# Patient Record
Sex: FEMALE | Race: WHITE | NOT HISPANIC OR LATINO | Employment: UNEMPLOYED | URBAN - METROPOLITAN AREA
[De-identification: names, ages, dates, MRNs, and addresses within clinical notes are randomized per-mention and may not be internally consistent; named-entity substitution may affect disease eponyms.]

---

## 2017-01-16 ENCOUNTER — OFFICE VISIT (OUTPATIENT)
Dept: OBSTETRICS AND GYNECOLOGY | Facility: CLINIC | Age: 77
End: 2017-01-16
Attending: OBSTETRICS & GYNECOLOGY
Payer: MEDICARE

## 2017-01-16 VITALS
WEIGHT: 154.31 LBS | SYSTOLIC BLOOD PRESSURE: 120 MMHG | HEIGHT: 68 IN | DIASTOLIC BLOOD PRESSURE: 70 MMHG | BODY MASS INDEX: 23.39 KG/M2

## 2017-01-16 DIAGNOSIS — Z79.890 POSTMENOPAUSAL HORMONE REPLACEMENT THERAPY: ICD-10-CM

## 2017-01-16 DIAGNOSIS — Z91.89 ENCOUNTER FOR GYNECOLOGIC EXAMINATION FOR HIGH-RISK PATIENT COVERED BY MEDICARE: Primary | ICD-10-CM

## 2017-01-16 DIAGNOSIS — Z12.31 SCREENING MAMMOGRAM FOR HIGH-RISK PATIENT: ICD-10-CM

## 2017-01-16 PROCEDURE — 99999 PR PBB SHADOW E&M-EST. PATIENT-LVL II: CPT | Mod: PBBFAC,,, | Performed by: OBSTETRICS & GYNECOLOGY

## 2017-01-16 PROCEDURE — G0101 CA SCREEN;PELVIC/BREAST EXAM: HCPCS | Mod: PBBFAC | Performed by: OBSTETRICS & GYNECOLOGY

## 2017-01-16 PROCEDURE — G0101 CA SCREEN;PELVIC/BREAST EXAM: HCPCS | Mod: S$PBB,,, | Performed by: OBSTETRICS & GYNECOLOGY

## 2017-01-16 PROCEDURE — 99212 OFFICE O/P EST SF 10 MIN: CPT | Mod: PBBFAC | Performed by: OBSTETRICS & GYNECOLOGY

## 2017-01-16 RX ORDER — ESTRADIOL AND NORETHINDRONE ACETATE .5; .1 MG/1; MG/1
1 TABLET ORAL DAILY
Qty: 90 TABLET | Refills: 3 | Status: SHIPPED | OUTPATIENT
Start: 2017-01-16 | End: 2017-07-03

## 2017-01-16 NOTE — PROGRESS NOTES
Subjective:       Patient ID: Shelly Vásquez is a 76 y.o. female.    Chief Complaint:  Annual Exam      History of Present Illness  HPI  Shelly Vásquez is a 76 y.o. female  here for her annual GYN exam.  She reports that she wants to stay on HRT because she feels better on it.  She describes her periods as stopped with menopause many years ago.  Denies break through bleeding.   Denies vaginal itching or irritation.  Denies vaginal discharge.  She is not currently sexually active. She has had 1 partner for 6 years .     History of abnormal pap: No  Last Pap: was normal  Last MMG: normal-May 2,2016:routine follow-up in 12 months  Last Colonoscopy:  colonoscopy 3 years ago without abnormalities.(2014- one sessile polyp, advised to return in 5 years)  Denies domestic violence. She does feel safe at home.     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     Social History Main Topics    Smoking status: Former Smoker     Packs/day: 1.00     Years: 22.00     Types: Cigarettes     Quit date: 1980    Smokeless tobacco: Never Used    Alcohol use 4.2 oz/week     7 Glasses of wine per week      Comment: Socially    Drug use: No    Sexual activity: Yes     Partners: Male     Birth control/ protection:  "Post-menopausal     Other Topics Concern    Not on file     Social History Narrative     Family History   Problem Relation Age of Onset    No Known Problems Father     No Known Problems Mother     No Known Problems Sister     No Known Problems Brother     No Known Problems Maternal Aunt     No Known Problems Maternal Uncle     No Known Problems Paternal Aunt     No Known Problems Paternal Uncle     No Known Problems Maternal Grandmother     No Known Problems Maternal Grandfather     No Known Problems Paternal Grandmother     No Known Problems Paternal Grandfather     Breast cancer Neg Hx     Colon cancer Neg Hx     Ovarian cancer Neg Hx     Amblyopia Neg Hx     Blindness Neg Hx     Cancer Neg Hx     Cataracts Neg Hx     Diabetes Neg Hx     Glaucoma Neg Hx     Hypertension Neg Hx     Macular degeneration Neg Hx     Retinal detachment Neg Hx     Strabismus Neg Hx     Stroke Neg Hx     Thyroid disease Neg Hx      OB History      Para Term  AB TAB SAB Ectopic Multiple Living    2 2 2       2          Visit Vitals    /70    Ht 5' 8" (1.727 m)    Wt 70 kg (154 lb 5.2 oz)    LMP  (LMP Unknown)    BMI 23.46 kg/m2           GYN & OB History  No LMP recorded (lmp unknown). Patient is postmenopausal.   Date of Last Pap: No result found    OB History    Para Term  AB SAB TAB Ectopic Multiple Living   2 2 2       2      # Outcome Date GA Lbr Marquez/2nd Weight Sex Delivery Anes PTL Lv   2 Term      Vag-Spont   Y   1 Term      Vag-Spont   Y          Review of Systems  Review of Systems   Constitutional: Negative for activity change, appetite change, fatigue and unexpected weight change.   HENT: Negative.    Eyes: Negative for visual disturbance.   Respiratory: Negative for shortness of breath and wheezing.    Cardiovascular: Negative for chest pain, palpitations and leg swelling.   Gastrointestinal: Negative for abdominal pain, bloating and blood in stool. "   Endocrine: Negative for diabetes, hair loss and hot flashes.   Genitourinary: Negative for decreased libido, dyspareunia and postmenopausal bleeding.   Musculoskeletal: Negative for back pain and joint swelling.   Skin:  Negative for no acne and hair changes.   Neurological: Negative for headaches.   Hematological: Does not bruise/bleed easily.   Psychiatric/Behavioral: Negative for depression and sleep disturbance. The patient is not nervous/anxious.    Breast: Negative for breast pain and nipple discharge          Objective:    Physical Exam:   Constitutional: She is oriented to person, place, and time. She appears well-developed and well-nourished.    HENT:   Head: Normocephalic and atraumatic.    Eyes: EOM are normal. Pupils are equal, round, and reactive to light.    Neck: Normal range of motion. Neck supple.    Cardiovascular: Normal rate and regular rhythm.     Pulmonary/Chest: Effort normal and breath sounds normal.   BREASTS: Symmetrical, no skin changes or visible lesions.  No palpable masses, nipple discharge bilaterally.          Abdominal: Soft. Bowel sounds are normal.     Genitourinary: Vagina normal. Pelvic exam was performed with patient supine.   Genitourinary Comments: PELVIC: Normal external genitalia without lesions.  Normal hair distribution.  Adequate perineal body, normal urethral meatus.  Vagina  Dry and poorly rugated, atrophic, without lesions or discharge.  Cervix pink, without lesions, discharge or tenderness.  No significant cystocele or rectocele.  Bimanual exam shows uterus to be normal size, regular, mobile and nontender.  Adnexa without masses or tenderness.    RECTAL:Deferred             Musculoskeletal: Normal range of motion and moves all extremeties.       Neurological: She is alert and oriented to person, place, and time.    Skin: Skin is warm and dry.    Psychiatric: She has a normal mood and affect.          Assessment:        1. Encounter for gynecologic examination for  high-risk patient covered by Medicare    2. Postmenopausal hormone replacement therapy    3. Screening mammogram for high-risk patient                Plan:      .1. Encounter for gynecologic examination for high-risk patient covered by Medicare  COUNSELING:  The patient was counseled today on osteoporosis prevention, calcium supplementation, and regular weight bearing exercise. The patient was also counseled today on ACS PAP guidelines, with recommendations for yearly pelvic exams unless their uterus, cervix, and ovaries were removed for benign reasons; in that case, examinations every 3-5 years are recommended. The patient was also counseled regarding monthly breast self-examination, routine STD screening for at-risk populations, prophylactic immunizations for transmitted infections such as HPV, Pertussis, or Influenza as appropriate, and yearly mammograms when indicated by ACS guidelines. She was advised to see her primary care physician for all other health maintenance.   FOLLOW-UP with me for next routine visit.         2. Postmenopausal hormone replacement therapy  A full discussion of the benefit-risk ratio of hormonal replacement therapy was carried out. Improvement in vasomotor and other climacteric symptoms is discussed, including possible improvements in sleep and mood. Reduction of risk for osteoporosis was explained. We discussed the study data showing increased risk of thrombo-embolic events such as myocardial infarction, stroke and also possibly breast cancer with estrogen replacement, and how this might affect her. The range of side effects such as breast tenderness, weight gain and including possible increases in lifetime risk of breast cancer and possible thrombotic complications was discussed. We also discussed ACOG's recommendation to use hormone replacement therapy for the relief of hot flashes alone and to be on the lowest dose possible for the shortest amount of time.  Alternative such as  herbal and soy-based products were reviewed. All of her questions about this therapy were answered.    - estradiol-norethindrone acet 0.5-0.1 mg per tablet; Take 1 tablet by mouth once daily.  Dispense: 90 tablet; Refill: 3    3. Screening mammogram for high-risk patient         Return in about 2 years (around 1/16/2019).

## 2017-01-19 ENCOUNTER — OFFICE VISIT (OUTPATIENT)
Dept: OPTOMETRY | Facility: CLINIC | Age: 77
End: 2017-01-19
Payer: MEDICARE

## 2017-01-19 ENCOUNTER — OFFICE VISIT (OUTPATIENT)
Dept: OPTOMETRY | Facility: CLINIC | Age: 77
End: 2017-01-19

## 2017-01-19 DIAGNOSIS — Z46.0 FITTING AND ADJUSTMENT OF SPECTACLES AND CONTACT LENSES: Primary | ICD-10-CM

## 2017-01-19 DIAGNOSIS — H52.4 PRESBYOPIA: ICD-10-CM

## 2017-01-19 DIAGNOSIS — H25.13 SENILE NUCLEAR SCLEROSIS, BILATERAL: Primary | ICD-10-CM

## 2017-01-19 DIAGNOSIS — Z13.5 GLAUCOMA SCREENING: ICD-10-CM

## 2017-01-19 PROCEDURE — 92014 COMPRE OPH EXAM EST PT 1/>: CPT | Mod: S$GLB,,, | Performed by: OPTOMETRIST

## 2017-01-19 PROCEDURE — 92015 DETERMINE REFRACTIVE STATE: CPT | Mod: S$GLB,,, | Performed by: OPTOMETRIST

## 2017-01-19 PROCEDURE — 92310 CONTACT LENS FITTING OU: CPT | Mod: S$GLB,,, | Performed by: OPTOMETRIST

## 2017-01-19 PROCEDURE — 99999 PR PBB SHADOW E&M-EST. PATIENT-LVL II: CPT | Mod: PBBFAC,,, | Performed by: OPTOMETRIST

## 2017-01-19 NOTE — PROGRESS NOTES
HPI     DLS: 12/07/2015 Dr. Esquivel    Patient is here for her annual exam. She states she has been doing well   with her CTL's. She states she wears one lens in the left eye for reading.   Denies flashes, floaters, diplopia, photophobia, glare, HA's, or pain.         Last edited by Juan R Esquivel, OD on 1/19/2017  2:26 PM.         Assessment /Plan     For exam results, see Encounter Report.    Senile nuclear sclerosis, bilateral  -Educated patient on presence of cataracts at today's exam, monitor at annual dilated fundus exam. 5+ years surgical estimate.    Glaucoma screening  -Monitor with annual eye exam and IOP check    Presbyopia  Eyeglass Final Rx     Eyeglass Final Rx      Sphere Cylinder Axis Add   Right [place] Sphere  +2.75   Left +0.25 +0.50 010 +2.75       Expiration Date:  1/20/2018              Contact Lens Final Rx     Final Contact Lens Rx      Brand Base Curve Diameter Sphere Cylinder   Right - - -     Left Acuvue Oasys 1 day 8.5 14.3 +3.00 Sphere       Expiration Date:  1/20/2018    Replacement:  Daily    Wearing Schedule:  Daily wear                  RTC 1 yr

## 2017-01-19 NOTE — MR AVS SNAPSHOT
[facility]  [address]  [facility] [address]  Phone: [phone]  Fax: [phone]                  Shelly Vásquez   2017 1:30 PM   Office Visit    Description:  Female : 1940   Provider:  Juan R Esquivel OD   Department:  [facility]           Reason for Visit     Cataract           Diagnoses this Visit        Comments    Senile nuclear sclerosis, bilateral    -  Primary     Glaucoma screening         Presbyopia                To Do List           Future Appointments        Provider Department Dept Phone    2017 2:00 PM [facility] MRI1 [address] [facility] [phone]    2017 3:00 PM Chaka Martin MD [facility] [phone]      Goals (5 Years of Data)     None      Follow-Up and Disposition     Return in about 1 year (around 2018).      [facility]     [facility] Care [facility] -  Assistance  Registered nurses in the [facility] provide clinical advisement, health education, appointment booking, and other advisory services.  Call for this free service at [phone].             Medications           Message regarding Medications     Verify the changes and/or additions to your medication regime listed below are the same as discussed with your clinician today.  If any of these changes or additions are incorrect, please notify your healthcare provider.             Verify that the below list of medications is an accurate representation of the medications you are currently taking.  If none reported, the list may be blank. If incorrect, please contact your healthcare provider. Carry this list with you in case of emergency.           Current Medications     acetaminophen (TYLENOL) 325 MG tablet Take 325 mg by mouth continuous prn for Pain.    epinephrine (EPIPEN) 0.3 mg/0.3 mL (1:1,000) AtIn Inject 1 each into the muscle once as needed (for stinging insect allergy).      estradiol-norethindrone acet 0.5-0.1 mg per tablet Take 1 tablet by mouth once daily.    gabapentin (NEURONTIN) 300 MG capsule Take 300 mg by mouth.    sucralfate (CARAFATE) 100 mg/mL suspension Take 10 mLs (1 g total) by mouth every 6 (six) hours.    temazepam (RESTORIL) 15 mg Cap Take 1 capsule (15 mg total) by mouth nightly.           Clinical Reference Information           Vital Signs - Last Recorded     LMP                   (LMP Unknown)           Allergies as of 1/19/2017     No Known Allergies      Immunizations Administered on Date of Encounter - 1/19/2017     None

## 2017-01-24 ENCOUNTER — PATIENT MESSAGE (OUTPATIENT)
Dept: OBSTETRICS AND GYNECOLOGY | Facility: CLINIC | Age: 77
End: 2017-01-24

## 2017-01-24 ENCOUNTER — OFFICE VISIT (OUTPATIENT)
Dept: SPINE | Facility: CLINIC | Age: 77
End: 2017-01-24
Attending: NEUROLOGICAL SURGERY
Payer: MEDICARE

## 2017-01-24 ENCOUNTER — HOSPITAL ENCOUNTER (OUTPATIENT)
Dept: RADIOLOGY | Facility: OTHER | Age: 77
Discharge: HOME | End: 2017-01-24
Attending: NEUROLOGICAL SURGERY
Payer: MEDICARE

## 2017-01-24 VITALS
DIASTOLIC BLOOD PRESSURE: 54 MMHG | BODY MASS INDEX: 23.34 KG/M2 | HEIGHT: 68 IN | SYSTOLIC BLOOD PRESSURE: 105 MMHG | HEART RATE: 68 BPM | WEIGHT: 154 LBS

## 2017-01-24 DIAGNOSIS — M48.061 SPINAL STENOSIS, LUMBAR: ICD-10-CM

## 2017-01-24 DIAGNOSIS — M43.16 SPONDYLOLISTHESIS AT L4-L5 LEVEL: Primary | ICD-10-CM

## 2017-01-24 PROCEDURE — 99212 OFFICE O/P EST SF 10 MIN: CPT | Mod: PBBFAC | Performed by: NEUROLOGICAL SURGERY

## 2017-01-24 PROCEDURE — 99214 OFFICE O/P EST MOD 30 MIN: CPT | Mod: S$PBB,,, | Performed by: NEUROLOGICAL SURGERY

## 2017-01-24 PROCEDURE — 72148 MRI LUMBAR SPINE W/O DYE: CPT | Mod: 26,,, | Performed by: RADIOLOGY

## 2017-01-24 PROCEDURE — 99999 PR PBB SHADOW E&M-EST. PATIENT-LVL II: CPT | Mod: PBBFAC,,, | Performed by: NEUROLOGICAL SURGERY

## 2017-01-24 NOTE — PROGRESS NOTES
CHIEF COMPLAINT:    Follow-up: Low Back Pain    HPI:    Shelly Vásquez is a 76 y.o.-year-old female who presents for neurosurgical follow-up. I last saw Shelly Vásquez on 11/29/16, and at that time she was complaining of pain across the whole lower back and right > left leg pain. I sent the patient for new MRI and follow up appointment. The patient's symptoms are now worse especially right leg pain. Today She is complaining of pain across the whole lower back moving into the L4 and L5 with associated paresthesias. She denies any new onset of weakness. The patient describes the pain as sharp, stabbing, aching and numbness and tingling. The patient rates the pain 7 on the pain scale. The patient has now been suffering with this pain for 1 year(s), and it originally started gradually. The pain is worse with sitting and driving and better with walking. The patient currently denies any changes in bowel or bladder control.      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     Past Surgical History   Procedure Laterality Date    Dilation and curettage of uterus  2008     PMB    Endometrial biopsy      Beast lift  2004    Tubal ligation      Tonsillectomy, adenoidectomy  age 8    Appendectomy  age 21    Eye surgery      Ptsosis       Family History   Problem Relation Age of Onset    No Known Problems Father     No Known Problems Mother     No Known Problems Sister     No Known Problems Brother     No Known Problems Maternal Aunt     No Known Problems Maternal Uncle     No Known Problems Paternal Aunt     No Known Problems Paternal Uncle     No Known Problems  Maternal Grandmother     No Known Problems Maternal Grandfather     No Known Problems Paternal Grandmother     No Known Problems Paternal Grandfather     Breast cancer Neg Hx     Colon cancer Neg Hx     Ovarian cancer Neg Hx     Amblyopia Neg Hx     Blindness Neg Hx     Cancer Neg Hx     Cataracts Neg Hx     Diabetes Neg Hx     Glaucoma Neg Hx     Hypertension Neg Hx     Macular degeneration Neg Hx     Retinal detachment Neg Hx     Strabismus Neg Hx     Stroke Neg Hx     Thyroid disease Neg Hx      Social History   Substance Use Topics    Smoking status: Former Smoker     Packs/day: 1.00     Years: 22.00     Types: Cigarettes     Quit date: 7/27/1980    Smokeless tobacco: Never Used    Alcohol use 4.2 oz/week     7 Glasses of wine per week      Comment: Socially        Review of Systems:  Constitutional: no fever or chills, pain well controlled  Eyes: no visual changes  ENT: no nasal congestion or sore throat  Respiratory: no cough or shortness of breath  Cardiovascular: no chest pain or palpitations  Gastrointestinal: no nausea or vomiting, tolerating diet  Genitourinary: no hematuria or dysuria  Integument/Breast: no rash or pruritis  Hematologic/Lymphatic: no easy bruising or lymphadenopathy  Musculoskeletal: no arthralgias or myalgias, positive for back pain  Neurological: no seizures or tremors  Behavioral/Psych: no auditory or visual hallucinations  Endocrine: no heat or cold intolerance    Review of Systems    OBJECTIVE:     Vital Signs (Most Recent)       Physical Exam:    Physical Exam:  Nursing note and vitals reviewed.    Constitutional: She appears well-developed and well-nourished.     Eyes: Pupils are equal, round, and reactive to light. Conjunctivae and EOM are normal.     Cardiovascular: Normal rate and intact carotid pulses.     Abdominal: Bowel sounds are normal.     Psych/Behavior: She is alert. She is oriented to person, place, and time. She has a normal mood and affect.      Musculoskeletal: Gait is normal.        Neck: Range of motion is full. Muscle strength is 5/5. Tone is normal.        Back: Range of motion is full. Muscle strength is 5/5. Tone is normal.        Right Upper Extremities: Range of motion is full. Muscle strength is 5/5. Tone is normal.        Left Upper Extremities: Range of motion is full. Muscle strength is 5/5. Tone is normal.       Right Lower Extremities: Range of motion is limited. Muscle strength is 5/5. Tone is normal.        Left Lower Extremities: Range of motion is full. Muscle strength is 5/5. Tone is normal.     Neurological:        DTRs: DTRs are DTRS NORMAL AND SYMMETRICnormal and symmetric.        Cranial nerves: Cranial nerve(s) II, III, IV, V, VI, VII, VIII, IX, X, XI and XII are intact.       Laboratory  none    Diagnostic Results:  MRI: Reviewed  L-spine: L4-5 grade I spondylolisthesis with moderate spinal stenosis     ASSESSMENT/PLAN:     Impression- symptomatic L4-5 spinal stenosis with right L5 radiculopathy less so neurogenic claudication.     Plan- Right MIS L4-5 laminectomy. All attendant risks, benefits and potential complications of the anticipated surgery were dicussed and patient wants to proceed with surgery

## 2017-01-25 ENCOUNTER — PATIENT MESSAGE (OUTPATIENT)
Dept: INTERNAL MEDICINE | Facility: CLINIC | Age: 77
End: 2017-01-25

## 2017-01-25 ENCOUNTER — PATIENT MESSAGE (OUTPATIENT)
Dept: SPINE | Facility: CLINIC | Age: 77
End: 2017-01-25

## 2017-01-26 ENCOUNTER — TELEPHONE (OUTPATIENT)
Dept: SPINE | Facility: CLINIC | Age: 77
End: 2017-01-26

## 2017-01-26 DIAGNOSIS — M48.061 LUMBAR STENOSIS: Primary | ICD-10-CM

## 2017-01-30 ENCOUNTER — CLINICAL SUPPORT (OUTPATIENT)
Dept: NEUROSURGERY | Facility: CLINIC | Age: 77
End: 2017-01-30
Payer: MEDICARE

## 2017-01-30 NOTE — PROGRESS NOTES
Patient arrived for spine education class. Pre op and post op instructions given, medication refill policy explained in detail, copy provided; 2 week and 6 week post op appointments given. Surgery guide and hibiclens given with instructions for use. Patient made aware someone will call the day before surgery with arrival time for surgery. Patient given folder with appts and surgical procedure information. RN provided an at length discussion regarding infection control and handwashing, environmental safety and preparing home pre-operatively for post-op needs, pet hazards, pain management and therapeutic regimens, stool softener with narcotics and having available support post-op. Discussed discharge instructions with an emphasis on body mechanics, no heavy lifting greater than 10 pounds, incision care with bacitracin twice daily if applicable, no submerging in water, pat incision dry if gets wet, s/s infection that require clinic notification, no bending and twisting at area of incision. Verbalized understanding and agreed to comply. Encouraged to call the clinic with any further questions or concerns, number provided.

## 2017-02-01 ENCOUNTER — OFFICE VISIT (OUTPATIENT)
Dept: INTERNAL MEDICINE | Facility: CLINIC | Age: 77
End: 2017-02-01
Payer: MEDICARE

## 2017-02-01 VITALS
WEIGHT: 158.94 LBS | HEIGHT: 68 IN | HEART RATE: 65 BPM | BODY MASS INDEX: 24.09 KG/M2 | SYSTOLIC BLOOD PRESSURE: 126 MMHG | DIASTOLIC BLOOD PRESSURE: 72 MMHG | OXYGEN SATURATION: 98 %

## 2017-02-01 DIAGNOSIS — Z01.818 PREOPERATIVE EXAMINATION: Primary | ICD-10-CM

## 2017-02-01 DIAGNOSIS — M43.16 SPONDYLOLISTHESIS AT L4-L5 LEVEL: ICD-10-CM

## 2017-02-01 DIAGNOSIS — F51.01 PRIMARY INSOMNIA: ICD-10-CM

## 2017-02-01 DIAGNOSIS — Z87.19 HISTORY OF GI BLEED: ICD-10-CM

## 2017-02-01 PROCEDURE — 99999 PR PBB SHADOW E&M-EST. PATIENT-LVL III: CPT | Mod: PBBFAC,,, | Performed by: INTERNAL MEDICINE

## 2017-02-01 PROCEDURE — 99213 OFFICE O/P EST LOW 20 MIN: CPT | Mod: PBBFAC | Performed by: INTERNAL MEDICINE

## 2017-02-01 PROCEDURE — 99214 OFFICE O/P EST MOD 30 MIN: CPT | Mod: S$PBB,,, | Performed by: INTERNAL MEDICINE

## 2017-02-01 RX ORDER — GABAPENTIN 300 MG/1
300 CAPSULE ORAL NIGHTLY
Qty: 30 CAPSULE | Refills: 3 | Status: SHIPPED | OUTPATIENT
Start: 2017-02-01 | End: 2017-02-18 | Stop reason: ALTCHOICE

## 2017-02-01 NOTE — MR AVS SNAPSHOT
[facility]  [address]  [facility] [address]  Phone: [phone]  Fax: [phone]                  Shelly Vásquez   2017 2:00 PM   Office Visit    Description:  Female : 1940   Provider:  Joana Doty MD   Department:  [facility]           Reason for Visit     Pre-op Exam           Diagnoses this Visit        Comments    Preoperative examination    -  Primary     Spondylolisthesis at L4-L5 level         History of GI bleed         Primary insomnia                To Do List           Future Appointments        Provider Department Dept Phone    2017 1:30 PM PRE-ADMIT, [facility] [phone]    3/2/2017 10:00 AM RN, NEUROSURGERY [facility] [phone]    3/21/2017 5:00 PM Chaka Martin MD [facility] [phone]      Your Future Surgeries/Procedures     Feb 15, 2017   Surgery with Chaka Martin MD   [facility]    [address]  [facility] [id number] [phone]              Goals (5 Years of Data)     None      Follow-Up and Disposition     Return if symptoms worsen or fail to improve.    Follow-up and Disposition History      [facility]     [facility] -  Assistance  Registered nurses in the [facility] provide clinical advisement, health education, appointment booking, and other advisory services.  Call for this free service at [phone].             Medications           Message regarding Medications     Verify the changes and/or additions to your medication regime listed below are the same as discussed with your clinician today.  If any of these changes or additions are incorrect, please notify your healthcare provider.        STOP taking these medications     sucralfate (CARAFATE) 100 mg/mL suspension Take 10 mLs (1 g total) by mouth every 6 (six) hours.     "       Verify that the below list of medications is an accurate representation of the medications you are currently taking.  If none reported, the list may be blank. If incorrect, please contact your healthcare provider. Carry this list with you in case of emergency.           Current Medications     acetaminophen (TYLENOL) 325 MG tablet Take 325 mg by mouth continuous prn for Pain.    epinephrine (EPIPEN) 0.3 mg/0.3 mL (1:1,000) AtIn Inject 1 each into the muscle once as needed (for stinging insect allergy).     estradiol-norethindrone acet 0.5-0.1 mg per tablet Take 1 tablet by mouth once daily.    gabapentin (NEURONTIN) 300 MG capsule Take 300 mg by mouth.    temazepam (RESTORIL) 15 mg Cap Take 1 capsule (15 mg total) by mouth nightly.           Clinical Reference Information           Vital Signs - Last Recorded  Most recent update: 2/1/2017  2:12 PM by Marlene Cobb MA    BP Pulse Ht Wt LMP SpO2    126/72 65 5' 8" (1.727 m) 72.1 kg (158 lb 15.2 oz) (LMP Unknown) 98%    BMI                24.17 kg/m2          Blood Pressure          Most Recent Value    BP  126/72      Allergies as of 2/1/2017     Nsaids (Non-steroidal Anti-inflammatory Drug)      Immunizations Administered on Date of Encounter - 2/1/2017     None      "

## 2017-02-01 NOTE — PROGRESS NOTES
The patient, Shelly Vásquez , who is a 76 y.o.  female   presents for a preoperative exam.     Dr. Martin    HPI:  Patient is scheduled for L4-5 laminectomy-minimally invasive. She has no new problems.    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        Past Surgical History   Procedure Laterality Date    Dilation and curettage of uterus  2008     PMB    Endometrial biopsy      Beast lift  2004    Tubal ligation      Tonsillectomy, adenoidectomy  age 8    Appendectomy  age 21    Eye surgery      Ptsosis          Family History   Problem Relation Age of Onset    No Known Problems Father     No Known Problems Mother     No Known Problems Sister     No Known Problems Brother     No Known Problems Maternal Aunt     No Known Problems Maternal Uncle     No Known Problems Paternal Aunt     No Known Problems Paternal Uncle     No Known Problems Maternal Grandmother     No Known Problems Maternal Grandfather     No Known Problems Paternal Grandmother     No Known Problems Paternal Grandfather     Breast cancer Neg Hx     Colon cancer Neg Hx     Ovarian cancer Neg Hx     Amblyopia Neg Hx     Blindness Neg Hx     Cancer Neg Hx     Cataracts Neg Hx     Diabetes Neg Hx     Glaucoma Neg Hx     Hypertension Neg Hx     Macular degeneration Neg Hx     Retinal detachment Neg Hx     Strabismus Neg Hx     Stroke Neg Hx     Thyroid disease Neg Hx         History   Smoking Status    Former Smoker    Packs/day: 1.00    Years: 22.00    Types: Cigarettes    Quit date: 7/27/1980  "  Smokeless Tobacco    Never Used        Allergies as of 02/01/2017 - Albert as Reviewed 02/01/2017   Allergen Reaction Noted    Nsaids (non-steroidal anti-inflammatory drug)  02/01/2017        Current Outpatient Prescriptions on File Prior to Visit   Medication Sig Dispense Refill    acetaminophen (TYLENOL) 325 MG tablet Take 325 mg by mouth continuous prn for Pain.      epinephrine (EPIPEN) 0.3 mg/0.3 mL (1:1,000) AtIn Inject 1 each into the muscle once as needed (for stinging insect allergy).       estradiol-norethindrone acet 0.5-0.1 mg per tablet Take 1 tablet by mouth once daily. 90 tablet 3    gabapentin (NEURONTIN) 300 MG capsule Take 300 mg by mouth.  2    temazepam (RESTORIL) 15 mg Cap Take 1 capsule (15 mg total) by mouth nightly. 90 capsule 1    [DISCONTINUED] sucralfate (CARAFATE) 100 mg/mL suspension Take 10 mLs (1 g total) by mouth every 6 (six) hours. 1200 mL 0     No current facility-administered medications on file prior to visit.         Review of Systems - Negative , no new problemss    Vitals:    02/01/17 1410   BP: 126/72   Pulse: 65   SpO2: 98%   Weight: 72.1 kg (158 lb 15.2 oz)   Height: 5' 8" (1.727 m)     Body mass index is 24.17 kg/(m^2).     Neck: no masses  Lung: clear to auscultation and percussion  Cor: RRR without murmur  Abdomen: positive bowel sounds nontender  No swelling       Shelly Wilkinson was seen today for pre-op exam.    Diagnoses and all orders for this visit:    Preoperative examination: optimized    Spondylolisthesis at L4-L5 level: continue gabapentin 300 mg at bedtime    History of GI bleed: no NSAIDS    Primary insomnia: tamezepam     Iron deficiency anemia: after surgery will continue the iron  with red meat or iron twice a day.           Patient is cleared for anesthesia and surgery. Instructions for optimization of medical problems were given. All over the counter medications are to be stopped two week prior to surgery.    Special instructions: Avoid NSAIDS    Lab " Results   Component Value Date    WBC 5.17 01/28/2016    HGB 11.1 (L) 01/28/2016    HCT 33.8 (L) 01/28/2016     01/28/2016    CHOL 98 (L) 01/01/2016    TRIG 102 01/01/2016    HDL 37 (L) 01/01/2016    ALT 11 01/28/2016    AST 17 01/28/2016     01/28/2016    K 4.3 01/28/2016     01/28/2016    CREATININE 0.9 01/28/2016    BUN 20 01/28/2016    CO2 25 01/28/2016    TSH 1.134 04/30/2014    INR 1.0 01/06/2016    HGBA1C 5.4 01/01/2016        Lab Results   Component Value Date    ALT 11 01/28/2016    AST 17 01/28/2016    ALKPHOS 34 (L) 01/28/2016    BILITOT 0.3 01/28/2016          Ekg reviewed 2016

## 2017-02-09 ENCOUNTER — HOSPITAL ENCOUNTER (OUTPATIENT)
Dept: PREADMISSION TESTING | Facility: OTHER | Age: 77
Discharge: HOME | End: 2017-02-09
Attending: NEUROLOGICAL SURGERY
Payer: MEDICARE

## 2017-02-09 ENCOUNTER — ANESTHESIA EVENT (OUTPATIENT)
Dept: SURGERY | Facility: OTHER | Age: 77
End: 2017-02-09
Payer: MEDICARE

## 2017-02-09 VITALS
DIASTOLIC BLOOD PRESSURE: 69 MMHG | HEIGHT: 68 IN | OXYGEN SATURATION: 99 % | HEART RATE: 66 BPM | BODY MASS INDEX: 23.95 KG/M2 | SYSTOLIC BLOOD PRESSURE: 119 MMHG | TEMPERATURE: 98 F | WEIGHT: 158 LBS

## 2017-02-09 LAB
BASOPHILS # BLD AUTO: 0.03 K/UL
BASOPHILS NFR BLD: 0.5 %
DIFFERENTIAL METHOD: ABNORMAL
EOSINOPHIL # BLD AUTO: 0.1 K/UL
EOSINOPHIL NFR BLD: 1.9 %
ERYTHROCYTE [DISTWIDTH] IN BLOOD BY AUTOMATED COUNT: 14.1 %
HCT VFR BLD AUTO: 31.8 %
HGB BLD-MCNC: 10.7 G/DL
LYMPHOCYTES # BLD AUTO: 2.6 K/UL
LYMPHOCYTES NFR BLD: 42.3 %
MCH RBC QN AUTO: 33.9 PG
MCHC RBC AUTO-ENTMCNC: 33.6 %
MCV RBC AUTO: 101 FL
MONOCYTES # BLD AUTO: 0.6 K/UL
MONOCYTES NFR BLD: 10 %
NEUTROPHILS # BLD AUTO: 2.8 K/UL
NEUTROPHILS NFR BLD: 45.1 %
PLATELET # BLD AUTO: 322 K/UL
PMV BLD AUTO: 9.7 FL
RBC # BLD AUTO: 3.16 M/UL
WBC # BLD AUTO: 6.2 K/UL

## 2017-02-09 PROCEDURE — 36415 COLL VENOUS BLD VENIPUNCTURE: CPT

## 2017-02-09 PROCEDURE — 85025 COMPLETE CBC W/AUTO DIFF WBC: CPT

## 2017-02-09 RX ORDER — LIDOCAINE HYDROCHLORIDE 10 MG/ML
1 INJECTION, SOLUTION EPIDURAL; INFILTRATION; INTRACAUDAL; PERINEURAL ONCE
Status: CANCELLED | OUTPATIENT
Start: 2017-02-09 | End: 2017-02-09

## 2017-02-09 RX ORDER — PREGABALIN 75 MG/1
150 CAPSULE ORAL
Status: DISCONTINUED | OUTPATIENT
Start: 2017-02-09 | End: 2017-02-10 | Stop reason: HOSPADM

## 2017-02-09 RX ORDER — MIDAZOLAM HYDROCHLORIDE 5 MG/ML
4 INJECTION INTRAMUSCULAR; INTRAVENOUS ONCE
Status: CANCELLED | OUTPATIENT
Start: 2017-02-09

## 2017-02-09 RX ORDER — SODIUM CHLORIDE, SODIUM LACTATE, POTASSIUM CHLORIDE, CALCIUM CHLORIDE 600; 310; 30; 20 MG/100ML; MG/100ML; MG/100ML; MG/100ML
INJECTION, SOLUTION INTRAVENOUS CONTINUOUS
Status: CANCELLED | OUTPATIENT
Start: 2017-02-09

## 2017-02-09 NOTE — ANESTHESIA PREPROCEDURE EVALUATION
02/09/2017  Shelly Vásquez is a 76 y.o., female.    [facility] Anesthesia Evaluation    I have reviewed the Patient Summary Reports.    I have reviewed the Nursing Notes.   I have reviewed the Medications.     Review of Systems  Anesthesia Hx:  No problems with previous Anesthesia  History of prior surgery of interest to airway management or planning: Denies Family Hx of Anesthesia complications.   Denies Personal Hx of Anesthesia complications.   Social:  Former Smoker    Hematology/Oncology:         -- Anemia:   Cardiovascular:  Cardiovascular Normal     Pulmonary:  Pulmonary Normal    Hepatic/GI:   PUD,    Musculoskeletal:   Arthritis   Spine Disorders: lumbar    Endocrine:  Endocrine Normal        Physical Exam  General:  Well nourished    Airway/Jaw/Neck:  Airway Findings: Mouth Opening: Normal Tongue: Normal  General Airway Assessment: Adult  Mallampati: II  TM Distance: Normal, at least 6 cm      Dental:  Dental Findings: lower front caps, upper partial dentures        Mental Status:  Mental Status Findings:  Alert and Oriented, Cooperative         Anesthesia Plan  Type of Anesthesia, risks & benefits discussed:  Anesthesia Type:  general  Patient's Preference:   Intra-op Monitoring Plan:   Intra-op Monitoring Plan Comments:   Post Op Pain Control Plan:   Post Op Pain Control Plan Comments:   Induction:   Inhalation and IV  Beta Blocker:         Informed Consent: Patient understands risks and agrees with Anesthesia plan.  Questions answered. Anesthesia consent signed with patient.  ASA Score: 2     Day of Surgery Review of History & Physical:    H&P update referred to the surgeon.         Ready For Surgery From Anesthesia Perspective.

## 2017-02-09 NOTE — DISCHARGE INSTRUCTIONS
PRE-ADMIT TESTING -  [phone]    [address]  [facility]        OUTPATIENT SURGERY UNIT - [phone]    Your surgery has been scheduled at [facility]. We are pleased to have the opportunity to serve you. For Further Information please call [phone].    On the day of surgery please report to the Information Desk on the 1st floor.    CONTACT YOUR PHYSICIAN'S OFFICE THE DAY PRIOR TO YOUR SURGERY TO OBTAIN YOUR ARRIVAL TIME.     The evening before surgery do not eat anything after 9 p.m. ( this includes hard candy, chewing gum and mints).  You may have GATORADE, POWERADE AND WATER FROM 9 p.m. until leaving home to come to the hospital.   DO NOT DRINK ANY LIQUIDS ON THE WAY TO THE HOSPITAL.     SPECIAL MEDICATION INSTRUCTIONS: TAKE medications checked off by the Anesthesiologist on your Medication List.    Angiogram Patients: Take medications as instructed by your physician, including aspirin.     Surgery Patients:    If you take ASPIRIN - Your PHYSICIAN/SURGEON will need to inform you IF/OR when you need to stop taking aspirin prior to your surgery.     Do Not take any medications containing IBUPROFEN.  Do Not Wear any make-up or dark nail polish   (especially eye make-up) to surgery. If you come to surgery with makeup on you will be required to remove the makeup or nail polish.    Do not shave your surgical area at least 5 days prior to your surgery. The surgical prep will be performed at the hospital according to Infection Control regulations.    Leave all valuables at home.   Do Not wear any jewelry or watches, including any metal in body piercings.  Contact Lens must be removed before surgery. Either do not wear the contact lens or bring a case and solution for storage.  Please bring a container for eyeglasses or dentures as required.  Bring any paperwork your physician has provided, such as consent forms,  history and physicals, doctor's orders, etc.   Bring comfortable  clothes that are loose fitting to wear upon discharge. Take into consideration the type of surgery being performed.  Maintain your diet as advised per your physician the day prior to surgery.      Adequate rest the night before surgery is advised.   Park in the Parking lot behind the hospital or in the [place] [facility] across the street from the parking lot. Parking is complimentary.  If you will be discharged the same day as your procedure, please arrange for a responsible adult to drive you home or to accompany you if traveling by taxi.   YOU WILL NOT BE PERMITTED TO DRIVE OR TO LEAVE THE HOSPITAL ALONE AFTER SURGERY.   It is strongly recommended that you arrange for someone to remain with you for the first 24 hrs following your surgery.       Thank you for your cooperation.  The Staff of [facility].        Bathing Instructions                                                                 Please shower the evening before and morning of your procedure with    ANTIBACTERIAL SOAP. ( DIAL, etc )  Concentrate on the surgical area   for at least 3 minutes and rinse completely. Dry off as usual.   Do not use any deodorant, powder, body lotions, perfume, after shave or    cologne.

## 2017-02-09 NOTE — IP AVS SNAPSHOT
[facility] Location ([facility])  [address] [place], [place] [id number]  Phone: [phone]           Patient Discharge Instructions    Our goal is to set you up for success. This packet includes information on your condition, medications, and your home care. It will help you to care for yourself so you don't get sicker.     Please ask your nurse if you have any questions.        There are many details to remember when preparing for your surgery. Here is what you will need to do, please ask your nurse if there are more specific instructions and if you have any questions:    1. 24 hours before procedure Do not smoke or drink alcoholic beverages 24 hours prior to your procedure    2. Eating before procedure Do not eat or drink anything 8 hours before your procedure - this includes gum, mints, and candy.     3. Day of procedure Please remove all jewelry for the procedure. If you wear contact lenses, dentures, hearing aids or glasses, bring a container to put them in during your surgery and give to a family member for safekeeping.  If your doctor has scheduled you for an overnight stay, bring a small overnight bag with any personal items that you need.    4. After procedure Make arrangements in advance for transportation home by a responsible adult. It is not safe to drive a vehicle during the 24 hours following surgery.     PLEASE NOTE: You may be contacted the day before your surgery to confirm your surgery date and arrival time. The Surgery schedule has many variables which may affect the time of your surgery case. Family members should be available if your surgery time changes.                [facility]  Unless otherwise directed by your provider, please contact [facility], our nurse care line that is available for 24/7 assistance.     [phone] (toll-free)    Registered nurses in the [facility] provide clinical advisement, health education, appointment booking, and other  advisory services.                    ** Verify the list of medication(s) below is accurate and up to date. Carry this with you in case of emergency. If your medications have changed, please notify your healthcare provider.             Medication List      TAKE these medications        Additional Info                      acetaminophen 325 MG tablet   Commonly known as:  TYLENOL   Refills:  0   Dose:  325 mg    Instructions:  Take 325 mg by mouth continuous prn for Pain.     Begin Date    AM    Noon    PM    Bedtime       epinephrine 0.3 mg/0.3 mL Atin   Commonly known as:  EPIPEN   Refills:  0   Dose:  1 each    Instructions:  Inject 1 each into the muscle once as needed (for stinging insect allergy).     Begin Date    AM    Noon    PM    Bedtime       estradiol-norethindrone acet 0.5-0.1 mg per tablet   Quantity:  90 tablet   Refills:  3   Dose:  1 tablet    Instructions:  Take 1 tablet by mouth once daily.     Begin Date    AM    Noon    PM    Bedtime       gabapentin 300 MG capsule   Commonly known as:  NEURONTIN   Quantity:  30 capsule   Refills:  3   Dose:  300 mg    Instructions:  Take 1 capsule (300 mg total) by mouth every evening.     Begin Date    AM    Noon    PM    Bedtime       temazepam 15 mg Cap   Commonly known as:  RESTORIL   Quantity:  90 capsule   Refills:  1   Dose:  15 mg    Instructions:  Take 1 capsule (15 mg total) by mouth nightly.     Begin Date    AM    Noon    PM    Bedtime                  Please bring to all follow up appointments:    1. A copy of your discharge instructions.  2. All medicines you are currently taking in their original bottles.  3. Identification and insurance card.    Please arrive 15 minutes ahead of scheduled appointment time.    Please call 24 hours in advance if you must reschedule your appointment and/or time.        Your Scheduled Appointments     Mar 02, 2017 10:00 AM CST   Post OP with RN, NEUROSURGERY   [facility] 7th Fl ([address] ) [id number]  [facility] [address]   [phone]            Mar 24, 2017  2:30 PM CDT   Back & Spine Post-Op with Chaka Martin MD   [facility] ([facility])    [address]  [address]  [facility] [address]   [phone]              Your Future Surgeries/Procedures     Feb 15, 2017   Surgery with Chaka Martin MD   [facility]    [address]  [facility] [address] [phone]                  Discharge Instructions       PRE-ADMIT TESTING -  [phone]    [address]  [facility]        OUTPATIENT SURGERY UNIT - [phone]    Your surgery has been scheduled at [facility]. We are pleased to have the opportunity to serve you. For Further Information please call [phone].    On the day of surgery please report to the Information Desk on the 1st floor.    CONTACT YOUR PHYSICIAN'S OFFICE THE DAY PRIOR TO YOUR SURGERY TO OBTAIN YOUR ARRIVAL TIME.     The evening before surgery do not eat anything after 9 p.m. ( this includes hard candy, chewing gum and mints).  You may have GATORADE, POWERADE AND WATER FROM 9 p.m. until leaving home to come to the hospital.   DO NOT DRINK ANY LIQUIDS ON THE WAY TO THE HOSPITAL.     SPECIAL MEDICATION INSTRUCTIONS: TAKE medications checked off by the Anesthesiologist on your Medication List.    Angiogram Patients: Take medications as instructed by your physician, including aspirin.     Surgery Patients:    If you take ASPIRIN - Your PHYSICIAN/SURGEON will need to inform you IF/OR when you need to stop taking aspirin prior to your surgery.     Do Not take any medications containing IBUPROFEN.  Do Not Wear any make-up or dark nail polish   (especially eye make-up) to surgery. If you come to surgery with makeup on you will be required to remove the makeup or nail polish.    Do not shave your surgical area at least 5 days prior to your surgery. The surgical prep will  be performed at the hospital according to Infection Control regulations.    Leave all valuables at home.   Do Not wear any jewelry or watches, including any metal in body piercings.  Contact Lens must be removed before surgery. Either do not wear the contact lens or bring a case and solution for storage.  Please bring a container for eyeglasses or dentures as required.  Bring any paperwork your physician has provided, such as consent forms,  history and physicals, doctor's orders, etc.   Bring comfortable clothes that are loose fitting to wear upon discharge. Take into consideration the type of surgery being performed.  Maintain your diet as advised per your physician the day prior to surgery.      Adequate rest the night before surgery is advised.   Park in the Parking lot behind the hospital or in the [employer] [facility] across the street from the parking lot. Parking is complimentary.  If you will be discharged the same day as your procedure, please arrange for a responsible adult to drive you home or to accompany you if traveling by taxi.   YOU WILL NOT BE PERMITTED TO DRIVE OR TO LEAVE THE HOSPITAL ALONE AFTER SURGERY.   It is strongly recommended that you arrange for someone to remain with you for the first 24 hrs following your surgery.       Thank you for your cooperation.  The Staff of [facility].        Bathing Instructions                                                                 Please shower the evening before and morning of your procedure with    ANTIBACTERIAL SOAP. ( DIAL, etc )  Concentrate on the surgical area   for at least 3 minutes and rinse completely. Dry off as usual.   Do not use any deodorant, powder, body lotions, perfume, after shave or    cologne.                                                Admission Information     Date & Time Provider Department [facility]    2/9/2017  1:30 PM Chaka Martin MD [facility] [phone]      Care Providers      "Provider Role Specialty Primary office phone    Chaka Martin MD Attending Provider Neurosurgery [phone]      Your Vitals Were     BP Pulse Temp Height Weight Last Period    119/69 66 97.6 °F (36.4 °C) (Oral) 5' 8" (1.727 m) 71.7 kg (158 lb) (LMP Unknown)    SpO2 BMI             99% 24.02 kg/m2         Recent Lab Values        1/1/2016                           6:33 AM           A1C 5.4           Comment for A1C at  6:33 AM on 1/1/2016:  Reference Interval:  5.0 - 5.6 Normal   5.7 - 6.4 High Risk   > 6.5 Diabetic     Hgb A1c results are standardized based on the (NGSP) National   Glycohemoglobin Standardization Program.    Hemoglobin A1C levels are related to mean serum/plasma glucose   during the preceding 2-3 months.            Allergies as of 2/9/2017        Reactions    Nsaids (Non-steroidal Anti-inflammatory Drug)     GI Bleed      Advance Directives     An advance directive is a document which, in the event you are no longer able to make decisions for yourself, tells your healthcare team what kind of treatment you do or do not want to receive, or who you would like to make those decisions for you.  If you do not currently have an advance directive, [facility] encourages you to create one.  For more information call:  [phone]), [phone] ([phone]),  or log on to [email or web address].        Smoking Cessation     If you would like to quit smoking:   You may be eligible for free services if you are a [place] resident and started smoking cigarettes before September 1, 1988.  Call the Smoking Cessation Trust (SCT) toll free at (310) 552-6142 or (891) 291-5735.   Call 8-701-QUIT-NOW if you do not meet the above criteria.            Language Assistance Services     ATTENTION: Language assistance services are available, free of charge. Please call [phone].      ATENCIÓN: Si habla español, tiene a eric disposición servicios gratuitos de asistencia lingüística. Llame al " [phone].     TELMA Ý: N?u b?n nói Ti?ng Vi?t, có các d?ch v? h? tr? ngôn ng? mi?n phí dành cho b?n. G?i s? [phone].         [facility] complies with applicable Federal civil rights laws and does not discriminate on the basis of race, color, national origin, age, disability, or sex.

## 2017-02-10 ENCOUNTER — TELEPHONE (OUTPATIENT)
Dept: NEUROSURGERY | Facility: CLINIC | Age: 77
End: 2017-02-10

## 2017-02-10 NOTE — TELEPHONE ENCOUNTER
RN spoke with patient and advised to arrive at 10 am on Wed. 2/15/2017 for surgical procedure with Dr. Martin. Verbalized understanding, and agreed to comply. States she completed her pre-op appointment on yesterday. Thanked RN for calling.

## 2017-02-15 ENCOUNTER — SURGERY (OUTPATIENT)
Age: 77
End: 2017-02-15

## 2017-02-15 ENCOUNTER — ANESTHESIA (OUTPATIENT)
Dept: SURGERY | Facility: OTHER | Age: 77
End: 2017-02-15
Payer: MEDICARE

## 2017-02-15 ENCOUNTER — HOSPITAL ENCOUNTER (OUTPATIENT)
Facility: OTHER | Age: 77
Discharge: HOME | End: 2017-02-15
Attending: NEUROLOGICAL SURGERY | Admitting: NEUROLOGICAL SURGERY
Payer: MEDICARE

## 2017-02-15 VITALS
RESPIRATION RATE: 18 BRPM | SYSTOLIC BLOOD PRESSURE: 99 MMHG | TEMPERATURE: 98 F | BODY MASS INDEX: 23.95 KG/M2 | WEIGHT: 158 LBS | DIASTOLIC BLOOD PRESSURE: 56 MMHG | HEIGHT: 68 IN | HEART RATE: 58 BPM | OXYGEN SATURATION: 98 %

## 2017-02-15 DIAGNOSIS — M48.061 LUMBAR STENOSIS: ICD-10-CM

## 2017-02-15 LAB
ABO + RH BLD: NORMAL
ANION GAP SERPL CALC-SCNC: 8 MMOL/L
APTT BLDCRRT: 26.8 SEC
BASOPHILS # BLD AUTO: 0.03 K/UL
BASOPHILS NFR BLD: 0.7 %
BLD GP AB SCN CELLS X3 SERPL QL: NORMAL
BUN SERPL-MCNC: 17 MG/DL
CALCIUM SERPL-MCNC: 9 MG/DL
CHLORIDE SERPL-SCNC: 107 MMOL/L
CO2 SERPL-SCNC: 23 MMOL/L
CREAT SERPL-MCNC: 0.9 MG/DL
DIFFERENTIAL METHOD: ABNORMAL
EOSINOPHIL # BLD AUTO: 0.1 K/UL
EOSINOPHIL NFR BLD: 2.6 %
ERYTHROCYTE [DISTWIDTH] IN BLOOD BY AUTOMATED COUNT: 14.2 %
EST. GFR  (AFRICAN AMERICAN): >60 ML/MIN/1.73 M^2
EST. GFR  (NON AFRICAN AMERICAN): >60 ML/MIN/1.73 M^2
GLUCOSE SERPL-MCNC: 90 MG/DL
HCT VFR BLD AUTO: 34.2 %
HGB BLD-MCNC: 11.7 G/DL
INR PPP: 1
LYMPHOCYTES # BLD AUTO: 2.4 K/UL
LYMPHOCYTES NFR BLD: 56.5 %
MCH RBC QN AUTO: 34.3 PG
MCHC RBC AUTO-ENTMCNC: 34.2 %
MCV RBC AUTO: 100 FL
MONOCYTES # BLD AUTO: 0.4 K/UL
MONOCYTES NFR BLD: 9.3 %
NEUTROPHILS # BLD AUTO: 1.3 K/UL
NEUTROPHILS NFR BLD: 30.9 %
PLATELET # BLD AUTO: 326 K/UL
PMV BLD AUTO: 9.3 FL
POTASSIUM SERPL-SCNC: 4.4 MMOL/L
PROTHROMBIN TIME: 11.5 SEC
RBC # BLD AUTO: 3.41 M/UL
SODIUM SERPL-SCNC: 138 MMOL/L
WBC # BLD AUTO: 4.21 K/UL

## 2017-02-15 PROCEDURE — 86901 BLOOD TYPING SEROLOGIC RH(D): CPT

## 2017-02-15 PROCEDURE — 25000003 PHARM REV CODE 250: Performed by: ANESTHESIOLOGY

## 2017-02-15 PROCEDURE — 71000033 HC RECOVERY, INTIAL HOUR: Performed by: NEUROLOGICAL SURGERY

## 2017-02-15 PROCEDURE — 25000003 PHARM REV CODE 250: Performed by: NEUROLOGICAL SURGERY

## 2017-02-15 PROCEDURE — 63600175 PHARM REV CODE 636 W HCPCS: Performed by: ANESTHESIOLOGY

## 2017-02-15 PROCEDURE — 37000008 HC ANESTHESIA 1ST 15 MINUTES: Performed by: NEUROLOGICAL SURGERY

## 2017-02-15 PROCEDURE — 36000711: Performed by: NEUROLOGICAL SURGERY

## 2017-02-15 PROCEDURE — 37000009 HC ANESTHESIA EA ADD 15 MINS: Performed by: NEUROLOGICAL SURGERY

## 2017-02-15 PROCEDURE — 63047 LAM FACETEC & FORAMOT LUMBAR: CPT | Mod: GC,,, | Performed by: NEUROLOGICAL SURGERY

## 2017-02-15 PROCEDURE — 80048 BASIC METABOLIC PNL TOTAL CA: CPT

## 2017-02-15 PROCEDURE — 63600175 PHARM REV CODE 636 W HCPCS: Performed by: NEUROLOGICAL SURGERY

## 2017-02-15 PROCEDURE — 63600175 PHARM REV CODE 636 W HCPCS: Performed by: STUDENT IN AN ORGANIZED HEALTH CARE EDUCATION/TRAINING PROGRAM

## 2017-02-15 PROCEDURE — 86900 BLOOD TYPING SEROLOGIC ABO: CPT

## 2017-02-15 PROCEDURE — 25000003 PHARM REV CODE 250: Performed by: NURSE ANESTHETIST, CERTIFIED REGISTERED

## 2017-02-15 PROCEDURE — 71000015 HC POSTOP RECOV 1ST HR: Performed by: NEUROLOGICAL SURGERY

## 2017-02-15 PROCEDURE — 85610 PROTHROMBIN TIME: CPT

## 2017-02-15 PROCEDURE — 85025 COMPLETE CBC W/AUTO DIFF WBC: CPT

## 2017-02-15 PROCEDURE — 71000039 HC RECOVERY, EACH ADD'L HOUR: Performed by: NEUROLOGICAL SURGERY

## 2017-02-15 PROCEDURE — 63600175 PHARM REV CODE 636 W HCPCS: Performed by: NURSE ANESTHETIST, CERTIFIED REGISTERED

## 2017-02-15 PROCEDURE — 36000710: Performed by: NEUROLOGICAL SURGERY

## 2017-02-15 PROCEDURE — 85730 THROMBOPLASTIN TIME PARTIAL: CPT

## 2017-02-15 PROCEDURE — 71000016 HC POSTOP RECOV ADDL HR: Performed by: NEUROLOGICAL SURGERY

## 2017-02-15 PROCEDURE — 27201423 OPTIME MED/SURG SUP & DEVICES STERILE SUPPLY: Performed by: NEUROLOGICAL SURGERY

## 2017-02-15 RX ORDER — LIDOCAINE HCL/PF 100 MG/5ML
SYRINGE (ML) INTRAVENOUS
Status: DISCONTINUED | OUTPATIENT
Start: 2017-02-15 | End: 2017-02-15

## 2017-02-15 RX ORDER — PHENYLEPHRINE HYDROCHLORIDE 10 MG/ML
INJECTION INTRAVENOUS
Status: DISCONTINUED | OUTPATIENT
Start: 2017-02-15 | End: 2017-02-15

## 2017-02-15 RX ORDER — OXYCODONE HYDROCHLORIDE 5 MG/1
5 TABLET ORAL
Status: DISCONTINUED | OUTPATIENT
Start: 2017-02-15 | End: 2017-02-15 | Stop reason: HOSPADM

## 2017-02-15 RX ORDER — SODIUM CHLORIDE 0.9 % (FLUSH) 0.9 %
3 SYRINGE (ML) INJECTION
Status: DISCONTINUED | OUTPATIENT
Start: 2017-02-15 | End: 2017-02-15 | Stop reason: HOSPADM

## 2017-02-15 RX ORDER — DEXAMETHASONE SODIUM PHOSPHATE 4 MG/ML
INJECTION, SOLUTION INTRA-ARTICULAR; INTRALESIONAL; INTRAMUSCULAR; INTRAVENOUS; SOFT TISSUE
Status: DISCONTINUED | OUTPATIENT
Start: 2017-02-15 | End: 2017-02-15

## 2017-02-15 RX ORDER — FENTANYL CITRATE 50 UG/ML
25 INJECTION, SOLUTION INTRAMUSCULAR; INTRAVENOUS EVERY 5 MIN PRN
Status: DISCONTINUED | OUTPATIENT
Start: 2017-02-15 | End: 2017-02-15 | Stop reason: HOSPADM

## 2017-02-15 RX ORDER — CEFAZOLIN SODIUM 2 G/50ML
2 SOLUTION INTRAVENOUS
Status: COMPLETED | OUTPATIENT
Start: 2017-02-15 | End: 2017-02-15

## 2017-02-15 RX ORDER — LIDOCAINE HYDROCHLORIDE 10 MG/ML
1 INJECTION, SOLUTION EPIDURAL; INFILTRATION; INTRACAUDAL; PERINEURAL ONCE
Status: DISCONTINUED | OUTPATIENT
Start: 2017-02-15 | End: 2017-02-15 | Stop reason: HOSPADM

## 2017-02-15 RX ORDER — CEPHALEXIN 500 MG/1
500 CAPSULE ORAL 4 TIMES DAILY
Qty: 20 CAPSULE | Refills: 0 | Status: SHIPPED | OUTPATIENT
Start: 2017-02-15 | End: 2017-02-15

## 2017-02-15 RX ORDER — GLYCOPYRROLATE 0.2 MG/ML
INJECTION INTRAMUSCULAR; INTRAVENOUS
Status: DISCONTINUED | OUTPATIENT
Start: 2017-02-15 | End: 2017-02-15

## 2017-02-15 RX ORDER — SODIUM CHLORIDE 9 MG/ML
INJECTION, SOLUTION INTRAVENOUS CONTINUOUS
Status: DISCONTINUED | OUTPATIENT
Start: 2017-02-15 | End: 2017-02-15 | Stop reason: HOSPADM

## 2017-02-15 RX ORDER — SODIUM CHLORIDE, SODIUM LACTATE, POTASSIUM CHLORIDE, CALCIUM CHLORIDE 600; 310; 30; 20 MG/100ML; MG/100ML; MG/100ML; MG/100ML
INJECTION, SOLUTION INTRAVENOUS CONTINUOUS
Status: DISCONTINUED | OUTPATIENT
Start: 2017-02-15 | End: 2017-02-15 | Stop reason: HOSPADM

## 2017-02-15 RX ORDER — ONDANSETRON 2 MG/ML
4 INJECTION INTRAMUSCULAR; INTRAVENOUS ONCE AS NEEDED
Status: DISCONTINUED | OUTPATIENT
Start: 2017-02-15 | End: 2017-02-15 | Stop reason: HOSPADM

## 2017-02-15 RX ORDER — CEPHALEXIN 500 MG/1
500 CAPSULE ORAL 4 TIMES DAILY
Qty: 20 CAPSULE | Refills: 0 | Status: SHIPPED | OUTPATIENT
Start: 2017-02-15 | End: 2017-02-20

## 2017-02-15 RX ORDER — ACETAMINOPHEN 10 MG/ML
INJECTION, SOLUTION INTRAVENOUS
Status: DISCONTINUED | OUTPATIENT
Start: 2017-02-15 | End: 2017-02-15

## 2017-02-15 RX ORDER — BACITRACIN 50000 [IU]/1
INJECTION, POWDER, FOR SOLUTION INTRAMUSCULAR
Status: DISCONTINUED | OUTPATIENT
Start: 2017-02-15 | End: 2017-02-15 | Stop reason: HOSPADM

## 2017-02-15 RX ORDER — HYDROMORPHONE HYDROCHLORIDE 2 MG/ML
0.4 INJECTION, SOLUTION INTRAMUSCULAR; INTRAVENOUS; SUBCUTANEOUS EVERY 5 MIN PRN
Status: DISCONTINUED | OUTPATIENT
Start: 2017-02-15 | End: 2017-02-15 | Stop reason: HOSPADM

## 2017-02-15 RX ORDER — ROCURONIUM BROMIDE 10 MG/ML
INJECTION, SOLUTION INTRAVENOUS
Status: DISCONTINUED | OUTPATIENT
Start: 2017-02-15 | End: 2017-02-15

## 2017-02-15 RX ORDER — MEPERIDINE HYDROCHLORIDE 50 MG/ML
12.5 INJECTION INTRAMUSCULAR; INTRAVENOUS; SUBCUTANEOUS ONCE AS NEEDED
Status: DISCONTINUED | OUTPATIENT
Start: 2017-02-15 | End: 2017-02-15 | Stop reason: HOSPADM

## 2017-02-15 RX ORDER — DIAZEPAM 5 MG/1
5 TABLET ORAL EVERY 6 HOURS PRN
Qty: 90 TABLET | Refills: 0 | Status: SHIPPED | OUTPATIENT
Start: 2017-02-15 | End: 2017-03-02 | Stop reason: SDUPTHER

## 2017-02-15 RX ORDER — BUPIVACAINE HCL/EPINEPHRINE 0.5-1:200K
VIAL (ML) INJECTION
Status: DISCONTINUED | OUTPATIENT
Start: 2017-02-15 | End: 2017-02-15 | Stop reason: HOSPADM

## 2017-02-15 RX ORDER — ONDANSETRON 2 MG/ML
INJECTION INTRAMUSCULAR; INTRAVENOUS
Status: DISCONTINUED | OUTPATIENT
Start: 2017-02-15 | End: 2017-02-15

## 2017-02-15 RX ORDER — NEOSTIGMINE METHYLSULFATE 1 MG/ML
INJECTION, SOLUTION INTRAVENOUS
Status: DISCONTINUED | OUTPATIENT
Start: 2017-02-15 | End: 2017-02-15

## 2017-02-15 RX ORDER — OXYCODONE AND ACETAMINOPHEN 5; 325 MG/1; MG/1
2 TABLET ORAL EVERY 4 HOURS PRN
Qty: 90 TABLET | Refills: 0 | Status: SHIPPED | OUTPATIENT
Start: 2017-02-15 | End: 2017-02-18 | Stop reason: ALTCHOICE

## 2017-02-15 RX ORDER — FENTANYL CITRATE 50 UG/ML
INJECTION, SOLUTION INTRAMUSCULAR; INTRAVENOUS
Status: DISCONTINUED | OUTPATIENT
Start: 2017-02-15 | End: 2017-02-15

## 2017-02-15 RX ORDER — PROPOFOL 10 MG/ML
VIAL (ML) INTRAVENOUS
Status: DISCONTINUED | OUTPATIENT
Start: 2017-02-15 | End: 2017-02-15

## 2017-02-15 RX ORDER — MIDAZOLAM HYDROCHLORIDE 5 MG/ML
4 INJECTION INTRAMUSCULAR; INTRAVENOUS ONCE
Status: COMPLETED | OUTPATIENT
Start: 2017-02-15 | End: 2017-02-15

## 2017-02-15 RX ORDER — LIDOCAINE HYDROCHLORIDE AND EPINEPHRINE 5; 5 MG/ML; UG/ML
INJECTION, SOLUTION INFILTRATION; PERINEURAL
Status: DISCONTINUED | OUTPATIENT
Start: 2017-02-15 | End: 2017-02-15 | Stop reason: HOSPADM

## 2017-02-15 RX ADMIN — LIDOCAINE HYDROCHLORIDE AND EPINEPHRINE 20 ML: 5; 5 INJECTION, SOLUTION INFILTRATION; PERINEURAL at 02:02

## 2017-02-15 RX ADMIN — FENTANYL CITRATE 25 MCG: 50 INJECTION, SOLUTION INTRAMUSCULAR; INTRAVENOUS at 04:02

## 2017-02-15 RX ADMIN — SODIUM CHLORIDE, SODIUM LACTATE, POTASSIUM CHLORIDE, AND CALCIUM CHLORIDE: 600; 310; 30; 20 INJECTION, SOLUTION INTRAVENOUS at 12:02

## 2017-02-15 RX ADMIN — FENTANYL CITRATE 50 MCG: 50 INJECTION, SOLUTION INTRAMUSCULAR; INTRAVENOUS at 01:02

## 2017-02-15 RX ADMIN — PHENYLEPHRINE HYDROCHLORIDE 100 MCG: 10 INJECTION INTRAVENOUS at 02:02

## 2017-02-15 RX ADMIN — GLYCOPYRROLATE 0.6 MG: 0.2 INJECTION, SOLUTION INTRAMUSCULAR; INTRAVENOUS at 03:02

## 2017-02-15 RX ADMIN — OXYCODONE HYDROCHLORIDE 5 MG: 5 TABLET ORAL at 03:02

## 2017-02-15 RX ADMIN — BACITRACIN 50000 UNITS: 50000 INJECTION, POWDER, FOR SOLUTION INTRAMUSCULAR at 02:02

## 2017-02-15 RX ADMIN — FENTANYL CITRATE 25 MCG: 50 INJECTION, SOLUTION INTRAMUSCULAR; INTRAVENOUS at 03:02

## 2017-02-15 RX ADMIN — ACETAMINOPHEN 1000 MG: 10 INJECTION, SOLUTION INTRAVENOUS at 01:02

## 2017-02-15 RX ADMIN — CEFAZOLIN SODIUM 2 G: 2 SOLUTION INTRAVENOUS at 01:02

## 2017-02-15 RX ADMIN — THROMBIN, TOPICAL (BOVINE) 5000 UNITS: KIT at 02:02

## 2017-02-15 RX ADMIN — ONDANSETRON 4 MG: 2 INJECTION INTRAMUSCULAR; INTRAVENOUS at 01:02

## 2017-02-15 RX ADMIN — LIDOCAINE HYDROCHLORIDE 100 MG: 20 INJECTION, SOLUTION INTRAVENOUS at 01:02

## 2017-02-15 RX ADMIN — PROPOFOL 40 MG: 10 INJECTION, EMULSION INTRAVENOUS at 03:02

## 2017-02-15 RX ADMIN — SODIUM CHLORIDE, SODIUM LACTATE, POTASSIUM CHLORIDE, AND CALCIUM CHLORIDE: 600; 310; 30; 20 INJECTION, SOLUTION INTRAVENOUS at 02:02

## 2017-02-15 RX ADMIN — PROPOFOL 180 MG: 10 INJECTION, EMULSION INTRAVENOUS at 01:02

## 2017-02-15 RX ADMIN — ROCURONIUM BROMIDE 40 MG: 10 INJECTION, SOLUTION INTRAVENOUS at 01:02

## 2017-02-15 RX ADMIN — ROCURONIUM BROMIDE 10 MG: 10 INJECTION, SOLUTION INTRAVENOUS at 02:02

## 2017-02-15 RX ADMIN — DEXAMETHASONE SODIUM PHOSPHATE 4 MG: 4 INJECTION, SOLUTION INTRAMUSCULAR; INTRAVENOUS at 01:02

## 2017-02-15 RX ADMIN — Medication 1 G: at 02:02

## 2017-02-15 RX ADMIN — BUPIVACAINE HYDROCHLORIDE AND EPINEPHRINE BITARTRATE 30 ML: 5; .005 INJECTION, SOLUTION PERINEURAL at 02:02

## 2017-02-15 RX ADMIN — NEOSTIGMINE METHYLSULFATE 4 MG: 1 INJECTION INTRAVENOUS at 03:02

## 2017-02-15 RX ADMIN — MIDAZOLAM HYDROCHLORIDE 4 MG: 5 INJECTION, SOLUTION INTRAMUSCULAR; INTRAVENOUS at 10:02

## 2017-02-15 NOTE — DISCHARGE SUMMARY
[facility] [place], operated by [facility]  Discharge Summary  Neurosurgery    Admit Date: 2/15/2017    Discharge Date and Time:  02/15/2017 3:15 PM    Attending Physician: Chaka Martin MD     Discharge Physician: Gagandeep Marin    Reason for Admission: Neurogenic claudication    Procedures Performed: Procedure(s) (LRB):  L4-5 LAMINECTOMY-MINIMALLY INVASIVE (Right)    Hospital Course: pt with history of neurogenic claudication and RLE radiculopathy presented for elective L4/5 MIS decompression.  Pt tolerated the procedure well and there were no complications.  Pt will recover in PACU and then is stable for dc home to fu in clinic.    Consults: none    Significant Diagnostic Studies: none    Final Diagnoses:     Discharged Condition: good   Principal Problem: <principal problem not specified>   Secondary Diagnoses:   Active Hospital Problems    Diagnosis  POA    Lumbar stenosis [M48.06]  Yes      Resolved Hospital Problems    Diagnosis Date Resolved POA   No resolved problems to display.       Disposition: Home or Self Care    Follow Up/Patient Instructions:     Medications:  Reconciled Home Medications:   Current Discharge Medication List      CONTINUE these medications which have NOT CHANGED    Details   estradiol-norethindrone acet 0.5-0.1 mg per tablet Take 1 tablet by mouth once daily.  Qty: 90 tablet, Refills: 3    Associated Diagnoses: Postmenopausal hormone replacement therapy      gabapentin (NEURONTIN) 300 MG capsule Take 1 capsule (300 mg total) by mouth every evening.  Qty: 30 capsule, Refills: 3      temazepam (RESTORIL) 15 mg Cap Take 1 capsule (15 mg total) by mouth nightly.  Qty: 90 capsule, Refills: 1      acetaminophen (TYLENOL) 325 MG tablet Take 325 mg by mouth continuous prn for Pain.      epinephrine (EPIPEN) 0.3 mg/0.3 mL (1:1,000) AtIn Inject 1 each into the muscle once as needed (for stinging insect allergy).            No discharge procedures on file.

## 2017-02-15 NOTE — OR NURSING
Pt has good strong dorsiflexion and plantar flextion abduction and adduction stong and equal. . No numbness and tingling

## 2017-02-15 NOTE — ANESTHESIA POSTPROCEDURE EVALUATION
"Anesthesia Post Evaluation    Patient: Shelly Vásquez    Procedure(s) Performed: Procedure(s) (LRB):  L4-5 LAMINECTOMY-MINIMALLY INVASIVE (Right)    Final Anesthesia Type: general  Patient location during evaluation: PACU  Patient participation: Yes- Able to Participate  Level of consciousness: awake and alert  Post-procedure vital signs: reviewed and stable  Pain management: adequate  Airway patency: patent  PONV status at discharge: No PONV  Anesthetic complications: no      Cardiovascular status: blood pressure returned to baseline  Respiratory status: unassisted, spontaneous ventilation and room air  Hydration status: euvolemic  Follow-up not needed.        Visit Vitals    BP (!) 99/51    Pulse (!) 56    Temp 36.4 °C (97.5 °F) (Oral)    Resp 18    Ht 5' 8" (1.727 m)    Wt 71.7 kg (158 lb)    LMP  (LMP Unknown)    SpO2 98%    Breastfeeding No    BMI 24.02 kg/m2       Pain/Oscar Score: Pain Assessment Performed: Yes (2/15/2017  4:16 PM)  Presence of Pain: complains of pain/discomfort (2/15/2017  4:16 PM)  Pain Rating Prior to Med Admin: 5 (2/15/2017  4:16 PM)  Pain Rating Post Med Admin: 3 (2/15/2017  4:16 PM)  Oscar Score: 10 (2/15/2017  3:21 PM)      "

## 2017-02-15 NOTE — OP NOTE
DATE OF PROCEDURE: 2/15/2017     PREOPERATIVE DIAGNOSES:   Lumbar stenosis [M48.06]    POSTOPERATIVE DIAGNOSES:   Lumbar stenosis [M48.06]    PROCEDURES PERFORMED:   Procedure(s) (LRB):  L4-5 LAMINECTOMY-MINIMALLY INVASIVE (Right)    Surgeon(s) and Role:     * Chaka Martin MD - Primary  Resident- Gagandeep Marin DO    ANESTHESIA: General     ESTIMATED BLOOD LOSS: 30cc    CLINICAL HISTORY AND INDICATIONS FOR SURGERY: Shelly Vásquez is a 76 y.o. female presents with signs and symptoms of neurogenic claudication with right sided radiculopathy. she  failed nonsurgical treatment including spinal injections and physical therapy. We discussed surgery including all the attendant risks, benefits, and potential complications of surgery and she wanted to proceed with surgery and consented to surgery.       OPERATIVE PROCEDURE: The patient was brought in to the Operating Room, identified and general anesthesia was induced using endotracheal intubation.   All the required IV lines were placed. Thigh-high CHECO stockings and SCDs were placed for DVT prophylaxis. The patient was then gently logrolled in a prone   position on the Prem frame and all the neurocutaneous pressure points were checked and padded. The lumbosacral region was prepped and draped using sterile   technique. We then used AP x-ray to rahel the midline and the medial aspect of the L4-L5 pedicles and a paramedian incision was designed on the right side. Local anesthetic was infiltrated.       We then incised the skin, subcutaneous tissue, and the thoracolumbar fascia and   assembled the METRx tube over the L4-L5 disk space on the left side. We used AP and lateral x-ray for placement of the incision and the METRx tube. We then brought in the operating microscope. We used monopolar cautery to remove soft tissue overlying the lamina of L4-L5 as well as the medial L4-L5 facet. We then used a combination of high-speed drill and Kerrison of different  configuration to do a laminectomy at L4-L5 until we found the attachment of ligamentum flavum. We also drilled off the medial 1/3 facet to decompress the lateral recess. We peeled the ligamentum flavum inferolaterally and removed it in piecemeal using Kerrison of different configuration. We then were able to see the lateral aspect of the thecal sac, fully decompressing the left side of the thecal sac.We used bipolar cautery and Gelfoam soaked in thrombin for hemostasis throughout the surgery. Once we had good decompression of the left side. We then angled our METRx tube medially and drilled the base of the spinous process through the contralateral lamina. We then used Kerrison of different configuration to remove the contralateral ligamentum flavum and the lamina, fully decompressing the L4-L5 spinal level. We used bipolar cautery for hemostasis throughout and Gelfoam soaked in thrombin.     We then began to remove the METRx tube and cauterized any bleeding points on our way out. We used 0 Vicryl suture to approximate the thoracolumbar fascia. We   used 2-0 Vicryl stitch for the subcuticular layer and skin glue to approximate the skin edges. We applied bacitracin ointment, Telfa and Tegaderm for final dressing of the incisions.     The patient was repositioned supine on the hospital bed, weaned off general anesthesia and extubated. she  was moved to the Recovery Room in stable condition.

## 2017-02-15 NOTE — DISCHARGE INSTRUCTIONS
Anesthesia: After Your Surgery  Youve just had surgery. During surgery, you received medication called anesthesia to keep you comfortable and pain-free. After surgery, you may experience some pain or nausea. This is common. Here are some tips for feeling better and recovering after surgery.    Going home  Your doctor or nurse will show you how to take care of yourself when you go home. He or she will also answer your questions. Have an adult family member or friend drive you home. For the first 24 hours after your surgery:  · Do not drive or use heavy equipment.  · Do not make important decisions or sign legal documents.  · Avoid alcohol.  · Have someone stay with you, if needed. He or she can watch for problems and help keep you safe.  Be sure to keep all follow-up appointments with your doctor. And rest after your procedure for as long as your doctor tells you to.    Coping with pain  If you have pain after surgery, pain medication will help you feel better. Take it as directed, before pain becomes severe. Also, ask your doctor or pharmacist about other ways to control pain, such as with heat, ice, and relaxation. And follow any other instructions your surgeon or nurse gives you.    Tips for taking pain medication  To get the best relief possible, remember these points:  · Pain medications can upset your stomach. Taking them with a little food may help.  · Most pain relievers taken by mouth need at least 20 to 30 minutes to take effect.  · Taking medication on a schedule can help you remember to take it. Try to time your medication so that you can take it before beginning an activity, such as dressing, walking, or sitting down for dinner.  · Constipation is a common side effect of pain medications. Contact your doctor before taking any medications like laxatives or stool softeners to help relieve constipation. Also ask about any dietary restrictions, because drinking lots of fluids and eating foods like fruits  and vegetables that are high in fiber can also help. Remember, dont take laxatives unless your surgeon has prescribed them.  · Mixing alcohol and pain medication can cause dizziness and slow your breathing. It can even be fatal. Dont drink alcohol while taking pain medication.  · Pain medication can slow your reflexes. Dont drive or operate machinery while taking pain medication.  If your health care provider tells you to take acetaminophen to help relieve your pain, ask him or her how much you are supposed to take each day. (Acetaminophen is the generic name for Tylenol and other brand-name pain relievers.) Acetaminophen or other pain relievers may interact with your prescription medicines or other over-the-counter (OTC) drugs. Some prescription medications contain acetaminophen along with other active ingredients. Using both prescription and OTC acetaminophen for pain can cause you to overdose. The FDA recommends that you read the labels on your OTC medications carefully. This will help you to clearly understand the list of active ingredients, dosing instructions, and any warnings. It may also help you avoid taking too much acetaminophen. If you have questions or don't understand the information, ask your pharmacist or health care provider to explain it to you before you take the OTC medication.    Managing nausea  Some people have an upset stomach after surgery. This is often due to anesthesia, pain, pain medications, or the stress of surgery. The following tips will help you manage nausea and get good nutrition as you recover. If you were on a special diet before surgery, ask your doctor if you should follow it during recovery. These tips may help:  · Dont push yourself to eat. Your body will tell you when to eat and how much.  · Start off with clear liquids and soup. They are easier to digest.  · Progress to semi-solid foods (mashed potatoes, applesauce, and gelatin) as you feel ready.  · Slowly move to  solid foods. Dont eat fatty, rich, or spicy foods at first.  · Dont force yourself to have three large meals a day. Instead, eat smaller amounts more often.  · Take pain medications with a small amount of solid food, such as crackers or toast to avoid nausea.      Call your surgeon if  · You still have pain an hour after taking medication (it may not be strong enough).  · You feel too sleepy, dizzy, or groggy (medication may be too strong).  · You have side effects like nausea, vomiting, or skin changes (rash, itching, or hives).   © 3211-3393 [employer]. [address] [place] [address], [place] [address]. All rights reserved. This information is not intended as a substitute for professional medical care. Always follow your healthcare professional's instructions.

## 2017-02-15 NOTE — PLAN OF CARE
Patient prefers to have  Morgan spouse present for discharge teaching. Please contact them @ [phone].

## 2017-02-15 NOTE — IP AVS SNAPSHOT
[facility] Location ([facility])  [address] [place] [address]  Phone: [phone]           Patient Discharge Instructions     Our goal is to set you up for success. This packet includes information on your condition, medications, and your home care. It will help you to care for yourself so you don't get sicker and need to go back to the hospital.     Please ask your nurse if you have any questions.        There are many details to remember when preparing to leave the hospital. Here is what you will need to do:    1. Take your medicine. If you are prescribed medications, review your Medication List in the following pages. You may have new medications to  at the pharmacy and others that you'll need to stop taking. Review the instructions for how and when to take your medications. Talk with your doctor or nurses if you are unsure of what to do.     2. Go to your follow-up appointments. Specific follow-up information is listed in the following pages. Your may be contacted by a transition nurse or clinical provider about future appointments. Be sure we have all of the phone numbers to reach you, if needed. Please contact your provider's office if you are unable to make an appointment.     3. Watch for warning signs. Your doctor or nurse will give you detailed warning signs to watch for and when to call for assistance. These instructions may also include educational information about your condition. If you experience any of warning signs to your health, call your doctor.               ** Verify the list of medication(s) below is accurate and up to date. Carry this with you in case of emergency. If your medications have changed, please notify your healthcare provider.             Medication List      START taking these medications        Additional Info                      cephALEXin 500 MG capsule   Commonly known as:  KEFLEX   Quantity:  20 capsule   Refills:  0   Dose:  500 mg     Instructions:  Take 1 capsule (500 mg total) by mouth 4 (four) times daily.     Begin Date    AM    Noon    PM    Bedtime       diazePAM 5 MG tablet   Commonly known as:  VALIUM   Quantity:  90 tablet   Refills:  0   Dose:  5 mg    Instructions:  Take 1 tablet (5 mg total) by mouth every 6 (six) hours as needed (muscle spasm).     Begin Date    AM    Noon    PM    Bedtime       oxycodone-acetaminophen 5-325 mg per tablet   Commonly known as:  PERCOCET   Quantity:  90 tablet   Refills:  0   Dose:  2 tablet    Instructions:  Take 2 tablets by mouth every 4 (four) hours as needed for Pain.     Begin Date    AM    Noon    PM    Bedtime         CONTINUE taking these medications        Additional Info                      acetaminophen 325 MG tablet   Commonly known as:  TYLENOL   Refills:  0   Dose:  325 mg    Instructions:  Take 325 mg by mouth continuous prn for Pain.     Begin Date    AM    Noon    PM    Bedtime       epinephrine 0.3 mg/0.3 mL Atin   Commonly known as:  EPIPEN   Refills:  0   Dose:  1 each    Instructions:  Inject 1 each into the muscle once as needed (for stinging insect allergy).     Begin Date    AM    Noon    PM    Bedtime       estradiol-norethindrone acet 0.5-0.1 mg per tablet   Quantity:  90 tablet   Refills:  3   Dose:  1 tablet    Instructions:  Take 1 tablet by mouth once daily.     Begin Date    AM    Noon    PM    Bedtime       gabapentin 300 MG capsule   Commonly known as:  NEURONTIN   Quantity:  30 capsule   Refills:  3   Dose:  300 mg    Instructions:  Take 1 capsule (300 mg total) by mouth every evening.     Begin Date    AM    Noon    PM    Bedtime       temazepam 15 mg Cap   Commonly known as:  RESTORIL   Quantity:  90 capsule   Refills:  1   Dose:  15 mg    Instructions:  Take 1 capsule (15 mg total) by mouth nightly.     Begin Date    AM    Noon    PM    Bedtime            Where to Get Your Medications      You can get these medications from any pharmacy     Bring a paper  prescription for each of these medications     cephALEXin 500 MG capsule    diazePAM 5 MG tablet    oxycodone-acetaminophen 5-325 mg per tablet                  Please bring to all follow up appointments:    1. A copy of your discharge instructions.  2. All medicines you are currently taking in their original bottles.  3. Identification and insurance card.    Please arrive 15 minutes ahead of scheduled appointment time.    Please call 24 hours in advance if you must reschedule your appointment and/or time.        Your Scheduled Appointments     Mar 02, 2017 10:00 AM CST   Post OP with RN, NEUROSURGERY   [facility] 7th Fl ([facility] )    [address]  [facility] [address]   [phone]            Mar 24, 2017  2:30 PM CDT   Back & Spine Post-Op with Chaka Martin MD   [facility]ine [facility] ([facility])    [address]  [address]  [facility] [id number] [phone]              Follow-up Information     Follow up with Chaka Martin MD In 2 weeks.    Specialties:  Neurosurgery, Spine Surgery    Why:  For wound re-check    Contact information:    [address]  [facility] [address]  [phone]          Discharge Instructions     Future Orders    Activity as tolerated     Call MD for:  difficulty breathing or increased cough     Call MD for:  increased confusion or weakness     Call MD for:  persistent dizziness, light-headedness, or visual disturbances     Call MD for:  persistent nausea and vomiting or diarrhea     Call MD for:  redness, tenderness, or signs of infection (pain, swelling, redness, odor or green/yellow discharge around incision site)     Call MD for:  severe persistent headache     Call MD for:  severe uncontrolled pain     Call MD for:  worsening rash     Diet general     Questions:    Total calories:      Fat restriction, if any:      Protein restriction, if any:      Na restriction, if any:      Fluid restriction:      Additional restrictions:       No dressing needed     Comments:    Pt may shower tomorrow but shouldn't soak wound.  Pat wound dry and don't rub it.  Please don't apply gels, creams, ointments to wound.        Discharge Instructions         Anesthesia: After Your Surgery  Youve just had surgery. During surgery, you received medication called anesthesia to keep you comfortable and pain-free. After surgery, you may experience some pain or nausea. This is common. Here are some tips for feeling better and recovering after surgery.    Going home  Your doctor or nurse will show you how to take care of yourself when you go home. He or she will also answer your questions. Have an adult family member or friend drive you home. For the first 24 hours after your surgery:  · Do not drive or use heavy equipment.  · Do not make important decisions or sign legal documents.  · Avoid alcohol.  · Have someone stay with you, if needed. He or she can watch for problems and help keep you safe.  Be sure to keep all follow-up appointments with your doctor. And rest after your procedure for as long as your doctor tells you to.    Coping with pain  If you have pain after surgery, pain medication will help you feel better. Take it as directed, before pain becomes severe. Also, ask your doctor or pharmacist about other ways to control pain, such as with heat, ice, and relaxation. And follow any other instructions your surgeon or nurse gives you.    Tips for taking pain medication  To get the best relief possible, remember these points:  · Pain medications can upset your stomach. Taking them with a little food may help.  · Most pain relievers taken by mouth need at least 20 to 30 minutes to take effect.  · Taking medication on a schedule can help you remember to take it. Try to time your medication so that you can take it before beginning an activity, such as dressing, walking, or sitting down for dinner.  · Constipation is a common side effect of pain medications.  Contact your doctor before taking any medications like laxatives or stool softeners to help relieve constipation. Also ask about any dietary restrictions, because drinking lots of fluids and eating foods like fruits and vegetables that are high in fiber can also help. Remember, dont take laxatives unless your surgeon has prescribed them.  · Mixing alcohol and pain medication can cause dizziness and slow your breathing. It can even be fatal. Dont drink alcohol while taking pain medication.  · Pain medication can slow your reflexes. Dont drive or operate machinery while taking pain medication.  If your health care provider tells you to take acetaminophen to help relieve your pain, ask him or her how much you are supposed to take each day. (Acetaminophen is the generic name for Tylenol and other brand-name pain relievers.) Acetaminophen or other pain relievers may interact with your prescription medicines or other over-the-counter (OTC) drugs. Some prescription medications contain acetaminophen along with other active ingredients. Using both prescription and OTC acetaminophen for pain can cause you to overdose. The FDA recommends that you read the labels on your OTC medications carefully. This will help you to clearly understand the list of active ingredients, dosing instructions, and any warnings. It may also help you avoid taking too much acetaminophen. If you have questions or don't understand the information, ask your pharmacist or health care provider to explain it to you before you take the OTC medication.    Managing nausea  Some people have an upset stomach after surgery. This is often due to anesthesia, pain, pain medications, or the stress of surgery. The following tips will help you manage nausea and get good nutrition as you recover. If you were on a special diet before surgery, ask your doctor if you should follow it during recovery. These tips may help:  · Dont push yourself to eat. Your body will  "tell you when to eat and how much.  · Start off with clear liquids and soup. They are easier to digest.  · Progress to semi-solid foods (mashed potatoes, applesauce, and gelatin) as you feel ready.  · Slowly move to solid foods. Dont eat fatty, rich, or spicy foods at first.  · Dont force yourself to have three large meals a day. Instead, eat smaller amounts more often.  · Take pain medications with a small amount of solid food, such as crackers or toast to avoid nausea.      Call your surgeon if  · You still have pain an hour after taking medication (it may not be strong enough).  · You feel too sleepy, dizzy, or groggy (medication may be too strong).  · You have side effects like nausea, vomiting, or skin changes (rash, itching, or hives).   © 9912-3805 [employer]. [address] [place] [address]. All rights reserved. This information is not intended as a substitute for professional medical care. Always follow your healthcare professional's instructions.                      Admission Information     Date & Time Provider Department CSN    2/15/2017 10:02 AM Chaka Martin MD [facility] [id number]      Care Providers     Provider Role Specialty Primary office phone    Chaka Martin MD Attending Provider Neurosurgery [phone]    Chaka Martin MD Surgeon  Neurosurgery [phone]      Your Vitals Were     BP Pulse Temp Resp Height Weight    128/61 56 97.5 °F (36.4 °C) (Oral) 18 5' 8" (1.727 m) 71.7 kg (158 lb)    Last Period SpO2 BMI          (LMP Unknown) 95% 24.02 kg/m2        Recent Lab Values        1/1/2016                           6:33 AM           A1C 5.4           Comment for A1C at  6:33 AM on 1/1/2016:  Reference Interval:  5.0 - 5.6 Normal   5.7 - 6.4 High Risk   > 6.5 Diabetic     Hgb A1c results are standardized based on the (NGSP) National   Glycohemoglobin Standardization Program.    Hemoglobin A1C levels are related to mean serum/plasma " glucose   during the preceding 2-3 months.            Allergies as of 2/15/2017        Reactions    Nsaids (Non-steroidal Anti-inflammatory Drug)     GI Bleed      [facility]     [facility] Nurse Care Line - 24/7 Assistance  Unless otherwise directed by your provider, please contact [facility], our nurse care line that is available for 24/7 assistance.     Registered nurses in the [facility] provide clinical advisement, health education, appointment booking, and other advisory services.  Call for this free service at [phone].        Advance Directives     An advance directive is a document which, in the event you are no longer able to make decisions for yourself, tells your healthcare team what kind of treatment you do or do not want to receive, or who you would like to make those decisions for you.  If you do not currently have an advance directive, [facility] encourages you to create one.  For more information call:  [phone]), [phone] ([phone]),  or log on to [email or web address].        Smoking Cessation     If you would like to quit smoking:   You may be eligible for free services if you are a [place] resident and started smoking cigarettes before September 1, 1988.  Call the Smoking Cessation Trust (SCT) toll free at (846) 683-5601 or (610) 804-1671.   Call 5-152-QUIT-NOW if you do not meet the above criteria.            Language Assistance Services     ATTENTION: Language assistance services are available, free of charge. Please call [phone].      ATENCIÓN: Si habla español, tiene a eric disposición servicios gratuitos de asistencia lingüística. Llame al [phone].     [facility] Ý: N?u b?n nói Ti?ng Vi?t, có các d?ch v? h? tr? ngôn ng? mi?n phí dành cho b?n. G?i s? [phone].         [facility] complies with applicable Federal civil rights laws and does not discriminate on the basis of race, color, national origin,  age, disability, or sex.

## 2017-02-15 NOTE — H&P
Shelly Vásquez is a 76 y.o.-year-old female who presents for neurosurgical follow-up. I last saw Shelly Vásquez on 11/29/16, and at that time she was complaining of pain across the whole lower back and right > left leg pain. I sent the patient for new MRI and follow up appointment. The patient's symptoms are now worse especially right leg pain. Today She is complaining of pain across the whole lower back moving into the L4 and L5 with associated paresthesias. She denies any new onset of weakness. The patient describes the pain as sharp, stabbing, aching and numbness and tingling. The patient rates the pain 7 on the pain scale. The patient has now been suffering with this pain for 1 year(s), and it originally started gradually. The pain is worse with sitting and driving and better with walking. The patient currently denies any changes in bowel or bladder control.        (Not in a hospital admission)     Review of patient's allergies indicates:  No Known Allergies           Past Medical History   Diagnosis Date    Arthritis      Basal cell cancer         on the face    Gastric ulcer      Herpes simplex without mention of complication         rare outbreaks    Postmenopausal HRT (hormone replacement therapy)      Senile nuclear sclerosis - Both Eyes 10/29/2012             Past Surgical History   Procedure Laterality Date    Dilation and curettage of uterus   2008       PMB    Endometrial biopsy        Beast lift   2004    Tubal ligation        Tonsillectomy, adenoidectomy   age 8    Appendectomy   age 21    Eye surgery        Ptsosis                Family History   Problem Relation Age of Onset    No Known Problems Father      No Known Problems Mother      No Known Problems Sister      No Known Problems Brother      No Known Problems Maternal Aunt      No Known Problems Maternal Uncle      No Known Problems Paternal Aunt      No Known Problems Paternal Uncle      No Known Problems  Maternal Grandmother      No Known Problems Maternal Grandfather      No Known Problems Paternal Grandmother      No Known Problems Paternal Grandfather      Breast cancer Neg Hx      Colon cancer Neg Hx      Ovarian cancer Neg Hx      Amblyopia Neg Hx      Blindness Neg Hx      Cancer Neg Hx      Cataracts Neg Hx      Diabetes Neg Hx      Glaucoma Neg Hx      Hypertension Neg Hx      Macular degeneration Neg Hx      Retinal detachment Neg Hx      Strabismus Neg Hx      Stroke Neg Hx      Thyroid disease Neg Hx                Social History   Substance Use Topics    Smoking status: Former Smoker       Packs/day: 1.00       Years: 22.00       Types: Cigarettes       Quit date: 7/27/1980    Smokeless tobacco: Never Used    Alcohol use 4.2 oz/week        7 Glasses of wine per week          Comment: Socially         Review of Systems:  Constitutional: no fever or chills, pain well controlled  Eyes: no visual changes  ENT: no nasal congestion or sore throat  Respiratory: no cough or shortness of breath  Cardiovascular: no chest pain or palpitations  Gastrointestinal: no nausea or vomiting, tolerating diet  Genitourinary: no hematuria or dysuria  Integument/Breast: no rash or pruritis  Hematologic/Lymphatic: no easy bruising or lymphadenopathy  Musculoskeletal: no arthralgias or myalgias, positive for back pain  Neurological: no seizures or tremors  Behavioral/Psych: no auditory or visual hallucinations  Endocrine: no heat or cold intolerance     Review of Systems     OBJECTIVE:      Vital Signs (Most Recent)        Physical Exam:     Physical Exam:  Nursing note and vitals reviewed.     Constitutional: She appears well-developed and well-nourished.      Eyes: Pupils are equal, round, and reactive to light. Conjunctivae and EOM are normal.      Cardiovascular: Normal rate and intact carotid pulses.      Abdominal: Bowel sounds are normal.     Psych/Behavior: She is alert. She is oriented to person,  place, and time. She has a normal mood and affect.     Musculoskeletal: Gait is normal.   Neck: Range of motion is full. Muscle strength is 5/5. Tone is normal.   Back: Range of motion is full. Muscle strength is 5/5. Tone is normal.   Right Upper Extremities: Range of motion is full. Muscle strength is 5/5. Tone is normal.   Left Upper Extremities: Range of motion is full. Muscle strength is 5/5. Tone is normal.   Right Lower Extremities: Range of motion is limited. Muscle strength is 5/5. Tone is normal.   Left Lower Extremities: Range of motion is full. Muscle strength is 5/5. Tone is normal.     Neurological:   DTRs: DTRs are DTRS NORMAL AND SYMMETRICnormal and symmetric.   Cranial nerves: Cranial nerve(s) II, III, IV, V, VI, VII, VIII, IX, X, XI and XII are intact.         Laboratory  none     Diagnostic Results:  MRI: Reviewed  L-spine: L4-5 grade I spondylolisthesis with moderate spinal stenosis      ASSESSMENT/PLAN:      Impression- symptomatic L4-5 spinal stenosis with right L5 radiculopathy less so neurogenic claudication.      Plan- Right MIS L4-5 laminectomy. All attendant risks, benefits and potential complications of the anticipated surgery were dicussed and patient wants to proceed with surgery

## 2017-02-15 NOTE — TRANSFER OF CARE
"Anesthesia Transfer of Care Note    Patient: Shelly Vásquez    Procedure(s) Performed: Procedure(s) (LRB):  L4-5 LAMINECTOMY-MINIMALLY INVASIVE (Right)    Patient location: PACU    Anesthesia Type: general    Transport from OR: Transported from OR on 2-3 L/min O2 by NC with adequate spontaneous ventilation    Post pain: adequate analgesia    Post assessment: no apparent anesthetic complications    Post vital signs: stable    Level of consciousness: awake and alert    Nausea/Vomiting: no nausea/vomiting    Complications: none          Last vitals:   Visit Vitals    /73 (BP Location: Left arm, Patient Position: Sitting, BP Method: Automatic)    Pulse 75    Temp 36.2 °C (97.2 °F) (Oral)    Resp 16    Ht 5' 8" (1.727 m)    Wt 71.7 kg (158 lb)    LMP  (LMP Unknown)    SpO2 97%    Breastfeeding No    BMI 24.02 kg/m2     "

## 2017-02-15 NOTE — BRIEF OP NOTE
[facility]  Neurosurgery  Operative Note    SUMMARY      Date of Procedure: 2/15/2017     Procedure: Procedure(s) (LRB):  L4-5 LAMINECTOMY-MINIMALLY INVASIVE (Right)     Surgeon(s) and Role:     * Chaka Martin MD - Primary    Assisting Surgeon: None    Pre-Operative Diagnosis: Lumbar stenosis [M48.06]    Post-Operative Diagnosis: Post-Op Diagnosis Codes:     * Lumbar stenosis [M48.06]    Anesthesia: General    Technical Procedures Used: na    Description of the Findings of the Procedure: right L4/5 MIS decompression    Significant Surgical Tasks Conducted by the Assistant(s), if Applicable: na    Complications: No    Estimated Blood Loss (EBL): * No values recorded between 2/15/2017  2:11 PM and 2/15/2017  3:14 PM *           Specimens:   Specimen     None           Implants: * No implants in log *           Condition: Stable    Disposition: PACU - hemodynamically stable.    Attestation: I was present and scrubbed for the entire procedure.

## 2017-02-16 ENCOUNTER — TELEPHONE (OUTPATIENT)
Dept: NEUROSURGERY | Facility: CLINIC | Age: 77
End: 2017-02-16

## 2017-02-16 NOTE — TELEPHONE ENCOUNTER
----- Message from Apple Guy RN sent at 2/16/2017 10:27 AM CST -----  Contact: PT  Can you call her please and instruct her regarding post-op pain and pain management please?  ----- Message -----     From: Joey Sharma     Sent: 2/16/2017  10:17 AM       To: Mario Null Staff    Would like to know how long her pain is suppose to last, after surgery?    Call: [phone]

## 2017-02-16 NOTE — TELEPHONE ENCOUNTER
Patient reports incisional pain and some mild back pain. Denies any new numbness, tingling, or weakness. Advised patient to alternate between Percocet and Valium as needed as prescribed. She may take 1 percocet instead of 2 tablets as prescribed. Do not take both Percocet and Valium together. Advised no heavy lifting greater than 10 pounds, avoid bending or twisting of the back, and gradually increase ambulation daily as tolerated. Patient verbalized understanding of all instructions given. Verified 2 week post op appointment with patient. Thanked RN for calling. Encouraged to call clinic for any further questions or concerns.

## 2017-02-17 ENCOUNTER — TELEPHONE (OUTPATIENT)
Dept: NEUROSURGERY | Facility: CLINIC | Age: 77
End: 2017-02-17

## 2017-02-17 NOTE — TELEPHONE ENCOUNTER
----- Message from Francis Banks sent at 2/17/2017  2:01 PM CST -----  Contact: Self [phone]  Patient is requesting a return call form Rochelle about pain in the right side where surgery was, shoots up to a 10 then goes to a 4-5, pls call

## 2017-02-17 NOTE — TELEPHONE ENCOUNTER
Patient reports she started to have muscle spasms in her right buttocks and leg today. Denies any new numbness or tingling, just the pain. Re-instructed patient on staggering Percocet and Valium. Also encouraged patient to ambulate. Patient did not seem to understand how to stagger medications. Patient became agitated and hung up phone before I could re-explain medication regime. Called son, Dex Castro, to discuss medications. Explained regime to son. Son thanked RN for calling and stated he would call his mother to explain to her as well. Call back number provided for further questions or concerns.

## 2017-02-18 RX ORDER — GABAPENTIN 100 MG/1
100 CAPSULE ORAL 3 TIMES DAILY
Qty: 90 CAPSULE | Refills: 3 | Status: SHIPPED | OUTPATIENT
Start: 2017-02-18 | End: 2017-07-03

## 2017-02-18 RX ORDER — METHYLPREDNISOLONE 4 MG/1
TABLET ORAL
Qty: 1 TABLET | Refills: 0 | Status: SHIPPED | OUTPATIENT
Start: 2017-02-18 | End: 2017-03-24

## 2017-02-23 ENCOUNTER — TELEPHONE (OUTPATIENT)
Dept: NEUROSURGERY | Facility: CLINIC | Age: 77
End: 2017-02-23

## 2017-02-23 DIAGNOSIS — Z74.09 MOBILITY IMPAIRED: ICD-10-CM

## 2017-02-23 DIAGNOSIS — Z98.890 S/P LUMBAR LAMINECTOMY: Primary | ICD-10-CM

## 2017-02-23 NOTE — TELEPHONE ENCOUNTER
Advised patient home health PT ordered. Verified [facility] received ordered. They will contact her tomorrow. Patient verbalized understanding.

## 2017-02-24 ENCOUNTER — TELEPHONE (OUTPATIENT)
Dept: NEUROSURGERY | Facility: CLINIC | Age: 77
End: 2017-02-24

## 2017-02-24 DIAGNOSIS — Z98.890 S/P LUMBAR LAMINECTOMY: Primary | ICD-10-CM

## 2017-02-24 NOTE — TELEPHONE ENCOUNTER
Advised patient PT order fax[facility] per request. Understanding verbalized. Thanked RN for calling.

## 2017-02-24 NOTE — TELEPHONE ENCOUNTER
----- Message from Apple Guy RN sent at 2/24/2017  4:14 PM CST -----  Contact: NEIL REDMOND [[id number]]  Can you take care of this?  ----- Message -----     From: Rochelle Manish     Sent: 2/24/2017   4:01 PM       To: Mario Null Staff    x_  1st Request  _  2nd Request  _  3rd Request        Who: NEIL REDMOND [[id number]]    Why: patient states she does not want home health anymore and would rather go to [facility]. Patient would like orders faxed over to them at [phone]    What Number to Call Back: [phone]    When to Expect a call back: (Before the end of the day)   -- if call after 3:00 call back will be tomorrow.

## 2017-03-02 ENCOUNTER — TELEPHONE (OUTPATIENT)
Dept: NEUROSURGERY | Facility: CLINIC | Age: 77
End: 2017-03-02

## 2017-03-02 ENCOUNTER — CLINICAL SUPPORT (OUTPATIENT)
Dept: NEUROSURGERY | Facility: CLINIC | Age: 77
End: 2017-03-02
Payer: MEDICARE

## 2017-03-02 VITALS
WEIGHT: 158 LBS | HEART RATE: 71 BPM | DIASTOLIC BLOOD PRESSURE: 72 MMHG | HEIGHT: 68 IN | BODY MASS INDEX: 23.95 KG/M2 | SYSTOLIC BLOOD PRESSURE: 122 MMHG

## 2017-03-02 PROCEDURE — 99999 PR PBB SHADOW E&M-EST. PATIENT-LVL III: CPT | Mod: PBBFAC,,,

## 2017-03-02 PROCEDURE — 99213 OFFICE O/P EST LOW 20 MIN: CPT | Mod: PBBFAC

## 2017-03-02 RX ORDER — DIAZEPAM 5 MG/1
5 TABLET ORAL EVERY 6 HOURS PRN
Qty: 90 TABLET | Refills: 0 | Status: SHIPPED | OUTPATIENT
Start: 2017-03-02 | End: 2017-07-03

## 2017-03-02 NOTE — TELEPHONE ENCOUNTER
Spoke to patient today during post op wound check appointment. Advised Valium Rx will be called into pharmacy on file.

## 2017-03-02 NOTE — TELEPHONE ENCOUNTER
----- Message from Estefanía Bianchi sent at 2/27/2017 11:02 AM CST -----  Contact: pt [phone]  Pt is calling for a refill of diazePAM (VALIUM) 5 MG tablet.pls call when ready.thanks

## 2017-03-02 NOTE — TELEPHONE ENCOUNTER
RN spoke with Rochelle Cano RN that visited with patient today. States she spoke with patient regarding Valium during appointment and will call it in.

## 2017-03-02 NOTE — TELEPHONE ENCOUNTER
----- Message from Apple Guy RN sent at 3/2/2017  1:47 PM CST -----  Contact: pt [phone]      ----- Message -----     From: Estefanía Bianchi     Sent: 2/27/2017  11:02 AM       To: Mario Null Staff    Pt is calling for a refill of diazePAM (VALIUM) 5 MG tablet.pls call when ready.thanks

## 2017-03-03 NOTE — PROGRESS NOTES
"Patient seen 2 weeks post op laminectomy on 2/15/17 with Dr. Martin. Incision to lower back assessed. Dermabond intact. Edges well approximated. No erythema, swelling, or drainage noted. Instructed patient to keep incision open to air, no scrubbing incision, no submerging incision in tub bath/pool for 6 more weeks, and pat to dry. Patient denies any new numbness or tingling. Reports continued preoperative leg pain. Patient states she is "very disappointed in the care she received after surgery" and "has been ignored and neglected by clinic". States she has called the clinic several times, but never received return phone calls. I had a long discussion with patient and showed patient in EPIC every encounter where I returned her phone calls regarding pain medication, medication regime, home health orders, and external PT orders. Patient appeared to be very confused and vaguely remembered these calls. Reviewed medication regime and expectations regarding pain with patient multiple times during visit. Reviewed discharge instructions with patient including no lifting greater than 10 pounds, avoid bending and twisting of her back, no driving while taking narcotic pain medication or muscle relaxants, and continue to increase ambulation daily as tolerated. Patient verbalized understanding of these instructions. 6 week post op appointment reviewed and provided in print. Encouraged to call clinic with any questions or concerns.  "

## 2017-03-05 ENCOUNTER — PATIENT MESSAGE (OUTPATIENT)
Dept: OBSTETRICS AND GYNECOLOGY | Facility: CLINIC | Age: 77
End: 2017-03-05

## 2017-03-05 ENCOUNTER — PATIENT MESSAGE (OUTPATIENT)
Dept: SPINE | Facility: CLINIC | Age: 77
End: 2017-03-05

## 2017-03-06 ENCOUNTER — PATIENT MESSAGE (OUTPATIENT)
Dept: SPINE | Facility: CLINIC | Age: 77
End: 2017-03-06

## 2017-03-06 DIAGNOSIS — B00.9 HERPES: Primary | ICD-10-CM

## 2017-03-06 RX ORDER — ACYCLOVIR 400 MG/1
400 TABLET ORAL DAILY
Qty: 90 TABLET | Refills: 4 | Status: SHIPPED | OUTPATIENT
Start: 2017-03-06 | End: 2018-05-25

## 2017-03-06 NOTE — TELEPHONE ENCOUNTER
Pt reports that she currently has blisters in genital area and requests refill of acyclovir 400 MG tabs. Called in prescription for acyclovir 400 MG tabs (disp: 90, refills:4) with the instructions to take 1 tab by mouth once daily. Pended and routed orders to Dr. Fairchild to review and sign.

## 2017-03-07 ENCOUNTER — PATIENT MESSAGE (OUTPATIENT)
Dept: SPINE | Facility: CLINIC | Age: 77
End: 2017-03-07

## 2017-03-07 NOTE — TELEPHONE ENCOUNTER
Left message informing pt that the prescription she requested was called in to her pharmacy. Instructed pt to call [phone] with any questions.

## 2017-03-08 ENCOUNTER — TELEPHONE (OUTPATIENT)
Dept: NEUROSURGERY | Facility: CLINIC | Age: 77
End: 2017-03-08

## 2017-03-08 DIAGNOSIS — Z98.890 S/P LAMINECTOMY: Primary | ICD-10-CM

## 2017-03-24 ENCOUNTER — OFFICE VISIT (OUTPATIENT)
Dept: SPINE | Facility: CLINIC | Age: 77
End: 2017-03-24
Attending: NEUROLOGICAL SURGERY
Payer: MEDICARE

## 2017-03-24 VITALS
BODY MASS INDEX: 23.95 KG/M2 | SYSTOLIC BLOOD PRESSURE: 99 MMHG | HEART RATE: 63 BPM | WEIGHT: 158 LBS | HEIGHT: 68 IN | DIASTOLIC BLOOD PRESSURE: 61 MMHG

## 2017-03-24 DIAGNOSIS — Z98.890 S/P LUMBAR LAMINECTOMY: Primary | ICD-10-CM

## 2017-03-24 PROCEDURE — 99212 OFFICE O/P EST SF 10 MIN: CPT | Mod: PBBFAC | Performed by: NEUROLOGICAL SURGERY

## 2017-03-24 PROCEDURE — 99024 POSTOP FOLLOW-UP VISIT: CPT | Mod: ,,, | Performed by: NEUROLOGICAL SURGERY

## 2017-03-24 PROCEDURE — 99999 PR PBB SHADOW E&M-EST. PATIENT-LVL II: CPT | Mod: PBBFAC,,, | Performed by: NEUROLOGICAL SURGERY

## 2017-03-24 NOTE — PROGRESS NOTES
CHIEF COMPLAINT:    4-6 week follow-up    HPI:    Shelly Vásquez is a 76 y.o.-year-old female who presents today for post-operative follow-up. She is s/p right L4-5 Laminectomy that was done by Dr. Martin on 2/15/17. Currently, the patient's symptoms are better since surgery but she did have a very rough post-operative time with back and leg pain. She is still complaining of back pain and bilateral dysesthesia in the feet. She denies any new onset of weakness. The patient describes the pain as aching. The patient rates the pain 4 on the pain scale. The pain is worse with driving a far distance and better with massage, hot tub, and therapy . Post-op medications the patient is currently taking are: Gabapentin 100mg Take 1 capsule by mouth 3 times daily .    ROS:    NAD    PE:    AAOX3  PERRL  EOMI    Lumbar incision:  C/D/I    ROM:   Decreased secondary to pain    Sensation:  Intact to light touch (All 4 extremities)    Strength: Full strength      Deltoids Biceps Triceps Wrist Ext. Wrist Flex. Hand    RUE         LUE          Hip Flex. Knee Flex. Knee Ext. Dorsi Flex Plantar Flex EHL   RLE         LLE           Gait:  normal    DTR:  2+ and symmetric bilaterally    No abnormal reflexes    SLR- negative    IMAGING:    XRays: none    CT: none    MRI: none    ASSESSMENT:   Doing better post op    PLAN:   Continue with physical therapy. Follow up with me in 3 months.

## 2017-03-29 ENCOUNTER — PATIENT MESSAGE (OUTPATIENT)
Dept: SPINE | Facility: CLINIC | Age: 77
End: 2017-03-29

## 2017-04-05 ENCOUNTER — OFFICE VISIT (OUTPATIENT)
Dept: OPHTHALMOLOGY | Facility: CLINIC | Age: 77
End: 2017-04-05
Payer: MEDICARE

## 2017-04-05 ENCOUNTER — PATIENT MESSAGE (OUTPATIENT)
Dept: OPTOMETRY | Facility: CLINIC | Age: 77
End: 2017-04-05

## 2017-04-05 ENCOUNTER — TELEPHONE (OUTPATIENT)
Dept: OPTOMETRY | Facility: CLINIC | Age: 77
End: 2017-04-05

## 2017-04-05 DIAGNOSIS — H00.16 ACUTE CHALAZION OF LEFT EYE: ICD-10-CM

## 2017-04-05 PROCEDURE — 99213 OFFICE O/P EST LOW 20 MIN: CPT | Mod: S$PBB,,, | Performed by: OPHTHALMOLOGY

## 2017-04-05 PROCEDURE — 99999 PR PBB SHADOW E&M-EST. PATIENT-LVL II: CPT | Mod: PBBFAC,,, | Performed by: OPHTHALMOLOGY

## 2017-04-05 PROCEDURE — 99212 OFFICE O/P EST SF 10 MIN: CPT | Mod: PBBFAC | Performed by: OPHTHALMOLOGY

## 2017-04-05 NOTE — PROGRESS NOTES
HPI     Triage pt  Patient states stye LLL x this morning. Pt states OS has been little   irritated.  Wear contact in the OS for reading.    Eye Drops:OTC stye drops                    Eyewash prn OS    I have personally interviewed the patient, reviewed the history and   examined the patient and agree with the technician's exam.       Last edited by Aidan Catherine MD on 4/5/2017  4:14 PM.     ROS     Positive for: Musculoskeletal    Negative for: Constitutional, Gastrointestinal, Neurological, Skin,   Genitourinary, HENT, Endocrine, Cardiovascular, Eyes, Respiratory,   Psychiatric, Allergic/Imm, Heme/Lymph    Last edited by Aidan Catherine MD on 4/5/2017  4:14 PM. (History)        Assessment /Plan     For exam results, see Encounter Report.    Acute chalazion of left eye      Apply heat to chalazion for 10-15 minutes at a time up to four times per day. Return in two to three weeks if not resolved.

## 2017-04-05 NOTE — PATIENT INSTRUCTIONS
Apply heat for 10-15 minutes at a time up to four times per day.   Return in two to three weeks if not resolved.

## 2017-04-05 NOTE — MR AVS SNAPSHOT
[facility]  [address]  [facility] [address]  Phone: [phone]  Fax: [phone]                  Shelly Vásquez   2017 4:00 PM   Office Visit    Description:  Female : 1940   Provider:  Aidan Catherine MD   Department:  [facility]           Reason for Visit     Eye Problem           Diagnoses this Visit        Comments    Acute chalazion of left eye                To Do List           Future Appointments        Provider Department Dept Phone    2017 10:00 AM Chaka Martin MD [facility] [phone]      Goals (5 Years of Data)     None      Follow-Up and Disposition     Return if symptoms worsen or fail to improve.      [facility] On Call     [facility] On Call Nurse Care Line - 24/ Assistance  Unless otherwise directed by your provider, please contact [facility], our nurse care line that is available for /7 assistance.     Registered nurses in the [facility] On Call Center provide: appointment scheduling, clinical advisement, health education, and other advisory services.  Call: [phone] (toll free)               Medications           Message regarding Medications     Verify the changes and/or additions to your medication regime listed below are the same as discussed with your clinician today.  If any of these changes or additions are incorrect, please notify your healthcare provider.             Verify that the below list of medications is an accurate representation of the medications you are currently taking.  If none reported, the list may be blank. If incorrect, please contact your healthcare provider. Carry this list with you in case of emergency.           Current Medications     acetaminophen (TYLENOL) 325 MG tablet Take 325 mg by mouth continuous prn for Pain.    acyclovir (ZOVIRAX) 400 MG tablet Take 1 tablet (400 mg total) by mouth once daily.    diazePAM (VALIUM) 5 MG tablet Take 1 tablet (5 mg total) by  mouth every 6 (six) hours as needed (muscle spasm).    epinephrine (EPIPEN) 0.3 mg/0.3 mL (1:1,000) AtIn Inject 1 each into the muscle once as needed (for stinging insect allergy).     estradiol-norethindrone acet 0.5-0.1 mg per tablet Take 1 tablet by mouth once daily.    gabapentin (NEURONTIN) 100 MG capsule Take 1 capsule (100 mg total) by mouth 3 (three) times daily.    temazepam (RESTORIL) 15 mg Cap Take 1 capsule (15 mg total) by mouth nightly.           Clinical Reference Information           Your Vitals Were     Last Period                   (LMP Unknown)           Allergies as of 4/5/2017     Nsaids (Non-steroidal Anti-inflammatory Drug)      Immunizations Administered on Date of Encounter - 4/5/2017     None      Instructions    Apply heat for 10-15 minutes at a time up to four times per day.   Return in two to three weeks if not resolved.       Language Assistance Services     ATTENTION: Language assistance services are available, free of charge. Please call [phone].      ATENCIÓN: Si jami oneill, tiene a eric disposición servicios gratuitos de asistencia lingüística. Llame al [phone].     [facility] Ý: N?u b?n nói Ti?ng Vi?t, có các d?ch v? h? tr? ngôn ng? mi?n phí michellh cho b?n. G?i s? [phone].         [facility] complies with applicable Federal civil rights laws and does not discriminate on the basis of race, color, national origin, age, disability, or sex.

## 2017-04-10 ENCOUNTER — TELEPHONE (OUTPATIENT)
Dept: OBSTETRICS AND GYNECOLOGY | Facility: CLINIC | Age: 77
End: 2017-04-10

## 2017-04-10 DIAGNOSIS — Z12.31 SCREENING MAMMOGRAM, ENCOUNTER FOR: ICD-10-CM

## 2017-04-10 DIAGNOSIS — Z12.31 SCREENING MAMMOGRAM FOR HIGH-RISK PATIENT: Primary | ICD-10-CM

## 2017-04-10 NOTE — TELEPHONE ENCOUNTER
Scheduled pt's MMG for 5/5/2017 at 1:15 PM at [facility]. Pt communicated understanding.

## 2017-04-10 NOTE — TELEPHONE ENCOUNTER
----- Message from Tanya Carvajal sent at 4/10/2017  1:26 PM CDT -----  Contact: Self  Good afternoon,    Pt is requesting orders for Mammogram.    Pt can be reached at [phone].    Thank you!

## 2017-04-18 DIAGNOSIS — Z79.890 POSTMENOPAUSAL HORMONE REPLACEMENT THERAPY: ICD-10-CM

## 2017-05-10 ENCOUNTER — HOSPITAL ENCOUNTER (OUTPATIENT)
Dept: RADIOLOGY | Facility: HOSPITAL | Age: 77
Discharge: HOME | End: 2017-05-10
Attending: OBSTETRICS & GYNECOLOGY
Payer: MEDICARE

## 2017-05-10 DIAGNOSIS — Z12.31 VISIT FOR SCREENING MAMMOGRAM: ICD-10-CM

## 2017-05-10 DIAGNOSIS — Z12.31 SCREENING MAMMOGRAM, ENCOUNTER FOR: ICD-10-CM

## 2017-05-10 PROCEDURE — 77067 SCR MAMMO BI INCL CAD: CPT | Mod: 26,,, | Performed by: RADIOLOGY

## 2017-05-10 PROCEDURE — 77063 BREAST TOMOSYNTHESIS BI: CPT | Mod: 26,,, | Performed by: RADIOLOGY

## 2017-05-10 PROCEDURE — 77067 SCR MAMMO BI INCL CAD: CPT | Mod: TC

## 2017-05-25 ENCOUNTER — PATIENT MESSAGE (OUTPATIENT)
Dept: INTERNAL MEDICINE | Facility: CLINIC | Age: 77
End: 2017-05-25

## 2017-05-26 ENCOUNTER — PATIENT MESSAGE (OUTPATIENT)
Dept: INTERNAL MEDICINE | Facility: CLINIC | Age: 77
End: 2017-05-26

## 2017-05-26 RX ORDER — TEMAZEPAM 15 MG/1
15 CAPSULE ORAL NIGHTLY
Qty: 90 CAPSULE | Refills: 1 | Status: SHIPPED | OUTPATIENT
Start: 2017-05-26 | End: 2017-10-09 | Stop reason: SDUPTHER

## 2017-05-26 RX ORDER — TEMAZEPAM 15 MG/1
CAPSULE ORAL
Qty: 90 CAPSULE | Refills: 0 | OUTPATIENT
Start: 2017-05-26

## 2017-06-01 ENCOUNTER — TELEPHONE (OUTPATIENT)
Dept: NEUROSURGERY | Facility: CLINIC | Age: 77
End: 2017-06-01

## 2017-06-01 NOTE — TELEPHONE ENCOUNTER
Placed call to patient to reschedule 6/27 appointment with Dr. Martin. States she is doing well and able to do all of the things she would normally do. RN request appointment be changed to 6/20, 7/18 or 7/25. States she will not be in town on any of those days. States she would like to cancel the 6/27 appointment and will call me when she gets back in town to reschedule. Thanked RN for calling. Appointment cancelled.

## 2017-06-26 ENCOUNTER — PATIENT MESSAGE (OUTPATIENT)
Dept: INTERNAL MEDICINE | Facility: CLINIC | Age: 77
End: 2017-06-26

## 2017-06-29 ENCOUNTER — OFFICE VISIT (OUTPATIENT)
Dept: INTERNAL MEDICINE | Facility: CLINIC | Age: 77
End: 2017-06-29
Payer: MEDICARE

## 2017-06-29 VITALS
OXYGEN SATURATION: 96 % | SYSTOLIC BLOOD PRESSURE: 124 MMHG | WEIGHT: 152.56 LBS | HEART RATE: 67 BPM | DIASTOLIC BLOOD PRESSURE: 78 MMHG | HEIGHT: 68 IN | BODY MASS INDEX: 23.12 KG/M2

## 2017-06-29 DIAGNOSIS — R55 PRE-SYNCOPE: Primary | ICD-10-CM

## 2017-06-29 PROCEDURE — 1126F AMNT PAIN NOTED NONE PRSNT: CPT | Mod: ,,, | Performed by: INTERNAL MEDICINE

## 2017-06-29 PROCEDURE — 99999 PR PBB SHADOW E&M-EST. PATIENT-LVL IV: CPT | Mod: PBBFAC,,, | Performed by: INTERNAL MEDICINE

## 2017-06-29 PROCEDURE — 99214 OFFICE O/P EST MOD 30 MIN: CPT | Mod: S$PBB,,, | Performed by: INTERNAL MEDICINE

## 2017-06-29 PROCEDURE — 1159F MED LIST DOCD IN RCRD: CPT | Mod: ,,, | Performed by: INTERNAL MEDICINE

## 2017-06-29 PROCEDURE — 99214 OFFICE O/P EST MOD 30 MIN: CPT | Mod: PBBFAC | Performed by: INTERNAL MEDICINE

## 2017-06-29 NOTE — PROGRESS NOTES
Subjective:      Patient ID: Shelly Vásquez is a 76 y.o. female.    Chief Complaint: Hospital Follow Up    HPI:  HPI   Patient comes in for a hospital follow-up for an event that occurred in [place].  [place].  Both the patient and her  are at the exercises, eat well and attempt to take care of their health.  Patient generally does not have significant anxiety.  She had an episode in [place] recently that did frighten her.  She states that she usually walks her dog several times a day and is very used to the walk.  It is really that she had been doing this for a month.  [place].  She said on a recent morning as she was completing the walk she started to feel poorly.  She doesn't truly recall all her symptoms but states that she felt her heart was beating fast and she may have had some chest discomfort because she put her hand on her heart.  She didn't know whether she could make it back to her condo.  She lay down on the sofa and stated she felt her legs tingling.  She was afraid to get up because she had of feeling something was going to happen.  She could not walk or stand.  She called a neighbor who was a physician who took her blood pressure and noted that her heart was beating rapidly.  She was taken to a local emergency room where extensive testing was done including a CT of her back because of recent back surgery, EKG, blood work, urine all of which were normal.  She does recall that when she got to the emergency room she began to feel better.  She also mentions that her condition improved.  She has never had an event like this.  She has had a previous GI bleed and recent back surgery but otherwise the patient has been in good health.  She tells me that she has lost her confidence now.  She is worried if she is going to have another spell like this and if so she going to be able to get help.  We did discuss different types of  monitors but also that we should further look into her situation.  She did not have the laboratory studies for me but said they included a thyroid all of which were normal she does have a disc of her CT scan.  Patient Active Problem List   Diagnosis    Insomnia    Osteoarthritis    Spinal stenosis, lumbar    Spondylolisthesis at L4-L5 level    History of GI bleed    Lumbar stenosis    Mobility impaired    S/P lumbar laminectomy    Acute chalazion of left eye     Past Medical History:   Diagnosis Date    Arthritis     Basal cell cancer     on the face    Gastric ulcer     Herpes simplex without mention of complication     rare outbreaks    Postmenopausal HRT (hormone replacement therapy)     Senile nuclear sclerosis - Both Eyes 10/29/2012     Past Surgical History:   Procedure Laterality Date    APPENDECTOMY  age 21    Beast Lift  2004    DILATION AND CURETTAGE OF UTERUS  2008    PMB    ENDOMETRIAL BIOPSY      EYE SURGERY      ptsosis      TONSILLECTOMY, ADENOIDECTOMY  age 8    TUBAL LIGATION       Family History   Problem Relation Age of Onset    No Known Problems Father     No Known Problems Mother     No Known Problems Sister     No Known Problems Brother     No Known Problems Maternal Aunt     No Known Problems Maternal Uncle     No Known Problems Paternal Aunt     No Known Problems Paternal Uncle     No Known Problems Maternal Grandmother     No Known Problems Maternal Grandfather     No Known Problems Paternal Grandmother     No Known Problems Paternal Grandfather     Breast cancer Neg Hx     Colon cancer Neg Hx     Ovarian cancer Neg Hx     Amblyopia Neg Hx     Blindness Neg Hx     Cancer Neg Hx     Cataracts Neg Hx     Diabetes Neg Hx     Glaucoma Neg Hx     Hypertension Neg Hx     Macular degeneration Neg Hx     Retinal detachment Neg Hx     Strabismus Neg Hx     Stroke Neg Hx     Thyroid disease Neg Hx      Review of Systems   Constitutional: Negative for  "chills, fever and unexpected weight change.        She remembers anxiety  [place] like she was going to faint or fall   HENT: Negative for trouble swallowing.    Eyes:        Visual change   Respiratory: Negative for cough, shortness of breath and wheezing.    Cardiovascular: Positive for chest pain and palpitations.   Gastrointestinal: Negative for abdominal distention, abdominal pain, blood in stool and vomiting.   Musculoskeletal: Negative for back pain.     Objective:     Vitals:    06/29/17 1103   BP: 124/78   Pulse: 67   SpO2: 96%   Weight: 69.2 kg (152 lb 8.9 oz)   Height: 5' 8" (1.727 m)   PainSc: 0-No pain     Body mass index is 23.2 kg/m².  Physical Exam   Constitutional: She is oriented to person, place, and time. She appears well-developed and well-nourished. No distress.   Neck: Carotid bruit is not present. No thyromegaly present.   Cardiovascular: Normal rate, regular rhythm and normal heart sounds.  PMI is not displaced.    Pulmonary/Chest: Effort normal and breath sounds normal. No respiratory distress.   Abdominal: Soft. Bowel sounds are normal. She exhibits no distension. There is no tenderness.   Musculoskeletal: She exhibits no edema.   Neurological: She is alert and oriented to person, place, and time.     Assessment:     1. Pre-syncope      Plan:   Shelly Wilkinson was seen today for hospital follow up.    Diagnoses and all orders for this visit:    Pre-syncope: I am concerned that this patient may have had an arrhythmia.  -     Ambulatory consult to Electrophysiology    Return in about 5 weeks (around 8/3/2017).         Medication List          Accurate as of 6/29/17 11:29 AM. If you have any questions, ask your nurse or doctor.               CONTINUE taking these medications    acetaminophen 325 MG tablet  Commonly known as:  TYLENOL     acyclovir 400 MG tablet  Commonly known as:  ZOVIRAX  Take 1 tablet (400 mg total) by mouth once daily.     diazePAM 5 MG tablet  Commonly known as:  VALIUM  Take 1 " tablet (5 mg total) by mouth every 6 (six) hours as needed (muscle spasm).     epinephrine 0.3 mg/0.3 mL Atin  Commonly known as:  EPIPEN     * estradiol-norethindrone acet 0.5-0.1 mg per tablet  Take 1 tablet by mouth once daily.     * LOPREEZA 0.5-0.1 mg per tablet  Generic drug:  estradiol-norethindrone acet  TAKE 1 TABLET BY MOUTH EVERY DAY     gabapentin 100 MG capsule  Commonly known as:  NEURONTIN  Take 1 capsule (100 mg total) by mouth 3 (three) times daily.     temazepam 15 mg Cap  Commonly known as:  RESTORIL  Take 1 capsule (15 mg total) by mouth nightly.        * This list has 2 medication(s) that are the same as other medications prescribed for you. Read the directions carefully, and ask your doctor or other care provider to review them with you.

## 2017-06-30 ENCOUNTER — PATIENT MESSAGE (OUTPATIENT)
Dept: INTERNAL MEDICINE | Facility: CLINIC | Age: 77
End: 2017-06-30

## 2017-06-30 ENCOUNTER — CLINICAL SUPPORT (OUTPATIENT)
Dept: CARDIOLOGY | Facility: CLINIC | Age: 77
End: 2017-06-30
Payer: MEDICARE

## 2017-06-30 ENCOUNTER — TELEPHONE (OUTPATIENT)
Dept: INTERNAL MEDICINE | Facility: CLINIC | Age: 77
End: 2017-06-30

## 2017-06-30 DIAGNOSIS — R55 PRE-SYNCOPE: ICD-10-CM

## 2017-06-30 LAB — INTERNAL CAROTID STENOSIS: NORMAL

## 2017-06-30 PROCEDURE — 93880 EXTRACRANIAL BILAT STUDY: CPT | Mod: PBBFAC | Performed by: INTERNAL MEDICINE

## 2017-06-30 NOTE — TELEPHONE ENCOUNTER
Please tell her that although her carotids have some plaque which we will discuss at her appt, it did not cause her symptoms. GML

## 2017-07-02 ENCOUNTER — PATIENT MESSAGE (OUTPATIENT)
Dept: OPHTHALMOLOGY | Facility: CLINIC | Age: 77
End: 2017-07-02

## 2017-07-03 ENCOUNTER — INITIAL CONSULT (OUTPATIENT)
Dept: ELECTROPHYSIOLOGY | Facility: CLINIC | Age: 77
End: 2017-07-03
Payer: MEDICARE

## 2017-07-03 VITALS
DIASTOLIC BLOOD PRESSURE: 60 MMHG | HEART RATE: 65 BPM | BODY MASS INDEX: 22.58 KG/M2 | HEIGHT: 68 IN | WEIGHT: 149 LBS | SYSTOLIC BLOOD PRESSURE: 105 MMHG

## 2017-07-03 DIAGNOSIS — R00.2 PALPITATIONS: ICD-10-CM

## 2017-07-03 DIAGNOSIS — R07.89 OTHER CHEST PAIN: ICD-10-CM

## 2017-07-03 PROCEDURE — 99215 OFFICE O/P EST HI 40 MIN: CPT | Mod: S$PBB,,, | Performed by: INTERNAL MEDICINE

## 2017-07-03 PROCEDURE — 99999 PR PBB SHADOW E&M-EST. PATIENT-LVL III: CPT | Mod: PBBFAC,,, | Performed by: INTERNAL MEDICINE

## 2017-07-03 PROCEDURE — 1159F MED LIST DOCD IN RCRD: CPT | Mod: ,,, | Performed by: INTERNAL MEDICINE

## 2017-07-03 PROCEDURE — 1126F AMNT PAIN NOTED NONE PRSNT: CPT | Mod: ,,, | Performed by: INTERNAL MEDICINE

## 2017-07-03 PROCEDURE — 99213 OFFICE O/P EST LOW 20 MIN: CPT | Mod: PBBFAC | Performed by: INTERNAL MEDICINE

## 2017-07-03 NOTE — PROGRESS NOTES
Post-Procedure Patient Discharge Instructions  Loop Recorders    Wound Care   If you are discharged with a standard dressing over the incision, you may remove the dressing after 24 hours.    If there are Steri-strips (strips of tape) over your incision, leave them on until your follow-up appointment. They may begin to fall off on their own, which is normal. If there is Dermabond (clear glue) over your incision, do not scrub it off. It acts as a barrier and will eventually disappear.   You will be discharged with 5 days of oral antibiotics. Please take the full prescription until it is gone.   Do not get the incision wet for 48 hours following the procedure. You may sponge bath during this period, working around the incision. After 48 hours, you may shower, but you should still try to keep this area as dry as possible, and avoid direct water contact to the incision (allow the water to hit back of your shoulder rather than directly on the incision). Gently pat the incision dry if it does get wet.   You may take regular showers after 2 weeks, unless otherwise indicated at your follow-up visit.   Do not submerge the incision in a tub, pool, or body of water for 6 weeks.   If you notice unusual swelling, redness, drainage, have more device site pain, chest pain, shortness of breath, or have a fever greater than 100 degrees, call our device clinic immediately: [phone] or [phone] during normal office hours. You may call [phone] after-hours or on weekends and ask for the electrophysiologist on call.  Activity    If you were driving prior to the procedure, you may resume driving right after your procedure (as long you did not receive any sedation). If you have a history of passing out or a history of certain arrhythmias, there may be driving restrictions unrelated to the procedure. Please clarify with your physician if this is the case.   Avoid rough contact at the device site for 6  weeks.   You may participate in sexual activity unless otherwise instructed.   You may return to work the follow day unless told otherwise by your provider.  Long-Term Instructions   Metal Detectors at Airports: Metal detectors at airports do not interfere with you loop recorder.   MRI: You may have an MRI with an implantable loop recorder. All that is required is that you send a transmission to download your information before and after you have your MRI.  Long-Term Follow-Up   Your device has the ability to transmit device information from home to the doctors office using a home monitoring system.   This remote system takes the place of a doctors visit. Your device will be checked from home every 31 days. Every 12 months, you will be asked to come into the office for an in-office check.   Your device should last in the range of 3 years.

## 2017-07-03 NOTE — PROGRESS NOTES
Subjective:    Patient ID:  Shelly Vásquez is a 76 y.o. female who presents for evaluation of tachycardia    HPI   76 y.o. F  back surgery 1/2017  hx GI bleeding due to NSAIDs    Was walking dog near her summer home in [place] [facility].  Developed sudden palpitations, chest discomfort. Legs hurt and tingled.  Called for help; an orthopedist (MD) responded and found BP to be OK, but  bpm by her (and her 's) report.  She was lying down, felt afraid to get up.  Brought to ER; was all better by then. Since, has been feeling fine but anxious about the situation. Good exertion tolerance.  No syncope.    echo 1/16: 60% LVEF.    My interpretation of today's ECG is NSR 65 bpm      Review of Systems   Constitution: Negative. Negative for weakness and malaise/fatigue.   HENT: Negative.  Negative for ear pain and tinnitus.    Eyes: Negative for blurred vision.   Cardiovascular: Positive for palpitations and syncope. Negative for chest pain, dyspnea on exertion and near-syncope.   Respiratory: Negative.  Negative for shortness of breath.    Endocrine: Negative.  Negative for polyuria.   Hematologic/Lymphatic: Does not bruise/bleed easily.   Skin: Negative.  Negative for rash.   Musculoskeletal: Negative.  Negative for joint pain and muscle weakness.   Gastrointestinal: Negative.  Negative for abdominal pain and change in bowel habit.   Genitourinary: Negative for frequency.   Neurological: Negative for dizziness.   Psychiatric/Behavioral: Negative.  Negative for depression. The patient is not nervous/anxious.    Allergic/Immunologic: Negative for environmental allergies.        Objective:    Physical Exam   Constitutional: She is oriented to person, place, and time. Vital signs are normal. She appears well-developed and well-nourished. She is active and cooperative.   HENT:   Head: Normocephalic and atraumatic.   Eyes: Conjunctivae and EOM are normal.   Neck: Normal range of motion. Carotid bruit is not present. No  tracheal deviation and no edema present. No thyroid mass and no thyromegaly present.   Cardiovascular: Normal rate, regular rhythm, normal heart sounds, intact distal pulses and normal pulses.   No extrasystoles are present. PMI is not displaced.  Exam reveals no gallop and no friction rub.    No murmur heard.  Pulmonary/Chest: Effort normal and breath sounds normal. No respiratory distress. She has no wheezes. She has no rales.   Abdominal: Soft. Normal appearance. She exhibits no distension. There is no hepatosplenomegaly.   Musculoskeletal: Normal range of motion.   Neurological: She is alert and oriented to person, place, and time. Coordination normal.   Skin: Skin is warm and dry. No rash noted.   Psychiatric: She has a normal mood and affect. Her speech is normal and behavior is normal. Thought content normal. Cognition and memory are normal.   Nursing note and vitals reviewed.        Assessment:       1. Palpitations    2. Other chest pain          Plan:       DDx of her episode is broad, including vasovagal, AF, AFL (especially with )...    Get records from ER visit in [place].  TSH  Update echo    ILR  I spent about a half hour discussing the risks and benefits of ILR placement. Our discussion of risks included (but was not limited to) the possibility of infection,bleeding, death, stroke, MI.  I discussed with patient risks, indications, benefits, and alternatives of the planned procedure. All questions were answered. Patient understands and wishes to proceed.

## 2017-07-03 NOTE — PROGRESS NOTES
IMPLANTABLE LOOP RECORDER EDUCATION CHECKLIST    7/6/17 @ 6 AM  REPORT TO THE CARDIOLOGY WAITING AREA ON 3RD FLOOR OF THE HOSPITAL (DO NOT REPORT TO CLINIC)  Directions for Reporting to Cardiology Waiting Area in the Hospital  If you park in the Parking Garage:  Take elevators to the 2nd floor  Walk up ramp and turn right by Gold Elevators  Take elevator to the 3rd floor  Upon exiting the elevator, turn away from the clinic areas  Walk long ibarra around to front of hospital to area with windows overlooking [facility]  Check in at Reception Desk  OR  If family is dropping you off:  Have them drop you off at the front of the Hospital  (Near the ER, where all the flags are hung).  Take the E elevators to the 3rd floor.  Check in at the Reception Desk in the waiting room.    WASH WITH HIBICLENS ANTIBACTERIAL SOAP (SUCH AS DIAL) ON THE NIGHT BEFORE AND THE MORNING OF YOUR PROCEDURE PRIOR TO ARRIVAL    DO NOT EAT OR DRINK ANYTHING 6 HOURS BEFORE YOUR PROCEDURE    MEDICATIONS:  YOU MAY TAKE YOUR NORMAL MORNING MEDICATIONS WITH A SIP OF WATER    YOU WILL GO HOME AFTER YOUR PROCEDURE    YOUR PAIN WILL ME MONITORED BY THE SEDATION NURSE DURING YOUR PROCEDURE    THE ABOVE INSTRUCTIONS WERE GIVEN TO THE PATIENT VERBALLY AND THEY VERBALIZED UNDERSTANDING. THEY DO NOT REQUIRE ANY SPECIAL NEEDS AND DO NOT HAVE ANY LEARNING BARRIERS.     Any need to reschedule or cancel procedures, or any questions regarding your procedures should be addressed directly with the Arrhythmia Department Nurses at the following phone number: [phone]

## 2017-07-03 NOTE — LETTER
July 3, 2017      Joana Doty MD  [address]  [facility] [address]           [facility] - Arrhythmia  [address]  [facility] [address]  Phone: [phone]  Fax: [phone]          Patient: Shelly Vásquez   MR Number: [id number]   YOB: 1940   Date of Visit: 7/3/2017       Dear Dr. Joana Doty:    Thank you for referring Shelly Vásquez to me for evaluation. Attached you will find relevant portions of my assessment and plan of care.    If you have questions, please do not hesitate to call me. I look forward to following Shelly Vásquez along with you.    Sincerely,    Michael Brady MD    Enclosure  CC:  No Recipients    If you would like to receive this communication electronically, please contact [email or web address] or [phone] to request more information on [employer] access.    For providers and/or their staff who would like to refer a patient to [facility], please contact us through our one-stop-shop provider referral line, [facility], at [phone].    If you feel you have received this communication in error or would no longer like to receive these types of communications, please e-mail [email or web address]

## 2017-07-05 ENCOUNTER — TELEPHONE (OUTPATIENT)
Dept: ELECTROPHYSIOLOGY | Facility: CLINIC | Age: 77
End: 2017-07-05

## 2017-07-05 ENCOUNTER — PATIENT MESSAGE (OUTPATIENT)
Dept: OPHTHALMOLOGY | Facility: CLINIC | Age: 77
End: 2017-07-05

## 2017-07-05 ENCOUNTER — HOSPITAL ENCOUNTER (OUTPATIENT)
Dept: CARDIOLOGY | Facility: CLINIC | Age: 77
Discharge: HOME | End: 2017-07-05
Payer: MEDICARE

## 2017-07-05 DIAGNOSIS — R00.2 PALPITATIONS: ICD-10-CM

## 2017-07-05 DIAGNOSIS — R00.2 PALPITATION: Primary | ICD-10-CM

## 2017-07-05 DIAGNOSIS — R07.89 OTHER CHEST PAIN: ICD-10-CM

## 2017-07-05 LAB
AORTIC VALVE REGURGITATION: NORMAL
DIASTOLIC DYSFUNCTION: NO
ESTIMATED PA SYSTOLIC PRESSURE: 26.81
MITRAL VALVE REGURGITATION: NORMAL
RETIRED EF AND QEF - SEE NOTES: 60 (ref 55–65)
TRICUSPID VALVE REGURGITATION: NORMAL

## 2017-07-05 PROCEDURE — 93306 TTE W/DOPPLER COMPLETE: CPT | Mod: PBBFAC | Performed by: INTERNAL MEDICINE

## 2017-07-05 NOTE — TELEPHONE ENCOUNTER
Medical records requested from  [facility]  [phone] and fax [phone].

## 2017-07-06 ENCOUNTER — HOSPITAL ENCOUNTER (OUTPATIENT)
Facility: HOSPITAL | Age: 77
Discharge: HOME | End: 2017-07-06
Attending: INTERNAL MEDICINE | Admitting: INTERNAL MEDICINE
Payer: MEDICARE

## 2017-07-06 VITALS
OXYGEN SATURATION: 97 % | SYSTOLIC BLOOD PRESSURE: 107 MMHG | TEMPERATURE: 97 F | DIASTOLIC BLOOD PRESSURE: 53 MMHG | RESPIRATION RATE: 20 BRPM | WEIGHT: 148 LBS | BODY MASS INDEX: 22.43 KG/M2 | HEART RATE: 67 BPM | HEIGHT: 68 IN

## 2017-07-06 DIAGNOSIS — G47.00 INSOMNIA: ICD-10-CM

## 2017-07-06 DIAGNOSIS — R00.2 PALPITATIONS: Primary | ICD-10-CM

## 2017-07-06 DIAGNOSIS — R55 SYNCOPE, UNSPECIFIED SYNCOPE TYPE: ICD-10-CM

## 2017-07-06 PROCEDURE — 93005 ELECTROCARDIOGRAM TRACING: CPT | Mod: 59

## 2017-07-06 PROCEDURE — 99220 PR INITIAL OBSERVATION CARE,LEVL III: CPT | Mod: ,,, | Performed by: INTERNAL MEDICINE

## 2017-07-06 PROCEDURE — 93010 ELECTROCARDIOGRAM REPORT: CPT | Mod: ,,, | Performed by: INTERNAL MEDICINE

## 2017-07-06 PROCEDURE — 63600175 PHARM REV CODE 636 W HCPCS

## 2017-07-06 PROCEDURE — 33282 EP LAB PROCEDURE: CPT | Mod: ,,, | Performed by: INTERNAL MEDICINE

## 2017-07-06 PROCEDURE — C1764 EVENT RECORDER, CARDIAC: HCPCS

## 2017-07-06 PROCEDURE — 25000003 PHARM REV CODE 250

## 2017-07-06 RX ORDER — SODIUM CHLORIDE 9 MG/ML
INJECTION, SOLUTION INTRAVENOUS CONTINUOUS
Status: DISCONTINUED | OUTPATIENT
Start: 2017-07-06 | End: 2017-07-06 | Stop reason: HOSPADM

## 2017-07-06 RX ORDER — CEFAZOLIN SODIUM 2 G/50ML
2 SOLUTION INTRAVENOUS
Status: DISCONTINUED | OUTPATIENT
Start: 2017-07-06 | End: 2017-07-06 | Stop reason: HOSPADM

## 2017-07-06 NOTE — NURSING
Pt discharged ambulatory in care of self.Hep lock ivy'pattie. Incision clean dry and intact. Discharge instructions given and verbalized understanding.

## 2017-07-06 NOTE — DISCHARGE SUMMARY
[facility]  Cardiac Electrophysiology  Discharge Summary      Patient Name: Shelly Vásquez  MRN: [id number]  Admission Date: 7/6/2017  Hospital Length of Stay: 0 days  Discharge Date and Time:  07/06/2017 8:45 AM  Attending Physician: Michael Brady MD    Discharging Provider: Meggan Avila NP  Primary Care Physician: Joana Doty MD    HPI:  76 year old female with PMH back surgery, Hx GI bleeding due to NSAIDs, episodes of palpitations of unclear etiology. Plan for outpatient  ILR implant for further arrhthymia monitoring     Procedure(s) (LRB):  PLACEMENT-LOOP RECORDER (N/A)     Hospital Course:  Pt underwent ILR implant, tolerated procedure, no acute complications noted. Left chest site with mepilex CDI . Pt to follow up with device clinic in 1 week for wound check , and 3 months with NP. Nicole   Discharge plans/instructions discussed with patient who verbalized understanding and agreement of plans of care.       Final Active Diagnoses:    Diagnosis Date Noted POA    PRINCIPAL PROBLEM:  Palpitations [R00.2] 07/03/2017 Yes      Problems Resolved During this Admission:    Diagnosis Date Noted Date Resolved POA     Discharged Condition: good    Disposition: Home or Self Care    Follow Up:  Follow-up Information     [facility] In 1 week.    Specialty:  Cardiology  Why:  For wound re-check  Contact information:  [address]  [facility] [address] [phone]  Additional information:  Clinic [place] - 3rd Floor           WILLY Lind In 3 months.    Specialty:  Cardiology  Contact information:  [address]  [facility] [id number] [phone]                 Patient Instructions:     Diet general   Order Specific Question Answer Comments   Additional restrictions: Cardiac (Low Na/Chol)      Activity as tolerated     Call MD for:  temperature >100.4     Call MD for:  persistent nausea and vomiting or diarrhea     Call MD for:  severe  uncontrolled pain     Call MD for:  redness, tenderness, or signs of infection (pain, swelling, redness, odor or green/yellow discharge around incision site)     Call MD for:  difficulty breathing or increased cough     Call MD for:  severe persistent headache     Call MD for:  worsening rash     Call MD for:  persistent dizziness, light-headedness, or visual disturbances     Call MD for:  increased confusion or weakness     Call MD for:   Scheduling Instructions: For any concerning medical symptoms     Change dressing (specify)   Order Comments: Mepilex dressing will fall out on its own       Medications:  Reconciled Home Medications:   Current Discharge Medication List      CONTINUE these medications which have NOT CHANGED    Details   acyclovir (ZOVIRAX) 400 MG tablet Take 1 tablet (400 mg total) by mouth once daily.  Qty: 90 tablet, Refills: 4    Associated Diagnoses: Herpes      LOPREEZA 0.5-0.1 mg per tablet TAKE 1 TABLET BY MOUTH EVERY DAY  Qty: 84 tablet, Refills: 3    Associated Diagnoses: Postmenopausal hormone replacement therapy      temazepam (RESTORIL) 15 mg Cap Take 1 capsule (15 mg total) by mouth nightly.  Qty: 90 capsule, Refills: 1      acetaminophen (TYLENOL) 325 MG tablet Take 325 mg by mouth continuous prn for Pain.             CHRISTINA James-BC  Cardiology Electrophysiology  NP   [facility]    Attending: MD Michael Brady

## 2017-07-06 NOTE — PLAN OF CARE
Pt received from ep lab via stretcher.Vs stable.No c/o pain or discomfort.No family present and pt oriented to room.Incision site without hematoma and dermabond intact.

## 2017-07-06 NOTE — INTERVAL H&P NOTE
The patient has been examined and the H&P has been reviewed:    I concur with the findings and no changes have occurred since H&P was written.   Denies any acute symptoms at present.  Echo with normal LV function     - ILR implant for long term arrhythmia monitoring       The risks and benefits of the procedure were discussed with the patient, questions answered, consent obtained. No further questions voiced at this time      CHRISTINA James-BC  Cardiology Electrophysiology NP       Attending: MD Michael Brady               Active Hospital Problems    Diagnosis  POA    Palpitations [R00.2]  Yes      Resolved Hospital Problems    Diagnosis Date Resolved POA   No resolved problems to display.

## 2017-07-06 NOTE — H&P (VIEW-ONLY)
Subjective:    Patient ID:  Shelly Vásquez is a 76 y.o. female who presents for evaluation of tachycardia    HPI   76 y.o. F  back surgery 1/2017  hx GI bleeding due to NSAIDs    [place]  Developed sudden palpitations, chest discomfort. Legs hurt and tingled.  Called for help; an orthopedist (MD) responded and found BP to be OK, but  bpm by her (and her 's) report.  She was lying down, felt afraid to get up.  Brought to ER; was all better by then. Since, has been feeling fine but anxious about the situation. Good exertion tolerance.  No syncope.    echo 1/16: 60% LVEF.    My interpretation of today's ECG is NSR 65 bpm      Review of Systems   Constitution: Negative. Negative for weakness and malaise/fatigue.   HENT: Negative.  Negative for ear pain and tinnitus.    Eyes: Negative for blurred vision.   Cardiovascular: Positive for palpitations and syncope. Negative for chest pain, dyspnea on exertion and near-syncope.   Respiratory: Negative.  Negative for shortness of breath.    Endocrine: Negative.  Negative for polyuria.   Hematologic/Lymphatic: Does not bruise/bleed easily.   Skin: Negative.  Negative for rash.   Musculoskeletal: Negative.  Negative for joint pain and muscle weakness.   Gastrointestinal: Negative.  Negative for abdominal pain and change in bowel habit.   Genitourinary: Negative for frequency.   Neurological: Negative for dizziness.   Psychiatric/Behavioral: Negative.  Negative for depression. The patient is not nervous/anxious.    Allergic/Immunologic: Negative for environmental allergies.        Objective:    Physical Exam   Constitutional: She is oriented to person, place, and time. Vital signs are normal. She appears well-developed and well-nourished. She is active and cooperative.   HENT:   Head: Normocephalic and atraumatic.   Eyes: Conjunctivae and EOM are normal.   Neck: Normal range of motion. Carotid bruit is not present. No  tracheal deviation and no edema present. No thyroid mass and no thyromegaly present.   Cardiovascular: Normal rate, regular rhythm, normal heart sounds, intact distal pulses and normal pulses.   No extrasystoles are present. PMI is not displaced.  Exam reveals no gallop and no friction rub.    No murmur heard.  Pulmonary/Chest: Effort normal and breath sounds normal. No respiratory distress. She has no wheezes. She has no rales.   Abdominal: Soft. Normal appearance. She exhibits no distension. There is no hepatosplenomegaly.   Musculoskeletal: Normal range of motion.   Neurological: She is alert and oriented to person, place, and time. Coordination normal.   Skin: Skin is warm and dry. No rash noted.   Psychiatric: She has a normal mood and affect. Her speech is normal and behavior is normal. Thought content normal. Cognition and memory are normal.   Nursing note and vitals reviewed.        Assessment:       1. Palpitations    2. Other chest pain          Plan:       DDx of her episode is broad, including vasovagal, AF, AFL (especially with )...    Get records from ER visit in [place].  TSH  Update echo    ILR  I spent about a half hour discussing the risks and benefits of ILR placement. Our discussion of risks included (but was not limited to) the possibility of infection,bleeding, death, stroke, MI.  I discussed with patient risks, indications, benefits, and alternatives of the planned procedure. All questions were answered. Patient understands and wishes to proceed.

## 2017-07-07 DIAGNOSIS — Z95.818 OTHER SPECIFIED CARDIAC DEVICE IN SITU: ICD-10-CM

## 2017-07-07 DIAGNOSIS — R55 SYNCOPE, UNSPECIFIED SYNCOPE TYPE: Primary | ICD-10-CM

## 2017-07-07 DIAGNOSIS — Z95.818 PRESENCE OF CARDIAC DEVICE: ICD-10-CM

## 2017-08-03 ENCOUNTER — OFFICE VISIT (OUTPATIENT)
Dept: INTERNAL MEDICINE | Facility: CLINIC | Age: 77
End: 2017-08-03
Payer: MEDICARE

## 2017-08-03 ENCOUNTER — OFFICE VISIT (OUTPATIENT)
Dept: OPHTHALMOLOGY | Facility: CLINIC | Age: 77
End: 2017-08-03
Payer: MEDICARE

## 2017-08-03 ENCOUNTER — TELEPHONE (OUTPATIENT)
Dept: DERMATOLOGY | Facility: CLINIC | Age: 77
End: 2017-08-03

## 2017-08-03 VITALS
OXYGEN SATURATION: 99 % | HEART RATE: 64 BPM | DIASTOLIC BLOOD PRESSURE: 68 MMHG | SYSTOLIC BLOOD PRESSURE: 122 MMHG | BODY MASS INDEX: 23.65 KG/M2 | HEIGHT: 68 IN | WEIGHT: 156.06 LBS

## 2017-08-03 DIAGNOSIS — H01.00A BLEPHARITIS OF UPPER AND LOWER EYELIDS OF BOTH EYES, UNSPECIFIED TYPE: ICD-10-CM

## 2017-08-03 DIAGNOSIS — H01.009 BLEPHARITIS OF BOTH UPPER AND LOWER EYELID: ICD-10-CM

## 2017-08-03 DIAGNOSIS — H00.15 CHALAZION OF LEFT LOWER EYELID: ICD-10-CM

## 2017-08-03 DIAGNOSIS — R00.2 PALPITATIONS: Primary | ICD-10-CM

## 2017-08-03 DIAGNOSIS — H01.00B BLEPHARITIS OF UPPER AND LOWER EYELIDS OF BOTH EYES, UNSPECIFIED TYPE: ICD-10-CM

## 2017-08-03 DIAGNOSIS — R21 FACIAL RASH: ICD-10-CM

## 2017-08-03 DIAGNOSIS — H00.11 CHALAZION RIGHT UPPER EYELID: Primary | ICD-10-CM

## 2017-08-03 DIAGNOSIS — R07.9 CHEST PAIN, UNSPECIFIED TYPE: ICD-10-CM

## 2017-08-03 PROCEDURE — 1126F AMNT PAIN NOTED NONE PRSNT: CPT | Mod: ,,, | Performed by: INTERNAL MEDICINE

## 2017-08-03 PROCEDURE — 92285 EXTERNAL OCULAR PHOTOGRAPHY: CPT | Mod: 50,PBBFAC | Performed by: OPHTHALMOLOGY

## 2017-08-03 PROCEDURE — 3008F BODY MASS INDEX DOCD: CPT | Mod: ,,, | Performed by: INTERNAL MEDICINE

## 2017-08-03 PROCEDURE — 99213 OFFICE O/P EST LOW 20 MIN: CPT | Mod: S$PBB,,, | Performed by: INTERNAL MEDICINE

## 2017-08-03 PROCEDURE — 1159F MED LIST DOCD IN RCRD: CPT | Mod: ,,, | Performed by: INTERNAL MEDICINE

## 2017-08-03 PROCEDURE — 99999 PR PBB SHADOW E&M-EST. PATIENT-LVL I: CPT | Mod: PBBFAC,,, | Performed by: OPHTHALMOLOGY

## 2017-08-03 PROCEDURE — 99211 OFF/OP EST MAY X REQ PHY/QHP: CPT | Mod: PBBFAC | Performed by: OPHTHALMOLOGY

## 2017-08-03 PROCEDURE — 92014 COMPRE OPH EXAM EST PT 1/>: CPT | Mod: S$PBB,,, | Performed by: OPHTHALMOLOGY

## 2017-08-03 PROCEDURE — 99999 PR PBB SHADOW E&M-EST. PATIENT-LVL III: CPT | Mod: PBBFAC,,, | Performed by: INTERNAL MEDICINE

## 2017-08-03 NOTE — PROGRESS NOTES
HPI     Mrs. Bravo is here today to rule out sebaceous carcinoma referred by Dr. Catherine. She states she gets reoccurring styes, and warm compresses help   some but do not cure them completely. She has not tried any drops or   ointment, as she had no one to prescribe them to her. This is an ongoing   problem, since childhood and on and off in her adult life and have   recently starting to get worse about 3 months ago.   No pain.   No drops.     Last edited by Zabrina Brandon, PCT on 8/3/2017  1:55 PM. (History)            Assessment /Plan     For exam results, see Encounter Report.    Chalazion right upper eyelid    Blepharitis of upper and lower eyelids of both eyes, unspecified type  -     External Photography - OU - Both Eyes    Blepharitis of both upper and lower eyelid    Chalazion of left lower eyelid      Recommend warm compresses and lid hygiene with Jeovany's baby shampoo.  Consider starting doxycycline if minimal improvement at next visit.  Consider kenalog injection into chalazion rul at next visit.  Slit lamp photos today.  If any worsening or increase in size of lesion left lower eyelid, consider biopsy and send for pathology to rule out sebaceous cell carcinoma.

## 2017-08-03 NOTE — TELEPHONE ENCOUNTER
----- Message from Binta Menchaca MD sent at 8/3/2017 12:06 PM CDT -----  Contact: [facility]   Can use the 10 am tomorrow  ----- Message -----  From: Karen Lu MA  Sent: 8/3/2017  11:37 AM  To: MD Dr. Nikolai Matamoros Mrs. Vásquez is asking to come  see you soon they stated she's VIP  ----- Message -----  From: Briseida Cobian  Sent: 8/3/2017  11:11 AM  To: [facility] Staff    [facility]- [facility] is calling to speak with the nurse pt is a VIP pt pt needs to be seen today [facility] isn't sure what the pt is coming in for. Pt is in town and would like to see Dr Menchaca today. Can you please call [facility] at  [phone]    TAYLOR

## 2017-08-03 NOTE — PROGRESS NOTES
Subjective:      Patient ID: Shelly Vásquez is a 76 y.o. female.    Chief Complaint: Follow-up    HPI:  HPI   Patient is here for follow up of a pre-syncopal episode. She has seen Cardiology with a ILR implant. She reports she has done well since that event. She is continuing to exercise    She has had a recent rash on her face and would like to see a dermatologist.  Patient Active Problem List   Diagnosis    Insomnia    Osteoarthritis    Spinal stenosis, lumbar    Spondylolisthesis at L4-L5 level    History of GI bleed    Lumbar stenosis    Mobility impaired    S/P lumbar laminectomy    Acute chalazion of left eye    Palpitations    Syncope     Past Medical History:   Diagnosis Date    Arthritis     Basal cell cancer     on the face    Encounter for blood transfusion     Gastric ulcer     Herpes simplex without mention of complication     rare outbreaks    Postmenopausal HRT (hormone replacement therapy)     Senile nuclear sclerosis - Both Eyes 10/29/2012     Past Surgical History:   Procedure Laterality Date    APPENDECTOMY  age 21    BACK SURGERY      Beast Lift  2004    COSMETIC SURGERY      DILATION AND CURETTAGE OF UTERUS  2008    PMB    ENDOMETRIAL BIOPSY      EYE SURGERY      ptsosis      TONSILLECTOMY, ADENOIDECTOMY  age 8    TUBAL LIGATION       Family History   Problem Relation Age of Onset    No Known Problems Father     No Known Problems Mother     No Known Problems Sister     No Known Problems Brother     No Known Problems Maternal Aunt     No Known Problems Maternal Uncle     No Known Problems Paternal Aunt     No Known Problems Paternal Uncle     No Known Problems Maternal Grandmother     No Known Problems Maternal Grandfather     No Known Problems Paternal Grandmother     No Known Problems Paternal Grandfather     Breast cancer Neg Hx     Colon cancer Neg Hx     Ovarian cancer Neg Hx     Amblyopia Neg Hx     Blindness Neg Hx     Cancer Neg Hx      "Cataracts Neg Hx     Diabetes Neg Hx     Glaucoma Neg Hx     Hypertension Neg Hx     Macular degeneration Neg Hx     Retinal detachment Neg Hx     Strabismus Neg Hx     Stroke Neg Hx     Thyroid disease Neg Hx      Review of Systems   Constitutional: Negative for activity change, chills, fever and unexpected weight change.   Respiratory: Negative for cough, chest tightness, shortness of breath and wheezing.    Cardiovascular: Negative for chest pain, palpitations and leg swelling.     Objective:     Vitals:    08/03/17 1009   BP: 122/68   Pulse: 64   SpO2: 99%   Weight: 70.8 kg (156 lb 1.4 oz)   Height: 5' 8" (1.727 m)   PainSc: 0-No pain     Body mass index is 23.73 kg/m².  Physical Exam   Constitutional: She appears well-developed and well-nourished.   Neck: No JVD present. No thyromegaly present.   Cardiovascular: Normal rate, normal heart sounds and intact distal pulses.    Pulmonary/Chest: Effort normal and breath sounds normal. No respiratory distress.     Assessment:     1. Palpitations    2. Facial rash    3. Chest pain, unspecified type      Plan:   Shelly Wilkinson was seen today for follow-up.    Diagnoses and all orders for this visit:    Palpitations: ILR placed and patient doing well    Facial rash:  -     Ambulatory consult to Dermatology    Chest pain, unspecified type: no further episodes    Return in about 6 months (around 2/3/2018), or Follow up.         Medication List          Accurate as of 8/3/17 11:59 PM. If you have any questions, ask your nurse or doctor.               CONTINUE taking these medications    acetaminophen 325 MG tablet  Commonly known as:  TYLENOL     acyclovir 400 MG tablet  Commonly known as:  ZOVIRAX  Take 1 tablet (400 mg total) by mouth once daily.     LOPREEZA 0.5-0.1 mg per tablet  Generic drug:  estradiol-norethindrone acet  TAKE 1 TABLET BY MOUTH EVERY DAY     temazepam 15 mg Cap  Commonly known as:  RESTORIL  Take 1 capsule (15 mg total) by mouth nightly.      "

## 2017-08-04 ENCOUNTER — OFFICE VISIT (OUTPATIENT)
Dept: DERMATOLOGY | Facility: CLINIC | Age: 77
End: 2017-08-04
Payer: MEDICARE

## 2017-08-04 VITALS — WEIGHT: 156 LBS | BODY MASS INDEX: 23.72 KG/M2

## 2017-08-04 DIAGNOSIS — L71.0 PERIORAL DERMATITIS: Primary | ICD-10-CM

## 2017-08-04 PROCEDURE — 99212 OFFICE O/P EST SF 10 MIN: CPT | Mod: S$PBB,,, | Performed by: DERMATOLOGY

## 2017-08-04 PROCEDURE — 1159F MED LIST DOCD IN RCRD: CPT | Mod: ,,, | Performed by: DERMATOLOGY

## 2017-08-04 PROCEDURE — 99213 OFFICE O/P EST LOW 20 MIN: CPT | Mod: PBBFAC,PO | Performed by: DERMATOLOGY

## 2017-08-04 PROCEDURE — 99999 PR PBB SHADOW E&M-EST. PATIENT-LVL III: CPT | Mod: PBBFAC,,, | Performed by: DERMATOLOGY

## 2017-08-04 PROCEDURE — 1126F AMNT PAIN NOTED NONE PRSNT: CPT | Mod: ,,, | Performed by: DERMATOLOGY

## 2017-08-04 NOTE — PROGRESS NOTES
Subjective:       Patient ID:  Shelly Vásquez is a 76 y.o. female who presents for   Chief Complaint   Patient presents with    Rash     face     History of Present Illness: The patient presents with chief complaint of rash.  Location: face  Duration: weeks   Signs/Symptoms: itch    Prior treatments:  otc hc cream and neosporin        Rash         Review of Systems   Constitutional: Negative for fever.   Skin: Positive for itching and rash.   Hematologic/Lymphatic: Does not bruise/bleed easily.        Objective:    Physical Exam   Skin:   Areas Examined (abnormalities noted in diagram):   Head / Face Inspection Performed              Diagram Legend      Erythematous  patches      Assessment / Plan:        Perioral dermatitis  monodox 100mg caps given (morgidox) to take qd with food for 14 days  eletone cream bid           Return if symptoms worsen or fail to improve.

## 2017-08-07 ENCOUNTER — PATIENT MESSAGE (OUTPATIENT)
Dept: ELECTROPHYSIOLOGY | Facility: CLINIC | Age: 77
End: 2017-08-07

## 2017-08-10 ENCOUNTER — TELEPHONE (OUTPATIENT)
Dept: ELECTROPHYSIOLOGY | Facility: CLINIC | Age: 77
End: 2017-08-10

## 2017-08-14 ENCOUNTER — TELEPHONE (OUTPATIENT)
Dept: ELECTROPHYSIOLOGY | Facility: CLINIC | Age: 77
End: 2017-08-14

## 2017-08-14 NOTE — TELEPHONE ENCOUNTER
Email received from patient with complaints of arrhythmia. Pt currently out of town but has [employer] home monitor. Pt and spouse spoke with Medardo [facility] to reconnect. Pt was advised to send manual transmission once able and to go to local ER if becomes more symptomatic. Pt spoke with tech support and equipment being shipped to her current location in order to send transmission (she will be out of town for several weeks). In the interim, if pt not feeling well she was instructed to follow up locally.

## 2017-08-16 ENCOUNTER — CLINICAL SUPPORT (OUTPATIENT)
Dept: ELECTROPHYSIOLOGY | Facility: CLINIC | Age: 77
End: 2017-08-16
Payer: MEDICARE

## 2017-08-16 DIAGNOSIS — R55 SYNCOPE, UNSPECIFIED SYNCOPE TYPE: ICD-10-CM

## 2017-08-16 DIAGNOSIS — Z95.818 OTHER SPECIFIED CARDIAC DEVICE IN SITU: ICD-10-CM

## 2017-08-20 ENCOUNTER — PATIENT MESSAGE (OUTPATIENT)
Dept: DERMATOLOGY | Facility: CLINIC | Age: 77
End: 2017-08-20

## 2017-08-22 ENCOUNTER — PATIENT MESSAGE (OUTPATIENT)
Dept: INTERNAL MEDICINE | Facility: CLINIC | Age: 77
End: 2017-08-22

## 2017-08-22 RX ORDER — DOXYCYCLINE 100 MG/1
100 CAPSULE ORAL 2 TIMES DAILY
COMMUNITY
End: 2017-08-22 | Stop reason: SDUPTHER

## 2017-08-22 RX ORDER — DOXYCYCLINE 100 MG/1
100 CAPSULE ORAL DAILY
Qty: 30 CAPSULE | Refills: 1 | Status: SHIPPED | OUTPATIENT
Start: 2017-08-22 | End: 2017-09-19 | Stop reason: SDUPTHER

## 2017-09-13 ENCOUNTER — PATIENT MESSAGE (OUTPATIENT)
Dept: DERMATOLOGY | Facility: CLINIC | Age: 77
End: 2017-09-13

## 2017-09-14 ENCOUNTER — OFFICE VISIT (OUTPATIENT)
Dept: OPHTHALMOLOGY | Facility: CLINIC | Age: 77
End: 2017-09-14
Payer: MEDICARE

## 2017-09-14 DIAGNOSIS — H01.00A BLEPHARITIS OF UPPER AND LOWER EYELIDS OF BOTH EYES, UNSPECIFIED TYPE: ICD-10-CM

## 2017-09-14 DIAGNOSIS — H00.19 CHALAZION, UNSPECIFIED LATERALITY: Primary | ICD-10-CM

## 2017-09-14 DIAGNOSIS — H00.15 CHALAZION OF LEFT LOWER EYELID: ICD-10-CM

## 2017-09-14 DIAGNOSIS — H01.00B BLEPHARITIS OF UPPER AND LOWER EYELIDS OF BOTH EYES, UNSPECIFIED TYPE: ICD-10-CM

## 2017-09-14 DIAGNOSIS — H00.11 CHALAZION RIGHT UPPER EYELID: ICD-10-CM

## 2017-09-14 PROCEDURE — 99999 PR PBB SHADOW E&M-EST. PATIENT-LVL II: CPT | Mod: PBBFAC,,, | Performed by: OPHTHALMOLOGY

## 2017-09-14 PROCEDURE — 92285 EXTERNAL OCULAR PHOTOGRAPHY: CPT | Mod: 50,PBBFAC | Performed by: OPHTHALMOLOGY

## 2017-09-14 PROCEDURE — 99212 OFFICE O/P EST SF 10 MIN: CPT | Mod: PBBFAC | Performed by: OPHTHALMOLOGY

## 2017-09-14 PROCEDURE — 92012 INTRM OPH EXAM EST PATIENT: CPT | Mod: S$PBB,,, | Performed by: OPHTHALMOLOGY

## 2017-09-14 NOTE — PROGRESS NOTES
HPI     Mrs. Bravo is here today for a follow-up chalazion RUL. Pt reports no   changes since last visit. Pt was using compresses several times a day OU.   Denies any pain.    Last edited by Manny Ashley MA on 9/14/2017  1:40 PM. (History)            Assessment /Plan     For exam results, see Encounter Report.    Chalazion, unspecified laterality  -     External Photography - OU - Both Eyes    Blepharitis of upper and lower eyelids of both eyes, unspecified type    Chalazion right upper eyelid    Chalazion of left lower eyelid    Patient has not noticed any improvement subjectively; however, size of chalazia has decreased in size. Patient has been on doxycycline per Dr. Menchaca. Continue warm compresses and lid hygiene and doxycycline. Patient bothered by erythematous appearance of chalazia. Return for reevaluation. Consider removal in the future if erythema persists.

## 2017-09-19 RX ORDER — DOXYCYCLINE 100 MG/1
100 CAPSULE ORAL DAILY
Qty: 30 CAPSULE | Refills: 1 | Status: SHIPPED | OUTPATIENT
Start: 2017-09-19 | End: 2017-12-19 | Stop reason: SDUPTHER

## 2017-10-04 ENCOUNTER — CLINICAL SUPPORT (OUTPATIENT)
Dept: ELECTROPHYSIOLOGY | Facility: CLINIC | Age: 77
End: 2017-10-04
Payer: MEDICARE

## 2017-10-04 DIAGNOSIS — Z95.818 OTHER SPECIFIED CARDIAC DEVICE IN SITU: ICD-10-CM

## 2017-10-04 DIAGNOSIS — R55 SYNCOPE, UNSPECIFIED SYNCOPE TYPE: ICD-10-CM

## 2017-10-04 PROCEDURE — 93298 REM INTERROG DEV EVAL SCRMS: CPT | Mod: ,,, | Performed by: INTERNAL MEDICINE

## 2017-10-04 PROCEDURE — 93299 LOOP RECORDER REMOTE: CPT | Mod: PBBFAC | Performed by: INTERNAL MEDICINE

## 2017-10-06 ENCOUNTER — OFFICE VISIT (OUTPATIENT)
Dept: ELECTROPHYSIOLOGY | Facility: CLINIC | Age: 77
End: 2017-10-06
Payer: MEDICARE

## 2017-10-06 ENCOUNTER — HOSPITAL ENCOUNTER (OUTPATIENT)
Dept: CARDIOLOGY | Facility: CLINIC | Age: 77
Discharge: HOME | End: 2017-10-06
Payer: MEDICARE

## 2017-10-06 VITALS
HEIGHT: 68 IN | DIASTOLIC BLOOD PRESSURE: 55 MMHG | WEIGHT: 154 LBS | SYSTOLIC BLOOD PRESSURE: 107 MMHG | BODY MASS INDEX: 23.34 KG/M2 | HEART RATE: 65 BPM

## 2017-10-06 DIAGNOSIS — R00.2 PALPITATION: ICD-10-CM

## 2017-10-06 DIAGNOSIS — R55 SYNCOPE, UNSPECIFIED SYNCOPE TYPE: Primary | ICD-10-CM

## 2017-10-06 DIAGNOSIS — Z95.818 STATUS POST PLACEMENT OF IMPLANTABLE LOOP RECORDER: ICD-10-CM

## 2017-10-06 DIAGNOSIS — R00.2 PALPITATIONS: ICD-10-CM

## 2017-10-06 PROCEDURE — 93005 ELECTROCARDIOGRAM TRACING: CPT | Mod: PBBFAC | Performed by: INTERNAL MEDICINE

## 2017-10-06 PROCEDURE — 93010 ELECTROCARDIOGRAM REPORT: CPT | Mod: S$PBB,,, | Performed by: INTERNAL MEDICINE

## 2017-10-06 PROCEDURE — 99999 PR PBB SHADOW E&M-EST. PATIENT-LVL III: CPT | Mod: PBBFAC,,, | Performed by: INTERNAL MEDICINE

## 2017-10-06 PROCEDURE — 99213 OFFICE O/P EST LOW 20 MIN: CPT | Mod: PBBFAC,25 | Performed by: INTERNAL MEDICINE

## 2017-10-06 PROCEDURE — 99214 OFFICE O/P EST MOD 30 MIN: CPT | Mod: S$PBB,,, | Performed by: INTERNAL MEDICINE

## 2017-10-06 NOTE — PROGRESS NOTES
"Subjective:    Patient ID:  Shelly Vásquez is a 76 y.o. female who presents for an ILR Check.     HPI    76 y.o. F  back surgery 1/2017  hx GI bleeding due to NSAIDs    Was walking dog near her summer home in [place].  Developed sudden palpitations, chest discomfort. Legs hurt and tingled.  Called for help; an orthopedist (MD) responded and found BP to be OK, but  bpm by her (and her 's) report.  She was lying down, felt afraid to get up.  Brought to ER; was all better by then. At her initial office visit (07/03/17), she reported that she had been feeling fine, but that she was anxious about the situation. At the time, she reported having good exertion tolerance; no syncope. An Echo 1/16: 60% LVEF.    Second episode (mid-July) was short (45 seconds); said that she "just didn't feel right;" she felt uncomfortable, and went to lay in bed, felt better after rest. She denies chest pain, SOB/DALE, palpitations, or syncope. Her energy level is stable; she remains active without difficulty.      ILR Interrogation today reveals a single 40-second episode of likely AT; no recurrence.     I reviewed today's ECG which demonstrated SR at 65 bpm; , QRS 86, and QTc 397.    Review of Systems   Constitution: Negative for diaphoresis and malaise/fatigue.   HENT: Negative for nosebleeds.    Eyes: Negative for double vision.   Cardiovascular: Positive for palpitations. Negative for chest pain, dyspnea on exertion, irregular heartbeat, near-syncope and syncope.   Respiratory: Negative for shortness of breath.    Skin: Negative.    Musculoskeletal: Negative.    Gastrointestinal: Negative for hematemesis and hematochezia.   Genitourinary: Negative for hematuria.   Neurological: Positive for dizziness.   Psychiatric/Behavioral: Negative for altered mental status.        Objective:    Physical Exam   Constitutional: She is oriented to person, place, and time. She appears well-developed and well-nourished.   HENT: "   Head: Normocephalic and atraumatic.   Eyes: Pupils are equal, round, and reactive to light.   Cardiovascular: Normal rate, regular rhythm and normal heart sounds.    Pulmonary/Chest: Effort normal and breath sounds normal.   Neurological: She is alert and oriented to person, place, and time.   Skin: She is not diaphoretic.   Vitals reviewed.        Assessment:       1. Syncope, unspecified syncope type    2. Palpitations    3. Status post placement of implantable loop recorder         Plan:       Ms. Vásquez is doing well from a device perspective with stable device function with only a single (40-second) episode of likely AT noted.     Continue current medication regimen and device settings.   Monthly ILR Transmissions.   Follow up in EP clinic in 1 year w/Echo, sooner as needed.     JERALD Walker, APRN, FNP-C    Seen, interviewed, and examined. I agree with the NP's note, and the plan devised in concert with her.  No changed to Rx at this time; pt will call if palpitations recur (or we'd call if AT becomes frequent/longlasting).

## 2017-10-07 ENCOUNTER — PATIENT MESSAGE (OUTPATIENT)
Dept: DERMATOLOGY | Facility: CLINIC | Age: 77
End: 2017-10-07

## 2017-10-09 RX ORDER — TEMAZEPAM 15 MG/1
15 CAPSULE ORAL NIGHTLY
Qty: 90 CAPSULE | Refills: 1 | Status: SHIPPED | OUTPATIENT
Start: 2017-10-09 | End: 2018-01-23 | Stop reason: SDUPTHER

## 2017-10-09 RX ORDER — CLINDAMYCIN PHOSPHATE 10 UG/ML
LOTION TOPICAL
Qty: 60 ML | Refills: 3 | Status: SHIPPED | OUTPATIENT
Start: 2017-10-09 | End: 2018-04-06 | Stop reason: ALTCHOICE

## 2017-10-19 ENCOUNTER — OFFICE VISIT (OUTPATIENT)
Dept: OPHTHALMOLOGY | Facility: CLINIC | Age: 77
End: 2017-10-19
Payer: MEDICARE

## 2017-10-19 DIAGNOSIS — H00.13 CHALAZION OF BOTH EYES: ICD-10-CM

## 2017-10-19 DIAGNOSIS — H01.00A BLEPHARITIS OF UPPER AND LOWER EYELIDS OF BOTH EYES, UNSPECIFIED TYPE: Primary | ICD-10-CM

## 2017-10-19 DIAGNOSIS — H01.00B BLEPHARITIS OF UPPER AND LOWER EYELIDS OF BOTH EYES, UNSPECIFIED TYPE: Primary | ICD-10-CM

## 2017-10-19 DIAGNOSIS — H00.16 CHALAZION OF BOTH EYES: ICD-10-CM

## 2017-10-19 PROCEDURE — 92012 INTRM OPH EXAM EST PATIENT: CPT | Mod: S$PBB,,, | Performed by: OPHTHALMOLOGY

## 2017-10-19 PROCEDURE — 99999 PR PBB SHADOW E&M-EST. PATIENT-LVL III: CPT | Mod: PBBFAC,,, | Performed by: OPHTHALMOLOGY

## 2017-10-19 PROCEDURE — 99213 OFFICE O/P EST LOW 20 MIN: CPT | Mod: PBBFAC | Performed by: OPHTHALMOLOGY

## 2017-10-19 NOTE — PROGRESS NOTES
HPI     Mrs. Bravo is here today for a follow-up evaluation for chalazion   bilateral.She states everything is going well, the chalazion has seemed to   shrink and go away. She is not having any problems, no complaints. She   denies any eye pain. She is not using any drops or ointments.     Last edited by Zabrina Brandon, PCT on 10/19/2017  2:06 PM. (History)            Assessment /Plan     For exam results, see Encounter Report.    Blepharitis of upper and lower eyelids of both eyes, unspecified type  -     External/Slit Lamp Photography    Chalazion of both eyes        Patient states she has noticed significant improvement in both eyes, and chalazia have resolved . Patient finished her course of doxycycline.   Continue warm compresses and lid hygiene .    I have reviewed and concur with the resident's history, physical, assessment, and plan.  I have personally interviewed and examined the patient.        RTC PRN

## 2017-11-06 ENCOUNTER — CLINICAL SUPPORT (OUTPATIENT)
Dept: ELECTROPHYSIOLOGY | Facility: CLINIC | Age: 77
End: 2017-11-06
Payer: MEDICARE

## 2017-11-06 DIAGNOSIS — R55 SYNCOPE, UNSPECIFIED SYNCOPE TYPE: ICD-10-CM

## 2017-11-06 DIAGNOSIS — Z95.818 PRESENCE OF CARDIAC DEVICE: ICD-10-CM

## 2017-11-06 PROCEDURE — 93299 LOOP RECORDER REMOTE: CPT | Mod: PBBFAC | Performed by: INTERNAL MEDICINE

## 2017-11-06 PROCEDURE — 93298 REM INTERROG DEV EVAL SCRMS: CPT | Mod: ,,, | Performed by: INTERNAL MEDICINE

## 2017-12-11 ENCOUNTER — CLINICAL SUPPORT (OUTPATIENT)
Dept: ELECTROPHYSIOLOGY | Facility: CLINIC | Age: 77
End: 2017-12-11
Attending: INTERNAL MEDICINE
Payer: MEDICARE

## 2017-12-11 DIAGNOSIS — R55 SYNCOPE, UNSPECIFIED SYNCOPE TYPE: ICD-10-CM

## 2017-12-11 DIAGNOSIS — Z95.818 PRESENCE OF CARDIAC DEVICE: ICD-10-CM

## 2017-12-11 PROCEDURE — 93298 REM INTERROG DEV EVAL SCRMS: CPT | Mod: ,,, | Performed by: INTERNAL MEDICINE

## 2017-12-11 PROCEDURE — 93299 LOOP RECORDER REMOTE: CPT | Mod: PBBFAC | Performed by: INTERNAL MEDICINE

## 2017-12-13 ENCOUNTER — TELEPHONE (OUTPATIENT)
Dept: ELECTROPHYSIOLOGY | Facility: CLINIC | Age: 77
End: 2017-12-13

## 2017-12-13 ENCOUNTER — PATIENT MESSAGE (OUTPATIENT)
Dept: ELECTROPHYSIOLOGY | Facility: CLINIC | Age: 77
End: 2017-12-13

## 2017-12-13 DIAGNOSIS — I47.19 ATRIAL TACHYCARDIA: Primary | ICD-10-CM

## 2017-12-13 RX ORDER — VERAPAMIL HYDROCHLORIDE 120 MG/1
120 CAPSULE, EXTENDED RELEASE ORAL DAILY
Qty: 90 CAPSULE | Refills: 3 | Status: SHIPPED | OUTPATIENT
Start: 2017-12-13 | End: 2018-02-26 | Stop reason: SDUPTHER

## 2017-12-19 RX ORDER — DOXYCYCLINE 100 MG/1
100 CAPSULE ORAL DAILY
Qty: 30 CAPSULE | Refills: 1 | Status: SHIPPED | OUTPATIENT
Start: 2017-12-19 | End: 2017-12-20 | Stop reason: SDUPTHER

## 2017-12-21 RX ORDER — DOXYCYCLINE 100 MG/1
100 CAPSULE ORAL DAILY
Qty: 30 CAPSULE | Refills: 1 | Status: SHIPPED | OUTPATIENT
Start: 2017-12-21 | End: 2018-04-06 | Stop reason: ALTCHOICE

## 2017-12-27 ENCOUNTER — TELEPHONE (OUTPATIENT)
Dept: ELECTROPHYSIOLOGY | Facility: CLINIC | Age: 77
End: 2017-12-27

## 2017-12-27 NOTE — TELEPHONE ENCOUNTER
36 mins of AF on ILR. Pt didn't feel it.  CHADS-VASc=3.    Discussed with pt via phone.  Will start Xarelto.  Discussed the indications and possible side effects of the medications prescribed to the patient by me.

## 2017-12-28 ENCOUNTER — PATIENT MESSAGE (OUTPATIENT)
Dept: ELECTROPHYSIOLOGY | Facility: CLINIC | Age: 77
End: 2017-12-28

## 2017-12-29 ENCOUNTER — PATIENT MESSAGE (OUTPATIENT)
Dept: SPINE | Facility: CLINIC | Age: 77
End: 2017-12-29

## 2018-01-01 ENCOUNTER — PATIENT MESSAGE (OUTPATIENT)
Dept: ELECTROPHYSIOLOGY | Facility: CLINIC | Age: 78
End: 2018-01-01

## 2018-01-02 ENCOUNTER — PATIENT MESSAGE (OUTPATIENT)
Dept: ELECTROPHYSIOLOGY | Facility: CLINIC | Age: 78
End: 2018-01-02

## 2018-01-02 NOTE — TELEPHONE ENCOUNTER
Spoke with Pt this morning. She had trouble trying to  the Xarelto on Friday. PA was obtained but Xarelto was too expensive so it was changed to Eliquis 5 mg BID. Pt is stating she is feeling SOB and is light headed and dizzy at times during the day. I had the Pt take her B/P this morning 113/79 HR 79. She did not take her verapamil yet. Pt will take her morning medications. She will recheck her B/P  a couple hours after taking the verapamil and call back with the results.

## 2018-01-09 ENCOUNTER — TELEPHONE (OUTPATIENT)
Dept: OBSTETRICS AND GYNECOLOGY | Facility: CLINIC | Age: 78
End: 2018-01-09

## 2018-01-09 DIAGNOSIS — Z12.39 SCREENING BREAST EXAMINATION: Primary | ICD-10-CM

## 2018-01-09 NOTE — TELEPHONE ENCOUNTER
Pt called to schedule mammogram. Last mammo was in may, 2017. Orders are in, Pt will call closer top May to schedule.

## 2018-01-15 ENCOUNTER — TELEPHONE (OUTPATIENT)
Dept: ELECTROPHYSIOLOGY | Facility: CLINIC | Age: 78
End: 2018-01-15

## 2018-01-15 ENCOUNTER — CLINICAL SUPPORT (OUTPATIENT)
Dept: ELECTROPHYSIOLOGY | Facility: CLINIC | Age: 78
End: 2018-01-15
Attending: INTERNAL MEDICINE
Payer: MEDICARE

## 2018-01-15 DIAGNOSIS — R55 SYNCOPE, UNSPECIFIED SYNCOPE TYPE: ICD-10-CM

## 2018-01-15 DIAGNOSIS — Z95.818 PRESENCE OF CARDIAC DEVICE: ICD-10-CM

## 2018-01-15 PROCEDURE — 93298 REM INTERROG DEV EVAL SCRMS: CPT | Mod: ,,, | Performed by: INTERNAL MEDICINE

## 2018-01-15 PROCEDURE — 93299 LOOP RECORDER REMOTE: CPT | Mod: PBBFAC | Performed by: INTERNAL MEDICINE

## 2018-01-23 ENCOUNTER — OFFICE VISIT (OUTPATIENT)
Dept: OPTOMETRY | Facility: CLINIC | Age: 78
End: 2018-01-23
Payer: MEDICARE

## 2018-01-23 ENCOUNTER — PATIENT MESSAGE (OUTPATIENT)
Dept: OPTOMETRY | Facility: CLINIC | Age: 78
End: 2018-01-23

## 2018-01-23 ENCOUNTER — PATIENT MESSAGE (OUTPATIENT)
Dept: OPHTHALMOLOGY | Facility: CLINIC | Age: 78
End: 2018-01-23

## 2018-01-23 DIAGNOSIS — H01.139 ATOPIC DERMATITIS OF EYELID, UNSPECIFIED LATERALITY: Primary | ICD-10-CM

## 2018-01-23 PROCEDURE — 92012 INTRM OPH EXAM EST PATIENT: CPT | Mod: S$PBB,,, | Performed by: OPTOMETRIST

## 2018-01-23 PROCEDURE — 99999 PR PBB SHADOW E&M-EST. PATIENT-LVL III: CPT | Mod: PBBFAC,,, | Performed by: OPTOMETRIST

## 2018-01-23 PROCEDURE — 99213 OFFICE O/P EST LOW 20 MIN: CPT | Mod: PBBFAC,PO | Performed by: OPTOMETRIST

## 2018-01-23 RX ORDER — NEOMYCIN SULFATE, POLYMYXIN B SULFATE, AND DEXAMETHASONE 3.5; 10000; 1 MG/G; [USP'U]/G; MG/G
OINTMENT OPHTHALMIC
Qty: 3.5 G | Refills: 0 | Status: SHIPPED | OUTPATIENT
Start: 2018-01-23 | End: 2018-01-27

## 2018-01-23 RX ORDER — TEMAZEPAM 15 MG/1
15 CAPSULE ORAL NIGHTLY
Qty: 90 CAPSULE | Refills: 1 | Status: SHIPPED | OUTPATIENT
Start: 2018-01-23 | End: 2018-04-24 | Stop reason: SDUPTHER

## 2018-01-23 NOTE — PROGRESS NOTES
"HPI     Eye Problem    Additional comments: OU red, swollen, itching, irritated, burning,   discharge x 4 days -- +Systane gtts // pt had false lashes put on Saturday   & had to have removed on Sunday due to symptoms // no blurred VA           Comments   Agree above  Had "false eyelashes" attached to lids over Saturday , then had removed   after 48 hours  Moderate inflammation / edema OU           Last edited by RUMA Dominguez, OD on 1/23/2018  9:59 AM. (History)        ROS     Positive for: Eyes    Negative for: Constitutional, Gastrointestinal, Neurological, Skin,   Genitourinary, Musculoskeletal, HENT, Endocrine, Cardiovascular,   Respiratory, Psychiatric, Allergic/Imm, Heme/Lymph    Last edited by RUMA Dominguez, OD on 1/23/2018  9:59 AM. (History)        Assessment /Plan     For exam results, see Encounter Report.    Atopic dermatitis of eyelid, unspecified laterality  -     fluorometholone 0.1% (FML) 0.1 % Oint; Apply sparingly to lid margins tid x 4 days  Dispense: 3.5 g; Refill: 1      Mild conjunctivitis component OU  Advised d/c cls wear    fml rosa isela as directed  Cool pack/ po ibuprofen  Call / message if not improving    RTC prn                   "

## 2018-01-25 ENCOUNTER — TELEPHONE (OUTPATIENT)
Dept: ELECTROPHYSIOLOGY | Facility: CLINIC | Age: 78
End: 2018-01-25

## 2018-01-25 NOTE — TELEPHONE ENCOUNTER
callled Ms Vásquez to see if she could send a manual transmission (Full Report) from her home loop monitor. Patient has an appt tomorrow w/ Dr Brady. She is in the city at this time and does not bring her home monitor with her when she stays in the city.

## 2018-01-26 ENCOUNTER — OFFICE VISIT (OUTPATIENT)
Dept: ELECTROPHYSIOLOGY | Facility: CLINIC | Age: 78
End: 2018-01-26
Payer: MEDICARE

## 2018-01-26 ENCOUNTER — HOSPITAL ENCOUNTER (OUTPATIENT)
Dept: CARDIOLOGY | Facility: CLINIC | Age: 78
Discharge: HOME | End: 2018-01-26
Payer: MEDICARE

## 2018-01-26 VITALS
SYSTOLIC BLOOD PRESSURE: 103 MMHG | BODY MASS INDEX: 23.95 KG/M2 | HEART RATE: 68 BPM | WEIGHT: 158 LBS | HEIGHT: 68 IN | DIASTOLIC BLOOD PRESSURE: 51 MMHG

## 2018-01-26 DIAGNOSIS — R00.2 PALPITATIONS: Primary | ICD-10-CM

## 2018-01-26 DIAGNOSIS — R00.2 PALPITATION: ICD-10-CM

## 2018-01-26 DIAGNOSIS — I47.10 PSVT (PAROXYSMAL SUPRAVENTRICULAR TACHYCARDIA): ICD-10-CM

## 2018-01-26 PROCEDURE — 99215 OFFICE O/P EST HI 40 MIN: CPT | Mod: S$PBB,,, | Performed by: INTERNAL MEDICINE

## 2018-01-26 PROCEDURE — 99213 OFFICE O/P EST LOW 20 MIN: CPT | Mod: PBBFAC | Performed by: INTERNAL MEDICINE

## 2018-01-26 PROCEDURE — 99999 PR PBB SHADOW E&M-EST. PATIENT-LVL III: CPT | Mod: PBBFAC,,, | Performed by: INTERNAL MEDICINE

## 2018-01-26 PROCEDURE — 93010 ELECTROCARDIOGRAM REPORT: CPT | Mod: S$PBB,,, | Performed by: INTERNAL MEDICINE

## 2018-01-26 PROCEDURE — 93005 ELECTROCARDIOGRAM TRACING: CPT | Mod: PBBFAC | Performed by: INTERNAL MEDICINE

## 2018-01-26 NOTE — PROGRESS NOTES
"Subjective:    Patient ID:  Shelly Vásquez is a 77 y.o. female who presents for an ILR Check.     Loss of Consciousness   Associated symptoms include dizziness, light-headedness and palpitations. Pertinent negatives include no abdominal pain, chest pain, diaphoresis, malaise/fatigue or weakness.       76 y.o. F  back surgery 1/2017  hx GI bleeding due to NSAIDs  pSVT, palpitations.  PAF, on xarelto    Was walking dog near her summer home in [place].  Developed sudden palpitations, chest discomfort. Legs hurt and tingled.  Called for help; an orthopedist (MD) responded and found BP to be OK, but  bpm by her (and her 's) report.  She was lying down, felt afraid to get up. Brought to ER; was all better by then. At her initial office visit (07/03/17), she reported that she had been feeling fine, but that she was anxious about the situation. At the time, she reported having good exertion tolerance; no syncope. An Echo 1/16: 60% LVEF.    Second episode (mid-July) was short (45 seconds); said that she "just didn't feel right;" she felt uncomfortable, and went to lay in bed, felt better after rest. She denies chest pain, SOB/DALE, palpitations, or syncope. Her energy level is stable; she remains active without difficulty.      Since last visit, has had episodes of LH with standing. These happen maybe qod. Lasts a short time, self-resolves.  Also had >30 mins of AF detected on ILR; xarelto started.  ILR has also detected a REGULAR narrow complex tachycardia, 320-360 ms, ending with a P wave and without a post-tachycardia pause.    My interpretation of today's ECG is SR at 68 bpm    Review of Systems   Constitution: Negative. Negative for diaphoresis, weakness and malaise/fatigue.   HENT: Negative.  Negative for ear pain, nosebleeds and tinnitus.    Eyes: Negative for blurred vision and double vision.   Cardiovascular: Positive for palpitations and syncope. Negative for chest pain, dyspnea on exertion, " irregular heartbeat and near-syncope.   Respiratory: Negative.  Negative for shortness of breath.    Endocrine: Negative.  Negative for polyuria.   Hematologic/Lymphatic: Does not bruise/bleed easily.   Skin: Negative.  Negative for rash.   Musculoskeletal: Negative.  Negative for joint pain and muscle weakness.   Gastrointestinal: Negative.  Negative for abdominal pain, change in bowel habit, hematemesis and hematochezia.   Genitourinary: Negative for frequency and hematuria.   Neurological: Positive for dizziness and light-headedness.   Psychiatric/Behavioral: Negative.  Negative for altered mental status and depression. The patient is not nervous/anxious.    Allergic/Immunologic: Negative for environmental allergies.        Objective:    Physical Exam   Constitutional: She is oriented to person, place, and time. Vital signs are normal. She appears well-developed and well-nourished. She is active and cooperative.   HENT:   Head: Normocephalic and atraumatic.   Eyes: Conjunctivae and EOM are normal. Pupils are equal, round, and reactive to light.   Neck: Normal range of motion. Carotid bruit is not present. No tracheal deviation and no edema present. No thyroid mass and no thyromegaly present.   Cardiovascular: Normal rate, regular rhythm, normal heart sounds, intact distal pulses and normal pulses.   No extrasystoles are present. PMI is not displaced.  Exam reveals no gallop and no friction rub.    No murmur heard.  Pulmonary/Chest: Effort normal and breath sounds normal. No respiratory distress. She has no wheezes. She has no rales.   Abdominal: Soft. Normal appearance. She exhibits no distension. There is no hepatosplenomegaly.   Musculoskeletal: Normal range of motion.   Neurological: She is alert and oriented to person, place, and time. Coordination normal.   Skin: Skin is warm and dry. No rash noted. She is not diaphoretic.   Psychiatric: She has a normal mood and affect. Her speech is normal and behavior is  normal. Thought content normal. Cognition and memory are normal.   Nursing note and vitals reviewed.        Assessment:       1. Palpitations    2. PSVT (paroxysmal supraventricular tachycardia)         Plan:       1. episodic LH. Only happens while standing. ? orthostatic hypotension  TTT    2. SVT  Strips most c/w AVNRT, though DDx includes AVRT or AT (though ILR strips most c/w not being AT, given that the NCT ends with a P).  Discussed with patient basic cardiac electrophysiology and in general possible mechanisms of SVT, including AVNRT, AVRT, AT, AFL.  I spent about a half hour discussing the nature of EP study and ablation, including possible transseptal puncture. We discused risks and benefits at length. Our discussion included, but was not limited to the risk of death, infection, bleeding, stroke, MI, cardiac perforation, embolism, cardiac tamponade, skin burns, and other organic injury including the possibility for need for surgery or pacemaker implantation.  I discussed with patient risks, indications, benefits, and alternatives of the planned procedure. All questions were answered. Patient understands and wishes to proceed.  No verapamil x 3 days preprocedure.  No eliquis x 3 days preprocedure.  Anesthesia for MAC.

## 2018-02-06 ENCOUNTER — TELEPHONE (OUTPATIENT)
Dept: ELECTROPHYSIOLOGY | Facility: CLINIC | Age: 78
End: 2018-02-06

## 2018-02-06 ENCOUNTER — PATIENT MESSAGE (OUTPATIENT)
Dept: ELECTROPHYSIOLOGY | Facility: CLINIC | Age: 78
End: 2018-02-06

## 2018-02-06 NOTE — PROGRESS NOTES
TILT TABLE TEST EDUCATION CHECKLIST    2-23-18 @ 8 AM - TILT TABLE  REPORT TO CARDIOLOGY WAITING ROOM ON 3RD FLOOR OF HOSPITAL  (DO NOT REPORT TO CLINIC)  Directions for Reporting to Cardiology Waiting Area in the Hospital  If you park in the Parking Garage:  Take elevators to the 2nd floor  Walk up ramp and turn right by Gold Elevators  Take elevator to the 3rd floor  Upon exiting the elevator, turn away from the clinic areas  Walk long ibarra around to front of hospital to area with windows overlooking [facility]  Check in at Reception Desk  OR  If family is dropping you off:  Have them drop you off at the front of the Hospital  (Near the ER, where all the flags are hung).  Take the E elevators to the 3rd floor.  Check in at the Reception Desk in the waiting room.        DO NOT EAT OR DRINK ANYTHING AFTER MIDNIGHT ON THE NIGHT BEFORE YOUR TEST    YOU SHOULD TAKE YOUR USUAL MORNING MEDICATIONS WITH A SIP OF WATER    YOU WILL NEED SOMEONE TO DRIVE YOU HOME AFTER YOUR TEST    THE ABOVE INSTRUCTIONS WERE GIVEN TO THE PATIENT VERBALLY AND THEY VERBALIZED UNDERSTANDING. THEY DO NOT REQUIRE ANY SPECIAL NEEDS AND DO NOT HAVE ANY LEARNING BARRIERS.     Any need to reschedule or cancel procedures, or any questions regarding your procedures should be addressed directly with the Arrhythmia Department Nurses at the following phone number: [phone]

## 2018-02-06 NOTE — TELEPHONE ENCOUNTER
Returned Pt's call. Advised instructions for tilt table are in the portal. Pt voiced understanding.          ----- Message from Madonna Knapp sent at 2/6/2018 11:56 AM CST -----  Contact: PT  Pt is returning your call and can be reached at [phone].    Thank you

## 2018-02-09 ENCOUNTER — PATIENT MESSAGE (OUTPATIENT)
Dept: ELECTROPHYSIOLOGY | Facility: CLINIC | Age: 78
End: 2018-02-09

## 2018-02-09 DIAGNOSIS — I47.10 PSVT (PAROXYSMAL SUPRAVENTRICULAR TACHYCARDIA): Primary | ICD-10-CM

## 2018-02-09 DIAGNOSIS — I49.9 CARDIAC ARRHYTHMIA, UNSPECIFIED CARDIAC ARRHYTHMIA TYPE: ICD-10-CM

## 2018-02-09 NOTE — PROGRESS NOTES
ABLATION EDUCATION CHECKLIST    3-20-18 - LAB WORK   PRE - PROCEDURE LABS HAVE BEEN ORDERED FOR YOU @ [facility]  BE SURE TO ARRIVE AT YOUR SCHEDULED TIME FOR THIS LAB WORK!  (YOU DO NOT HAVE TO FAST FOR THIS LABWORK!!!!)    3-22-18 @ 6 AM - ABLATION  Report to Cardiology Waiting Room on 3rd floor of the Hospital    (Do not report to clinic)  Directions for Reporting to Cardiology Waiting Area in the Hospital  If you park in the Parking Garage:  Take elevators to the 2nd floor  Walk up ramp and turn right by Gold Elevators  Take elevator to the 3rd floor  Upon exiting the elevator, turn away from the clinic areas  Walk long ibarra around to front of hospital to area with windows overlooking [facility]  Check in at Reception Desk  OR  If family is dropping you off:  Have them drop you off at the front of the Hospital  (Near the ER, where all the flags are hung).  Take the E elevators to the 3rd floor.  Check in at the Reception Desk in the waiting room.        Do not eat or drink anything after: 12 mn on the night before your procedure    Medications:  HOLD VERAPAMIL AND ELIQUIS FOR 3 DAYS PRIOR TO PROCEDURE. LAST DOSE ON 3/18/18.   You may take ALL OTHER morning medications with a sip of water    You will be spending the night after your procedure  You will need someone to drive you home the day after your procedure.    Your pain during your procedure will be managed by the anesthesia team.     THE ABOVE INSTRUCTIONS WERE GIVEN TO THE PATIENT VERBALLY AND THEY VERBALIZED UNDERSTANDING.  THEY DO NOT REQUIRE ANY SPECIAL NEEDS AND DO NOT HAVE ANY LEARNING BARRIERS.    Any need to reschedule or cancel procedures, or any questions regarding your procedures should be addressed directly with the Arrhythmia Department Nurses at the following phone number: [phone]

## 2018-02-19 ENCOUNTER — CLINICAL SUPPORT (OUTPATIENT)
Dept: ELECTROPHYSIOLOGY | Facility: CLINIC | Age: 78
End: 2018-02-19
Attending: INTERNAL MEDICINE
Payer: MEDICARE

## 2018-02-19 DIAGNOSIS — Z95.818 PRESENCE OF CARDIAC DEVICE: ICD-10-CM

## 2018-02-19 DIAGNOSIS — R55 SYNCOPE, UNSPECIFIED SYNCOPE TYPE: ICD-10-CM

## 2018-02-19 PROCEDURE — 93299 LOOP RECORDER REMOTE: CPT | Mod: PBBFAC | Performed by: INTERNAL MEDICINE

## 2018-02-19 PROCEDURE — 93298 REM INTERROG DEV EVAL SCRMS: CPT | Mod: ,,, | Performed by: INTERNAL MEDICINE

## 2018-02-21 ENCOUNTER — TELEPHONE (OUTPATIENT)
Dept: ELECTROPHYSIOLOGY | Facility: CLINIC | Age: 78
End: 2018-02-21

## 2018-02-21 NOTE — TELEPHONE ENCOUNTER
Pt has tilt table appt on Friday and stated she woke up with cold this morning. She wanted to know if this would interfere with her test. Advised if she was feeling okay it would not interfer.        ----- Message from Gema Qunin sent at 2/21/2018  9:01 AM CST -----  Contact: patient  Please call pt at [phone]. Patient is scheduled for Tilt table test on 02/23/18 with Dr Brady but now has a fresh cold. Can she still do the test?    Thank you

## 2018-02-26 DIAGNOSIS — I47.19 ATRIAL TACHYCARDIA: ICD-10-CM

## 2018-02-26 RX ORDER — VERAPAMIL HYDROCHLORIDE 120 MG/1
120 CAPSULE, EXTENDED RELEASE ORAL DAILY
Qty: 90 CAPSULE | Refills: 3 | Status: SHIPPED | OUTPATIENT
Start: 2018-02-26 | End: 2018-04-20 | Stop reason: SDUPTHER

## 2018-03-05 ENCOUNTER — PATIENT MESSAGE (OUTPATIENT)
Dept: MEDSURG UNIT | Facility: HOSPITAL | Age: 78
End: 2018-03-05

## 2018-03-20 ENCOUNTER — LAB VISIT (OUTPATIENT)
Dept: LAB | Facility: HOSPITAL | Age: 78
End: 2018-03-20
Attending: INTERNAL MEDICINE
Payer: MEDICARE

## 2018-03-20 DIAGNOSIS — I49.9 CARDIAC ARRHYTHMIA, UNSPECIFIED CARDIAC ARRHYTHMIA TYPE: ICD-10-CM

## 2018-03-20 DIAGNOSIS — I47.10 PSVT (PAROXYSMAL SUPRAVENTRICULAR TACHYCARDIA): ICD-10-CM

## 2018-03-20 LAB
ANION GAP SERPL CALC-SCNC: 7 MMOL/L
APTT BLDCRRT: 26.6 SEC
BASOPHILS # BLD AUTO: 0.09 K/UL
BASOPHILS NFR BLD: 1.7 %
BUN SERPL-MCNC: 14 MG/DL
CALCIUM SERPL-MCNC: 9.4 MG/DL
CHLORIDE SERPL-SCNC: 106 MMOL/L
CO2 SERPL-SCNC: 26 MMOL/L
CREAT SERPL-MCNC: 0.9 MG/DL
DIFFERENTIAL METHOD: ABNORMAL
EOSINOPHIL # BLD AUTO: 0.1 K/UL
EOSINOPHIL NFR BLD: 2.5 %
ERYTHROCYTE [DISTWIDTH] IN BLOOD BY AUTOMATED COUNT: 14.1 %
EST. GFR  (AFRICAN AMERICAN): >60 ML/MIN/1.73 M^2
EST. GFR  (NON AFRICAN AMERICAN): >60 ML/MIN/1.73 M^2
GLUCOSE SERPL-MCNC: 97 MG/DL
HCT VFR BLD AUTO: 34 %
HGB BLD-MCNC: 10.9 G/DL
IMM GRANULOCYTES # BLD AUTO: 0.02 K/UL
IMM GRANULOCYTES NFR BLD AUTO: 0.4 %
INR PPP: 1.1
LYMPHOCYTES # BLD AUTO: 2 K/UL
LYMPHOCYTES NFR BLD: 38.8 %
MCH RBC QN AUTO: 34.1 PG
MCHC RBC AUTO-ENTMCNC: 32.1 G/DL
MCV RBC AUTO: 106 FL
MONOCYTES # BLD AUTO: 0.6 K/UL
MONOCYTES NFR BLD: 11.4 %
NEUTROPHILS # BLD AUTO: 2.3 K/UL
NEUTROPHILS NFR BLD: 45.2 %
NRBC BLD-RTO: 0 /100 WBC
PLATELET # BLD AUTO: 421 K/UL
PMV BLD AUTO: 9.6 FL
POTASSIUM SERPL-SCNC: 4.2 MMOL/L
PROTHROMBIN TIME: 11.6 SEC
RBC # BLD AUTO: 3.2 M/UL
SODIUM SERPL-SCNC: 139 MMOL/L
WBC # BLD AUTO: 5.16 K/UL

## 2018-03-20 PROCEDURE — 85025 COMPLETE CBC W/AUTO DIFF WBC: CPT

## 2018-03-20 PROCEDURE — 36415 COLL VENOUS BLD VENIPUNCTURE: CPT | Mod: PO

## 2018-03-20 PROCEDURE — 85610 PROTHROMBIN TIME: CPT | Mod: PO

## 2018-03-20 PROCEDURE — 85730 THROMBOPLASTIN TIME PARTIAL: CPT | Mod: PO

## 2018-03-20 PROCEDURE — 80048 BASIC METABOLIC PNL TOTAL CA: CPT

## 2018-03-21 ENCOUNTER — TELEPHONE (OUTPATIENT)
Dept: ELECTROPHYSIOLOGY | Facility: CLINIC | Age: 78
End: 2018-03-21

## 2018-03-21 NOTE — TELEPHONE ENCOUNTER
Received call from Pt. She stated she didn't know she had to hold medications. No one told her. She states she has been on a trip and had not read her instructions. Pt did have blood work drawn on time. When asked about her heart palpitations she stated she felt them off and on during her trip. She stated it would come and go.  Spoke with Dr Brady. He advised that the  Pt  hold her verapamil and eliquis for today and tomm. Instructed Pt on nothing to eat or drink after midnight and to hold Eliquis and verapamil until procedure. Pt Voiced understanding.

## 2018-03-21 NOTE — TELEPHONE ENCOUNTER
Attempted to contact Pt to confirm procedure for tomm. No answer. Left message reviewing pre op instructions , medications that should of been held and arrival time. Also left number to call for any questions.

## 2018-03-22 ENCOUNTER — HOSPITAL ENCOUNTER (OUTPATIENT)
Facility: HOSPITAL | Age: 78
Discharge: HOME | End: 2018-03-23
Attending: INTERNAL MEDICINE | Admitting: INTERNAL MEDICINE
Payer: MEDICARE

## 2018-03-22 ENCOUNTER — ANESTHESIA (OUTPATIENT)
Dept: MEDSURG UNIT | Facility: HOSPITAL | Age: 78
End: 2018-03-22
Payer: MEDICARE

## 2018-03-22 ENCOUNTER — ANESTHESIA EVENT (OUTPATIENT)
Dept: MEDSURG UNIT | Facility: HOSPITAL | Age: 78
End: 2018-03-22
Payer: MEDICARE

## 2018-03-22 DIAGNOSIS — I48.0 PAF (PAROXYSMAL ATRIAL FIBRILLATION): ICD-10-CM

## 2018-03-22 DIAGNOSIS — G47.00 INSOMNIA: ICD-10-CM

## 2018-03-22 DIAGNOSIS — I47.10 PSVT (PAROXYSMAL SUPRAVENTRICULAR TACHYCARDIA): Primary | ICD-10-CM

## 2018-03-22 DIAGNOSIS — I47.10 SVT (SUPRAVENTRICULAR TACHYCARDIA): ICD-10-CM

## 2018-03-22 PROCEDURE — 99220 PR INITIAL OBSERVATION CARE,LEVL III: CPT | Mod: ,,, | Performed by: INTERNAL MEDICINE

## 2018-03-22 PROCEDURE — 93010 ELECTROCARDIOGRAM REPORT: CPT | Mod: 76,,, | Performed by: INTERNAL MEDICINE

## 2018-03-22 PROCEDURE — 37000008 HC ANESTHESIA 1ST 15 MINUTES: Performed by: INTERNAL MEDICINE

## 2018-03-22 PROCEDURE — 25000003 PHARM REV CODE 250: Performed by: NURSE PRACTITIONER

## 2018-03-22 PROCEDURE — 93623 PRGRMD STIMJ&PACG IV RX NFS: CPT | Mod: 26,,, | Performed by: INTERNAL MEDICINE

## 2018-03-22 PROCEDURE — 25000003 PHARM REV CODE 250: Performed by: NURSE ANESTHETIST, CERTIFIED REGISTERED

## 2018-03-22 PROCEDURE — 37000009 HC ANESTHESIA EA ADD 15 MINS: Performed by: INTERNAL MEDICINE

## 2018-03-22 PROCEDURE — 93005 ELECTROCARDIOGRAM TRACING: CPT

## 2018-03-22 PROCEDURE — 93010 ELECTROCARDIOGRAM REPORT: CPT | Mod: ,,, | Performed by: INTERNAL MEDICINE

## 2018-03-22 PROCEDURE — D9220A PRA ANESTHESIA: Mod: ANES,,, | Performed by: ANESTHESIOLOGY

## 2018-03-22 PROCEDURE — 25000003 PHARM REV CODE 250

## 2018-03-22 PROCEDURE — 63600175 PHARM REV CODE 636 W HCPCS: Performed by: NURSE ANESTHETIST, CERTIFIED REGISTERED

## 2018-03-22 PROCEDURE — 27200198 EP LAB PROCEDURE

## 2018-03-22 PROCEDURE — D9220A PRA ANESTHESIA: Mod: CRNA,,, | Performed by: NURSE ANESTHETIST, CERTIFIED REGISTERED

## 2018-03-22 PROCEDURE — 93613 INTRACARDIAC EPHYS 3D MAPG: CPT | Mod: ,,, | Performed by: INTERNAL MEDICINE

## 2018-03-22 PROCEDURE — C1730 CATH, EP, 19 OR FEW ELECT: HCPCS

## 2018-03-22 PROCEDURE — 93621 COMP EP EVL L PAC&REC C SINS: CPT | Mod: 26,,, | Performed by: INTERNAL MEDICINE

## 2018-03-22 PROCEDURE — 93653 COMPRE EP EVAL TX SVT: CPT | Mod: ,,, | Performed by: INTERNAL MEDICINE

## 2018-03-22 PROCEDURE — 63600175 PHARM REV CODE 636 W HCPCS

## 2018-03-22 PROCEDURE — C1733 CATH, EP, OTHR THAN COOL-TIP: HCPCS

## 2018-03-22 RX ORDER — PROPOFOL 10 MG/ML
VIAL (ML) INTRAVENOUS
Status: DISCONTINUED | OUTPATIENT
Start: 2018-03-22 | End: 2018-03-22

## 2018-03-22 RX ORDER — SODIUM CHLORIDE 0.9 % (FLUSH) 0.9 %
3 SYRINGE (ML) INJECTION
Status: DISCONTINUED | OUTPATIENT
Start: 2018-03-22 | End: 2018-03-23 | Stop reason: HOSPADM

## 2018-03-22 RX ORDER — SODIUM CHLORIDE 9 MG/ML
INJECTION, SOLUTION INTRAVENOUS CONTINUOUS
Status: DISCONTINUED | OUTPATIENT
Start: 2018-03-22 | End: 2018-03-23 | Stop reason: HOSPADM

## 2018-03-22 RX ORDER — ACETAMINOPHEN 325 MG/1
650 TABLET ORAL EVERY 6 HOURS PRN
Status: DISCONTINUED | OUTPATIENT
Start: 2018-03-22 | End: 2018-03-23 | Stop reason: HOSPADM

## 2018-03-22 RX ORDER — LIDOCAINE HYDROCHLORIDE 10 MG/ML
1 INJECTION, SOLUTION EPIDURAL; INFILTRATION; INTRACAUDAL; PERINEURAL ONCE
Status: DISCONTINUED | OUTPATIENT
Start: 2018-03-22 | End: 2018-03-22

## 2018-03-22 RX ORDER — HYDROCODONE BITARTRATE AND ACETAMINOPHEN 5; 325 MG/1; MG/1
1 TABLET ORAL EVERY 6 HOURS PRN
Status: DISCONTINUED | OUTPATIENT
Start: 2018-03-22 | End: 2018-03-23 | Stop reason: HOSPADM

## 2018-03-22 RX ORDER — PHENYLEPHRINE HYDROCHLORIDE 10 MG/ML
INJECTION INTRAVENOUS
Status: DISCONTINUED | OUTPATIENT
Start: 2018-03-22 | End: 2018-03-22

## 2018-03-22 RX ORDER — SODIUM CHLORIDE 9 MG/ML
INJECTION, SOLUTION INTRAVENOUS CONTINUOUS
Status: DISCONTINUED | OUTPATIENT
Start: 2018-03-22 | End: 2018-03-22

## 2018-03-22 RX ORDER — PROPOFOL 10 MG/ML
VIAL (ML) INTRAVENOUS CONTINUOUS PRN
Status: DISCONTINUED | OUTPATIENT
Start: 2018-03-22 | End: 2018-03-22

## 2018-03-22 RX ORDER — FENTANYL CITRATE 50 UG/ML
25 INJECTION, SOLUTION INTRAMUSCULAR; INTRAVENOUS EVERY 5 MIN PRN
Status: DISCONTINUED | OUTPATIENT
Start: 2018-03-22 | End: 2018-03-23 | Stop reason: HOSPADM

## 2018-03-22 RX ORDER — RAMELTEON 8 MG/1
8 TABLET ORAL NIGHTLY PRN
Status: DISCONTINUED | OUTPATIENT
Start: 2018-03-22 | End: 2018-03-23 | Stop reason: HOSPADM

## 2018-03-22 RX ORDER — VERAPAMIL HYDROCHLORIDE 120 MG/1
120 TABLET, FILM COATED, EXTENDED RELEASE ORAL DAILY
Status: DISCONTINUED | OUTPATIENT
Start: 2018-03-23 | End: 2018-03-23 | Stop reason: HOSPADM

## 2018-03-22 RX ORDER — ONDANSETRON 2 MG/ML
INJECTION INTRAMUSCULAR; INTRAVENOUS
Status: DISCONTINUED | OUTPATIENT
Start: 2018-03-22 | End: 2018-03-22

## 2018-03-22 RX ORDER — MIDAZOLAM HYDROCHLORIDE 1 MG/ML
INJECTION, SOLUTION INTRAMUSCULAR; INTRAVENOUS
Status: DISCONTINUED | OUTPATIENT
Start: 2018-03-22 | End: 2018-03-22

## 2018-03-22 RX ADMIN — SODIUM CHLORIDE: 0.9 INJECTION, SOLUTION INTRAVENOUS at 08:03

## 2018-03-22 RX ADMIN — EPHEDRINE SULFATE 10 MG: 50 INJECTION, SOLUTION INTRAMUSCULAR; INTRAVENOUS; SUBCUTANEOUS at 08:03

## 2018-03-22 RX ADMIN — PHENYLEPHRINE HYDROCHLORIDE 200 MCG: 10 INJECTION INTRAVENOUS at 09:03

## 2018-03-22 RX ADMIN — ISOPROTERENOL HYDROCHLORIDE 1 MCG: 0.2 INJECTION, SOLUTION INTRAMUSCULAR; INTRAVENOUS at 08:03

## 2018-03-22 RX ADMIN — ONDANSETRON 4 MG: 2 INJECTION INTRAMUSCULAR; INTRAVENOUS at 10:03

## 2018-03-22 RX ADMIN — PHENYLEPHRINE HYDROCHLORIDE 100 MCG: 10 INJECTION INTRAVENOUS at 10:03

## 2018-03-22 RX ADMIN — MIDAZOLAM 2 MG: 1 INJECTION INTRAMUSCULAR; INTRAVENOUS at 07:03

## 2018-03-22 RX ADMIN — PROPOFOL 150 MCG/KG/MIN: 10 INJECTION, EMULSION INTRAVENOUS at 07:03

## 2018-03-22 RX ADMIN — PHENYLEPHRINE HYDROCHLORIDE 100 MCG: 10 INJECTION INTRAVENOUS at 08:03

## 2018-03-22 RX ADMIN — SODIUM CHLORIDE: 0.9 INJECTION, SOLUTION INTRAVENOUS at 07:03

## 2018-03-22 RX ADMIN — PROPOFOL 80 MG: 10 INJECTION, EMULSION INTRAVENOUS at 07:03

## 2018-03-22 RX ADMIN — ACETAMINOPHEN 650 MG: 325 TABLET ORAL at 03:03

## 2018-03-22 RX ADMIN — PHENYLEPHRINE HYDROCHLORIDE 200 MCG: 10 INJECTION INTRAVENOUS at 10:03

## 2018-03-22 RX ADMIN — EPHEDRINE SULFATE 10 MG: 50 INJECTION, SOLUTION INTRAMUSCULAR; INTRAVENOUS; SUBCUTANEOUS at 09:03

## 2018-03-22 NOTE — NURSING
Spouse at bedside.  Right and left groins remain clean dry and intact.  Lunch ordered.  Welch and fluids tolerated.  Continue to monitor.

## 2018-03-22 NOTE — ANESTHESIA PREPROCEDURE EVALUATION
03/22/2018  Shelly Vásquez is a 77 y.o., female.    Anesthesia Evaluation         Review of Systems  Anesthesia Hx:  No problems with previous Anesthesia   Social:  Social Alcohol Use, Former Smoker    Cardiovascular:   Exercise tolerance: good Dysrhythmias atrial fibrillation ECG has been reviewed. SVT  Echo from 2017  CONCLUSIONS     1 - Normal left ventricular systolic function (EF 60-65%).     2 - Moderate left atrial enlargement.     3 - Normal left ventricular diastolic function.     4 - Normal right ventricular systolic function .     5 - Mild mitral regurgitation.     6 - The estimated PA systolic pressure is 27 mmHg.     7 - Mild aortic regurgitation.    Pulmonary:  Pulmonary Normal    Musculoskeletal:   Arthritis   Spine Disorders: lumbar    Neurological:  Neurology Normal    Endocrine:  Endocrine Normal        Physical Exam  General:  Well nourished    Airway/Jaw/Neck:  Airway Findings: Mouth Opening: Normal Tongue: Normal  General Airway Assessment: Adult  Mallampati: II  TM Distance: Normal, at least 6 cm  Jaw/Neck Findings:     Neck ROM: Normal ROM      Dental:  Dental Findings: In tact   Chest/Lungs:  Chest/Lungs Findings: Clear to auscultation, Normal Respiratory Rate     Heart/Vascular:  Heart Findings: Rate: Normal  Rhythm: Regular Rhythm  Sounds: Normal        Mental Status:  Mental Status Findings:  Cooperative, Alert and Oriented         Anesthesia Plan  Type of Anesthesia, risks & benefits discussed:  Anesthesia Type:  general, MAC  Patient's Preference:   Intra-op Monitoring Plan: standard ASA monitors  Intra-op Monitoring Plan Comments:   Post Op Pain Control Plan: IV/PO Opioids PRN, multimodal analgesia and per primary service following discharge from PACU  Post Op Pain Control Plan Comments:   Induction:   IV  Beta Blocker:  Patient is not currently on a Beta-Blocker (No further  documentation required).       Informed Consent: Patient understands risks and agrees with Anesthesia plan.  Questions answered. Anesthesia consent signed with patient.  ASA Score: 2     Day of Surgery Review of History & Physical:            Ready For Surgery From Anesthesia Perspective.

## 2018-03-22 NOTE — H&P
"[facility]  Cardiac Electrophysiology  History and Physical     Admission Date: 3/22/2018  Code Status: Prior   Attending Provider: Michael Brady MD   Principal Problem:SVT (supraventricular tachycardia)    Subjective:     Chief Complaint:  Presents for SVT-RFA.     HPI:  76 y.o. F  back surgery 1/2017  hx GI bleeding due to NSAIDs  pSVT, palpitations.  PAF, on xarelto     Was walking dog near her summer home in [place].  Developed sudden palpitations, chest discomfort. Legs hurt and tingled.  Called for help; an orthopedist (MD) responded and found BP to be OK, but  bpm by her (and her 's) report.  She was lying down, felt afraid to get up. Brought to ER; was all better by then. At her initial office visit (07/03/17), she reported that she had been feeling fine, but that she was anxious about the situation. At the time, she reported having good exertion tolerance; no syncope. An Echo 1/16: 60% LVEF.  Second episode (mid-July) was short (45 seconds); said that she "just didn't feel right;" she felt uncomfortable, and went to lay in bed, felt better after rest. She denies chest pain, SOB/DALE, palpitations, or syncope. Her energy level is stable; she remains active without difficulty.       Since last visit, has had episodes of LH with standing. These happen maybe qod. Lasts a short time, self-resolves.  Also had >30 mins of AF detected on ILR; xarelto started.  ILR has also detected a REGULAR narrow complex tachycardia, 320-360 ms, ending with a P wave and without a post-tachycardia pause.    Denies fever, chills, SOB, CP.  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      Current Outpatient Prescriptions on File Prior to Encounter   Medication Sig    apixaban 5 mg Tab Take 1 tablet (5 mg total) by mouth 2 (two) times daily.    LOPREEZA 0.5-0.1 mg per tablet TAKE 1 TABLET BY MOUTH EVERY DAY    temazepam (RESTORIL) 15 mg Cap Take 1 capsule (15 mg total) by mouth nightly.    acetaminophen (TYLENOL) 325 MG tablet Take 325 mg by mouth continuous prn for Pain.    acyclovir (ZOVIRAX) 400 MG tablet Take 1 tablet (400 mg total) by mouth once daily. (Patient taking differently: Take 400 mg by mouth continuous prn. )    clindamycin (CLEOCIN T) 1 % lotion Use hs (Patient taking differently: as needed. Use hs)    doxycycline (MONODOX) 100 MG capsule Take 1 capsule (100 mg total) by mouth once daily. (Patient taking differently: Take 100 mg by mouth as needed. )     Family History     Problem Relation (Age of Onset)    No Known Problems Father, Mother, Sister, Brother, Maternal Aunt, Maternal Uncle, Paternal Aunt, Paternal Uncle, Maternal Grandmother, Maternal Grandfather, Paternal Grandmother, Paternal Grandfather        Social History Main Topics    Smoking status: Former Smoker     Packs/day: 1.00     Years: 22.00     Types: Cigarettes     Quit date: 7/27/1980    Smokeless tobacco: Never Used    Alcohol use 4.2 oz/week     7 Glasses of wine per week       Comment: Socially    Drug use: No    Sexual activity: Yes     Partners: Male     Birth control/ protection: Post-menopausal     Review of Systems   Constitution: Negative for chills, decreased appetite, fever, weakness, weight gain and weight loss.   Eyes: Negative for blurred vision and visual halos.   Cardiovascular: Positive for palpitations (last episode on Tuesday). Negative for chest pain, claudication, dyspnea on exertion, leg swelling and syncope.   Respiratory: Negative for cough and shortness of breath.    Hematologic/Lymphatic: Negative for bleeding problem. Does not bruise/bleed easily.   Skin: Negative for rash.   Musculoskeletal: Negative for joint pain, muscle cramps and muscle weakness.   Gastrointestinal: Negative for abdominal pain, change in bowel habit, diarrhea, nausea and vomiting.   Neurological: Negative for headaches, numbness and paresthesias.   Psychiatric/Behavioral: Negative for altered mental status.     Objective:     Vital Signs (Most Recent):  Temp: 97.7 °F (36.5 °C) (03/22/18 0636)  Pulse: 66 (03/22/18 0636)  Resp: 18 (03/22/18 0636)  BP: (!) 117/59 (03/22/18 0637)  SpO2: 98 % (03/22/18 0636) Vital Signs (24h Range):  Temp:  [97.7 °F (36.5 °C)] 97.7 °F (36.5 °C)  Pulse:  [66] 66  Resp:  [18] 18  SpO2:  [98 %] 98 %  BP: (117-118)/(59-60) 117/59     Weight: 68 kg (150 lb)  Body mass index is 22.81 kg/m².    SpO2: 98 %  O2 Device (Oxygen Therapy): room air    Physical Exam   Constitutional: She is oriented to person, place, and time. She appears well-developed and well-nourished.   Neck: Neck supple.   Cardiovascular: Normal rate, regular rhythm, normal heart sounds and intact distal pulses.    No murmur heard.  Pulses:       Radial pulses are 2+ on the right side, and 2+ on the left side.   Pulmonary/Chest: Effort normal. No stridor. No respiratory distress. She has no wheezes.   Abdominal: Soft. She exhibits no distension. There is no tenderness.   Musculoskeletal: Normal range of  motion. She exhibits no edema.   Neurological: She is alert and oriented to person, place, and time. She exhibits normal muscle tone. Coordination normal.   Skin: Skin is warm and dry. No rash noted. No erythema.   Psychiatric: She has a normal mood and affect.   Vitals reviewed.      Significant Labs:   EP:   Recent Labs  Lab 03/20/18  1005      K 4.2      CO2 26   GLU 97   BUN 14   CREATININE 0.9   CALCIUM 9.4   ANIONGAP 7*   ESTGFRAFRICA >60.0   EGFRNONAA >60.0   WBC 5.16   HGB 10.9*   HCT 34.0*   *   INR 1.1       Significant Imaging: EKG: Sinus Rhythm; 68 bpm    Assessment and Plan:     * SVT (supraventricular tachycardia)    Plan for RFA-SVT today 3/22/18  Anesthesia for sedation          Procedure consent obtained in clinic and on chart.  Confirmed planned SVT-RFA.  Discussed procedure.  No further questions.     Michelle Cannon NP  Cardiac Electrophysiology  [facility]

## 2018-03-22 NOTE — ANESTHESIA RELEASE NOTE
Anesthesia Release from PACU Note    Anesthesia Release from PACU note    Patient: Shelly Vásquez    Procedure(s) Performed: Procedure(s) (LRB):  ABLATION (N/A)    Anesthesia type: general    Post pain: Adequate analgesia    Post assessment: no apparent anesthetic complications, tolerated procedure well and no evidence of recall    Last Vitals:   Vitals:    03/22/18 1100   BP: (!) 100/55   Pulse: 76   Resp: 15   Temp:    SpO2: 100%       Post vital signs: stable    Level of consciousness: awake, alert  and oriented    Nausea/Vomiting: no nausea/no vomiting    Complications: none    Airway Patency: patent    Respiratory: unassisted    Cardiovascular: stable and blood pressure at baseline    Hydration: euvolemic

## 2018-03-22 NOTE — NURSING
Received via stretcher from recovery.  Pt trf to bed using board.  Pt instructed on bedrest and keeping head flat on bed.  Awake and alert.  Denies pain or SOB.  Resp even and unlabored.  Gauze dressing covered with tegaderms to bilateral groins clean dry and intact.  No bleeding or hematoma noted.  VSS.  Pt spouse called on phone to come to pt room/bedside.  Menu provided and fluids.  Call light within reach of pt and pt instructed on use of call light.  Strong equal palmar grasps.  Smiles symmetrically.  Will continue to monitor.

## 2018-03-22 NOTE — TRANSFER OF CARE
"Anesthesia Transfer of Care Note    Patient: Shelly Vásquez    Procedure(s) Performed: Procedure(s) (LRB):  ABLATION (N/A)    Patient location: PACU    Anesthesia Type: general    Transport from OR: Transported from OR on 100% O2 by closed face mask with adequate spontaneous ventilation    Post pain: adequate analgesia    Post assessment: no apparent anesthetic complications and tolerated procedure well    Post vital signs: stable    Level of consciousness: responds to stimulation and sedated    Nausea/Vomiting: no nausea/vomiting    Complications: none    Transfer of care protocol was followed      Last vitals:   Visit Vitals  BP (!) 101/57   Pulse 82   Temp 36.5 °C (97.7 °F) (Skin)   Resp 12   Ht 5' 8" (1.727 m)   Wt 68 kg (150 lb)   LMP  (LMP Unknown)   SpO2 100%   Breastfeeding? No   BMI 22.81 kg/m²     "

## 2018-03-22 NOTE — ANESTHESIA POSTPROCEDURE EVALUATION
"Anesthesia Post Evaluation    Patient: Shelly Vásquez    Procedure(s) Performed: Procedure(s) (LRB):  ABLATION (N/A)    Final Anesthesia Type: general  Patient location during evaluation: PACU  Patient participation: Yes- Able to Participate  Level of consciousness: awake and alert  Post-procedure vital signs: reviewed and stable  Pain management: adequate  Airway patency: patent  PONV status at discharge: No PONV  Anesthetic complications: no      Cardiovascular status: hemodynamically stable and blood pressure returned to baseline  Respiratory status: unassisted and spontaneous ventilation  Hydration status: euvolemic  Follow-up not needed.        Visit Vitals  BP (!) 100/55   Pulse 76   Temp 36.5 °C (97.7 °F) (Skin)   Resp 15   Ht 5' 8" (1.727 m)   Wt 68 kg (150 lb)   LMP  (LMP Unknown)   SpO2 100%   Breastfeeding? No   BMI 22.81 kg/m²       Pain/Oscar Score: Pain Assessment Performed: Yes (3/22/2018  6:37 AM)      "

## 2018-03-22 NOTE — NURSING
Pt ambulated without difficulty. Right and left groin remain soft.  No bleeding or hematoma noted.

## 2018-03-22 NOTE — SUBJECTIVE & OBJECTIVE
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      Current Outpatient Prescriptions on File Prior to Encounter   Medication Sig    apixaban 5 mg Tab Take 1 tablet (5 mg total) by mouth 2 (two) times daily.    LOPREEZA 0.5-0.1 mg per tablet TAKE 1 TABLET BY MOUTH EVERY DAY    temazepam (RESTORIL) 15 mg Cap Take 1 capsule (15 mg total) by mouth nightly.    acetaminophen (TYLENOL) 325 MG tablet Take 325 mg by mouth continuous prn for Pain.    acyclovir (ZOVIRAX) 400 MG tablet Take 1 tablet (400 mg total) by mouth once daily. (Patient taking differently: Take 400 mg by mouth continuous prn. )    clindamycin (CLEOCIN T) 1 % lotion Use hs (Patient taking differently: as needed. Use hs)    doxycycline (MONODOX) 100 MG capsule Take 1 capsule (100 mg total) by mouth once daily. (Patient taking differently: Take 100 mg by mouth as needed. )     Family History     Problem Relation (Age of Onset)    No Known Problems Father, Mother, Sister, Brother, Maternal Aunt, Maternal Uncle, Paternal Aunt, Paternal Uncle, Maternal Grandmother, Maternal Grandfather, Paternal Grandmother,  Paternal Grandfather        Social History Main Topics    Smoking status: Former Smoker     Packs/day: 1.00     Years: 22.00     Types: Cigarettes     Quit date: 7/27/1980    Smokeless tobacco: Never Used    Alcohol use 4.2 oz/week     7 Glasses of wine per week      Comment: Socially    Drug use: No    Sexual activity: Yes     Partners: Male     Birth control/ protection: Post-menopausal     Review of Systems   Constitution: Negative for chills, decreased appetite, fever, weakness, weight gain and weight loss.   Eyes: Negative for blurred vision and visual halos.   Cardiovascular: Positive for palpitations (last episode on Tuesday). Negative for chest pain, claudication, dyspnea on exertion, leg swelling and syncope.   Respiratory: Negative for cough and shortness of breath.    Hematologic/Lymphatic: Negative for bleeding problem. Does not bruise/bleed easily.   Skin: Negative for rash.   Musculoskeletal: Negative for joint pain, muscle cramps and muscle weakness.   Gastrointestinal: Negative for abdominal pain, change in bowel habit, diarrhea, nausea and vomiting.   Neurological: Negative for headaches, numbness and paresthesias.   Psychiatric/Behavioral: Negative for altered mental status.     Objective:     Vital Signs (Most Recent):  Temp: 97.7 °F (36.5 °C) (03/22/18 0636)  Pulse: 66 (03/22/18 0636)  Resp: 18 (03/22/18 0636)  BP: (!) 117/59 (03/22/18 0637)  SpO2: 98 % (03/22/18 0636) Vital Signs (24h Range):  Temp:  [97.7 °F (36.5 °C)] 97.7 °F (36.5 °C)  Pulse:  [66] 66  Resp:  [18] 18  SpO2:  [98 %] 98 %  BP: (117-118)/(59-60) 117/59     Weight: 68 kg (150 lb)  Body mass index is 22.81 kg/m².    SpO2: 98 %  O2 Device (Oxygen Therapy): room air    Physical Exam   Constitutional: She is oriented to person, place, and time. She appears well-developed and well-nourished.   Neck: Neck supple.   Cardiovascular: Normal rate, regular rhythm, normal heart sounds and intact distal pulses.    No murmur  heard.  Pulses:       Radial pulses are 2+ on the right side, and 2+ on the left side.   Pulmonary/Chest: Effort normal. No stridor. No respiratory distress. She has no wheezes.   Abdominal: Soft. She exhibits no distension. There is no tenderness.   Musculoskeletal: Normal range of motion. She exhibits no edema.   Neurological: She is alert and oriented to person, place, and time. She exhibits normal muscle tone. Coordination normal.   Skin: Skin is warm and dry. No rash noted. No erythema.   Psychiatric: She has a normal mood and affect.   Vitals reviewed.      Significant Labs:   EP:   Recent Labs  Lab 03/20/18  1005      K 4.2      CO2 26   GLU 97   BUN 14   CREATININE 0.9   CALCIUM 9.4   ANIONGAP 7*   ESTGFRAFRICA >60.0   EGFRNONAA >60.0   WBC 5.16   HGB 10.9*   HCT 34.0*   *   INR 1.1       Significant Imaging: EKG: Sinus Rhythm; 68 bpm

## 2018-03-22 NOTE — HPI
"76 y.o. F  back surgery 1/2017  hx GI bleeding due to NSAIDs  pSVT, palpitations.  PAF, on xarelto     Was walking dog near her summer [place] in [facility].  Developed sudden palpitations, chest discomfort. Legs hurt and tingled.  Called for help; an orthopedist (MD) responded and found BP to be OK, but  bpm by her (and her 's) report.  She was lying down, felt afraid to get up. Brought to ER; was all better by then. At her initial office visit (07/03/17), she reported that she had been feeling fine, but that she was anxious about the situation. At the time, she reported having good exertion tolerance; no syncope. An Echo 1/16: 60% LVEF.  Second episode (mid-July) was short (45 seconds); said that she "just didn't feel right;" she felt uncomfortable, and went to lay in bed, felt better after rest. She denies chest pain, SOB/DALE, palpitations, or syncope. Her energy level is stable; she remains active without difficulty.       Since last visit, has had episodes of LH with standing. These happen maybe qod. Lasts a short time, self-resolves.  Also had >30 mins of AF detected on ILR; xarelto started.  ILR has also detected a REGULAR narrow complex tachycardia, 320-360 ms, ending with a P wave and without a post-tachycardia pause.    Denies fever, chills, SOB, CP.  Reports last episode 2 days ago  Last dose Eliquis/Verapamil:  Yesterday morning  "

## 2018-03-22 NOTE — NURSING TRANSFER
Nursing Transfer Note      3/22/2018     Transfer To: [id number]    Transfer via stretcher    Transfer with cardiac monitoring    Transported by RN    Medicines sent: no    Chart send with patient: Yes    Notified: spouse    Patient reassessed at: 3/22/18@1145hrs

## 2018-03-23 VITALS
DIASTOLIC BLOOD PRESSURE: 54 MMHG | OXYGEN SATURATION: 94 % | BODY MASS INDEX: 22.73 KG/M2 | RESPIRATION RATE: 20 BRPM | TEMPERATURE: 97 F | SYSTOLIC BLOOD PRESSURE: 96 MMHG | HEART RATE: 70 BPM | HEIGHT: 68 IN | WEIGHT: 150 LBS

## 2018-03-23 PROCEDURE — 25000003 PHARM REV CODE 250: Performed by: NURSE PRACTITIONER

## 2018-03-23 RX ADMIN — APIXABAN 5 MG: 2.5 TABLET, FILM COATED ORAL at 09:03

## 2018-03-23 RX ADMIN — VERAPAMIL HYDROCHLORIDE 120 MG: 120 TABLET, FILM COATED, EXTENDED RELEASE ORAL at 09:03

## 2018-03-23 NOTE — DISCHARGE SUMMARY
"[facility]  Cardiac Electrophysiology  Discharge Summary      Patient Name: Shelly Vásquez  MRN: [id number]  Admission Date: 3/22/2018  Hospital Length of Stay: 0 days  Discharge Date and Time: 3/23/2018  9:30 AM  Attending Physician: Dr. Caitlyn MD   Discharging Provider: Michelle Cannon NP  Primary Care Physician: Joana Doty MD    HPI:   76 y.o. F  back surgery 1/2017  hx GI bleeding due to NSAIDs  pSVT, palpitations.  PAF, on xarelto     Was walking dog near her summer home in [place].  Developed sudden palpitations, chest discomfort. Legs hurt and tingled.  Called for help; an orthopedist (MD) responded and found BP to be OK, but  bpm by her (and her 's) report.  She was lying down, felt afraid to get up. Brought to ER; was all better by then. At her initial office visit (07/03/17), she reported that she had been feeling fine, but that she was anxious about the situation. At the time, she reported having good exertion tolerance; no syncope. An Echo 1/16: 60% LVEF.  Second episode (mid-July) was short (45 seconds); said that she "just didn't feel right;" she felt uncomfortable, and went to lay in bed, felt better after rest. She denies chest pain, SOB/DALE, palpitations, or syncope. Her energy level is stable; she remains active without difficulty.       Since last visit, has had episodes of LH with standing. These happen maybe qod. Lasts a short time, self-resolves.  Also had >30 mins of AF detected on ILR; xarelto started.  ILR has also detected a REGULAR narrow complex tachycardia, 320-360 ms, ending with a P wave and without a post-tachycardia pause.    Denies fever, chills, SOB, CP.  Reports last episode 2 days ago  Last dose Eliquis/Verapamil:  Yesterday morning    Procedure(s) (LRB):  ABLATION (N/A)     Hospital Course:  S/p SVT Ablation on 3/22/18 ( see procedure note for details). Tolerated procedure with no acute complications. No issues overnight. Tolerating " diet, ambulating, voiding, pain well controlled. Groins soft with no bleeding or hematoma. Discharge plans/instructions discussed with patient and  who verbalized understanding and agreement with plans of care. No further questions or concerns voiced at this time.    Consults: Anesthesia    Significant Diagnostic Studies: see chart    Pending Diagnostic Studies:     None          Final Active Diagnoses:    Diagnosis Date Noted POA    PRINCIPAL PROBLEM:  SVT (supraventricular tachycardia) [I47.1] 03/22/2018 Yes    PAF (paroxysmal atrial fibrillation) [I48.0] 03/22/2018 Yes      Problems Resolved During this Admission:    Diagnosis Date Noted Date Resolved POA     Discharged Condition: good    Disposition: Home or Self Care    Follow Up:  Follow-up Information     Michael Brady MD In 6 weeks.    Specialties:  Cardiology, Electrophysiology  Why:  post RFA-SVT  Contact information:  Timi Ballard [address]ola  [facility] [id number] [phone]                 Patient Instructions:     Diet Adult Regular     Activity as tolerated     Lifting restrictions   Order Comments: No lifting more than 5-10 pounds x 1 week     Call MD for:  temperature >100.4     Call MD for:  persistent nausea and vomiting or diarrhea     Call MD for:  severe uncontrolled pain     Call MD for:  redness, tenderness, or signs of infection (pain, swelling, redness, odor or green/yellow discharge around incision site)     Call MD for:  difficulty breathing or increased cough     Call MD for:  severe persistent headache     Call MD for:  worsening rash     Call MD for:  persistent dizziness, light-headedness, or visual disturbances     Call MD for:  increased confusion or weakness     Call MD for:   Order Comments: Any concerns regarding procedure     Change dressing (specify)   Order Comments: May remove dressings tonight. No tub baths or soaking in water x 3 days.       Medications:  Reconciled Home Medications:   Discharge Medication List as of  3/23/2018  9:07 AM      CONTINUE these medications which have NOT CHANGED    Details   acetaminophen (TYLENOL) 325 MG tablet Take 325 mg by mouth continuous prn for Pain., Until Discontinued, Historical Med      acyclovir (ZOVIRAX) 400 MG tablet Take 1 tablet (400 mg total) by mouth once daily., Starting 3/6/2017, Until Wed 5/30/18, Phone In      apixaban 5 mg Tab Take 1 tablet (5 mg total) by mouth 2 (two) times daily., Starting Tue 1/2/2018, No Print      clindamycin (CLEOCIN T) 1 % lotion Use hs, Normal      doxycycline (MONODOX) 100 MG capsule Take 1 capsule (100 mg total) by mouth once daily., Starting Thu 12/21/2017, Normal      LOPREEZA 0.5-0.1 mg per tablet TAKE 1 TABLET BY MOUTH EVERY DAY, Normal      temazepam (RESTORIL) 15 mg Cap Take 1 capsule (15 mg total) by mouth nightly., Starting Tue 1/23/2018, Normal      verapamil (VERELAN) 120 MG C24P Take 1 capsule (120 mg total) by mouth once daily., Starting Mon 2/26/2018, Until Tue 2/26/2019, Normal           Michelle Cannon NP  Cardiac Electrophysiology  [facility]

## 2018-03-23 NOTE — PROGRESS NOTES
Patient with bp 82/42, hr 65, patient is asymptomatic. Encouraged po fluid intake.  Patient  got up and ambulated in the hallway and remained asymptomatic . Rechecked bp 80/40, hr 63. Notified dr. Portillo. No new orders received. [facility]

## 2018-03-23 NOTE — PLAN OF CARE
Problem: Patient Care Overview  Goal: Plan of Care Review  Outcome: Ongoing (interventions implemented as appropriate)  Plan of care discussed with patient. All questions answered. bilat groin sites cdi, soft. No complaints from patient. Plan for dc home this am. Will monitor.

## 2018-04-06 ENCOUNTER — OFFICE VISIT (OUTPATIENT)
Dept: INTERNAL MEDICINE | Facility: CLINIC | Age: 78
End: 2018-04-06
Payer: MEDICARE

## 2018-04-06 VITALS
BODY MASS INDEX: 23.45 KG/M2 | HEART RATE: 72 BPM | HEIGHT: 68 IN | DIASTOLIC BLOOD PRESSURE: 60 MMHG | SYSTOLIC BLOOD PRESSURE: 106 MMHG | WEIGHT: 154.75 LBS

## 2018-04-06 DIAGNOSIS — Z87.19 HISTORY OF GI BLEED: ICD-10-CM

## 2018-04-06 DIAGNOSIS — I48.0 PAF (PAROXYSMAL ATRIAL FIBRILLATION): ICD-10-CM

## 2018-04-06 DIAGNOSIS — D75.839 THROMBOCYTOSIS: ICD-10-CM

## 2018-04-06 DIAGNOSIS — M47.816 ARTHROPATHY OF LUMBAR FACET JOINT: ICD-10-CM

## 2018-04-06 DIAGNOSIS — M15.9 PRIMARY OSTEOARTHRITIS INVOLVING MULTIPLE JOINTS: Chronic | ICD-10-CM

## 2018-04-06 DIAGNOSIS — I47.10 PSVT (PAROXYSMAL SUPRAVENTRICULAR TACHYCARDIA): ICD-10-CM

## 2018-04-06 DIAGNOSIS — Z78.0 POST-MENOPAUSAL: ICD-10-CM

## 2018-04-06 DIAGNOSIS — Z91.81 AT RISK FOR FALLS: ICD-10-CM

## 2018-04-06 DIAGNOSIS — I70.0 AORTIC ATHEROSCLEROSIS: ICD-10-CM

## 2018-04-06 DIAGNOSIS — M48.061 SPINAL STENOSIS OF LUMBAR REGION, UNSPECIFIED WHETHER NEUROGENIC CLAUDICATION PRESENT: ICD-10-CM

## 2018-04-06 DIAGNOSIS — Z00.00 ENCOUNTER FOR PREVENTIVE HEALTH EXAMINATION: Primary | ICD-10-CM

## 2018-04-06 PROBLEM — R00.2 PALPITATIONS: Status: RESOLVED | Noted: 2017-07-03 | Resolved: 2018-04-06

## 2018-04-06 PROCEDURE — 99215 OFFICE O/P EST HI 40 MIN: CPT | Mod: PBBFAC | Performed by: NURSE PRACTITIONER

## 2018-04-06 PROCEDURE — 99999 PR PBB SHADOW E&M-EST. PATIENT-LVL V: CPT | Mod: PBBFAC,,, | Performed by: NURSE PRACTITIONER

## 2018-04-06 PROCEDURE — G0439 PPPS, SUBSEQ VISIT: HCPCS | Mod: ,,, | Performed by: NURSE PRACTITIONER

## 2018-04-06 RX ORDER — PNV NO.95/FERROUS FUM/FOLIC AC 28MG-0.8MG
100 TABLET ORAL DAILY PRN
COMMUNITY
End: 2019-04-24

## 2018-04-06 NOTE — PROGRESS NOTES
"Shelly Vásquez presented for a  Medicare AWV and comprehensive Health Risk Assessment today. The following components were reviewed and updated:    · Medical history  · Family History  · Social history  · Allergies and Current Medications  · Health Risk Assessment  · Health Maintenance  · Care Team     ** See Completed Assessments for Annual Wellness Visit within the encounter summary.**       The following assessments were completed:  · Living Situation  · CAGE  · Depression Screening  · Timed Get Up and Go  · Whisper Test  · Cognitive Function Screening  ·   ·   ·   · Nutrition Screening  · ADL Screening  · PAQ Screening    Vitals:    04/06/18 1509   BP: 106/60   BP Location: Left arm   Pulse: 72   Weight: 70.2 kg (154 lb 12.2 oz)   Height: 5' 8" (1.727 m)     Body mass index is 23.53 kg/m².  Physical Exam   Constitutional: She is oriented to person, place, and time. She appears well-developed and well-nourished.   HENT:   Head: Normocephalic.   Cardiovascular: Normal rate, regular rhythm and intact distal pulses.    Pulmonary/Chest: Effort normal and breath sounds normal.   Abdominal: Soft. Bowel sounds are normal.   Musculoskeletal: Normal range of motion. She exhibits no edema.   Neurological: She is alert and oriented to person, place, and time.   Skin: Skin is warm and dry.   Psychiatric: She has a normal mood and affect.   Nursing note and vitals reviewed.        Diagnoses and health risks identified today and associated recommendations/orders:    1. Encounter for preventive health examination  Here for Health Risk Assessment/Annual Wellness Visit.  Follow up in one year.  Prescription given for pneumococcal conjugate immunization.    2. Aortic atherosclerosis  Chronic, stable. Noted Lumbar XR 5/27/16.  Followed by PCP.    3. At risk for falls  Chronic, reporting 2 falls last year without significant injury. Some balance problems.  - Ambulatory Referral to Physical Therapy    4. Post-menopausal  - DXA " Bone Density Spine And Hip; Future    5. PSVT (paroxysmal supraventricular tachycardia)  Chronic, stable on current medications. Followed by Cardiology.    6. PAF (paroxysmal atrial fibrillation)  Chronic, stable on current medications, S/P ablation. Followed by Cardiology.    7. History of GI bleed  Stable. Followed by PCP.    8. Thrombocytosis  Chronic, mild, stable. Followed by PCP.    9. Arthropathy of lumbar facet joint  Chronic, stable on current PRN OTC medication. Noted MRI 1/24/17. Followed by PCP.    10. Spinal stenosis of lumbar region, unspecified whether neurogenic claudication present  Chronic, stable on current PRN OTC medication. Followed by PCP.    11. Primary osteoarthritis involving multiple joints  Chronic, stable on current PRN OTC medication. Followed by PCP.      Provided Shelly Wilkinson with a 5-10 year written screening schedule and personal prevention plan. Recommendations were developed using the USPSTF age appropriate recommendations. Education, counseling, and referrals were provided as needed. After Visit Summary printed and given to patient which includes a list of additional screenings\tests needed.    Follow-up in 7 weeks (on 5/22/2018).with PCP    [place]ia Connolly NP

## 2018-04-06 NOTE — PROGRESS NOTES
I offered to discuss end of life issues, including information on how to make advance directives that the patient could use to name someone who would make medical decisions on their behalf if they became too ill to make themselves.    ___Patient declined  _X_Patient is interested, I provided paper work and offered to discuss. Reports she had Advanced Directives - advised to bring to appointment to be scanned into EPIC.

## 2018-04-06 NOTE — PATIENT INSTRUCTIONS
Counseling and Referral of Other Preventative  (Italic type indicates deductible and co-insurance are waived)    Patient Name: Shelly Vásquez  Today's Date: 4/6/2018    Health Maintenance       Date Due Completion Date    Pneumococcal (65+) (1 of 2 - PCV13) 12/28/2003 Need to obtain Prevnar    DEXA SCAN 01/12/2014 Ordered    Lipid Panel 01/01/2021 1/1/2016    TETANUS VACCINE 03/31/2024 3/31/2014        No orders of the defined types were placed in this encounter.    The following information is provided to all patients.  This information is to help you find resources for any of the problems found today that may be affecting your health:                Living healthy guide: www.[facility].[place].gov      Understanding Diabetes: www.diabetes.org      Eating healthy: www.cdc.gov/healthyweight      CDC home safety checklist: www.cdc.gov/steadi/patient.html      Agency on Aging: www.goea.[place].[facility]      Alcoholics anonymous (AA): www.aa.org      Physical Activity: www.kathryn.nih.gov/xl3dczo      Tobacco use: www.quitwithusla.org       Natural Laxative: Miralax, Glycolax or Metamucil

## 2018-04-07 DIAGNOSIS — Z79.890 POSTMENOPAUSAL HORMONE REPLACEMENT THERAPY: ICD-10-CM

## 2018-04-12 RX ORDER — APIXABAN 5 MG/1
TABLET, FILM COATED ORAL
Qty: 180 TABLET | Refills: 3 | Status: SHIPPED | OUTPATIENT
Start: 2018-04-12 | End: 2019-04-24

## 2018-04-16 ENCOUNTER — CLINICAL SUPPORT (OUTPATIENT)
Dept: FAMILY MEDICINE | Facility: CLINIC | Age: 78
End: 2018-04-16
Payer: MEDICARE

## 2018-04-16 ENCOUNTER — CLINICAL SUPPORT (OUTPATIENT)
Dept: REHABILITATION | Facility: HOSPITAL | Age: 78
End: 2018-04-16
Attending: NURSE PRACTITIONER
Payer: MEDICARE

## 2018-04-16 DIAGNOSIS — Z91.81 AT HIGH RISK FOR FALLS: Primary | ICD-10-CM

## 2018-04-16 DIAGNOSIS — Z23 NEED FOR VACCINATION: Primary | ICD-10-CM

## 2018-04-16 PROCEDURE — G8978 MOBILITY CURRENT STATUS: HCPCS | Mod: CK,PO | Performed by: PHYSICAL THERAPIST

## 2018-04-16 PROCEDURE — 97110 THERAPEUTIC EXERCISES: CPT | Mod: PO | Performed by: PHYSICAL THERAPIST

## 2018-04-16 PROCEDURE — G0009 ADMIN PNEUMOCOCCAL VACCINE: HCPCS | Mod: PBBFAC,PO

## 2018-04-16 PROCEDURE — 97161 PT EVAL LOW COMPLEX 20 MIN: CPT | Mod: PO | Performed by: PHYSICAL THERAPIST

## 2018-04-16 PROCEDURE — G8979 MOBILITY GOAL STATUS: HCPCS | Mod: CJ,PO | Performed by: PHYSICAL THERAPIST

## 2018-04-16 NOTE — PLAN OF CARE
TIME RECORD    Date: 04/16/2018    Start Time:  110  Stop Time:  200      OUTPATIENT PHYSICAL THERAPY   PATIENT EVALUATION  Onset Date: one year  Primary Diagnosis:   1. 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  Nutrition:  Normal  History of Present Illness: In the past year she has fallen 3 times. Feels andree she got up from them. She is on Eliquis and another med that made her bruise easily. States the last fall was pretty bad, it was just after Mardi [place]as. She has been going to [employer] for 10 years, 2x/week. Went to an osteopath last week who helped her to think about balance. HE explained to her that you lose your senses after you fall so you have to rethink about balance. She states that was one week ago and she has felt a lot better about her balance since last week. She likes to wear high heel shoes and dress up. [facility] and had [facility] and it was successful. States what she likes the best was a machine that helped with balance to show your COG. She feels like she is less wobbly than she was before. She went out the last 4-5 nights for a big family wedding and she did very well with all of the standing and partying without feeling LOB. She states she knows the importance  "of exercise and wants to keep up her good quality of life. In regard to her falls, she says that she was invited to an event at a neighborhood she had never been in and tripped over a broken piece of concrete. Went forward (this was in summer). Second fall around [place], she was ironing a table cloth and tripped over iron cord and fell flat on oak floor. 3rd fall she was cleaning her car out and had used an extension cord on the vac she was using and fell onto her L side onto the concrete. She was very bruised all along the L side of her body. She states the therapy she received for her back in Feb 2016. She went to PT at [facility] and saw [place] there.   Prior Level of Function: Independent  Social History: very active, likes to exercise at [employer] 2x/week, Likes to socialize, lives with chapis . She has an exercise room at her house. She does Feldenkreis exercises daily. Does daily stretching and balance exercises.   Place of Residence (Steps/Adaptations): has stairs into the home ( 5 set of stairs) all stairs are made out of slate and are "inconsistent". She has handrails on all of the stairs.   Functional Deficits Leading to Referral/Nature of Injury: standing and ambulatory balance, increased falls.   Patient Therapy Goals: improve her balance    Subjective     Shelly Vásquez states she works on balance and coordination  In her [email or web address] class.    No pain.     Objective     Posture: good posture in sitting, slight excessive thoracic kyphosis noted in standing  Sensation: light touch sensation intact in lumbar dermatomes  Range of Motion/Strength:      MMT RIGHT LEFT   Hip flex 5/5 4+/5   Hip ext 4/5 4-/5   Hip abd 5/5 5/5   Hip add 5/5 5/5   Knee flex 5/5 4+/5   Knee ext 5/5 4+/5             Flexibility: good flexibility in BLEs  Gait: Without AD, good foot clearance, mild to mod path deviation during ambulation noted  Bed Mobility:Independent  Transfers: Independent  Special Tests: SLS <5 " seconds on either LE (needs LE support to maintain SLS); Tandem stance with RLE in front >15 second, cannot assume position without UE assist with LLE in front.    Trent/56  (40% disability, Gcodes current CK, goal CJ)   CH 0 percent impaired, limited or restricted  CI At least 1 percent but less than 20 percent impaired, limited or restricted  CJ At least 20 percent but less than 40 percent impaired, limited or restricted  CK At least 40 percent but less than 60 percent impaired, limited or restricted  CL At least 60 percent but less than 80 percent impaired, limited or restricted  CM At least 80 percent but less than 100 percent impaired, limited or restricted   percent impaired, limited or restricted      Assessment       Initial Assessment (Pertinent finding, problem list and factors affecting outcome): Pt is a 78 y/o female who reports 3 major falls in the last year. Pt states that she saw an osteopath last week who gave her some imagery and physical cues to help her improve her balance and she feels like this has already helped to improve her balance. Pt demonstrates decreased strength in the L hip musculature, as well as decreased balance in standing in NBOS, tandem and in single limb stance, all of which contribute to a higher risk for falling. Pt will benefit from PT to address her impairments so she may reduce her risk for falls and further injury.   Rehab Potiential: excellent   Pt's spiritual, cultural and educational needs considered and pt agreeable to plan of care and goals as stated below:       Short Term Goals (2 Weeks):   1. Pt will assume tandem stance with L foot in anterior position, without UE support, and maintain x 10 seconds for improve standing balance.  2. Pt will report no falls/near falls.  Long Term Goals (4 Weeks):   1. Pt will score >/=44/56 on Trent, indicating reduced risk for falls.   2. Pt will walk 500 feet without path deviation, indicating improved standing dynamic  balance.    History  Co-morbidities and personal factors that may impact the plan of care Examination  Body Structures and Functions, activity limitations and participation restrictions that may impact the plan of care    Clinical Presentation   Co-morbidities:   advanced age        Personal Factors:   age Body Regions:   lower extremities    Body Systems:    strength  balance  gait  motor control            Participation Restrictions:   Fall risk in standing     Activity limitations:   Learning and applying knowledge  no deficits    General Tasks and Commands  no deficits    Communication  no deficits    Mobility  no deficits    Self care  no deficits    Domestic Life  doing house work (cleaning house, washing dishes, laundry)    Interactions/Relationships  no deficits    Life Areas  no deficits    Community and Social Life  no deficits         stable and uncomplicated                      low   low  low Decision Making/ Complexity Score:  low             Plan     Certification Period: 4/16/18 to 5/21/18  Recommended Treatment Plan: 1 times per week for 5 weeks: Gait Training, Manual Therapy, Moist Heat/ Ice, Neuromuscular Re-ed, Patient Education, Therapeutic Activites and Therapeutic Exercise  Other Recommendations: Continue current exercise routine at home/gym.     PTA may be involved in care for this patient under direction of PT.         Therapist: Yvonne Thayer, PT    I CERTIFY THE NEED FOR THESE SERVICES FURNISHED UNDER THIS PLAN OF TREATMENT AND WHILE UNDER MY CARE    Physician's comments: ________________________________________________________________________________________________________________________________________________      Physician's Name: ___________________________________

## 2018-04-16 NOTE — PROGRESS NOTES
SEE EVALUATION IN PLAN OF CARE TAB      OUTPATIENT PHYSICAL THERAPY  PHYSICAL THERAPY EVALUATION    Name: Shelly Vásquez  Clinic Number: [id number]    Visit Date: 4/16/2018  Evaluation Date: 4/16/2018  Visit #: 1 / 5  Authorization period Expiration: 12/31/18  Plan of Care Expiration: 5/21/18  Precautions: fall risk, cardiac    Time In: 110  Time Out: 200  Total 1:1 Treatment Time: 50 min    Discussed Plan of Care with patient: Yes    Shelly received 10 minutes of therapeutic exercise including:   SLS and tandem stance to practice at home. Discussed activities to continue at home/gym and activity modification to reduce fall risk.  (NEXT VISIT: balance/proprioception, hip strengthening)      Written Home Exercises Provided: See Patient Instructions for 4/17/2018.  Exercises were reviewed and Shelly was able to demonstrate them prior to the end of the session. Pt received a written copy of exercises to perform at home. Shelly demonstrated good  understanding of the education provided.       Yvonne Thayer, PT

## 2018-04-18 ENCOUNTER — CLINICAL SUPPORT (OUTPATIENT)
Dept: ELECTROPHYSIOLOGY | Facility: CLINIC | Age: 78
End: 2018-04-18
Attending: INTERNAL MEDICINE
Payer: MEDICARE

## 2018-04-18 DIAGNOSIS — Z95.818 PRESENCE OF CARDIAC DEVICE: ICD-10-CM

## 2018-04-18 DIAGNOSIS — R55 SYNCOPE, UNSPECIFIED SYNCOPE TYPE: ICD-10-CM

## 2018-04-18 PROCEDURE — 93299 LOOP RECORDER REMOTE: CPT | Mod: PBBFAC | Performed by: INTERNAL MEDICINE

## 2018-04-18 PROCEDURE — 93298 REM INTERROG DEV EVAL SCRMS: CPT | Mod: ,,, | Performed by: INTERNAL MEDICINE

## 2018-04-20 ENCOUNTER — PATIENT MESSAGE (OUTPATIENT)
Dept: INTERNAL MEDICINE | Facility: CLINIC | Age: 78
End: 2018-04-20

## 2018-04-20 DIAGNOSIS — I47.19 ATRIAL TACHYCARDIA: ICD-10-CM

## 2018-04-20 RX ORDER — VERAPAMIL HYDROCHLORIDE 120 MG/1
120 CAPSULE, EXTENDED RELEASE ORAL DAILY
Qty: 90 CAPSULE | Refills: 3 | Status: SHIPPED | OUTPATIENT
Start: 2018-04-20 | End: 2019-01-15 | Stop reason: SDUPTHER

## 2018-04-23 ENCOUNTER — DOCUMENTATION ONLY (OUTPATIENT)
Dept: REHABILITATION | Facility: HOSPITAL | Age: 78
End: 2018-04-23

## 2018-04-23 ENCOUNTER — PATIENT MESSAGE (OUTPATIENT)
Dept: INTERNAL MEDICINE | Facility: CLINIC | Age: 78
End: 2018-04-23

## 2018-04-23 NOTE — PT/OT/SLP PROGRESS
Pt presents today for first follow up appt after initial evaluation. She reports she tripped over a pot in her green house last week and fell onto the R knee, with persistent pain since then. She had a radiograph ordered/scheduled for last Friday but was out of town and could not go. She asks if she can miss her PT appointment today to go have the xray done in our lab here, which I agree is a good idea, as she is still swollen, bruised down the entire R lower leg, and cannot fully extend or flex the R knee. We will resume PT POC next week.

## 2018-04-24 ENCOUNTER — HOSPITAL ENCOUNTER (OUTPATIENT)
Dept: RADIOLOGY | Facility: HOSPITAL | Age: 78
Discharge: HOME | End: 2018-04-24
Attending: INTERNAL MEDICINE
Payer: MEDICARE

## 2018-04-24 ENCOUNTER — INITIAL CONSULT (OUTPATIENT)
Dept: PHYSICAL MEDICINE AND REHAB | Facility: CLINIC | Age: 78
End: 2018-04-24
Payer: MEDICARE

## 2018-04-24 VITALS
WEIGHT: 154 LBS | SYSTOLIC BLOOD PRESSURE: 120 MMHG | BODY MASS INDEX: 23.34 KG/M2 | HEART RATE: 67 BPM | HEIGHT: 68 IN | DIASTOLIC BLOOD PRESSURE: 58 MMHG

## 2018-04-24 DIAGNOSIS — S80.01XA CONTUSION OF RIGHT KNEE, INITIAL ENCOUNTER: ICD-10-CM

## 2018-04-24 DIAGNOSIS — M25.569 CHRONIC KNEE PAIN, UNSPECIFIED LATERALITY: Primary | ICD-10-CM

## 2018-04-24 DIAGNOSIS — G89.29 CHRONIC KNEE PAIN, UNSPECIFIED LATERALITY: Primary | ICD-10-CM

## 2018-04-24 DIAGNOSIS — S89.91XA INJURY OF RIGHT KNEE, INITIAL ENCOUNTER: ICD-10-CM

## 2018-04-24 DIAGNOSIS — S89.91XA INJURY OF RIGHT KNEE, INITIAL ENCOUNTER: Primary | ICD-10-CM

## 2018-04-24 PROCEDURE — 99999 PR PBB SHADOW E&M-EST. PATIENT-LVL III: CPT | Mod: PBBFAC,,, | Performed by: PHYSICAL MEDICINE & REHABILITATION

## 2018-04-24 PROCEDURE — 73560 X-RAY EXAM OF KNEE 1 OR 2: CPT | Mod: 26,59,LT, | Performed by: RADIOLOGY

## 2018-04-24 PROCEDURE — 99203 OFFICE O/P NEW LOW 30 MIN: CPT | Mod: S$PBB,,, | Performed by: PHYSICAL MEDICINE & REHABILITATION

## 2018-04-24 PROCEDURE — 99213 OFFICE O/P EST LOW 20 MIN: CPT | Mod: PBBFAC,25,PN | Performed by: PHYSICAL MEDICINE & REHABILITATION

## 2018-04-24 PROCEDURE — 73562 X-RAY EXAM OF KNEE 3: CPT | Mod: TC,FY,PO,RT

## 2018-04-24 PROCEDURE — 73560 X-RAY EXAM OF KNEE 1 OR 2: CPT | Mod: TC,FY,PO,LT

## 2018-04-24 PROCEDURE — 73562 X-RAY EXAM OF KNEE 3: CPT | Mod: 26,RT,, | Performed by: RADIOLOGY

## 2018-04-24 RX ORDER — TEMAZEPAM 15 MG/1
15 CAPSULE ORAL NIGHTLY
Qty: 90 CAPSULE | Refills: 1 | Status: SHIPPED | OUTPATIENT
Start: 2018-04-24 | End: 2018-09-07 | Stop reason: SDUPTHER

## 2018-04-24 NOTE — PROGRESS NOTES
[facility]    CHIEF COMPLAINT:   Chief Complaint   Patient presents with    Knee Pain     HISTORY OF PRESENT ILLNESS: Shelly Vásquez is a 77 y.o. female who presents to me for the first time for evaluation and treatment of right knee pain.  She suffered a fall one week ago while in her greenhouse.  She reports that her right leg was pinned underneath her and she tripped and fell to her right.  She had immediate pain over the anterior and medial aspects of the right knee.  She notes significant swelling in the area.  She notes overall her condition has not improved since last week.  She rates her pain as a 7 on a scale of 1-10.  Her pain is intermittent.  She does have pain when walking and going from a seated to a standing position.  She notes bruising over the anterior knee and shin.  She denies any history of previous knee pain or swelling before the injury last week.    Review of Systems   Constitutional: Negative for fever.   HENT: Negative for drooling.    Eyes: Negative for discharge.   Respiratory: Negative for choking.    Cardiovascular: Negative for chest pain.   Genitourinary: Negative for flank pain.   Skin: Negative for wound.   Allergic/Immunologic: Negative for immunocompromised state.   Neurological: Negative for tremors and syncope.   Psychiatric/Behavioral: Negative for behavioral problems.     Past Medical History:   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    Supraventricular tachycardia        Past Surgical History:   Past Surgical History:   Procedure Laterality Date    APPENDECTOMY  age 21    BACK SURGERY      Beast Lift  2004    COSMETIC SURGERY      DILATION  AND CURETTAGE OF UTERUS  2008    PMB    ENDOMETRIAL BIOPSY      EYE SURGERY      ptsosis      RADIOFREQUENCY ABLATION  03/2018    SMALL INTESTINE SURGERY      TONSILLECTOMY, ADENOIDECTOMY  age 8    TUBAL LIGATION         Family History:   Family History   Problem Relation Age of Onset    No Known Problems Father     Cirrhosis Mother     No Known Problems Sister     No Known Problems Brother     No Known Problems Maternal Aunt     No Known Problems Maternal Uncle     No Known Problems Paternal Aunt     No Known Problems Paternal Uncle     No Known Problems Maternal Grandmother     No Known Problems Maternal Grandfather     No Known Problems Paternal Grandmother     No Known Problems Paternal Grandfather     No Known Problems Daughter     No Known Problems Son     Breast cancer Neg Hx     Colon cancer Neg Hx     Ovarian cancer Neg Hx     Amblyopia Neg Hx     Blindness Neg Hx     Cancer Neg Hx     Cataracts Neg Hx     Diabetes Neg Hx     Glaucoma Neg Hx     Hypertension Neg Hx     Macular degeneration Neg Hx     Retinal detachment Neg Hx     Strabismus Neg Hx     Stroke Neg Hx     Thyroid disease Neg Hx        Medications:   Current Outpatient Prescriptions on File Prior to Visit   Medication Sig Dispense Refill    acetaminophen (TYLENOL) 325 MG tablet Take 325 mg by mouth continuous prn for Pain.      acyclovir (ZOVIRAX) 400 MG tablet Take 1 tablet (400 mg total) by mouth once daily. (Patient taking differently: Take 400 mg by mouth continuous prn. ) 90 tablet 4    apixaban 5 mg Tab Take 1 tablet (5 mg total) by mouth 2 (two) times daily. 180 tablet 3    cyanocobalamin (VITAMIN B-12) 100 MCG tablet Take 100 mcg by mouth daily as needed.      ELIQUIS 5 mg Tab TAKE 1 TABLET BY MOUTH TWICE DAILY 180 tablet 3    LOPREEZA 0.5-0.1 mg per tablet TAKE 1 TABLET BY MOUTH EVERY DAY 84 tablet 3    NON FORMULARY MEDICATION 1 tablet every evening. [employer] sleep aid - doxylamine 25 mg       "temazepam (RESTORIL) 15 mg Cap Take 1 capsule (15 mg total) by mouth nightly. 90 capsule 1    verapamil (VERELAN) 120 MG C24P Take 1 capsule (120 mg total) by mouth once daily. 90 capsule 3     No current facility-administered medications on file prior to visit.        Allergies:   Review of patient's allergies indicates:   Allergen Reactions    Nsaids (non-steroidal anti-inflammatory drug)      GI Bleed       Social History:   Social History     Social History    Marital status:      Spouse name: N/A    Number of children: N/A    Years of education: N/A     Social History Main Topics    Smoking status: Former Smoker     Packs/day: 1.00     Years: 22.00     Types: Cigarettes     Quit date: 7/27/1980    Smokeless tobacco: Never Used    Alcohol use 4.2 oz/week     7 Glasses of wine per week      Comment: 1-2 glasses of wine daily    Drug use: No    Sexual activity: Yes     Partners: Male     Birth control/ protection: Post-menopausal     Other Topics Concern    None     Social History Narrative    None     Shelly Wilkinson lives [place]  [place] in [place].    PHYSICAL EXAMINATION:   General    Vitals:    04/24/18 1257   BP: (!) 120/58   Pulse: 67   Weight: 69.9 kg (154 lb)   Height: 5' 8" (1.727 m)     Constitutional: Oriented to person, place, and time. No apparent distress. Appears well-developed and well-nourished. Pleasant.  HENT:   Head: Normocephalic and atraumatic.   Eyes: Right eye exhibits no discharge. Left eye exhibits no discharge. No scleral icterus.   Pulmonary/Chest: Effort normal. No respiratory distress.   Abdominal: There is no guarding.   Neurological: Alert and oriented to person, place, and time.   Psychiatric: Behavior is normal.   Right Knee Exam     Tenderness   Right knee tenderness location: Tender over the distal portion of the patellar tendon and tibial tubercle.    Range of Motion   Extension: 0   Flexion: 140 (There is some anterior knee pain with terminal " flexion)     Tests   Humaira:  Medial - negative Lateral - negative  Lachman:  Anterior - negative    Posterior - negative  Varus: negative  Valgus: negative  Patellar Apprehension: negative    Other   Erythema: absent  Scars: absent  Sensation: normal  Pulse: present  Swelling: mild  Other tests: no effusion present      Left Knee Exam     Tenderness   The patient is experiencing no tenderness.         Range of Motion   Extension: 0   Flexion: 140     Other   Erythema: absent  Scars: absent  Sensation: normal  Pulse: present  Swelling: none  Effusion: no effusion present        INSPECTION: There is + swelling, ecchymoses over the anterior knee and shin.  GAIT/DYNAMIC: Preserved.    Imaging  X-ray of the right knee from 4/24/2018: No acute fracture, dislocation, or osseous destructive process.  There is mild right medial and moderate left medial tibiofemoral joint space narrowing.  There is degenerative spurring of the tibial spines.  No patellar tilt or subluxation.  No knee joint effusion or chondrocalcinosis..    Data Reviewed: X-ray    Supportive Actions: Independent visualization of images or test specimens    ASSESSMENT:   1. Chronic knee pain, unspecified laterality    2. Contusion of right knee, initial encounter      PLAN:     1. Time was spent reviewing the above diagnosis in depth with Shelly Minh today, including acute management and rehabilitation.     2.  Her point of maximal tenderness is at the inferior portion of the patellar tendon.  Her knee exam is essentially normal and there was no intra-articular effusion.  Diagnostic ultrasound exam today of the area revealed a well-circumscribed hypoechoic lesion superficial to the inferior patellar tendon.  This area was very tender to sono palpation and seem to reproduce her pain.  Was also observed that color Doppler function showed increased vascular flow within this lesion.  We discussed aspiration of this apparent hematoma, however with the pulsating  vascular flow elected to abort this procedure.  This likely represents an active hematoma secondary to blunt force trauma/contusion.  I recommended conservative management consisting of rest, ice, elevation, and Tylenol.  In terms of her activity she may use pain as a guide.    3. RTC if not improved in the next 2-3 weeks.    The above note was completed, in part, with the aid of Dragon dictation software/hardware. Translation errors may be present.

## 2018-04-26 ENCOUNTER — PATIENT MESSAGE (OUTPATIENT)
Dept: INTERNAL MEDICINE | Facility: CLINIC | Age: 78
End: 2018-04-26

## 2018-04-30 ENCOUNTER — CLINICAL SUPPORT (OUTPATIENT)
Dept: REHABILITATION | Facility: HOSPITAL | Age: 78
End: 2018-04-30
Attending: NURSE PRACTITIONER
Payer: MEDICARE

## 2018-04-30 DIAGNOSIS — Z91.81 AT HIGH RISK FOR FALLS: ICD-10-CM

## 2018-04-30 PROCEDURE — 97110 THERAPEUTIC EXERCISES: CPT | Mod: PO | Performed by: PHYSICAL THERAPIST

## 2018-04-30 PROCEDURE — 97112 NEUROMUSCULAR REEDUCATION: CPT | Mod: PO | Performed by: PHYSICAL THERAPIST

## 2018-04-30 NOTE — PROGRESS NOTES
Physical Therapy Daily Treatment Note   Name: Shelly Vásquez  Clinic Number: [id number]    Evaluation Date: 4/16/2018  Visit #: 2 / 5  Authorization period Expiration: 12/31/18  Plan of Care Expiration: 5/21/18  Precautions: fall risk, cardiac    4/30/2018  Time in: 106  Time out: 200      Diagnosis:   Encounter Diagnosis   Name Primary?    At high risk for falls      Physician: Virginia Connolly,*    Subjective   Pt reports: She is still having some soreness in the R knee since a fall a couple of weeks ago (she tripped over a pot).     Pain Scale:  2/10      Objective     Instruction in therapeutic exercises to improve strength and core stability x 24 min:     BIke x 5 min, L2  Bridge on ball, 2x10  SL clamshells, RTB, 2x10  SLR, 3x10  Prone hip ext, 3x10    Neuromuscular reeducation to improve balance x 30 min:    SLS, 3x20s L/R  Tandem gait on turf, 2x length <->  Cone walking, 3x <->, progressing from step to to reciprocal gait pattern, cuing for hip flexion to go voer cones  Standing on airex + orange SB toss on rebounder, 2x10  Caraoke, 20 ft x 2 <->    Written Home Exercises Provided: Patient educated to continue with previously issued HEP.  Exercises were reviewed and Shelly was able to demonstrate them prior to the end of the session. Pt received a written copy of exercises to perform at home. Shelly demonstrated good  understanding of the education provided.     Assessment   Pt required cuing for heel strike during gait, which felt very unnatural for her due to long history of wearing high heels. Pt was noted to have decreased foot clearance during all balance training today, which could be a contributing factor to her frequent falls.   Will benefit from con't skilled care.    Anticipated barriers to physical therapy: none  Pt's spiritual, cultural and educational needs considered and pt agreeable to plan of care and goals.      Plan   Continue with  established Plan of Care towards Physical Therapy goals.       Yvonne Thayer, PT

## 2018-05-05 ENCOUNTER — PATIENT MESSAGE (OUTPATIENT)
Dept: PHYSICAL MEDICINE AND REHAB | Facility: CLINIC | Age: 78
End: 2018-05-05

## 2018-05-07 ENCOUNTER — CLINICAL SUPPORT (OUTPATIENT)
Dept: REHABILITATION | Facility: HOSPITAL | Age: 78
End: 2018-05-07
Attending: NURSE PRACTITIONER
Payer: MEDICARE

## 2018-05-07 DIAGNOSIS — Z91.81 AT HIGH RISK FOR FALLS: ICD-10-CM

## 2018-05-07 PROCEDURE — 97112 NEUROMUSCULAR REEDUCATION: CPT | Mod: PO | Performed by: PHYSICAL THERAPIST

## 2018-05-07 PROCEDURE — 97110 THERAPEUTIC EXERCISES: CPT | Mod: PO | Performed by: PHYSICAL THERAPIST

## 2018-05-07 NOTE — PROGRESS NOTES
Physical Therapy Daily Treatment Note   Name: Shelly Vásquez  Clinic Number: [id number]    Evaluation Date: 4/16/2018  Visit #: 3 / 5  Authorization period Expiration: 12/31/18  Plan of Care Expiration: 5/21/18  Precautions: fall risk, cardiac    5/7/2018  Time in: 102  Time out: 156      Diagnosis:   Encounter Diagnosis   Name Primary?    At high risk for falls      Physician: Virginia Connolly,*    Subjective   Pt reports: She is noticing more stable gait at home. Has been practicing heel strike and feels this is helping. States the knee pain is improving and less swelling.      Pain Scale: 0/10      Objective     Instruction in therapeutic exercises to improve strength and core stability x 31 min:     BIke x 10 min, L2  Incline board stretch, 3x30s  Bridge on ball, 3x10  SL clamshells, RTB, 2x10  SLR 1#, 3x10  Prone hip ext, 1#, 3x10  DF, GTB, 2x10    Neuromuscular reeducation to improve balance x 23 min:    SLS, 3x20s L/R  Tandem gait on turf, then retro walk to starting position, 3x 20 feet <->  Standing on airex + orange SB toss on rebounder, 3x10  Caraoke, 20 ft x 2 <->  High knees marching, 2x length of field    Written Home Exercises Provided: Patient educated to continue with previously issued HEP.  Exercises were reviewed and Shelly was able to demonstrate them prior to the end of the session. Pt received a written copy of exercises to perform at home. Shelly demonstrated good  understanding of the education provided.     Assessment   Several lateral LOB during NBOS walking on turf, otherwise pt demonstrates little path deviation during clinic.   Will benefit from con't skilled care.    Anticipated barriers to physical therapy: none  Pt's spiritual, cultural and educational needs considered and pt agreeable to plan of care and goals.      Plan   Continue with established Plan of Care towards Physical Therapy goals.       Yvonne Thayer, PT

## 2018-05-21 ENCOUNTER — CLINICAL SUPPORT (OUTPATIENT)
Dept: ELECTROPHYSIOLOGY | Facility: CLINIC | Age: 78
End: 2018-05-21
Attending: INTERNAL MEDICINE
Payer: MEDICARE

## 2018-05-21 DIAGNOSIS — R55 SYNCOPE, UNSPECIFIED SYNCOPE TYPE: ICD-10-CM

## 2018-05-21 DIAGNOSIS — Z95.818 PRESENCE OF CARDIAC DEVICE: ICD-10-CM

## 2018-05-21 PROCEDURE — 93299 LOOP RECORDER REMOTE: CPT | Mod: PBBFAC | Performed by: INTERNAL MEDICINE

## 2018-05-21 PROCEDURE — 93298 REM INTERROG DEV EVAL SCRMS: CPT | Mod: ,,, | Performed by: INTERNAL MEDICINE

## 2018-05-22 ENCOUNTER — OFFICE VISIT (OUTPATIENT)
Dept: INTERNAL MEDICINE | Facility: CLINIC | Age: 78
End: 2018-05-22
Payer: MEDICARE

## 2018-05-22 VITALS
SYSTOLIC BLOOD PRESSURE: 100 MMHG | HEART RATE: 62 BPM | WEIGHT: 155.63 LBS | HEIGHT: 68 IN | BODY MASS INDEX: 23.59 KG/M2 | DIASTOLIC BLOOD PRESSURE: 60 MMHG

## 2018-05-22 DIAGNOSIS — Z87.19 HISTORY OF GI BLEED: ICD-10-CM

## 2018-05-22 DIAGNOSIS — I47.10 PSVT (PAROXYSMAL SUPRAVENTRICULAR TACHYCARDIA): ICD-10-CM

## 2018-05-22 DIAGNOSIS — E78.00 PURE HYPERCHOLESTEROLEMIA: ICD-10-CM

## 2018-05-22 DIAGNOSIS — I48.0 PAF (PAROXYSMAL ATRIAL FIBRILLATION): Primary | ICD-10-CM

## 2018-05-22 DIAGNOSIS — D75.839 THROMBOCYTOSIS: ICD-10-CM

## 2018-05-22 DIAGNOSIS — D50.8 OTHER IRON DEFICIENCY ANEMIA: ICD-10-CM

## 2018-05-22 PROCEDURE — 99213 OFFICE O/P EST LOW 20 MIN: CPT | Mod: PBBFAC | Performed by: INTERNAL MEDICINE

## 2018-05-22 PROCEDURE — 99214 OFFICE O/P EST MOD 30 MIN: CPT | Mod: S$PBB,,, | Performed by: INTERNAL MEDICINE

## 2018-05-22 PROCEDURE — 99999 PR PBB SHADOW E&M-EST. PATIENT-LVL III: CPT | Mod: PBBFAC,,, | Performed by: INTERNAL MEDICINE

## 2018-05-22 NOTE — PATIENT INSTRUCTIONS
Remember to make    mammogram appt:   bone density appt   Gyn appt      Pharmacy:  New Shingles vaccine: Shingrix  2 shots ( day zero and 2-6 month) not live, 90% effective

## 2018-05-22 NOTE — PROGRESS NOTES
Subjective:      Patient ID: Shelly Vásquez is a 77 y.o. female.    Chief Complaint: Follow-up    HPI:  HPI   Patient has spent the last 9 months in the recovery mode:  1. Fall: right knee, followed up with Orthopedics, patient on blood thinners. She did go to rehab at [facility]  2. Cardiac: he has a follow up with Dr. Vernon  Patient made an effort to establish with a Cardiologist in the [place].    Review 5/22/2018   Colonoscopy 4/29/2014 follow up in 5-10  years polyp: does not want to do it again  Mammogram: reminded  Gyn: Dr. Fairchild  Optho: 2018  Flu: done  Tetanus  Shingles: discussed          Patient Active Problem List   Diagnosis    Insomnia    Osteoarthritis    Spinal stenosis, lumbar    Spondylolisthesis at L4-L5 level    History of GI bleed    Mobility impaired    S/P lumbar laminectomy    Acute chalazion of left eye    Syncope    Status post placement of implantable loop recorder    PSVT (paroxysmal supraventricular tachycardia)    PAF (paroxysmal atrial fibrillation)    At high risk for falls    Thrombocytosis    Aortic atherosclerosis    Arthropathy of lumbar facet joint     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    Supraventricular tachycardia      Past Surgical History:   Procedure Laterality Date    APPENDECTOMY  age 21    BACK SURGERY      Beast Lift  2004    COSMETIC SURGERY      DILATION AND CURETTAGE OF UTERUS  2008    PMB    ENDOMETRIAL BIOPSY      EYE SURGERY      ptsosis      RADIOFREQUENCY ABLATION  03/2018    SMALL INTESTINE SURGERY      TONSILLECTOMY, ADENOIDECTOMY  age 8    TUBAL LIGATION       Family History   Problem Relation Age of Onset    No  "Known Problems Father     Cirrhosis Mother     No Known Problems Sister     No Known Problems Brother     No Known Problems Maternal Aunt     No Known Problems Maternal Uncle     No Known Problems Paternal Aunt     No Known Problems Paternal Uncle     No Known Problems Maternal Grandmother     No Known Problems Maternal Grandfather     No Known Problems Paternal Grandmother     No Known Problems Paternal Grandfather     No Known Problems Daughter     No Known Problems Son     Breast cancer Neg Hx     Colon cancer Neg Hx     Ovarian cancer Neg Hx     Amblyopia Neg Hx     Blindness Neg Hx     Cancer Neg Hx     Cataracts Neg Hx     Diabetes Neg Hx     Glaucoma Neg Hx     Hypertension Neg Hx     Macular degeneration Neg Hx     Retinal detachment Neg Hx     Strabismus Neg Hx     Stroke Neg Hx     Thyroid disease Neg Hx      Review of Systems   Constitutional: Negative for chills, fever and unexpected weight change.   HENT: Negative for trouble swallowing.    Respiratory: Negative for cough, shortness of breath and wheezing.    Cardiovascular: Negative for chest pain and palpitations.   Gastrointestinal: Negative for abdominal distention, abdominal pain, blood in stool and vomiting.   Musculoskeletal: Negative for back pain.     Objective:     Vitals:    05/22/18 1437   BP: 100/60   Pulse: 62   Weight: 70.6 kg (155 lb 10.3 oz)   Height: 5' 8" (1.727 m)   PainSc: 0-No pain     Body mass index is 23.67 kg/m².  Physical Exam   Constitutional: She is oriented to person, place, and time. She appears well-developed and well-nourished. No distress.   HENT:   Right Ear: Tympanic membrane and ear canal normal.   Left Ear: Tympanic membrane and ear canal normal.   Nose: No sinus tenderness or nasal deformity.   Mouth/Throat: No oropharyngeal exudate or posterior oropharyngeal erythema.   Eyes: Conjunctivae, EOM and lids are normal. Pupils are equal, round, and reactive to light.   Neck: Carotid bruit is " not present. No thyromegaly present.   Cardiovascular: Normal rate, regular rhythm, normal heart sounds and intact distal pulses.  PMI is not displaced.    Pulmonary/Chest: Effort normal and breath sounds normal. No respiratory distress.   Abdominal: Soft. Bowel sounds are normal. She exhibits no distension. There is no tenderness.   Musculoskeletal: She exhibits no edema or tenderness.   Lymphadenopathy:        Head (right side): No submandibular adenopathy present.        Head (left side): No submandibular adenopathy present.     She has no cervical adenopathy.     She has no axillary adenopathy.        Right: No inguinal adenopathy present.        Left: No inguinal adenopathy present.   Neurological: She is alert and oriented to person, place, and time. She has normal strength.   Skin: Skin is intact. No lesion noted.   Psychiatric: She has a normal mood and affect. Her speech is normal and behavior is normal.     Assessment:     1. PAF (paroxysmal atrial fibrillation)    2. History of GI bleed    3. PSVT (paroxysmal supraventricular tachycardia)    4. Thrombocytosis      Plan:   Shelly Wilkinson was seen today for follow-up.    Diagnoses and all orders for this visit:    PAF (paroxysmal atrial fibrillation): on eliquis    History of GI bleed: will check hgb on eliquis    PSVT (paroxysmal supraventricular tachycardia): no symptoms    Thrombocytosis: check labs    Cbc and cmp ordered today      Follow-up in about 1 year (around 5/22/2019) for Annual exam.     Medication List          Accurate as of 5/22/18  3:25 PM. If you have any questions, ask your nurse or doctor.               CHANGE how you take these medications    acyclovir 400 MG tablet  Commonly known as:  ZOVIRAX  Take 1 tablet (400 mg total) by mouth once daily.  What changed:  · when to take this  · reasons to take this        CONTINUE taking these medications    acetaminophen 325 MG tablet  Commonly known as:  TYLENOL     * apixaban 5 mg Tab  Take 1  tablet (5 mg total) by mouth 2 (two) times daily.     * ELIQUIS 5 mg Tab  Generic drug:  apixaban  TAKE 1 TABLET BY MOUTH TWICE DAILY     LOPREEZA 0.5-0.1 mg per tablet  Generic drug:  estradiol-norethindrone acet  TAKE 1 TABLET BY MOUTH EVERY DAY     NON FORMULARY MEDICATION     temazepam 15 mg Cap  Commonly known as:  RESTORIL  Take 1 capsule (15 mg total) by mouth nightly.     verapamil 120 MG C24p  Commonly known as:  VERELAN  Take 1 capsule (120 mg total) by mouth once daily.     VITAMIN B-12 100 MCG tablet  Generic drug:  cyanocobalamin        * This list has 2 medication(s) that are the same as other medications prescribed for you. Read the directions carefully, and ask your doctor or other care provider to review them with you.

## 2018-05-25 ENCOUNTER — OFFICE VISIT (OUTPATIENT)
Dept: OBSTETRICS AND GYNECOLOGY | Facility: CLINIC | Age: 78
End: 2018-05-25
Payer: MEDICARE

## 2018-05-25 ENCOUNTER — OFFICE VISIT (OUTPATIENT)
Dept: ELECTROPHYSIOLOGY | Facility: CLINIC | Age: 78
End: 2018-05-25
Payer: MEDICARE

## 2018-05-25 ENCOUNTER — HOSPITAL ENCOUNTER (OUTPATIENT)
Dept: CARDIOLOGY | Facility: CLINIC | Age: 78
Discharge: HOME | End: 2018-05-25
Payer: MEDICARE

## 2018-05-25 VITALS
DIASTOLIC BLOOD PRESSURE: 51 MMHG | BODY MASS INDEX: 23.64 KG/M2 | SYSTOLIC BLOOD PRESSURE: 105 MMHG | HEART RATE: 60 BPM | WEIGHT: 156 LBS | HEIGHT: 68 IN

## 2018-05-25 VITALS
WEIGHT: 154 LBS | DIASTOLIC BLOOD PRESSURE: 80 MMHG | BODY MASS INDEX: 23.34 KG/M2 | SYSTOLIC BLOOD PRESSURE: 100 MMHG | HEIGHT: 68 IN

## 2018-05-25 DIAGNOSIS — M43.16 SPONDYLOLISTHESIS AT L4-L5 LEVEL: ICD-10-CM

## 2018-05-25 DIAGNOSIS — I48.0 PAF (PAROXYSMAL ATRIAL FIBRILLATION): Primary | ICD-10-CM

## 2018-05-25 DIAGNOSIS — I47.10 PSVT (PAROXYSMAL SUPRAVENTRICULAR TACHYCARDIA): Primary | ICD-10-CM

## 2018-05-25 DIAGNOSIS — I47.10 PSVT (PAROXYSMAL SUPRAVENTRICULAR TACHYCARDIA): ICD-10-CM

## 2018-05-25 DIAGNOSIS — R55 SYNCOPE, UNSPECIFIED SYNCOPE TYPE: ICD-10-CM

## 2018-05-25 DIAGNOSIS — Z79.890 POSTMENOPAUSAL HORMONE REPLACEMENT THERAPY: ICD-10-CM

## 2018-05-25 DIAGNOSIS — Z01.419 WELL WOMAN EXAM WITH ROUTINE GYNECOLOGICAL EXAM: Primary | ICD-10-CM

## 2018-05-25 PROCEDURE — 99999 PR PBB SHADOW E&M-EST. PATIENT-LVL III: CPT | Mod: PBBFAC,,, | Performed by: INTERNAL MEDICINE

## 2018-05-25 PROCEDURE — G0101 CA SCREEN;PELVIC/BREAST EXAM: HCPCS | Mod: PBBFAC | Performed by: OBSTETRICS & GYNECOLOGY

## 2018-05-25 PROCEDURE — 93005 ELECTROCARDIOGRAM TRACING: CPT | Mod: PBBFAC | Performed by: INTERNAL MEDICINE

## 2018-05-25 PROCEDURE — 99999 PR PBB SHADOW E&M-EST. PATIENT-LVL III: CPT | Mod: PBBFAC,,, | Performed by: OBSTETRICS & GYNECOLOGY

## 2018-05-25 PROCEDURE — 93010 ELECTROCARDIOGRAM REPORT: CPT | Mod: S$PBB,,, | Performed by: INTERNAL MEDICINE

## 2018-05-25 PROCEDURE — 99213 OFFICE O/P EST LOW 20 MIN: CPT | Mod: PBBFAC | Performed by: OBSTETRICS & GYNECOLOGY

## 2018-05-25 PROCEDURE — 99213 OFFICE O/P EST LOW 20 MIN: CPT | Mod: PBBFAC,27 | Performed by: INTERNAL MEDICINE

## 2018-05-25 PROCEDURE — G0101 CA SCREEN;PELVIC/BREAST EXAM: HCPCS | Mod: S$PBB,,, | Performed by: OBSTETRICS & GYNECOLOGY

## 2018-05-25 PROCEDURE — 99214 OFFICE O/P EST MOD 30 MIN: CPT | Mod: S$PBB,,, | Performed by: INTERNAL MEDICINE

## 2018-05-25 RX ORDER — ESTRADIOL AND NORETHINDRONE ACETATE .5; .1 MG/1; MG/1
1 TABLET ORAL DAILY
Qty: 84 TABLET | Refills: 3 | Status: SHIPPED | OUTPATIENT
Start: 2018-05-25 | End: 2018-12-04 | Stop reason: SDUPTHER

## 2018-05-25 NOTE — PROGRESS NOTES
"Subjective:    Patient ID:  Shelly Vásquez is a 77 y.o. female who presents for an ILR Check.     Loss of Consciousness   Associated symptoms include dizziness, light-headedness and palpitations. Pertinent negatives include no abdominal pain, chest pain, diaphoresis, malaise/fatigue or weakness.   Palpitations    Associated symptoms include dizziness. Pertinent negatives include no anxiety, chest pain, diaphoresis, irregular heartbeat, malaise/fatigue, near-syncope, shortness of breath or weakness.       77 y.o. 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  PAF, on xarelto    Was walking dog near her summer home in [place].  Developed sudden palpitations, chest discomfort. Legs hurt and tingled.  Called for help; an orthopedist (MD) responded and found BP to be OK, but  bpm by her (and her 's) report.  She was lying down, felt afraid to get up. Brought to ER; was all better by then. At her initial office visit (07/03/17), she reported that she had been feeling fine, but that she was anxious about the situation. At the time, she reported having good exertion tolerance; no syncope. An Echo 1/16: 60% LVEF.    Second episode (mid-July) was short (45 seconds); said that she "just didn't feel right;" she felt uncomfortable, and went to lay in bed, felt better after rest. She denies chest pain, SOB/DALE, palpitations, or syncope. Her energy level is stable; she remains active without difficulty.    has had episodes of LH with standing. These happen maybe qod. Lasts a short time, self-resolves.  Also had >30 mins of AF detected on ILR; xarelto started.    ILR had also detected a REGULAR narrow complex tachycardia, 320-360 ms, ending with a P wave and without a post-tachycardia pause.  In response to that SVT, I did RFA of SVT; inducible AVNRT. Successfully ablated 3/2018.  Since, ILR shows no arrhythmia at all.    My interpretation of today's ECG is SR at 60 " bpm    Review of Systems   Constitution: Negative. Negative for diaphoresis, weakness and malaise/fatigue.   HENT: Negative.  Negative for ear pain, nosebleeds and tinnitus.    Eyes: Negative for blurred vision and double vision.   Cardiovascular: Positive for palpitations. Negative for chest pain, dyspnea on exertion, irregular heartbeat and near-syncope.   Respiratory: Negative.  Negative for shortness of breath.    Endocrine: Negative.  Negative for polyuria.   Hematologic/Lymphatic: Does not bruise/bleed easily.   Skin: Negative.  Negative for rash.   Musculoskeletal: Negative.  Negative for joint pain and muscle weakness.   Gastrointestinal: Negative.  Negative for abdominal pain, change in bowel habit, hematemesis and hematochezia.   Genitourinary: Negative for frequency and hematuria.   Neurological: Positive for dizziness and light-headedness.   Psychiatric/Behavioral: Negative.  Negative for altered mental status and depression. The patient is not nervous/anxious.    Allergic/Immunologic: Negative for environmental allergies.        Objective:    Physical Exam   Constitutional: She is oriented to person, place, and time. Vital signs are normal. She appears well-developed and well-nourished. She is active and cooperative.   HENT:   Head: Normocephalic and atraumatic.   Eyes: Conjunctivae and EOM are normal. Pupils are equal, round, and reactive to light.   Neck: Normal range of motion. Carotid bruit is not present. No tracheal deviation and no edema present. No thyroid mass and no thyromegaly present.   Cardiovascular: Normal rate, regular rhythm, normal heart sounds, intact distal pulses and normal pulses.   No extrasystoles are present. PMI is not displaced.  Exam reveals no gallop and no friction rub.    No murmur heard.  Pulmonary/Chest: Effort normal and breath sounds normal. No respiratory distress. She has no wheezes. She has no rales.   Abdominal: Soft. Normal appearance. She exhibits no distension.  There is no hepatosplenomegaly.   Musculoskeletal: Normal range of motion.   Neurological: She is alert and oriented to person, place, and time. Coordination normal.   Skin: Skin is warm and dry. No rash noted. She is not diaphoretic.   Psychiatric: She has a normal mood and affect. Her speech is normal and behavior is normal. Thought content normal. Cognition and memory are normal.   Nursing note and vitals reviewed.        Assessment:       1. PAF (paroxysmal atrial fibrillation)    2. PSVT (paroxysmal supraventricular tachycardia)    3. Spondylolisthesis at L4-L5 level    4. Syncope, unspecified syncope type         Plan:       1. episodic LH. Only happens while standing.  Discussed with patient orthostatic/vasovagal mediated hypotension and the resultant decreased level of consciousness that can result. Discussed counteractive measures including standing up slowly, remaining hydrated, support hose, salt intake, exercise stressing lower extremity strength.  Her episodes are rare; will try behavioral therapy for now, reserving meds (e.g., midodrine, florinef) in case it worsens.    2. SVT  s/p RFA AVNRT; no recurrence.     3. PAF  Rare; none since RFA of AVNRT. Continue to follow via ILR.  Continue a/c.    Return in 1 year with echo, or earlier prn.  Will cc pt's [facility] cardiologist, Dr. Alfred Pierre.

## 2018-05-25 NOTE — PROGRESS NOTES
History & Physical  Gynecology      SUBJECTIVE:     Chief Complaint: Well Woman       History of Present Illness:  Annual Exam-Postmenopausal  Patient presents for annual exam. The patient has no complaints today. Patient denies post-menopausal vaginal bleeding. The patient is not sexually active. The patient is taking hormone replacement therapy.  Does not do well when she stops taking it and wants to continue.  The patient participates in regular exercise: yes.  She does not smoke.     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    Supraventricular tachycardia      Past Surgical History:   Procedure Laterality Date    APPENDECTOMY  age 21    BACK SURGERY      Beast Lift      COSMETIC SURGERY      DILATION AND CURETTAGE OF UTERUS      PMB    ENDOMETRIAL BIOPSY      EYE SURGERY      ptsosis      RADIOFREQUENCY ABLATION  2018    SMALL INTESTINE SURGERY      TONSILLECTOMY, ADENOIDECTOMY  age 8    TUBAL LIGATION       OB History      Para Term  AB Living    2 2 2     2    SAB TAB Ectopic Multiple Live Births            2        Family History   Problem Relation Age of Onset    No Known Problems Father     Cirrhosis Mother     No Known Problems Sister     No Known Problems Brother     No Known Problems Maternal Aunt     No Known Problems Maternal Uncle     No Known Problems Paternal Aunt     No Known Problems  Paternal Uncle     No Known Problems Maternal Grandmother     No Known Problems Maternal Grandfather     No Known Problems Paternal Grandmother     No Known Problems Paternal Grandfather     No Known Problems Daughter     No Known Problems Son     Breast cancer Neg Hx     Colon cancer Neg Hx     Ovarian cancer Neg Hx     Amblyopia Neg Hx     Blindness Neg Hx     Cancer Neg Hx     Cataracts Neg Hx     Diabetes Neg Hx     Glaucoma Neg Hx     Hypertension Neg Hx     Macular degeneration Neg Hx     Retinal detachment Neg Hx     Strabismus Neg Hx     Stroke Neg Hx     Thyroid disease Neg Hx      Social History   Substance Use Topics    Smoking status: Former Smoker     Packs/day: 1.00     Years: 22.00     Types: Cigarettes     Quit date: 7/27/1980    Smokeless tobacco: Never Used    Alcohol use 4.2 oz/week     7 Glasses of wine per week      Comment: 1-2 glasses of wine daily       Current Outpatient Prescriptions   Medication Sig    acetaminophen (TYLENOL) 325 MG tablet Take 325 mg by mouth continuous prn for Pain.    cyanocobalamin (VITAMIN B-12) 100 MCG tablet Take 100 mcg by mouth daily as needed.    ELIQUIS 5 mg Tab TAKE 1 TABLET BY MOUTH TWICE DAILY    estradiol-norethindrone acet (LOPREEZA) 0.5-0.1 mg per tablet Take 1 tablet by mouth once daily.    NON FORMULARY MEDICATION 1 tablet every evening. [employer] sleep aid - doxylamine 25 mg    temazepam (RESTORIL) 15 mg Cap Take 1 capsule (15 mg total) by mouth nightly.    verapamil (VERELAN) 120 MG C24P Take 1 capsule (120 mg total) by mouth once daily.    acyclovir (ZOVIRAX) 400 MG tablet Take 1 tablet (400 mg total) by mouth once daily. (Patient taking differently: Take 400 mg by mouth continuous prn. )     No current facility-administered medications for this visit.        Review of Systems:  Review of Systems   Constitutional: Negative for appetite change, fever and unexpected weight change.   Respiratory: Negative for shortness of  breath.    Cardiovascular: Negative for chest pain.   Gastrointestinal: Negative for nausea and vomiting.   Genitourinary: Negative for dyspareunia, frequency, genital sores, pelvic pain, urgency, vaginal bleeding, vaginal discharge, vaginal pain, urinary incontinence, postcoital bleeding, postmenopausal bleeding and vaginal odor.        OBJECTIVE:     Physical Exam:  Physical Exam   Constitutional: She is oriented to person, place, and time. She appears well-developed and well-nourished.   Neck: Normal range of motion. Neck supple. No tracheal deviation present. No thyromegaly present.   Cardiovascular: Normal rate, regular rhythm and normal heart sounds.    Pulmonary/Chest: Effort normal and breath sounds normal. Right breast exhibits no inverted nipple, no mass, no nipple discharge, no skin change and no tenderness. Left breast exhibits no inverted nipple, no mass, no nipple discharge, no skin change and no tenderness. Breasts are symmetrical.   Abdominal: Soft.   Genitourinary: Vagina normal and uterus normal. No labial fusion. There is no rash, tenderness, lesion or injury on the right labia. There is no rash, tenderness, lesion or injury on the left labia. Uterus is not deviated, not enlarged, not fixed and not tender. Cervix exhibits no motion tenderness, no discharge and no friability. Right adnexum displays no mass, no tenderness and no fullness. Left adnexum displays no mass, no tenderness and no fullness. No erythema, tenderness or bleeding in the vagina. No foreign body in the vagina. No signs of injury around the vagina. No vaginal discharge found.   Neurological: She is alert and oriented to person, place, and time.   Psychiatric: She has a normal mood and affect. Her behavior is normal. Judgment and thought content normal.   Nursing note and vitals reviewed.      Chaperoned by: Deana    ASSESSMENT:       ICD-10-CM ICD-9-CM    1. Well woman exam with routine gynecological exam Z01.419 V72.31    2.  Postmenopausal hormone replacement therapy Z79.890 V07.4 estradiol-norethindrone acet (LOPREEZA) 0.5-0.1 mg per tablet          Plan:      Shelly Wilkinson was seen today for well woman.    Diagnoses and all orders for this visit:    Well woman exam with routine gynecological exam    Postmenopausal hormone replacement therapy  -     estradiol-norethindrone acet (LOPREEZA) 0.5-0.1 mg per tablet; Take 1 tablet by mouth once daily.        No orders of the defined types were placed in this encounter.      Well Woman:   - Pap smear: no longer [id number]  - Mammogram: scheduled  - Colonoscopy: no longer required  - Dexa: scheduled  - Immunizations: done with pcp  - Labs: done with pcp  - Exercise counseling provided  - pt does not want to discontinue hormone therapy; discussed the risks with her; rx refilled      Follow up in one year for annual, or prn.    Rachel Fletcher

## 2018-05-30 ENCOUNTER — HOSPITAL ENCOUNTER (OUTPATIENT)
Dept: RADIOLOGY | Facility: HOSPITAL | Age: 78
Discharge: HOME | End: 2018-05-30
Attending: NURSE PRACTITIONER
Payer: MEDICARE

## 2018-05-30 ENCOUNTER — HOSPITAL ENCOUNTER (OUTPATIENT)
Dept: RADIOLOGY | Facility: HOSPITAL | Age: 78
Discharge: HOME | End: 2018-05-30
Attending: OBSTETRICS & GYNECOLOGY
Payer: MEDICARE

## 2018-05-30 DIAGNOSIS — Z78.0 POST-MENOPAUSAL: ICD-10-CM

## 2018-05-30 DIAGNOSIS — Z12.39 SCREENING BREAST EXAMINATION: ICD-10-CM

## 2018-05-30 PROCEDURE — 77080 DXA BONE DENSITY AXIAL: CPT | Mod: TC,PO

## 2018-05-30 PROCEDURE — 77067 SCR MAMMO BI INCL CAD: CPT | Mod: 26,,, | Performed by: RADIOLOGY

## 2018-05-30 PROCEDURE — 77063 BREAST TOMOSYNTHESIS BI: CPT | Mod: 26,,, | Performed by: RADIOLOGY

## 2018-05-30 PROCEDURE — 77080 DXA BONE DENSITY AXIAL: CPT | Mod: 26,,, | Performed by: RADIOLOGY

## 2018-05-30 PROCEDURE — 77067 SCR MAMMO BI INCL CAD: CPT | Mod: TC,PO

## 2018-06-20 DIAGNOSIS — Z79.890 POSTMENOPAUSAL HORMONE REPLACEMENT THERAPY: ICD-10-CM

## 2018-06-22 ENCOUNTER — CLINICAL SUPPORT (OUTPATIENT)
Dept: ELECTROPHYSIOLOGY | Facility: CLINIC | Age: 78
End: 2018-06-22
Attending: INTERNAL MEDICINE
Payer: MEDICARE

## 2018-06-22 DIAGNOSIS — Z95.818 PRESENCE OF CARDIAC DEVICE: ICD-10-CM

## 2018-06-22 DIAGNOSIS — R55 SYNCOPE, UNSPECIFIED SYNCOPE TYPE: ICD-10-CM

## 2018-06-22 PROCEDURE — 93299 LOOP RECORDER REMOTE: CPT | Mod: PBBFAC | Performed by: INTERNAL MEDICINE

## 2018-06-22 PROCEDURE — 93298 REM INTERROG DEV EVAL SCRMS: CPT | Mod: ,,, | Performed by: INTERNAL MEDICINE

## 2018-07-25 ENCOUNTER — CLINICAL SUPPORT (OUTPATIENT)
Dept: ELECTROPHYSIOLOGY | Facility: CLINIC | Age: 78
End: 2018-07-25
Attending: INTERNAL MEDICINE
Payer: MEDICARE

## 2018-07-25 DIAGNOSIS — R55 SYNCOPE, UNSPECIFIED SYNCOPE TYPE: ICD-10-CM

## 2018-07-25 DIAGNOSIS — Z95.818 PRESENCE OF CARDIAC DEVICE: ICD-10-CM

## 2018-07-25 PROCEDURE — 93298 REM INTERROG DEV EVAL SCRMS: CPT | Mod: ,,, | Performed by: INTERNAL MEDICINE

## 2018-07-25 PROCEDURE — 93299 LOOP RECORDER REMOTE: CPT | Mod: PBBFAC | Performed by: INTERNAL MEDICINE

## 2018-07-31 ENCOUNTER — PATIENT MESSAGE (OUTPATIENT)
Dept: INTERNAL MEDICINE | Facility: CLINIC | Age: 78
End: 2018-07-31

## 2018-08-01 RX ORDER — ACYCLOVIR 400 MG/1
400 TABLET ORAL 3 TIMES DAILY
Qty: 15 TABLET | Refills: 6 | Status: SHIPPED | OUTPATIENT
Start: 2018-08-01 | End: 2019-10-08

## 2018-08-20 ENCOUNTER — PATIENT MESSAGE (OUTPATIENT)
Dept: OPHTHALMOLOGY | Facility: CLINIC | Age: 78
End: 2018-08-20

## 2018-08-27 ENCOUNTER — CLINICAL SUPPORT (OUTPATIENT)
Dept: ELECTROPHYSIOLOGY | Facility: CLINIC | Age: 78
End: 2018-08-27
Attending: INTERNAL MEDICINE
Payer: MEDICARE

## 2018-08-27 DIAGNOSIS — R55 SYNCOPE, UNSPECIFIED SYNCOPE TYPE: ICD-10-CM

## 2018-08-27 DIAGNOSIS — Z95.818 PRESENCE OF CARDIAC DEVICE: ICD-10-CM

## 2018-08-27 PROCEDURE — 93298 REM INTERROG DEV EVAL SCRMS: CPT | Mod: ,,, | Performed by: INTERNAL MEDICINE

## 2018-09-08 RX ORDER — TEMAZEPAM 15 MG/1
CAPSULE ORAL
Qty: 90 CAPSULE | Refills: 0 | Status: SHIPPED | OUTPATIENT
Start: 2018-09-08 | End: 2019-01-15 | Stop reason: SDUPTHER

## 2018-10-01 ENCOUNTER — CLINICAL SUPPORT (OUTPATIENT)
Dept: ELECTROPHYSIOLOGY | Facility: CLINIC | Age: 78
End: 2018-10-01
Attending: INTERNAL MEDICINE
Payer: MEDICARE

## 2018-10-01 DIAGNOSIS — R55 SYNCOPE, UNSPECIFIED SYNCOPE TYPE: ICD-10-CM

## 2018-10-01 DIAGNOSIS — Z95.818 PRESENCE OF CARDIAC DEVICE: ICD-10-CM

## 2018-10-01 PROCEDURE — 93299 LOOP RECORDER REMOTE: CPT | Mod: PBBFAC | Performed by: INTERNAL MEDICINE

## 2018-10-01 PROCEDURE — 93298 REM INTERROG DEV EVAL SCRMS: CPT | Mod: ,,, | Performed by: INTERNAL MEDICINE

## 2018-10-08 ENCOUNTER — TELEPHONE (OUTPATIENT)
Dept: ELECTROPHYSIOLOGY | Facility: CLINIC | Age: 78
End: 2018-10-08

## 2018-10-11 ENCOUNTER — TELEPHONE (OUTPATIENT)
Dept: ELECTROPHYSIOLOGY | Facility: CLINIC | Age: 78
End: 2018-10-11

## 2018-10-11 NOTE — TELEPHONE ENCOUNTER
Spoke w/ patient. She is [facility] until Sunday. Did not bring her monitor on her trip. Will call he direct line on Monday 10.15.18 to reconnect.        LM on pt's /[facility] # VM.[place] on [facility] #VM. Gave direct # to reach me.

## 2018-10-16 ENCOUNTER — TELEPHONE (OUTPATIENT)
Dept: INTERNAL MEDICINE | Facility: CLINIC | Age: 78
End: 2018-10-16

## 2018-10-16 ENCOUNTER — PATIENT MESSAGE (OUTPATIENT)
Dept: INTERNAL MEDICINE | Facility: CLINIC | Age: 78
End: 2018-10-16

## 2018-11-06 ENCOUNTER — NURSE TRIAGE (OUTPATIENT)
Dept: ADMINISTRATIVE | Facility: CLINIC | Age: 78
End: 2018-11-06

## 2018-11-06 ENCOUNTER — TELEPHONE (OUTPATIENT)
Dept: INTERNAL MEDICINE | Facility: CLINIC | Age: 78
End: 2018-11-06

## 2018-11-06 NOTE — TELEPHONE ENCOUNTER
----- Message from Shelly Jarrell sent at 11/6/2018  3:13 PM CST -----  Contact: PT Portal Request  Appointment Request From: Shelly Vásquez    With Provider: Joana Doty MD [[facility] - [facility]]    Preferred Date Range: 11/7/2018 - 11/8/2018    Preferred Times: Any time    Reason for visit: surgery follow up    Comments:  fufil request of attending emergency department

## 2018-11-06 NOTE — TELEPHONE ENCOUNTER
I spoke with the patient. She has disolvable sutures. She will test me on Thursday and tell me whether she would like to be seen. [place]

## 2018-11-06 NOTE — TELEPHONE ENCOUNTER
Reason for Disposition   [1] Last tetanus shot > 10 years ago AND [2] CLEAN cut     ED recommended due to flap on upper lip.    Protocols used: ST CUTS AND [id number]-T-SO     states that pt fell 30 minutes ago and has a laceration on upper lip with flap. States he thinks pt needs to see plastic surgeon to see what to do with flap. ED recommended, informed pt that they would only see plastics if ED MD thought it was necessary.

## 2018-11-12 ENCOUNTER — PATIENT MESSAGE (OUTPATIENT)
Dept: INTERNAL MEDICINE | Facility: CLINIC | Age: 78
End: 2018-11-12

## 2018-11-12 NOTE — TELEPHONE ENCOUNTER
Spoke to pt . Pt c/o bleeding from lips on yesterday from formed scab sutured area,pt requesting to see Dr Doty or advice for care.     Consulted with Dr Doty , requested pt to return to ER to seek care from ER physician to check pt's sutured lip for continued bleed.       Pt refused to return to previous ER  Physician, Pt states, she will seek care at the ER on [address].

## 2018-11-13 ENCOUNTER — TELEPHONE (OUTPATIENT)
Dept: OTOLARYNGOLOGY | Facility: CLINIC | Age: 78
End: 2018-11-13

## 2018-11-13 ENCOUNTER — TELEPHONE (OUTPATIENT)
Dept: PLASTIC SURGERY | Facility: CLINIC | Age: 78
End: 2018-11-13

## 2018-11-13 ENCOUNTER — OFFICE VISIT (OUTPATIENT)
Dept: PLASTIC SURGERY | Facility: CLINIC | Age: 78
End: 2018-11-13
Payer: MEDICARE

## 2018-11-13 VITALS
RESPIRATION RATE: 12 BRPM | WEIGHT: 154.31 LBS | HEART RATE: 63 BPM | TEMPERATURE: 99 F | HEIGHT: 67 IN | SYSTOLIC BLOOD PRESSURE: 129 MMHG | DIASTOLIC BLOOD PRESSURE: 70 MMHG | BODY MASS INDEX: 24.22 KG/M2

## 2018-11-13 DIAGNOSIS — S01.511S LIP LACERATION, SEQUELA: Primary | ICD-10-CM

## 2018-11-13 PROCEDURE — 99213 OFFICE O/P EST LOW 20 MIN: CPT | Mod: PBBFAC,PO | Performed by: SURGERY

## 2018-11-13 PROCEDURE — 99999 PR PBB SHADOW E&M-EST. PATIENT-LVL III: CPT | Mod: PBBFAC,,, | Performed by: SURGERY

## 2018-11-13 PROCEDURE — 12051 INTMD RPR FACE/MM 2.5 CM/<: CPT | Mod: S$PBB,,, | Performed by: SURGERY

## 2018-11-13 PROCEDURE — 12051 INTMD RPR FACE/MM 2.5 CM/<: CPT | Mod: PBBFAC,PO | Performed by: SURGERY

## 2018-11-13 PROCEDURE — 99202 OFFICE O/P NEW SF 15 MIN: CPT | Mod: S$PBB,,, | Performed by: SURGERY

## 2018-11-13 NOTE — PROGRESS NOTES
PLASTIC & RECONSTRUCTIVE CONSULTATION NOTE    CC  No chief complaint on file.  Lip laceration    Referring Provider- Self  PCP: Joana Doty MD    HPI  History from patient, chart, referring provider  Shelly Vásquez is a 77 y.o. female presenting with upper lip laceration sustained 1 week ago.  Laceration occurred during a fall from standing.  She had the laceration repaired at an outside emergency room.  Four days later she had spontaneous bleeding in the middle of the night.  She is on elliquis for atrial fibrillation.  The bleeding did not resolve on its own.  It required 3 hours of pressure.  They want the most cosmetic healing method possible.    PMH  Patient Active Problem List    Diagnosis Date Noted    At high risk for falls 04/06/2018    Thrombocytosis 04/06/2018    Aortic atherosclerosis 04/06/2018    Arthropathy of lumbar facet joint 04/06/2018    PAF (paroxysmal atrial fibrillation) 03/22/2018    PSVT (paroxysmal supraventricular tachycardia) 01/26/2018    Status post placement of implantable loop recorder 10/06/2017    Syncope 07/06/2017    Acute chalazion of left eye 04/05/2017    Mobility impaired 02/23/2017    S/P lumbar laminectomy 02/23/2017    History of GI bleed 02/01/2017    Spinal stenosis, lumbar 11/29/2016    Spondylolisthesis at L4-L5 level 11/29/2016    Osteoarthritis 12/31/2015    Insomnia 02/18/2013   Reviewed HPI    [facility]  Past Surgical History:   Procedure Laterality Date    ABLATION N/A 3/22/2018    Performed by Michael Brayd MD at [facility] CATH LAB    APPENDECTOMY  age 21    BACK SURGERY      Beast Lift  2004    BREAST BIOPSY  50yrs ago    unable to see scar    COLONOSCOPY N/A 4/29/2014    Performed by CHRISTIANA Cisse MD at [facility] ENDO (4TH FLR)    COSMETIC SURGERY      DILATION AND CURETTAGE OF UTERUS  2008    PMB    ENDOMETRIAL BIOPSY      ESOPHAGOGASTRODUODENOSCOPY (EGD) N/A 3/4/2016    Performed by Daren Maldonado MD at [facility] ENDO (4TH FLR)     ESOPHAGOGASTRODUODENOSCOPY (EGD) N/A 1/7/2016    Performed by D[facility]donado MD at [facility] ENDO (2ND FLR)    ESOPHAGOGASTRODUODENOSCOPY (EGD) N/A 1/1/2016    Performed by Sumanth Guzmán MD at [facility] ENDO    EYE SURGERY      L4-5 LAMINECTOMY-MINIMALLY INVASIVE Right 2/15/2017    [facility] at [facility] OR    ptsosis      RADIOFREQUENCY ABLATION  03/2018    REPAIR, BLEPHAROPTOSIS Right 10/31/2013    Performed by Morgan Landrum MD at [facility] OR 1ST FLR    REPAIR, BLEPHAROPTOSIS Left 4/11/2013    Performed by Morgan Landrum MD at [facility] OR 1ST FLR    SMALL INTESTINE SURGERY      TONSILLECTOMY, ADENOIDECTOMY  age 8    TOTAL REDUCTION MAMMOPLASTY Bilateral 2003    TUBAL LIGATION     Reviewed    FH  Family History   Problem Relation Age of Onset    No Known Problems Father     Cirrhosis Mother     No Known Problems Sister     No Known Problems Brother     No Known Problems Maternal Aunt     No Known Problems Maternal Uncle     No Known Problems Paternal Aunt     No Known Problems Paternal Uncle     No Known Problems Maternal Grandmother     No Known Problems Maternal Grandfather     No Known Problems Paternal Grandmother     No Known Problems Paternal Grandfather     No Known Problems Daughter     No Known Problems Son     Breast cancer Neg Hx     Colon cancer Neg Hx     Ovarian cancer Neg Hx     Amblyopia Neg Hx     Blindness Neg Hx     Cancer Neg Hx     Cataracts Neg Hx     Diabetes Neg Hx     Glaucoma Neg Hx     Hypertension Neg Hx     Macular degeneration Neg Hx     Retinal detachment Neg Hx     Strabismus Neg Hx     Stroke Neg Hx     Thyroid disease Neg Hx    Reviewed    MEDICATIONS  Outpatient Medications Marked as Taking for the 11/13/18 encounter (Office Visit) with Cecil Walters MD   Medication Sig Dispense Refill    acetaminophen (TYLENOL) 325 MG tablet Take 325 mg by mouth continuous prn for Pain.      acyclovir (ZOVIRAX) 400 MG tablet Take 1  tablet (400 mg total) by mouth 3 (three) times daily. For 5 days 15 tablet 6    cyanocobalamin (VITAMIN B-12) 100 MCG tablet Take 100 mcg by mouth daily as needed.      ELIQUIS 5 mg Tab TAKE 1 TABLET BY MOUTH TWICE DAILY 180 tablet 3    estradiol-norethindrone acet (LOPREEZA) 0.5-0.1 mg per tablet Take 1 tablet by mouth once daily. 84 tablet 3    LOPREEZA 0.5-0.1 mg per tablet TAKE 1 TABLET BY MOUTH EVERY DAY 84 tablet 0    NON FORMULARY MEDICATION 1 tablet every evening. [employer] sleep aid - doxylamine 25 mg      temazepam (RESTORIL) 15 mg Cap TAKE 1 CAPSULE(15 MG) BY MOUTH EVERY NIGHT 90 capsule 0    traMADol (ULTRAM) 50 mg tablet Take 1 tablet (50 mg total) by mouth every 6 (six) hours as needed for Pain. 8 tablet 0    verapamil (VERELAN) 120 MG C24P Take 1 capsule (120 mg total) by mouth once daily. 90 capsule 3   Reviewed    ALLERGIES   Review of patient's allergies indicates:   Allergen Reactions    Nsaids (non-steroidal anti-inflammatory drug)      GI Bleed  Had 5 blood transfusions   Reviewed    SOCIAL HISTORY  Tobacco:   Social History     Tobacco Use   Smoking Status Former Smoker    Packs/day: 1.00    Years: 22.00    Pack years: 22.00    Types: Cigarettes    Last attempt to quit: 1980    Years since quittin.3   Smokeless Tobacco Never Used     EtOH:   Social History     Substance and Sexual Activity   Alcohol Use Yes    Alcohol/week: 4.2 oz    Types: 7 Glasses of wine per week    Comment: 1-2 glasses of wine daily   Reviewed.  Not actively smoking    ROS  Pertinent 12 point ROS negative except as listed above.  She denies history of weight change, fatigue, eye problems, ear problems, hoarseness/voice change, chest discomfort, palpitations, vein inflammation, swollen feet, breathing problems, chronic cough, indigestion, trouble swallowing, abdominal pain, blood in stool, urinary problems, prostate problems, sexual problems, joint problems, back pain, musculoskeletal pain, skin  "problems, dizziness, headaches, muscle weakness, trouble sleeping, abnormal bruising, abnormal thirst, abnormal sweating, depression, anxiety, or any prior psychiatry consultation.      PHYSICAL EXAM  /70   Pulse 63   Temp 98.7 °F (37.1 °C) (Oral)   Resp 12   Ht 5' 7" (1.702 m)   Wt 70 kg (154 lb 5.2 oz)   LMP  (LMP Unknown)   BMI 24.17 kg/m²     Vitals:    11/13/18 1052   BP: 129/70   Pulse: 63   Resp: 12   Temp: 98.7 °F (37.1 °C)   TempSrc: Oral   Weight: 70 kg (154 lb 5.2 oz)   Height: 5' 7" (1.702 m)     Height:   Ht Readings from Last 1 Encounters:   11/13/18 5' 7" (1.702 m)     Weight:   Wt Readings from Last 1 Encounters:   11/13/18 70 kg (154 lb 5.2 oz)     Body mass index is 24.17 kg/m².    Gen: Physical examination reveals a well developed, well nourished female of good mood who is alert and is oriented to person, place, time.    HEENT: Head is normocephalic and atraumatic except for 0.5 cm upper lip laceration. Extraocular motion is intact. Mucosal membranes moist.  Facial nerve symmetric    Pulm: Breathing is non-labored, normal respiratory effort    CV: Palpable peripheral pulses    Extremities: No cyanosis or edema    Musculoskeletal: Normal gate and stance    Skin: Normal turgor    GI: Abdomen Soft, Non-tender, Non-distended. Moderate lipodystrophy.    Skin quality: Supple, easily approximated.  Markham II skin.      Evolving ecchymosis of her upper lip, yellow discoloration  Evidence of previous laceration repair at site of upper lip.    0.5 cm upper lip laceration through central lip vermilion under peak of cupid's bow on right.    The white roll is spared  The orbicularis is intact.   Laceration extends onto mucosa where lips make contact.        LABS  Lab Results   Component Value Date    WBC 6.66 05/22/2018    HGB 10.3 (L) 05/22/2018    HCT 31.1 (L) 05/22/2018     (H) 05/22/2018     (H) 05/22/2018     Lab Results   Component Value Date     05/22/2018    K 4.2 " 05/22/2018     05/22/2018    CO2 25 05/22/2018     Lab Results   Component Value Date    ALBUMIN 4.1 05/22/2018     Lab Results   Component Value Date    CREATININE 0.9 05/22/2018     Lab Results   Component Value Date    HGBA1C 5.4 01/01/2016       OFFICE PROCEDURE    Name: Shelly Vásquez  MRN: [id number]  Date: 11/3/18    PRE-OP DIAGNOSIS:  Upper Lip Laceration    POST-OP DIAGNOSIS:  Same    PROCEDURES:  Simple repair of upper lip laceration, 3 cm    ANESTHESIA:  Lidocaine 2% with epinephrine 1:100,000 ml    FINDINGS: Repaired lip laceration  SURGEON: Roberto Walters MD  EBL: minimal  COMPLICATIONS: none  SPECIMENS: none  DISPOSITION: good condition, discharged to home.  The patient tolerated the procedure without adverse consequences    INDICATIONS:  The risks, benefits, alternatives, and expected outcomes were discussed with the patient's parents; the risks including but not limited to poor scarring, wound healing problem, infection, bleeding, keloid, hypertrophic scarring, incomplete removal, recurrence of mass, sensory nerve injury. Informed consent was obtained.  All questions were answered.      PROCEDURE:  The patient was brought to the procedure room and placed in a supine position.  Time out and verification procedure were performed. 1 cc 2% lidocaine with 1:200,000 epinephrine solution was injected locally with a TB neelde. The patient was prepped with alcohol and betadine.  The clot over her upper lip was sizeable and was moistened and removed.  There was granulation tissue over her orbicularis at the depth of the wound.  There was no evidence of infection.  Hemostasis was achieved with silver nitrate. The wound was closed with interrupted 5-0 chromic simple suture with good approximation of vermilion and mucosa. The patient tolerating the procedure well without complication.  She had good approximation of land marks.  And no obvious raw surface that needed to be removed.      AFTERCARE  See Patient  Instructions  Return as needed    ASSESSMENT  No diagnosis found.  76 yo F with upper lip laceration, repaired today in clinic.    1.  Delayed separation of prior upper lip repair with bleeding.    2.  History of bleeding from laceration site at home  3.  On anticoagulation for history of atrial fibrillation    I could not appreciate what kind of suture was used in the previous repair but it was resorbable.    ?  INFORMED CONSENT FOR SURGERY  Risks, benefits, outcomes, possible complications, perioperative restrictions, recovery, and potential disability were reviewed. Permission to obtain photographs before, during, and after surgery was also granted. Questions were answered. Written preoperative instructions and potential complications were given.  Specific risks were future dehiscence, bleeding from dehisced edges, cosmetic deformity, hypertrophic scarring (more likely given delayed closure), tissue edema, need for future surgeries.  All of their questions were answered.      PLAN  - Laceration repair as above  - Vaseline to the suture line thin layer twice daily.  Can keep that on vermilion and not mucosa.    - I mentioned to them that I do not anticipate a dehiscence but it is possible.  There was no infection today.    - Provided with silver nitrate sticks in the event there is break down of the wound and active bleeding.   - I gave them my card with contact information in the event that there are any questions  - Follow up in 2 weeks    Plastic & Reconstructive Surgery  Microsurgery  [facility]  c/o Cecil Walters M.D.  [facility]  Second Floor [address]  [address] [place] [address]    Work [phone]  Toll free [phone]  If no answer [phone]

## 2018-11-13 NOTE — LETTER
November 13, 2018      [place] [address] [facility] [address]           [facility]  [address]  [facility] [address]  Phone: [phone]  Fax: [phone]          Patient: Shelly Vásquez   MR Number: [id number]   YOB: 1940   Date of Visit: 11/13/2018       Dear [facility]:    Thank you for referring Shelly Vásquez to me for evaluation. Attached you will find relevant portions of my assessment and plan of care.    If you have questions, please do not hesitate to call me. I look forward to following Shelly Vásquez along with you.    Sincerely,    Cecil Walters MD    Enclosure  CC:  No Recipients    If you would like to receive this communication electronically, please contact [email or web address] or [phone] to request more information on [employer] access.    For providers and/or their staff who would like to refer a patient to [facility], please contact us through our one-stop-shop provider referral line, Ava Ricardo, at [phone].    If you feel you have received this communication in error or would no longer like to receive these types of communications, please e-mail [email or web address]

## 2018-11-13 NOTE — TELEPHONE ENCOUNTER
I spoke with patient to see if she wanted to reschedule her appt she stated she was going to see someone else I told her to call us back if she changed her mind.

## 2018-11-13 NOTE — TELEPHONE ENCOUNTER
Desire was able to get patient scheduled for 11 AM , and I called patient to confirm her appt she said she'd be here for 11 am.    ----- Message from Cecil Walters MD sent at 11/13/2018  5:39 AM CST -----  Yariel jeffniles    Please add this patient to today's 11/13 schedule for 11.  Thanks!    ----- Message -----  From: Leon Spring MD  Sent: 11/12/2018   5:07 PM  To: MD Roberto Romero  This is the pt with the lip laceration I called you about this evening. Thanks for agreeing to see her TUesday at 11am  Jordi Spring MD  Cell [phone]

## 2018-11-18 DIAGNOSIS — Z79.890 POSTMENOPAUSAL HORMONE REPLACEMENT THERAPY: ICD-10-CM

## 2018-12-04 ENCOUNTER — PATIENT MESSAGE (OUTPATIENT)
Dept: OBSTETRICS AND GYNECOLOGY | Facility: CLINIC | Age: 78
End: 2018-12-04

## 2018-12-04 DIAGNOSIS — Z79.890 POSTMENOPAUSAL HORMONE REPLACEMENT THERAPY: ICD-10-CM

## 2018-12-04 RX ORDER — ESTRADIOL AND NORETHINDRONE ACETATE .5; .1 MG/1; MG/1
1 TABLET ORAL DAILY
Qty: 28 TABLET | Refills: 0 | Status: SHIPPED | OUTPATIENT
Start: 2018-12-04 | End: 2018-12-10 | Stop reason: SDUPTHER

## 2018-12-06 ENCOUNTER — PATIENT MESSAGE (OUTPATIENT)
Dept: OBSTETRICS AND GYNECOLOGY | Facility: CLINIC | Age: 78
End: 2018-12-06

## 2018-12-10 ENCOUNTER — PATIENT MESSAGE (OUTPATIENT)
Dept: OBSTETRICS AND GYNECOLOGY | Facility: CLINIC | Age: 78
End: 2018-12-10

## 2018-12-10 DIAGNOSIS — Z79.890 POSTMENOPAUSAL HORMONE REPLACEMENT THERAPY: ICD-10-CM

## 2018-12-10 RX ORDER — ESTRADIOL AND NORETHINDRONE ACETATE .5; .1 MG/1; MG/1
1 TABLET ORAL DAILY
Qty: 84 TABLET | Refills: 3 | Status: SHIPPED | OUTPATIENT
Start: 2018-12-10 | End: 2019-10-15 | Stop reason: SDUPTHER

## 2018-12-21 ENCOUNTER — CLINICAL SUPPORT (OUTPATIENT)
Dept: CARDIOLOGY | Facility: HOSPITAL | Age: 78
End: 2018-12-21
Attending: INTERNAL MEDICINE
Payer: MEDICARE

## 2018-12-21 DIAGNOSIS — Z95.818 STATUS POST PLACEMENT OF IMPLANTABLE LOOP RECORDER: ICD-10-CM

## 2018-12-21 DIAGNOSIS — Z95.818 STATUS POST PLACEMENT OF IMPLANTABLE LOOP RECORDER: Primary | ICD-10-CM

## 2018-12-23 PROCEDURE — 93298 REM INTERROG DEV EVAL SCRMS: CPT | Mod: ,,, | Performed by: INTERNAL MEDICINE

## 2018-12-27 DIAGNOSIS — Z95.818 STATUS POST PLACEMENT OF IMPLANTABLE LOOP RECORDER: Primary | ICD-10-CM

## 2018-12-28 ENCOUNTER — CLINICAL SUPPORT (OUTPATIENT)
Dept: CARDIOLOGY | Facility: HOSPITAL | Age: 78
End: 2018-12-28
Attending: INTERNAL MEDICINE
Payer: MEDICARE

## 2018-12-28 DIAGNOSIS — Z95.818 STATUS POST PLACEMENT OF IMPLANTABLE LOOP RECORDER: ICD-10-CM

## 2019-01-04 ENCOUNTER — CLINICAL SUPPORT (OUTPATIENT)
Dept: CARDIOLOGY | Facility: HOSPITAL | Age: 79
End: 2019-01-04
Attending: INTERNAL MEDICINE
Payer: MEDICARE

## 2019-01-04 DIAGNOSIS — Z46.9 FITTING AND ADJUSTMENT OF DEVICE: ICD-10-CM

## 2019-01-15 DIAGNOSIS — I47.19 ATRIAL TACHYCARDIA: ICD-10-CM

## 2019-01-15 RX ORDER — TEMAZEPAM 15 MG/1
15 CAPSULE ORAL NIGHTLY PRN
Qty: 90 CAPSULE | Refills: 1 | Status: SHIPPED | OUTPATIENT
Start: 2019-01-15 | End: 2019-04-08 | Stop reason: SDUPTHER

## 2019-01-15 RX ORDER — VERAPAMIL HYDROCHLORIDE 120 MG/1
120 CAPSULE, EXTENDED RELEASE ORAL DAILY
Qty: 90 CAPSULE | Refills: 3 | Status: SHIPPED | OUTPATIENT
Start: 2019-01-15 | End: 2019-10-08

## 2019-01-21 DIAGNOSIS — Z46.9 FITTING AND ADJUSTMENT OF DEVICE: Primary | ICD-10-CM

## 2019-02-11 ENCOUNTER — CLINICAL SUPPORT (OUTPATIENT)
Dept: CARDIOLOGY | Facility: HOSPITAL | Age: 79
End: 2019-02-11
Attending: INTERNAL MEDICINE
Payer: MEDICARE

## 2019-02-11 DIAGNOSIS — Z46.9 FITTING AND ADJUSTMENT OF DEVICE: ICD-10-CM

## 2019-02-11 DIAGNOSIS — Z46.9 FITTING AND ADJUSTMENT OF DEVICE: Primary | ICD-10-CM

## 2019-03-06 ENCOUNTER — CLINICAL SUPPORT (OUTPATIENT)
Dept: CARDIOLOGY | Facility: HOSPITAL | Age: 79
End: 2019-03-06
Attending: INTERNAL MEDICINE
Payer: MEDICARE

## 2019-03-06 DIAGNOSIS — Z46.9 FITTING AND ADJUSTMENT OF DEVICE: ICD-10-CM

## 2019-03-06 PROCEDURE — 93299 CARDIAC DEVICE CHECK - REMOTE: CPT

## 2019-03-26 ENCOUNTER — TELEPHONE (OUTPATIENT)
Dept: OPTOMETRY | Facility: CLINIC | Age: 79
End: 2019-03-26

## 2019-03-26 NOTE — TELEPHONE ENCOUNTER
----- Message from Elver Hwanger sent at 3/26/2019  9:52 AM CDT -----  Hello,    This patient rx is , but she would like to order lenses.  Can she reorder or do she need to be seen first ?    -Rossi

## 2019-03-26 NOTE — TELEPHONE ENCOUNTER
PATIENT NEEDS ANNUAL EYE EXAM AND CONTACT FIT! Please schedule if patient calls back.  No extended contact lens

## 2019-04-02 ENCOUNTER — PATIENT MESSAGE (OUTPATIENT)
Dept: ELECTROPHYSIOLOGY | Facility: CLINIC | Age: 79
End: 2019-04-02

## 2019-04-04 ENCOUNTER — CLINICAL SUPPORT (OUTPATIENT)
Dept: CARDIOLOGY | Facility: HOSPITAL | Age: 79
End: 2019-04-04
Attending: INTERNAL MEDICINE
Payer: MEDICARE

## 2019-04-04 DIAGNOSIS — Z46.9 FITTING AND ADJUSTMENT OF DEVICE: ICD-10-CM

## 2019-04-04 PROCEDURE — 93299 CARDIAC DEVICE CHECK - REMOTE: CPT

## 2019-04-08 RX ORDER — TEMAZEPAM 15 MG/1
15 CAPSULE ORAL NIGHTLY PRN
Qty: 90 CAPSULE | Refills: 1 | Status: SHIPPED | OUTPATIENT
Start: 2019-04-08 | End: 2019-04-09 | Stop reason: SDUPTHER

## 2019-04-09 ENCOUNTER — PATIENT MESSAGE (OUTPATIENT)
Dept: INTERNAL MEDICINE | Facility: CLINIC | Age: 79
End: 2019-04-09

## 2019-04-09 RX ORDER — TEMAZEPAM 15 MG/1
15 CAPSULE ORAL NIGHTLY PRN
Qty: 90 CAPSULE | Refills: 1 | Status: SHIPPED | OUTPATIENT
Start: 2019-04-09 | End: 2019-07-08 | Stop reason: SDUPTHER

## 2019-04-15 ENCOUNTER — PATIENT MESSAGE (OUTPATIENT)
Dept: INTERNAL MEDICINE | Facility: CLINIC | Age: 79
End: 2019-04-15

## 2019-04-15 ENCOUNTER — TELEPHONE (OUTPATIENT)
Dept: INTERNAL MEDICINE | Facility: CLINIC | Age: 79
End: 2019-04-15

## 2019-04-15 RX ORDER — AMOXICILLIN 875 MG/1
875 TABLET, FILM COATED ORAL 2 TIMES DAILY
Qty: 14 TABLET | Refills: 0 | Status: SHIPPED | OUTPATIENT
Start: 2019-04-15 | End: 2019-04-24

## 2019-04-24 ENCOUNTER — OFFICE VISIT (OUTPATIENT)
Dept: OPTOMETRY | Facility: CLINIC | Age: 79
End: 2019-04-24
Payer: MEDICARE

## 2019-04-24 ENCOUNTER — HOSPITAL ENCOUNTER (OUTPATIENT)
Dept: CARDIOLOGY | Facility: CLINIC | Age: 79
Discharge: HOME | End: 2019-04-24
Payer: MEDICARE

## 2019-04-24 ENCOUNTER — HOSPITAL ENCOUNTER (OUTPATIENT)
Dept: CARDIOLOGY | Facility: CLINIC | Age: 79
Discharge: HOME | End: 2019-04-24
Attending: INTERNAL MEDICINE
Payer: MEDICARE

## 2019-04-24 ENCOUNTER — OFFICE VISIT (OUTPATIENT)
Dept: ELECTROPHYSIOLOGY | Facility: CLINIC | Age: 79
End: 2019-04-24
Payer: MEDICARE

## 2019-04-24 VITALS
SYSTOLIC BLOOD PRESSURE: 110 MMHG | DIASTOLIC BLOOD PRESSURE: 70 MMHG | HEIGHT: 68 IN | WEIGHT: 150 LBS | BODY MASS INDEX: 22.73 KG/M2 | HEART RATE: 60 BPM

## 2019-04-24 VITALS
SYSTOLIC BLOOD PRESSURE: 122 MMHG | DIASTOLIC BLOOD PRESSURE: 53 MMHG | WEIGHT: 157.44 LBS | HEART RATE: 57 BPM | HEIGHT: 68 IN | BODY MASS INDEX: 23.86 KG/M2

## 2019-04-24 DIAGNOSIS — R55 SYNCOPE, UNSPECIFIED SYNCOPE TYPE: ICD-10-CM

## 2019-04-24 DIAGNOSIS — H25.13 NUCLEAR SCLEROSIS, BILATERAL: Primary | ICD-10-CM

## 2019-04-24 DIAGNOSIS — I47.10 PSVT (PAROXYSMAL SUPRAVENTRICULAR TACHYCARDIA): ICD-10-CM

## 2019-04-24 DIAGNOSIS — M43.16 SPONDYLOLISTHESIS AT L4-L5 LEVEL: ICD-10-CM

## 2019-04-24 DIAGNOSIS — I47.10 PSVT (PAROXYSMAL SUPRAVENTRICULAR TACHYCARDIA): Primary | ICD-10-CM

## 2019-04-24 DIAGNOSIS — Z46.0 FITTING AND ADJUSTMENT OF SPECTACLES AND CONTACT LENSES: Primary | ICD-10-CM

## 2019-04-24 DIAGNOSIS — H02.88B MEIBOMIAN GLAND DYSFUNCTION (MGD), BILATERAL, BOTH UPPER AND LOWER LIDS: ICD-10-CM

## 2019-04-24 DIAGNOSIS — Z98.890 HISTORY OF RADIOFREQUENCY ABLATION (RFA) PROCEDURE FOR CARDIAC ARRHYTHMIA: ICD-10-CM

## 2019-04-24 DIAGNOSIS — H52.223 HYPEROPIA OF BOTH EYES WITH REGULAR ASTIGMATISM AND PRESBYOPIA: ICD-10-CM

## 2019-04-24 DIAGNOSIS — Z95.818 STATUS POST PLACEMENT OF IMPLANTABLE LOOP RECORDER: ICD-10-CM

## 2019-04-24 DIAGNOSIS — H52.4 HYPEROPIA OF BOTH EYES WITH REGULAR ASTIGMATISM AND PRESBYOPIA: ICD-10-CM

## 2019-04-24 DIAGNOSIS — H02.88A MEIBOMIAN GLAND DYSFUNCTION (MGD), BILATERAL, BOTH UPPER AND LOWER LIDS: ICD-10-CM

## 2019-04-24 DIAGNOSIS — H52.03 HYPEROPIA OF BOTH EYES WITH REGULAR ASTIGMATISM AND PRESBYOPIA: ICD-10-CM

## 2019-04-24 DIAGNOSIS — I48.0 PAF (PAROXYSMAL ATRIAL FIBRILLATION): ICD-10-CM

## 2019-04-24 LAB
ASCENDING AORTA: 3.01 CM
AV INDEX (PROSTH): 0.64
AV MEAN GRADIENT: 5.8 MMHG
AV PEAK GRADIENT: 13.69 MMHG
AV REGURGITATION PRESSURE HALF TIME: 530 MS
AV VALVE AREA: 2.26 CM2
AV VELOCITY RATIO: 0.7
BSA FOR ECHO PROCEDURE: 1.81 M2
CV ECHO LV RWT: 0.32 CM
DOP CALC AO PEAK VEL: 1.85 M/S
DOP CALC AO VTI: 37.58 CM
DOP CALC LVOT AREA: 3.53 CM2
DOP CALC LVOT DIAMETER: 2.12 CM
DOP CALC LVOT PEAK VEL: 1.29 M/S
DOP CALC LVOT STROKE VOLUME: 84.99 CM3
DOP CALCLVOT PEAK VEL VTI: 24.09 CM
E WAVE DECELERATION TIME: 228.72 MSEC
E/A RATIO: 1.06
E/E' RATIO: 10.75
ECHO LV POSTERIOR WALL: 0.76 CM (ref 0.6–1.1)
FRACTIONAL SHORTENING: 30 % (ref 28–44)
INTERVENTRICULAR SEPTUM: 0.89 CM (ref 0.6–1.1)
LA MAJOR: 5.67 CM
LA MINOR: 5.83 CM
LA WIDTH: 3.71 CM
LEFT ATRIUM SIZE: 3.86 CM
LEFT ATRIUM VOLUME INDEX: 38.7 ML/M2
LEFT ATRIUM VOLUME: 69.98 CM3
LEFT INTERNAL DIMENSION IN SYSTOLE: 3.27 CM (ref 2.1–4)
LEFT VENTRICLE DIASTOLIC VOLUME INDEX: 56.65 ML/M2
LEFT VENTRICLE DIASTOLIC VOLUME: 102.46 ML
LEFT VENTRICLE MASS INDEX: 70.4 G/M2
LEFT VENTRICLE SYSTOLIC VOLUME INDEX: 23.9 ML/M2
LEFT VENTRICLE SYSTOLIC VOLUME: 43.26 ML
LEFT VENTRICULAR INTERNAL DIMENSION IN DIASTOLE: 4.7 CM (ref 3.5–6)
LEFT VENTRICULAR MASS: 127.25 G
LV LATERAL E/E' RATIO: 9.56
LV SEPTAL E/E' RATIO: 12.29
MV PEAK A VEL: 0.81 M/S
MV PEAK E VEL: 0.86 M/S
PISA TR MAX VEL: 2.25 M/S
PULM VEIN S/D RATIO: 1.82
PV PEAK D VEL: 0.49 M/S
PV PEAK S VEL: 0.89 M/S
RA MAJOR: 5.16 CM
RA PRESSURE: 3 MMHG
RA WIDTH: 3.29 CM
RIGHT VENTRICULAR END-DIASTOLIC DIMENSION: 3.42 CM
SINUS: 3.04 CM
STJ: 2.97 CM
TDI LATERAL: 0.09
TDI SEPTAL: 0.07
TDI: 0.08
TR MAX PG: 20.25 MMHG
TRICUSPID ANNULAR PLANE SYSTOLIC EXCURSION: 2.2 CM
TV REST PULMONARY ARTERY PRESSURE: 23 MMHG

## 2019-04-24 PROCEDURE — 92310 PR CONTACT LENS FITTING (NO CHANGE): ICD-10-PCS | Mod: ,,, | Performed by: OPTOMETRIST

## 2019-04-24 PROCEDURE — 93306 TRANSTHORACIC ECHO (TTE) COMPLETE: ICD-10-PCS | Mod: 26,S$PBB,, | Performed by: INTERNAL MEDICINE

## 2019-04-24 PROCEDURE — 92310 CONTACT LENS FITTING OU: CPT | Mod: ,,, | Performed by: OPTOMETRIST

## 2019-04-24 PROCEDURE — 93306 TTE W/DOPPLER COMPLETE: CPT | Mod: PBBFAC | Performed by: INTERNAL MEDICINE

## 2019-04-24 PROCEDURE — 99212 OFFICE O/P EST SF 10 MIN: CPT | Mod: PBBFAC,25,27 | Performed by: OPTOMETRIST

## 2019-04-24 PROCEDURE — 99213 OFFICE O/P EST LOW 20 MIN: CPT | Mod: PBBFAC,25 | Performed by: INTERNAL MEDICINE

## 2019-04-24 PROCEDURE — 99999 PR PBB SHADOW E&M-EST. PATIENT-LVL II: ICD-10-PCS | Mod: PBBFAC,,, | Performed by: OPTOMETRIST

## 2019-04-24 PROCEDURE — 93010 ELECTROCARDIOGRAM REPORT: CPT | Mod: S$PBB,,, | Performed by: INTERNAL MEDICINE

## 2019-04-24 PROCEDURE — 92015 DETERMINE REFRACTIVE STATE: CPT | Mod: ,,, | Performed by: OPTOMETRIST

## 2019-04-24 PROCEDURE — 99214 OFFICE O/P EST MOD 30 MIN: CPT | Mod: S$PBB,,, | Performed by: INTERNAL MEDICINE

## 2019-04-24 PROCEDURE — 99999 PR PBB SHADOW E&M-EST. PATIENT-LVL II: CPT | Mod: PBBFAC,,, | Performed by: OPTOMETRIST

## 2019-04-24 PROCEDURE — 99214 PR OFFICE/OUTPT VISIT, EST, LEVL IV, 30-39 MIN: ICD-10-PCS | Mod: S$PBB,,, | Performed by: INTERNAL MEDICINE

## 2019-04-24 PROCEDURE — 92014 PR EYE EXAM, EST PATIENT,COMPREHESV: ICD-10-PCS | Mod: S$PBB,,, | Performed by: OPTOMETRIST

## 2019-04-24 PROCEDURE — 93010 RHYTHM STRIP: ICD-10-PCS | Mod: S$PBB,,, | Performed by: INTERNAL MEDICINE

## 2019-04-24 PROCEDURE — 99999 PR PBB SHADOW E&M-EST. PATIENT-LVL III: ICD-10-PCS | Mod: PBBFAC,,, | Performed by: INTERNAL MEDICINE

## 2019-04-24 PROCEDURE — 99999 PR PBB SHADOW E&M-EST. PATIENT-LVL III: CPT | Mod: PBBFAC,,, | Performed by: INTERNAL MEDICINE

## 2019-04-24 PROCEDURE — 92014 COMPRE OPH EXAM EST PT 1/>: CPT | Mod: S$PBB,,, | Performed by: OPTOMETRIST

## 2019-04-24 PROCEDURE — 93005 ELECTROCARDIOGRAM TRACING: CPT | Mod: PBBFAC | Performed by: INTERNAL MEDICINE

## 2019-04-24 PROCEDURE — 92015 PR REFRACTION: ICD-10-PCS | Mod: ,,, | Performed by: OPTOMETRIST

## 2019-04-24 RX ORDER — ASPIRIN 81 MG/1
81 TABLET ORAL DAILY
Qty: 30 TABLET | Refills: 0 | Status: ON HOLD
Start: 2019-04-24 | End: 2023-04-26 | Stop reason: SDUPTHER

## 2019-04-24 NOTE — PATIENT INSTRUCTIONS
Blepharitis is inflammation of the eyelids caused by increased bacteria on the eyelid margins and eyelashes.  It can contribute to Dry Eyes and cause burning especially in the morning. I recommend you use SteriLid Foam, Ocussoft HypoChlor or OCusoft Plus foam every night prior to bedtime to help control this condition.        [email or web address]

## 2019-04-24 NOTE — PROGRESS NOTES
Ms. Vásquez is a patient of Dr. Brady and was last seen in clinic 5/25/2018.      Subjective:   Patient ID:  Shelly Vásquez is a 78 y.o. female who presents for follow-up of Atrial Fibrillation  .     HPI:    Ms. Vásquez is a 78 y.o. 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  PAF, on eliquis    Was walking dog near her summer home in [place]. Developed sudden palpitations, chest discomfort. Legs hurt and tingled.  bpm by her (and her 's) report.  At her initial office visit (07/03/17), she reported that she had been feeling fine, but that she was anxious about the situation. At the time, she reported having good exertion tolerance; no syncope. An Echo 1/16: 60% LVEF.  ILR implanted 7/6/2017.   >30 mins of AF detected on ILR; eliquis started.  ILR had also detected a REGULAR narrow complex tachycardia, 320-360 ms, ending with a P wave and without a post-tachycardia pause.  In response to that SVT, RFA of SVT; inducible AVNRT. Successfully ablated 3/2018.  Since, ILR shows no arrhythmia at all.    Update (04/24/2019):    Today she reports that she has not had any palpitations since her ablation 3/2018. She is very active and exercises regularly. Ms. Vásquez denies chest pain with exertion or at rest, SOB, DALE, dizziness, or syncope.    She is currently taking eliquis 5mg BID for stroke prophylaxis and denies significant bleeding episodes. She is currently being treated with verapamil 120mg daily for HR control.  Kidney function is stable, with a creatinine of 0.9 on 5/22/2018.    Loop reports have shown no arrhythmia since her procedure.    I have personally reviewed the patient's EKG today, which shows sinus bradycardia at 57bpm. FL interval is 160. QT is 420.    Recent Cardiac Tests:    2D Echo (4/24/2019):  · Mild left atrial enlargement.  · Normal left  ventricular systolic function. The estimated ejection fraction is 60%  · Indeterminate left ventricular diastolic function.  · Normal right ventricular systolic function.  · Mild-to-moderate mitral regurgitation.  · Mild tricuspid regurgitation.  · Normal central venous pressure (3 mm Hg).  · The estimated PA systolic pressure is 23 mm Hg    Current Outpatient Medications   Medication Sig    acetaminophen (TYLENOL) 325 MG tablet Take 325 mg by mouth continuous prn for Pain.    ELIQUIS 5 mg Tab TAKE 1 TABLET BY MOUTH TWICE DAILY    estradiol-norethindrone acet (LOPREEZA) 0.5-0.1 mg per tablet Take 1 tablet by mouth once daily.    NON FORMULARY MEDICATION 1 tablet every evening. [employer] sleep aid - doxylamine 25 mg    temazepam (RESTORIL) 15 mg Cap Take 1 capsule (15 mg total) by mouth nightly as needed.    verapamil (VERELAN) 120 MG C24P Take 1 capsule (120 mg total) by mouth once daily.    acyclovir (ZOVIRAX) 400 MG tablet Take 1 tablet (400 mg total) by mouth 3 (three) times daily. For 5 days    amoxicillin (AMOXIL) 875 MG tablet Take 1 tablet (875 mg total) by mouth 2 (two) times daily.    cyanocobalamin (VITAMIN B-12) 100 MCG tablet Take 100 mcg by mouth daily as needed.    LOPREEZA 0.5-0.1 mg per tablet TAKE 1 TABLET BY MOUTH EVERY DAY     No current facility-administered medications for this visit.      Review of Systems   Constitution: Negative for malaise/fatigue.   Cardiovascular: Negative for chest pain, dyspnea on exertion, irregular heartbeat, leg swelling and palpitations.   Respiratory: Negative for shortness of breath.    Hematologic/Lymphatic: Negative for bleeding problem.   Skin: Negative for rash.   Musculoskeletal: Negative for myalgias.   Gastrointestinal: Negative for hematemesis, hematochezia and nausea.   Genitourinary: Negative for hematuria.   Neurological: Negative for light-headedness.   Psychiatric/Behavioral: Negative for altered mental status.   Allergic/Immunologic: Negative  "for persistent infections.     Objective:        BP (!) 122/53   Pulse (!) 57   Ht 5' 8" (1.727 m)   Wt 71.4 kg (157 lb 6.5 oz)   LMP  (LMP Unknown)   BMI 23.93 kg/m²     Physical Exam   Constitutional: She is oriented to person, place, and time. She appears well-developed and well-nourished.   HENT:   Head: Normocephalic.   Nose: Nose normal.   Eyes: Pupils are equal, round, and reactive to light.   Cardiovascular: Normal rate, regular rhythm, S1 normal and S2 normal.   No murmur heard.  Pulses:       Radial pulses are 2+ on the right side, and 2+ on the left side.   Pulmonary/Chest: Breath sounds normal. No respiratory distress.   Abdominal: Normal appearance.   Musculoskeletal: Normal range of motion. She exhibits no edema.   Neurological: She is alert and oriented to person, place, and time.   Skin: Skin is warm and dry. No erythema.   Psychiatric: She has a normal mood and affect. Her speech is normal and behavior is normal.   Nursing note and vitals reviewed.    Lab Results   Component Value Date     05/22/2018    K 4.2 05/22/2018    MG 2.0 01/03/2016    BUN 19 05/22/2018    CREATININE 0.9 05/22/2018    ALT 8 (L) 05/22/2018    AST 16 05/22/2018    AST 14 12/31/2015    HGB 10.3 (L) 05/22/2018    HCT 31.1 (L) 05/22/2018    TSH 1.591 07/03/2017    LDLCALC 87.0 05/22/2018       Recent Labs   Lab 02/15/17  1005 03/20/18  1005   INR 1.0 1.1       Assessment:     1. PSVT (paroxysmal supraventricular tachycardia)    2. History of radiofrequency ablation (RFA) procedure for cardiac arrhythmia    3. Status post placement of implantable loop recorder      Plan:     In summary, Ms. Vásquez is a 78 y.o. 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   She is doing well from a rhythm perspective, with no documented arrhythmia since her AVNRT ablation 3/2018. She is requesting to stop her anticoagulant. [id number] Score is 3 (AGE, SEX). Loop reports reviewed. She does appear to " have an episode of true AF 11/7/2017 lasting 37 minutes. However, she has not had any AF since this period. Discussed case with Dr. Brady. OK to stop eliquis, but when her ILR reaches RRT, will recommend replacement. Echo today shows normal EF.     Stop eliquis. Start ASA 81mg daily.  Continue routine device checks.  RTC in one year with echo, sooner if needed.    *A copy of this note has been sent to Dr. Brady, who also counseled the patient.*    Follow up in about 1 year (around 4/24/2020).    ------------------------------------------------------------------    YOUSIF Saab, NP-C  Cardiac Electrophysiology

## 2019-04-24 NOTE — PROGRESS NOTES
HPI     DFE / CL fitting  DLS- 01/19/17 Dr. Esquivel     Pt doing well no complaints at this time.   Doing very well with contact lens for reading and happy with vision   Use Channel make-up remover     (-)Flashes (-)Floaters  (-)Itch, (-)tear, (-)burn, (-)Dryness.  (-) OTC Drops  (-)Photophobia  (-)Glare        Left Acuvue Oasys 1 day 8.5 14.3 +3.00 Sphere                        Last edited by Juan R Esquivel, OD on 4/24/2019  3:13 PM. (History)            Assessment /Plan     For exam results, see Encounter Report.    Nuclear sclerosis, bilateral  -Educated patient on presence of cataracts at today's exam, monitor at annual dilated fundus exam. 5+ years surgical estimate.    Meibomian gland dysfunction (MGD), bilateral, both upper and lower lids  -Nightly lid scrubs using Sterilid foam or Occusoft foam. Systane balance as needed.    Hyperopia of both eyes with regular astigmatism and presbyopia  Contact Lens Final Rx     Final Contact Lens Rx       Brand Base Curve Diameter Sphere Cylinder    Right - - -      Left Acuvue Oasys 1 day 8.5 14.3 +3.00 Sphere    Expiration Date:  4/24/2020    Replacement:  Daily    Wearing Schedule:  Daily wear              Declines sRx    RTC 1 yr

## 2019-05-06 ENCOUNTER — CLINICAL SUPPORT (OUTPATIENT)
Dept: CARDIOLOGY | Facility: HOSPITAL | Age: 79
End: 2019-05-06
Attending: INTERNAL MEDICINE
Payer: MEDICARE

## 2019-05-06 DIAGNOSIS — Z46.9 FITTING AND ADJUSTMENT OF DEVICE: ICD-10-CM

## 2019-05-06 PROCEDURE — 93299 CARDIAC DEVICE CHECK - REMOTE: CPT

## 2019-05-21 ENCOUNTER — PATIENT MESSAGE (OUTPATIENT)
Dept: OBSTETRICS AND GYNECOLOGY | Facility: CLINIC | Age: 79
End: 2019-05-21

## 2019-06-06 ENCOUNTER — CLINICAL SUPPORT (OUTPATIENT)
Dept: CARDIOLOGY | Facility: HOSPITAL | Age: 79
End: 2019-06-06
Attending: INTERNAL MEDICINE
Payer: MEDICARE

## 2019-06-06 DIAGNOSIS — Z46.9 FITTING AND ADJUSTMENT OF DEVICE: ICD-10-CM

## 2019-06-06 PROCEDURE — 93299 CARDIAC DEVICE CHECK - REMOTE: CPT

## 2019-06-12 ENCOUNTER — PATIENT MESSAGE (OUTPATIENT)
Dept: INTERNAL MEDICINE | Facility: CLINIC | Age: 79
End: 2019-06-12

## 2019-07-01 ENCOUNTER — HOSPITAL ENCOUNTER (OUTPATIENT)
Dept: RADIOLOGY | Facility: HOSPITAL | Age: 79
Discharge: HOME | End: 2019-07-01
Attending: OBSTETRICS & GYNECOLOGY
Payer: MEDICARE

## 2019-07-01 VITALS — WEIGHT: 157 LBS | BODY MASS INDEX: 23.79 KG/M2 | HEIGHT: 68 IN

## 2019-07-01 DIAGNOSIS — Z12.39 ENCOUNTER FOR SPECIAL SCREENING EXAMINATION FOR NEOPLASM OF BREAST: ICD-10-CM

## 2019-07-01 PROCEDURE — 77063 MAMMO DIGITAL SCREENING BILAT WITH TOMOSYNTHESIS_CAD: ICD-10-PCS | Mod: 26,,, | Performed by: RADIOLOGY

## 2019-07-01 PROCEDURE — 77063 BREAST TOMOSYNTHESIS BI: CPT | Mod: 26,,, | Performed by: RADIOLOGY

## 2019-07-01 PROCEDURE — 77067 SCR MAMMO BI INCL CAD: CPT | Mod: 26,,, | Performed by: RADIOLOGY

## 2019-07-01 PROCEDURE — 77067 SCR MAMMO BI INCL CAD: CPT | Mod: TC,PN

## 2019-07-01 PROCEDURE — 77067 MAMMO DIGITAL SCREENING BILAT WITH TOMOSYNTHESIS_CAD: ICD-10-PCS | Mod: 26,,, | Performed by: RADIOLOGY

## 2019-07-08 ENCOUNTER — PATIENT MESSAGE (OUTPATIENT)
Dept: INTERNAL MEDICINE | Facility: CLINIC | Age: 79
End: 2019-07-08

## 2019-07-08 ENCOUNTER — CLINICAL SUPPORT (OUTPATIENT)
Dept: CARDIOLOGY | Facility: HOSPITAL | Age: 79
End: 2019-07-08
Attending: INTERNAL MEDICINE
Payer: MEDICARE

## 2019-07-08 DIAGNOSIS — Z46.9 FITTING AND ADJUSTMENT OF DEVICE: ICD-10-CM

## 2019-07-08 DIAGNOSIS — M79.643 PAIN OF HAND, UNSPECIFIED LATERALITY: Primary | ICD-10-CM

## 2019-07-08 PROCEDURE — 93299 CARDIAC DEVICE CHECK - REMOTE: CPT

## 2019-07-08 RX ORDER — TEMAZEPAM 15 MG/1
15 CAPSULE ORAL NIGHTLY PRN
Qty: 90 CAPSULE | Refills: 1 | Status: SHIPPED | OUTPATIENT
Start: 2019-07-08 | End: 2019-10-14 | Stop reason: SDUPTHER

## 2019-07-22 ENCOUNTER — PES CALL (OUTPATIENT)
Dept: ADMINISTRATIVE | Facility: CLINIC | Age: 79
End: 2019-07-22

## 2019-07-30 ENCOUNTER — TELEPHONE (OUTPATIENT)
Dept: INFECTIOUS DISEASES | Facility: CLINIC | Age: 79
End: 2019-07-30

## 2019-07-30 DIAGNOSIS — M77.8 HAND TENDONITIS: Primary | ICD-10-CM

## 2019-07-31 ENCOUNTER — EXTERNAL CHRONIC CARE MANAGEMENT (OUTPATIENT)
Dept: PRIMARY CARE CLINIC | Facility: CLINIC | Age: 79
End: 2019-07-31
Payer: MEDICARE

## 2019-07-31 PROCEDURE — 99490 CHRNC CARE MGMT STAFF 1ST 20: CPT | Mod: S$PBB,,, | Performed by: INTERNAL MEDICINE

## 2019-07-31 PROCEDURE — 99490 CHRNC CARE MGMT STAFF 1ST 20: CPT | Mod: PBBFAC | Performed by: INTERNAL MEDICINE

## 2019-07-31 PROCEDURE — 99490 PR CHRONIC CARE MGMT, 1ST 20 MIN: ICD-10-PCS | Mod: S$PBB,,, | Performed by: INTERNAL MEDICINE

## 2019-08-15 ENCOUNTER — TELEPHONE (OUTPATIENT)
Dept: ORTHOPEDICS | Facility: CLINIC | Age: 79
End: 2019-08-15

## 2019-08-15 DIAGNOSIS — M79.641 RIGHT HAND PAIN: Primary | ICD-10-CM

## 2019-08-15 NOTE — TELEPHONE ENCOUNTER
Called patient in regard to phone message. She verbalized understanding of the XR information and confirmed that she will come 1 hour prior to the appointment on 8/20/19.

## 2019-08-19 ENCOUNTER — PATIENT OUTREACH (OUTPATIENT)
Dept: ADMINISTRATIVE | Facility: OTHER | Age: 79
End: 2019-08-19

## 2019-08-20 ENCOUNTER — HOSPITAL ENCOUNTER (OUTPATIENT)
Dept: RADIOLOGY | Facility: OTHER | Age: 79
Discharge: HOME | End: 2019-08-20
Attending: ORTHOPAEDIC SURGERY
Payer: MEDICARE

## 2019-08-20 ENCOUNTER — OFFICE VISIT (OUTPATIENT)
Dept: ORTHOPEDICS | Facility: CLINIC | Age: 79
End: 2019-08-20
Payer: MEDICARE

## 2019-08-20 VITALS
HEIGHT: 68 IN | DIASTOLIC BLOOD PRESSURE: 62 MMHG | BODY MASS INDEX: 23.79 KG/M2 | SYSTOLIC BLOOD PRESSURE: 88 MMHG | HEART RATE: 64 BPM | WEIGHT: 157 LBS

## 2019-08-20 DIAGNOSIS — M79.641 RIGHT HAND PAIN: Primary | ICD-10-CM

## 2019-08-20 DIAGNOSIS — M79.641 RIGHT HAND PAIN: ICD-10-CM

## 2019-08-20 DIAGNOSIS — M65.30 TRIGGER FINGER, UNSPECIFIED FINGER, UNSPECIFIED LATERALITY: ICD-10-CM

## 2019-08-20 DIAGNOSIS — M19.049 HAND ARTHRITIS: ICD-10-CM

## 2019-08-20 PROCEDURE — 76942 TENDON SHEATH: R RING MCP: ICD-10-PCS | Mod: 26,S$PBB,, | Performed by: ORTHOPAEDIC SURGERY

## 2019-08-20 PROCEDURE — 20550 TENDON SHEATH: R RING MCP: ICD-10-PCS | Mod: S$PBB,F8,, | Performed by: ORTHOPAEDIC SURGERY

## 2019-08-20 PROCEDURE — 73130 XR HAND COMPLETE 3 VIEW RIGHT: ICD-10-PCS | Mod: 26,RT,, | Performed by: RADIOLOGY

## 2019-08-20 PROCEDURE — 99999 PR PBB SHADOW E&M-EST. PATIENT-LVL III: CPT | Mod: PBBFAC,,, | Performed by: ORTHOPAEDIC SURGERY

## 2019-08-20 PROCEDURE — 76942 ECHO GUIDE FOR BIOPSY: CPT | Mod: PBBFAC | Performed by: ORTHOPAEDIC SURGERY

## 2019-08-20 PROCEDURE — 73130 X-RAY EXAM OF HAND: CPT | Mod: 26,RT,, | Performed by: RADIOLOGY

## 2019-08-20 PROCEDURE — 73130 X-RAY EXAM OF HAND: CPT | Mod: TC,FY,RT

## 2019-08-20 PROCEDURE — 99203 PR OFFICE/OUTPT VISIT, NEW, LEVL III, 30-44 MIN: ICD-10-PCS | Mod: S$PBB,25,, | Performed by: ORTHOPAEDIC SURGERY

## 2019-08-20 PROCEDURE — 99203 OFFICE O/P NEW LOW 30 MIN: CPT | Mod: S$PBB,25,, | Performed by: ORTHOPAEDIC SURGERY

## 2019-08-20 PROCEDURE — 20550 NJX 1 TENDON SHEATH/LIGAMENT: CPT | Mod: S$PBB,F8,, | Performed by: ORTHOPAEDIC SURGERY

## 2019-08-20 PROCEDURE — 99999 PR PBB SHADOW E&M-EST. PATIENT-LVL III: ICD-10-PCS | Mod: PBBFAC,,, | Performed by: ORTHOPAEDIC SURGERY

## 2019-08-20 PROCEDURE — 99213 OFFICE O/P EST LOW 20 MIN: CPT | Mod: PBBFAC,25 | Performed by: ORTHOPAEDIC SURGERY

## 2019-08-20 RX ADMIN — DEXAMETHASONE SODIUM PHOSPHATE 4 MG: 4 INJECTION INTRA-ARTICULAR; INTRALESIONAL; INTRAMUSCULAR; INTRAVENOUS; SOFT TISSUE at 11:08

## 2019-08-20 NOTE — PROGRESS NOTES
Subjective:      Patient ID: Shelly Vásquez is a 78 y.o. female.    Chief Complaint: Pain of the Right Hand      HPI  Shelly Vásquez is a 78 y.o. female presenting today for right hand pain. She notes pain and stiffness throughout the right hand. Symptoms are increased in the morning. She denies numbness or tingling.       Review of patient's allergies indicates:   Allergen Reactions    Nsaids (non-steroidal anti-inflammatory drug)      GI Bleed  Had 5 blood transfusions         Current Outpatient Medications   Medication Sig Dispense Refill    acetaminophen (TYLENOL) 325 MG tablet Take 325 mg by mouth continuous prn for Pain.      aspirin (ECOTRIN) 81 MG EC tablet Take 1 tablet (81 mg total) by mouth once daily. 30 tablet 0    estradiol-norethindrone acet (LOPREEZA) 0.5-0.1 mg per tablet Take 1 tablet by mouth once daily. 84 tablet 3    LOPREEZA 0.5-0.1 mg per tablet TAKE 1 TABLET BY MOUTH EVERY DAY 84 tablet 0    NON FORMULARY MEDICATION 1 tablet every evening. [employer] sleep aid - doxylamine 25 mg      temazepam (RESTORIL) 15 mg Cap Take 1 capsule (15 mg total) by mouth nightly as needed. 90 capsule 1    verapamil (VERELAN) 120 MG C24P Take 1 capsule (120 mg total) by mouth once daily. 90 capsule 3    acyclovir (ZOVIRAX) 400 MG tablet Take 1 tablet (400 mg total) by mouth 3 (three) times daily. For 5 days 15 tablet 6     No current facility-administered medications for this visit.        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 CATH LAB    APPENDECTOMY  age 21    BACK SURGERY      Beast Lift  2004    BREAST BIOPSY  50yrs ago    unable to see scar    COLONOSCOPY N/A 4/29/2014    Performed by CHRISTIANA Cisse MD at [facility] ENDO (4TH FLR)    COSMETIC SURGERY      DILATION AND CURETTAGE OF UTERUS  2008    PMB    ENDOMETRIAL BIOPSY      ESOPHAGOGASTRODUODENOSCOPY (EGD) N/A 3/4/2016    Performed by Daren Maldonado MD at [facility] ENDO (4TH FLR)    ESOPHAGOGASTRODUODENOSCOPY (EGD) N/A 1/7/2016    Performed by Daren Maldonado MD at [facility] ENDO (2ND FLR)    ESOPHAGOGASTRODUODENOSCOPY (EGD) N/A 1/1/2016    [facility]    EYE SURGERY      L4-5 LAMINECTOMY-MINIMALLY INVASIVE Right 2/15/2017    Performed by [facility]aka Martin [facility] at [facility] [place] OR    ptsosis      RADIOFREQUENCY ABLATION  03/2018    REPAIR, BLEPHAROPTOSIS Right 10/31/2013    Performed by Morgan Landrum MD at [facility] OR 1ST FLR    REPAIR, BLEPHAROPTOSIS Left 4/11/2013    Performed by Morgan Landrum MD at [facility] OR 1ST FLR    SMALL INTESTINE SURGERY      TONSILLECTOMY, ADENOIDECTOMY  age 8    TOTAL REDUCTION MAMMOPLASTY Bilateral 2003    TUBAL LIGATION         Review of Systems:  Constitutional: Negative for chills and fever.   Respiratory: Negative for cough and shortness of breath.    Gastrointestinal: Negative for nausea and vomiting.   Skin: Negative for rash.   Neurological: Negative for dizziness and headaches.   Psychiatric/Behavioral: Negative for depression.   MSK as in HPI       OBJECTIVE:     PHYSICAL EXAM:  BP (!) 88/62   Pulse 64   Ht 5' 8" (1.727 m)   Wt 71.2 kg (157 lb)   LMP  (LMP Unknown)   BMI 23.87 kg/m²     GEN:  NAD, well-developed, well-groomed.  NEURO: Awake, alert, and oriented. Normal attention and concentration.    PSYCH: Normal mood and affect. Behavior is normal.  HEENT: No cervical lymphadenopathy noted.  CARDIOVASCULAR: Radial pulses 2+ bilaterally. No LE edema noted.  PULMONARY: Breath " sounds normal. No respiratory distress.  SKIN: Intact, no rashes.      MSK:   RUE:  Good active ROM of the wrist and fingers. She has tenderness at the right thumb CMC joint. AIN/PIN/Radial/Median/Ulnar Nerves assessed in isolation without deficit. Radial & Ulnar arteries palpated 2+. Capillary Refill <3s.    RADIOGRAPHS:  Xray right hand 8/20/19  FINDINGS:  Three views: There is ulnar negative variance.  There is mild DJD.  No fracture, dislocation, or bone destruction seen.  Comments: I have personally reviewed the imaging and I agree with the above radiologist's report.    ASSESSMENT/PLAN:       ICD-10-CM ICD-9-CM   1. Right hand pain M79.641 729.5   2. Hand arthritis M19.049 716.94          No orders of the defined types were placed in this encounter.       Plan:           The patient indicates understanding of these issues and agrees to the plan.    Emilia Valle PA-C  Hand [facility]   [facility] [place]

## 2019-08-20 NOTE — LETTER
August 28, 2019      Joana Doty MD  [address]  [facility] [address]           [facility] [place] [address] [facility]  [facility], [address]  [facility] [address]  Phone: [phone]          Patient: Shelly Vásquez   MR Number: [id number]   YOB: 1940   Date of Visit: 8/20/2019       Dear Dr. Joana Doty:    Thank you for referring Shelly Vásquez to me for evaluation. Attached you will find relevant portions of my assessment and plan of care.    If you have questions, please do not hesitate to call me. I look forward to following Shelly Vásquez along with you.    Sincerely,    Ava Lind MD    Enclosure  CC:  No Recipients    If you would like to receive this communication electronically, please contact [email or web address] or [phone] to request more information on [employer] access.    For providers and/or their staff who would like to refer a patient to [facility], please contact us through our one-stop-shop provider referral line, [facility], at [phone].    If you feel you have received this communication in error or would no longer like to receive these types of communications, please e-mail [email or web address]

## 2019-08-20 NOTE — PROCEDURES
Tendon Sheath: R ring MCP  Date/Time: 8/20/2019 11:34 AM  Performed by: Ava Lind MD  Authorized by: Ava Lind MD     Consent Done?:  Yes (Verbal)  Timeout: prior to procedure the correct patient, procedure, and site was verified    Indications:  Pain  Timeout: prior to procedure the correct patient, procedure, and site was verified    Location:  Ring finger  Site:  R ring MCP  Prep: patient was prepped and draped in usual sterile fashion    Ultrasonic guidance for needle placement?: Yes    Needle size:  25 G  Approach:  Volar  Medications:  4 mg dexamethasone 4 mg/mL

## 2019-08-23 ENCOUNTER — TELEPHONE (OUTPATIENT)
Dept: INTERNAL MEDICINE | Facility: CLINIC | Age: 79
End: 2019-08-23

## 2019-08-26 ENCOUNTER — CLINICAL SUPPORT (OUTPATIENT)
Dept: INTERNAL MEDICINE | Facility: CLINIC | Age: 79
End: 2019-08-26
Payer: MEDICARE

## 2019-08-26 ENCOUNTER — OFFICE VISIT (OUTPATIENT)
Dept: INTERNAL MEDICINE | Facility: CLINIC | Age: 79
End: 2019-08-26
Payer: MEDICARE

## 2019-08-26 VITALS
HEIGHT: 68 IN | BODY MASS INDEX: 23.22 KG/M2 | WEIGHT: 153.25 LBS | DIASTOLIC BLOOD PRESSURE: 70 MMHG | SYSTOLIC BLOOD PRESSURE: 106 MMHG | HEART RATE: 68 BPM

## 2019-08-26 DIAGNOSIS — M48.061 SPINAL STENOSIS OF LUMBAR REGION, UNSPECIFIED WHETHER NEUROGENIC CLAUDICATION PRESENT: ICD-10-CM

## 2019-08-26 DIAGNOSIS — I48.0 PAF (PAROXYSMAL ATRIAL FIBRILLATION): ICD-10-CM

## 2019-08-26 DIAGNOSIS — M43.16 SPONDYLOLISTHESIS AT L4-L5 LEVEL: ICD-10-CM

## 2019-08-26 DIAGNOSIS — Z91.81 AT HIGH RISK FOR FALLS: ICD-10-CM

## 2019-08-26 DIAGNOSIS — Z98.890 S/P LUMBAR LAMINECTOMY: ICD-10-CM

## 2019-08-26 DIAGNOSIS — Z95.818 STATUS POST PLACEMENT OF IMPLANTABLE LOOP RECORDER: ICD-10-CM

## 2019-08-26 DIAGNOSIS — M47.816 ARTHROPATHY OF LUMBAR FACET JOINT: ICD-10-CM

## 2019-08-26 DIAGNOSIS — Z87.19 HISTORY OF GI BLEED: ICD-10-CM

## 2019-08-26 DIAGNOSIS — I70.0 AORTIC ATHEROSCLEROSIS: ICD-10-CM

## 2019-08-26 DIAGNOSIS — Z98.890 HISTORY OF RADIOFREQUENCY ABLATION (RFA) PROCEDURE FOR CARDIAC ARRHYTHMIA: ICD-10-CM

## 2019-08-26 DIAGNOSIS — M15.9 PRIMARY OSTEOARTHRITIS INVOLVING MULTIPLE JOINTS: Chronic | ICD-10-CM

## 2019-08-26 DIAGNOSIS — R55 SYNCOPE, UNSPECIFIED SYNCOPE TYPE: ICD-10-CM

## 2019-08-26 DIAGNOSIS — D75.839 THROMBOCYTOSIS: ICD-10-CM

## 2019-08-26 DIAGNOSIS — Z00.00 ENCOUNTER FOR PREVENTIVE HEALTH EXAMINATION: Primary | ICD-10-CM

## 2019-08-26 DIAGNOSIS — I47.10 PSVT (PAROXYSMAL SUPRAVENTRICULAR TACHYCARDIA): ICD-10-CM

## 2019-08-26 PROBLEM — Z74.09 MOBILITY IMPAIRED: Status: RESOLVED | Noted: 2017-02-23 | Resolved: 2019-08-26

## 2019-08-26 PROBLEM — H00.16: Status: RESOLVED | Noted: 2017-04-05 | Resolved: 2019-08-26

## 2019-08-26 PROCEDURE — 99999 PR PBB SHADOW E&M-EST. PATIENT-LVL IV: CPT | Mod: PBBFAC,,, | Performed by: NURSE PRACTITIONER

## 2019-08-26 PROCEDURE — 99999 PR PBB SHADOW E&M-EST. PATIENT-LVL IV: ICD-10-PCS | Mod: PBBFAC,,, | Performed by: NURSE PRACTITIONER

## 2019-08-26 PROCEDURE — 99999 PR PBB SHADOW E&M-EST. PATIENT-LVL I: CPT | Mod: PBBFAC,,,

## 2019-08-26 PROCEDURE — 99214 OFFICE O/P EST MOD 30 MIN: CPT | Mod: PBBFAC,27 | Performed by: NURSE PRACTITIONER

## 2019-08-26 PROCEDURE — G0439 PPPS, SUBSEQ VISIT: HCPCS | Mod: S$GLB,,, | Performed by: NURSE PRACTITIONER

## 2019-08-26 PROCEDURE — 99999 PR PBB SHADOW E&M-EST. PATIENT-LVL I: ICD-10-PCS | Mod: PBBFAC,,,

## 2019-08-26 PROCEDURE — 99211 OFF/OP EST MAY X REQ PHY/QHP: CPT | Mod: PBBFAC,25

## 2019-08-26 PROCEDURE — G0439 PR MEDICARE ANNUAL WELLNESS SUBSEQUENT VISIT: ICD-10-PCS | Mod: S$GLB,,, | Performed by: NURSE PRACTITIONER

## 2019-08-26 PROCEDURE — G0009 ADMIN PNEUMOCOCCAL VACCINE: HCPCS | Mod: PBBFAC

## 2019-08-26 NOTE — PROGRESS NOTES
"Shelly Vásquez presented for a  Medicare AWV and comprehensive Health Risk Assessment today. The following components were reviewed and updated:    · Medical history  · Family History  · Social history  · Allergies and Current Medications  · Health Risk Assessment  · Health Maintenance  · Care Team     ** See Completed Assessments for Annual Wellness Visit within the encounter summary.**       The following assessments were completed:  · Living Situation  · CAGE  · Depression Screening  · Timed Get Up and Go  · Whisper Test  · Cognitive Function Screening  ·   ·   ·   · Nutrition Screening  · ADL Screening  · PAQ Screening    Vitals:    08/26/19 0941   BP: 106/70   BP Location: Right arm   Pulse: 68   Weight: 69.5 kg (153 lb 3.5 oz)   Height: 5' 8" (1.727 m)     Body mass index is 23.3 kg/m².  Physical Exam   Constitutional: She is oriented to person, place, and time. She appears well-developed and well-nourished.   HENT:   Head: Normocephalic.   Cardiovascular: Normal rate and regular rhythm.   Pulmonary/Chest: Effort normal and breath sounds normal.   Abdominal: Soft. Bowel sounds are normal.   Musculoskeletal: Normal range of motion. She exhibits no edema.   Neurological: She is alert and oriented to person, place, and time.   Skin: Skin is warm and dry.   Psychiatric: She has a normal mood and affect.   Nursing note and vitals reviewed.        Diagnoses and health risks identified today and associated recommendations/orders:    1. Encounter for preventive health examination  Here for Health Risk Assessment/Annual Wellness Visit.  Health maintenance reviewed and updated. Follow up in one year.    2. Aortic atherosclerosis  Chronic, stable on current medications. Noted Lumbar XR 5/27/16. Followed by PCP.    3. PSVT (paroxysmal supraventricular tachycardia)  Chronic, stable on current medications. Followed by PCP, Cardiology.    4. PAF (paroxysmal atrial fibrillation)  Chronic, stable S/P ablation. Followed by " PCP, Cardiology.    5. History of radiofrequency ablation (RFA) procedure for cardiac arrhythmia  Stable. Followed by Cardiology    6. Status post placement of implantable loop recorder  Stable. Followed by Cardiology.    7. Thrombocytosis  Chronic, stable. Followed by PCP.    8. Primary osteoarthritis involving multiple joints  Chronic, stable with current OTC medication. Followed by PCP.    9. Spinal stenosis of lumbar region, unspecified whether neurogenic claudication present  Chronic, stable with current OTC medication. Followed by PCP.    10. Arthropathy of lumbar facet joint  Chronic, stable with current OTC medication. Followed by PCP.    11. Spondylolisthesis at L4-L5 level  Chronic, stable with current OTC medication. Followed by PCP.    12. S/P lumbar laminectomy  Stable. Followed by PCP.    13. History of GI bleed  Stable. Followed by PCP.    14. Syncope, unspecified syncope type  Chronic, stable. No episodes reported. Followed by PCP.    15. At high risk for falls  Chronic, reports fall x 1 last year. Fell tripped and fell in 11/2018 with lip laceration requiring sutures. Followed by PCP.      Apolonia Everettah Minh with a 5-10 year written screening schedule and personal prevention plan. Recommendations were developed using the USPSTF age appropriate recommendations. Education, counseling, and referrals were provided as needed. After Visit Summary printed and given to patient which includes a list of additional screenings\tests needed.    Follow up in 8 weeks (on 10/21/2019).with PCP    Virginia Connolly NP

## 2019-08-26 NOTE — PATIENT INSTRUCTIONS
Counseling and Referral of Other Preventative  (Italic type indicates deductible and co-insurance are waived)    Patient Name: Shelly Vásquez  Today's Date: 8/26/2019    Health Maintenance       Date Due Completion Date    Shingles Vaccine (1 of 2) 12/28/1988 Obtain new shingles vaccine - SHINGRIX - when available    Pneumococcal Vaccine (65+ Low/Medium Risk) (2 of 2 - PPSV23) 04/16/2019 4/16/2018 - Need pneumovax today    Influenza Vaccine (1) 09/01/2019 12/10/2018    DEXA SCAN 05/30/2021 5/30/2018    Lipid Panel 05/22/2023 5/22/2018    TETANUS VACCINE 03/31/2024 3/31/2014        No orders of the defined types were placed in this encounter.    The following information is provided to all patients.  This information is to help you find resources for any of the problems found today that may be affecting your health:                Living healthy guide: www.[facility].[place].[facility]      Understanding Diabetes: www.diabetes.org      Eating healthy: www.cdc.gov/healthyweight      CDC home safety checklist: www.cdc.gov/steadi/patient.html      Agency on Aging: www.goea.[place].[facility]      Alcoholics anonymous (AA): www.aa.org      Physical Activity: www.kathryn.nih.gov/zz4xvlf      Tobacco use: www.quitwithusla.org     Counseling and Referral of Other Preventative  (Italic type indicates deductible and co-insurance are waived)    Patient Name: Shelly Vásquez  Today's Date: 8/26/2019    Health Maintenance       Date Due Completion Date    Shingles Vaccine (1 of 2) 12/28/1988 ---    Pneumococcal Vaccine (65+ Low/Medium Risk) (2 of 2 - PPSV23) 04/16/2019 4/16/2018    Influenza Vaccine (1) 09/01/2019 12/10/2018    DEXA SCAN 05/30/2021 5/30/2018    Lipid Panel 05/22/2023 5/22/2018    TETANUS VACCINE 03/31/2024 3/31/2014        No orders of the defined types were placed in this encounter.    The following information is provided to all patients.  This information is to help you find resources for any of the problems found  today that may be affecting your health:                Living healthy guide: www.[facility].[place].[facility]      Understanding Diabetes: www.diabetes.org      Eating healthy: www.cdc.gov/healthyweight      CDC home safety checklist: www.cdc.gov/steadi/patient.html      Agency on Aging: www.goea.[place].[facility]      Alcoholics anonymous (AA): www.aa.org      Physical Activity: www.kathryn.nih.gov/rt5hnij      Tobacco use: www.quitwithusla.org

## 2019-08-28 RX ORDER — DEXAMETHASONE SODIUM PHOSPHATE 4 MG/ML
4 INJECTION, SOLUTION INTRA-ARTICULAR; INTRALESIONAL; INTRAMUSCULAR; INTRAVENOUS; SOFT TISSUE
Status: DISCONTINUED | OUTPATIENT
Start: 2019-08-20 | End: 2019-08-28 | Stop reason: HOSPADM

## 2019-08-28 NOTE — PROGRESS NOTES
I have personally taken the history and examined the patient. I agree with the Hand Surgery PA's note. The plan will be conservative treatment for hand arthritis. DIscussed paraffin, anti-inflammatory diet, compound cream, OT ( not ordered). All questions answered at this time. Pt has right hand pain. Pt has right ring trigger finger

## 2019-09-03 ENCOUNTER — CLINICAL SUPPORT (OUTPATIENT)
Dept: CARDIOLOGY | Facility: HOSPITAL | Age: 79
End: 2019-09-03
Payer: MEDICARE

## 2019-09-03 PROCEDURE — 93298 REM INTERROG DEV EVAL SCRMS: CPT | Mod: ,,, | Performed by: INTERNAL MEDICINE

## 2019-09-03 PROCEDURE — 93298 CARDIAC DEVICE CHECK - REMOTE: ICD-10-PCS | Mod: ,,, | Performed by: INTERNAL MEDICINE

## 2019-09-08 DIAGNOSIS — I47.19 ATRIAL TACHYCARDIA: ICD-10-CM

## 2019-09-09 RX ORDER — VERAPAMIL HYDROCHLORIDE 120 MG/1
CAPSULE, EXTENDED RELEASE ORAL
Qty: 90 CAPSULE | Refills: 3 | Status: SHIPPED | OUTPATIENT
Start: 2019-09-09 | End: 2019-10-08

## 2019-09-30 ENCOUNTER — EXTERNAL CHRONIC CARE MANAGEMENT (OUTPATIENT)
Dept: PRIMARY CARE CLINIC | Facility: CLINIC | Age: 79
End: 2019-09-30
Payer: MEDICARE

## 2019-09-30 PROCEDURE — 99490 PR CHRONIC CARE MGMT, 1ST 20 MIN: ICD-10-PCS | Mod: S$PBB,,, | Performed by: INTERNAL MEDICINE

## 2019-09-30 PROCEDURE — 99490 CHRNC CARE MGMT STAFF 1ST 20: CPT | Mod: PBBFAC | Performed by: INTERNAL MEDICINE

## 2019-09-30 PROCEDURE — 99490 CHRNC CARE MGMT STAFF 1ST 20: CPT | Mod: S$PBB,,, | Performed by: INTERNAL MEDICINE

## 2019-10-07 ENCOUNTER — PATIENT MESSAGE (OUTPATIENT)
Dept: OBSTETRICS AND GYNECOLOGY | Facility: CLINIC | Age: 79
End: 2019-10-07

## 2019-10-08 ENCOUNTER — TELEPHONE (OUTPATIENT)
Dept: OBSTETRICS AND GYNECOLOGY | Facility: CLINIC | Age: 79
End: 2019-10-08

## 2019-10-08 ENCOUNTER — PATIENT OUTREACH (OUTPATIENT)
Dept: ADMINISTRATIVE | Facility: OTHER | Age: 79
End: 2019-10-08

## 2019-10-08 ENCOUNTER — OFFICE VISIT (OUTPATIENT)
Dept: OBSTETRICS AND GYNECOLOGY | Facility: CLINIC | Age: 79
End: 2019-10-08
Payer: MEDICARE

## 2019-10-08 VITALS
WEIGHT: 154.31 LBS | DIASTOLIC BLOOD PRESSURE: 70 MMHG | SYSTOLIC BLOOD PRESSURE: 124 MMHG | BODY MASS INDEX: 23.39 KG/M2 | HEIGHT: 68 IN

## 2019-10-08 DIAGNOSIS — B00.9 HERPES: Primary | ICD-10-CM

## 2019-10-08 DIAGNOSIS — N84.2 VAGINAL POLYP: ICD-10-CM

## 2019-10-08 PROCEDURE — 99999 PR PBB SHADOW E&M-EST. PATIENT-LVL III: ICD-10-PCS | Mod: PBBFAC,,, | Performed by: OBSTETRICS & GYNECOLOGY

## 2019-10-08 PROCEDURE — 99213 OFFICE O/P EST LOW 20 MIN: CPT | Mod: PBBFAC,25 | Performed by: OBSTETRICS & GYNECOLOGY

## 2019-10-08 PROCEDURE — 99213 OFFICE O/P EST LOW 20 MIN: CPT | Mod: 25,S$PBB,, | Performed by: OBSTETRICS & GYNECOLOGY

## 2019-10-08 PROCEDURE — 99999 PR PBB SHADOW E&M-EST. PATIENT-LVL III: CPT | Mod: PBBFAC,,, | Performed by: OBSTETRICS & GYNECOLOGY

## 2019-10-08 PROCEDURE — 88305 TISSUE EXAM BY PATHOLOGIST: CPT | Performed by: PATHOLOGY

## 2019-10-08 PROCEDURE — 57100 BIOPSY VAGINAL MUCOSA SIMPLE: CPT | Mod: S$PBB,,, | Performed by: OBSTETRICS & GYNECOLOGY

## 2019-10-08 PROCEDURE — 57100 BIOPSY VAGINAL MUCOSA SIMPLE: CPT | Mod: PBBFAC | Performed by: OBSTETRICS & GYNECOLOGY

## 2019-10-08 PROCEDURE — 99213 PR OFFICE/OUTPT VISIT, EST, LEVL III, 20-29 MIN: ICD-10-PCS | Mod: 25,S$PBB,, | Performed by: OBSTETRICS & GYNECOLOGY

## 2019-10-08 PROCEDURE — 88305 TISSUE SPECIMEN TO PATHOLOGY, OBSTETRICS/GYNECOLOGY: ICD-10-PCS | Mod: 26,,, | Performed by: PATHOLOGY

## 2019-10-08 PROCEDURE — 57100 PR BIOPSY OF VAGINA,SIMPLE: ICD-10-PCS | Mod: S$PBB,,, | Performed by: OBSTETRICS & GYNECOLOGY

## 2019-10-08 RX ORDER — VALACYCLOVIR HYDROCHLORIDE 500 MG/1
500 TABLET, FILM COATED ORAL DAILY
Qty: 30 TABLET | Refills: 11 | Status: SHIPPED | OUTPATIENT
Start: 2019-10-08 | End: 2021-04-13

## 2019-10-08 RX ORDER — VALACYCLOVIR HYDROCHLORIDE 1 G/1
1000 TABLET, FILM COATED ORAL DAILY
Qty: 5 TABLET | Refills: 0 | Status: SHIPPED | OUTPATIENT
Start: 2019-10-08 | End: 2019-10-08 | Stop reason: SDUPTHER

## 2019-10-08 RX ORDER — LIDOCAINE HYDROCHLORIDE 20 MG/ML
JELLY TOPICAL
Qty: 30 ML | Refills: 1 | Status: SHIPPED | OUTPATIENT
Start: 2019-10-08 | End: 2020-10-07

## 2019-10-08 RX ORDER — LIDOCAINE HYDROCHLORIDE 20 MG/ML
JELLY TOPICAL
Qty: 30 ML | Refills: 1 | Status: SHIPPED | OUTPATIENT
Start: 2019-10-08 | End: 2019-10-08 | Stop reason: SDUPTHER

## 2019-10-08 RX ORDER — VALACYCLOVIR HYDROCHLORIDE 1 G/1
1000 TABLET, FILM COATED ORAL DAILY
Qty: 5 TABLET | Refills: 0 | Status: SHIPPED | OUTPATIENT
Start: 2019-10-08 | End: 2019-10-13

## 2019-10-08 NOTE — TELEPHONE ENCOUNTER
Spoke with patient regarding her needing a same day appointment for her vaginal pain. Patient scheduled today.

## 2019-10-08 NOTE — TELEPHONE ENCOUNTER
----- Message from Fantasma Alexis sent at 10/8/2019  8:28 AM CDT -----  Regarding: Request for urgent appt today  Contact: [phone]  This benefactor is asking for a same day appt with Dr. Fletcher.She is available anytime before a 3PM appt she has scheduled for this afternoon.  Please call her directly to get details and offer 's availability.  Thank you.

## 2019-10-08 NOTE — TELEPHONE ENCOUNTER
Called patient to offer her a same day appointment with . Left voicemail for the patient to give the office a call back.

## 2019-10-08 NOTE — PROGRESS NOTES
Gynecology    SUBJECTIVE:     Chief Complaint: Vaginal Pain       History of Present Illness:  78 year old who with vaginal pain that started yesterday morning.  She reports that initially pain was on the right side- inside and out.  She had sharp pains that felt like nerve pain.  She looked with a mirror and didn't see any lesions.  She does have a history of herpes, but hasn't had an outbreak in many years.  The pain was itching/burning yesterday, but now it feels like occasional twitching.  She took tylenol for sleep and used nupercainal oil in the area.  No discharge or odor.  No sexual activity.  No vaginal bleeding.    Review of Systems:  Review of Systems   Constitutional: Negative for chills and fever.   Genitourinary: Positive for vaginal pain. Negative for pelvic pain, vaginal bleeding, vaginal discharge, postmenopausal bleeding and vaginal odor.        OBJECTIVE:     Physical Exam:  Physical Exam   Constitutional: She is oriented to person, place, and time. She appears well-developed and well-nourished.   Pulmonary/Chest: Effort normal.   Genitourinary:       No labial fusion. There is tenderness and lesion on the right labia. There is no rash or injury on the right labia. There is no rash, tenderness, lesion or injury on the left labia. Cervix exhibits no motion tenderness, no discharge and no friability. No erythema, tenderness or bleeding in the vagina. No foreign body in the vagina. No signs of injury around the vagina. No vaginal discharge found.   Genitourinary Comments: Vagina: polypoid tissues on the anterior wall of the vagina measuring about 7mm; benign appearing   Neurological: She is oriented to person, place, and time.   Skin: No pallor.   Psychiatric: She has a normal mood and affect. Her behavior is normal. Judgment and thought content normal.   Nursing note and vitals reviewed.      Chaperoned by: Wilmer    ASSESSMENT:       ICD-10-CM ICD-9-CM    1. Herpes B00.9 054.9 valACYclovir (VALTREX)  1000 MG tablet      lidocaine HCL 2% (XYLOCAINE) 2 % jelly      valACYclovir (VALTREX) 500 MG tablet      DISCONTINUED: lidocaine HCL 2% (XYLOCAINE) 2 % jelly      DISCONTINUED: valACYclovir (VALTREX) 1000 MG tablet   2. Vaginal polyp N84.2 623.7 Tissue Specimen To Pathology, Obstetrics/Gynecology          Plan:      Shelly Wilkinson was seen today for vaginal pain.    Diagnoses and all orders for this visit:    Herpes  -     Discontinue: lidocaine HCL 2% (XYLOCAINE) 2 % jelly; Apply to affected area daily prn  -     Discontinue: valACYclovir (VALTREX) 1000 MG tablet; Take 1 tablet (1,000 mg total) by mouth once daily. for 5 days  -     valACYclovir (VALTREX) 1000 MG tablet; Take 1 tablet (1,000 mg total) by mouth once daily. for 5 days  -     lidocaine HCL 2% (XYLOCAINE) 2 % jelly; Apply to affected area daily as needed  -     valACYclovir (VALTREX) 500 MG tablet; Take 1 tablet (500 mg total) by mouth once daily.    Vaginal polyp  -     Tissue Specimen To Pathology, Obstetrics/Gynecology    - valtrex to pharmacy and lidocaine  - prophylactic dose sent as well  - biopsy taken of small polypoid lesion on the anterior vaginal wall; sent to pathology for evaluation    No orders of the defined types were placed in this encounter.      Follow up for annual or prn.    Rachel Fletcher

## 2019-10-08 NOTE — TELEPHONE ENCOUNTER
----- Message from Fantasma Alexis sent at 10/8/2019  8:28 AM CDT -----  Regarding: Request for urgent appt today  Contact: [phone]  This benefactor is asking for a same day appt with Dr. Fletcher.She is available anytime before a 3PM appt she has scheduled for this afternoon.  Please call her directly to get details and offer 's availability.  Thank you.

## 2019-10-10 ENCOUNTER — TELEPHONE (OUTPATIENT)
Dept: OBSTETRICS AND GYNECOLOGY | Facility: CLINIC | Age: 79
End: 2019-10-10

## 2019-10-10 NOTE — TELEPHONE ENCOUNTER
Pt was informed that her results were reviewed by Dr. Fletcher and the biopsy of the vaginal wall polyp was benign.Verbalized understanding.

## 2019-10-14 DIAGNOSIS — Z79.890 POSTMENOPAUSAL HORMONE REPLACEMENT THERAPY: ICD-10-CM

## 2019-10-14 RX ORDER — ESTRADIOL AND NORETHINDRONE ACETATE .5; .1 MG/1; MG/1
TABLET ORAL
Qty: 84 TABLET | Refills: 0 | OUTPATIENT
Start: 2019-10-14

## 2019-10-15 DIAGNOSIS — Z79.890 POSTMENOPAUSAL HORMONE REPLACEMENT THERAPY: Primary | ICD-10-CM

## 2019-10-15 RX ORDER — ESTRADIOL AND NORETHINDRONE ACETATE .5; .1 MG/1; MG/1
1 TABLET ORAL DAILY
Qty: 84 TABLET | Refills: 3 | Status: SHIPPED | OUTPATIENT
Start: 2019-10-15 | End: 2020-10-14 | Stop reason: SDUPTHER

## 2019-10-15 RX ORDER — TEMAZEPAM 15 MG/1
15 CAPSULE ORAL NIGHTLY PRN
Qty: 90 CAPSULE | Refills: 1 | Status: SHIPPED | OUTPATIENT
Start: 2019-10-15 | End: 2020-01-03 | Stop reason: SDUPTHER

## 2019-10-15 RX ORDER — ESTRADIOL AND NORETHINDRONE ACETATE .5; .1 MG/1; MG/1
1 TABLET ORAL DAILY
Qty: 84 TABLET | Refills: 3 | Status: CANCELLED | OUTPATIENT
Start: 2019-10-15

## 2019-10-15 NOTE — TELEPHONE ENCOUNTER
----- Message from Tash Horn MD sent at 10/15/2019 12:45 PM CDT -----  This patient is requesting a hormone refill. I have never see her before, can you send the request to Dr. Fletcher?

## 2019-10-21 ENCOUNTER — TELEPHONE (OUTPATIENT)
Dept: INTERNAL MEDICINE | Facility: CLINIC | Age: 79
End: 2019-10-21

## 2019-10-21 ENCOUNTER — IMMUNIZATION (OUTPATIENT)
Dept: INTERNAL MEDICINE | Facility: CLINIC | Age: 79
End: 2019-10-21
Payer: MEDICARE

## 2019-10-21 ENCOUNTER — LAB VISIT (OUTPATIENT)
Dept: LAB | Facility: HOSPITAL | Age: 79
End: 2019-10-21
Attending: INTERNAL MEDICINE
Payer: MEDICARE

## 2019-10-21 ENCOUNTER — OFFICE VISIT (OUTPATIENT)
Dept: INTERNAL MEDICINE | Facility: CLINIC | Age: 79
End: 2019-10-21
Payer: MEDICARE

## 2019-10-21 VITALS
DIASTOLIC BLOOD PRESSURE: 78 MMHG | SYSTOLIC BLOOD PRESSURE: 128 MMHG | WEIGHT: 155.44 LBS | HEART RATE: 64 BPM | HEIGHT: 68 IN | OXYGEN SATURATION: 97 % | BODY MASS INDEX: 23.56 KG/M2

## 2019-10-21 DIAGNOSIS — Z87.19 HISTORY OF GI BLEED: ICD-10-CM

## 2019-10-21 DIAGNOSIS — E53.8 B12 NUTRITIONAL DEFICIENCY: Primary | ICD-10-CM

## 2019-10-21 DIAGNOSIS — D64.9 ANEMIA, UNSPECIFIED TYPE: ICD-10-CM

## 2019-10-21 DIAGNOSIS — E55.9 MILD VITAMIN D DEFICIENCY: ICD-10-CM

## 2019-10-21 DIAGNOSIS — G47.00 INSOMNIA, UNSPECIFIED TYPE: ICD-10-CM

## 2019-10-21 DIAGNOSIS — E78.5 HYPERLIPIDEMIA, UNSPECIFIED HYPERLIPIDEMIA TYPE: ICD-10-CM

## 2019-10-21 DIAGNOSIS — R71.8 ELEVATED MCV: ICD-10-CM

## 2019-10-21 DIAGNOSIS — E78.5 HYPERLIPIDEMIA, UNSPECIFIED HYPERLIPIDEMIA TYPE: Primary | ICD-10-CM

## 2019-10-21 DIAGNOSIS — D53.9 NUTRITIONAL ANEMIA, UNSPECIFIED: ICD-10-CM

## 2019-10-21 LAB
25(OH)D3+25(OH)D2 SERPL-MCNC: 11 NG/ML (ref 30–96)
ALBUMIN SERPL BCP-MCNC: 4.7 G/DL (ref 3.5–5.2)
ALP SERPL-CCNC: 27 U/L (ref 55–135)
ALT SERPL W/O P-5'-P-CCNC: 13 U/L (ref 10–44)
ANION GAP SERPL CALC-SCNC: 9 MMOL/L (ref 8–16)
AST SERPL-CCNC: 18 U/L (ref 10–40)
BASOPHILS # BLD AUTO: 0.08 K/UL (ref 0–0.2)
BASOPHILS NFR BLD: 1.4 % (ref 0–1.9)
BILIRUB SERPL-MCNC: 0.6 MG/DL (ref 0.1–1)
BUN SERPL-MCNC: 16 MG/DL (ref 8–23)
CALCIUM SERPL-MCNC: 9.3 MG/DL (ref 8.7–10.5)
CHLORIDE SERPL-SCNC: 106 MMOL/L (ref 95–110)
CHOLEST SERPL-MCNC: 193 MG/DL (ref 120–199)
CHOLEST/HDLC SERPL: 2.2 {RATIO} (ref 2–5)
CO2 SERPL-SCNC: 23 MMOL/L (ref 23–29)
CREAT SERPL-MCNC: 1 MG/DL (ref 0.5–1.4)
DIFFERENTIAL METHOD: ABNORMAL
EOSINOPHIL # BLD AUTO: 0.1 K/UL (ref 0–0.5)
EOSINOPHIL NFR BLD: 2.4 % (ref 0–8)
ERYTHROCYTE [DISTWIDTH] IN BLOOD BY AUTOMATED COUNT: 14.6 % (ref 11.5–14.5)
EST. GFR  (AFRICAN AMERICAN): >60 ML/MIN/1.73 M^2
EST. GFR  (NON AFRICAN AMERICAN): 53 ML/MIN/1.73 M^2
FERRITIN SERPL-MCNC: 332 NG/ML (ref 20–300)
GLUCOSE SERPL-MCNC: 89 MG/DL (ref 70–110)
HCT VFR BLD AUTO: 30.6 % (ref 37–48.5)
HDLC SERPL-MCNC: 88 MG/DL (ref 40–75)
HDLC SERPL: 45.6 % (ref 20–50)
HGB BLD-MCNC: 10.4 G/DL (ref 12–16)
IRON SERPL-MCNC: 206 UG/DL (ref 30–160)
LDLC SERPL CALC-MCNC: 91 MG/DL (ref 63–159)
LYMPHOCYTES # BLD AUTO: 2.1 K/UL (ref 1–4.8)
LYMPHOCYTES NFR BLD: 37.1 % (ref 18–48)
MCH RBC QN AUTO: 36.6 PG (ref 27–31)
MCHC RBC AUTO-ENTMCNC: 34 G/DL (ref 32–36)
MCV RBC AUTO: 108 FL (ref 82–98)
MONOCYTES # BLD AUTO: 0.5 K/UL (ref 0.3–1)
MONOCYTES NFR BLD: 9.2 % (ref 4–15)
NEUTROPHILS # BLD AUTO: 2.9 K/UL (ref 1.8–7.7)
NEUTROPHILS NFR BLD: 49.9 % (ref 38–73)
NONHDLC SERPL-MCNC: 105 MG/DL
PLATELET # BLD AUTO: 454 K/UL (ref 150–350)
PMV BLD AUTO: 10 FL (ref 9.2–12.9)
POTASSIUM SERPL-SCNC: 3.9 MMOL/L (ref 3.5–5.1)
PROT SERPL-MCNC: 7.1 G/DL (ref 6–8.4)
RBC # BLD AUTO: 2.84 M/UL (ref 4–5.4)
SATURATED IRON: 73 % (ref 20–50)
SODIUM SERPL-SCNC: 138 MMOL/L (ref 136–145)
TOTAL IRON BINDING CAPACITY: 283 UG/DL (ref 250–450)
TRANSFERRIN SERPL-MCNC: 191 MG/DL (ref 200–375)
TRIGL SERPL-MCNC: 70 MG/DL (ref 30–150)
WBC # BLD AUTO: 5.74 K/UL (ref 3.9–12.7)

## 2019-10-21 PROCEDURE — 36415 COLL VENOUS BLD VENIPUNCTURE: CPT

## 2019-10-21 PROCEDURE — 99214 OFFICE O/P EST MOD 30 MIN: CPT | Mod: S$PBB,,, | Performed by: INTERNAL MEDICINE

## 2019-10-21 PROCEDURE — 82607 VITAMIN B-12: CPT

## 2019-10-21 PROCEDURE — 99214 PR OFFICE/OUTPT VISIT, EST, LEVL IV, 30-39 MIN: ICD-10-PCS | Mod: S$PBB,,, | Performed by: INTERNAL MEDICINE

## 2019-10-21 PROCEDURE — 85025 COMPLETE CBC W/AUTO DIFF WBC: CPT

## 2019-10-21 PROCEDURE — 82728 ASSAY OF FERRITIN: CPT

## 2019-10-21 PROCEDURE — 99999 PR PBB SHADOW E&M-EST. PATIENT-LVL IV: ICD-10-PCS | Mod: PBBFAC,,, | Performed by: INTERNAL MEDICINE

## 2019-10-21 PROCEDURE — 90662 IIV NO PRSV INCREASED AG IM: CPT | Mod: PBBFAC

## 2019-10-21 PROCEDURE — 80061 LIPID PANEL: CPT

## 2019-10-21 PROCEDURE — 83540 ASSAY OF IRON: CPT

## 2019-10-21 PROCEDURE — 99999 PR PBB SHADOW E&M-EST. PATIENT-LVL IV: CPT | Mod: PBBFAC,,, | Performed by: INTERNAL MEDICINE

## 2019-10-21 PROCEDURE — 82306 VITAMIN D 25 HYDROXY: CPT

## 2019-10-21 PROCEDURE — 82746 ASSAY OF FOLIC ACID SERUM: CPT

## 2019-10-21 PROCEDURE — 99214 OFFICE O/P EST MOD 30 MIN: CPT | Mod: PBBFAC | Performed by: INTERNAL MEDICINE

## 2019-10-21 PROCEDURE — 80053 COMPREHEN METABOLIC PANEL: CPT

## 2019-10-21 NOTE — PROGRESS NOTES
Subjective:      Patient ID: Shelly Vásquez is a 78 y.o. female.    Chief Complaint: Follow-up    HPI:  HPI   Patient is here for follow-up of medical problems. She has a history of paroxysmal atrial fibrillation and is only on aspirin daily.  She has followed up in Cardiology.    She has a history of iron deficient anemia.  She will obtain laboratory studies.     The following assessments were completed    Living situation: independent  CAGE: no  Depression screening: recently lost her son  Timed Get Up and Go: good  Whisper Test: good  Cognitive Function Screening: normal  Nutrition Screening: normal  ADL Screening: normal    10/21/2019  Colonoscopy: 2014: repeat in 10 years  Mammogram: 7/1/2019  Gyn: Dr. Fletcher  Optho:Yearly  Yearly  Flu: today  Tetanus  Shingles  Pneumovax    Podiatry  A1C  Urine microalbumin/creatinine      Patient Active Problem List   Diagnosis    Insomnia    Osteoarthritis    Spinal stenosis, lumbar    Spondylolisthesis at L4-L5 level    History of GI bleed    S/P lumbar laminectomy    Syncope    Status post placement of implantable loop recorder    PSVT (paroxysmal supraventricular tachycardia)    PAF (paroxysmal atrial fibrillation)    At high risk for falls    Thrombocytosis    Aortic atherosclerosis    Arthropathy of lumbar facet joint    History of radiofrequency ablation (RFA) procedure for cardiac arrhythmia     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    Supraventricular tachycardia     s/p AVNRT ablation (Dr Brady)     Past Surgical History:   Procedure Laterality Date    APPENDECTOMY  age 21    BACK SURGERY      Beast Lift  2004    BREAST BIOPSY  50yrs  "ago    unable to see scar    COSMETIC SURGERY      DILATION AND CURETTAGE OF UTERUS  2008    PMB    ENDOMETRIAL BIOPSY      EYE SURGERY      ptsosis      RADIOFREQUENCY ABLATION  03/2018    SMALL INTESTINE SURGERY      TONSILLECTOMY, ADENOIDECTOMY  age 8    TOTAL REDUCTION MAMMOPLASTY Bilateral 2003    TUBAL LIGATION       Family History   Problem Relation Age of Onset    No Known Problems Father     Cirrhosis Mother     No Known Problems Sister     No Known Problems Brother     No Known Problems Maternal Aunt     No Known Problems Maternal Uncle     No Known Problems Paternal Aunt     No Known Problems Paternal Uncle     No Known Problems Maternal Grandmother     No Known Problems Maternal Grandfather     No Known Problems Paternal Grandmother     No Known Problems Paternal Grandfather     No Known Problems Daughter     No Known Problems Son     Breast cancer Neg Hx     Colon cancer Neg Hx     Ovarian cancer Neg Hx     Amblyopia Neg Hx     Blindness Neg Hx     Cancer Neg Hx     Cataracts Neg Hx     Diabetes Neg Hx     Glaucoma Neg Hx     Hypertension Neg Hx     Macular degeneration Neg Hx     Retinal detachment Neg Hx     Strabismus Neg Hx     Stroke Neg Hx     Thyroid disease Neg Hx      Review of Systems   Constitutional: Negative for chills, fever and unexpected weight change.   HENT: Negative for trouble swallowing.    Respiratory: Negative for cough, shortness of breath and wheezing.    Cardiovascular: Negative for chest pain and palpitations.   Gastrointestinal: Negative for abdominal distention, abdominal pain, blood in stool and vomiting.   Musculoskeletal: Negative for back pain.     Objective:     Vitals:    10/21/19 1153   BP: 128/78   Pulse: 64   SpO2: 97%   Weight: 70.5 kg (155 lb 6.8 oz)   Height: 5' 8" (1.727 m)   PainSc: 0-No pain     Body mass index is 23.63 kg/m².  Physical Exam   Constitutional: She is oriented to person, place, and time. She appears " well-developed and well-nourished. No distress.   Neck: Carotid bruit is not present. No thyromegaly present.   Cardiovascular: Normal rate, regular rhythm and normal heart sounds. PMI is not displaced.   Pulmonary/Chest: Effort normal and breath sounds normal. No respiratory distress.   Abdominal: Soft. Bowel sounds are normal. She exhibits no distension. There is no tenderness.   Musculoskeletal: She exhibits no edema.   Neurological: She is alert and oriented to person, place, and time.     Assessment:     1. Hyperlipidemia, unspecified hyperlipidemia type    2. Mild vitamin D deficiency    3. Insomnia, unspecified type    4. History of GI bleed    5. Anemia, unspecified type      Plan:   Shelly Wilkinson was seen today for follow-up.    Diagnoses and all orders for this visit:    Hyperlipidemia, unspecified hyperlipidemia type  Comments:  Recheck lipids  Orders:  -     CBC auto differential; Future  -     Comprehensive metabolic panel; Future  -     Lipid panel; Future    Mild vitamin D deficiency  Comments:  Checked vitamin-D  Orders:  -     Vitamin D; Future    Insomnia, unspecified type  Comments:  Continue temazepam    History of GI bleed  Comments:  Patient has had chronic anemia and will recheck this today she is currently off of aspirin    Anemia, unspecified type  -     Iron and TIBC; Future  -     Ferritin; Future        Problem List Items Addressed This Visit     Insomnia    History of GI bleed      Other Visit Diagnoses     Hyperlipidemia, unspecified hyperlipidemia type    -  Primary    Recheck lipids    Relevant Orders    CBC auto differential (Completed)    Comprehensive metabolic panel    Lipid panel    Mild vitamin D deficiency        Checked vitamin-D    Relevant Orders    Vitamin D    Anemia, unspecified type        Relevant Orders    Iron and TIBC    Ferritin        Orders Placed This Encounter   Procedures    CBC auto differential     Standing Status:   Future     Number of Occurrences:   1      Standing Expiration Date:   12/20/2019    Comprehensive metabolic panel     Standing Status:   Future     Number of Occurrences:   1     Standing Expiration Date:   12/20/2019    Lipid panel     Standing Status:   Future     Number of Occurrences:   1     Standing Expiration Date:   12/20/2019    Vitamin D     Standing Status:   Future     Number of Occurrences:   1     Standing Expiration Date:   12/20/2019    Iron and TIBC     Standing Status:   Future     Standing Expiration Date:   10/20/2020    Ferritin     Standing Status:   Future     Standing Expiration Date:   12/19/2020     Follow up in about 1 year (around 10/21/2020) for Annual exam.     Medication List           Accurate as of October 21, 2019  2:59 PM. If you have any questions, ask your nurse or doctor.               CONTINUE taking these medications    acetaminophen 325 MG tablet  Commonly known as:  TYLENOL     aspirin 81 MG EC tablet  Commonly known as:  ECOTRIN  Take 1 tablet (81 mg total) by mouth once daily.     estradiol-norethindrone acet 0.5-0.1 mg per tablet  Commonly known as:  LOPREEZA  Take 1 tablet by mouth once daily.     lidocaine HCL 2% 2 % jelly  Commonly known as:  XYLOCAINE  Apply to affected area daily as needed     temazepam 15 mg Cap  Commonly known as:  RESTORIL  Take 1 capsule (15 mg total) by mouth nightly as needed.     valACYclovir 500 MG tablet  Commonly known as:  VALTREX  Take 1 tablet (500 mg total) by mouth once daily.

## 2019-10-22 ENCOUNTER — TELEPHONE (OUTPATIENT)
Dept: INTERNAL MEDICINE | Facility: CLINIC | Age: 79
End: 2019-10-22

## 2019-10-22 ENCOUNTER — TELEPHONE (OUTPATIENT)
Dept: HEMATOLOGY/ONCOLOGY | Facility: CLINIC | Age: 79
End: 2019-10-22

## 2019-10-22 DIAGNOSIS — D64.9 ANEMIA, UNSPECIFIED TYPE: Primary | ICD-10-CM

## 2019-10-22 LAB
FOLATE SERPL-MCNC: 7.1 NG/ML (ref 4–24)
VIT B12 SERPL-MCNC: 709 PG/ML (ref 210–950)

## 2019-10-22 NOTE — TELEPHONE ENCOUNTER
----- Message from Joana Doty MD sent at 10/21/2019  7:40 PM CDT -----  Please ask her to take 3000 units of vit D daily

## 2019-10-22 NOTE — TELEPHONE ENCOUNTER
Patient is leaving for [place] today. Lab, hgb low but not iron deficient. Will need appt in hematology. Left message for patient to call me when she returns. Dr. Doty    ( Orders for hematology consult in)

## 2019-10-22 NOTE — TELEPHONE ENCOUNTER
Spoke with Jonh from [facility] lab. Stated tubes was already sent over across the street to the lab. Called main campus lab and was able to add vitamin b 12 and folate.

## 2019-10-22 NOTE — TELEPHONE ENCOUNTER
Pt informed to take 3,000 units of vitamin d daily. Pt stated she had seen the results on mychart.

## 2019-10-23 ENCOUNTER — PATIENT MESSAGE (OUTPATIENT)
Dept: INTERNAL MEDICINE | Facility: CLINIC | Age: 79
End: 2019-10-23

## 2019-10-30 ENCOUNTER — PATIENT MESSAGE (OUTPATIENT)
Dept: ELECTROPHYSIOLOGY | Facility: CLINIC | Age: 79
End: 2019-10-30

## 2019-10-31 ENCOUNTER — EXTERNAL CHRONIC CARE MANAGEMENT (OUTPATIENT)
Dept: PRIMARY CARE CLINIC | Facility: CLINIC | Age: 79
End: 2019-10-31
Payer: MEDICARE

## 2019-10-31 PROCEDURE — 99490 CHRNC CARE MGMT STAFF 1ST 20: CPT | Mod: PBBFAC | Performed by: INTERNAL MEDICINE

## 2019-10-31 PROCEDURE — 99490 CHRNC CARE MGMT STAFF 1ST 20: CPT | Mod: S$PBB,,, | Performed by: INTERNAL MEDICINE

## 2019-10-31 PROCEDURE — 99490 PR CHRONIC CARE MGMT, 1ST 20 MIN: ICD-10-PCS | Mod: S$PBB,,, | Performed by: INTERNAL MEDICINE

## 2019-11-05 ENCOUNTER — INITIAL CONSULT (OUTPATIENT)
Dept: HEMATOLOGY/ONCOLOGY | Facility: CLINIC | Age: 79
End: 2019-11-05
Payer: MEDICARE

## 2019-11-05 ENCOUNTER — LAB VISIT (OUTPATIENT)
Dept: LAB | Facility: HOSPITAL | Age: 79
End: 2019-11-05
Payer: MEDICARE

## 2019-11-05 VITALS
WEIGHT: 158.06 LBS | HEART RATE: 65 BPM | BODY MASS INDEX: 23.95 KG/M2 | DIASTOLIC BLOOD PRESSURE: 61 MMHG | OXYGEN SATURATION: 93 % | SYSTOLIC BLOOD PRESSURE: 111 MMHG | RESPIRATION RATE: 16 BRPM | TEMPERATURE: 98 F | HEIGHT: 68 IN

## 2019-11-05 DIAGNOSIS — D53.9 MACROCYTIC ANEMIA: ICD-10-CM

## 2019-11-05 DIAGNOSIS — I48.0 PAF (PAROXYSMAL ATRIAL FIBRILLATION): ICD-10-CM

## 2019-11-05 DIAGNOSIS — D75.839 THROMBOCYTOSIS: ICD-10-CM

## 2019-11-05 DIAGNOSIS — D53.9 MACROCYTIC ANEMIA: Primary | ICD-10-CM

## 2019-11-05 LAB
HAPTOGLOB SERPL-MCNC: 19 MG/DL (ref 30–250)
LDH SERPL L TO P-CCNC: 158 U/L (ref 110–260)
RETICS/RBC NFR AUTO: 1.5 % (ref 0.5–2.5)

## 2019-11-05 PROCEDURE — 84165 PROTEIN E-PHORESIS SERUM: CPT | Mod: 26,,, | Performed by: PATHOLOGY

## 2019-11-05 PROCEDURE — 84165 PROTEIN E-PHORESIS SERUM: CPT

## 2019-11-05 PROCEDURE — 83615 LACTATE (LD) (LDH) ENZYME: CPT

## 2019-11-05 PROCEDURE — 86334 IMMUNOFIX E-PHORESIS SERUM: CPT

## 2019-11-05 PROCEDURE — 83520 IMMUNOASSAY QUANT NOS NONAB: CPT | Mod: 59

## 2019-11-05 PROCEDURE — 86334 IMMUNOFIX E-PHORESIS SERUM: CPT | Mod: 26,,, | Performed by: PATHOLOGY

## 2019-11-05 PROCEDURE — 99999 PR PBB SHADOW E&M-EST. PATIENT-LVL V: CPT | Mod: PBBFAC,GC,,

## 2019-11-05 PROCEDURE — 86334 PATHOLOGIST INTERPRETATION IFE: ICD-10-PCS | Mod: 26,,, | Performed by: PATHOLOGY

## 2019-11-05 PROCEDURE — 99215 OFFICE O/P EST HI 40 MIN: CPT | Mod: PBBFAC

## 2019-11-05 PROCEDURE — 99204 OFFICE O/P NEW MOD 45 MIN: CPT | Mod: S$PBB,GC,,

## 2019-11-05 PROCEDURE — 36415 COLL VENOUS BLD VENIPUNCTURE: CPT

## 2019-11-05 PROCEDURE — 84165 PATHOLOGIST INTERPRETATION SPE: ICD-10-PCS | Mod: 26,,, | Performed by: PATHOLOGY

## 2019-11-05 PROCEDURE — 85045 AUTOMATED RETICULOCYTE COUNT: CPT

## 2019-11-05 PROCEDURE — 99204 PR OFFICE/OUTPT VISIT, NEW, LEVL IV, 45-59 MIN: ICD-10-PCS | Mod: S$PBB,GC,,

## 2019-11-05 PROCEDURE — 99999 PR PBB SHADOW E&M-EST. PATIENT-LVL V: ICD-10-PCS | Mod: PBBFAC,GC,,

## 2019-11-05 PROCEDURE — 83010 ASSAY OF HAPTOGLOBIN QUANT: CPT

## 2019-11-05 NOTE — Clinical Note
Marleni Vásquez needs:1. Bone marrow scheduled in the OR for 11/14/19 - case order placed, once scheduled, I will attach the specific orders for the biopsyThanksMike

## 2019-11-05 NOTE — PROGRESS NOTES
macrocytoic anemia  Complete PB eval, TSH/PCD cochran  Schedule marrow on 11/17; hold asa 5 days then restart after  Fu 4 weeks after marrow to allow for full NGS, cg report; will call as info comes in piecemeal

## 2019-11-06 ENCOUNTER — PATIENT MESSAGE (OUTPATIENT)
Dept: HEMATOLOGY/ONCOLOGY | Facility: CLINIC | Age: 79
End: 2019-11-06

## 2019-11-06 LAB
ALBUMIN SERPL ELPH-MCNC: 4.2 G/DL (ref 3.35–5.55)
ALPHA1 GLOB SERPL ELPH-MCNC: 0.23 G/DL (ref 0.17–0.41)
ALPHA2 GLOB SERPL ELPH-MCNC: 0.43 G/DL (ref 0.43–0.99)
B-GLOBULIN SERPL ELPH-MCNC: 0.59 G/DL (ref 0.5–1.1)
GAMMA GLOB SERPL ELPH-MCNC: 0.65 G/DL (ref 0.67–1.58)
INTERPRETATION SERPL IFE-IMP: NORMAL
KAPPA LC SER QL IA: 2.4 MG/DL (ref 0.33–1.94)
KAPPA LC/LAMBDA SER IA: 1.26 (ref 0.26–1.65)
LAMBDA LC SER QL IA: 1.9 MG/DL (ref 0.57–2.63)
PROT SERPL-MCNC: 6.1 G/DL (ref 6–8.4)

## 2019-11-06 NOTE — PROGRESS NOTES
[facility]  [facility]  Hematology/Medical Oncology Clinic      PATIENT: Shelly Vásquez  MRN: [id number]  DATE: 11/6/2019    Diagnosis:   1. Macrocytic anemia    2. PAF (paroxysmal atrial fibrillation)    3. Thrombocytosis      Chief Complaint: No chief complaint on file.    Other MDs:  Joana Doty MD    Subjective:   Initial History: Ms. Vásquez is a 78 y.o. female who presents to hematology clinic for her noted history of anemia.     She has a pMHx of GIB requiring transfusions in the past as well as pAF - not on anticoagulation (aspirin only) with a loop recorder in place.     She has a long standing ~4 year history of anemia that appears to have starting with a GIB but then has never truly recovered.  Over the course of the past 2-3 years, baseline anemia has slowly worsened as well as her MCV has slowly increased. She is also noted to have mildly worsening thrombocytopenia during this time.     She has undergone a previous IG work up including a colonoscopy in 2014 as well as a EGD in 2016 without obvious abnormality or cause for anemia.     No previous cytotoxic drugs or exposures. No family hx of blood or oncological cancers. Short term previous smoker and does partake in roughly 1 alcohol drink per day.     Her most recent lab work up including iron studies which are quite bizarre and non specific. B12/folate were noted to be wnl.     She denies any concerning symptoms including unintentional weight loss, night sweats, fever/chills, fatigue or SOB/DALE.    She was sent to oncology to further discuss her recent lab results and to figure out why she continues to be anemic.     Past Medical History:   Diagnosis Date    Arthritis     Atrial fibrillation     Basal cell cancer     on the face    Encounter for blood transfusion     Gastric ulcer     Herpes simplex without mention of complication     rare outbreaks    Postmenopausal HRT (hormone replacement therapy)      Senile nuclear sclerosis - Both Eyes 10/29/2012    Supraventricular tachycardia     s/p AVNRT ablation (Dr Brady)     Past Surgical History:   Procedure Laterality Date    APPENDECTOMY  age 21    BACK SURGERY      Beast Lift  2004    BREAST BIOPSY  50yrs ago    unable to see scar    COSMETIC SURGERY      DILATION AND CURETTAGE OF UTERUS  2008    PMB    ENDOMETRIAL BIOPSY      EYE SURGERY      ptsosis      RADIOFREQUENCY ABLATION  03/2018    SMALL INTESTINE SURGERY      TONSILLECTOMY, ADENOIDECTOMY  age 8    TOTAL REDUCTION MAMMOPLASTY Bilateral 2003    TUBAL LIGATION       Family History   Problem Relation Age of Onset    No Known Problems Father     Cirrhosis Mother     No Known Problems Sister     No Known Problems Brother     No Known Problems Maternal Aunt     No Known Problems Maternal Uncle     No Known Problems Paternal Aunt     No Known Problems Paternal Uncle     No Known Problems Maternal Grandmother     No Known Problems Maternal Grandfather     No Known Problems Paternal Grandmother     No Known Problems Paternal Grandfather     No Known Problems Daughter     No Known Problems Son     Breast cancer Neg Hx     Colon cancer Neg Hx     Ovarian cancer Neg Hx     Amblyopia Neg Hx     Blindness Neg Hx     Cancer Neg Hx     Cataracts Neg Hx     Diabetes Neg Hx     Glaucoma Neg Hx     Hypertension Neg Hx     Macular degeneration Neg Hx     Retinal detachment Neg Hx     Strabismus Neg Hx     Stroke Neg Hx     Thyroid disease Neg Hx       reports that she quit smoking about 39 years ago. Her smoking use included cigarettes. She has a 22.00 pack-year smoking history. She has never used smokeless tobacco. She reports that she drinks about 7.0 standard drinks of alcohol per week. She reports that she does not use drugs.  Review of patient's allergies indicates:   Allergen Reactions    Nsaids (non-steroidal anti-inflammatory drug)      GI Bleed  Had 5 blood  transfusions     Current Outpatient Medications   Medication Sig Dispense Refill    acetaminophen (TYLENOL) 325 MG tablet Take 325 mg by mouth continuous prn for Pain.      aspirin (ECOTRIN) 81 MG EC tablet Take 1 tablet (81 mg total) by mouth once daily. 30 tablet 0    estradiol-norethindrone acet (LOPREEZA) 0.5-0.1 mg per tablet Take 1 tablet by mouth once daily. 84 tablet 3    lidocaine HCL 2% (XYLOCAINE) 2 % jelly Apply to affected area daily as needed 30 mL 1    temazepam (RESTORIL) 15 mg Cap Take 1 capsule (15 mg total) by mouth nightly as needed. 90 capsule 1    valACYclovir (VALTREX) 500 MG tablet Take 1 tablet (500 mg total) by mouth once daily. 30 tablet 11    verapamil HCl (VERAPAMIL ORAL) Take by mouth.       No current facility-administered medications for this visit.      Review of Systems   Constitutional: Negative for appetite change, chills, fatigue and unexpected weight change.   HENT: Negative for dental problem, mouth sores, nosebleeds, trouble swallowing and voice change.    Eyes: Negative for visual disturbance.   Respiratory: Negative for chest tightness and shortness of breath.    Cardiovascular: Negative for chest pain, palpitations and leg swelling.   Gastrointestinal: Negative for abdominal distention, abdominal pain, blood in stool, diarrhea, nausea and vomiting.   Endocrine: Negative for cold intolerance.   Genitourinary: Negative for hematuria.   Musculoskeletal: Negative for neck pain.   Skin: Negative for pallor and rash.   Allergic/Immunologic: Negative for immunocompromised state.   Neurological: Negative for dizziness, weakness and headaches.   Hematological: Negative for adenopathy. Does not bruise/bleed easily.   Psychiatric/Behavioral: Negative for agitation and behavioral problems.     ECOG Performance Status: 1     Objective:      Vitals:   Vitals:    11/05/19 1517   BP: 111/61   Pulse: 65   Resp: 16   Temp: 97.8 °F (36.6 °C)   TempSrc: Oral   SpO2: (!) 93%   Weight:  "71.7 kg (158 lb 1.1 oz)   Height: 5' 8" (1.727 m)     BMI: Body mass index is 24.03 kg/m².    Physical Exam   Constitutional: She is oriented to person, place, and time. She appears well-developed and well-nourished.   Well appearing, thin, white, elderly female   HENT:   Head: Normocephalic and atraumatic.   Eyes: Pupils are equal, round, and reactive to light. EOM are normal.   Neck: Normal range of motion. Neck supple.   Cardiovascular: Normal rate and regular rhythm.   Pulmonary/Chest: Effort normal and breath sounds normal. She has no wheezes.   Abdominal: Soft. Bowel sounds are normal. She exhibits no mass.   Musculoskeletal: Normal range of motion. She exhibits no edema.   Lymphadenopathy:        Head (right side): No submandibular, no posterior auricular and no occipital adenopathy present.        Head (left side): No submandibular, no posterior auricular and no occipital adenopathy present.     She has no cervical adenopathy.     She has no axillary adenopathy.        Right: No supraclavicular adenopathy present.        Left: No supraclavicular adenopathy present.   Neurological: She is alert and oriented to person, place, and time.   Skin: Skin is warm and dry.   Psychiatric: She has a normal mood and affect. Her behavior is normal.   Vitals reviewed.    Laboratory Data:  Lab Visit on 11/05/2019   Component Date Value Ref Range Status    Immunofix Interp. 11/05/2019 SEE COMMENT   Final    Comment: Serum protein electrophoresis and immunofixation results should be   interpreted in clinical context in that some therapeutic agents can   result   in false positive results (example, daratumumab). Correlation with   the   patient s therapeutic regimen is required.  See pathologist's interpretation.      Protein, Serum 11/05/2019 6.1  6.0 - 8.4 g/dL Final    Comment: Serum protein electrophoresis and immunofixation results should be   interpreted in clinical context in that some therapeutic agents can   result "   in false positive results (example, daratumumab). Correlation with   the   patient s therapeutic regimen is required.      Albumin grams/dl 11/05/2019 4.20  3.35 - 5.55 g/dL Final    Alpha-1 grams/dl 11/05/2019 0.23  0.17 - 0.41 g/dL Final    Alpha-2 grams/dl 11/05/2019 0.43  0.43 - 0.99 g/dL Final    Beta grams/dl 11/05/2019 0.59  0.50 - 1.10 g/dL Final    Gamma grams/dl 11/05/2019 0.65* 0.67 - 1.58 g/dL Final    Kappa Free Light Chains 11/05/2019 2.40* 0.33 - 1.94 mg/dL Final    Lambda Free Light Chains 11/05/2019 1.90  0.57 - 2.63 mg/dL Final    Kappa/Lambda FLC Ratio 11/05/2019 1.26  0.26 - 1.65 Final    LD 11/05/2019 158  110 - 260 U/L Final    Results are increased in hemolyzed samples.    Retic 11/05/2019 1.5  0.5 - 2.5 % Final    Haptoglobin 11/05/2019 19* 30 - 250 mg/dL Final         Assessment:       1. Macrocytic anemia    2. PAF (paroxysmal atrial fibrillation)    3. Thrombocytosis        Hematologic History:      See below    Plan:     Ms. Vásquez presents to hematology clinic today to discuss her recent lab results and chronic anemia. It appears to be worsening over the course of the past few years and becoming slightly more macrocytic than in the past. She has no concerning ROS complaints though her increasing MCV, with elevated RDW and normal B12/folate, age and platelet count, are suspicious for a possible underlying hematologic process. She would certainly benefit from a further blood work up but ultimately I do think she needs to undergo a bone marrow biopsy. She is understanding of the possible benign versus malignant differential and would like to proceed with the work up and bone marrow biopsy asap.    -SPEP, sFLC and SUSHILA today  -LDH, hapto to r/o hemolysis (unliekly)  -retic count  -TSH/T4 - as a cause of macro anemia  -will schedule OR case request for bone marrow under anesthesia - per patient request   -will place orders for marrow once scheduled   -bone marrow consent    -copper level for nutritional component not checked yet  -she is not interested in future GI work up as her  had an adverse event to a colonoscopy recently   -follow up in the clinic after the bmbx results to discuss - will call in as results trickle in over the phone or through mychart    Sebas De Anda MD  Hematology/Medical Oncology Fellow  [phone] (pager)

## 2019-11-07 ENCOUNTER — PATIENT MESSAGE (OUTPATIENT)
Dept: HEMATOLOGY/ONCOLOGY | Facility: CLINIC | Age: 79
End: 2019-11-07

## 2019-11-07 ENCOUNTER — PATIENT MESSAGE (OUTPATIENT)
Dept: SURGERY | Facility: HOSPITAL | Age: 79
End: 2019-11-07

## 2019-11-07 DIAGNOSIS — D53.9 MACROCYTIC ANEMIA: Primary | ICD-10-CM

## 2019-11-07 LAB
PATHOLOGIST INTERPRETATION IFE: NORMAL
PATHOLOGIST INTERPRETATION SPE: NORMAL

## 2019-11-08 ENCOUNTER — PATIENT MESSAGE (OUTPATIENT)
Dept: SURGERY | Facility: HOSPITAL | Age: 79
End: 2019-11-08

## 2019-11-15 RX ORDER — VERAPAMIL HYDROCHLORIDE 120 MG/1
120 CAPSULE, EXTENDED RELEASE ORAL DAILY
Qty: 90 CAPSULE | Refills: 3 | Status: SHIPPED | OUTPATIENT
Start: 2019-11-15 | End: 2020-11-09 | Stop reason: SDUPTHER

## 2019-11-28 ENCOUNTER — PATIENT MESSAGE (OUTPATIENT)
Dept: HEMATOLOGY/ONCOLOGY | Facility: CLINIC | Age: 79
End: 2019-11-28

## 2019-11-29 ENCOUNTER — PATIENT MESSAGE (OUTPATIENT)
Dept: HEMATOLOGY/ONCOLOGY | Facility: CLINIC | Age: 79
End: 2019-11-29

## 2019-11-30 ENCOUNTER — EXTERNAL CHRONIC CARE MANAGEMENT (OUTPATIENT)
Dept: PRIMARY CARE CLINIC | Facility: CLINIC | Age: 79
End: 2019-11-30
Payer: MEDICARE

## 2019-11-30 PROCEDURE — 99490 PR CHRONIC CARE MGMT, 1ST 20 MIN: ICD-10-PCS | Mod: S$PBB,,, | Performed by: INTERNAL MEDICINE

## 2019-11-30 PROCEDURE — 99490 CHRNC CARE MGMT STAFF 1ST 20: CPT | Mod: PBBFAC | Performed by: INTERNAL MEDICINE

## 2019-11-30 PROCEDURE — 99490 CHRNC CARE MGMT STAFF 1ST 20: CPT | Mod: S$PBB,,, | Performed by: INTERNAL MEDICINE

## 2019-12-02 ENCOUNTER — PATIENT MESSAGE (OUTPATIENT)
Dept: HEMATOLOGY/ONCOLOGY | Facility: CLINIC | Age: 79
End: 2019-12-02

## 2019-12-02 ENCOUNTER — TELEPHONE (OUTPATIENT)
Dept: HEMATOLOGY/ONCOLOGY | Facility: CLINIC | Age: 79
End: 2019-12-02

## 2019-12-02 DIAGNOSIS — D53.9 MACROCYTIC ANEMIA: Primary | ICD-10-CM

## 2019-12-02 NOTE — TELEPHONE ENCOUNTER
"Contacted the patient after receiving notification from "[facility]" , that although patient canceled her appt for Tuesday, due to a dental appt, that she was not feeling well and truly felt that she needed to be seen this week.  Spoke to the patient , who states that she has been feeling pretty weak, and at the very least felt as though she may need to get blood work done.   I spoke with her about coming to [facility] to have labs drawn, she will wait for the results, to assure no urgent care is needed.  I will discuss with Dr De Anda / Dr Lombardo tomorrow a plan to coordinate either being seen or her next steps for care.   "

## 2019-12-03 ENCOUNTER — LAB VISIT (OUTPATIENT)
Dept: LAB | Facility: HOSPITAL | Age: 79
End: 2019-12-03
Payer: MEDICARE

## 2019-12-03 ENCOUNTER — PATIENT MESSAGE (OUTPATIENT)
Dept: HEMATOLOGY/ONCOLOGY | Facility: CLINIC | Age: 79
End: 2019-12-03

## 2019-12-03 ENCOUNTER — OFFICE VISIT (OUTPATIENT)
Dept: HEMATOLOGY/ONCOLOGY | Facility: CLINIC | Age: 79
End: 2019-12-03
Payer: MEDICARE

## 2019-12-03 VITALS
HEIGHT: 68 IN | SYSTOLIC BLOOD PRESSURE: 118 MMHG | HEART RATE: 70 BPM | WEIGHT: 157.44 LBS | RESPIRATION RATE: 16 BRPM | OXYGEN SATURATION: 99 % | BODY MASS INDEX: 23.86 KG/M2 | DIASTOLIC BLOOD PRESSURE: 58 MMHG | TEMPERATURE: 98 F

## 2019-12-03 DIAGNOSIS — D53.9 MACROCYTIC ANEMIA: ICD-10-CM

## 2019-12-03 DIAGNOSIS — D53.9 MACROCYTIC ANEMIA: Primary | ICD-10-CM

## 2019-12-03 LAB
ABO + RH BLD: NORMAL
ALBUMIN SERPL BCP-MCNC: 4.2 G/DL (ref 3.5–5.2)
ALP SERPL-CCNC: 33 U/L (ref 55–135)
ALT SERPL W/O P-5'-P-CCNC: 11 U/L (ref 10–44)
ANION GAP SERPL CALC-SCNC: 9 MMOL/L (ref 8–16)
AST SERPL-CCNC: 15 U/L (ref 10–40)
BASOPHILS # BLD AUTO: 0.11 K/UL (ref 0–0.2)
BASOPHILS NFR BLD: 1.9 % (ref 0–1.9)
BILIRUB SERPL-MCNC: 0.7 MG/DL (ref 0.1–1)
BLD GP AB SCN CELLS X3 SERPL QL: NORMAL
BUN SERPL-MCNC: 17 MG/DL (ref 8–23)
CALCIUM SERPL-MCNC: 9.3 MG/DL (ref 8.7–10.5)
CHLORIDE SERPL-SCNC: 108 MMOL/L (ref 95–110)
CO2 SERPL-SCNC: 23 MMOL/L (ref 23–29)
CREAT SERPL-MCNC: 1 MG/DL (ref 0.5–1.4)
DIFFERENTIAL METHOD: ABNORMAL
EOSINOPHIL # BLD AUTO: 0.2 K/UL (ref 0–0.5)
EOSINOPHIL NFR BLD: 3 % (ref 0–8)
ERYTHROCYTE [DISTWIDTH] IN BLOOD BY AUTOMATED COUNT: 14.2 % (ref 11.5–14.5)
EST. GFR  (AFRICAN AMERICAN): >60 ML/MIN/1.73 M^2
EST. GFR  (NON AFRICAN AMERICAN): 53.3 ML/MIN/1.73 M^2
FERRITIN SERPL-MCNC: 360 NG/ML (ref 20–300)
GLUCOSE SERPL-MCNC: 101 MG/DL (ref 70–110)
HAPTOGLOB SERPL-MCNC: 24 MG/DL (ref 30–250)
HCT VFR BLD AUTO: 30.1 % (ref 37–48.5)
HGB BLD-MCNC: 9.9 G/DL (ref 12–16)
IMM GRANULOCYTES # BLD AUTO: 0.01 K/UL (ref 0–0.04)
IMM GRANULOCYTES NFR BLD AUTO: 0.2 % (ref 0–0.5)
IRON SERPL-MCNC: 187 UG/DL (ref 30–160)
LDH SERPL L TO P-CCNC: 178 U/L (ref 110–260)
LYMPHOCYTES # BLD AUTO: 2.1 K/UL (ref 1–4.8)
LYMPHOCYTES NFR BLD: 36.6 % (ref 18–48)
MAGNESIUM SERPL-MCNC: 2.2 MG/DL (ref 1.6–2.6)
MCH RBC QN AUTO: 36.9 PG (ref 27–31)
MCHC RBC AUTO-ENTMCNC: 32.9 G/DL (ref 32–36)
MCV RBC AUTO: 112 FL (ref 82–98)
MONOCYTES # BLD AUTO: 0.6 K/UL (ref 0.3–1)
MONOCYTES NFR BLD: 9.5 % (ref 4–15)
NEUTROPHILS # BLD AUTO: 2.8 K/UL (ref 1.8–7.7)
NEUTROPHILS NFR BLD: 48.8 % (ref 38–73)
NRBC BLD-RTO: 0 /100 WBC
PHOSPHATE SERPL-MCNC: 3.3 MG/DL (ref 2.7–4.5)
PLATELET # BLD AUTO: 404 K/UL (ref 150–350)
PMV BLD AUTO: 9.9 FL (ref 9.2–12.9)
POTASSIUM SERPL-SCNC: 4 MMOL/L (ref 3.5–5.1)
PROT SERPL-MCNC: 6.7 G/DL (ref 6–8.4)
RBC # BLD AUTO: 2.68 M/UL (ref 4–5.4)
RETICS/RBC NFR AUTO: 0.9 % (ref 0.5–2.5)
SATURATED IRON: 71 % (ref 20–50)
SODIUM SERPL-SCNC: 140 MMOL/L (ref 136–145)
T4 FREE SERPL-MCNC: 0.96 NG/DL (ref 0.71–1.51)
TOTAL IRON BINDING CAPACITY: 263 UG/DL (ref 250–450)
TRANSFERRIN SERPL-MCNC: 178 MG/DL (ref 200–375)
TSH SERPL DL<=0.005 MIU/L-ACNC: 1.42 UIU/ML (ref 0.4–4)
WBC # BLD AUTO: 5.76 K/UL (ref 3.9–12.7)

## 2019-12-03 PROCEDURE — 1126F AMNT PAIN NOTED NONE PRSNT: CPT | Mod: GC,,,

## 2019-12-03 PROCEDURE — 1126F PR PAIN SEVERITY QUANTIFIED, NO PAIN PRESENT: ICD-10-PCS | Mod: GC,,,

## 2019-12-03 PROCEDURE — 1159F MED LIST DOCD IN RCRD: CPT | Mod: GC,,,

## 2019-12-03 PROCEDURE — 85045 AUTOMATED RETICULOCYTE COUNT: CPT

## 2019-12-03 PROCEDURE — 83010 ASSAY OF HAPTOGLOBIN QUANT: CPT

## 2019-12-03 PROCEDURE — 82728 ASSAY OF FERRITIN: CPT

## 2019-12-03 PROCEDURE — 84439 ASSAY OF FREE THYROXINE: CPT

## 2019-12-03 PROCEDURE — 99215 OFFICE O/P EST HI 40 MIN: CPT | Mod: PBBFAC,25

## 2019-12-03 PROCEDURE — 84100 ASSAY OF PHOSPHORUS: CPT

## 2019-12-03 PROCEDURE — 83540 ASSAY OF IRON: CPT

## 2019-12-03 PROCEDURE — 99214 OFFICE O/P EST MOD 30 MIN: CPT | Mod: S$PBB,GC,,

## 2019-12-03 PROCEDURE — 99999 PR PBB SHADOW E&M-EST. PATIENT-LVL V: ICD-10-PCS | Mod: PBBFAC,GC,,

## 2019-12-03 PROCEDURE — 86850 RBC ANTIBODY SCREEN: CPT

## 2019-12-03 PROCEDURE — 85025 COMPLETE CBC W/AUTO DIFF WBC: CPT

## 2019-12-03 PROCEDURE — 84443 ASSAY THYROID STIM HORMONE: CPT

## 2019-12-03 PROCEDURE — 82668 ASSAY OF ERYTHROPOIETIN: CPT

## 2019-12-03 PROCEDURE — 99999 PR PBB SHADOW E&M-EST. PATIENT-LVL V: CPT | Mod: PBBFAC,GC,,

## 2019-12-03 PROCEDURE — 99214 PR OFFICE/OUTPT VISIT, EST, LEVL IV, 30-39 MIN: ICD-10-PCS | Mod: S$PBB,GC,,

## 2019-12-03 PROCEDURE — 83615 LACTATE (LD) (LDH) ENZYME: CPT

## 2019-12-03 PROCEDURE — 1159F PR MEDICATION LIST DOCUMENTED IN MEDICAL RECORD: ICD-10-PCS | Mod: GC,,,

## 2019-12-03 PROCEDURE — 83735 ASSAY OF MAGNESIUM: CPT

## 2019-12-03 PROCEDURE — 80053 COMPREHEN METABOLIC PANEL: CPT

## 2019-12-03 NOTE — H&P (VIEW-ONLY)
[facility]  [facility]  Hematology/Medical Oncology Clinic      PATIENT: Shelly Vásquez  MRN: [id number]  DATE: 12/3/2019    Diagnosis:   1. Macrocytic anemia      Chief Complaint: Follow-up    Other MDs:  Joana Doty MD    Subjective:   Initial History: Ms. Vásquez is a 78 y.o. female who returns to hematology clinic for her noted history of anemia.     She has a pMHx of GIB requiring transfusions in the past as well as pAF - not on anticoagulation (aspirin only) with a loop recorder in place.     Initially seen about 1 month ago for her hx of macrocytic anemia. She presents today with complaints of fatigue, DALE and decreased exercise tolerance over the past few weeks.    She was scheduled for a bmbx 3 weeks ago, however she cancelled d/t personal reasons and did not reschedule.    She is accompanied by her  today. She appears well though clearly has some memory problems - this was noted on last exam as well.      Work up after last visit including SPEP/SUSHILA were normal. REtic not elevated, which is slightly abnormal for anemia. Hapto was decrease though LDH wnl. Bili not elevated.    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    Supraventricular tachycardia     s/p AVNRT ablation (Dr Brady)     Past Surgical History:   Procedure Laterality Date    APPENDECTOMY  age 21    BACK SURGERY      Beast Lift  2004    BREAST BIOPSY  50yrs ago    unable to see scar    COSMETIC SURGERY      DILATION AND CURETTAGE OF UTERUS  2008    PMB    ENDOMETRIAL BIOPSY      EYE SURGERY      ptsosis      RADIOFREQUENCY ABLATION  03/2018    SMALL INTESTINE SURGERY       TONSILLECTOMY, ADENOIDECTOMY  age 8    TOTAL REDUCTION MAMMOPLASTY Bilateral 2003    TUBAL LIGATION       Family History   Problem Relation Age of Onset    No Known Problems Father     Cirrhosis Mother     No Known Problems Sister     No Known Problems Brother     No Known Problems Maternal Aunt     No Known Problems Maternal Uncle     No Known Problems Paternal Aunt     No Known Problems Paternal Uncle     No Known Problems Maternal Grandmother     No Known Problems Maternal Grandfather     No Known Problems Paternal Grandmother     No Known Problems Paternal Grandfather     No Known Problems Daughter     No Known Problems Son     Breast cancer Neg Hx     Colon cancer Neg Hx     Ovarian cancer Neg Hx     Amblyopia Neg Hx     Blindness Neg Hx     Cancer Neg Hx     Cataracts Neg Hx     Diabetes Neg Hx     Glaucoma Neg Hx     Hypertension Neg Hx     Macular degeneration Neg Hx     Retinal detachment Neg Hx     Strabismus Neg Hx     Stroke Neg Hx     Thyroid disease Neg Hx       reports that she quit smoking about 39 years ago. Her smoking use included cigarettes. She has a 22.00 pack-year smoking history. She has never used smokeless tobacco. She reports that she drinks about 7.0 standard drinks of alcohol per week. She reports that she does not use drugs.  Review of patient's allergies indicates:   Allergen Reactions    Nsaids (non-steroidal anti-inflammatory drug)      GI Bleed  Had 5 blood transfusions     Current Outpatient Medications   Medication Sig Dispense Refill    acetaminophen (TYLENOL) 325 MG tablet Take 325 mg by mouth continuous prn for Pain.      aspirin (ECOTRIN) 81 MG EC tablet Take 1 tablet (81 mg total) by mouth once daily. 30 tablet 0    estradiol-norethindrone acet (LOPREEZA) 0.5-0.1 mg per tablet Take 1 tablet by mouth once daily. 84 tablet 3    lidocaine HCL 2% (XYLOCAINE) 2 % jelly Apply to affected area daily as needed 30 mL 1    temazepam (RESTORIL) 15  "mg Cap Take 1 capsule (15 mg total) by mouth nightly as needed. 90 capsule 1    verapamil (VERELAN) 120 MG C24P Take 1 capsule (120 mg total) by mouth once daily. 90 capsule 3    valACYclovir (VALTREX) 500 MG tablet Take 1 tablet (500 mg total) by mouth once daily. 30 tablet 11     No current facility-administered medications for this visit.      Review of Systems   Constitutional: Positive for fatigue. Negative for appetite change, chills and unexpected weight change.   HENT: Positive for dental problem. Negative for mouth sores, nosebleeds, trouble swallowing and voice change.    Eyes: Negative for visual disturbance.   Respiratory: Positive for shortness of breath. Negative for chest tightness and wheezing.    Cardiovascular: Negative for chest pain, palpitations and leg swelling.   Gastrointestinal: Negative for abdominal distention, abdominal pain, blood in stool, diarrhea, nausea and vomiting.   Endocrine: Negative for cold intolerance.   Genitourinary: Negative for hematuria.   Musculoskeletal: Negative for neck pain.   Skin: Negative for pallor and rash.   Allergic/Immunologic: Negative for immunocompromised state.   Neurological: Negative for dizziness, weakness and headaches.   Hematological: Negative for adenopathy. Does not bruise/bleed easily.   Psychiatric/Behavioral: Negative for agitation and behavioral problems.     ECOG Performance Status: 1     Objective:      Vitals:   Vitals:    12/03/19 1401   BP: (!) 118/58   BP Location: Left arm   Patient Position: Sitting   BP Method: Large (Automatic)   Pulse: 70   Resp: 16   Temp: 98 °F (36.7 °C)   TempSrc: Oral   SpO2: 99%   Weight: 71.4 kg (157 lb 6.5 oz)   Height: 5' 8" (1.727 m)     BMI: Body mass index is 23.93 kg/m².    Physical Exam   Constitutional: She is oriented to person, place, and time. She appears well-developed and well-nourished.   Well appearing, thin, white, elderly female   HENT:   Head: Normocephalic and atraumatic.   Eyes: Pupils " are equal, round, and reactive to light. EOM are normal.   Neck: Normal range of motion. Neck supple.   Cardiovascular: Normal rate and regular rhythm.   Pulmonary/Chest: Effort normal and breath sounds normal. She has no wheezes.   Abdominal: Soft. Bowel sounds are normal. She exhibits no mass.   Musculoskeletal: Normal range of motion. She exhibits no edema.   Lymphadenopathy:        Head (right side): No submandibular, no posterior auricular and no occipital adenopathy present.        Head (left side): No submandibular, no posterior auricular and no occipital adenopathy present.     She has no cervical adenopathy.     She has no axillary adenopathy.        Right: No supraclavicular adenopathy present.        Left: No supraclavicular adenopathy present.   Neurological: She is alert and oriented to person, place, and time.   Skin: Skin is warm and dry.   Psychiatric: She has a normal mood and affect. Her behavior is normal.   Vitals reviewed.    Laboratory Data:  Lab Visit on 12/03/2019   Component Date Value Ref Range Status    WBC 12/03/2019 5.76  3.90 - 12.70 K/uL Final    RBC 12/03/2019 2.68* 4.00 - 5.40 M/uL Final    Hemoglobin 12/03/2019 9.9* 12.0 - 16.0 g/dL Final    Hematocrit 12/03/2019 30.1* 37.0 - 48.5 % Final    Mean Corpuscular Volume 12/03/2019 112* 82 - 98 fL Final    Mean Corpuscular Hemoglobin 12/03/2019 36.9* 27.0 - 31.0 pg Final    Mean Corpuscular Hemoglobin Conc 12/03/2019 32.9  32.0 - 36.0 g/dL Final    RDW 12/03/2019 14.2  11.5 - 14.5 % Final    Platelets 12/03/2019 404* 150 - 350 K/uL Final    MPV 12/03/2019 9.9  9.2 - 12.9 fL Final    Immature Granulocytes 12/03/2019 0.2  0.0 - 0.5 % Final    Gran # (ANC) 12/03/2019 2.8  1.8 - 7.7 K/uL Final    Immature Grans (Abs) 12/03/2019 0.01  0.00 - 0.04 K/uL Final    Comment: Mild elevation in immature granulocytes is non specific and   can be seen in a variety of conditions including stress response,   acute inflammation, trauma and  pregnancy. Correlation with other   laboratory and clinical findings is essential.      Lymph # 12/03/2019 2.1  1.0 - 4.8 K/uL Final    Mono # 12/03/2019 0.6  0.3 - 1.0 K/uL Final    Eos # 12/03/2019 0.2  0.0 - 0.5 K/uL Final    Baso # 12/03/2019 0.11  0.00 - 0.20 K/uL Final    nRBC 12/03/2019 0  0 /100 WBC Final    Gran% 12/03/2019 48.8  38.0 - 73.0 % Final    Lymph% 12/03/2019 36.6  18.0 - 48.0 % Final    Mono% 12/03/2019 9.5  4.0 - 15.0 % Final    Eosinophil% 12/03/2019 3.0  0.0 - 8.0 % Final    Basophil% 12/03/2019 1.9  0.0 - 1.9 % Final    Differential Method 12/03/2019 Automated   Final    Sodium 12/03/2019 140  136 - 145 mmol/L Final    Potassium 12/03/2019 4.0  3.5 - 5.1 mmol/L Final    Chloride 12/03/2019 108  95 - 110 mmol/L Final    CO2 12/03/2019 23  23 - 29 mmol/L Final    Glucose 12/03/2019 101  70 - 110 mg/dL Final    BUN, Bld 12/03/2019 17  8 - 23 mg/dL Final    Creatinine 12/03/2019 1.0  0.5 - 1.4 mg/dL Final    Calcium 12/03/2019 9.3  8.7 - 10.5 mg/dL Final    Total Protein 12/03/2019 6.7  6.0 - 8.4 g/dL Final    Albumin 12/03/2019 4.2  3.5 - 5.2 g/dL Final    Total Bilirubin 12/03/2019 0.7  0.1 - 1.0 mg/dL Final    Comment: For infants and newborns, interpretation of results should be based  on gestational age, weight and in agreement with clinical  observations.  Premature Infant recommended reference ranges:  Up to 24 hours.............<8.0 mg/dL  Up to 48 hours............<12.0 mg/dL  3-5 days..................<15.0 mg/dL  6-29 days.................<15.0 mg/dL      Alkaline Phosphatase 12/03/2019 33* 55 - 135 U/L Final    AST 12/03/2019 15  10 - 40 U/L Final    ALT 12/03/2019 11  10 - 44 U/L Final    Anion Gap 12/03/2019 9  8 - 16 mmol/L Final    eGFR if African American 12/03/2019 >60.0  >60 mL/min/1.73 m^2 Final    eGFR if non African American 12/03/2019 53.3* >60 mL/min/1.73 m^2 Final    Comment: Calculation used to obtain the estimated glomerular filtration  rate  (eGFR) is the CKD-EPI equation.       Magnesium 12/03/2019 2.2  1.6 - 2.6 mg/dL Final    Phosphorus 12/03/2019 3.3  2.7 - 4.5 mg/dL Final    Group & Rh 12/03/2019 A POS   Final    Indirect Berry 12/03/2019 NEG   Final    LD 12/03/2019 178  110 - 260 U/L Final    Results are increased in hemolyzed samples.    Ferritin 12/03/2019 360* 20.0 - 300.0 ng/mL Final    Iron 12/03/2019 187* 30 - 160 ug/dL Final    Transferrin 12/03/2019 178* 200 - 375 mg/dL Final    TIBC 12/03/2019 263  250 - 450 ug/dL Final    Saturated Iron 12/03/2019 71* 20 - 50 % Final    TSH 12/03/2019 1.424  0.400 - 4.000 uIU/mL Final    Free T4 12/03/2019 0.96  0.71 - 1.51 ng/dL Final    Haptoglobin 12/03/2019 24* 30 - 250 mg/dL Final         Assessment:       1. Macrocytic anemia        Hematologic History:      See below    Plan:     Mrs. Vásquez presents to hematology clinic today as an urgent visit for above ROS complaints. She had a recent work up here that was unrevealing regarding her underlying macrocytic anemia and bone marrow biopsy was recommended. She did not go through with this and cancelled. Long discussion was had with her and her  today regarding possible malignant process at play and the need for this biopsy. They acknowledged understanding. Otherwise, do not know what to make of her new onset fatigue, will need to follow up with PCP as it is unlikely to be hematologically related as her counts look stable again today.    -will scheduled for a bmbx on Dec 19th   -follow up with me on Dec 31st  -will check hemolytic labs again   -LDH, retic, hapto, LARS  -Epo level  -TSH/T4 - as hypothyroid sim can cause macro anemia  -discussed to hold asa 81 mg daily 5 days prior to marrow    Discussed with Dr. Spring.    Sebas De Anda MD  Hematology/Medical Oncology Fellow  [phone] (pager)

## 2019-12-03 NOTE — Clinical Note
Julius Hill. Vásquez needs some things:1. Bone marrow under anesthesia on Dec 19th at 8am2. Needs f/u with me on Dec 31st3. She also needs to come in for a quick lab visit in the [facility]

## 2019-12-03 NOTE — PROGRESS NOTES
Persistent MCA  Cancelled previously scheduled marrow   Iron, b12, folate normal   Previous hapto low  Add on epo, hapto, retic, sabi, tsh, free t4to todays labs   Schedule marrow 12/19 and fu with labs and lunski appt 12/31

## 2019-12-03 NOTE — PROGRESS NOTES
[facility]  [facility]  Hematology/Medical Oncology Clinic      PATIENT: Shelly Vásquez  MRN: [id number]  DATE: 12/3/2019    Diagnosis:   1. Macrocytic anemia      Chief Complaint: Follow-up    Other MDs:  Joana Doty MD    Subjective:   Initial History: Ms. Vásquez is a 78 y.o. female who returns to hematology clinic for her noted history of anemia.     She has a pMHx of GIB requiring transfusions in the past as well as pAF - not on anticoagulation (aspirin only) with a loop recorder in place.     Initially seen about 1 month ago for her hx of macrocytic anemia. She presents today with complaints of fatigue, DALE and decreased exercise tolerance over the past few weeks.    She was scheduled for a bmbx 3 weeks ago, however she cancelled d/t personal reasons and did not reschedule.    She is accompanied by her  today. She appears well though clearly has some memory problems - this was noted on last exam as well.      Work up after last visit including SPEP/SUSHILA were normal. REtic not elevated, which is slightly abnormal for anemia. Hapto was decrease though LDH wnl. Bili not elevated.    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    Supraventricular tachycardia     s/p AVNRT ablation (Dr Brady)     Past Surgical History:   Procedure Laterality Date    APPENDECTOMY  age 21    BACK SURGERY      Beast Lift  2004    BREAST BIOPSY  50yrs ago    unable to see scar    COSMETIC SURGERY      DILATION AND CURETTAGE OF UTERUS  2008    PMB    ENDOMETRIAL BIOPSY      EYE SURGERY      ptsosis      RADIOFREQUENCY ABLATION  03/2018    SMALL INTESTINE SURGERY       TONSILLECTOMY, ADENOIDECTOMY  age 8    TOTAL REDUCTION MAMMOPLASTY Bilateral 2003    TUBAL LIGATION       Family History   Problem Relation Age of Onset    No Known Problems Father     Cirrhosis Mother     No Known Problems Sister     No Known Problems Brother     No Known Problems Maternal Aunt     No Known Problems Maternal Uncle     No Known Problems Paternal Aunt     No Known Problems Paternal Uncle     No Known Problems Maternal Grandmother     No Known Problems Maternal Grandfather     No Known Problems Paternal Grandmother     No Known Problems Paternal Grandfather     No Known Problems Daughter     No Known Problems Son     Breast cancer Neg Hx     Colon cancer Neg Hx     Ovarian cancer Neg Hx     Amblyopia Neg Hx     Blindness Neg Hx     Cancer Neg Hx     Cataracts Neg Hx     Diabetes Neg Hx     Glaucoma Neg Hx     Hypertension Neg Hx     Macular degeneration Neg Hx     Retinal detachment Neg Hx     Strabismus Neg Hx     Stroke Neg Hx     Thyroid disease Neg Hx       reports that she quit smoking about 39 years ago. Her smoking use included cigarettes. She has a 22.00 pack-year smoking history. She has never used smokeless tobacco. She reports that she drinks about 7.0 standard drinks of alcohol per week. She reports that she does not use drugs.  Review of patient's allergies indicates:   Allergen Reactions    Nsaids (non-steroidal anti-inflammatory drug)      GI Bleed  Had 5 blood transfusions     Current Outpatient Medications   Medication Sig Dispense Refill    acetaminophen (TYLENOL) 325 MG tablet Take 325 mg by mouth continuous prn for Pain.      aspirin (ECOTRIN) 81 MG EC tablet Take 1 tablet (81 mg total) by mouth once daily. 30 tablet 0    estradiol-norethindrone acet (LOPREEZA) 0.5-0.1 mg per tablet Take 1 tablet by mouth once daily. 84 tablet 3    lidocaine HCL 2% (XYLOCAINE) 2 % jelly Apply to affected area daily as needed 30 mL 1    temazepam (RESTORIL) 15  "mg Cap Take 1 capsule (15 mg total) by mouth nightly as needed. 90 capsule 1    verapamil (VERELAN) 120 MG C24P Take 1 capsule (120 mg total) by mouth once daily. 90 capsule 3    valACYclovir (VALTREX) 500 MG tablet Take 1 tablet (500 mg total) by mouth once daily. 30 tablet 11     No current facility-administered medications for this visit.      Review of Systems   Constitutional: Positive for fatigue. Negative for appetite change, chills and unexpected weight change.   HENT: Positive for dental problem. Negative for mouth sores, nosebleeds, trouble swallowing and voice change.    Eyes: Negative for visual disturbance.   Respiratory: Positive for shortness of breath. Negative for chest tightness and wheezing.    Cardiovascular: Negative for chest pain, palpitations and leg swelling.   Gastrointestinal: Negative for abdominal distention, abdominal pain, blood in stool, diarrhea, nausea and vomiting.   Endocrine: Negative for cold intolerance.   Genitourinary: Negative for hematuria.   Musculoskeletal: Negative for neck pain.   Skin: Negative for pallor and rash.   Allergic/Immunologic: Negative for immunocompromised state.   Neurological: Negative for dizziness, weakness and headaches.   Hematological: Negative for adenopathy. Does not bruise/bleed easily.   Psychiatric/Behavioral: Negative for agitation and behavioral problems.     ECOG Performance Status: 1     Objective:      Vitals:   Vitals:    12/03/19 1401   BP: (!) 118/58   BP Location: Left arm   Patient Position: Sitting   BP Method: Large (Automatic)   Pulse: 70   Resp: 16   Temp: 98 °F (36.7 °C)   TempSrc: Oral   SpO2: 99%   Weight: 71.4 kg (157 lb 6.5 oz)   Height: 5' 8" (1.727 m)     BMI: Body mass index is 23.93 kg/m².    Physical Exam   Constitutional: She is oriented to person, place, and time. She appears well-developed and well-nourished.   Well appearing, thin, white, elderly female   HENT:   Head: Normocephalic and atraumatic.   Eyes: Pupils " are equal, round, and reactive to light. EOM are normal.   Neck: Normal range of motion. Neck supple.   Cardiovascular: Normal rate and regular rhythm.   Pulmonary/Chest: Effort normal and breath sounds normal. She has no wheezes.   Abdominal: Soft. Bowel sounds are normal. She exhibits no mass.   Musculoskeletal: Normal range of motion. She exhibits no edema.   Lymphadenopathy:        Head (right side): No submandibular, no posterior auricular and no occipital adenopathy present.        Head (left side): No submandibular, no posterior auricular and no occipital adenopathy present.     She has no cervical adenopathy.     She has no axillary adenopathy.        Right: No supraclavicular adenopathy present.        Left: No supraclavicular adenopathy present.   Neurological: She is alert and oriented to person, place, and time.   Skin: Skin is warm and dry.   Psychiatric: She has a normal mood and affect. Her behavior is normal.   Vitals reviewed.    Laboratory Data:  Lab Visit on 12/03/2019   Component Date Value Ref Range Status    WBC 12/03/2019 5.76  3.90 - 12.70 K/uL Final    RBC 12/03/2019 2.68* 4.00 - 5.40 M/uL Final    Hemoglobin 12/03/2019 9.9* 12.0 - 16.0 g/dL Final    Hematocrit 12/03/2019 30.1* 37.0 - 48.5 % Final    Mean Corpuscular Volume 12/03/2019 112* 82 - 98 fL Final    Mean Corpuscular Hemoglobin 12/03/2019 36.9* 27.0 - 31.0 pg Final    Mean Corpuscular Hemoglobin Conc 12/03/2019 32.9  32.0 - 36.0 g/dL Final    RDW 12/03/2019 14.2  11.5 - 14.5 % Final    Platelets 12/03/2019 404* 150 - 350 K/uL Final    MPV 12/03/2019 9.9  9.2 - 12.9 fL Final    Immature Granulocytes 12/03/2019 0.2  0.0 - 0.5 % Final    Gran # (ANC) 12/03/2019 2.8  1.8 - 7.7 K/uL Final    Immature Grans (Abs) 12/03/2019 0.01  0.00 - 0.04 K/uL Final    Comment: Mild elevation in immature granulocytes is non specific and   can be seen in a variety of conditions including stress response,   acute inflammation, trauma and  pregnancy. Correlation with other   laboratory and clinical findings is essential.      Lymph # 12/03/2019 2.1  1.0 - 4.8 K/uL Final    Mono # 12/03/2019 0.6  0.3 - 1.0 K/uL Final    Eos # 12/03/2019 0.2  0.0 - 0.5 K/uL Final    Baso # 12/03/2019 0.11  0.00 - 0.20 K/uL Final    nRBC 12/03/2019 0  0 /100 WBC Final    Gran% 12/03/2019 48.8  38.0 - 73.0 % Final    Lymph% 12/03/2019 36.6  18.0 - 48.0 % Final    Mono% 12/03/2019 9.5  4.0 - 15.0 % Final    Eosinophil% 12/03/2019 3.0  0.0 - 8.0 % Final    Basophil% 12/03/2019 1.9  0.0 - 1.9 % Final    Differential Method 12/03/2019 Automated   Final    Sodium 12/03/2019 140  136 - 145 mmol/L Final    Potassium 12/03/2019 4.0  3.5 - 5.1 mmol/L Final    Chloride 12/03/2019 108  95 - 110 mmol/L Final    CO2 12/03/2019 23  23 - 29 mmol/L Final    Glucose 12/03/2019 101  70 - 110 mg/dL Final    BUN, Bld 12/03/2019 17  8 - 23 mg/dL Final    Creatinine 12/03/2019 1.0  0.5 - 1.4 mg/dL Final    Calcium 12/03/2019 9.3  8.7 - 10.5 mg/dL Final    Total Protein 12/03/2019 6.7  6.0 - 8.4 g/dL Final    Albumin 12/03/2019 4.2  3.5 - 5.2 g/dL Final    Total Bilirubin 12/03/2019 0.7  0.1 - 1.0 mg/dL Final    Comment: For infants and newborns, interpretation of results should be based  on gestational age, weight and in agreement with clinical  observations.  Premature Infant recommended reference ranges:  Up to 24 hours.............<8.0 mg/dL  Up to 48 hours............<12.0 mg/dL  3-5 days..................<15.0 mg/dL  6-29 days.................<15.0 mg/dL      Alkaline Phosphatase 12/03/2019 33* 55 - 135 U/L Final    AST 12/03/2019 15  10 - 40 U/L Final    ALT 12/03/2019 11  10 - 44 U/L Final    Anion Gap 12/03/2019 9  8 - 16 mmol/L Final    eGFR if African American 12/03/2019 >60.0  >60 mL/min/1.73 m^2 Final    eGFR if non African American 12/03/2019 53.3* >60 mL/min/1.73 m^2 Final    Comment: Calculation used to obtain the estimated glomerular filtration  rate  (eGFR) is the CKD-EPI equation.       Magnesium 12/03/2019 2.2  1.6 - 2.6 mg/dL Final    Phosphorus 12/03/2019 3.3  2.7 - 4.5 mg/dL Final    Group & Rh 12/03/2019 A POS   Final    Indirect Berry 12/03/2019 NEG   Final    LD 12/03/2019 178  110 - 260 U/L Final    Results are increased in hemolyzed samples.    Ferritin 12/03/2019 360* 20.0 - 300.0 ng/mL Final    Iron 12/03/2019 187* 30 - 160 ug/dL Final    Transferrin 12/03/2019 178* 200 - 375 mg/dL Final    TIBC 12/03/2019 263  250 - 450 ug/dL Final    Saturated Iron 12/03/2019 71* 20 - 50 % Final    TSH 12/03/2019 1.424  0.400 - 4.000 uIU/mL Final    Free T4 12/03/2019 0.96  0.71 - 1.51 ng/dL Final    Haptoglobin 12/03/2019 24* 30 - 250 mg/dL Final         Assessment:       1. Macrocytic anemia        Hematologic History:      See below    Plan:     Mrs. Vásquez presents to hematology clinic today as an urgent visit for above ROS complaints. She had a recent work up here that was unrevealing regarding her underlying macrocytic anemia and bone marrow biopsy was recommended. She did not go through with this and cancelled. Long discussion was had with her and her  today regarding possible malignant process at play and the need for this biopsy. They acknowledged understanding. Otherwise, do not know what to make of her new onset fatigue, will need to follow up with PCP as it is unlikely to be hematologically related as her counts look stable again today.    -will scheduled for a bmbx on Dec 19th   -follow up with me on Dec 31st  -will check hemolytic labs again   -LDH, retic, hapto, LARS  -Epo level  -TSH/T4 - as hypothyroid sim can cause macro anemia  -discussed to hold asa 81 mg daily 5 days prior to marrow    Discussed with Dr. Spring.    Sebas De Anda MD  Hematology/Medical Oncology Fellow  [phone] (pager)

## 2019-12-04 ENCOUNTER — TELEPHONE (OUTPATIENT)
Dept: HEMATOLOGY/ONCOLOGY | Facility: CLINIC | Age: 79
End: 2019-12-04

## 2019-12-04 ENCOUNTER — LAB VISIT (OUTPATIENT)
Dept: LAB | Facility: HOSPITAL | Age: 79
End: 2019-12-04
Payer: MEDICARE

## 2019-12-04 DIAGNOSIS — D53.9 MACROCYTIC ANEMIA: ICD-10-CM

## 2019-12-04 LAB
BASOPHILS # BLD AUTO: 0.08 K/UL (ref 0–0.2)
BASOPHILS NFR BLD: 1.4 % (ref 0–1.9)
DAT IGG-SP REAG RBC-IMP: NORMAL
DIFFERENTIAL METHOD: ABNORMAL
EOSINOPHIL # BLD AUTO: 0.2 K/UL (ref 0–0.5)
EOSINOPHIL NFR BLD: 3.9 % (ref 0–8)
ERYTHROCYTE [DISTWIDTH] IN BLOOD BY AUTOMATED COUNT: 14.3 % (ref 11.5–14.5)
HCT VFR BLD AUTO: 28.2 % (ref 37–48.5)
HGB BLD-MCNC: 9.3 G/DL (ref 12–16)
IMM GRANULOCYTES # BLD AUTO: 0.02 K/UL (ref 0–0.04)
IMM GRANULOCYTES NFR BLD AUTO: 0.3 % (ref 0–0.5)
LYMPHOCYTES # BLD AUTO: 2.6 K/UL (ref 1–4.8)
LYMPHOCYTES NFR BLD: 43.9 % (ref 18–48)
MCH RBC QN AUTO: 36.5 PG (ref 27–31)
MCHC RBC AUTO-ENTMCNC: 33 G/DL (ref 32–36)
MCV RBC AUTO: 111 FL (ref 82–98)
MONOCYTES # BLD AUTO: 0.6 K/UL (ref 0.3–1)
MONOCYTES NFR BLD: 11 % (ref 4–15)
NEUTROPHILS # BLD AUTO: 2.3 K/UL (ref 1.8–7.7)
NEUTROPHILS NFR BLD: 39.5 % (ref 38–73)
NRBC BLD-RTO: 0 /100 WBC
PLATELET # BLD AUTO: 405 K/UL (ref 150–350)
PMV BLD AUTO: 9.6 FL (ref 9.2–12.9)
RBC # BLD AUTO: 2.55 M/UL (ref 4–5.4)
T4 FREE SERPL-MCNC: 0.91 NG/DL (ref 0.71–1.51)
TSH SERPL DL<=0.005 MIU/L-ACNC: 1.4 UIU/ML (ref 0.4–4)
WBC # BLD AUTO: 5.83 K/UL (ref 3.9–12.7)

## 2019-12-04 PROCEDURE — 84443 ASSAY THYROID STIM HORMONE: CPT

## 2019-12-04 PROCEDURE — 82525 ASSAY OF COPPER: CPT

## 2019-12-04 PROCEDURE — 82668 ASSAY OF ERYTHROPOIETIN: CPT

## 2019-12-04 PROCEDURE — 84439 ASSAY OF FREE THYROXINE: CPT

## 2019-12-04 PROCEDURE — 85025 COMPLETE CBC W/AUTO DIFF WBC: CPT

## 2019-12-04 PROCEDURE — 86880 COOMBS TEST DIRECT: CPT

## 2019-12-05 ENCOUNTER — PATIENT MESSAGE (OUTPATIENT)
Dept: ELECTROPHYSIOLOGY | Facility: CLINIC | Age: 79
End: 2019-12-05

## 2019-12-06 LAB — EPO SERPL-ACNC: 31.8 MIU/ML (ref 2.6–18.5)

## 2019-12-09 ENCOUNTER — PATIENT MESSAGE (OUTPATIENT)
Dept: HEMATOLOGY/ONCOLOGY | Facility: CLINIC | Age: 79
End: 2019-12-09

## 2019-12-09 LAB
COPPER SERPL-MCNC: 818 UG/L (ref 810–1990)
EPO SERPL-ACNC: 34 MIU/ML (ref 2.6–18.5)

## 2019-12-19 ENCOUNTER — HOSPITAL ENCOUNTER (OUTPATIENT)
Facility: HOSPITAL | Age: 79
Discharge: HOME | End: 2019-12-19
Attending: INTERNAL MEDICINE | Admitting: INTERNAL MEDICINE
Payer: MEDICARE

## 2019-12-19 ENCOUNTER — ANESTHESIA EVENT (OUTPATIENT)
Dept: SURGERY | Facility: HOSPITAL | Age: 79
End: 2019-12-19
Payer: MEDICARE

## 2019-12-19 ENCOUNTER — ANESTHESIA (OUTPATIENT)
Dept: SURGERY | Facility: HOSPITAL | Age: 79
End: 2019-12-19
Payer: MEDICARE

## 2019-12-19 VITALS
OXYGEN SATURATION: 100 % | DIASTOLIC BLOOD PRESSURE: 60 MMHG | SYSTOLIC BLOOD PRESSURE: 129 MMHG | TEMPERATURE: 98 F | BODY MASS INDEX: 23.79 KG/M2 | HEART RATE: 60 BPM | HEIGHT: 68 IN | WEIGHT: 157 LBS | RESPIRATION RATE: 18 BRPM

## 2019-12-19 DIAGNOSIS — D64.9 ANEMIA, UNSPECIFIED TYPE: ICD-10-CM

## 2019-12-19 DIAGNOSIS — D46.9 MDS (MYELODYSPLASTIC SYNDROME): ICD-10-CM

## 2019-12-19 DIAGNOSIS — D75.839 THROMBOCYTOSIS: ICD-10-CM

## 2019-12-19 PROCEDURE — 88341 IMHCHEM/IMCYTCHM EA ADD ANTB: CPT | Mod: 26,,, | Performed by: PATHOLOGY

## 2019-12-19 PROCEDURE — 88185 FLOWCYTOMETRY/TC ADD-ON: CPT | Mod: 59 | Performed by: PATHOLOGY

## 2019-12-19 PROCEDURE — 88299 UNLISTED CYTOGENETIC STUDY: CPT

## 2019-12-19 PROCEDURE — 38222 PR BONE MARROW BIOPSY(IES) W/ASPIRATION(S); DIAGNOSTIC: ICD-10-PCS | Mod: RT,,, | Performed by: NURSE PRACTITIONER

## 2019-12-19 PROCEDURE — 88341 PR IHC OR ICC EACH ADD'L SINGLE ANTIBODY  STAINPR: ICD-10-PCS | Mod: 26,,, | Performed by: PATHOLOGY

## 2019-12-19 PROCEDURE — 71000015 HC POSTOP RECOV 1ST HR: Performed by: INTERNAL MEDICINE

## 2019-12-19 PROCEDURE — 88305 TISSUE EXAM BY PATHOLOGIST: CPT | Performed by: PATHOLOGY

## 2019-12-19 PROCEDURE — 88305 TISSUE EXAM BY PATHOLOGIST: CPT | Mod: 26,,, | Performed by: PATHOLOGY

## 2019-12-19 PROCEDURE — 88313 SPECIAL STAINS GROUP 2: CPT | Performed by: PATHOLOGY

## 2019-12-19 PROCEDURE — 88305 TISSUE EXAM BY PATHOLOGIST: ICD-10-PCS | Mod: 26,,, | Performed by: PATHOLOGY

## 2019-12-19 PROCEDURE — 38222 DX BONE MARROW BX & ASPIR: CPT | Mod: RT,,, | Performed by: NURSE PRACTITIONER

## 2019-12-19 PROCEDURE — 88311 PR  DECALCIFY TISSUE: ICD-10-PCS | Mod: 26,,, | Performed by: PATHOLOGY

## 2019-12-19 PROCEDURE — D9220A PRA ANESTHESIA: ICD-10-PCS | Mod: ANES,,, | Performed by: ANESTHESIOLOGY

## 2019-12-19 PROCEDURE — D9220A PRA ANESTHESIA: Mod: CRNA,,, | Performed by: NURSE ANESTHETIST, CERTIFIED REGISTERED

## 2019-12-19 PROCEDURE — 88264 CHROMOSOME ANALYSIS 20-25: CPT

## 2019-12-19 PROCEDURE — 88342 CHG IMMUNOCYTOCHEMISTRY: ICD-10-PCS | Mod: 26,59,, | Performed by: PATHOLOGY

## 2019-12-19 PROCEDURE — 25000003 PHARM REV CODE 250: Performed by: NURSE ANESTHETIST, CERTIFIED REGISTERED

## 2019-12-19 PROCEDURE — 88184 FLOWCYTOMETRY/ TC 1 MARKER: CPT | Performed by: PATHOLOGY

## 2019-12-19 PROCEDURE — 85097 BONE MARROW INTERPRETATION: CPT | Mod: ,,, | Performed by: PATHOLOGY

## 2019-12-19 PROCEDURE — 88342 IMHCHEM/IMCYTCHM 1ST ANTB: CPT | Mod: 59 | Performed by: PATHOLOGY

## 2019-12-19 PROCEDURE — 88311 DECALCIFY TISSUE: CPT | Mod: 26,,, | Performed by: PATHOLOGY

## 2019-12-19 PROCEDURE — 37000008 HC ANESTHESIA 1ST 15 MINUTES: Performed by: INTERNAL MEDICINE

## 2019-12-19 PROCEDURE — 37000009 HC ANESTHESIA EA ADD 15 MINS: Performed by: INTERNAL MEDICINE

## 2019-12-19 PROCEDURE — 85097 PR  BONE MARROW,SMEAR INTERPRETATION: ICD-10-PCS | Mod: ,,, | Performed by: PATHOLOGY

## 2019-12-19 PROCEDURE — D9220A PRA ANESTHESIA: ICD-10-PCS | Mod: CRNA,,, | Performed by: NURSE ANESTHETIST, CERTIFIED REGISTERED

## 2019-12-19 PROCEDURE — 88313 PR  SPECIAL STAINS,GROUP II: ICD-10-PCS | Mod: 26,,, | Performed by: PATHOLOGY

## 2019-12-19 PROCEDURE — D9220A PRA ANESTHESIA: Mod: ANES,,, | Performed by: ANESTHESIOLOGY

## 2019-12-19 PROCEDURE — 88237 TISSUE CULTURE BONE MARROW: CPT | Mod: 59

## 2019-12-19 PROCEDURE — 88189 PR  FLOWCYTOMETRY/READ, 16 & > MARKERS: ICD-10-PCS | Mod: ,,, | Performed by: PATHOLOGY

## 2019-12-19 PROCEDURE — 88189 FLOWCYTOMETRY/READ 16 & >: CPT | Mod: ,,, | Performed by: PATHOLOGY

## 2019-12-19 PROCEDURE — 88341 IMHCHEM/IMCYTCHM EA ADD ANTB: CPT | Performed by: PATHOLOGY

## 2019-12-19 PROCEDURE — 88342 IMHCHEM/IMCYTCHM 1ST ANTB: CPT | Mod: 26,59,, | Performed by: PATHOLOGY

## 2019-12-19 PROCEDURE — 36000705 HC OR TIME LEV I EA ADD 15 MIN: Performed by: INTERNAL MEDICINE

## 2019-12-19 PROCEDURE — 63600175 PHARM REV CODE 636 W HCPCS: Performed by: NURSE ANESTHETIST, CERTIFIED REGISTERED

## 2019-12-19 PROCEDURE — 25000003 PHARM REV CODE 250: Performed by: INTERNAL MEDICINE

## 2019-12-19 PROCEDURE — 36415 COLL VENOUS BLD VENIPUNCTURE: CPT

## 2019-12-19 PROCEDURE — 88313 SPECIAL STAINS GROUP 2: CPT | Mod: 26,,, | Performed by: PATHOLOGY

## 2019-12-19 PROCEDURE — 36000704 HC OR TIME LEV I 1ST 15 MIN: Performed by: INTERNAL MEDICINE

## 2019-12-19 RX ORDER — GLYCOPYRROLATE 0.2 MG/ML
INJECTION INTRAMUSCULAR; INTRAVENOUS
Status: DISCONTINUED | OUTPATIENT
Start: 2019-12-19 | End: 2019-12-19

## 2019-12-19 RX ORDER — PHENYLEPHRINE HYDROCHLORIDE 10 MG/ML
INJECTION INTRAVENOUS
Status: DISCONTINUED | OUTPATIENT
Start: 2019-12-19 | End: 2019-12-19

## 2019-12-19 RX ORDER — ONDANSETRON 2 MG/ML
4 INJECTION INTRAMUSCULAR; INTRAVENOUS DAILY PRN
Status: DISCONTINUED | OUTPATIENT
Start: 2019-12-19 | End: 2019-12-19 | Stop reason: HOSPADM

## 2019-12-19 RX ORDER — FENTANYL CITRATE 50 UG/ML
25 INJECTION, SOLUTION INTRAMUSCULAR; INTRAVENOUS EVERY 5 MIN PRN
Status: DISCONTINUED | OUTPATIENT
Start: 2019-12-19 | End: 2019-12-19 | Stop reason: HOSPADM

## 2019-12-19 RX ORDER — LIDOCAINE HCL/PF 100 MG/5ML
SYRINGE (ML) INTRAVENOUS
Status: DISCONTINUED | OUTPATIENT
Start: 2019-12-19 | End: 2019-12-19

## 2019-12-19 RX ORDER — SODIUM CHLORIDE 9 MG/ML
INJECTION, SOLUTION INTRAVENOUS CONTINUOUS
Status: DISCONTINUED | OUTPATIENT
Start: 2019-12-19 | End: 2019-12-19 | Stop reason: HOSPADM

## 2019-12-19 RX ORDER — LIDOCAINE HYDROCHLORIDE 10 MG/ML
INJECTION, SOLUTION EPIDURAL; INFILTRATION; INTRACAUDAL; PERINEURAL
Status: DISCONTINUED | OUTPATIENT
Start: 2019-12-19 | End: 2019-12-19 | Stop reason: HOSPADM

## 2019-12-19 RX ORDER — PROPOFOL 10 MG/ML
VIAL (ML) INTRAVENOUS
Status: DISCONTINUED | OUTPATIENT
Start: 2019-12-19 | End: 2019-12-19

## 2019-12-19 RX ORDER — OXYCODONE AND ACETAMINOPHEN 5; 325 MG/1; MG/1
1 TABLET ORAL
Status: DISCONTINUED | OUTPATIENT
Start: 2019-12-19 | End: 2019-12-19 | Stop reason: HOSPADM

## 2019-12-19 RX ADMIN — PROPOFOL 40 MG: 10 INJECTION, EMULSION INTRAVENOUS at 07:12

## 2019-12-19 RX ADMIN — LIDOCAINE HYDROCHLORIDE 40 MG: 20 INJECTION, SOLUTION INTRAVENOUS at 07:12

## 2019-12-19 RX ADMIN — GLYCOPYRROLATE 0.2 MG: 0.2 INJECTION, SOLUTION INTRAMUSCULAR; INTRAVENOUS at 07:12

## 2019-12-19 RX ADMIN — PROPOFOL 20 MG: 10 INJECTION, EMULSION INTRAVENOUS at 07:12

## 2019-12-19 RX ADMIN — PHENYLEPHRINE HYDROCHLORIDE 100 MCG: 10 INJECTION INTRAVENOUS at 07:12

## 2019-12-19 NOTE — TRANSFER OF CARE
"Anesthesia Transfer of Care Note    Patient: Shelly Vásquez    Procedure(s) Performed: Procedure(s) (LRB):  Biopsy-bone marrow (Right)    Patient location: PACU    Anesthesia Type: general    Transport from OR: Transported from OR on 2-3 L/min O2 by NC with adequate spontaneous ventilation    Post pain: adequate analgesia    Post assessment: no apparent anesthetic complications    Post vital signs: stable    Level of consciousness: awake    Nausea/Vomiting: no nausea/vomiting    Complications: none    Transfer of care protocol was followed      Last vitals:   Visit Vitals  BP (!) 112/55 (BP Location: Right arm, Patient Position: Sitting)   Pulse 63   Temp 36.7 °C (98.1 °F)   Resp 18   Ht 5' 8" (1.727 m)   Wt 71.2 kg (157 lb)   LMP  (LMP Unknown)   Breastfeeding? No   BMI 23.87 kg/m²     "

## 2019-12-19 NOTE — DISCHARGE SUMMARY
[facility]  Hematology  Bone Marrow Transplant  Discharge Summary      Patient Name: Shelly Vásquez  MRN: [id number]  Admission Date: 12/19/2019  Hospital Length of Stay: 0 days  Discharge Date and Time:  12/19/2019 7:42 AM  Attending Physician: Aidan Spring MD   Discharging Provider: Cindy Ahumada NP  Primary Care Provider: Joana Doty MD      Procedure(s) (LRB):  Biopsy-bone marrow (Left)     Hospital Course: Patient admitted to pre op today for a bone marrow aspiration and biopsy. Pt was consented for a bone marrow biopsy. Patient was sedated per anesthesia and a bone marrow biopsy and aspiration was performed in the OR (see procedure note). Patient was then transferred to post op and discharged home when appropriate per anesthesia.       Pending Diagnostic Studies:     Procedure Component Value Units Date/Time    Chromosome Analysis, Bone Marrow [[id number]] Collected:  12/19/19 0700    Order Status:  Sent Lab Status:  In process Updated:  12/19/19 0700    Specimen:  Bone Marrow     HEMATOLOGIC DISORDERS,FISH (HOLD) [[id number]] Collected:  12/19/19 0700    Order Status:  Sent Lab Status:  In process Updated:  12/19/19 0701    Specimen:  Blood from Bone Marrow     Heme Disorders DNA/RNA Hold, Bone Marrow [[id number]] Collected:  12/19/19 0700    Order Status:  Sent Lab Status:  In process Updated:  12/19/19 0700    Specimen:  Bone Marrow     Leukemia/Lymphoma Screen - Bone Marrow Left Posterior Iliac Crest [[id number]] Collected:  12/19/19 0700    Order Status:  Sent Lab Status:  In process Updated:  12/19/19 0700    Specimen:  Bone Marrow     OncoHeme (NGS) Hematologic Neoplasms, BM Diagnosis or Indication for test: macrocytic anemia [[id number]] Collected:  12/19/19 0700    Order Status:  Sent Lab Status:  In process Updated:  12/19/19 0700    Specimen:  Bone Marrow     Specimen to Pathology, Bone Marrow Aspiration/Biopsy [[id number]] Collected:  12/19/19 0703    Order Status:   Sent Lab Status:  In process Updated:  12/19/19 0704        Final Active Diagnoses:    Diagnosis Date Noted POA    MDS (myelodysplastic syndrome) [D46.9] 12/19/2019 Yes      Problems Resolved During this Admission:      Discharged Condition: good    Disposition: Home or Self Care    Follow Up: With Dr. Spring/Dr. De Anda in BMT clinic    Patient Instructions:      Diet Adult Regular     Notify your health care provider if you experience any of the following:  temperature >100.4     Notify your health care provider if you experience any of the following:  severe uncontrolled pain     Notify your health care provider if you experience any of the following:  redness, tenderness, or signs of infection (pain, swelling, redness, odor or green/yellow discharge around incision site)     Remove dressing in 24 hours     Activity as tolerated     Medications:  Reconciled Home Medications:      Medication List      ASK your doctor about these medications    acetaminophen 325 MG tablet  Commonly known as:  TYLENOL  Take 325 mg by mouth continuous prn for Pain.     aspirin 81 MG EC tablet  Commonly known as:  ECOTRIN  Take 1 tablet (81 mg total) by mouth once daily.     estradiol-norethindrone acet 0.5-0.1 mg per tablet  Commonly known as:  Lopreeza  Take 1 tablet by mouth once daily.     lidocaine HCL 2% 2 % jelly  Commonly known as:  XYLOCAINE  Apply to affected area daily as needed     temazepam 15 mg Cap  Commonly known as:  RESTORIL  Take 1 capsule (15 mg total) by mouth nightly as needed.     valACYclovir 500 MG tablet  Commonly known as:  Valtrex  Take 1 tablet (500 mg total) by mouth once daily.     verapamil 120 MG C24p  Commonly known as:  VERELAN  Take 1 capsule (120 mg total) by mouth once daily.            Cindy Ahumada NP  Bone Marrow Transplant  [facility]

## 2019-12-19 NOTE — ANESTHESIA PREPROCEDURE EVALUATION
12/19/2019  Shelly Vásquez is a 78 y.o., female with a pre-operative diagnosis of Macrocytic anemia [D53.9] who is scheduled for Procedure(s) (LRB):  Biopsy-bone marrow (Left).     Requested anesthesia type: General/MAC  Surgeon: Aidan Spring MD  Allergies:   Review of patient's allergies indicates:   Allergen Reactions    Nsaids (non-steroidal anti-inflammatory drug)      GI Bleed  Had 5 blood transfusions     Vital Sign Range:    Chronic Medications:   Medications Prior to Admission   Medication Sig Dispense Refill Last Dose    acetaminophen (TYLENOL) 325 MG tablet Take 325 mg by mouth continuous prn for Pain.   Taking    aspirin (ECOTRIN) 81 MG EC tablet Take 1 tablet (81 mg total) by mouth once daily. 30 tablet 0 Taking    estradiol-norethindrone acet (LOPREEZA) 0.5-0.1 mg per tablet Take 1 tablet by mouth once daily. 84 tablet 3 Taking    lidocaine HCL 2% (XYLOCAINE) 2 % jelly Apply to affected area daily as needed 30 mL 1 Taking    temazepam (RESTORIL) 15 mg Cap Take 1 capsule (15 mg total) by mouth nightly as needed. 90 capsule 1 Taking    valACYclovir (VALTREX) 500 MG tablet Take 1 tablet (500 mg total) by mouth once daily. 30 tablet 11 Taking    verapamil (VERELAN) 120 MG C24P Take 1 capsule (120 mg total) by mouth once daily. 90 capsule 3 Taking     Current Medications:   No current facility-administered medications for this encounter.      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    Anesthesia Evaluation    I have reviewed the Patient Summary Reports.    I have reviewed the Nursing Notes.   I have reviewed the Medications.     Review of Systems  Anesthesia Hx:  No problems with previous Anesthesia  Denies Family Hx of Anesthesia complications.   Denies Personal Hx of Anesthesia complications.   Social:  Social Alcohol Use, Former Smoker    Cardiovascular:   Exercise tolerance: good Dysrhythmias atrial fibrillation ECG has been reviewed. SVT  Echo from 2017  CONCLUSIONS     1 - Normal left ventricular systolic function (EF 60-65%).     2 - Moderate left atrial   enlargement.     3 - Normal left ventricular diastolic function.     4 - Normal right ventricular systolic function .     5 - Mild mitral regurgitation.     6 - The estimated PA systolic pressure is 27 mmHg.     7 - Mild aortic regurgitation.     Recent ablation for SVT PAT.   Pulmonary:  Pulmonary Normal    Hepatic/GI:   PUD,    Musculoskeletal:   Arthritis   Spine Disorders: lumbar    Neurological:  Neurology Normal    Endocrine:  Endocrine Normal        Physical Exam  General:  Well nourished    Airway/Jaw/Neck:  Airway Findings: Mouth Opening: Normal Tongue: Normal  General Airway Assessment: Adult  Mallampati: II  TM Distance: Normal, at least 6 cm  Jaw/Neck Findings:     Neck ROM: Normal ROM      Dental:  Dental Findings: In tact   Chest/Lungs:  Chest/Lungs Findings: Clear to auscultation, Normal Respiratory Rate     Heart/Vascular:  Heart Findings: Rate: Normal  Rhythm: Regular Rhythm  Sounds: Normal        Mental Status:  Mental Status Findings:  Cooperative, Alert and Oriented         Anesthesia Plan  Type of Anesthesia, risks & benefits discussed:  Anesthesia Type:  general, MAC  Patient's Preference: as indicated  Intra-op Monitoring Plan: standard ASA monitors  Intra-op Monitoring Plan Comments:   Post Op Pain Control Plan:   Post Op Pain Control Plan Comments:   Induction:   IV  Beta Blocker:  Patient is not  currently on a Beta-Blocker (No further documentation required).       Informed Consent: Patient understands risks and agrees with Anesthesia plan.  Questions answered. Anesthesia consent signed with patient.  ASA Score: 3     Day of Surgery Review of History & Physical:  There are no significant changes.  H&P update referred to the provider.     Anesthesia Plan Notes: Reassurance given.        Ready For Surgery From Anesthesia Perspective.

## 2019-12-19 NOTE — ANESTHESIA POSTPROCEDURE EVALUATION
Anesthesia Post Evaluation    Patient: Shelly Vásquez    Procedure(s) Performed: Procedure(s) (LRB):  Biopsy-bone marrow (Right)    Final Anesthesia Type: general    Patient location during evaluation: PACU  Patient participation: Yes- Able to Participate  Level of consciousness: awake and alert and oriented  Post-procedure vital signs: reviewed and stable  Pain management: adequate  Airway patency: patent    PONV status at discharge: No PONV  Anesthetic complications: no      Cardiovascular status: stable  Respiratory status: unassisted, spontaneous ventilation and room air  Hydration status: euvolemic  Follow-up not needed.          Vitals Value Taken Time   /60 12/19/2019  8:30 AM   Temp 36.7 °C (98 °F) 12/19/2019  8:30 AM   Pulse 60 12/19/2019  8:30 AM   Resp 18 12/19/2019  8:30 AM   SpO2 100 % 12/19/2019  8:30 AM         No case tracking events are documented in the log.      Pain/Oscar Score: Oscar Score: 10 (12/19/2019  8:15 AM)

## 2019-12-19 NOTE — PLAN OF CARE
Pt meets criteria for discharge. AAO, tolerating po liquids. No s/s of pain. Discharge instructions given and reviewed. Patient discharged to home with .

## 2019-12-19 NOTE — DISCHARGE INSTRUCTIONS
Discharge instructions for having a Bone Marrow Aspiration / Biopsy    Keep Bandage in place for 24 hours.  - Do not shower or take a tub bath during this time. (you may sponge bathe).  - Call the nurse or physician for excessive bleeding or pain at biopsy site.  - You may take Tylenol as needed for pain.    You have received medication to sedate you.  - Do not drive a car or operate heavy machinery for the rest of the day.  - You may resume other activities as tolerated.    You can call [phone] for any problems during the hours of 8:00 AM-5:00PM.        Bone Marrow Aspiration and Biopsy    Bone marrow is the soft, spongy part inside bones. It makes most of the bodys blood cells. Aspiration and biopsy are procedures done to take a sample of bone marrow out of the body for examination. To perform either procedure, a needle is inserted into one of your bones, usually the back of the hip bone. Then a sample of bone marrow is removed.   If a sample of fluid and cells is taken, it is called bone marrow aspiration. If a solid sample of bone marrow tissue is removed, it is called bone marrow biopsy. In either case, the samples are sent to a lab and studied. The procedures can be done alone, but are most often done together. This sheet tells you more about what to expect.  Why the procedures are done  The procedures may be done for a number of reasons. They can help diagnose certain blood or bone marrow disorders or infections. They may help find certain cancers, such as leukemia. They can show if cancer in other areas of the body has spread to the bone marrow. They can be used during cancer treatment, such as chemotherapy, to monitor treatment progress. And they may be done before certain treatments, such as a stem cell transplant, which require a bone marrow sample. Your healthcare provider will explain why you need the procedure and answer any questions you have.  Preparing for the procedure  Prepare for the  procedure as told. In addition:  · Tell your healthcare provider:  ¨ What medicines you take. This includes blood thinners, such as aspirin. This also includes over-the-counter medicines, herbs, and other supplements. You may need to stop taking some or all of them before the procedure.  ¨ If you are allergic to any medicines. Also mention if you have ever had a reaction to medicines used during other tests or procedures in the past.  ¨ If you have a history of bleeding problems.  ¨ If you are pregnant or may be pregnant.  · Follow any directions youre given for not eating or drinking before the procedure.  The day of the procedure  The procedures can be done at a hospital, clinic, or healthcare providers office. They are performed by a healthcare provider or trained healthcare provider. Whether youre having one or both procedures, plan to be at the facility for 1 to 2 hours. Youll likely go home the same day.  Before the procedure begins  What to expect before the procedure:  · Youll change into a patient gown.  · An IV line may be put into a vein in your arm or hand. This line supplies fluids and medicines.  · Youll be given a sedative to help you relax, if needed. This medicine is given by pill, injection, or through the IV line.  During the procedure  What to expect during the procedure:  · Youll lie on your side or your stomach.  · The site to be used for the bone marrow samples is marked and cleaned. The most common site is the back of the hip bone. Less common sites include the front of the hip bone or breastbone.  · Numbing medicine (local anesthesia) is injected at the site.  · A small cut is made through the numbed skin.  · One or both procedures are then done. Be sure to lie still for each procedure. It is normal to feel some pressure or pain during each procedure.  ¨ For the bone marrow aspiration, a thin needle is put through the cut and into the bone. A syringe is then attached to the needle and  used to remove a sample of bone marrow fluid and cells.  ¨ For the bone marrow biopsy, a different needle is put through the same cut and into the bone. A small amount of bone marrow tissue is then removed.  · The samples are sent to a lab to be evaluated.  · When the procedure is complete, pressure is applied to the site for 10 to 15 minutes to help stop bleeding. The site is then bandaged.  After the procedure  Most people can go home after a short period of observation. If you need it, youll be given medicine to manage pain. If you were given a sedative, you may be taken to a recovery room to rest until the medicine wears off. An adult family member or friend must drive you home afterward.  Recovering at home  Once at home, follow any instructions youre given. Be sure to:  · Take all medicines as directed.  · Care for the procedure site as instructed.  · Check for signs of infection at the procedure site (see below).  · Avoid getting the procedure site wet. Do not bathe or shower until your healthcare providers say it is OK to do so. If you wish, you may wash with a sponge or washcloth.  · Avoid heavy lifting and other strenuous activities as directed.     Call the healthcare provider  Call your healthcare provider if you have any of the following:  · Fever of 100.4 ºF (38 ºC) or higher, or as directed by your healthcare provider  · Signs of infection at the procedure site, such as increased redness or swelling, warmth, worsening pain, bleeding, or foul-smelling drainage   Follow-up  Your healthcare provider will discuss the results with you when they are ready. This is usually within a week after the procedure.     Risks and possible complications of these procedures  These include:  · Severe bleeding or bruising at the procedure site  · Infection at the procedure site  · Bone fracture  · Bone infection   Date Last Reviewed: 10/8/2015  © 0776-4845 [employer]. [address] [place] [address], [place]  [address]. All rights reserved. This information is not intended as a substitute for professional medical care. Always follow your healthcare professional's instructions.

## 2019-12-19 NOTE — BRIEF OP NOTE
PROCEDURE NOTE:  Date of Procedure: 12/19/2019  Bone Marrow Biopsy and Aspiration  Indication: possible MDS  Consent: Informed consent was obtained from patient.  Timeout: Done and documented.  Position: left lateral  Site: Rightt posterior illiac crest.  Prep: Betadine.  Needle used: 11 gauge Jamshidi needle.  Anesthetic: 2% lidocaine 5 cc.  Biopsy: The biopsy needle was introduced into the marrow cavity and an aspirate was obtained without complications and sent for flow cytometry, NGS, Fish hold, DNA/RNA hold and cytogenetics. Core biopsy obtained without difficulty and sent for routine histologic examination.  Complications: None.  Disposition: The patient was discharged home per anesthesia protocol.  Blood loss: Minimal.     Cindy Ahumada NP  Hematology/Oncology/BMT

## 2019-12-20 LAB
BODY SITE - BONE MARROW: NORMAL
CLINICAL DIAGNOSIS - BONE MARROW: NORMAL
FLOW CYTOMETRY ANTIBODIES ANALYZED - BONE MARROW: NORMAL
FLOW CYTOMETRY COMMENT - BONE MARROW: NORMAL
FLOW CYTOMETRY INTERPRETATION - BONE MARROW: NORMAL

## 2019-12-21 LAB
DNA/RNA EXTRACT AND HOLD RESULT: NORMAL
DNA/RNA EXTRACTION: NORMAL
EXHR SPECIMEN TYPE: NORMAL

## 2019-12-22 ENCOUNTER — PATIENT MESSAGE (OUTPATIENT)
Dept: HEMATOLOGY/ONCOLOGY | Facility: CLINIC | Age: 79
End: 2019-12-22

## 2019-12-23 ENCOUNTER — PATIENT MESSAGE (OUTPATIENT)
Dept: HEMATOLOGY/ONCOLOGY | Facility: CLINIC | Age: 79
End: 2019-12-23

## 2019-12-26 LAB
ANNOTATION COMMENT IMP: NORMAL
HOLDF INTERPRETATION: NORMAL
HOLDF REASON FOR REFERRAL: NORMAL
HOLDF RELEASED BY: NORMAL
HOLDF REQUESTED FISH TEST: NORMAL
HOLDF SOURCE: NORMAL
MOL DX INTERP BLD/T QL: NORMAL
REF LAB TEST METHOD: NORMAL
SPECIMEN TYPE: NORMAL

## 2019-12-31 ENCOUNTER — EXTERNAL CHRONIC CARE MANAGEMENT (OUTPATIENT)
Dept: PRIMARY CARE CLINIC | Facility: CLINIC | Age: 79
End: 2019-12-31
Payer: MEDICARE

## 2019-12-31 PROCEDURE — 99490 CHRNC CARE MGMT STAFF 1ST 20: CPT | Mod: PBBFAC | Performed by: INTERNAL MEDICINE

## 2019-12-31 PROCEDURE — 99490 CHRNC CARE MGMT STAFF 1ST 20: CPT | Mod: S$PBB,,, | Performed by: INTERNAL MEDICINE

## 2019-12-31 PROCEDURE — 99490 PR CHRONIC CARE MGMT, 1ST 20 MIN: ICD-10-PCS | Mod: S$PBB,,, | Performed by: INTERNAL MEDICINE

## 2020-01-02 ENCOUNTER — PATIENT MESSAGE (OUTPATIENT)
Dept: INTERNAL MEDICINE | Facility: CLINIC | Age: 80
End: 2020-01-02

## 2020-01-03 RX ORDER — TEMAZEPAM 15 MG/1
15 CAPSULE ORAL NIGHTLY PRN
Qty: 90 CAPSULE | Refills: 1 | Status: SHIPPED | OUTPATIENT
Start: 2020-01-03 | End: 2020-01-13

## 2020-01-10 LAB
ANNOTATION COMMENT IMP: NORMAL
NGS CLINCIAL TRIALS: NORMAL
NGS INDICATION OF TEST: NORMAL
NGS INTERPRETATION: NORMAL
NGS ONCOHEME PANEL GENE LIST: NORMAL
NGS PATHOGENIC MUTATIONS DETECTED: NORMAL
NGS REVIEWED BY:: NORMAL
NGS VARIANTS OF UNKNOWN SIGNIFICANCE: NORMAL
NGSHM RESULT, BONE MARROW: NORMAL
REF LAB TEST METHOD: NORMAL
SPECIMEN SOURCE: NORMAL
TEST PERFORMANCE INFO SPEC: NORMAL

## 2020-01-12 LAB
COMMENT: NORMAL
FINAL PATHOLOGIC DIAGNOSIS: NORMAL
GROSS: NORMAL
MICROSCOPIC EXAM: NORMAL
SUPPLEMENTAL DIAGNOSIS: NORMAL

## 2020-01-13 ENCOUNTER — PATIENT MESSAGE (OUTPATIENT)
Dept: HEMATOLOGY/ONCOLOGY | Facility: CLINIC | Age: 80
End: 2020-01-13

## 2020-01-13 ENCOUNTER — PATIENT MESSAGE (OUTPATIENT)
Dept: INTERNAL MEDICINE | Facility: CLINIC | Age: 80
End: 2020-01-13

## 2020-01-13 ENCOUNTER — TELEPHONE (OUTPATIENT)
Dept: INTERNAL MEDICINE | Facility: CLINIC | Age: 80
End: 2020-01-13

## 2020-01-13 RX ORDER — TEMAZEPAM 15 MG/1
CAPSULE ORAL
Qty: 90 CAPSULE | Refills: 0 | Status: SHIPPED | OUTPATIENT
Start: 2020-01-13 | End: 2020-04-15 | Stop reason: SDUPTHER

## 2020-01-13 RX ORDER — AZITHROMYCIN 250 MG/1
TABLET, FILM COATED ORAL
Qty: 6 TABLET | Refills: 0 | Status: SHIPPED | OUTPATIENT
Start: 2020-01-13 | End: 2020-07-15 | Stop reason: ALTCHOICE

## 2020-01-13 RX ORDER — OSELTAMIVIR PHOSPHATE 75 MG/1
75 CAPSULE ORAL 2 TIMES DAILY
Qty: 10 CAPSULE | Refills: 0 | Status: SHIPPED | OUTPATIENT
Start: 2020-01-13 | End: 2020-01-18

## 2020-01-13 NOTE — TELEPHONE ENCOUNTER
Spoke to pt she said that lastnight she had severe chills, what she described as a jumping nerve pain like symptoms

## 2020-01-13 NOTE — TELEPHONE ENCOUNTER
----- Message from Leia Maldonado sent at 1/13/2020  4:23 PM CST -----  Contact: Patient via [employer]  Message     Appointment Request From: Shelly Vásquez    With Provider: Joana Doty MD [[facility] - [facility]]    Preferred Date Range: Any    Preferred Times: Any time    Reason for visit: joana, i am certain that what I have is the flu..................any suggestions for me and or Morgan ?    Comments:  visit not required .....just help with the symptoms...........bad chills......ache alll over, etc.

## 2020-01-14 ENCOUNTER — LAB VISIT (OUTPATIENT)
Dept: LAB | Facility: HOSPITAL | Age: 80
End: 2020-01-14
Payer: MEDICARE

## 2020-01-14 ENCOUNTER — OFFICE VISIT (OUTPATIENT)
Dept: HEMATOLOGY/ONCOLOGY | Facility: CLINIC | Age: 80
End: 2020-01-14
Payer: MEDICARE

## 2020-01-14 VITALS
DIASTOLIC BLOOD PRESSURE: 58 MMHG | RESPIRATION RATE: 16 BRPM | OXYGEN SATURATION: 95 % | HEART RATE: 85 BPM | WEIGHT: 156.94 LBS | BODY MASS INDEX: 23.79 KG/M2 | HEIGHT: 68 IN | SYSTOLIC BLOOD PRESSURE: 101 MMHG | TEMPERATURE: 98 F

## 2020-01-14 DIAGNOSIS — D46.1 RARS (REFRACTORY ANEMIA WITH RINGED SIDEROBLASTS): Primary | ICD-10-CM

## 2020-01-14 DIAGNOSIS — D53.9 MACROCYTIC ANEMIA: ICD-10-CM

## 2020-01-14 LAB
ABO + RH BLD: NORMAL
ALBUMIN SERPL BCP-MCNC: 4 G/DL (ref 3.5–5.2)
ALP SERPL-CCNC: 32 U/L (ref 55–135)
ALT SERPL W/O P-5'-P-CCNC: 9 U/L (ref 10–44)
ANION GAP SERPL CALC-SCNC: 10 MMOL/L (ref 8–16)
AST SERPL-CCNC: 15 U/L (ref 10–40)
BASOPHILS # BLD AUTO: 0.05 K/UL (ref 0–0.2)
BASOPHILS NFR BLD: 0.7 % (ref 0–1.9)
BILIRUB SERPL-MCNC: 0.5 MG/DL (ref 0.1–1)
BLD GP AB SCN CELLS X3 SERPL QL: NORMAL
BUN SERPL-MCNC: 11 MG/DL (ref 8–23)
CALCIUM SERPL-MCNC: 9.4 MG/DL (ref 8.7–10.5)
CHLORIDE SERPL-SCNC: 104 MMOL/L (ref 95–110)
CO2 SERPL-SCNC: 22 MMOL/L (ref 23–29)
CREAT SERPL-MCNC: 1.1 MG/DL (ref 0.5–1.4)
DIFFERENTIAL METHOD: ABNORMAL
EOSINOPHIL # BLD AUTO: 0.1 K/UL (ref 0–0.5)
EOSINOPHIL NFR BLD: 1.1 % (ref 0–8)
ERYTHROCYTE [DISTWIDTH] IN BLOOD BY AUTOMATED COUNT: 14.5 % (ref 11.5–14.5)
EST. GFR  (AFRICAN AMERICAN): 54.4 ML/MIN/1.73 M^2
EST. GFR  (NON AFRICAN AMERICAN): 47.2 ML/MIN/1.73 M^2
FERRITIN SERPL-MCNC: 427 NG/ML (ref 20–300)
GLUCOSE SERPL-MCNC: 119 MG/DL (ref 70–110)
HCT VFR BLD AUTO: 29.3 % (ref 37–48.5)
HGB BLD-MCNC: 9.4 G/DL (ref 12–16)
IMM GRANULOCYTES # BLD AUTO: 0.02 K/UL (ref 0–0.04)
IMM GRANULOCYTES NFR BLD AUTO: 0.3 % (ref 0–0.5)
IRON SERPL-MCNC: <10 UG/DL (ref 30–160)
LDH SERPL L TO P-CCNC: 163 U/L (ref 110–260)
LYMPHOCYTES # BLD AUTO: 1.3 K/UL (ref 1–4.8)
LYMPHOCYTES NFR BLD: 17.3 % (ref 18–48)
MAGNESIUM SERPL-MCNC: 1.9 MG/DL (ref 1.6–2.6)
MCH RBC QN AUTO: 35.6 PG (ref 27–31)
MCHC RBC AUTO-ENTMCNC: 32.1 G/DL (ref 32–36)
MCV RBC AUTO: 111 FL (ref 82–98)
MONOCYTES # BLD AUTO: 0.8 K/UL (ref 0.3–1)
MONOCYTES NFR BLD: 10.7 % (ref 4–15)
NEUTROPHILS # BLD AUTO: 5.1 K/UL (ref 1.8–7.7)
NEUTROPHILS NFR BLD: 69.9 % (ref 38–73)
NRBC BLD-RTO: 0 /100 WBC
PHOSPHATE SERPL-MCNC: 3.4 MG/DL (ref 2.7–4.5)
PLATELET # BLD AUTO: 323 K/UL (ref 150–350)
PMV BLD AUTO: 9.6 FL (ref 9.2–12.9)
POTASSIUM SERPL-SCNC: 4 MMOL/L (ref 3.5–5.1)
PROT SERPL-MCNC: 6.9 G/DL (ref 6–8.4)
RBC # BLD AUTO: 2.64 M/UL (ref 4–5.4)
SATURATED IRON: ABNORMAL % (ref 20–50)
SODIUM SERPL-SCNC: 136 MMOL/L (ref 136–145)
TOTAL IRON BINDING CAPACITY: 241 UG/DL (ref 250–450)
TRANSFERRIN SERPL-MCNC: 163 MG/DL (ref 200–375)
WBC # BLD AUTO: 7.32 K/UL (ref 3.9–12.7)

## 2020-01-14 PROCEDURE — 36415 COLL VENOUS BLD VENIPUNCTURE: CPT

## 2020-01-14 PROCEDURE — 83735 ASSAY OF MAGNESIUM: CPT

## 2020-01-14 PROCEDURE — 83615 LACTATE (LD) (LDH) ENZYME: CPT

## 2020-01-14 PROCEDURE — 99215 PR OFFICE/OUTPT VISIT, EST, LEVL V, 40-54 MIN: ICD-10-PCS | Mod: S$PBB,GC,,

## 2020-01-14 PROCEDURE — 82728 ASSAY OF FERRITIN: CPT

## 2020-01-14 PROCEDURE — 99215 OFFICE O/P EST HI 40 MIN: CPT | Mod: S$PBB,GC,,

## 2020-01-14 PROCEDURE — 83540 ASSAY OF IRON: CPT

## 2020-01-14 PROCEDURE — 1159F PR MEDICATION LIST DOCUMENTED IN MEDICAL RECORD: ICD-10-PCS | Mod: GC,,,

## 2020-01-14 PROCEDURE — 1126F PR PAIN SEVERITY QUANTIFIED, NO PAIN PRESENT: ICD-10-PCS | Mod: GC,,,

## 2020-01-14 PROCEDURE — 80053 COMPREHEN METABOLIC PANEL: CPT

## 2020-01-14 PROCEDURE — 86901 BLOOD TYPING SEROLOGIC RH(D): CPT

## 2020-01-14 PROCEDURE — 99999 PR PBB SHADOW E&M-EST. PATIENT-LVL IV: ICD-10-PCS | Mod: PBBFAC,GC,,

## 2020-01-14 PROCEDURE — 99214 OFFICE O/P EST MOD 30 MIN: CPT | Mod: PBBFAC,25

## 2020-01-14 PROCEDURE — 84100 ASSAY OF PHOSPHORUS: CPT

## 2020-01-14 PROCEDURE — 99999 PR PBB SHADOW E&M-EST. PATIENT-LVL IV: CPT | Mod: PBBFAC,GC,,

## 2020-01-14 PROCEDURE — 85025 COMPLETE CBC W/AUTO DIFF WBC: CPT

## 2020-01-14 PROCEDURE — 1126F AMNT PAIN NOTED NONE PRSNT: CPT | Mod: GC,,,

## 2020-01-14 PROCEDURE — 1159F MED LIST DOCD IN RCRD: CPT | Mod: GC,,,

## 2020-01-14 RX ORDER — HYDROCORTISONE 25 MG/G
CREAM TOPICAL
Refills: 3 | COMMUNITY
Start: 2019-11-06 | End: 2022-05-12

## 2020-01-14 RX ORDER — KETOCONAZOLE 20 MG/G
CREAM TOPICAL
Refills: 11 | COMMUNITY
Start: 2019-11-06 | End: 2022-05-12

## 2020-01-14 NOTE — Clinical Note
Mary, Mrs. Vásquez needs some things1. CBC, iron and ferritin in 1 month 2. CBC and follow up with me in 6 monthsThHarper

## 2020-01-15 ENCOUNTER — OFFICE VISIT (OUTPATIENT)
Dept: INTERNAL MEDICINE | Facility: CLINIC | Age: 80
End: 2020-01-15
Payer: MEDICARE

## 2020-01-15 VITALS
HEIGHT: 68 IN | OXYGEN SATURATION: 98 % | WEIGHT: 155.63 LBS | SYSTOLIC BLOOD PRESSURE: 100 MMHG | TEMPERATURE: 98 F | DIASTOLIC BLOOD PRESSURE: 50 MMHG | HEART RATE: 95 BPM | BODY MASS INDEX: 23.59 KG/M2

## 2020-01-15 DIAGNOSIS — D46.9 MYELODYSPLASTIC SYNDROME: ICD-10-CM

## 2020-01-15 DIAGNOSIS — J06.9 UPPER RESPIRATORY TRACT INFECTION, UNSPECIFIED TYPE: Primary | ICD-10-CM

## 2020-01-15 PROCEDURE — 1159F MED LIST DOCD IN RCRD: CPT | Mod: ,,, | Performed by: INTERNAL MEDICINE

## 2020-01-15 PROCEDURE — 99999 PR PBB SHADOW E&M-EST. PATIENT-LVL III: ICD-10-PCS | Mod: PBBFAC,,, | Performed by: INTERNAL MEDICINE

## 2020-01-15 PROCEDURE — 1159F PR MEDICATION LIST DOCUMENTED IN MEDICAL RECORD: ICD-10-PCS | Mod: ,,, | Performed by: INTERNAL MEDICINE

## 2020-01-15 PROCEDURE — 1125F PR PAIN SEVERITY QUANTIFIED, PAIN PRESENT: ICD-10-PCS | Mod: ,,, | Performed by: INTERNAL MEDICINE

## 2020-01-15 PROCEDURE — 99213 OFFICE O/P EST LOW 20 MIN: CPT | Mod: S$PBB,,, | Performed by: INTERNAL MEDICINE

## 2020-01-15 PROCEDURE — 99213 PR OFFICE/OUTPT VISIT, EST, LEVL III, 20-29 MIN: ICD-10-PCS | Mod: S$PBB,,, | Performed by: INTERNAL MEDICINE

## 2020-01-15 PROCEDURE — 99213 OFFICE O/P EST LOW 20 MIN: CPT | Mod: PBBFAC | Performed by: INTERNAL MEDICINE

## 2020-01-15 PROCEDURE — 99999 PR PBB SHADOW E&M-EST. PATIENT-LVL III: CPT | Mod: PBBFAC,,, | Performed by: INTERNAL MEDICINE

## 2020-01-15 PROCEDURE — 1125F AMNT PAIN NOTED PAIN PRSNT: CPT | Mod: ,,, | Performed by: INTERNAL MEDICINE

## 2020-01-15 NOTE — PROGRESS NOTES
"[facility]  [facility]  Hematology/Medical Oncology Clinic      PATIENT: Shelly Vásquez  MRN: [id number]  DATE: 1/15/2020    Diagnosis:   1. RARS (refractory anemia with ringed sideroblasts)      Chief Complaint: No chief complaint on file.    Other MDs:  Joana Doty MD    Subjective:   Initial History: Ms. Vásquez is a 79 y.o. female who returns to hematology clinic for her noted history of anemia.     She has a pMHx of GIB requiring transfusions in the past as well as pAF - not on anticoagulation (aspirin only) with a loop recorder in place.     Coming in to discuss results of bmbx for worsening macrocytic anemia over the past few years. Full laboratory work up, prior to bmbx, that was unrevealing. Labs summarized below:  Protein studies wnl  B12/folate ok  Retic - not elevated  Hapto low  Thyroid studies wnl  Iron studies with bizarre findings- iron high, Sat high TIBC normal, transferrin low, ferritin 332  LDH normal  EPO 34  LARS negative    Her BMBx on 12/19/19 showed:  Hypercellular marrow 60% with ringed sideroblasts (50%)  MF 0/3  Increased iron storage  ME 1.3:1  Normal chromosomes   Normal leuko/lymph screen  NGS showing SF3B1- VUS+ (favorable)    She is accompanied by her  in the clinic today. They missed last weeks appointment to discuss these results because "they had the dates wrong."    She reports she is doing well and fatigue has improved. Exercises daily with moderate exercising reported.     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    Supraventricular tachycardia     s/p AVNRT ablation (Dr Brady)     Past Surgical History:   Procedure Laterality " Date    APPENDECTOMY  age 21    BACK SURGERY      Beast Lift  2004    BONE MARROW BIOPSY Right 12/19/2019    Procedure: Biopsy-bone marrow;  Surgeon: Aidan Spring MD;  Location: [facility] OR [address] [place] [address];  Service: Oncology;  Laterality: Right;    BREAST BIOPSY  50yrs ago    unable to see scar    COSMETIC SURGERY      DILATION AND CURETTAGE OF UTERUS  2008    PMB    ENDOMETRIAL BIOPSY      EYE SURGERY      ptsosis      RADIOFREQUENCY ABLATION  03/2018    SMALL INTESTINE SURGERY      TONSILLECTOMY, ADENOIDECTOMY  age 8    TOTAL REDUCTION MAMMOPLASTY Bilateral 2003    TUBAL LIGATION       Family History   Problem Relation Age of Onset    No Known Problems Father     Cirrhosis Mother     No Known Problems Sister     No Known Problems Brother     No Known Problems Maternal Aunt     No Known Problems Maternal Uncle     No Known Problems Paternal Aunt     No Known Problems Paternal Uncle     No Known Problems Maternal Grandmother     No Known Problems Maternal Grandfather     No Known Problems Paternal Grandmother     No Known Problems Paternal Grandfather     No Known Problems Daughter     No Known Problems Son     Breast cancer Neg Hx     Colon cancer Neg Hx     Ovarian cancer Neg Hx     Amblyopia Neg Hx     Blindness Neg Hx     Cancer Neg Hx     Cataracts Neg Hx     Diabetes Neg Hx     Glaucoma Neg Hx     Hypertension Neg Hx     Macular degeneration Neg Hx     Retinal detachment Neg Hx     Strabismus Neg Hx     Stroke Neg Hx     Thyroid disease Neg Hx       reports that she quit smoking about 39 years ago. Her smoking use included cigarettes. She has a 22.00 pack-year smoking history. She has never used smokeless tobacco. She reports that she drinks about 7.0 standard drinks of alcohol per week. She reports that she does not use drugs.  Review of patient's allergies indicates:   Allergen Reactions    Nsaids (non-steroidal anti-inflammatory drug)      GI Bleed  Had 5 blood  transfusions     Current Outpatient Medications   Medication Sig Dispense Refill    acetaminophen (TYLENOL) 325 MG tablet Take 325 mg by mouth continuous prn for Pain.      aspirin (ECOTRIN) 81 MG EC tablet Take 1 tablet (81 mg total) by mouth once daily. 30 tablet 0    azithromycin (Z-ALOK) 250 MG tablet 2 initially then one daily 6 tablet 0    estradiol-norethindrone acet (LOPREEZA) 0.5-0.1 mg per tablet Take 1 tablet by mouth once daily. 84 tablet 3    hydrocortisone 2.5 % cream CED AA BID. MAY MIX WITH KETOCONAZOLE CREAM  3    ketoconazole (NIZORAL) 2 % cream CED BID AA  11    lidocaine HCL 2% (XYLOCAINE) 2 % jelly Apply to affected area daily as needed 30 mL 1    oseltamivir (TAMIFLU) 75 MG capsule Take 1 capsule (75 mg total) by mouth 2 (two) times daily. for 5 days 10 capsule 0    temazepam (RESTORIL) 15 mg Cap TAKE 1 CAPSULE(15 MG) BY MOUTH EVERY NIGHT 90 capsule 0    verapamil (VERELAN) 120 MG C24P Take 1 capsule (120 mg total) by mouth once daily. 90 capsule 3    valACYclovir (VALTREX) 500 MG tablet Take 1 tablet (500 mg total) by mouth once daily. 30 tablet 11     No current facility-administered medications for this visit.      Review of Systems   Constitutional: Positive for fatigue. Negative for appetite change, chills and unexpected weight change.   HENT: Positive for dental problem. Negative for mouth sores, nosebleeds, trouble swallowing and voice change.    Eyes: Negative for visual disturbance.   Respiratory: Positive for shortness of breath. Negative for chest tightness and wheezing.    Cardiovascular: Negative for chest pain, palpitations and leg swelling.   Gastrointestinal: Negative for abdominal distention, abdominal pain, blood in stool, diarrhea, nausea and vomiting.   Endocrine: Negative for cold intolerance.   Genitourinary: Negative for hematuria.   Musculoskeletal: Negative for neck pain.   Skin: Negative for pallor and rash.   Allergic/Immunologic: Negative for  "immunocompromised state.   Neurological: Negative for dizziness, weakness and headaches.   Hematological: Negative for adenopathy. Does not bruise/bleed easily.   Psychiatric/Behavioral: Negative for agitation and behavioral problems.     ECOG Performance Status: 1     Objective:      Vitals:   Vitals:    01/14/20 1632   BP: (!) 101/58   Pulse: 85   Resp: 16   Temp: 97.9 °F (36.6 °C)   SpO2: 95%   Weight: 71.2 kg (156 lb 15.5 oz)   Height: 5' 8" (1.727 m)     BMI: Body mass index is 23.87 kg/m².    Physical Exam   Constitutional: She is oriented to person, place, and time. She appears well-developed and well-nourished.   Well appearing, thin, white, elderly female   HENT:   Head: Normocephalic and atraumatic.   Eyes: Pupils are equal, round, and reactive to light. EOM are normal.   Neck: Normal range of motion. Neck supple.   Cardiovascular: Normal rate and regular rhythm.   Pulmonary/Chest: Effort normal and breath sounds normal. She has no wheezes.   Abdominal: Soft. Bowel sounds are normal. She exhibits no mass.   Musculoskeletal: Normal range of motion. She exhibits no edema.   Lymphadenopathy:        Head (right side): No submandibular, no posterior auricular and no occipital adenopathy present.        Head (left side): No submandibular, no posterior auricular and no occipital adenopathy present.     She has no cervical adenopathy.     She has no axillary adenopathy.        Right: No supraclavicular adenopathy present.        Left: No supraclavicular adenopathy present.   Neurological: She is alert and oriented to person, place, and time.   Skin: Skin is warm and dry.   Psychiatric: She has a normal mood and affect. Her behavior is normal.   Vitals reviewed.    Laboratory Data:  Lab Visit on 01/14/2020   Component Date Value Ref Range Status    WBC 01/14/2020 7.32  3.90 - 12.70 K/uL Final    RBC 01/14/2020 2.64* 4.00 - 5.40 M/uL Final    Hemoglobin 01/14/2020 9.4* 12.0 - 16.0 g/dL Final    Hematocrit " 01/14/2020 29.3* 37.0 - 48.5 % Final    Mean Corpuscular Volume 01/14/2020 111* 82 - 98 fL Final    Mean Corpuscular Hemoglobin 01/14/2020 35.6* 27.0 - 31.0 pg Final    Mean Corpuscular Hemoglobin Conc 01/14/2020 32.1  32.0 - 36.0 g/dL Final    RDW 01/14/2020 14.5  11.5 - 14.5 % Final    Platelets 01/14/2020 323  150 - 350 K/uL Final    MPV 01/14/2020 9.6  9.2 - 12.9 fL Final    Immature Granulocytes 01/14/2020 0.3  0.0 - 0.5 % Final    Gran # (ANC) 01/14/2020 5.1  1.8 - 7.7 K/uL Final    Immature Grans (Abs) 01/14/2020 0.02  0.00 - 0.04 K/uL Final    Comment: Mild elevation in immature granulocytes is non specific and   can be seen in a variety of conditions including stress response,   acute inflammation, trauma and pregnancy. Correlation with other   laboratory and clinical findings is essential.      Lymph # 01/14/2020 1.3  1.0 - 4.8 K/uL Final    Mono # 01/14/2020 0.8  0.3 - 1.0 K/uL Final    Eos # 01/14/2020 0.1  0.0 - 0.5 K/uL Final    Baso # 01/14/2020 0.05  0.00 - 0.20 K/uL Final    nRBC 01/14/2020 0  0 /100 WBC Final    Gran% 01/14/2020 69.9  38.0 - 73.0 % Final    Lymph% 01/14/2020 17.3* 18.0 - 48.0 % Final    Mono% 01/14/2020 10.7  4.0 - 15.0 % Final    Eosinophil% 01/14/2020 1.1  0.0 - 8.0 % Final    Basophil% 01/14/2020 0.7  0.0 - 1.9 % Final    Differential Method 01/14/2020 Automated   Final    Sodium 01/14/2020 136  136 - 145 mmol/L Final    Potassium 01/14/2020 4.0  3.5 - 5.1 mmol/L Final    Chloride 01/14/2020 104  95 - 110 mmol/L Final    CO2 01/14/2020 22* 23 - 29 mmol/L Final    Glucose 01/14/2020 119* 70 - 110 mg/dL Final    BUN, Bld 01/14/2020 11  8 - 23 mg/dL Final    Creatinine 01/14/2020 1.1  0.5 - 1.4 mg/dL Final    Calcium 01/14/2020 9.4  8.7 - 10.5 mg/dL Final    Total Protein 01/14/2020 6.9  6.0 - 8.4 g/dL Final    Albumin 01/14/2020 4.0  3.5 - 5.2 g/dL Final    Total Bilirubin 01/14/2020 0.5  0.1 - 1.0 mg/dL Final    Comment: For infants and newborns,  interpretation of results should be based  on gestational age, weight and in agreement with clinical  observations.  Premature Infant recommended reference ranges:  Up to 24 hours.............<8.0 mg/dL  Up to 48 hours............<12.0 mg/dL  3-5 days..................<15.0 mg/dL  6-29 days.................<15.0 mg/dL      Alkaline Phosphatase 01/14/2020 32* 55 - 135 U/L Final    AST 01/14/2020 15  10 - 40 U/L Final    ALT 01/14/2020 9* 10 - 44 U/L Final    Anion Gap 01/14/2020 10  8 - 16 mmol/L Final    eGFR if African American 01/14/2020 54.4* >60 mL/min/1.73 m^2 Final    eGFR if non  01/14/2020 47.2* >60 mL/min/1.73 m^2 Final    Comment: Calculation used to obtain the estimated glomerular filtration  rate (eGFR) is the CKD-EPI equation.       Magnesium 01/14/2020 1.9  1.6 - 2.6 mg/dL Final    Phosphorus 01/14/2020 3.4  2.7 - 4.5 mg/dL Final    Group & Rh 01/14/2020 A POS   Final    Indirect Berry 01/14/2020 NEG   Final    LD 01/14/2020 163  110 - 260 U/L Final    Results are increased in hemolyzed samples.    Ferritin 01/14/2020 427* 20.0 - 300.0 ng/mL Final    Iron 01/14/2020 <10* 30 - 160 ug/dL Final    Transferrin 01/14/2020 163* 200 - 375 mg/dL Final    TIBC 01/14/2020 241* 250 - 450 ug/dL Final    Saturated Iron 01/14/2020 Unable to calculate  20 - 50 % Final       Assessment:       1. RARS (refractory anemia with ringed sideroblasts)      Hematologic History:      2019 November   -initial consult for macrocytic anemia, work up negative  December   -bmbx should SF3B1 for RARS   2020 January   -discuss findings and normal OS, routine follow up    Plan:     Mrs. Vásquez presents to hematology clinic today to discuss her bmbx results showing RARS with SF3B1 mutation shown on NGS. This mutation and diagnosis are usually associated with good outcomes without an obvious detriment to OS. Routine follow up is recommended and intervention is usually only recommended once profound  anemia is noted on laboratory examination. She had labs ordered prior to her exma today that are somewhat odd. Her iron panel shows a drastic change in her iron level from 187 -> <10 over the course of a month while her ferritin was noted to go from 360 -> 427. This may be an erroneous reading and will look at this next time her labs are drawn. Hgb appears to be stable.     1. RARS with SF3B1 mutation  -routine CBCs 3-4 times yearly recommended  -will repeat iron studies in 1 month with CBC   -if normal, can check cbc in 3-4 months  -follow up with me in 6 months  -no indication for intervention at this time, no change in OS noted with this disease  -encouraged to keep exercising    Discussed with Dr. Arana.    Sebas De Anda MD  Hematology/Medical Oncology Fellow  [phone] (pager)

## 2020-01-15 NOTE — PROGRESS NOTES
Subjective:       Patient ID: Shelly Vásquez is a 79 y.o. female.    Chief Complaint: Influenza    79 year old lady complains of chills and aches with temp to 101.  2 days ago her PCP prescribed z pack and tamiflu.  Now she feels just weak and tired.  Is able to sleep with no problem.  Has new great grandchild - her first - and she wants to go see her    Review of Systems   Constitutional: Negative for activity change, chills, fatigue and fever.   HENT: Negative for congestion, ear pain, nosebleeds, postnasal drip, sinus pressure and sore throat.    Eyes: Negative.  Negative for visual disturbance.   Respiratory: Negative for cough, chest tightness, shortness of breath and wheezing.    Cardiovascular: Negative for chest pain.   Gastrointestinal: Negative for abdominal pain, diarrhea, nausea and vomiting.   Genitourinary: Negative for difficulty urinating, dysuria, frequency and urgency.   Musculoskeletal: Negative for arthralgias and neck stiffness.   Skin: Negative for rash.   Neurological: Negative for dizziness, weakness and headaches.   Psychiatric/Behavioral: Negative for sleep disturbance. The patient is not nervous/anxious.        Objective:      Physical Exam   Constitutional: She is oriented to person, place, and time. She appears well-developed and well-nourished.  Non-toxic appearance. No distress.   HENT:   Head: Normocephalic and atraumatic.   Right Ear: Tympanic membrane, external ear and ear canal normal.   Left Ear: Tympanic membrane, external ear and ear canal normal.   Eyes: Pupils are equal, round, and reactive to light. EOM are normal. No scleral icterus.   Neck: Normal range of motion. Neck supple. No thyromegaly present.   Cardiovascular: Normal rate, regular rhythm and normal heart sounds.   Pulmonary/Chest: Effort normal and breath sounds normal.   Abdominal: Soft. Bowel sounds are normal. She exhibits no mass. There is no tenderness. There is no rebound.   Musculoskeletal: Normal range  of motion.   Lymphadenopathy:     She has no cervical adenopathy.   Neurological: She is alert and oriented to person, place, and time. She has normal reflexes. She displays normal reflexes. No cranial nerve deficit. She exhibits normal muscle tone. Coordination normal.   Skin: Skin is warm and dry.   Psychiatric: She has a normal mood and affect. Her behavior is normal.       Assessment:       1. Upper respiratory tract infection, unspecified type    2. Myelodysplastic syndrome        Plan:   Shelly Wilkinson was seen today for influenza.    Diagnoses and all orders for this visit:    Upper respiratory tract infection, unspecified type    Myelodysplastic syndrome

## 2020-01-24 ENCOUNTER — PATIENT MESSAGE (OUTPATIENT)
Dept: INTERNAL MEDICINE | Facility: CLINIC | Age: 80
End: 2020-01-24

## 2020-01-31 ENCOUNTER — EXTERNAL CHRONIC CARE MANAGEMENT (OUTPATIENT)
Dept: PRIMARY CARE CLINIC | Facility: CLINIC | Age: 80
End: 2020-01-31
Payer: MEDICARE

## 2020-01-31 PROCEDURE — 99490 CHRNC CARE MGMT STAFF 1ST 20: CPT | Mod: S$PBB,,, | Performed by: INTERNAL MEDICINE

## 2020-01-31 PROCEDURE — 99490 PR CHRONIC CARE MGMT, 1ST 20 MIN: ICD-10-PCS | Mod: S$PBB,,, | Performed by: INTERNAL MEDICINE

## 2020-01-31 PROCEDURE — 99490 CHRNC CARE MGMT STAFF 1ST 20: CPT | Mod: PBBFAC | Performed by: INTERNAL MEDICINE

## 2020-02-07 LAB
CHROM BANDING METHOD: NORMAL
CHROMOSOME ANALYSIS BM ADDITIONAL INFORMATION: NORMAL
CHROMOSOME ANALYSIS BM RELEASED BY: NORMAL
CHROMOSOME ANALYSIS BM RESULT SUMMARY: NORMAL
CLINICAL CYTOGENETICIST REVIEW: NORMAL
KARYOTYP MAR: NORMAL
REASON FOR REFERRAL (NARRATIVE): NORMAL
REF LAB TEST METHOD: NORMAL
SPECIMEN SOURCE: NORMAL
SPECIMEN: NORMAL

## 2020-02-14 ENCOUNTER — LAB VISIT (OUTPATIENT)
Dept: LAB | Facility: HOSPITAL | Age: 80
End: 2020-02-14
Payer: MEDICARE

## 2020-02-14 DIAGNOSIS — D46.1 RARS (REFRACTORY ANEMIA WITH RINGED SIDEROBLASTS): ICD-10-CM

## 2020-02-14 LAB
BASOPHILS # BLD AUTO: 0.1 K/UL (ref 0–0.2)
BASOPHILS NFR BLD: 2 % (ref 0–1.9)
DIFFERENTIAL METHOD: ABNORMAL
EOSINOPHIL # BLD AUTO: 0.2 K/UL (ref 0–0.5)
EOSINOPHIL NFR BLD: 3.2 % (ref 0–8)
ERYTHROCYTE [DISTWIDTH] IN BLOOD BY AUTOMATED COUNT: 16.1 % (ref 11.5–14.5)
HCT VFR BLD AUTO: 28.8 % (ref 37–48.5)
HGB BLD-MCNC: 8.9 G/DL (ref 12–16)
IMM GRANULOCYTES # BLD AUTO: 0.01 K/UL (ref 0–0.04)
IMM GRANULOCYTES NFR BLD AUTO: 0.2 % (ref 0–0.5)
IRON SERPL-MCNC: 199 UG/DL (ref 30–160)
LYMPHOCYTES # BLD AUTO: 2.2 K/UL (ref 1–4.8)
LYMPHOCYTES NFR BLD: 43.9 % (ref 18–48)
MCH RBC QN AUTO: 34.8 PG (ref 27–31)
MCHC RBC AUTO-ENTMCNC: 30.9 G/DL (ref 32–36)
MCV RBC AUTO: 113 FL (ref 82–98)
MONOCYTES # BLD AUTO: 0.5 K/UL (ref 0.3–1)
MONOCYTES NFR BLD: 10.6 % (ref 4–15)
NEUTROPHILS # BLD AUTO: 2 K/UL (ref 1.8–7.7)
NEUTROPHILS NFR BLD: 40.1 % (ref 38–73)
NRBC BLD-RTO: 0 /100 WBC
PLATELET # BLD AUTO: 411 K/UL (ref 150–350)
PMV BLD AUTO: 10.4 FL (ref 9.2–12.9)
RBC # BLD AUTO: 2.56 M/UL (ref 4–5.4)
SATURATED IRON: 85 % (ref 20–50)
TOTAL IRON BINDING CAPACITY: 235 UG/DL (ref 250–450)
TRANSFERRIN SERPL-MCNC: 159 MG/DL (ref 200–375)
WBC # BLD AUTO: 5.01 K/UL (ref 3.9–12.7)

## 2020-02-14 PROCEDURE — 83540 ASSAY OF IRON: CPT

## 2020-02-14 PROCEDURE — 85025 COMPLETE CBC W/AUTO DIFF WBC: CPT

## 2020-02-14 PROCEDURE — 82728 ASSAY OF FERRITIN: CPT

## 2020-02-14 PROCEDURE — 36415 COLL VENOUS BLD VENIPUNCTURE: CPT | Mod: PO

## 2020-02-15 LAB — FERRITIN SERPL-MCNC: 379 NG/ML (ref 20–300)

## 2020-02-29 ENCOUNTER — EXTERNAL CHRONIC CARE MANAGEMENT (OUTPATIENT)
Dept: PRIMARY CARE CLINIC | Facility: CLINIC | Age: 80
End: 2020-02-29
Payer: MEDICARE

## 2020-02-29 PROCEDURE — 99490 CHRNC CARE MGMT STAFF 1ST 20: CPT | Mod: S$PBB,,, | Performed by: INTERNAL MEDICINE

## 2020-02-29 PROCEDURE — 99490 PR CHRONIC CARE MGMT, 1ST 20 MIN: ICD-10-PCS | Mod: S$PBB,,, | Performed by: INTERNAL MEDICINE

## 2020-02-29 PROCEDURE — 99490 CHRNC CARE MGMT STAFF 1ST 20: CPT | Mod: PBBFAC | Performed by: INTERNAL MEDICINE

## 2020-03-25 ENCOUNTER — PATIENT MESSAGE (OUTPATIENT)
Dept: INTERNAL MEDICINE | Facility: CLINIC | Age: 80
End: 2020-03-25

## 2020-03-25 ENCOUNTER — TELEPHONE (OUTPATIENT)
Dept: INTERNAL MEDICINE | Facility: CLINIC | Age: 80
End: 2020-03-25

## 2020-03-25 RX ORDER — TIZANIDINE 2 MG/1
2 TABLET ORAL NIGHTLY PRN
Qty: 7 TABLET | Refills: 0 | Status: SHIPPED | OUTPATIENT
Start: 2020-03-25 | End: 2020-04-04

## 2020-03-25 RX ORDER — TRAMADOL HYDROCHLORIDE 50 MG/1
50 TABLET ORAL EVERY 8 HOURS PRN
Qty: 21 TABLET | Refills: 0 | Status: SHIPPED | OUTPATIENT
Start: 2020-03-25 | End: 2021-04-13

## 2020-03-30 ENCOUNTER — PATIENT MESSAGE (OUTPATIENT)
Dept: OPTOMETRY | Facility: CLINIC | Age: 80
End: 2020-03-30

## 2020-03-30 ENCOUNTER — PATIENT MESSAGE (OUTPATIENT)
Dept: INTERNAL MEDICINE | Facility: CLINIC | Age: 80
End: 2020-03-30

## 2020-03-31 ENCOUNTER — EXTERNAL CHRONIC CARE MANAGEMENT (OUTPATIENT)
Dept: PRIMARY CARE CLINIC | Facility: CLINIC | Age: 80
End: 2020-03-31
Payer: MEDICARE

## 2020-03-31 PROCEDURE — 99490 CHRNC CARE MGMT STAFF 1ST 20: CPT | Mod: PBBFAC | Performed by: INTERNAL MEDICINE

## 2020-03-31 PROCEDURE — 99490 PR CHRONIC CARE MGMT, 1ST 20 MIN: ICD-10-PCS | Mod: S$PBB,,, | Performed by: INTERNAL MEDICINE

## 2020-03-31 PROCEDURE — 99490 CHRNC CARE MGMT STAFF 1ST 20: CPT | Mod: S$PBB,,, | Performed by: INTERNAL MEDICINE

## 2020-04-15 RX ORDER — TEMAZEPAM 15 MG/1
15 CAPSULE ORAL NIGHTLY
Qty: 90 CAPSULE | Refills: 0 | Status: SHIPPED | OUTPATIENT
Start: 2020-04-15 | End: 2020-07-15 | Stop reason: SDUPTHER

## 2020-04-28 ENCOUNTER — PATIENT MESSAGE (OUTPATIENT)
Dept: OPTOMETRY | Facility: CLINIC | Age: 80
End: 2020-04-28

## 2020-04-30 ENCOUNTER — EXTERNAL CHRONIC CARE MANAGEMENT (OUTPATIENT)
Dept: PRIMARY CARE CLINIC | Facility: CLINIC | Age: 80
End: 2020-04-30
Payer: MEDICARE

## 2020-04-30 PROCEDURE — 99490 PR CHRONIC CARE MGMT, 1ST 20 MIN: ICD-10-PCS | Mod: S$PBB,,, | Performed by: INTERNAL MEDICINE

## 2020-04-30 PROCEDURE — 99490 CHRNC CARE MGMT STAFF 1ST 20: CPT | Mod: S$PBB,,, | Performed by: INTERNAL MEDICINE

## 2020-04-30 PROCEDURE — 99490 CHRNC CARE MGMT STAFF 1ST 20: CPT | Mod: PBBFAC | Performed by: INTERNAL MEDICINE

## 2020-05-31 ENCOUNTER — EXTERNAL CHRONIC CARE MANAGEMENT (OUTPATIENT)
Dept: PRIMARY CARE CLINIC | Facility: CLINIC | Age: 80
End: 2020-05-31
Payer: MEDICARE

## 2020-05-31 PROCEDURE — 99490 CHRNC CARE MGMT STAFF 1ST 20: CPT | Mod: PBBFAC | Performed by: INTERNAL MEDICINE

## 2020-05-31 PROCEDURE — 99490 PR CHRONIC CARE MGMT, 1ST 20 MIN: ICD-10-PCS | Mod: S$PBB,,, | Performed by: INTERNAL MEDICINE

## 2020-05-31 PROCEDURE — 99490 CHRNC CARE MGMT STAFF 1ST 20: CPT | Mod: S$PBB,,, | Performed by: INTERNAL MEDICINE

## 2020-06-25 ENCOUNTER — TELEPHONE (OUTPATIENT)
Dept: SURGERY | Facility: CLINIC | Age: 80
End: 2020-06-25

## 2020-06-25 ENCOUNTER — TELEPHONE (OUTPATIENT)
Dept: OBSTETRICS AND GYNECOLOGY | Facility: CLINIC | Age: 80
End: 2020-06-25

## 2020-06-25 DIAGNOSIS — Z12.31 BREAST CANCER SCREENING BY MAMMOGRAM: Primary | ICD-10-CM

## 2020-06-25 NOTE — TELEPHONE ENCOUNTER
Dr. Fairchild's office called and stated the patient would take the 3:30 appt with Marian Salazar PA-C on Monday 6/29/2020. Appt  Scheduled per request.

## 2020-06-25 NOTE — TELEPHONE ENCOUNTER
----- Message from Jm Blair sent at 6/25/2020  1:23 PM CDT -----  Regarding: Orders  Contact: NEIL REDMOND [[id number]]  Name of Who is Calling: NEIL REDMOND [[id number]]      What is the request in detail: Patient would like to have her Mammogram orders put in       Can the clinic reply by [facility]: yes      What Number to Call Back if not in [facility]: [phone]

## 2020-06-29 ENCOUNTER — PES CALL (OUTPATIENT)
Dept: ADMINISTRATIVE | Facility: CLINIC | Age: 80
End: 2020-06-29

## 2020-06-29 ENCOUNTER — OFFICE VISIT (OUTPATIENT)
Dept: SURGERY | Facility: CLINIC | Age: 80
End: 2020-06-29
Payer: MEDICARE

## 2020-06-29 VITALS
BODY MASS INDEX: 23.04 KG/M2 | DIASTOLIC BLOOD PRESSURE: 56 MMHG | HEART RATE: 68 BPM | SYSTOLIC BLOOD PRESSURE: 115 MMHG | HEIGHT: 68 IN | WEIGHT: 152 LBS

## 2020-06-29 DIAGNOSIS — N63.0 BREAST LUMP: Primary | ICD-10-CM

## 2020-06-29 DIAGNOSIS — R59.1 LYMPHADENOPATHY: ICD-10-CM

## 2020-06-29 PROCEDURE — 99999 PR PBB SHADOW E&M-EST. PATIENT-LVL IV: ICD-10-PCS | Mod: PBBFAC,,, | Performed by: PHYSICIAN ASSISTANT

## 2020-06-29 PROCEDURE — 99214 OFFICE O/P EST MOD 30 MIN: CPT | Mod: PBBFAC | Performed by: PHYSICIAN ASSISTANT

## 2020-06-29 PROCEDURE — 99203 OFFICE O/P NEW LOW 30 MIN: CPT | Mod: S$PBB,,, | Performed by: PHYSICIAN ASSISTANT

## 2020-06-29 PROCEDURE — 99999 PR PBB SHADOW E&M-EST. PATIENT-LVL IV: CPT | Mod: PBBFAC,,, | Performed by: PHYSICIAN ASSISTANT

## 2020-06-29 PROCEDURE — 99203 PR OFFICE/OUTPT VISIT, NEW, LEVL III, 30-44 MIN: ICD-10-PCS | Mod: S$PBB,,, | Performed by: PHYSICIAN ASSISTANT

## 2020-06-29 NOTE — PROGRESS NOTES
[facility]  [facility]  6/29/2020      REFERRING PROVIDER: Ailyn Fairchild MD  [address] [address]    SUBJECTIVE:   Ms. Shelly Vásquez is a 79 y.o. female who presents today complaining of bilateral breast pain.    History of Present Illness: Patient states she has had bilateral breast pain for the past week. She describes it as occurring occasionally and tender to touch, worse on the right than the left.  She has been on HRT since her 50's and also had a breast lift at that time.   She has minimal caffeine intake of 1-1.5 cups of coffee per day.   She had a benign breast biopsy several years ago (unsure of which side).    She denies palpating any breast masses. She denies any nipple discharge or skin changes at the breast.    She had a bilateral screening mammogram on 7/1/19 that was read as BIRADS 1, negative.    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    Supraventricular tachycardia     s/p AVNRT ablation (Dr Brady)     Past Surgical History:   Procedure Laterality Date    APPENDECTOMY  age 21    BACK SURGERY      Beast Lift  2004    BONE MARROW BIOPSY Right 12/19/2019    Procedure: Biopsy-bone marrow;  Surgeon: Aidan Spring MD;  Location: [facility] OR [address] [place] [address];  Service: Oncology;  Laterality: Right;    BREAST BIOPSY  50yrs ago    unable to see scar    COSMETIC SURGERY      DILATION AND CURETTAGE OF UTERUS  2008    PMB    ENDOMETRIAL BIOPSY      EYE SURGERY      ptsosis      RADIOFREQUENCY ABLATION  03/2018    SMALL INTESTINE SURGERY      TONSILLECTOMY, ADENOIDECTOMY  age 8    TOTAL REDUCTION MAMMOPLASTY Bilateral 2003    TUBAL  LIGATION       Social History     Socioeconomic History    Marital status:      Spouse name: Not on file    Number of children: Not on file    Years of education: Not on file    Highest education level: Not on file   Occupational History    Not on file   Social Needs    Financial resource strain: Not on file    Food insecurity     Worry: Not on file     Inability: Not on file    Transportation needs     Medical: Not on file     Non-medical: Not on file   Tobacco Use    Smoking status: Former Smoker     Packs/day: 1.00     Years: 22.00     Pack years: 22.00     Types: Cigarettes     Quit date: 1980     Years since quittin.9    Smokeless tobacco: Never Used   Substance and Sexual Activity    Alcohol use: Yes     Alcohol/week: 7.0 standard drinks     Types: 7 Glasses of wine per week     Comment: 1-2 glasses of wine daily    Drug use: No    Sexual activity: Yes     Partners: Male     Birth control/protection: Post-menopausal   Lifestyle    Physical activity     Days per week: Not on file     Minutes per session: Not on file    Stress: Not on file   Relationships    Social connections     Talks on phone: Not on file     Gets together: Not on file     Attends [employer] service: Not on file     Active member of club or organization: Not on file     Attends meetings of clubs or organizations: Not on file     Relationship status: Not on file   Other Topics Concern    Are you pregnant or think you may be? Not Asked    Breast-feeding Not Asked   Social History Narrative    Not on file     Review of patient's allergies indicates:   Allergen Reactions    Nsaids (non-steroidal anti-inflammatory drug)      GI Bleed  Had 5 blood transfusions      Family History: No known family history of breast or ovarian cancer.    Tyrer-Cuzick Score: 1.67%    Review of Systems: Denies any chest pain or shortness of breath.  Denies any fever or chills.  See HPI/ Interval History for other systems  reviewed.    OBJECTIVE:   Vitals:    06/29/20 1339   BP: (!) 115/56   Pulse: 68      Physical Exam:  HEENT: Normocephalic, atraumatic.    General: alert and oriented; no acute distress.  Breast: 5 cm slightly firm linear lesion at upper inner aspect of left breast near scar (11 o'clock position, N+8 cm). No right breast masses. No skin changes. No nipple inversion or discharge bilaterally.    Lymph: slightly enlarged right axillary lymph node; mobile and non-tender. No palpable adjacent axillary lymphadenopathy on the left.        ASSESSMENT:  Ms. Shelly Vásquez is a 79 y.o. year old female with bilateral breast pain, a left breast lump, and right axillary lymphadenopathy.      PLAN:   We discussed that a further workup is recommended with a bilateral diagnostic mammogram and focused ultrasound.   I recommended over the counter evening primrose oil tablets for her breast pain.  She can follow up with me as needed.    ~Marian Salazar PA-C      Surgical Oncology            6/29/2020

## 2020-06-29 NOTE — LETTER
June 29, 2020      Ailyn Fairchild MD  [address] [facility]  [address]  [facility] [address]           [facility]  [address]  [facility] [address]  Phone: [phone]  Fax: [phone]          Patient: Shelly Vásquez   MR Number: [id number]   YOB: 1940   Date of Visit: 6/29/2020       Dear Dr. Ailyn Fairchild:    Thank you for referring Shelly Vásquez to me for evaluation. Attached you will find relevant portions of my assessment and plan of care.    If you have questions, please do not hesitate to call me. I look forward to following Shelly Vásquez along with you.    Sincerely,    CHARLENE Robles    Enclosure  CC:  No Recipients    If you would like to receive this communication electronically, please contact [email or web address] or [phone] to request more information on [employer] access.    For providers and/or their staff who would like to refer a patient to [facility], please contact us through our one-stop-shop provider referral line, [facility], at [phone].    If you feel you have received this communication in error or would no longer like to receive these types of communications, please e-mail [email or web address]

## 2020-06-30 ENCOUNTER — EXTERNAL CHRONIC CARE MANAGEMENT (OUTPATIENT)
Dept: PRIMARY CARE CLINIC | Facility: CLINIC | Age: 80
End: 2020-06-30
Payer: MEDICARE

## 2020-06-30 PROCEDURE — 99490 PR CHRONIC CARE MGMT, 1ST 20 MIN: ICD-10-PCS | Mod: S$PBB,,, | Performed by: INTERNAL MEDICINE

## 2020-06-30 PROCEDURE — 99490 CHRNC CARE MGMT STAFF 1ST 20: CPT | Mod: S$PBB,,, | Performed by: INTERNAL MEDICINE

## 2020-06-30 PROCEDURE — 99490 CHRNC CARE MGMT STAFF 1ST 20: CPT | Mod: PBBFAC | Performed by: INTERNAL MEDICINE

## 2020-07-01 ENCOUNTER — HOSPITAL ENCOUNTER (OUTPATIENT)
Dept: RADIOLOGY | Facility: HOSPITAL | Age: 80
Discharge: HOME | End: 2020-07-01
Attending: PHYSICIAN ASSISTANT
Payer: MEDICARE

## 2020-07-01 DIAGNOSIS — R59.1 LYMPHADENOPATHY: ICD-10-CM

## 2020-07-01 DIAGNOSIS — N63.0 BREAST LUMP: ICD-10-CM

## 2020-07-01 PROCEDURE — 77066 DX MAMMO INCL CAD BI: CPT | Mod: 26,,, | Performed by: RADIOLOGY

## 2020-07-01 PROCEDURE — 77062 BREAST TOMOSYNTHESIS BI: CPT | Mod: 26,,, | Performed by: RADIOLOGY

## 2020-07-01 PROCEDURE — 77066 MAMMO DIGITAL DIAGNOSTIC BILAT WITH TOMOSYNTHESIS_CAD: ICD-10-PCS | Mod: 26,,, | Performed by: RADIOLOGY

## 2020-07-01 PROCEDURE — 77066 DX MAMMO INCL CAD BI: CPT | Mod: TC,PO

## 2020-07-01 PROCEDURE — 77062 MAMMO DIGITAL DIAGNOSTIC BILAT WITH TOMOSYNTHESIS_CAD: ICD-10-PCS | Mod: 26,,, | Performed by: RADIOLOGY

## 2020-07-02 ENCOUNTER — HOSPITAL ENCOUNTER (OUTPATIENT)
Dept: RADIOLOGY | Facility: HOSPITAL | Age: 80
Discharge: HOME | End: 2020-07-02
Attending: PHYSICIAN ASSISTANT
Payer: MEDICARE

## 2020-07-02 ENCOUNTER — TELEPHONE (OUTPATIENT)
Dept: RADIOLOGY | Facility: HOSPITAL | Age: 80
End: 2020-07-02

## 2020-07-02 DIAGNOSIS — N63.0 BREAST LUMP: ICD-10-CM

## 2020-07-02 DIAGNOSIS — R59.1 LYMPHADENOPATHY: ICD-10-CM

## 2020-07-02 PROCEDURE — 76642 ULTRASOUND BREAST LIMITED: CPT | Mod: TC,PO,RT

## 2020-07-02 PROCEDURE — 76642 US BREAST RIGHT LIMITED: ICD-10-PCS | Mod: 26,RT,, | Performed by: RADIOLOGY

## 2020-07-02 PROCEDURE — 76642 ULTRASOUND BREAST LIMITED: CPT | Mod: 26,RT,, | Performed by: RADIOLOGY

## 2020-07-02 NOTE — TELEPHONE ENCOUNTER
Called patient in reference to her breast imaging and scheduling a breast ultrasound. No answer. Left the patient a message with my direct phone number.

## 2020-07-15 ENCOUNTER — OFFICE VISIT (OUTPATIENT)
Dept: INTERNAL MEDICINE | Facility: CLINIC | Age: 80
End: 2020-07-15
Payer: MEDICARE

## 2020-07-15 VITALS
HEART RATE: 66 BPM | DIASTOLIC BLOOD PRESSURE: 60 MMHG | BODY MASS INDEX: 23.26 KG/M2 | WEIGHT: 153.44 LBS | SYSTOLIC BLOOD PRESSURE: 120 MMHG | HEIGHT: 68 IN

## 2020-07-15 DIAGNOSIS — I48.0 PAF (PAROXYSMAL ATRIAL FIBRILLATION): ICD-10-CM

## 2020-07-15 DIAGNOSIS — Z95.818 STATUS POST PLACEMENT OF IMPLANTABLE LOOP RECORDER: ICD-10-CM

## 2020-07-15 DIAGNOSIS — D75.839 THROMBOCYTOSIS: ICD-10-CM

## 2020-07-15 DIAGNOSIS — Z00.00 ENCOUNTER FOR PREVENTIVE HEALTH EXAMINATION: Primary | ICD-10-CM

## 2020-07-15 DIAGNOSIS — M43.16 SPONDYLOLISTHESIS AT L4-L5 LEVEL: ICD-10-CM

## 2020-07-15 DIAGNOSIS — I47.10 PSVT (PAROXYSMAL SUPRAVENTRICULAR TACHYCARDIA): ICD-10-CM

## 2020-07-15 DIAGNOSIS — D46.9 MDS (MYELODYSPLASTIC SYNDROME): ICD-10-CM

## 2020-07-15 DIAGNOSIS — Z98.890 S/P LUMBAR LAMINECTOMY: ICD-10-CM

## 2020-07-15 DIAGNOSIS — Z98.890 HISTORY OF RADIOFREQUENCY ABLATION (RFA) PROCEDURE FOR CARDIAC ARRHYTHMIA: ICD-10-CM

## 2020-07-15 DIAGNOSIS — Z74.09 OTHER REDUCED MOBILITY: ICD-10-CM

## 2020-07-15 DIAGNOSIS — M47.816 ARTHROPATHY OF LUMBAR FACET JOINT: ICD-10-CM

## 2020-07-15 DIAGNOSIS — N18.30 STAGE 3 CHRONIC KIDNEY DISEASE: ICD-10-CM

## 2020-07-15 DIAGNOSIS — M48.061 SPINAL STENOSIS OF LUMBAR REGION, UNSPECIFIED WHETHER NEUROGENIC CLAUDICATION PRESENT: ICD-10-CM

## 2020-07-15 DIAGNOSIS — Z87.19 HISTORY OF GI BLEED: ICD-10-CM

## 2020-07-15 DIAGNOSIS — M15.9 PRIMARY OSTEOARTHRITIS INVOLVING MULTIPLE JOINTS: Chronic | ICD-10-CM

## 2020-07-15 DIAGNOSIS — I70.0 AORTIC ATHEROSCLEROSIS: ICD-10-CM

## 2020-07-15 PROCEDURE — 99214 OFFICE O/P EST MOD 30 MIN: CPT | Mod: PBBFAC | Performed by: NURSE PRACTITIONER

## 2020-07-15 PROCEDURE — 99999 PR PBB SHADOW E&M-EST. PATIENT-LVL IV: CPT | Mod: PBBFAC,,, | Performed by: NURSE PRACTITIONER

## 2020-07-15 PROCEDURE — 99999 PR PBB SHADOW E&M-EST. PATIENT-LVL IV: ICD-10-PCS | Mod: PBBFAC,,, | Performed by: NURSE PRACTITIONER

## 2020-07-15 PROCEDURE — 99213 OFFICE O/P EST LOW 20 MIN: CPT | Mod: ,,, | Performed by: NURSE PRACTITIONER

## 2020-07-15 PROCEDURE — 99213 PR OFFICE/OUTPT VISIT, EST, LEVL III, 20-29 MIN: ICD-10-PCS | Mod: ,,, | Performed by: NURSE PRACTITIONER

## 2020-07-15 RX ORDER — FERROUS SULFATE 324(65)MG
325 TABLET, DELAYED RELEASE (ENTERIC COATED) ORAL DAILY
COMMUNITY
End: 2021-11-24

## 2020-07-15 RX ORDER — CHOLECALCIFEROL (VITAMIN D3) 25 MCG
1000 TABLET ORAL DAILY
COMMUNITY

## 2020-07-15 NOTE — PROGRESS NOTES
"  Shelly Vásquez presented for a  Medicare AWV and comprehensive Health Risk Assessment today. The following components were reviewed and updated:    · Medical history  · Family History  · Social history  · Allergies and Current Medications  · Health Risk Assessment  · Health Maintenance  · Care Team     ** See Completed Assessments for Annual Wellness Visit within the encounter summary.**       The following assessments were completed:  · Living Situation  · CAGE  · Depression Screening  · Timed Get Up and Go  · Whisper Test  · Cognitive Function Screening      ·   · Nutrition Screening  · ADL Screening  · PAQ Screening    Vitals:    07/15/20 1355   BP: 120/60   BP Location: Right arm   Pulse: 66   Weight: 69.6 kg (153 lb 7 oz)   Height: 5' 8" (1.727 m)     Body mass index is 23.33 kg/m².  Physical Exam  Vitals signs and nursing note reviewed.   Constitutional:       Appearance: She is well-developed.   HENT:      Head: Normocephalic.   Cardiovascular:      Rate and Rhythm: Normal rate and regular rhythm.   Pulmonary:      Effort: Pulmonary effort is normal.      Breath sounds: Normal breath sounds.   Abdominal:      General: Bowel sounds are normal.      Palpations: Abdomen is soft.   Musculoskeletal: Normal range of motion.   Skin:     General: Skin is warm and dry.   Neurological:      Mental Status: She is alert and oriented to person, place, and time.      Motor: No abnormal muscle tone.           Diagnoses and health risks identified today and associated recommendations/orders:    1. Encounter for preventive health examination  Here for Health Risk Assessment/Annual Wellness Visit.  Health maintenance reviewed and updated. Follow up in one year.    2. Aortic atherosclerosis  Chronic, stable on current medications. Noted Lumbar XR 5/27/16. Followed by PCP.    3. PAF (paroxysmal atrial fibrillation)  Chronic, stable on current medications. Followed by PCP, Cardiology.    4. PSVT (paroxysmal " supraventricular tachycardia)  Chronic, stable on current medications. Followed by PCP, Cardiology.    5. Status post placement of implantable loop recorder  Chronic, stable. Followed by Cardiology    6. History of radiofrequency ablation (RFA) procedure for cardiac arrhythmia  Stable. Followed by Cardiology.    7. Thrombocytosis  Chronic, stable. Followed by PCP, Hematology.    8. MDS (myelodysplastic syndrome)  Chronic, stable. Followed by PCP, Hematology.    9. History of GI bleed  Stable. Followed by PCP.    10. Spinal stenosis of lumbar region, unspecified whether neurogenic claudication present  Chronic, stable on current PRN medications. Followed by PCP, Pain Managment.    11. Primary osteoarthritis involving multiple joints  Chronic, stable on current PRN medications. Followed by PCP, Pain management.    12. Spondylolisthesis at L4-L5 level  Chronic, stable on current PRN medications. Followed by PCP, Pain Management.    13. S/P lumbar laminectomy  Stable. Followed by PCP, Pain Managment.    14. Arthropathy of lumbar facet joint  Chronic, stable on current PRN medications. Followed by PCP, Pain Management.    15. Other reduced mobility  Chronic, no reported falls, no assistive device for ambulation. Followed by PCP.    16. Stage 3 chronic kidney disease  Chronic, stable. Followed by PCP.      Provided Shelly Wilkinson with a 5-10 year written screening schedule and personal prevention plan. Recommendations were developed using the USPSTF age appropriate recommendations. Education, counseling, and referrals were provided as needed. After Visit Summary printed and given to patient which includes a list of additional screenings\tests needed.    Follow up in about 3 months (around 10/20/2020).with PCP    Virginia Connolly NP

## 2020-07-15 NOTE — PATIENT INSTRUCTIONS
Counseling and Referral of Other Preventative  (Italic type indicates deductible and co-insurance are waived)    Patient Name: Shelly Vásquez  Today's Date: 7/15/2020    Health Maintenance       Date Due Completion Date    Shingles Vaccine (1 of 2) 12/28/1988 Obtain new shingles vaccine - SHINGRIX - when available    Colonoscopy 10/01/2020 (Originally 4/29/2019) 4/29/2014    Influenza Vaccine (1) 09/01/2020 10/21/2019    DEXA SCAN 05/30/2021 5/30/2018    TETANUS VACCINE 03/31/2024 3/31/2014    Lipid Panel 10/21/2024 10/21/2019        No orders of the defined types were placed in this encounter.    The following information is provided to all patients.  This information is to help you find resources for any of the problems found today that may be affecting your health:                Living healthy guide: www.[facility].[place].gov      Understanding Diabetes: www.diabetes.org      Eating healthy: www.cdc.gov/healthyweight      [facility] home safety checklist: www.cdc.gov/steadi/patient.html      Agency on Aging: www.goea.[place].[facility]      Alcoholics anonymous (AA): www.aa.org      Physical Activity: www.kathryn.nih.gov/gu9fbfh      Tobacco use: www.quitwithusla.org

## 2020-07-31 ENCOUNTER — EXTERNAL CHRONIC CARE MANAGEMENT (OUTPATIENT)
Dept: PRIMARY CARE CLINIC | Facility: CLINIC | Age: 80
End: 2020-07-31
Payer: MEDICARE

## 2020-07-31 PROCEDURE — 99490 CHRNC CARE MGMT STAFF 1ST 20: CPT | Mod: S$PBB,,, | Performed by: INTERNAL MEDICINE

## 2020-07-31 PROCEDURE — 99490 CHRNC CARE MGMT STAFF 1ST 20: CPT | Mod: PBBFAC | Performed by: INTERNAL MEDICINE

## 2020-07-31 PROCEDURE — 99490 PR CHRONIC CARE MGMT, 1ST 20 MIN: ICD-10-PCS | Mod: S$PBB,,, | Performed by: INTERNAL MEDICINE

## 2020-08-10 ENCOUNTER — OFFICE VISIT (OUTPATIENT)
Dept: URGENT CARE | Facility: CLINIC | Age: 80
End: 2020-08-10
Payer: MEDICARE

## 2020-08-10 VITALS
SYSTOLIC BLOOD PRESSURE: 107 MMHG | HEART RATE: 74 BPM | DIASTOLIC BLOOD PRESSURE: 67 MMHG | OXYGEN SATURATION: 96 % | TEMPERATURE: 99 F

## 2020-08-10 DIAGNOSIS — M25.512 ACUTE PAIN OF LEFT SHOULDER: Primary | ICD-10-CM

## 2020-08-10 PROCEDURE — 73030 X-RAY EXAM OF SHOULDER: CPT | Mod: FY,LT,S$GLB, | Performed by: RADIOLOGY

## 2020-08-10 PROCEDURE — 99213 PR OFFICE/OUTPT VISIT, EST, LEVL III, 20-29 MIN: ICD-10-PCS | Mod: ICN,CMP,S$GLB, | Performed by: FAMILY MEDICINE

## 2020-08-10 PROCEDURE — 99213 OFFICE O/P EST LOW 20 MIN: CPT | Mod: ICN,CMP,S$GLB, | Performed by: FAMILY MEDICINE

## 2020-08-10 PROCEDURE — 73030 XR SHOULDER COMPLETE 2 OR MORE VIEWS LEFT: ICD-10-PCS | Mod: FY,LT,S$GLB, | Performed by: RADIOLOGY

## 2020-08-10 NOTE — PROGRESS NOTES
Subjective:       Patient ID: Shelly Vásquez is a 79 y.o. female.    Vitals:  temperature is 98.6 °F (37 °C). Her blood pressure is 107/67 and her pulse is 74. Her oxygen saturation is 96%.     Chief Complaint: Shoulder Pain    Patient presents with left shoulder pain for 2 weeks.  Reports that she has hired a new  who started some upper body exercises with weights.  Pain is located to left shoulder, but no radiation.  Pain intensity is mild and only increases with arm movements and lifting weights.  Patient denies chest pain, shortness of breath, weakness, palpitations, nausea/vomiting.    Shoulder Pain   The pain is present in the left shoulder. This is a new problem. The current episode started today. The problem has been gradually worsening. The pain is at a severity of 5/10. Pertinent negatives include no fever or headaches. She has tried acetaminophen for the symptoms. The treatment provided no relief.       Constitution: Negative for chills, fatigue and fever.   HENT: Negative for congestion and sore throat.    Neck: Negative for painful lymph nodes.   Cardiovascular: Negative for chest pain and leg swelling.   Eyes: Negative for double vision and blurred vision.   Respiratory: Negative for cough and shortness of breath.    Gastrointestinal: Negative for nausea, vomiting and diarrhea.   Genitourinary: Negative for dysuria, frequency, urgency and history of kidney stones.   Musculoskeletal: Positive for pain. Negative for joint pain, joint swelling, muscle cramps and muscle ache.   Skin: Negative for color change, pale, rash and bruising.   Allergic/Immunologic: Negative for seasonal allergies.   Neurological: Negative for dizziness, history of vertigo, light-headedness, passing out and headaches.   Hematologic/Lymphatic: Negative for swollen lymph nodes.   Psychiatric/Behavioral: Negative for nervous/anxious, sleep disturbance and depression. The patient is not nervous/anxious.        Objective:       Physical Exam   Constitutional: She is oriented to person, place, and time. She appears well-developed. She is cooperative.  Non-toxic appearance. She does not appear ill. No distress.   HENT:   Head: Normocephalic and atraumatic.   Ears:   Right Ear: Hearing, tympanic membrane, external ear and ear canal normal.   Left Ear: Hearing, tympanic membrane, external ear and ear canal normal.   Nose: Nose normal. No mucosal edema, rhinorrhea or nasal deformity. No epistaxis. Right sinus exhibits no maxillary sinus tenderness and no frontal sinus tenderness. Left sinus exhibits no maxillary sinus tenderness and no frontal sinus tenderness.   Mouth/Throat: Uvula is midline, oropharynx is clear and moist and mucous membranes are normal. No trismus in the jaw. Normal dentition. No uvula swelling. No posterior oropharyngeal erythema.   Eyes: Conjunctivae and lids are normal. Right eye exhibits no discharge. Left eye exhibits no discharge. No scleral icterus.   Neck: Trachea normal, normal range of motion, full passive range of motion without pain and phonation normal. Neck supple.   Cardiovascular: Normal rate, regular rhythm, normal heart sounds and normal pulses.   Pulmonary/Chest: Effort normal and breath sounds normal. No respiratory distress.   Abdominal: Soft. Normal appearance and bowel sounds are normal. She exhibits no distension, no pulsatile midline mass and no mass. There is no abdominal tenderness.   Musculoskeletal: Normal range of motion.         General: No deformity.      Comments: Left shoulder:  No swelling, redness, bruise.  No TTP.  Good ROM.  Painful abduction and external rotation.  Neurovascular intact.   Neurological: She is alert and oriented to person, place, and time. She exhibits normal muscle tone. Coordination normal.   Skin: Skin is warm, dry, intact, not diaphoretic and not pale. Psychiatric: Her speech is normal and behavior is normal. Judgment and thought content normal.   Nursing note  and vitals reviewed.        Assessment:       1. Acute pain of left shoulder        Plan:         Acute pain of left shoulder  -     X-Ray Shoulder 2 or More Views Left; Future; Expected date: 08/10/2020  -     Ambulatory referral/consult to Orthopedics        PLEASE READ YOUR DISCHARGE INSTRUCTIONS ENTIRELY AS IT CONTAINS IMPORTANT INFORMATION.    Please return here or go to the Emergency Department for any concerns or worsening of condition.      If not allergic, please take over the counter Tylenol (Acetaminophen) and/or Motrin (Ibuprofen) as directed for control of pain and/or fever.  Please follow up with your primary care doctor or specialist as needed.      If you smoke, please stop smoking.    Please return or see your primary care doctor if you develop new or worsening symptoms.     Please arrange follow up with your primary medical clinic as soon as possible. You must understand that you've received an Urgent Care treatment only and that you may be released before all of your medical problems are known or treated. You, the patient, will arrange for follow up as instructed. If your symptoms worsen or fail to improve you should go to the Emergency Room.  Exercises for Shoulder Flexibility: Wall Walk    Improving your flexibility can reduce pain. Stretching exercises also can help increase your range of pain-free motion. Breathe normally when you exercise. Use smooth, fluid movements.  Note: Follow any special instructions you are given. If you feel pain, stop the exercise. If the pain continues after stopping, call your healthcare provider:  · Stand with your shoulder about 2 feet from the wall.  · Raise your arm to shoulder level and gently walk your fingers up the wall as high as you can.  · Hold for a few seconds. Then walk your fingers back down.  · Repeat 3 times. Move closer to the wall as you repeat.  · Build up to holding each stretch for 30 seconds.  Caution: Do this stretch only if your  healthcare provider recommends it. Dont do it when you are first injured.       Date Last Reviewed: 8/16/2015  © 4181-6667 [employer]. [address] [place] [address]. All rights reserved. This information is not intended as a substitute for professional medical care. Always follow your healthcare professional's instructions.        Exercises for Shoulder Flexibility: Back Scratch    Improving your flexibility can reduce pain. Stretching exercises also can help increase your range of pain-free motion. Breathe normally when you exercise. Try to use smooth, fluid movements. Never force a stretch.  Note: Follow any special instructions you are given. If you feel pain, stop the exercise. If the pain continues after stopping, call your healthcare provider.  · Stand straight, placing the back of your hand on the side you want to stretch flat against your lower back.  · Throw one end of a towel over your shoulder. Grab it behind your back with your other hand.  · Pull down gently on the towel with your front arm. Let your back arm slide up as high as is comfortable. Youll feel a stretch in your shoulder. Hold the stretch for a few seconds.  · Repeat 3 to 5 times. Build up to holding each stretch for 30 to 60 seconds.  For your safety, check with your healthcare provider before starting an exercise program.   Date Last Reviewed: 8/26/2015  © 5863-0469 [employer]. [address] [place] [address]. All rights reserved. This information is not intended as a substitute for professional medical care. Always follow your healthcare professional's instructions.        Exercises for Shoulder Flexibility: Adduction (Reaching Across)    This stretch can help restore shoulder flexibility and relieve pain over time. When stretching, be sure to breathe deeply. And follow any special instructions from your doctor or physical therapist:  1. Put the hand from the side you want to stretch on  your opposite shoulder. Your elbow should point away from your body. Try to raise your elbow as close to shoulder height as you can.  2. With your other hand, push the raised elbow toward the opposite shoulder. Avoid turning your head. Stop when you feel the stretch. Try to hold the stretch for 5 seconds.  3. Work up to doing 3 sets of this stretch, 3 times a day. Work up to holding the stretch for 30 to 60 seconds.  Note: Be sure to push your elbow across your chest, not up toward your chin. Over time, try to push your elbow farther across your chest to enhance the stretch.  Frozen shoulder  Frozen shoulder is another name for adhesive capsulitis, which causes restricted movement in the shoulder. If you have frozen shoulder, this stretch may cause discomfort, especially when you first get started. A few months may pass before you achieve the results you want. Once your shoulder heals, it rarely becomes frozen again. So stick to your stretching program. If you have any questions, be sure to ask your doctor.   Date Last Reviewed: 8/16/2015 © 2000-2017 [employer]. [address] [place] [address]. All rights reserved. This information is not intended as a substitute for professional medical care. Always follow your healthcare professional's instructions.        Exercises for Shoulder Flexibility: Internal Rotation    This stretch can help restore shoulder flexibility and relieve pain over time. When stretching, be sure to breathe deeply. Follow any special instructions from your healthcare provider or physical therapist.  4. While seated, move the arm on the side you want to stretch toward the middle of your back. The palm of your hand should face out.  5. Cup your other hand under the hand thats behind your back. Gently push your cupped hand upward until you feel the stretch in the shoulder. Try to hold the stretch for 5 seconds.  6. Work up to doing 3 sets of this stretch, 3 times a day.  Work up to holding the stretch for 30 to 60 seconds.  Note: Keep your back straight. Its OK if your hand cant reach the middle of your back. Instead, start the stretch with your hand as close as you can get it to the middle of your back.     Frozen shoulder  Frozen shoulder is another name for adhesive capsulitis. This causes restricted movement in the shoulder. If you have frozen shoulder, this stretch may cause discomfort, especially when you first get started. A few months may pass before you achieve the results you want. But once your shoulder heals, it rarely becomes frozen again. So stick to your stretching program. If you have any questions, be sure to ask your healthcare provider.   Date Last Reviewed: 10/14/2015  © 6792-0010 [employer]. [address] [place] [address], [place] [address]. All rights reserved. This information is not intended as a substitute for professional medical care. Always follow your healthcare professional's instructions.        Exercises for Shoulder Flexibility: External Rotation    This stretch can help restore shoulder flexibility and relieve pain over time. When stretching, be sure to breathe deeply. Follow any special instructions from your doctor or physical therapist:  7.  a doorway. Grasp the doorjamb with the hand on the frozen side. Your arm should be bent.  8. With the other hand, hold the elbow on the frozen side firmly against your body.  9. Standing in the same spot, rotate your body away from the doorjamb. Stop when you feel the stretch in the shoulder. At first, try to hold the stretch for 5 seconds.  10. Work up to doing 3 sets of this stretch, 3 times a day. Work up to holding the stretch for 30 to 60 seconds.  Note: Keep your arms as still as you can. Over time, rotate your body a little more to enhance the stretch. But be careful not to twist your back.  Frozen shoulder  Frozen shoulder is another name for adhesive capsulitis, which causes  restricted movement in the shoulder. If you have frozen shoulder, this stretch may cause discomfort, especially when you first get started. A few months may pass before you achieve the results you want. But once your shoulder heals, it rarely becomes frozen again. So stick to your stretching program. If you have any questions, be sure to ask your doctor.   Date Last Reviewed: 8/16/2015  © 2663-6150 [employer]. [address] [place] [address], [place] [address]. All rights reserved. This information is not intended as a substitute for professional medical care. Always follow your healthcare professional's instructions.

## 2020-08-10 NOTE — PATIENT INSTRUCTIONS
PLEASE READ YOUR DISCHARGE INSTRUCTIONS ENTIRELY AS IT CONTAINS IMPORTANT INFORMATION.    Please return here or go to the Emergency Department for any concerns or worsening of condition.      If not allergic, please take over the counter Tylenol (Acetaminophen) and/or Motrin (Ibuprofen) as directed for control of pain and/or fever.  Please follow up with your primary care doctor or specialist as needed.      If you smoke, please stop smoking.    Please return or see your primary care doctor if you develop new or worsening symptoms.     Please arrange follow up with your primary medical clinic as soon as possible. You must understand that you've received an Urgent Care treatment only and that you may be released before all of your medical problems are known or treated. You, the patient, will arrange for follow up as instructed. If your symptoms worsen or fail to improve you should go to the Emergency Room.  Exercises for Shoulder Flexibility: Wall Walk    Improving your flexibility can reduce pain. Stretching exercises also can help increase your range of pain-free motion. Breathe normally when you exercise. Use smooth, fluid movements.  Note: Follow any special instructions you are given. If you feel pain, stop the exercise. If the pain continues after stopping, call your healthcare provider:  · Stand with your shoulder about 2 feet from the wall.  · Raise your arm to shoulder level and gently walk your fingers up the wall as high as you can.  · Hold for a few seconds. Then walk your fingers back down.  · Repeat 3 times. Move closer to the wall as you repeat.  · Build up to holding each stretch for 30 seconds.  Caution: Do this stretch only if your healthcare provider recommends it. Dont do it when you are first injured.       Date Last Reviewed: 8/16/2015  © 7137-8707 [employer]. [address] [place] [address], [place] [address]. All rights reserved. This information is not intended as a substitute for  professional medical care. Always follow your healthcare professional's instructions.        Exercises for Shoulder Flexibility: Back Scratch    Improving your flexibility can reduce pain. Stretching exercises also can help increase your range of pain-free motion. Breathe normally when you exercise. Try to use smooth, fluid movements. Never force a stretch.  Note: Follow any special instructions you are given. If you feel pain, stop the exercise. If the pain continues after stopping, call your healthcare provider.  · Stand straight, placing the back of your hand on the side you want to stretch flat against your lower back.  · Throw one end of a towel over your shoulder. Grab it behind your back with your other hand.  · Pull down gently on the towel with your front arm. Let your back arm slide up as high as is comfortable. Youll feel a stretch in your shoulder. Hold the stretch for a few seconds.  · Repeat 3 to 5 times. Build up to holding each stretch for 30 to 60 seconds.  For your safety, check with your healthcare provider before starting an exercise program.   Date Last Reviewed: 8/26/2015 © 2000-2017 [employer]. [address] [place], [place] [id number]. All rights reserved. This information is not intended as a substitute for professional medical care. Always follow your healthcare professional's instructions.        Exercises for Shoulder Flexibility: Adduction (Reaching Across)    This stretch can help restore shoulder flexibility and relieve pain over time. When stretching, be sure to breathe deeply. And follow any special instructions from your doctor or physical therapist:  1. Put the hand from the side you want to stretch on your opposite shoulder. Your elbow should point away from your body. Try to raise your elbow as close to shoulder height as you can.  2. With your other hand, push the raised elbow toward the opposite shoulder. Avoid turning your head. Stop when you feel the stretch.  Try to hold the stretch for 5 seconds.  3. Work up to doing 3 sets of this stretch, 3 times a day. Work up to holding the stretch for 30 to 60 seconds.  Note: Be sure to push your elbow across your chest, not up toward your chin. Over time, try to push your elbow farther across your chest to enhance the stretch.  Frozen shoulder  Frozen shoulder is another name for adhesive capsulitis, which causes restricted movement in the shoulder. If you have frozen shoulder, this stretch may cause discomfort, especially when you first get started. A few months may pass before you achieve the results you want. Once your shoulder heals, it rarely becomes frozen again. So stick to your stretching program. If you have any questions, be sure to ask your doctor.   Date Last Reviewed: 8/16/2015 © 2000-2017 [employer]. [address] [place] [address]. All rights reserved. This information is not intended as a substitute for professional medical care. Always follow your healthcare professional's instructions.        Exercises for Shoulder Flexibility: Internal Rotation    This stretch can help restore shoulder flexibility and relieve pain over time. When stretching, be sure to breathe deeply. Follow any special instructions from your healthcare provider or physical therapist.  4. While seated, move the arm on the side you want to stretch toward the middle of your back. The palm of your hand should face out.  5. Cup your other hand under the hand thats behind your back. Gently push your cupped hand upward until you feel the stretch in the shoulder. Try to hold the stretch for 5 seconds.  6. Work up to doing 3 sets of this stretch, 3 times a day. Work up to holding the stretch for 30 to 60 seconds.  Note: Keep your back straight. Its OK if your hand cant reach the middle of your back. Instead, start the stretch with your hand as close as you can get it to the middle of your back.     Frozen shoulder  Frozen  shoulder is another name for adhesive capsulitis. This causes restricted movement in the shoulder. If you have frozen shoulder, this stretch may cause discomfort, especially when you first get started. A few months may pass before you achieve the results you want. But once your shoulder heals, it rarely becomes frozen again. So stick to your stretching program. If you have any questions, be sure to ask your healthcare provider.   Date Last Reviewed: 10/14/2015  © 9965-0394 [employer]. [address] [place] [address]. All rights reserved. This information is not intended as a substitute for professional medical care. Always follow your healthcare professional's instructions.        Exercises for Shoulder Flexibility: External Rotation    This stretch can help restore shoulder flexibility and relieve pain over time. When stretching, be sure to breathe deeply. Follow any special instructions from your doctor or physical therapist:  7.  a doorway. Grasp the doorjamb with the hand on the frozen side. Your arm should be bent.  8. With the other hand, hold the elbow on the frozen side firmly against your body.  9. Standing in the same spot, rotate your body away from the doorjamb. Stop when you feel the stretch in the shoulder. At first, try to hold the stretch for 5 seconds.  10. Work up to doing 3 sets of this stretch, 3 times a day. Work up to holding the stretch for 30 to 60 seconds.  Note: Keep your arms as still as you can. Over time, rotate your body a little more to enhance the stretch. But be careful not to twist your back.  Frozen shoulder  Frozen shoulder is another name for adhesive capsulitis, which causes restricted movement in the shoulder. If you have frozen shoulder, this stretch may cause discomfort, especially when you first get started. A few months may pass before you achieve the results you want. But once your shoulder heals, it rarely becomes frozen again. So stick to  your stretching program. If you have any questions, be sure to ask your doctor.   Date Last Reviewed: 8/16/2015  © 8556-5251 [employer]. [address] [place] [address], [place] [address]. All rights reserved. This information is not intended as a substitute for professional medical care. Always follow your healthcare professional's instructions.

## 2020-08-13 ENCOUNTER — OFFICE VISIT (OUTPATIENT)
Dept: SPORTS MEDICINE | Facility: CLINIC | Age: 80
End: 2020-08-13
Payer: MEDICARE

## 2020-08-13 ENCOUNTER — TELEPHONE (OUTPATIENT)
Dept: SPORTS MEDICINE | Facility: CLINIC | Age: 80
End: 2020-08-13

## 2020-08-13 VITALS
DIASTOLIC BLOOD PRESSURE: 67 MMHG | SYSTOLIC BLOOD PRESSURE: 125 MMHG | WEIGHT: 153.13 LBS | HEIGHT: 68 IN | HEART RATE: 67 BPM | BODY MASS INDEX: 23.21 KG/M2

## 2020-08-13 DIAGNOSIS — M25.512 LEFT SHOULDER PAIN, UNSPECIFIED CHRONICITY: ICD-10-CM

## 2020-08-13 DIAGNOSIS — M19.012 PRIMARY OSTEOARTHRITIS OF LEFT SHOULDER: Primary | ICD-10-CM

## 2020-08-13 PROCEDURE — 20610 DRAIN/INJ JOINT/BURSA W/O US: CPT | Mod: PBBFAC | Performed by: ORTHOPAEDIC SURGERY

## 2020-08-13 PROCEDURE — 99213 OFFICE O/P EST LOW 20 MIN: CPT | Mod: PBBFAC,25 | Performed by: ORTHOPAEDIC SURGERY

## 2020-08-13 PROCEDURE — 99999 PR PBB SHADOW E&M-EST. PATIENT-LVL III: ICD-10-PCS | Mod: PBBFAC,,, | Performed by: ORTHOPAEDIC SURGERY

## 2020-08-13 PROCEDURE — 99204 PR OFFICE/OUTPT VISIT, NEW, LEVL IV, 45-59 MIN: ICD-10-PCS | Mod: 25,S$PBB,, | Performed by: ORTHOPAEDIC SURGERY

## 2020-08-13 PROCEDURE — 20610 LARGE JOINT ASPIRATION/INJECTION: L GLENOHUMERAL: ICD-10-PCS | Mod: S$PBB,LT,, | Performed by: ORTHOPAEDIC SURGERY

## 2020-08-13 PROCEDURE — 99999 PR PBB SHADOW E&M-EST. PATIENT-LVL III: CPT | Mod: PBBFAC,,, | Performed by: ORTHOPAEDIC SURGERY

## 2020-08-13 PROCEDURE — 99204 OFFICE O/P NEW MOD 45 MIN: CPT | Mod: 25,S$PBB,, | Performed by: ORTHOPAEDIC SURGERY

## 2020-08-13 RX ADMIN — TRIAMCINOLONE ACETONIDE 40 MG: 40 INJECTION, SUSPENSION INTRA-ARTICULAR; INTRAMUSCULAR at 03:08

## 2020-08-13 NOTE — TELEPHONE ENCOUNTER
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 Was referred to ortho after urgent care visit on Monday where they did x ray of shoulder. She would like to be seen today. She is a pt of Dr Davin Villafana last seen last year. Would like to see ortho but ortho said Dr. Wallace would like to see shoulder pts. Please call pt to schedule [phone]. Thank you!

## 2020-08-13 NOTE — PROGRESS NOTES
CC: Left shoulder pain     79 y.o. Female presents as a new patient to me.  Right hand dominant retiree.  Complaint is a several week history of activity-related left shoulder pain.  She localizes the pain over the lateral subdeltoid recess and also deep in the shoulder.  She describes some intermittent popping in the shoulder.  Recently associated with increased weight lifting and exercise activity with a .  No specific antecedent injury event.  No subjective weakness.  Pain has gotten minimally better with rest, activity modifications, and oral medication.  No pre-existing significant problems.  No neck radicular complaints.  Current pain is daily and activity limiting the some assistance.  Can bother her at night if she lies on the left side.    PMHx notable for stage 3 kidney disease, thrombocytosis, history of GI bleed, and paroxysmal atrial fibrillation.  Positive for tobacco.   Negative for diabetes.     PAST MEDICAL HISTORY:   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    Supraventricular tachycardia     s/p AVNRT ablation (Dr Brady)     PAST SURGICAL HISTORY:  Past Surgical History:   Procedure Laterality Date    APPENDECTOMY  age 21    BACK SURGERY      Beast Lift  2004    BONE MARROW BIOPSY Right 12/19/2019    Procedure: Biopsy-bone marrow;  Surgeon: Aidan Spring MD;  Location: [facility] OR [address] [place] [address];  Service: Oncology;  Laterality: Right;    BREAST BIOPSY  50yrs ago    unable to see scar    COSMETIC SURGERY      DILATION AND CURETTAGE OF UTERUS  2008    PMB    ENDOMETRIAL BIOPSY      EYE SURGERY      ptsosis      RADIOFREQUENCY ABLATION  03/2018    SMALL INTESTINE SURGERY       TONSILLECTOMY, ADENOIDECTOMY  age 8    TOTAL REDUCTION MAMMOPLASTY Bilateral 2003    TUBAL LIGATION       FAMILY HISTORY:  Family History   Problem Relation Age of Onset    No Known Problems Father     Cirrhosis Mother     No Known Problems Sister     No Known Problems Brother     No Known Problems Maternal Aunt     No Known Problems Maternal Uncle     No Known Problems Paternal Aunt     No Known Problems Paternal Uncle     No Known Problems Maternal Grandmother     No Known Problems Maternal Grandfather     No Known Problems Paternal Grandmother     No Known Problems Paternal Grandfather     No Known Problems Daughter     No Known Problems Son     Breast cancer Neg Hx     Colon cancer Neg Hx     Ovarian cancer Neg Hx     Amblyopia Neg Hx     Blindness Neg Hx     Cancer Neg Hx     Cataracts Neg Hx     Diabetes Neg Hx     Glaucoma Neg Hx     Hypertension Neg Hx     Macular degeneration Neg Hx     Retinal detachment Neg Hx     Strabismus Neg Hx     Stroke Neg Hx     Thyroid disease Neg Hx      MEDICATIONS:    Current Outpatient Medications:     acetaminophen (TYLENOL) 325 MG tablet, Take 325 mg by mouth continuous prn for Pain., Disp: , Rfl:     aspirin (ECOTRIN) 81 MG EC tablet, Take 1 tablet (81 mg total) by mouth once daily., Disp: 30 tablet, Rfl: 0    estradiol-norethindrone acet (LOPREEZA) 0.5-0.1 mg per tablet, Take 1 tablet by mouth once daily., Disp: 84 tablet, Rfl: 3    ferrous sulfate 324 mg (65 mg iron) TbEC, Take 325 mg by mouth once daily., Disp: , Rfl:     hydrocortisone 2.5 % cream, CED AA BID. MAY MIX WITH KETOCONAZOLE CREAM, Disp: , Rfl: 3    ketoconazole (NIZORAL) 2 % cream, CED BID AA, Disp: , Rfl: 11    lidocaine HCL 2% (XYLOCAINE) 2 % jelly, Apply to affected area daily as needed, Disp: 30 mL, Rfl: 1    temazepam (RESTORIL) 15 mg Cap, Take 1 capsule (15 mg total) by mouth every evening., Disp: 90 capsule, Rfl: 1    traMADoL (ULTRAM) 50 mg tablet, Take 1  "tablet (50 mg total) by mouth every 8 (eight) hours as needed for Pain. May not use temazepam with this med, Disp: 21 tablet, Rfl: 0    verapamil (VERELAN) 120 MG C24P, Take 1 capsule (120 mg total) by mouth once daily., Disp: 90 capsule, Rfl: 3    vitamin D (VITAMIN D3) 1000 units Tab, Take 1,000 Units by mouth once daily., Disp: , Rfl:     valACYclovir (VALTREX) 500 MG tablet, Take 1 tablet (500 mg total) by mouth once daily. (Patient taking differently: Take 500 mg by mouth daily as needed. ), Disp: 30 tablet, Rfl: 11    ALLERGIES:  Review of patient's allergies indicates:   Allergen Reactions    Nsaids (non-steroidal anti-inflammatory drug)      GI Bleed  Had 5 blood transfusions     REVIEW OF SYSTEMS:  Constitution: Negative. Negative for chills, fever and night sweats.    Hematologic/Lymphatic: Negative for bleeding problem. Does not bruise/bleed easily.   Skin: Negative for dry skin, itching and rash.   Musculoskeletal: Negative for falls. Positive for left shoulder pain and muscle weakness.     All other review of symptoms were reviewed and found to be noncontributory.    PHYSICAL EXAMINATION:  Vitals:  /67   Pulse 67   Ht 5' 8" (1.727 m)   Wt 69.4 kg (153 lb 1.6 oz)   LMP  (LMP Unknown)   BMI 23.28 kg/m²    General: Well-developed well-nourished 79 y.o. femalein no acute distress   Cardiovascular: Regular rhythm by palpation of distal pulse, normal color and temperature, no concerning varicosities on symptomatic side   Lungs: No labored breathing or wheezing appreciated   Neuro: Alert and oriented ×3   Psychiatric: well oriented to person, place and time, demonstrates normal mood and affect   Skin: No rashes, lesions or ulcers, normal temperature, turgor, and texture on uninvolved extremity    Ortho/SPM Exam  Examination of the left shoulder demonstrates pain active and passive range of motion.  Active forward elevation to 160, passively to 170.  Palpable pain over the posterior glenohumeral " joint line.  Mild pain with passive external rotation to 60°.  5-out of 5 resisted scaption and external rotation with arm at side.  Negative belly press test.  Negative external impingement findings.  Negative AC joint for pain.    IMAGING:  Xrays including AP, Outlet and Axillary Lateral of Left shoulder are ordered / images reviewed by me:   AC and glenohumeral DJD.  Productive change over the inferior aspect of the glenohumeral joint    ASSESSMENT:      ICD-10-CM ICD-9-CM   1. Primary osteoarthritis of left shoulder  M19.012 715.11   2. Left shoulder pain, unspecified chronicity  M25.512 719.41       PLAN:     The patient has some mild to moderate degenerative changes of the left glenohumeral joint with findings on exam consistent with symptomatic degenerative disease.  Intact cuff function.  Pain has been present for several weeks despite some rest and activity modifications.  Intra-articular glenohumeral steroid injection was given today.  Discussed some exercises to follow at home for cuff strengthening.  Modify exercise and daily activities.  I will see the patient back as needed.  If pain is persistent or recurrent despite these measures, next step would be MRI.    Large Joint Aspiration/Injection: L glenohumeral    Date/Time: 8/13/2020 3:15 PM  Performed by: ERNESTO Wallace MD  Authorized by: ERNESTO Wallace MD     Consent Done?:  Yes (Verbal)  Indications:  Pain  Site marked: the procedure site was marked    Timeout: prior to procedure the correct patient, procedure, and site was verified    Prep: patient was prepped and draped in usual sterile fashion      Local anesthesia used?: Yes    Local anesthetic:  Co-phenylcaine spray (0.2% Naropin)  Anesthetic total (ml):  4      Details:  Needle Size:  22 G  Approach:  Superior  Location:  Shoulder  Site:  L glenohumeral  Medications:  40 mg triamcinolone acetonide 40 mg/mL  Patient tolerance:  Patient tolerated the procedure well with no immediate  complications

## 2020-08-14 NOTE — PROGRESS NOTES
CC: Left shoulder pain     79 y.o. Female presents as a new patient to me.  Right-hand-dominant.  Complaint is a several week history of significant left shoulder pain.  She localizes deep in the joint.  No specific antecedent trauma.  Denies any pre-existing issues.  Associated crepitus present.  Worse with overhead activity.  Also bothers her lying on the left side at night.  Better with rest.  Pain can be 6-7 out of 10 in severity.  No associated neck or radicular symptoms.  She does have some associated weakness.  Treatment has included Tylenol and activity modifications.  No injections.  No history of diabetes.    PMHx notable for stage III kidney disease, aortic atherosclerosis, atrial fibrillation, and thrombocytosis.  Negative for tobacco.   Negative for diabetes.     PAST MEDICAL HISTORY:   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    Supraventricular tachycardia     s/p AVNRT ablation (Dr Brady)     PAST SURGICAL HISTORY:  Past Surgical History:   Procedure Laterality Date    APPENDECTOMY  age 21    BACK SURGERY      Beast Lift  2004    BONE MARROW BIOPSY Right 12/19/2019    Procedure: Biopsy-bone marrow;  Surgeon: Aidan Spring MD;  Location: [facility] OR [address] [place] [address];  Service: Oncology;  Laterality: Right;    BREAST BIOPSY  50yrs ago    unable to see scar    COSMETIC SURGERY      DILATION AND CURETTAGE OF UTERUS  2008    PMB    ENDOMETRIAL BIOPSY      EYE SURGERY      ptsosis      RADIOFREQUENCY ABLATION  03/2018    SMALL INTESTINE SURGERY      TONSILLECTOMY, ADENOIDECTOMY  age 8    TOTAL REDUCTION MAMMOPLASTY Bilateral 2003    TUBAL LIGATION       FAMILY HISTORY:  Family History    Problem Relation Age of Onset    No Known Problems Father     Cirrhosis Mother     No Known Problems Sister     No Known Problems Brother     No Known Problems Maternal Aunt     No Known Problems Maternal Uncle     No Known Problems Paternal Aunt     No Known Problems Paternal Uncle     No Known Problems Maternal Grandmother     No Known Problems Maternal Grandfather     No Known Problems Paternal Grandmother     No Known Problems Paternal Grandfather     No Known Problems Daughter     No Known Problems Son     Breast cancer Neg Hx     Colon cancer Neg Hx     Ovarian cancer Neg Hx     Amblyopia Neg Hx     Blindness Neg Hx     Cancer Neg Hx     Cataracts Neg Hx     Diabetes Neg Hx     Glaucoma Neg Hx     Hypertension Neg Hx     Macular degeneration Neg Hx     Retinal detachment Neg Hx     Strabismus Neg Hx     Stroke Neg Hx     Thyroid disease Neg Hx      MEDICATIONS:    Current Outpatient Medications:     acetaminophen (TYLENOL) 325 MG tablet, Take 325 mg by mouth continuous prn for Pain., Disp: , Rfl:     aspirin (ECOTRIN) 81 MG EC tablet, Take 1 tablet (81 mg total) by mouth once daily., Disp: 30 tablet, Rfl: 0    estradiol-norethindrone acet (LOPREEZA) 0.5-0.1 mg per tablet, Take 1 tablet by mouth once daily., Disp: 84 tablet, Rfl: 3    ferrous sulfate 324 mg (65 mg iron) TbEC, Take 325 mg by mouth once daily., Disp: , Rfl:     hydrocortisone 2.5 % cream, CED AA BID. MAY MIX WITH KETOCONAZOLE CREAM, Disp: , Rfl: 3    ketoconazole (NIZORAL) 2 % cream, CED BID AA, Disp: , Rfl: 11    lidocaine HCL 2% (XYLOCAINE) 2 % jelly, Apply to affected area daily as needed, Disp: 30 mL, Rfl: 1    temazepam (RESTORIL) 15 mg Cap, Take 1 capsule (15 mg total) by mouth every evening., Disp: 90 capsule, Rfl: 1    traMADoL (ULTRAM) 50 mg tablet, Take 1 tablet (50 mg total) by mouth every 8 (eight) hours as needed for Pain. May not use temazepam with this med, Disp: 21 tablet, Rfl: 0     "verapamil (VERELAN) 120 MG C24P, Take 1 capsule (120 mg total) by mouth once daily., Disp: 90 capsule, Rfl: 3    vitamin D (VITAMIN D3) 1000 units Tab, Take 1,000 Units by mouth once daily., Disp: , Rfl:     valACYclovir (VALTREX) 500 MG tablet, Take 1 tablet (500 mg total) by mouth once daily. (Patient taking differently: Take 500 mg by mouth daily as needed. ), Disp: 30 tablet, Rfl: 11    ALLERGIES:  Review of patient's allergies indicates:   Allergen Reactions    Nsaids (non-steroidal anti-inflammatory drug)      GI Bleed  Had 5 blood transfusions     REVIEW OF SYSTEMS:  Constitution: Negative. Negative for chills, fever and night sweats.    Hematologic/Lymphatic: Negative for bleeding problem. Does not bruise/bleed easily.   Skin: Negative for dry skin, itching and rash.   Musculoskeletal: Negative for falls. Positive for left shoulder pain and muscle weakness.     All other review of symptoms were reviewed and found to be noncontributory.    PHYSICAL EXAMINATION:  Vitals:  /62   Pulse 74   Ht 5' 8" (1.727 m)   Wt 69.4 kg (153 lb)   LMP  (LMP Unknown)   BMI 23.26 kg/m²    General: Well-developed well-nourished 79 y.o. femalein no acute distress   Cardiovascular: Regular rhythm by palpation of distal pulse, normal color and temperature, no concerning varicosities on symptomatic side   Lungs: No labored breathing or wheezing appreciated   Neuro: Alert and oriented ×3   Psychiatric: well oriented to person, place and time, demonstrates normal mood and affect   Skin: No rashes, lesions or ulcers, normal temperature, turgor, and texture on uninvolved extremity    Ortho/SPM Exam  Examination of the left shoulder demonstrates pain localized deep in the joint with both active and passive range of motion.  Active forward elevation to 140, passive to 150 pain and crepitus.  Pain on passive external rotation to 40°.  Positive pain to deep palpation over the posterior glenohumeral joint line.  Positive biceps " proximal groove tenderness.  Negative AC joint.  4/5 resisted scaption with limitation due to pain.  No midline neck tenderness.    IMAGING:  Xrays including AP, Outlet and Axillary Lateral of Left shoulder are ordered / images reviewed by me:   Moderate degenerative changes of the glenohumeral and AC joints with well-maintained acromiohumeral interval    ASSESSMENT:      ICD-10-CM ICD-9-CM   1. Glenohumeral arthritis, left  M19.012 716.91   2. Biceps tendinitis of left upper extremity  M75.22 726.12   3. Left shoulder pain, unspecified chronicity  M25.512 719.41       PLAN:     Findings most consistent with left glenohumeral arthritis and associated biceps tenosynovitis.  Treatment options discussed.  Conservative care was recommended.  The patient is not a candidate to take oral anti-inflammatory medication.  She wishes to proceed with a glenohumeral intra-articular steroid injection which was given today.  Education provided regarding a home therapy program for cuff strengthening and scapular stabilization.  Discussed avoidance of certain exacerbating activities.  No further workup to include MRI is needed at this time.  I will see her back as needed.    HEP 78067 - W. Roge Wallace MD and SMA Claire, instructed and demonstrated a periscapular and rotator cuff strengthening HEP. Instruction and demonstration of an AAROM and stretching HEP was also performed. The patient then demonstrated understanding of exercises and proper technique. This program was performed for 15 minutes.     Large Joint Aspiration/Injection: L glenohumeral    Date/Time: 8/18/2020 11:15 AM  Performed by: ERNESTO Wallace MD  Authorized by: ERNESTO Wallace MD     Consent Done?:  Yes (Verbal)  Indications:  Pain  Site marked: the procedure site was marked    Timeout: prior to procedure the correct patient, procedure, and site was verified    Prep: patient was prepped and draped in usual sterile fashion      Local anesthesia  used?: Yes    Local anesthetic:  Co-phenylcaine spray (0.2% Naropin)  Anesthetic total (ml):  4      Details:  Needle Size:  22 G  Approach:  Superior  Location:  Shoulder  Site:  L glenohumeral  Medications:  40 mg triamcinolone acetonide 40 mg/mL  Patient tolerance:  Patient tolerated the procedure well with no immediate complications

## 2020-08-18 ENCOUNTER — HOSPITAL ENCOUNTER (OUTPATIENT)
Dept: RADIOLOGY | Facility: HOSPITAL | Age: 80
Discharge: HOME | End: 2020-08-18
Attending: ORTHOPAEDIC SURGERY
Payer: MEDICARE

## 2020-08-18 ENCOUNTER — OFFICE VISIT (OUTPATIENT)
Dept: SPORTS MEDICINE | Facility: CLINIC | Age: 80
End: 2020-08-18
Payer: MEDICARE

## 2020-08-18 VITALS
DIASTOLIC BLOOD PRESSURE: 62 MMHG | BODY MASS INDEX: 23.19 KG/M2 | SYSTOLIC BLOOD PRESSURE: 111 MMHG | HEIGHT: 68 IN | WEIGHT: 153 LBS | HEART RATE: 74 BPM

## 2020-08-18 DIAGNOSIS — M75.22 BICEPS TENDINITIS OF LEFT UPPER EXTREMITY: ICD-10-CM

## 2020-08-18 DIAGNOSIS — M25.512 LEFT SHOULDER PAIN, UNSPECIFIED CHRONICITY: ICD-10-CM

## 2020-08-18 DIAGNOSIS — M19.012 GLENOHUMERAL ARTHRITIS, LEFT: Primary | ICD-10-CM

## 2020-08-18 PROCEDURE — 99999 PR PBB SHADOW E&M-EST. PATIENT-LVL III: ICD-10-PCS | Mod: PBBFAC,,, | Performed by: ORTHOPAEDIC SURGERY

## 2020-08-18 PROCEDURE — 73030 XR SHOULDER COMPLETE 2 OR MORE VIEWS LEFT: ICD-10-PCS | Mod: 26,LT,, | Performed by: RADIOLOGY

## 2020-08-18 PROCEDURE — 99204 PR OFFICE/OUTPT VISIT, NEW, LEVL IV, 45-59 MIN: ICD-10-PCS | Mod: 25,S$PBB,, | Performed by: ORTHOPAEDIC SURGERY

## 2020-08-18 PROCEDURE — 73030 X-RAY EXAM OF SHOULDER: CPT | Mod: 26,LT,, | Performed by: RADIOLOGY

## 2020-08-18 PROCEDURE — 97110 THERAPEUTIC EXERCISES: CPT | Mod: GP,S$PBB,, | Performed by: ORTHOPAEDIC SURGERY

## 2020-08-18 PROCEDURE — 97110 PR THERAPEUTIC EXERCISES: ICD-10-PCS | Mod: GP,S$PBB,, | Performed by: ORTHOPAEDIC SURGERY

## 2020-08-18 PROCEDURE — 99999 PR PBB SHADOW E&M-EST. PATIENT-LVL III: CPT | Mod: PBBFAC,,, | Performed by: ORTHOPAEDIC SURGERY

## 2020-08-18 PROCEDURE — 73030 X-RAY EXAM OF SHOULDER: CPT | Mod: TC,LT

## 2020-08-18 PROCEDURE — 99204 OFFICE O/P NEW MOD 45 MIN: CPT | Mod: 25,S$PBB,, | Performed by: ORTHOPAEDIC SURGERY

## 2020-08-18 PROCEDURE — 20610 DRAIN/INJ JOINT/BURSA W/O US: CPT | Mod: PBBFAC | Performed by: ORTHOPAEDIC SURGERY

## 2020-08-18 PROCEDURE — 99213 OFFICE O/P EST LOW 20 MIN: CPT | Mod: PBBFAC,25 | Performed by: ORTHOPAEDIC SURGERY

## 2020-08-18 PROCEDURE — 20610 LARGE JOINT ASPIRATION/INJECTION: L GLENOHUMERAL: ICD-10-PCS | Mod: S$PBB,LT,, | Performed by: ORTHOPAEDIC SURGERY

## 2020-08-18 RX ORDER — TRIAMCINOLONE ACETONIDE 40 MG/ML
40 INJECTION, SUSPENSION INTRA-ARTICULAR; INTRAMUSCULAR
Status: DISCONTINUED | OUTPATIENT
Start: 2020-08-18 | End: 2020-08-18 | Stop reason: HOSPADM

## 2020-08-18 RX ADMIN — TRIAMCINOLONE ACETONIDE 40 MG: 40 INJECTION, SUSPENSION INTRA-ARTICULAR; INTRAMUSCULAR at 11:08

## 2020-08-18 NOTE — LETTER
August 18, 2020      Chapito Porter MD  [address]  [address]  [place] [address]           [facility]  [address]  [facility] [address]  Phone: [phone]          Patient: Shelly Vásquez   MR Number: [id number]   YOB: 1940   Date of Visit: 8/18/2020       Dear Dr. Chapito Porter:    Thank you for referring Shelly Vásquez to me for evaluation. Attached you will find relevant portions of my assessment and plan of care.    If you have questions, please do not hesitate to call me. I look forward to following Shelyl Vásquez along with you.    Sincerely,    W Roge Wallace MD    Enclosure  CC:  No Recipients    If you would like to receive this communication electronically, please contact [email or web address] or [phone] to request more information on [employer] access.    For providers and/or their staff who would like to refer a patient to [facility], please contact us through our one-stop-shop provider referral line, [facility], at [phone].    If you feel you have received this communication in error or would no longer like to receive these types of communications, please e-mail [email or web address]

## 2020-08-24 RX ORDER — TRIAMCINOLONE ACETONIDE 40 MG/ML
40 INJECTION, SUSPENSION INTRA-ARTICULAR; INTRAMUSCULAR
Status: DISCONTINUED | OUTPATIENT
Start: 2020-08-13 | End: 2020-08-24 | Stop reason: HOSPADM

## 2020-08-31 ENCOUNTER — EXTERNAL CHRONIC CARE MANAGEMENT (OUTPATIENT)
Dept: PRIMARY CARE CLINIC | Facility: CLINIC | Age: 80
End: 2020-08-31
Payer: MEDICARE

## 2020-08-31 PROCEDURE — 99490 CHRNC CARE MGMT STAFF 1ST 20: CPT | Mod: S$PBB,,, | Performed by: INTERNAL MEDICINE

## 2020-08-31 PROCEDURE — 99490 PR CHRONIC CARE MGMT, 1ST 20 MIN: ICD-10-PCS | Mod: S$PBB,,, | Performed by: INTERNAL MEDICINE

## 2020-08-31 PROCEDURE — 99490 CHRNC CARE MGMT STAFF 1ST 20: CPT | Mod: PBBFAC | Performed by: INTERNAL MEDICINE

## 2020-09-30 ENCOUNTER — PATIENT MESSAGE (OUTPATIENT)
Dept: INTERNAL MEDICINE | Facility: CLINIC | Age: 80
End: 2020-09-30

## 2020-09-30 ENCOUNTER — EXTERNAL CHRONIC CARE MANAGEMENT (OUTPATIENT)
Dept: PRIMARY CARE CLINIC | Facility: CLINIC | Age: 80
End: 2020-09-30
Payer: MEDICARE

## 2020-09-30 PROCEDURE — 99490 CHRNC CARE MGMT STAFF 1ST 20: CPT | Mod: S$PBB,,, | Performed by: INTERNAL MEDICINE

## 2020-09-30 PROCEDURE — 99490 PR CHRONIC CARE MGMT, 1ST 20 MIN: ICD-10-PCS | Mod: S$PBB,,, | Performed by: INTERNAL MEDICINE

## 2020-09-30 PROCEDURE — 99490 CHRNC CARE MGMT STAFF 1ST 20: CPT | Mod: PBBFAC | Performed by: INTERNAL MEDICINE

## 2020-10-07 ENCOUNTER — PATIENT MESSAGE (OUTPATIENT)
Dept: OBSTETRICS AND GYNECOLOGY | Facility: CLINIC | Age: 80
End: 2020-10-07

## 2020-10-07 DIAGNOSIS — Z79.890 POSTMENOPAUSAL HORMONE REPLACEMENT THERAPY: Primary | ICD-10-CM

## 2020-10-08 ENCOUNTER — PATIENT MESSAGE (OUTPATIENT)
Dept: OBSTETRICS AND GYNECOLOGY | Facility: CLINIC | Age: 80
End: 2020-10-08

## 2020-10-08 RX ORDER — ESTRADIOL AND NORETHINDRONE ACETATE .5; .1 MG/1; MG/1
1 TABLET ORAL DAILY
Qty: 84 TABLET | Refills: 3 | OUTPATIENT
Start: 2020-10-08

## 2020-10-10 ENCOUNTER — PATIENT MESSAGE (OUTPATIENT)
Dept: OBSTETRICS AND GYNECOLOGY | Facility: CLINIC | Age: 80
End: 2020-10-10

## 2020-10-12 DIAGNOSIS — Z79.890 POSTMENOPAUSAL HORMONE REPLACEMENT THERAPY: ICD-10-CM

## 2020-10-12 RX ORDER — ESTRADIOL AND NORETHINDRONE ACETATE .5; .1 MG/1; MG/1
1 TABLET ORAL DAILY
Qty: 30 TABLET | Refills: 0 | Status: CANCELLED | OUTPATIENT
Start: 2020-10-12

## 2020-10-12 NOTE — TELEPHONE ENCOUNTER
Does she want to see Dr. Fairchild? It looks like she has seen her before. Or maybe she would be willing to refill meds since she has seen her?

## 2020-10-12 NOTE — TELEPHONE ENCOUNTER
Patient requested a refill on her lopreeza. She was informed that Dr. Fletcher is not in the office. However, her partner, Dr. Edwards's recommend that she schedule an appointment for her annual exam before refills can be sent. Pt was upset that she had to make an appointment. She expressed that she does not have a rapport with doctor. Patient expressed that her  was a member of the board, she does not like this and would file a complaint. Nurse expressed her deepest apologies and informed patient that it is [facility]'s policy that she has to be current on yearly exam for provider to send in refill. She has not had a yearly exam since 5/2018.In order for her to receive a refill, she needs to schedule an appointment to be seen.       ----- Message from Pat Hines sent at 10/12/2020 10:59 AM CDT -----  Contact: NEIL REDMOND [[id number]]  Type: RX Refill Request    Who Called: NEIL REDMODN [[id number]]    RX Name and Strength: estradiol-norethindrone acet (LOPREEZA) 0.5-0.1 mg per tablet    Preferred Pharmacy with phone number: ..  [id number] [id number] [place] [address] & [address] [address]  Phone: [phone] Fax: [phone]    Best Call Back Number: ..[phone]    Additional Information: PT would like medication ASAP. She is in the process of moving and will not be able to pick it up today or tomorrow. She can come in to pick it up within 2 weeks. Pt states she would like the office to call her when it is ready.

## 2020-10-13 ENCOUNTER — PATIENT MESSAGE (OUTPATIENT)
Dept: INTERNAL MEDICINE | Facility: CLINIC | Age: 80
End: 2020-10-13

## 2020-10-13 DIAGNOSIS — Z79.890 POSTMENOPAUSAL HORMONE REPLACEMENT THERAPY: ICD-10-CM

## 2020-10-13 RX ORDER — ESTRADIOL AND NORETHINDRONE ACETATE .5; .1 MG/1; MG/1
1 TABLET ORAL DAILY
Qty: 84 TABLET | Refills: 3 | Status: CANCELLED | OUTPATIENT
Start: 2020-10-13

## 2020-10-13 NOTE — TELEPHONE ENCOUNTER
Pt sees Dr. Doty as PCP. Pt is needing a refill on hormone Estradiol, pt requested from OBGYN, and is having issues getting it filled due to needing to see them, but not getting in till 10/22/20. Pt only has 3 days left on the current prescription. Pt is only wanting a 30 day supply in order to last until seeing OBGYN, to get medication straightened out. Please contact patient at the above number for further instructions. I may be contacted per Epic messages or at the number listed below. Thank you.         Thanks,     JOSHUA Park

## 2020-10-14 RX ORDER — ESTRADIOL AND NORETHINDRONE ACETATE .5; .1 MG/1; MG/1
1 TABLET ORAL DAILY
Qty: 30 TABLET | Refills: 0 | Status: SHIPPED | OUTPATIENT
Start: 2020-10-14 | End: 2020-10-14

## 2020-10-18 ENCOUNTER — PATIENT MESSAGE (OUTPATIENT)
Dept: INTERNAL MEDICINE | Facility: CLINIC | Age: 80
End: 2020-10-18

## 2020-10-19 ENCOUNTER — PATIENT MESSAGE (OUTPATIENT)
Dept: INTERNAL MEDICINE | Facility: CLINIC | Age: 80
End: 2020-10-19

## 2020-10-19 RX ORDER — TEMAZEPAM 15 MG/1
15 CAPSULE ORAL NIGHTLY
Qty: 90 CAPSULE | Refills: 1 | Status: SHIPPED | OUTPATIENT
Start: 2020-10-19 | End: 2021-01-22 | Stop reason: SDUPTHER

## 2020-10-20 ENCOUNTER — PATIENT OUTREACH (OUTPATIENT)
Dept: ADMINISTRATIVE | Facility: OTHER | Age: 80
End: 2020-10-20

## 2020-10-22 ENCOUNTER — LAB VISIT (OUTPATIENT)
Dept: LAB | Facility: OTHER | Age: 80
End: 2020-10-22
Attending: OBSTETRICS & GYNECOLOGY
Payer: MEDICARE

## 2020-10-22 ENCOUNTER — OFFICE VISIT (OUTPATIENT)
Dept: OBSTETRICS AND GYNECOLOGY | Facility: CLINIC | Age: 80
End: 2020-10-22
Payer: MEDICARE

## 2020-10-22 VITALS
SYSTOLIC BLOOD PRESSURE: 127 MMHG | WEIGHT: 149.06 LBS | DIASTOLIC BLOOD PRESSURE: 67 MMHG | HEIGHT: 68 IN | BODY MASS INDEX: 22.59 KG/M2

## 2020-10-22 DIAGNOSIS — Z79.890 POSTMENOPAUSAL HORMONE REPLACEMENT THERAPY: ICD-10-CM

## 2020-10-22 DIAGNOSIS — Z01.419 WELL WOMAN EXAM: Primary | ICD-10-CM

## 2020-10-22 DIAGNOSIS — Z01.419 WELL WOMAN EXAM: ICD-10-CM

## 2020-10-22 DIAGNOSIS — Z13.6 ENCOUNTER FOR SCREENING FOR CARDIOVASCULAR DISORDERS: ICD-10-CM

## 2020-10-22 LAB
CHOLEST SERPL-MCNC: 178 MG/DL (ref 120–199)
CHOLEST/HDLC SERPL: 1.9 {RATIO} (ref 2–5)
HDLC SERPL-MCNC: 95 MG/DL (ref 40–75)
HDLC SERPL: 53.4 % (ref 20–50)
LDLC SERPL CALC-MCNC: 71.6 MG/DL (ref 63–159)
NONHDLC SERPL-MCNC: 83 MG/DL
TRIGL SERPL-MCNC: 57 MG/DL (ref 30–150)

## 2020-10-22 PROCEDURE — G0101 CA SCREEN;PELVIC/BREAST EXAM: HCPCS | Mod: PBBFAC | Performed by: OBSTETRICS & GYNECOLOGY

## 2020-10-22 PROCEDURE — 80061 LIPID PANEL: CPT

## 2020-10-22 PROCEDURE — G0101 PR CA SCREEN;PELVIC/BREAST EXAM: ICD-10-PCS | Mod: S$PBB,,, | Performed by: OBSTETRICS & GYNECOLOGY

## 2020-10-22 PROCEDURE — 99213 OFFICE O/P EST LOW 20 MIN: CPT | Mod: PBBFAC,25 | Performed by: OBSTETRICS & GYNECOLOGY

## 2020-10-22 PROCEDURE — G0101 CA SCREEN;PELVIC/BREAST EXAM: HCPCS | Mod: S$PBB,,, | Performed by: OBSTETRICS & GYNECOLOGY

## 2020-10-22 PROCEDURE — 36415 COLL VENOUS BLD VENIPUNCTURE: CPT

## 2020-10-22 PROCEDURE — 99999 PR PBB SHADOW E&M-EST. PATIENT-LVL III: CPT | Mod: PBBFAC,,, | Performed by: OBSTETRICS & GYNECOLOGY

## 2020-10-22 PROCEDURE — 99999 PR PBB SHADOW E&M-EST. PATIENT-LVL III: ICD-10-PCS | Mod: PBBFAC,,, | Performed by: OBSTETRICS & GYNECOLOGY

## 2020-10-22 RX ORDER — ESTRADIOL AND NORETHINDRONE ACETATE .5; .1 MG/1; MG/1
1 TABLET ORAL DAILY
Qty: 30 TABLET | Refills: 11 | Status: SHIPPED | OUTPATIENT
Start: 2020-10-22 | End: 2021-10-15

## 2020-10-22 NOTE — PROGRESS NOTES
CC: 78 yo female, here for AE and refill HRT    HPI: Here for above. Mammogram is up to date. She is a  -- she has two daughters.     She has been on HRT for 30+ years and desires to continue. She has previously been counseled about risks/benefits and alternatives.    Has PCP - Dr. Doty.     She has a horse farm. Also has a home in [place].     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    Supraventricular tachycardia     s/p AVNRT ablation (Dr Brady)       Past Surgical History:   Procedure Laterality Date    APPENDECTOMY  age 21    BACK SURGERY      Beast Lift      BONE MARROW BIOPSY Right 2019    Procedure: Biopsy-bone marrow;  Surgeon: Aidan Spring MD;  Location: [facility] OR [address] [place] [address];  Service: Oncology;  Laterality: Right;    BREAST BIOPSY  50yrs ago    unable to see scar    COSMETIC SURGERY      DILATION AND CURETTAGE OF UTERUS      PMB    ENDOMETRIAL BIOPSY      EYE SURGERY      ptsosis      RADIOFREQUENCY ABLATION  2018    SMALL INTESTINE SURGERY      TONSILLECTOMY, ADENOIDECTOMY  age 8    TOTAL REDUCTION MAMMOPLASTY Bilateral     TUBAL LIGATION         OB History        2    Para   2    Term   2            AB        Living   2       SAB        TAB        Ectopic        Multiple        Live Births   2                 Current Outpatient Medications on File Prior to Visit   Medication Sig Dispense Refill    acetaminophen (TYLENOL) 325 MG tablet Take 325 mg by mouth continuous prn for Pain.      aspirin (ECOTRIN) 81 MG EC tablet Take 1 tablet (81 mg total) by mouth once daily. 30 tablet 0    ferrous sulfate 324 mg (65 mg iron) TbEC Take 325 mg  by mouth once daily.      verapamil (VERELAN) 120 MG C24P Take 1 capsule (120 mg total) by mouth once daily. 90 capsule 3    vitamin D (VITAMIN D3) 1000 units Tab Take 1,000 Units by mouth once daily.      hydrocortisone 2.5 % cream CED AA BID. MAY MIX WITH KETOCONAZOLE CREAM  3    ketoconazole (NIZORAL) 2 % cream CED BID AA  11    temazepam (RESTORIL) 15 mg Cap Take 1 capsule (15 mg total) by mouth every evening. 90 capsule 1    traMADoL (ULTRAM) 50 mg tablet Take 1 tablet (50 mg total) by mouth every 8 (eight) hours as needed for Pain. May not use temazepam with this med (Patient not taking: Reported on 10/22/2020) 21 tablet 0    valACYclovir (VALTREX) 500 MG tablet Take 1 tablet (500 mg total) by mouth once daily. (Patient taking differently: Take 500 mg by mouth daily as needed. ) 30 tablet 11    [DISCONTINUED] estradiol-norethindrone acet 0.5-0.1 mg per tablet TAKE 1 TABLET BY MOUTH EVERY DAY 30 tablet 0     No current facility-administered medications on file prior to visit.        Prior to Admission medications    Medication Sig Start Date End Date Taking? Authorizing Provider   acetaminophen (TYLENOL) 325 MG tablet Take 325 mg by mouth continuous prn for Pain.    Historical Provider   aspirin (ECOTRIN) 81 MG EC tablet Take 1 tablet (81 mg total) by mouth once daily. 4/24/19   Briseida Storm NP   estradiol-norethindrone acet 0.5-0.1 mg per tablet TAKE 1 TABLET BY MOUTH EVERY DAY 10/14/20   Joana Doty MD   ferrous sulfate 324 mg (65 mg iron) TbEC Take 325 mg by mouth once daily.    Historical Provider   hydrocortisone 2.5 % cream CED AA BID. MAY MIX WITH KETOCONAZOLE CREAM 11/6/19   Historical Provider   ketoconazole (NIZORAL) 2 % cream CED BID AA 11/6/19   Historical Provider   temazepam (RESTORIL) 15 mg Cap Take 1 capsule (15 mg total) by mouth every evening. 10/19/20   Joana Doty MD   traMADoL (ULTRAM) 50 mg tablet Take 1 tablet (50 mg total) by mouth every 8 (eight) hours as  "needed for Pain. May not use temazepam with this med 3/25/20   Joana Doty MD   valACYclovir (VALTREX) 500 MG tablet Take 1 tablet (500 mg total) by mouth once daily.  Patient taking differently: Take 500 mg by mouth daily as needed.  10/8/19 7/15/20  Rachel Fletcher MD   verapamil (VERELAN) 120 MG C24P Take 1 capsule (120 mg total) by mouth once daily. 11/15/19 11/14/20  Michael Brady MD   vitamin D (VITAMIN D3) 1000 units Tab Take 1,000 Units by mouth once daily.    Historical Provider         ROS:  GENERAL: Feeling well overall.   SKIN: Denies rash or lesions.   HEAD: Denies head injury or headache.   REPRODUCTIVE: See HPI.   HEMATOLOGIC: No easy bruisability or excessive bleeding.     Physical Exam:   /67 (BP Location: Right arm, Patient Position: Sitting)   Ht 5' 8" (1.727 m)   Wt 67.6 kg (149 lb 0.5 oz)   LMP  (LMP Unknown)   BMI 22.66 kg/m²   General: No distress, well appearing  HEENT: normocephalic, atraumatic   Heart: Regular rate  Lungs: No increased work of breathing  Breasts: Symmetrical, no masses, discharge or skin retractions.  MS: lower extremeties symmetrical, no edema  Pelvic Exam:  deferred  PSYCH: Normal affect, mood appropriate       ASSESSMENT/PLAN: Shelly Wilkinson was seen today for annual exam.    Diagnoses and all orders for this visit:    Well woman exam  -     Lipid panel; Future    Encounter for screening for cardiovascular disorders   -     Lipid panel; Future    Postmenopausal hormone replacement therapy  -     estradiol-norethindrone acet 0.5-0.1 mg per tablet; Take 1 tablet by mouth once daily.        A full discussion of the benefit-risk ratio of hormonal replacement therapy was carried out. Improvement in vasomotor and other climacteric symptoms is discussed, including possible improvements in sleep and mood. We discussed the study data showing increased risk of thrombo-embolic events such as myocardial infarction, stroke and also possibly breast cancer with " estrogen replacement, and how this might affect her.  We also discussed ACOG's recommendation to use hormone replacement therapy for the relief of hot flashes alone and to be on the lowest dose possible for the shortest amount of time. We discussed alternative therapies included non hormonal therapies we also discussed cessation of HRT and see how she does. She prefers to continue HRT and understands the above risks. She has been previously counseled by both Dr. Fairchild and Dr. Fletcher.     RTO in 1 year for WWE or sooner if needed. Okay to do virtual visit next year if she prefers.    Liza Vasquez MD  Obstetrics and Gynecology  [facility]

## 2020-10-31 ENCOUNTER — EXTERNAL CHRONIC CARE MANAGEMENT (OUTPATIENT)
Dept: PRIMARY CARE CLINIC | Facility: CLINIC | Age: 80
End: 2020-10-31
Payer: MEDICARE

## 2020-10-31 PROCEDURE — 99489 CPLX CHRNC CARE EA ADDL 30: CPT | Mod: S$PBB,,, | Performed by: INTERNAL MEDICINE

## 2020-10-31 PROCEDURE — 99487 CPLX CHRNC CARE 1ST 60 MIN: CPT | Mod: S$PBB,,, | Performed by: INTERNAL MEDICINE

## 2020-10-31 PROCEDURE — 99487 PR COMPLX CHRON CARE MGMT, 1ST HR, PER MONTH: ICD-10-PCS | Mod: S$PBB,,, | Performed by: INTERNAL MEDICINE

## 2020-10-31 PROCEDURE — 99489 PR COMPLX CHRON CARE MGMT, EA ADDTL 30 MIN, PER MONTH: ICD-10-PCS | Mod: S$PBB,,, | Performed by: INTERNAL MEDICINE

## 2020-10-31 PROCEDURE — 99487 CPLX CHRNC CARE 1ST 60 MIN: CPT | Mod: PBBFAC,27 | Performed by: INTERNAL MEDICINE

## 2020-10-31 PROCEDURE — 99489 CPLX CHRNC CARE EA ADDL 30: CPT | Mod: PBBFAC,27 | Performed by: INTERNAL MEDICINE

## 2020-11-09 RX ORDER — VERAPAMIL HYDROCHLORIDE 120 MG/1
120 CAPSULE, EXTENDED RELEASE ORAL DAILY
Qty: 90 CAPSULE | Refills: 3 | Status: SHIPPED | OUTPATIENT
Start: 2020-11-09 | End: 2021-04-27 | Stop reason: SDUPTHER

## 2020-11-11 ENCOUNTER — PATIENT MESSAGE (OUTPATIENT)
Dept: INTERNAL MEDICINE | Facility: CLINIC | Age: 80
End: 2020-11-11

## 2020-11-30 ENCOUNTER — EXTERNAL CHRONIC CARE MANAGEMENT (OUTPATIENT)
Dept: PRIMARY CARE CLINIC | Facility: CLINIC | Age: 80
End: 2020-11-30
Payer: MEDICARE

## 2020-11-30 PROCEDURE — 99490 PR CHRONIC CARE MGMT, 1ST 20 MIN: ICD-10-PCS | Mod: S$PBB,,, | Performed by: INTERNAL MEDICINE

## 2020-11-30 PROCEDURE — 99490 CHRNC CARE MGMT STAFF 1ST 20: CPT | Mod: S$PBB,,, | Performed by: INTERNAL MEDICINE

## 2020-11-30 PROCEDURE — 99490 CHRNC CARE MGMT STAFF 1ST 20: CPT | Mod: PBBFAC | Performed by: INTERNAL MEDICINE

## 2020-12-01 ENCOUNTER — HOSPITAL ENCOUNTER (OUTPATIENT)
Dept: RADIOLOGY | Facility: HOSPITAL | Age: 80
Discharge: HOME | End: 2020-12-01
Attending: ORTHOPAEDIC SURGERY
Payer: MEDICARE

## 2020-12-01 ENCOUNTER — PATIENT MESSAGE (OUTPATIENT)
Dept: SPORTS MEDICINE | Facility: CLINIC | Age: 80
End: 2020-12-01

## 2020-12-01 ENCOUNTER — OFFICE VISIT (OUTPATIENT)
Dept: SPORTS MEDICINE | Facility: CLINIC | Age: 80
End: 2020-12-01
Payer: MEDICARE

## 2020-12-01 VITALS
SYSTOLIC BLOOD PRESSURE: 125 MMHG | WEIGHT: 152 LBS | BODY MASS INDEX: 23.04 KG/M2 | HEIGHT: 68 IN | DIASTOLIC BLOOD PRESSURE: 72 MMHG | HEART RATE: 73 BPM

## 2020-12-01 DIAGNOSIS — G89.29 CHRONIC LEFT SHOULDER PAIN: Primary | ICD-10-CM

## 2020-12-01 DIAGNOSIS — M25.512 ACUTE PAIN OF LEFT SHOULDER: ICD-10-CM

## 2020-12-01 DIAGNOSIS — M25.512 CHRONIC LEFT SHOULDER PAIN: Primary | ICD-10-CM

## 2020-12-01 PROCEDURE — 99999 PR PBB SHADOW E&M-EST. PATIENT-LVL III: ICD-10-PCS | Mod: PBBFAC,,, | Performed by: ORTHOPAEDIC SURGERY

## 2020-12-01 PROCEDURE — 99213 OFFICE O/P EST LOW 20 MIN: CPT | Mod: S$PBB,,, | Performed by: ORTHOPAEDIC SURGERY

## 2020-12-01 PROCEDURE — 99213 PR OFFICE/OUTPT VISIT, EST, LEVL III, 20-29 MIN: ICD-10-PCS | Mod: S$PBB,,, | Performed by: ORTHOPAEDIC SURGERY

## 2020-12-01 PROCEDURE — 73030 X-RAY EXAM OF SHOULDER: CPT | Mod: TC,LT

## 2020-12-01 PROCEDURE — 73030 X-RAY EXAM OF SHOULDER: CPT | Mod: 26,LT,, | Performed by: RADIOLOGY

## 2020-12-01 PROCEDURE — 73030 XR SHOULDER COMPLETE 2 OR MORE VIEWS LEFT: ICD-10-PCS | Mod: 26,LT,, | Performed by: RADIOLOGY

## 2020-12-01 PROCEDURE — 99213 OFFICE O/P EST LOW 20 MIN: CPT | Mod: PBBFAC,25 | Performed by: ORTHOPAEDIC SURGERY

## 2020-12-01 PROCEDURE — 99999 PR PBB SHADOW E&M-EST. PATIENT-LVL III: CPT | Mod: PBBFAC,,, | Performed by: ORTHOPAEDIC SURGERY

## 2020-12-01 NOTE — PROGRESS NOTES
CC: Left shoulder pain     79 y.o. Female returns to clinic today for recurrent left shoulder pain.  I saw her previously for this complaint.  X-rays showed some degenerative changes and an intra-articular glenohumeral steroid injection was given about 3 months ago.  She does not recall specifically this injection but states that she did have some pain relief thereafter.  Was doing reasonably well with that and some self-directed therapy when she about 2 months ago sustained repeat injury to left shoulder.  She sustained a mechanical fall in the bathroom and landed on her left side.  She has had significant pain since then.  She localizes over the lateral subdeltoid recess. Worse with overhead activity and lying on her left side at night. Pain is not disruptive to sleep at night otherwise. Better with rest.  Accompanied by weakness and pain sometimes at rest.  Denies neck pain or radicular symptoms. Treatment thus far has also included activity modifications, rest, and oral medication.  Here today to discuss diagnosis and treatment options.  Unable to take oral anti-inflammatory medication due to history of GI bleed.  Currently taking Tylenol.  Also has used CBD ointments.     Pain Score: 0-No pain    PAST MEDICAL HISTORY:   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    Supraventricular tachycardia     s/p AVNRT ablation (Dr Brady)     PAST SURGICAL HISTORY:  Past Surgical History:   Procedure Laterality Date    APPENDECTOMY  age 21    BACK SURGERY      Beast Lift  2004    BONE MARROW BIOPSY Right 12/19/2019    Procedure: Biopsy-bone marrow;  Surgeon: Aidan Spring MD;  Location: [facility] OR [address] [place] [address];   Service: Oncology;  Laterality: Right;    BREAST BIOPSY  50yrs ago    unable to see scar    COSMETIC SURGERY      DILATION AND CURETTAGE OF UTERUS  2008    PMB    ENDOMETRIAL BIOPSY      EYE SURGERY      ptsosis      RADIOFREQUENCY ABLATION  03/2018    SMALL INTESTINE SURGERY      TONSILLECTOMY, ADENOIDECTOMY  age 8    TOTAL REDUCTION MAMMOPLASTY Bilateral 2003    TUBAL LIGATION       FAMILY HISTORY:  Family History   Problem Relation Age of Onset    No Known Problems Father     Cirrhosis Mother     No Known Problems Sister     No Known Problems Brother     No Known Problems Maternal Aunt     No Known Problems Maternal Uncle     No Known Problems Paternal Aunt     No Known Problems Paternal Uncle     No Known Problems Maternal Grandmother     No Known Problems Maternal Grandfather     No Known Problems Paternal Grandmother     No Known Problems Paternal Grandfather     No Known Problems Daughter     No Known Problems Son     Breast cancer Neg Hx     Colon cancer Neg Hx     Ovarian cancer Neg Hx     Amblyopia Neg Hx     Blindness Neg Hx     Cancer Neg Hx     Cataracts Neg Hx     Diabetes Neg Hx     Glaucoma Neg Hx     Hypertension Neg Hx     Macular degeneration Neg Hx     Retinal detachment Neg Hx     Strabismus Neg Hx     Stroke Neg Hx     Thyroid disease Neg Hx      MEDICATIONS:    Current Outpatient Medications:     acetaminophen (TYLENOL) 325 MG tablet, Take 325 mg by mouth continuous prn for Pain., Disp: , Rfl:     aspirin (ECOTRIN) 81 MG EC tablet, Take 1 tablet (81 mg total) by mouth once daily., Disp: 30 tablet, Rfl: 0    estradiol-norethindrone acet 0.5-0.1 mg per tablet, Take 1 tablet by mouth once daily., Disp: 30 tablet, Rfl: 11    ferrous sulfate 324 mg (65 mg iron) TbEC, Take 325 mg by mouth once daily., Disp: , Rfl:     hydrocortisone 2.5 % cream, CED AA BID. MAY MIX WITH KETOCONAZOLE CREAM, Disp: , Rfl: 3    ketoconazole (NIZORAL) 2 % cream, CED BID AA,  "Disp: , Rfl: 11    temazepam (RESTORIL) 15 mg Cap, Take 1 capsule (15 mg total) by mouth every evening., Disp: 90 capsule, Rfl: 1    traMADoL (ULTRAM) 50 mg tablet, Take 1 tablet (50 mg total) by mouth every 8 (eight) hours as needed for Pain. May not use temazepam with this med, Disp: 21 tablet, Rfl: 0    verapamiL (VERELAN) 120 MG C24P, Take 1 capsule (120 mg total) by mouth once daily., Disp: 90 capsule, Rfl: 3    vitamin D (VITAMIN D3) 1000 units Tab, Take 1,000 Units by mouth once daily., Disp: , Rfl:     valACYclovir (VALTREX) 500 MG tablet, Take 1 tablet (500 mg total) by mouth once daily. (Patient taking differently: Take 500 mg by mouth daily as needed. ), Disp: 30 tablet, Rfl: 11    ALLERGIES:  Review of patient's allergies indicates:   Allergen Reactions    Nsaids (non-steroidal anti-inflammatory drug)      GI Bleed  Had 5 blood transfusions     REVIEW OF SYSTEMS:  Constitution: Negative. Negative for chills, fever and night sweats.    Hematologic/Lymphatic: Negative for bleeding problem. Does not bruise/bleed easily.   Skin: Negative for dry skin, itching and rash.   Musculoskeletal: Negative for falls. Positive for left shoulder pain and muscle weakness.     All other review of symptoms were reviewed and found to be noncontributory.    PHYSICAL EXAMINATION:  Vitals:  /72   Pulse 73   Ht 5' 8" (1.727 m)   Wt 68.9 kg (152 lb)   LMP  (LMP Unknown)   BMI 23.11 kg/m²    General: Well-developed well-nourished 79 y.o. femalein no acute distress   Cardiovascular: Regular rhythm by palpation of distal pulse, normal color and temperature, no concerning varicosities on symptomatic side   Lungs: No labored breathing or wheezing appreciated   Neuro: Alert and oriented ×3   Psychiatric: well oriented to person, place and time, demonstrates normal mood and affect   Skin: No rashes, lesions or ulcers, normal temperature, turgor, and texture on uninvolved extremity    Ortho/SPM Exam  Examination of the " left shoulder demonstrates active forward elevation to 120, ER with arm at side to 20, IR to body. Passive FE to 160 with pain at end range, ER to 50. Prominent tenderness along the lateral subdeltoid recess and Codman's point.  Mild tenderness over the proximal biceps groove.  Negative AC tenderness. 4-/5 resisted supraspinatus testing with pain on resisted testing. 5-/5 resisted infraspinatus testing. Negative belly press test. Stable shoulder. No midline neck tenderness. Negative Spurling's maneuver.     IMAGING:  Repeat x-rays of the left shoulder today show:  No interval changes of significance.    ASSESSMENT:      ICD-10-CM ICD-9-CM   1. Chronic left shoulder pain  M25.512 719.41    G89.29 338.29     PLAN:     Findings and treatment options were discussed with the patient.  Sounds like she responded reasonably well to the injection previously but has had recurrent pain provoked by recent injury as outlined above.  The patient has weakness and pain on resisted cuff testing.  Given the recurrent nature of her pain despite prior conservative treatment, MRI is indicated at this time for further assessment.  I will see her back after that study to discuss results and treatment options.    Procedures

## 2020-12-09 ENCOUNTER — PATIENT MESSAGE (OUTPATIENT)
Dept: SPORTS MEDICINE | Facility: CLINIC | Age: 80
End: 2020-12-09

## 2020-12-09 ENCOUNTER — HOSPITAL ENCOUNTER (OUTPATIENT)
Dept: RADIOLOGY | Facility: HOSPITAL | Age: 80
Discharge: HOME | End: 2020-12-09
Attending: ORTHOPAEDIC SURGERY
Payer: MEDICARE

## 2020-12-09 DIAGNOSIS — M25.512 CHRONIC LEFT SHOULDER PAIN: ICD-10-CM

## 2020-12-09 DIAGNOSIS — G89.29 CHRONIC LEFT SHOULDER PAIN: ICD-10-CM

## 2020-12-09 PROCEDURE — 73221 MRI SHOULDER WITHOUT CONTRAST LEFT: ICD-10-PCS | Mod: 26,LT,, | Performed by: RADIOLOGY

## 2020-12-09 PROCEDURE — 73221 MRI JOINT UPR EXTREM W/O DYE: CPT | Mod: TC,LT

## 2020-12-09 PROCEDURE — 73221 MRI JOINT UPR EXTREM W/O DYE: CPT | Mod: 26,LT,, | Performed by: RADIOLOGY

## 2020-12-10 ENCOUNTER — OFFICE VISIT (OUTPATIENT)
Dept: SPORTS MEDICINE | Facility: CLINIC | Age: 80
End: 2020-12-10
Payer: MEDICARE

## 2020-12-10 VITALS
HEART RATE: 71 BPM | DIASTOLIC BLOOD PRESSURE: 69 MMHG | HEIGHT: 68 IN | SYSTOLIC BLOOD PRESSURE: 122 MMHG | WEIGHT: 153 LBS | BODY MASS INDEX: 23.19 KG/M2

## 2020-12-10 DIAGNOSIS — M19.012 PRIMARY OSTEOARTHRITIS OF LEFT SHOULDER: Primary | ICD-10-CM

## 2020-12-10 DIAGNOSIS — M75.22 BICEPS TENDINITIS OF LEFT UPPER EXTREMITY: ICD-10-CM

## 2020-12-10 PROCEDURE — 99213 OFFICE O/P EST LOW 20 MIN: CPT | Mod: PBBFAC,25 | Performed by: ORTHOPAEDIC SURGERY

## 2020-12-10 PROCEDURE — 20610 DRAIN/INJ JOINT/BURSA W/O US: CPT | Mod: PBBFAC | Performed by: ORTHOPAEDIC SURGERY

## 2020-12-10 PROCEDURE — 99213 OFFICE O/P EST LOW 20 MIN: CPT | Mod: 25,S$PBB,, | Performed by: ORTHOPAEDIC SURGERY

## 2020-12-10 PROCEDURE — 99999 PR PBB SHADOW E&M-EST. PATIENT-LVL III: ICD-10-PCS | Mod: PBBFAC,,, | Performed by: ORTHOPAEDIC SURGERY

## 2020-12-10 PROCEDURE — 99213 PR OFFICE/OUTPT VISIT, EST, LEVL III, 20-29 MIN: ICD-10-PCS | Mod: 25,S$PBB,, | Performed by: ORTHOPAEDIC SURGERY

## 2020-12-10 PROCEDURE — 99999 PR PBB SHADOW E&M-EST. PATIENT-LVL III: CPT | Mod: PBBFAC,,, | Performed by: ORTHOPAEDIC SURGERY

## 2020-12-10 PROCEDURE — 20610 LARGE JOINT ASPIRATION/INJECTION: L GLENOHUMERAL: ICD-10-PCS | Mod: S$PBB,LT,, | Performed by: ORTHOPAEDIC SURGERY

## 2020-12-10 RX ADMIN — TRIAMCINOLONE ACETONIDE 40 MG: 40 INJECTION, SUSPENSION INTRA-ARTICULAR; INTRAMUSCULAR at 11:12

## 2020-12-16 ENCOUNTER — CLINICAL SUPPORT (OUTPATIENT)
Dept: REHABILITATION | Facility: OTHER | Age: 80
End: 2020-12-16
Attending: ORTHOPAEDIC SURGERY
Payer: MEDICARE

## 2020-12-16 DIAGNOSIS — G89.29 CHRONIC LEFT SHOULDER PAIN: ICD-10-CM

## 2020-12-16 DIAGNOSIS — M19.012 PRIMARY OSTEOARTHRITIS OF LEFT SHOULDER: ICD-10-CM

## 2020-12-16 DIAGNOSIS — M25.512 CHRONIC LEFT SHOULDER PAIN: ICD-10-CM

## 2020-12-16 DIAGNOSIS — M25.612 SHOULDER STIFFNESS, LEFT: ICD-10-CM

## 2020-12-16 DIAGNOSIS — M75.22 BICEPS TENDINITIS OF LEFT UPPER EXTREMITY: ICD-10-CM

## 2020-12-16 PROCEDURE — 97110 THERAPEUTIC EXERCISES: CPT | Mod: PN

## 2020-12-16 PROCEDURE — 97161 PT EVAL LOW COMPLEX 20 MIN: CPT | Mod: PN

## 2020-12-16 NOTE — PLAN OF CARE
[facility]  Physical Therapy Initial Evaluation    Date: 12/16/2020   Name: Shelly Vásquez  Clinic Number: [id number]    Therapy Diagnosis:   Encounter Diagnoses   Name Primary?    Primary osteoarthritis of left shoulder     Biceps tendinitis of left upper extremity      Physician: ERNESTO Wallace MD    Physician Orders: PT Eval and Treat   Medical Diagnosis from Referral:   M19.012 (ICD-10-CM) - Primary osteoarthritis of left shoulder   M75.22 (ICD-10-CM) - Biceps tendinitis of left upper extremity     Evaluation Date: 12/16/2020  Authorization Period Expiration: 12/10/2020  Plan of Care Expiration: 2/26/2021  Visit # / Visits authorized: 1/ 1    Time In: 10:58 (arrived 13 mins late)  Time Out: 11:30 pm  Total Appointment Time (timed & untimed codes): 32 minutes    Precautions: Standard    Subjective   Date of onset: 2 falls, most recent one was 2 months ago which exacerbate her shoulder pain  History of current condition - Shelly reports: L shoulder pain which has been present for several years but was exacerbated by a fall directly onto her shoulder. MD diagnosed her with advanced OA of the L shoulder. Had an injection last week which was helpful to reduce her pain. She has difficulty with carrying objects as well as lifting overhead.     Medical History:   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    Supraventricular tachycardia     s/p AVNRT ablation (Dr Brady)       Surgical History:   Shelly Vásquez  has a past surgical history that includes Dilation and curettage of uterus (2008); Endometrial biopsy; Beast Lift (2004); Tubal ligation; TONSILLECTOMY, ADENOIDECTOMY (age  8); Appendectomy (age 21); Eye surgery; ptsosis; Back surgery; Cosmetic surgery; Small intestine surgery; Radiofrequency ablation (03/2018); Total Reduction Mammoplasty (Bilateral, 2003); Breast biopsy (50yrs ago); and Bone marrow biopsy (Right, 12/19/2019).    Medications:   Shelly Wilkinson has a current medication list which includes the following prescription(s): acetaminophen, aspirin, estradiol-norethindrone acet, ferrous sulfate, hydrocortisone, ketoconazole, temazepam, tramadol, valacyclovir, verapamil, and vitamin d.    Allergies:   Review of patient's allergies indicates:   Allergen Reactions    Nsaids (non-steroidal anti-inflammatory drug)      GI Bleed  Had 5 blood transfusions        Imaging, MRI studies, xray    Prior Therapy:   Social History:  lives with their family  Occupation: retired  Prior Level of Function: reduced function  Current Level of Function: reduced function and significant pain    Pain:  Current 4/10, worst 6/10, best 4/10   Location: shoulder   Description: Aching and Dull  Aggravating Factors: see subjective  Easing Factors: injection    Pts goals: improve shoulder function.     Objective     Observation: none     Posture: slightly slouched      Passive Range of Motion:   Shoulder Right Left   Flexion 180 155   Abduction 180 135   ER at 90 90 90   IR 45 20      Active Range of Motion:   Shoulder Right Left   Flexion 160 100   Abduction 160 80   ER functional  T4 T1   IR functional L1 Sacrum     Strength:  Shoulder Right Left   Flexion 5/5 3-/5   Abduction 5/5 3-/5   ER 5/5 4-/5   IR 5/5 4-/5         Joint Mobility: post/inf GHJ hypomobility    Palpation: TTP of lateral deltoid insertion     Sensation: intact    Limitation/Restriction for FOTO shoulder Survey    Therapist reviewed FOTO scores for Shelly Vásquez on 12/16/2020.   FOTO documents entered into EPIC - see Media section.    Limitation Score: 42%         TREATMENT     Shelly received therapeutic exercises  to develop strength, endurance and ROM for 5 minutes including:  (limited time due to late arrival)  Supine shoulder flexion - HEP demo  Wall slides - HEP mackenzie Bravo received the following manual therapy techniques: Joint mobilizations, Myofacial release and Soft tissue Mobilization were applied to the: L shoulder for 00 minutes, including:  NOT PERFORMED    Home Exercises and Patient Education Provided    Education provided:   - HEP, POC, answered pt questions    Written Home Exercises Provided: yes.  Exercises were reviewed and patient was able to demonstrate them prior to the end of the session.  Patient demonstrated good  understanding of the education provided.     See EMR under Patient Instructions for exercisesprovided 12/16/2020.    Assessment   Shelly Wilkinson is a 79 y.o. female referred to outpatient Physical Therapy with a medical diagnosis of left shoulder arthritis. Pt presents with decreased shoulder ROM, significant shoulder weakness, poor GHJ mobility, and reduced function. Weakness appears to be her greatest impairment as passive motion was significantly greater than active. Pt will benefit from skilled outpatient Physical Therapy to address the deficits stated above and in the chart below, provide pt/family education, and to maximize pt's level of independence.     Pt prognosis is Good.       Plan of care discussed with patient: Yes  Pt's spiritual, cultural and educational needs considered and patient is agreeable to the plan of care and goals as stated below:     Anticipated Barriers for therapy: advanced age and subsequent OA    Medical Necessity is demonstrated by the following  History  Co-morbidities and personal factors that may impact the plan of care Co-morbidities:   advanced age    Personal Factors:   no deficits     low   Examination  Body Structures and Functions, activity limitations and participation restrictions that may impact the plan of care Body Regions:   upper  extremities    Body Systems:    ROM  strength    Participation Restrictions:   covid-19    Activity limitations:   Learning and applying knowledge  no deficits    General Tasks and Commands  no deficits    Communication  no deficits    Mobility  lifting and carrying objects    Self care  no deficits    Domestic Life  no deficits    Interactions/Relationships  no deficits    Life Areas  no deficits    Community and Social Life  no deficits         low   Clinical Presentation stable and uncomplicated low   Decision Making/ Complexity Score: low     GOALS: Short Term Goals:  5 weeks  1.Report decreased shoulder pain < / =  4/10 at worse  to increase tolerance for lifting overhead  2. Increase PROM to 165 degrees of L shoulder flexion   3. Increased strength by 1/3 MMT grade in all shoulder MMTs assessed to increase tolerance for ADL and work activities.  4. Pt to tolerate HEP to improve ROM and independence with ADL's    Long Term Goals: 10 weeks  1.Report decreased shoulder pain  < / =  2 /10  to increase tolerance for lifting overhead  2.Increase AROM to 150 degrees or better for active shoulder fleixon  3.Increase strength to >/= 4/5 in all MMTs assessed to increase tolerance for ADL and work activities.  4. Pt goal: able to carry 10 pound bag with no pain.  5. Pt will have improved gcode of CJ (20-40% limited) on FOTO shoulder in order to demonstrate true functional improvement.      Plan   Plan of care Certification: 12/16/2020 to 2/26/2021.    Outpatient Physical Therapy 2 times weekly for 10 weeks to include the following interventions: Gait Training, Manual Therapy, Moist Heat/ Ice, Neuromuscular Re-ed, Patient Education, Therapeutic Activites and Therapeutic Exercise.     Al Reynoso, PT

## 2020-12-31 ENCOUNTER — EXTERNAL CHRONIC CARE MANAGEMENT (OUTPATIENT)
Dept: PRIMARY CARE CLINIC | Facility: CLINIC | Age: 80
End: 2020-12-31
Payer: MEDICARE

## 2020-12-31 PROCEDURE — 99490 CHRNC CARE MGMT STAFF 1ST 20: CPT | Mod: S$PBB,,, | Performed by: INTERNAL MEDICINE

## 2020-12-31 PROCEDURE — 99490 CHRNC CARE MGMT STAFF 1ST 20: CPT | Mod: PBBFAC | Performed by: INTERNAL MEDICINE

## 2020-12-31 PROCEDURE — 99490 PR CHRONIC CARE MGMT, 1ST 20 MIN: ICD-10-PCS | Mod: S$PBB,,, | Performed by: INTERNAL MEDICINE

## 2021-01-03 ENCOUNTER — PATIENT MESSAGE (OUTPATIENT)
Dept: INTERNAL MEDICINE | Facility: CLINIC | Age: 81
End: 2021-01-03

## 2021-01-03 RX ORDER — TRIAMCINOLONE ACETONIDE 40 MG/ML
40 INJECTION, SUSPENSION INTRA-ARTICULAR; INTRAMUSCULAR
Status: DISCONTINUED | OUTPATIENT
Start: 2020-12-10 | End: 2021-01-03 | Stop reason: HOSPADM

## 2021-01-07 ENCOUNTER — CLINICAL SUPPORT (OUTPATIENT)
Dept: URGENT CARE | Facility: CLINIC | Age: 81
End: 2021-01-07
Payer: MEDICARE

## 2021-01-07 ENCOUNTER — CLINICAL SUPPORT (OUTPATIENT)
Dept: REHABILITATION | Facility: OTHER | Age: 81
End: 2021-01-07
Attending: ORTHOPAEDIC SURGERY
Payer: MEDICARE

## 2021-01-07 DIAGNOSIS — Z03.818 ENCOUNTER FOR OBSERVATION FOR SUSPECTED EXPOSURE TO OTHER BIOLOGICAL AGENTS RULED OUT: Primary | ICD-10-CM

## 2021-01-07 DIAGNOSIS — G89.29 CHRONIC LEFT SHOULDER PAIN: ICD-10-CM

## 2021-01-07 DIAGNOSIS — M25.512 CHRONIC LEFT SHOULDER PAIN: ICD-10-CM

## 2021-01-07 DIAGNOSIS — M25.612 SHOULDER STIFFNESS, LEFT: ICD-10-CM

## 2021-01-07 LAB
CTP QC/QA: YES
SARS-COV-2 RDRP RESP QL NAA+PROBE: NEGATIVE

## 2021-01-07 PROCEDURE — U0002 COVID-19 LAB TEST NON-CDC: HCPCS | Mod: QW,CR,S$GLB, | Performed by: FAMILY MEDICINE

## 2021-01-07 PROCEDURE — 99211 OFF/OP EST MAY X REQ PHY/QHP: CPT | Mod: S$GLB,,, | Performed by: FAMILY MEDICINE

## 2021-01-07 PROCEDURE — 99211 PR OFFICE/OUTPT VISIT, EST, LEVL I: ICD-10-PCS | Mod: S$GLB,,, | Performed by: FAMILY MEDICINE

## 2021-01-07 PROCEDURE — 97110 THERAPEUTIC EXERCISES: CPT | Mod: PN

## 2021-01-07 PROCEDURE — 97140 MANUAL THERAPY 1/> REGIONS: CPT | Mod: PN

## 2021-01-07 PROCEDURE — U0002: ICD-10-PCS | Mod: QW,CR,S$GLB, | Performed by: FAMILY MEDICINE

## 2021-01-09 ENCOUNTER — IMMUNIZATION (OUTPATIENT)
Dept: INTERNAL MEDICINE | Facility: CLINIC | Age: 81
End: 2021-01-09
Payer: MEDICARE

## 2021-01-09 DIAGNOSIS — Z23 NEED FOR VACCINATION: ICD-10-CM

## 2021-01-09 PROCEDURE — 0001A COVID-19, MRNA, LNP-S, PF, 30 MCG/0.3 ML DOSE VACCINE: ICD-10-PCS | Mod: CV19,,, | Performed by: INTERNAL MEDICINE

## 2021-01-09 PROCEDURE — 91300 COVID-19, MRNA, LNP-S, PF, 30 MCG/0.3 ML DOSE VACCINE: CPT | Mod: ,,, | Performed by: INTERNAL MEDICINE

## 2021-01-09 PROCEDURE — 0001A COVID-19, MRNA, LNP-S, PF, 30 MCG/0.3 ML DOSE VACCINE: CPT | Mod: CV19,,, | Performed by: INTERNAL MEDICINE

## 2021-01-09 PROCEDURE — 91300 COVID-19, MRNA, LNP-S, PF, 30 MCG/0.3 ML DOSE VACCINE: ICD-10-PCS | Mod: ,,, | Performed by: INTERNAL MEDICINE

## 2021-01-11 ENCOUNTER — CLINICAL SUPPORT (OUTPATIENT)
Dept: REHABILITATION | Facility: OTHER | Age: 81
End: 2021-01-11
Attending: ORTHOPAEDIC SURGERY
Payer: MEDICARE

## 2021-01-11 DIAGNOSIS — M25.612 SHOULDER STIFFNESS, LEFT: Primary | ICD-10-CM

## 2021-01-11 DIAGNOSIS — G89.29 CHRONIC LEFT SHOULDER PAIN: ICD-10-CM

## 2021-01-11 DIAGNOSIS — M25.512 CHRONIC LEFT SHOULDER PAIN: ICD-10-CM

## 2021-01-11 PROCEDURE — 97140 MANUAL THERAPY 1/> REGIONS: CPT | Mod: PN

## 2021-01-11 PROCEDURE — 97110 THERAPEUTIC EXERCISES: CPT | Mod: PN

## 2021-01-19 ENCOUNTER — CLINICAL SUPPORT (OUTPATIENT)
Dept: REHABILITATION | Facility: OTHER | Age: 81
End: 2021-01-19
Attending: ORTHOPAEDIC SURGERY
Payer: MEDICARE

## 2021-01-19 DIAGNOSIS — M25.512 CHRONIC LEFT SHOULDER PAIN: ICD-10-CM

## 2021-01-19 DIAGNOSIS — G89.29 CHRONIC LEFT SHOULDER PAIN: ICD-10-CM

## 2021-01-19 DIAGNOSIS — M25.612 SHOULDER STIFFNESS, LEFT: ICD-10-CM

## 2021-01-19 PROCEDURE — 97110 THERAPEUTIC EXERCISES: CPT | Mod: PN

## 2021-01-21 ENCOUNTER — CLINICAL SUPPORT (OUTPATIENT)
Dept: REHABILITATION | Facility: OTHER | Age: 81
End: 2021-01-21
Attending: ORTHOPAEDIC SURGERY
Payer: MEDICARE

## 2021-01-21 DIAGNOSIS — M25.512 CHRONIC LEFT SHOULDER PAIN: ICD-10-CM

## 2021-01-21 DIAGNOSIS — G89.29 CHRONIC LEFT SHOULDER PAIN: ICD-10-CM

## 2021-01-21 DIAGNOSIS — M25.612 SHOULDER STIFFNESS, LEFT: ICD-10-CM

## 2021-01-21 PROCEDURE — 97110 THERAPEUTIC EXERCISES: CPT | Mod: PN

## 2021-01-21 PROCEDURE — 97140 MANUAL THERAPY 1/> REGIONS: CPT | Mod: PN

## 2021-01-25 ENCOUNTER — CLINICAL SUPPORT (OUTPATIENT)
Dept: REHABILITATION | Facility: OTHER | Age: 81
End: 2021-01-25
Attending: ORTHOPAEDIC SURGERY
Payer: MEDICARE

## 2021-01-25 DIAGNOSIS — M25.512 CHRONIC LEFT SHOULDER PAIN: Primary | ICD-10-CM

## 2021-01-25 DIAGNOSIS — G89.29 CHRONIC LEFT SHOULDER PAIN: Primary | ICD-10-CM

## 2021-01-25 DIAGNOSIS — M25.612 SHOULDER STIFFNESS, LEFT: ICD-10-CM

## 2021-01-25 PROCEDURE — 97140 MANUAL THERAPY 1/> REGIONS: CPT | Mod: PN

## 2021-01-25 PROCEDURE — 97110 THERAPEUTIC EXERCISES: CPT | Mod: PN

## 2021-01-27 ENCOUNTER — PATIENT MESSAGE (OUTPATIENT)
Dept: SPORTS MEDICINE | Facility: CLINIC | Age: 81
End: 2021-01-27

## 2021-01-28 ENCOUNTER — CLINICAL SUPPORT (OUTPATIENT)
Dept: REHABILITATION | Facility: OTHER | Age: 81
End: 2021-01-28
Attending: ORTHOPAEDIC SURGERY
Payer: MEDICARE

## 2021-01-28 DIAGNOSIS — M25.612 SHOULDER STIFFNESS, LEFT: ICD-10-CM

## 2021-01-28 DIAGNOSIS — G89.29 CHRONIC LEFT SHOULDER PAIN: ICD-10-CM

## 2021-01-28 DIAGNOSIS — M25.512 CHRONIC LEFT SHOULDER PAIN: ICD-10-CM

## 2021-01-28 PROCEDURE — 97110 THERAPEUTIC EXERCISES: CPT | Mod: PN

## 2021-01-30 ENCOUNTER — IMMUNIZATION (OUTPATIENT)
Dept: INTERNAL MEDICINE | Facility: CLINIC | Age: 81
End: 2021-01-30
Payer: MEDICARE

## 2021-01-30 DIAGNOSIS — Z23 NEED FOR VACCINATION: Primary | ICD-10-CM

## 2021-01-30 PROCEDURE — 91300 PR SARS-COV- 2 COVID-19 VACCINE, NO PRSV, 30MCG/0.3ML, IM: CPT | Mod: PBBFAC

## 2021-01-30 PROCEDURE — 0002A PR IMMUNIZ ADMIN, SARS-COV-2 COVID-19 VACC, 30MCG/0.3ML, 2ND DOSE: CPT | Mod: PBBFAC

## 2021-01-30 RX ADMIN — RNA INGREDIENT BNT-162B2 0.3 ML: 0.23 INJECTION, SUSPENSION INTRAMUSCULAR at 01:01

## 2021-01-31 ENCOUNTER — EXTERNAL CHRONIC CARE MANAGEMENT (OUTPATIENT)
Dept: PRIMARY CARE CLINIC | Facility: CLINIC | Age: 81
End: 2021-01-31
Payer: MEDICARE

## 2021-01-31 PROCEDURE — 99490 CHRNC CARE MGMT STAFF 1ST 20: CPT | Mod: S$PBB,,, | Performed by: INTERNAL MEDICINE

## 2021-01-31 PROCEDURE — 99490 CHRNC CARE MGMT STAFF 1ST 20: CPT | Mod: PBBFAC | Performed by: INTERNAL MEDICINE

## 2021-01-31 PROCEDURE — 99490 PR CHRONIC CARE MGMT, 1ST 20 MIN: ICD-10-PCS | Mod: S$PBB,,, | Performed by: INTERNAL MEDICINE

## 2021-02-02 ENCOUNTER — OFFICE VISIT (OUTPATIENT)
Dept: SPORTS MEDICINE | Facility: CLINIC | Age: 81
End: 2021-02-02
Payer: MEDICARE

## 2021-02-02 VITALS
BODY MASS INDEX: 23.16 KG/M2 | WEIGHT: 152.81 LBS | SYSTOLIC BLOOD PRESSURE: 129 MMHG | DIASTOLIC BLOOD PRESSURE: 64 MMHG | HEIGHT: 68 IN | HEART RATE: 59 BPM

## 2021-02-02 DIAGNOSIS — M75.22 BICEPS TENDINITIS OF LEFT UPPER EXTREMITY: ICD-10-CM

## 2021-02-02 DIAGNOSIS — M19.012 PRIMARY OSTEOARTHRITIS OF LEFT SHOULDER: Primary | ICD-10-CM

## 2021-02-02 PROCEDURE — 20550 TENDON SHEATH: ICD-10-PCS | Mod: S$PBB,LT,, | Performed by: ORTHOPAEDIC SURGERY

## 2021-02-02 PROCEDURE — 99999 PR PBB SHADOW E&M-EST. PATIENT-LVL III: CPT | Mod: PBBFAC,,, | Performed by: ORTHOPAEDIC SURGERY

## 2021-02-02 PROCEDURE — 99999 PR PBB SHADOW E&M-EST. PATIENT-LVL III: ICD-10-PCS | Mod: PBBFAC,,, | Performed by: ORTHOPAEDIC SURGERY

## 2021-02-02 PROCEDURE — 99213 OFFICE O/P EST LOW 20 MIN: CPT | Mod: 25,S$PBB,, | Performed by: ORTHOPAEDIC SURGERY

## 2021-02-02 PROCEDURE — 76942 TENDON SHEATH: ICD-10-PCS | Mod: 26,S$PBB,, | Performed by: ORTHOPAEDIC SURGERY

## 2021-02-02 PROCEDURE — 76942 ECHO GUIDE FOR BIOPSY: CPT | Mod: 26,S$PBB,, | Performed by: ORTHOPAEDIC SURGERY

## 2021-02-02 PROCEDURE — 99213 PR OFFICE/OUTPT VISIT, EST, LEVL III, 20-29 MIN: ICD-10-PCS | Mod: 25,S$PBB,, | Performed by: ORTHOPAEDIC SURGERY

## 2021-02-02 PROCEDURE — 20550 NJX 1 TENDON SHEATH/LIGAMENT: CPT | Mod: PBBFAC,LT | Performed by: ORTHOPAEDIC SURGERY

## 2021-02-02 PROCEDURE — 99213 OFFICE O/P EST LOW 20 MIN: CPT | Mod: PBBFAC | Performed by: ORTHOPAEDIC SURGERY

## 2021-02-02 RX ADMIN — TRIAMCINOLONE ACETONIDE 40 MG: 40 INJECTION, SUSPENSION INTRA-ARTICULAR; INTRAMUSCULAR at 11:02

## 2021-02-03 ENCOUNTER — PES CALL (OUTPATIENT)
Dept: ADMINISTRATIVE | Facility: CLINIC | Age: 81
End: 2021-02-03

## 2021-02-05 ENCOUNTER — CLINICAL SUPPORT (OUTPATIENT)
Dept: REHABILITATION | Facility: HOSPITAL | Age: 81
End: 2021-02-05
Attending: ORTHOPAEDIC SURGERY
Payer: MEDICARE

## 2021-02-05 DIAGNOSIS — M19.012 PRIMARY OSTEOARTHRITIS OF LEFT SHOULDER: ICD-10-CM

## 2021-02-05 DIAGNOSIS — M25.512 CHRONIC LEFT SHOULDER PAIN: ICD-10-CM

## 2021-02-05 DIAGNOSIS — G89.29 CHRONIC LEFT SHOULDER PAIN: ICD-10-CM

## 2021-02-05 DIAGNOSIS — M25.612 SHOULDER STIFFNESS, LEFT: ICD-10-CM

## 2021-02-05 DIAGNOSIS — M75.22 BICEPS TENDINITIS OF LEFT UPPER EXTREMITY: ICD-10-CM

## 2021-02-05 PROCEDURE — 97110 THERAPEUTIC EXERCISES: CPT | Performed by: PHYSICAL THERAPIST

## 2021-02-05 PROCEDURE — 97164 PT RE-EVAL EST PLAN CARE: CPT | Performed by: PHYSICAL THERAPIST

## 2021-02-09 ENCOUNTER — PATIENT MESSAGE (OUTPATIENT)
Dept: ORTHOPEDICS | Facility: CLINIC | Age: 81
End: 2021-02-09

## 2021-02-14 RX ORDER — TRIAMCINOLONE ACETONIDE 40 MG/ML
40 INJECTION, SUSPENSION INTRA-ARTICULAR; INTRAMUSCULAR
Status: DISCONTINUED | OUTPATIENT
Start: 2021-02-02 | End: 2021-02-14 | Stop reason: HOSPADM

## 2021-02-17 ENCOUNTER — PATIENT MESSAGE (OUTPATIENT)
Dept: INTERNAL MEDICINE | Facility: CLINIC | Age: 81
End: 2021-02-17

## 2021-02-17 ENCOUNTER — TELEPHONE (OUTPATIENT)
Dept: NEUROLOGY | Facility: CLINIC | Age: 81
End: 2021-02-17

## 2021-02-17 DIAGNOSIS — G25.81 RESTLESS LEG: Primary | ICD-10-CM

## 2021-02-18 ENCOUNTER — OFFICE VISIT (OUTPATIENT)
Dept: NEUROLOGY | Facility: CLINIC | Age: 81
End: 2021-02-18
Payer: MEDICARE

## 2021-02-18 VITALS
BODY MASS INDEX: 23.25 KG/M2 | DIASTOLIC BLOOD PRESSURE: 66 MMHG | HEIGHT: 68 IN | SYSTOLIC BLOOD PRESSURE: 129 MMHG | WEIGHT: 153.38 LBS | HEART RATE: 69 BPM

## 2021-02-18 DIAGNOSIS — E61.1 IRON DEFICIENCY: ICD-10-CM

## 2021-02-18 DIAGNOSIS — G24.9 DYSTONIA: Primary | ICD-10-CM

## 2021-02-18 DIAGNOSIS — G25.81 RESTLESS LEG: ICD-10-CM

## 2021-02-18 DIAGNOSIS — G47.00 INSOMNIA, UNSPECIFIED TYPE: ICD-10-CM

## 2021-02-18 PROCEDURE — 99999 PR PBB SHADOW E&M-EST. PATIENT-LVL IV: ICD-10-PCS | Mod: PBBFAC,,, | Performed by: PHYSICIAN ASSISTANT

## 2021-02-18 PROCEDURE — 99215 OFFICE O/P EST HI 40 MIN: CPT | Mod: S$PBB,,, | Performed by: PHYSICIAN ASSISTANT

## 2021-02-18 PROCEDURE — 99417 PR PROLONGED SVC, OUTPT, W/WO DIRECT PT CONTACT,  EA ADDTL 15 MIN: ICD-10-PCS | Mod: S$PBB,,, | Performed by: PHYSICIAN ASSISTANT

## 2021-02-18 PROCEDURE — 99999 PR PBB SHADOW E&M-EST. PATIENT-LVL IV: CPT | Mod: PBBFAC,,, | Performed by: PHYSICIAN ASSISTANT

## 2021-02-18 PROCEDURE — 99417 PROLNG OP E/M EACH 15 MIN: CPT | Mod: S$PBB,,, | Performed by: PHYSICIAN ASSISTANT

## 2021-02-18 PROCEDURE — 99214 OFFICE O/P EST MOD 30 MIN: CPT | Mod: PBBFAC | Performed by: PHYSICIAN ASSISTANT

## 2021-02-18 PROCEDURE — 99215 PR OFFICE/OUTPT VISIT, EST, LEVL V, 40-54 MIN: ICD-10-PCS | Mod: S$PBB,,, | Performed by: PHYSICIAN ASSISTANT

## 2021-02-18 RX ORDER — BACLOFEN 10 MG/1
10 TABLET ORAL 3 TIMES DAILY
Qty: 90 TABLET | Refills: 11 | Status: SHIPPED | OUTPATIENT
Start: 2021-02-18 | End: 2021-04-13 | Stop reason: SDUPTHER

## 2021-02-19 ENCOUNTER — PATIENT MESSAGE (OUTPATIENT)
Dept: NEUROLOGY | Facility: CLINIC | Age: 81
End: 2021-02-19

## 2021-02-19 PROBLEM — G25.81 RESTLESS LEG: Status: ACTIVE | Noted: 2021-02-19

## 2021-02-19 RX ORDER — PRAMIPEXOLE DIHYDROCHLORIDE 0.12 MG/1
0.12 TABLET ORAL NIGHTLY
Qty: 30 TABLET | Refills: 11 | Status: SHIPPED | OUTPATIENT
Start: 2021-02-19 | End: 2021-04-13 | Stop reason: SDUPTHER

## 2021-02-22 ENCOUNTER — PATIENT MESSAGE (OUTPATIENT)
Dept: NEUROLOGY | Facility: CLINIC | Age: 81
End: 2021-02-22

## 2021-02-22 ENCOUNTER — LAB VISIT (OUTPATIENT)
Dept: LAB | Facility: HOSPITAL | Age: 81
End: 2021-02-22
Payer: MEDICARE

## 2021-02-22 DIAGNOSIS — G25.81 RESTLESS LEG: ICD-10-CM

## 2021-02-22 DIAGNOSIS — G24.9 DYSTONIA: ICD-10-CM

## 2021-02-22 DIAGNOSIS — E61.1 IRON DEFICIENCY: ICD-10-CM

## 2021-02-22 LAB
ALBUMIN SERPL BCP-MCNC: 4 G/DL (ref 3.5–5.2)
ALP SERPL-CCNC: 32 U/L (ref 55–135)
ALT SERPL W/O P-5'-P-CCNC: 17 U/L (ref 10–44)
ANION GAP SERPL CALC-SCNC: 6 MMOL/L (ref 8–16)
ANION GAP SERPL CALC-SCNC: 6 MMOL/L (ref 8–16)
AST SERPL-CCNC: 15 U/L (ref 10–40)
BILIRUB SERPL-MCNC: 0.9 MG/DL (ref 0.1–1)
BUN SERPL-MCNC: 16 MG/DL (ref 8–23)
CALCIUM SERPL-MCNC: 8.9 MG/DL (ref 8.7–10.5)
CHLORIDE SERPL-SCNC: 104 MMOL/L (ref 95–110)
CHLORIDE SERPL-SCNC: 104 MMOL/L (ref 95–110)
CO2 SERPL-SCNC: 26 MMOL/L (ref 23–29)
CO2 SERPL-SCNC: 26 MMOL/L (ref 23–29)
CREAT SERPL-MCNC: 0.9 MG/DL (ref 0.5–1.4)
EST. GFR  (AFRICAN AMERICAN): >60 ML/MIN/1.73 M^2
EST. GFR  (NON AFRICAN AMERICAN): 59.7 ML/MIN/1.73 M^2
FERRITIN SERPL-MCNC: 475 NG/ML (ref 20–300)
GLUCOSE SERPL-MCNC: 89 MG/DL (ref 70–110)
IRON SERPL-MCNC: 142 UG/DL (ref 30–160)
POTASSIUM SERPL-SCNC: 4.6 MMOL/L (ref 3.5–5.1)
POTASSIUM SERPL-SCNC: 4.6 MMOL/L (ref 3.5–5.1)
PROT SERPL-MCNC: 6.4 G/DL (ref 6–8.4)
SATURATED IRON: 55 % (ref 20–50)
SODIUM SERPL-SCNC: 136 MMOL/L (ref 136–145)
SODIUM SERPL-SCNC: 136 MMOL/L (ref 136–145)
TOTAL IRON BINDING CAPACITY: 256 UG/DL (ref 250–450)
TRANSFERRIN SERPL-MCNC: 173 MG/DL (ref 200–375)

## 2021-02-22 PROCEDURE — 80053 COMPREHEN METABOLIC PANEL: CPT

## 2021-02-22 PROCEDURE — 83540 ASSAY OF IRON: CPT

## 2021-02-22 PROCEDURE — 36415 COLL VENOUS BLD VENIPUNCTURE: CPT

## 2021-02-22 PROCEDURE — 82728 ASSAY OF FERRITIN: CPT

## 2021-02-25 ENCOUNTER — TELEPHONE (OUTPATIENT)
Dept: INTERNAL MEDICINE | Facility: CLINIC | Age: 81
End: 2021-02-25

## 2021-02-25 ENCOUNTER — PATIENT MESSAGE (OUTPATIENT)
Dept: NEUROLOGY | Facility: CLINIC | Age: 81
End: 2021-02-25

## 2021-02-26 ENCOUNTER — TELEPHONE (OUTPATIENT)
Dept: INTERNAL MEDICINE | Facility: CLINIC | Age: 81
End: 2021-02-26

## 2021-02-26 ENCOUNTER — PATIENT MESSAGE (OUTPATIENT)
Dept: NEUROLOGY | Facility: CLINIC | Age: 81
End: 2021-02-26

## 2021-02-26 ENCOUNTER — OFFICE VISIT (OUTPATIENT)
Dept: INTERNAL MEDICINE | Facility: CLINIC | Age: 81
End: 2021-02-26
Payer: MEDICARE

## 2021-02-26 VITALS
HEART RATE: 75 BPM | BODY MASS INDEX: 22.72 KG/M2 | HEIGHT: 68 IN | WEIGHT: 149.94 LBS | OXYGEN SATURATION: 97 % | DIASTOLIC BLOOD PRESSURE: 68 MMHG | SYSTOLIC BLOOD PRESSURE: 120 MMHG

## 2021-02-26 DIAGNOSIS — D75.839 THROMBOCYTOSIS: ICD-10-CM

## 2021-02-26 DIAGNOSIS — I47.10 PSVT (PAROXYSMAL SUPRAVENTRICULAR TACHYCARDIA): ICD-10-CM

## 2021-02-26 DIAGNOSIS — N18.30 STAGE 3 CHRONIC KIDNEY DISEASE, UNSPECIFIED WHETHER STAGE 3A OR 3B CKD: ICD-10-CM

## 2021-02-26 DIAGNOSIS — D46.9 MDS (MYELODYSPLASTIC SYNDROME): ICD-10-CM

## 2021-02-26 DIAGNOSIS — I70.0 AORTIC ATHEROSCLEROSIS: ICD-10-CM

## 2021-02-26 DIAGNOSIS — Z95.818 STATUS POST PLACEMENT OF IMPLANTABLE LOOP RECORDER: ICD-10-CM

## 2021-02-26 DIAGNOSIS — Z00.00 ENCOUNTER FOR PREVENTIVE HEALTH EXAMINATION: Primary | ICD-10-CM

## 2021-02-26 DIAGNOSIS — Z98.890 HISTORY OF RADIOFREQUENCY ABLATION (RFA) PROCEDURE FOR CARDIAC ARRHYTHMIA: ICD-10-CM

## 2021-02-26 DIAGNOSIS — M15.9 PRIMARY OSTEOARTHRITIS INVOLVING MULTIPLE JOINTS: Chronic | ICD-10-CM

## 2021-02-26 DIAGNOSIS — G24.9 DYSTONIA: ICD-10-CM

## 2021-02-26 DIAGNOSIS — I48.0 PAF (PAROXYSMAL ATRIAL FIBRILLATION): ICD-10-CM

## 2021-02-26 PROCEDURE — 99999 PR PBB SHADOW E&M-EST. PATIENT-LVL IV: ICD-10-PCS | Mod: PBBFAC,,, | Performed by: NURSE PRACTITIONER

## 2021-02-26 PROCEDURE — 99214 OFFICE O/P EST MOD 30 MIN: CPT | Mod: PBBFAC | Performed by: NURSE PRACTITIONER

## 2021-02-26 PROCEDURE — 99999 PR PBB SHADOW E&M-EST. PATIENT-LVL IV: CPT | Mod: PBBFAC,,, | Performed by: NURSE PRACTITIONER

## 2021-02-26 PROCEDURE — G0439 PR MEDICARE ANNUAL WELLNESS SUBSEQUENT VISIT: ICD-10-PCS | Mod: ,,, | Performed by: NURSE PRACTITIONER

## 2021-02-26 PROCEDURE — G0439 PPPS, SUBSEQ VISIT: HCPCS | Mod: ,,, | Performed by: NURSE PRACTITIONER

## 2021-02-28 ENCOUNTER — PATIENT MESSAGE (OUTPATIENT)
Dept: SPORTS MEDICINE | Facility: CLINIC | Age: 81
End: 2021-02-28

## 2021-02-28 ENCOUNTER — EXTERNAL CHRONIC CARE MANAGEMENT (OUTPATIENT)
Dept: PRIMARY CARE CLINIC | Facility: CLINIC | Age: 81
End: 2021-02-28
Payer: MEDICARE

## 2021-02-28 PROCEDURE — 99490 CHRNC CARE MGMT STAFF 1ST 20: CPT | Mod: PBBFAC | Performed by: INTERNAL MEDICINE

## 2021-02-28 PROCEDURE — 99490 PR CHRONIC CARE MGMT, 1ST 20 MIN: ICD-10-PCS | Mod: S$PBB,,, | Performed by: INTERNAL MEDICINE

## 2021-02-28 PROCEDURE — 99490 CHRNC CARE MGMT STAFF 1ST 20: CPT | Mod: S$PBB,,, | Performed by: INTERNAL MEDICINE

## 2021-03-05 ENCOUNTER — PATIENT MESSAGE (OUTPATIENT)
Dept: REHABILITATION | Facility: HOSPITAL | Age: 81
End: 2021-03-05

## 2021-03-08 ENCOUNTER — PATIENT MESSAGE (OUTPATIENT)
Dept: OPTOMETRY | Facility: CLINIC | Age: 81
End: 2021-03-08

## 2021-03-15 ENCOUNTER — PATIENT MESSAGE (OUTPATIENT)
Dept: OPTOMETRY | Facility: CLINIC | Age: 81
End: 2021-03-15

## 2021-03-16 ENCOUNTER — OFFICE VISIT (OUTPATIENT)
Dept: SPORTS MEDICINE | Facility: CLINIC | Age: 81
End: 2021-03-16
Payer: MEDICARE

## 2021-03-16 VITALS
SYSTOLIC BLOOD PRESSURE: 107 MMHG | HEIGHT: 68 IN | DIASTOLIC BLOOD PRESSURE: 57 MMHG | WEIGHT: 148 LBS | BODY MASS INDEX: 22.43 KG/M2 | HEART RATE: 65 BPM

## 2021-03-16 DIAGNOSIS — M75.22 BICEPS TENDINITIS OF LEFT UPPER EXTREMITY: ICD-10-CM

## 2021-03-16 DIAGNOSIS — M19.012 PRIMARY OSTEOARTHRITIS OF LEFT SHOULDER: Primary | ICD-10-CM

## 2021-03-16 PROCEDURE — 99213 OFFICE O/P EST LOW 20 MIN: CPT | Mod: S$PBB,,, | Performed by: ORTHOPAEDIC SURGERY

## 2021-03-16 PROCEDURE — 99999 PR PBB SHADOW E&M-EST. PATIENT-LVL III: CPT | Mod: PBBFAC,,, | Performed by: ORTHOPAEDIC SURGERY

## 2021-03-16 PROCEDURE — 99213 PR OFFICE/OUTPT VISIT, EST, LEVL III, 20-29 MIN: ICD-10-PCS | Mod: S$PBB,,, | Performed by: ORTHOPAEDIC SURGERY

## 2021-03-16 PROCEDURE — 99213 OFFICE O/P EST LOW 20 MIN: CPT | Mod: PBBFAC | Performed by: ORTHOPAEDIC SURGERY

## 2021-03-16 PROCEDURE — 99999 PR PBB SHADOW E&M-EST. PATIENT-LVL III: ICD-10-PCS | Mod: PBBFAC,,, | Performed by: ORTHOPAEDIC SURGERY

## 2021-03-17 ENCOUNTER — OFFICE VISIT (OUTPATIENT)
Dept: OPTOMETRY | Facility: CLINIC | Age: 81
End: 2021-03-17
Payer: MEDICARE

## 2021-03-17 DIAGNOSIS — H01.002 BLEPHARITIS OF LOWER EYELIDS OF BOTH EYES, UNSPECIFIED TYPE: ICD-10-CM

## 2021-03-17 DIAGNOSIS — H01.005 BLEPHARITIS OF LOWER EYELIDS OF BOTH EYES, UNSPECIFIED TYPE: ICD-10-CM

## 2021-03-17 DIAGNOSIS — H00.11 CHALAZION OF RIGHT UPPER EYELID: Primary | ICD-10-CM

## 2021-03-17 PROCEDURE — 99999 PR PBB SHADOW E&M-EST. PATIENT-LVL III: CPT | Mod: PBBFAC,,, | Performed by: OPTOMETRIST

## 2021-03-17 PROCEDURE — 92012 PR EYE EXAM, EST PATIENT,INTERMED: ICD-10-PCS | Mod: S$PBB,,, | Performed by: OPTOMETRIST

## 2021-03-17 PROCEDURE — 99999 PR PBB SHADOW E&M-EST. PATIENT-LVL III: ICD-10-PCS | Mod: PBBFAC,,, | Performed by: OPTOMETRIST

## 2021-03-17 PROCEDURE — 92012 INTRM OPH EXAM EST PATIENT: CPT | Mod: S$PBB,,, | Performed by: OPTOMETRIST

## 2021-03-17 PROCEDURE — 99213 OFFICE O/P EST LOW 20 MIN: CPT | Mod: PBBFAC | Performed by: OPTOMETRIST

## 2021-03-17 RX ORDER — LIDOCAINE AND PRILOCAINE 25; 25 MG/G; MG/G
CREAM TOPICAL
COMMUNITY
Start: 2021-02-12 | End: 2021-11-24

## 2021-03-17 RX ORDER — DOXYCYCLINE HYCLATE 100 MG
100 TABLET ORAL 2 TIMES DAILY
Qty: 60 TABLET | Refills: 0 | Status: SHIPPED | OUTPATIENT
Start: 2021-03-17 | End: 2021-04-07 | Stop reason: SDUPTHER

## 2021-03-31 ENCOUNTER — EXTERNAL CHRONIC CARE MANAGEMENT (OUTPATIENT)
Dept: PRIMARY CARE CLINIC | Facility: CLINIC | Age: 81
End: 2021-03-31
Payer: MEDICARE

## 2021-03-31 PROCEDURE — 99490 PR CHRONIC CARE MGMT, 1ST 20 MIN: ICD-10-PCS | Mod: S$PBB,,, | Performed by: INTERNAL MEDICINE

## 2021-03-31 PROCEDURE — 99490 CHRNC CARE MGMT STAFF 1ST 20: CPT | Mod: PBBFAC | Performed by: INTERNAL MEDICINE

## 2021-03-31 PROCEDURE — 99490 CHRNC CARE MGMT STAFF 1ST 20: CPT | Mod: S$PBB,,, | Performed by: INTERNAL MEDICINE

## 2021-04-07 ENCOUNTER — PATIENT MESSAGE (OUTPATIENT)
Dept: OPTOMETRY | Facility: CLINIC | Age: 81
End: 2021-04-07

## 2021-04-07 RX ORDER — DOXYCYCLINE HYCLATE 100 MG
100 TABLET ORAL 2 TIMES DAILY
Qty: 28 TABLET | Refills: 0 | Status: SHIPPED | OUTPATIENT
Start: 2021-04-07 | End: 2021-04-21

## 2021-04-13 ENCOUNTER — PATIENT MESSAGE (OUTPATIENT)
Dept: OPTOMETRY | Facility: CLINIC | Age: 81
End: 2021-04-13

## 2021-04-13 ENCOUNTER — OFFICE VISIT (OUTPATIENT)
Dept: NEUROLOGY | Facility: CLINIC | Age: 81
End: 2021-04-13
Payer: MEDICARE

## 2021-04-13 VITALS
HEART RATE: 64 BPM | SYSTOLIC BLOOD PRESSURE: 104 MMHG | DIASTOLIC BLOOD PRESSURE: 68 MMHG | WEIGHT: 151.38 LBS | HEIGHT: 68 IN | BODY MASS INDEX: 22.94 KG/M2

## 2021-04-13 DIAGNOSIS — G24.9 DYSTONIA: ICD-10-CM

## 2021-04-13 DIAGNOSIS — G25.81 RESTLESS LEG: Primary | ICD-10-CM

## 2021-04-13 DIAGNOSIS — E61.1 IRON DEFICIENCY: ICD-10-CM

## 2021-04-13 DIAGNOSIS — K59.00 CONSTIPATION, UNSPECIFIED CONSTIPATION TYPE: ICD-10-CM

## 2021-04-13 PROCEDURE — 99214 OFFICE O/P EST MOD 30 MIN: CPT | Mod: S$PBB,,, | Performed by: PHYSICIAN ASSISTANT

## 2021-04-13 PROCEDURE — 99999 PR PBB SHADOW E&M-EST. PATIENT-LVL III: ICD-10-PCS | Mod: PBBFAC,,, | Performed by: PHYSICIAN ASSISTANT

## 2021-04-13 PROCEDURE — 99214 PR OFFICE/OUTPT VISIT, EST, LEVL IV, 30-39 MIN: ICD-10-PCS | Mod: S$PBB,,, | Performed by: PHYSICIAN ASSISTANT

## 2021-04-13 PROCEDURE — 99999 PR PBB SHADOW E&M-EST. PATIENT-LVL III: CPT | Mod: PBBFAC,,, | Performed by: PHYSICIAN ASSISTANT

## 2021-04-13 PROCEDURE — 99213 OFFICE O/P EST LOW 20 MIN: CPT | Mod: PBBFAC | Performed by: PHYSICIAN ASSISTANT

## 2021-04-13 RX ORDER — PRAMIPEXOLE DIHYDROCHLORIDE 0.12 MG/1
0.12 TABLET ORAL NIGHTLY
Qty: 90 TABLET | Refills: 3 | Status: SHIPPED | OUTPATIENT
Start: 2021-04-13 | End: 2021-04-27 | Stop reason: SDUPTHER

## 2021-04-13 RX ORDER — BACLOFEN 10 MG/1
10 TABLET ORAL 3 TIMES DAILY
Qty: 270 TABLET | Refills: 3 | Status: SHIPPED | OUTPATIENT
Start: 2021-04-13 | End: 2021-11-24

## 2021-04-22 ENCOUNTER — PATIENT MESSAGE (OUTPATIENT)
Dept: UROLOGY | Facility: CLINIC | Age: 81
End: 2021-04-22

## 2021-04-22 ENCOUNTER — PATIENT MESSAGE (OUTPATIENT)
Dept: INTERNAL MEDICINE | Facility: CLINIC | Age: 81
End: 2021-04-22

## 2021-04-27 ENCOUNTER — PATIENT MESSAGE (OUTPATIENT)
Dept: NEUROLOGY | Facility: CLINIC | Age: 81
End: 2021-04-27

## 2021-04-27 RX ORDER — VERAPAMIL HYDROCHLORIDE 120 MG/1
120 CAPSULE, EXTENDED RELEASE ORAL DAILY
Qty: 90 CAPSULE | Refills: 3 | Status: SHIPPED | OUTPATIENT
Start: 2021-04-27 | End: 2022-01-25 | Stop reason: SDUPTHER

## 2021-04-29 ENCOUNTER — OFFICE VISIT (OUTPATIENT)
Dept: OPTOMETRY | Facility: CLINIC | Age: 81
End: 2021-04-29
Payer: MEDICARE

## 2021-04-29 DIAGNOSIS — Z13.5 GLAUCOMA SCREENING: ICD-10-CM

## 2021-04-29 DIAGNOSIS — H25.13 NUCLEAR SCLEROSIS, BILATERAL: Primary | ICD-10-CM

## 2021-04-29 DIAGNOSIS — H02.88B MEIBOMIAN GLAND DYSFUNCTION (MGD), BILATERAL, BOTH UPPER AND LOWER LIDS: ICD-10-CM

## 2021-04-29 DIAGNOSIS — H52.4 HYPEROPIA WITH PRESBYOPIA OF BOTH EYES: ICD-10-CM

## 2021-04-29 DIAGNOSIS — H52.03 HYPEROPIA WITH PRESBYOPIA OF BOTH EYES: ICD-10-CM

## 2021-04-29 DIAGNOSIS — H02.88A MEIBOMIAN GLAND DYSFUNCTION (MGD), BILATERAL, BOTH UPPER AND LOWER LIDS: ICD-10-CM

## 2021-04-29 PROCEDURE — 92015 DETERMINE REFRACTIVE STATE: CPT | Mod: ,,, | Performed by: OPTOMETRIST

## 2021-04-29 PROCEDURE — 92015 PR REFRACTION: ICD-10-PCS | Mod: ,,, | Performed by: OPTOMETRIST

## 2021-04-29 PROCEDURE — 99999 PR PBB SHADOW E&M-EST. PATIENT-LVL III: CPT | Mod: PBBFAC,,, | Performed by: OPTOMETRIST

## 2021-04-29 PROCEDURE — 92014 COMPRE OPH EXAM EST PT 1/>: CPT | Mod: S$PBB,,, | Performed by: OPTOMETRIST

## 2021-04-29 PROCEDURE — 99213 OFFICE O/P EST LOW 20 MIN: CPT | Mod: PBBFAC | Performed by: OPTOMETRIST

## 2021-04-29 PROCEDURE — 99999 PR PBB SHADOW E&M-EST. PATIENT-LVL III: ICD-10-PCS | Mod: PBBFAC,,, | Performed by: OPTOMETRIST

## 2021-04-29 PROCEDURE — 92014 PR EYE EXAM, EST PATIENT,COMPREHESV: ICD-10-PCS | Mod: S$PBB,,, | Performed by: OPTOMETRIST

## 2021-04-30 ENCOUNTER — PATIENT MESSAGE (OUTPATIENT)
Dept: OPTOMETRY | Facility: CLINIC | Age: 81
End: 2021-04-30

## 2021-04-30 ENCOUNTER — EXTERNAL CHRONIC CARE MANAGEMENT (OUTPATIENT)
Dept: PRIMARY CARE CLINIC | Facility: CLINIC | Age: 81
End: 2021-04-30
Payer: MEDICARE

## 2021-04-30 PROCEDURE — 99490 CHRNC CARE MGMT STAFF 1ST 20: CPT | Mod: S$PBB,,, | Performed by: INTERNAL MEDICINE

## 2021-04-30 PROCEDURE — 99490 PR CHRONIC CARE MGMT, 1ST 20 MIN: ICD-10-PCS | Mod: S$PBB,,, | Performed by: INTERNAL MEDICINE

## 2021-04-30 PROCEDURE — 99490 CHRNC CARE MGMT STAFF 1ST 20: CPT | Mod: PBBFAC | Performed by: INTERNAL MEDICINE

## 2021-05-03 ENCOUNTER — PATIENT MESSAGE (OUTPATIENT)
Dept: ELECTROPHYSIOLOGY | Facility: CLINIC | Age: 81
End: 2021-05-03

## 2021-05-31 ENCOUNTER — EXTERNAL CHRONIC CARE MANAGEMENT (OUTPATIENT)
Dept: PRIMARY CARE CLINIC | Facility: CLINIC | Age: 81
End: 2021-05-31
Payer: MEDICARE

## 2021-05-31 PROCEDURE — 99490 CHRNC CARE MGMT STAFF 1ST 20: CPT | Mod: S$PBB,,, | Performed by: INTERNAL MEDICINE

## 2021-05-31 PROCEDURE — 99490 CHRNC CARE MGMT STAFF 1ST 20: CPT | Mod: PBBFAC | Performed by: INTERNAL MEDICINE

## 2021-05-31 PROCEDURE — 99490 PR CHRONIC CARE MGMT, 1ST 20 MIN: ICD-10-PCS | Mod: S$PBB,,, | Performed by: INTERNAL MEDICINE

## 2021-06-07 ENCOUNTER — PATIENT MESSAGE (OUTPATIENT)
Dept: ELECTROPHYSIOLOGY | Facility: CLINIC | Age: 81
End: 2021-06-07

## 2021-06-07 ENCOUNTER — PATIENT MESSAGE (OUTPATIENT)
Dept: INTERNAL MEDICINE | Facility: CLINIC | Age: 81
End: 2021-06-07

## 2021-06-08 ENCOUNTER — PATIENT MESSAGE (OUTPATIENT)
Dept: INTERNAL MEDICINE | Facility: CLINIC | Age: 81
End: 2021-06-08

## 2021-06-08 ENCOUNTER — HOSPITAL ENCOUNTER (OUTPATIENT)
Dept: RADIOLOGY | Facility: OTHER | Age: 81
Discharge: HOME | End: 2021-06-08
Attending: OBSTETRICS & GYNECOLOGY
Payer: MEDICARE

## 2021-06-08 DIAGNOSIS — Z12.31 BREAST CANCER SCREENING BY MAMMOGRAM: ICD-10-CM

## 2021-06-08 PROCEDURE — 77067 MAMMO DIGITAL SCREENING BILAT WITH TOMOSYNTHESIS_CAD: ICD-10-PCS | Mod: 26,,, | Performed by: RADIOLOGY

## 2021-06-08 PROCEDURE — 77067 SCR MAMMO BI INCL CAD: CPT | Mod: TC

## 2021-06-08 PROCEDURE — 77063 MAMMO DIGITAL SCREENING BILAT WITH TOMOSYNTHESIS_CAD: ICD-10-PCS | Mod: 26,,, | Performed by: RADIOLOGY

## 2021-06-08 PROCEDURE — 77067 SCR MAMMO BI INCL CAD: CPT | Mod: 26,,, | Performed by: RADIOLOGY

## 2021-06-08 PROCEDURE — 77063 BREAST TOMOSYNTHESIS BI: CPT | Mod: 26,,, | Performed by: RADIOLOGY

## 2021-06-09 ENCOUNTER — PATIENT MESSAGE (OUTPATIENT)
Dept: INTERNAL MEDICINE | Facility: CLINIC | Age: 81
End: 2021-06-09

## 2021-06-21 ENCOUNTER — PATIENT MESSAGE (OUTPATIENT)
Dept: NEUROLOGY | Facility: CLINIC | Age: 81
End: 2021-06-21

## 2021-06-21 ENCOUNTER — OFFICE VISIT (OUTPATIENT)
Dept: INTERNAL MEDICINE | Facility: CLINIC | Age: 81
End: 2021-06-21
Payer: MEDICARE

## 2021-06-21 ENCOUNTER — LAB VISIT (OUTPATIENT)
Dept: LAB | Facility: HOSPITAL | Age: 81
End: 2021-06-21
Attending: INTERNAL MEDICINE
Payer: MEDICARE

## 2021-06-21 VITALS
OXYGEN SATURATION: 99 % | HEIGHT: 68 IN | BODY MASS INDEX: 23.05 KG/M2 | HEART RATE: 66 BPM | DIASTOLIC BLOOD PRESSURE: 60 MMHG | WEIGHT: 152.13 LBS | SYSTOLIC BLOOD PRESSURE: 130 MMHG

## 2021-06-21 DIAGNOSIS — D46.9 MDS (MYELODYSPLASTIC SYNDROME): ICD-10-CM

## 2021-06-21 DIAGNOSIS — N18.31 STAGE 3A CHRONIC KIDNEY DISEASE: ICD-10-CM

## 2021-06-21 DIAGNOSIS — E61.1 IRON DEFICIENCY: ICD-10-CM

## 2021-06-21 DIAGNOSIS — D75.839 THROMBOCYTOSIS: ICD-10-CM

## 2021-06-21 DIAGNOSIS — I48.0 PAF (PAROXYSMAL ATRIAL FIBRILLATION): ICD-10-CM

## 2021-06-21 DIAGNOSIS — Z98.890 HISTORY OF RADIOFREQUENCY ABLATION (RFA) PROCEDURE FOR CARDIAC ARRHYTHMIA: ICD-10-CM

## 2021-06-21 DIAGNOSIS — D46.1 REFRACTORY ANEMIA WITH RINGED SIDEROBLASTS: ICD-10-CM

## 2021-06-21 DIAGNOSIS — I47.10 PSVT (PAROXYSMAL SUPRAVENTRICULAR TACHYCARDIA): ICD-10-CM

## 2021-06-21 DIAGNOSIS — Z01.818 PREOPERATIVE EXAMINATION: Primary | ICD-10-CM

## 2021-06-21 PROBLEM — Z91.81 AT HIGH RISK FOR FALLS: Status: RESOLVED | Noted: 2018-04-06 | Resolved: 2021-06-21

## 2021-06-21 PROBLEM — G89.29 CHRONIC LEFT SHOULDER PAIN: Status: RESOLVED | Noted: 2020-12-16 | Resolved: 2021-06-21

## 2021-06-21 PROBLEM — R55 SYNCOPE: Status: RESOLVED | Noted: 2017-07-06 | Resolved: 2021-06-21

## 2021-06-21 PROBLEM — M25.612 SHOULDER STIFFNESS, LEFT: Status: RESOLVED | Noted: 2020-12-16 | Resolved: 2021-06-21

## 2021-06-21 PROBLEM — M75.22 BICEPS TENDINITIS OF LEFT UPPER EXTREMITY: Status: RESOLVED | Noted: 2021-03-16 | Resolved: 2021-06-21

## 2021-06-21 PROBLEM — M25.512 CHRONIC LEFT SHOULDER PAIN: Status: RESOLVED | Noted: 2020-12-16 | Resolved: 2021-06-21

## 2021-06-21 LAB
ALBUMIN SERPL BCP-MCNC: 4.5 G/DL (ref 3.5–5.2)
ALP SERPL-CCNC: 31 U/L (ref 55–135)
ALT SERPL W/O P-5'-P-CCNC: 14 U/L (ref 10–44)
ANION GAP SERPL CALC-SCNC: 8 MMOL/L (ref 8–16)
AST SERPL-CCNC: 16 U/L (ref 10–40)
BASOPHILS # BLD AUTO: 0.08 K/UL (ref 0–0.2)
BASOPHILS NFR BLD: 1.5 % (ref 0–1.9)
BILIRUB SERPL-MCNC: 0.9 MG/DL (ref 0.1–1)
BUN SERPL-MCNC: 17 MG/DL (ref 8–23)
CALCIUM SERPL-MCNC: 10.1 MG/DL (ref 8.7–10.5)
CHLORIDE SERPL-SCNC: 106 MMOL/L (ref 95–110)
CO2 SERPL-SCNC: 23 MMOL/L (ref 23–29)
CREAT SERPL-MCNC: 0.9 MG/DL (ref 0.5–1.4)
DIFFERENTIAL METHOD: ABNORMAL
EOSINOPHIL # BLD AUTO: 0.2 K/UL (ref 0–0.5)
EOSINOPHIL NFR BLD: 3.3 % (ref 0–8)
ERYTHROCYTE [DISTWIDTH] IN BLOOD BY AUTOMATED COUNT: 14.3 % (ref 11.5–14.5)
EST. GFR  (AFRICAN AMERICAN): >60 ML/MIN/1.73 M^2
EST. GFR  (NON AFRICAN AMERICAN): 59.7 ML/MIN/1.73 M^2
FERRITIN SERPL-MCNC: 506 NG/ML (ref 20–300)
GLUCOSE SERPL-MCNC: 116 MG/DL (ref 70–110)
HCT VFR BLD AUTO: 27.9 % (ref 37–48.5)
HGB BLD-MCNC: 9.2 G/DL (ref 12–16)
IMM GRANULOCYTES # BLD AUTO: 0.02 K/UL (ref 0–0.04)
IMM GRANULOCYTES NFR BLD AUTO: 0.4 % (ref 0–0.5)
IRON SERPL-MCNC: 201 UG/DL (ref 30–160)
LYMPHOCYTES # BLD AUTO: 2.4 K/UL (ref 1–4.8)
LYMPHOCYTES NFR BLD: 44 % (ref 18–48)
MCH RBC QN AUTO: 36.2 PG (ref 27–31)
MCHC RBC AUTO-ENTMCNC: 33 G/DL (ref 32–36)
MCV RBC AUTO: 110 FL (ref 82–98)
MONOCYTES # BLD AUTO: 0.5 K/UL (ref 0.3–1)
MONOCYTES NFR BLD: 9.6 % (ref 4–15)
NEUTROPHILS # BLD AUTO: 2.2 K/UL (ref 1.8–7.7)
NEUTROPHILS NFR BLD: 41.2 % (ref 38–73)
NRBC BLD-RTO: 0 /100 WBC
PLATELET # BLD AUTO: 444 K/UL (ref 150–450)
PLATELET BLD QL SMEAR: ABNORMAL
PMV BLD AUTO: 10.1 FL (ref 9.2–12.9)
POTASSIUM SERPL-SCNC: 4.1 MMOL/L (ref 3.5–5.1)
PROT SERPL-MCNC: 6.7 G/DL (ref 6–8.4)
RBC # BLD AUTO: 2.54 M/UL (ref 4–5.4)
SATURATED IRON: 78 % (ref 20–50)
SCHISTOCYTES BLD QL SMEAR: ABNORMAL
SMUDGE CELLS BLD QL SMEAR: PRESENT
SODIUM SERPL-SCNC: 137 MMOL/L (ref 136–145)
TOTAL IRON BINDING CAPACITY: 258 UG/DL (ref 250–450)
TRANSFERRIN SERPL-MCNC: 174 MG/DL (ref 200–375)
WBC # BLD AUTO: 5.41 K/UL (ref 3.9–12.7)

## 2021-06-21 PROCEDURE — 36415 COLL VENOUS BLD VENIPUNCTURE: CPT | Performed by: INTERNAL MEDICINE

## 2021-06-21 PROCEDURE — 99999 PR PBB SHADOW E&M-EST. PATIENT-LVL III: ICD-10-PCS | Mod: PBBFAC,,, | Performed by: INTERNAL MEDICINE

## 2021-06-21 PROCEDURE — 99214 OFFICE O/P EST MOD 30 MIN: CPT | Mod: S$PBB,,, | Performed by: INTERNAL MEDICINE

## 2021-06-21 PROCEDURE — 85025 COMPLETE CBC W/AUTO DIFF WBC: CPT | Performed by: INTERNAL MEDICINE

## 2021-06-21 PROCEDURE — 83615 LACTATE (LD) (LDH) ENZYME: CPT | Performed by: INTERNAL MEDICINE

## 2021-06-21 PROCEDURE — 99214 PR OFFICE/OUTPT VISIT, EST, LEVL IV, 30-39 MIN: ICD-10-PCS | Mod: S$PBB,,, | Performed by: INTERNAL MEDICINE

## 2021-06-21 PROCEDURE — 99999 PR PBB SHADOW E&M-EST. PATIENT-LVL III: CPT | Mod: PBBFAC,,, | Performed by: INTERNAL MEDICINE

## 2021-06-21 PROCEDURE — 82728 ASSAY OF FERRITIN: CPT | Performed by: INTERNAL MEDICINE

## 2021-06-21 PROCEDURE — 80053 COMPREHEN METABOLIC PANEL: CPT | Performed by: INTERNAL MEDICINE

## 2021-06-21 PROCEDURE — 99213 OFFICE O/P EST LOW 20 MIN: CPT | Mod: PBBFAC | Performed by: INTERNAL MEDICINE

## 2021-06-21 PROCEDURE — 83540 ASSAY OF IRON: CPT | Performed by: INTERNAL MEDICINE

## 2021-06-22 ENCOUNTER — HOSPITAL ENCOUNTER (OUTPATIENT)
Dept: CARDIOLOGY | Facility: CLINIC | Age: 81
Discharge: HOME | End: 2021-06-22
Payer: MEDICARE

## 2021-06-22 DIAGNOSIS — Z01.818 PREOPERATIVE EXAMINATION: ICD-10-CM

## 2021-06-22 LAB — LDH SERPL L TO P-CCNC: 164 U/L (ref 110–260)

## 2021-06-22 PROCEDURE — 93005 ELECTROCARDIOGRAM TRACING: CPT | Mod: PBBFAC | Performed by: INTERNAL MEDICINE

## 2021-06-22 PROCEDURE — 93010 EKG 12-LEAD: ICD-10-PCS | Mod: S$PBB,,, | Performed by: INTERNAL MEDICINE

## 2021-06-22 PROCEDURE — 93010 ELECTROCARDIOGRAM REPORT: CPT | Mod: S$PBB,,, | Performed by: INTERNAL MEDICINE

## 2021-06-30 ENCOUNTER — EXTERNAL CHRONIC CARE MANAGEMENT (OUTPATIENT)
Dept: PRIMARY CARE CLINIC | Facility: CLINIC | Age: 81
End: 2021-06-30
Payer: MEDICARE

## 2021-06-30 PROCEDURE — 99490 PR CHRONIC CARE MGMT, 1ST 20 MIN: ICD-10-PCS | Mod: S$PBB,,, | Performed by: INTERNAL MEDICINE

## 2021-06-30 PROCEDURE — 99490 CHRNC CARE MGMT STAFF 1ST 20: CPT | Mod: S$PBB,,, | Performed by: INTERNAL MEDICINE

## 2021-06-30 PROCEDURE — 99490 CHRNC CARE MGMT STAFF 1ST 20: CPT | Mod: PBBFAC | Performed by: INTERNAL MEDICINE

## 2021-07-17 ENCOUNTER — PATIENT MESSAGE (OUTPATIENT)
Dept: NEUROLOGY | Facility: CLINIC | Age: 81
End: 2021-07-17

## 2021-07-20 ENCOUNTER — OFFICE VISIT (OUTPATIENT)
Dept: HEMATOLOGY/ONCOLOGY | Facility: CLINIC | Age: 81
End: 2021-07-20
Payer: MEDICARE

## 2021-07-20 ENCOUNTER — LAB VISIT (OUTPATIENT)
Dept: LAB | Facility: HOSPITAL | Age: 81
End: 2021-07-20
Payer: MEDICARE

## 2021-07-20 VITALS
SYSTOLIC BLOOD PRESSURE: 111 MMHG | WEIGHT: 149.69 LBS | DIASTOLIC BLOOD PRESSURE: 53 MMHG | TEMPERATURE: 98 F | HEART RATE: 64 BPM | OXYGEN SATURATION: 99 % | BODY MASS INDEX: 22.76 KG/M2 | RESPIRATION RATE: 16 BRPM

## 2021-07-20 DIAGNOSIS — D46.1 RARS (REFRACTORY ANEMIA WITH RINGED SIDEROBLASTS): Primary | ICD-10-CM

## 2021-07-20 DIAGNOSIS — E83.19 IRON OVERLOAD: ICD-10-CM

## 2021-07-20 DIAGNOSIS — D46.1 RARS (REFRACTORY ANEMIA WITH RINGED SIDEROBLASTS): ICD-10-CM

## 2021-07-20 LAB
APTT BLDCRRT: 24.2 SEC (ref 21–32)
BASOPHILS # BLD AUTO: 0.07 K/UL (ref 0–0.2)
BASOPHILS NFR BLD: 1.4 % (ref 0–1.9)
DIFFERENTIAL METHOD: ABNORMAL
EOSINOPHIL # BLD AUTO: 0.2 K/UL (ref 0–0.5)
EOSINOPHIL NFR BLD: 3.2 % (ref 0–8)
ERYTHROCYTE [DISTWIDTH] IN BLOOD BY AUTOMATED COUNT: 15.7 % (ref 11.5–14.5)
HCT VFR BLD AUTO: 27.5 % (ref 37–48.5)
HGB BLD-MCNC: 9.1 G/DL (ref 12–16)
IMM GRANULOCYTES # BLD AUTO: 0.01 K/UL (ref 0–0.04)
IMM GRANULOCYTES NFR BLD AUTO: 0.2 % (ref 0–0.5)
INR PPP: 1 (ref 0.8–1.2)
LYMPHOCYTES # BLD AUTO: 2.2 K/UL (ref 1–4.8)
LYMPHOCYTES NFR BLD: 44.4 % (ref 18–48)
MCH RBC QN AUTO: 36 PG (ref 27–31)
MCHC RBC AUTO-ENTMCNC: 33.1 G/DL (ref 32–36)
MCV RBC AUTO: 109 FL (ref 82–98)
MONOCYTES # BLD AUTO: 0.5 K/UL (ref 0.3–1)
MONOCYTES NFR BLD: 10.5 % (ref 4–15)
NEUTROPHILS # BLD AUTO: 2 K/UL (ref 1.8–7.7)
NEUTROPHILS NFR BLD: 40.3 % (ref 38–73)
NRBC BLD-RTO: 0 /100 WBC
PLATELET # BLD AUTO: 431 K/UL (ref 150–450)
PMV BLD AUTO: 10 FL (ref 9.2–12.9)
PROTHROMBIN TIME: 11.4 SEC (ref 9–12.5)
RBC # BLD AUTO: 2.53 M/UL (ref 4–5.4)
WBC # BLD AUTO: 4.93 K/UL (ref 3.9–12.7)

## 2021-07-20 PROCEDURE — 99214 OFFICE O/P EST MOD 30 MIN: CPT | Mod: S$PBB,GC,,

## 2021-07-20 PROCEDURE — 85730 THROMBOPLASTIN TIME PARTIAL: CPT

## 2021-07-20 PROCEDURE — 99213 OFFICE O/P EST LOW 20 MIN: CPT | Mod: PBBFAC

## 2021-07-20 PROCEDURE — 85025 COMPLETE CBC W/AUTO DIFF WBC: CPT

## 2021-07-20 PROCEDURE — 85610 PROTHROMBIN TIME: CPT

## 2021-07-20 PROCEDURE — 99999 PR PBB SHADOW E&M-EST. PATIENT-LVL III: CPT | Mod: PBBFAC,GC,,

## 2021-07-20 PROCEDURE — 36415 COLL VENOUS BLD VENIPUNCTURE: CPT

## 2021-07-20 PROCEDURE — 99999 PR PBB SHADOW E&M-EST. PATIENT-LVL III: ICD-10-PCS | Mod: PBBFAC,GC,,

## 2021-07-20 PROCEDURE — 99214 PR OFFICE/OUTPT VISIT, EST, LEVL IV, 30-39 MIN: ICD-10-PCS | Mod: S$PBB,GC,,

## 2021-07-21 ENCOUNTER — PATIENT MESSAGE (OUTPATIENT)
Dept: HEMATOLOGY/ONCOLOGY | Facility: CLINIC | Age: 81
End: 2021-07-21

## 2021-07-29 RX ORDER — TEMAZEPAM 15 MG/1
15 CAPSULE ORAL NIGHTLY
Qty: 90 CAPSULE | Refills: 0 | Status: SHIPPED | OUTPATIENT
Start: 2021-07-29 | End: 2021-10-13 | Stop reason: SDUPTHER

## 2021-07-30 ENCOUNTER — TELEPHONE (OUTPATIENT)
Dept: INTERNAL MEDICINE | Facility: CLINIC | Age: 81
End: 2021-07-30

## 2021-07-31 ENCOUNTER — EXTERNAL CHRONIC CARE MANAGEMENT (OUTPATIENT)
Dept: PRIMARY CARE CLINIC | Facility: CLINIC | Age: 81
End: 2021-07-31
Payer: MEDICARE

## 2021-07-31 PROCEDURE — 99490 CHRNC CARE MGMT STAFF 1ST 20: CPT | Mod: S$PBB,,, | Performed by: INTERNAL MEDICINE

## 2021-07-31 PROCEDURE — 99490 CHRNC CARE MGMT STAFF 1ST 20: CPT | Mod: PBBFAC | Performed by: INTERNAL MEDICINE

## 2021-07-31 PROCEDURE — 99490 PR CHRONIC CARE MGMT, 1ST 20 MIN: ICD-10-PCS | Mod: S$PBB,,, | Performed by: INTERNAL MEDICINE

## 2021-08-02 ENCOUNTER — PATIENT MESSAGE (OUTPATIENT)
Dept: INTERNAL MEDICINE | Facility: CLINIC | Age: 81
End: 2021-08-02

## 2021-08-03 ENCOUNTER — PATIENT MESSAGE (OUTPATIENT)
Dept: ELECTROPHYSIOLOGY | Facility: CLINIC | Age: 81
End: 2021-08-03

## 2021-08-05 ENCOUNTER — OFFICE VISIT (OUTPATIENT)
Dept: INTERNAL MEDICINE | Facility: CLINIC | Age: 81
End: 2021-08-05
Payer: MEDICARE

## 2021-08-05 VITALS
HEIGHT: 68 IN | HEART RATE: 67 BPM | WEIGHT: 151 LBS | BODY MASS INDEX: 22.88 KG/M2 | DIASTOLIC BLOOD PRESSURE: 70 MMHG | SYSTOLIC BLOOD PRESSURE: 102 MMHG | OXYGEN SATURATION: 97 %

## 2021-08-05 DIAGNOSIS — Z98.890 HISTORY OF RADIOFREQUENCY ABLATION (RFA) PROCEDURE FOR CARDIAC ARRHYTHMIA: ICD-10-CM

## 2021-08-05 DIAGNOSIS — D46.1 REFRACTORY ANEMIA WITH RINGED SIDEROBLASTS: ICD-10-CM

## 2021-08-05 DIAGNOSIS — N18.31 STAGE 3A CHRONIC KIDNEY DISEASE: ICD-10-CM

## 2021-08-05 DIAGNOSIS — I48.0 PAF (PAROXYSMAL ATRIAL FIBRILLATION): ICD-10-CM

## 2021-08-05 DIAGNOSIS — Z01.818 PREOP EXAMINATION: Primary | ICD-10-CM

## 2021-08-05 PROCEDURE — 99214 PR OFFICE/OUTPT VISIT, EST, LEVL IV, 30-39 MIN: ICD-10-PCS | Mod: S$PBB,,, | Performed by: INTERNAL MEDICINE

## 2021-08-05 PROCEDURE — 99999 PR PBB SHADOW E&M-EST. PATIENT-LVL III: ICD-10-PCS | Mod: PBBFAC,,, | Performed by: INTERNAL MEDICINE

## 2021-08-05 PROCEDURE — 99214 OFFICE O/P EST MOD 30 MIN: CPT | Mod: S$PBB,,, | Performed by: INTERNAL MEDICINE

## 2021-08-05 PROCEDURE — 99213 OFFICE O/P EST LOW 20 MIN: CPT | Mod: PBBFAC | Performed by: INTERNAL MEDICINE

## 2021-08-05 PROCEDURE — 99999 PR PBB SHADOW E&M-EST. PATIENT-LVL III: CPT | Mod: PBBFAC,,, | Performed by: INTERNAL MEDICINE

## 2021-08-06 ENCOUNTER — TELEPHONE (OUTPATIENT)
Dept: INTERNAL MEDICINE | Facility: CLINIC | Age: 81
End: 2021-08-06

## 2021-08-10 DIAGNOSIS — G25.81 RESTLESS LEG: ICD-10-CM

## 2021-08-11 RX ORDER — PRAMIPEXOLE DIHYDROCHLORIDE 0.12 MG/1
0.12 TABLET ORAL NIGHTLY
Qty: 90 TABLET | Refills: 3 | Status: SHIPPED | OUTPATIENT
Start: 2021-08-11 | End: 2021-11-24

## 2021-08-19 ENCOUNTER — PATIENT MESSAGE (OUTPATIENT)
Dept: INTERNAL MEDICINE | Facility: CLINIC | Age: 81
End: 2021-08-19

## 2021-08-20 ENCOUNTER — PATIENT MESSAGE (OUTPATIENT)
Dept: INTERNAL MEDICINE | Facility: CLINIC | Age: 81
End: 2021-08-20

## 2021-08-30 ENCOUNTER — PATIENT MESSAGE (OUTPATIENT)
Dept: SPORTS MEDICINE | Facility: CLINIC | Age: 81
End: 2021-08-30

## 2021-08-31 ENCOUNTER — EXTERNAL CHRONIC CARE MANAGEMENT (OUTPATIENT)
Dept: PRIMARY CARE CLINIC | Facility: CLINIC | Age: 81
End: 2021-08-31
Payer: MEDICARE

## 2021-08-31 PROCEDURE — 99490 PR CHRONIC CARE MGMT, 1ST 20 MIN: ICD-10-PCS | Mod: S$PBB,,, | Performed by: INTERNAL MEDICINE

## 2021-08-31 PROCEDURE — 99490 CHRNC CARE MGMT STAFF 1ST 20: CPT | Mod: PBBFAC | Performed by: INTERNAL MEDICINE

## 2021-08-31 PROCEDURE — 99490 CHRNC CARE MGMT STAFF 1ST 20: CPT | Mod: S$PBB,,, | Performed by: INTERNAL MEDICINE

## 2021-09-03 ENCOUNTER — PATIENT MESSAGE (OUTPATIENT)
Dept: NEUROLOGY | Facility: CLINIC | Age: 81
End: 2021-09-03

## 2021-09-08 ENCOUNTER — IMMUNIZATION (OUTPATIENT)
Dept: INTERNAL MEDICINE | Facility: CLINIC | Age: 81
End: 2021-09-08
Payer: MEDICARE

## 2021-09-08 DIAGNOSIS — Z23 NEED FOR VACCINATION: Primary | ICD-10-CM

## 2021-09-08 PROCEDURE — 0003A COVID-19, MRNA, LNP-S, PF, 30 MCG/0.3 ML DOSE VACCINE: ICD-10-PCS | Mod: CV19,,, | Performed by: INTERNAL MEDICINE

## 2021-09-08 PROCEDURE — 91300 COVID-19, MRNA, LNP-S, PF, 30 MCG/0.3 ML DOSE VACCINE: CPT | Mod: ,,, | Performed by: INTERNAL MEDICINE

## 2021-09-08 PROCEDURE — 91300 COVID-19, MRNA, LNP-S, PF, 30 MCG/0.3 ML DOSE VACCINE: ICD-10-PCS | Mod: ,,, | Performed by: INTERNAL MEDICINE

## 2021-09-08 PROCEDURE — 0003A COVID-19, MRNA, LNP-S, PF, 30 MCG/0.3 ML DOSE VACCINE: CPT | Mod: CV19,,, | Performed by: INTERNAL MEDICINE

## 2021-09-24 ENCOUNTER — PATIENT MESSAGE (OUTPATIENT)
Dept: INTERNAL MEDICINE | Facility: CLINIC | Age: 81
End: 2021-09-24

## 2021-09-24 DIAGNOSIS — R26.89 BALANCE DISORDER: Primary | ICD-10-CM

## 2021-09-30 ENCOUNTER — EXTERNAL CHRONIC CARE MANAGEMENT (OUTPATIENT)
Dept: PRIMARY CARE CLINIC | Facility: CLINIC | Age: 81
End: 2021-09-30
Payer: MEDICARE

## 2021-09-30 PROCEDURE — 99490 CHRNC CARE MGMT STAFF 1ST 20: CPT | Mod: PBBFAC | Performed by: INTERNAL MEDICINE

## 2021-09-30 PROCEDURE — 99490 CHRNC CARE MGMT STAFF 1ST 20: CPT | Mod: S$PBB,,, | Performed by: INTERNAL MEDICINE

## 2021-09-30 PROCEDURE — 99490 PR CHRONIC CARE MGMT, 1ST 20 MIN: ICD-10-PCS | Mod: S$PBB,,, | Performed by: INTERNAL MEDICINE

## 2021-10-15 RX ORDER — TEMAZEPAM 15 MG/1
15 CAPSULE ORAL NIGHTLY
Qty: 90 CAPSULE | Refills: 0 | Status: SHIPPED | OUTPATIENT
Start: 2021-10-15 | End: 2021-10-16 | Stop reason: SDUPTHER

## 2021-10-21 ENCOUNTER — LAB VISIT (OUTPATIENT)
Dept: LAB | Facility: HOSPITAL | Age: 81
End: 2021-10-21
Payer: MEDICARE

## 2021-10-21 DIAGNOSIS — D46.1 RARS (REFRACTORY ANEMIA WITH RINGED SIDEROBLASTS): ICD-10-CM

## 2021-10-21 LAB
BASOPHILS # BLD AUTO: 0.08 K/UL (ref 0–0.2)
BASOPHILS NFR BLD: 1.5 % (ref 0–1.9)
DIFFERENTIAL METHOD: ABNORMAL
EOSINOPHIL # BLD AUTO: 0.2 K/UL (ref 0–0.5)
EOSINOPHIL NFR BLD: 4 % (ref 0–8)
ERYTHROCYTE [DISTWIDTH] IN BLOOD BY AUTOMATED COUNT: 17.6 % (ref 11.5–14.5)
FERRITIN SERPL-MCNC: 597 NG/ML (ref 20–300)
HCT VFR BLD AUTO: 31.6 % (ref 37–48.5)
HGB BLD-MCNC: 9.7 G/DL (ref 12–16)
IMM GRANULOCYTES # BLD AUTO: 0.01 K/UL (ref 0–0.04)
IMM GRANULOCYTES NFR BLD AUTO: 0.2 % (ref 0–0.5)
LYMPHOCYTES # BLD AUTO: 2.4 K/UL (ref 1–4.8)
LYMPHOCYTES NFR BLD: 43.1 % (ref 18–48)
MCH RBC QN AUTO: 36.6 PG (ref 27–31)
MCHC RBC AUTO-ENTMCNC: 30.7 G/DL (ref 32–36)
MCV RBC AUTO: 119 FL (ref 82–98)
MONOCYTES # BLD AUTO: 0.6 K/UL (ref 0.3–1)
MONOCYTES NFR BLD: 10.4 % (ref 4–15)
NEUTROPHILS # BLD AUTO: 2.3 K/UL (ref 1.8–7.7)
NEUTROPHILS NFR BLD: 40.8 % (ref 38–73)
NRBC BLD-RTO: 0 /100 WBC
PLATELET # BLD AUTO: 322 K/UL (ref 150–450)
PLATELET BLD QL SMEAR: ABNORMAL
PMV BLD AUTO: 10.4 FL (ref 9.2–12.9)
RBC # BLD AUTO: 2.65 M/UL (ref 4–5.4)
WBC # BLD AUTO: 5.5 K/UL (ref 3.9–12.7)

## 2021-10-21 PROCEDURE — 85025 COMPLETE CBC W/AUTO DIFF WBC: CPT

## 2021-10-21 PROCEDURE — 36415 COLL VENOUS BLD VENIPUNCTURE: CPT

## 2021-10-21 PROCEDURE — 82728 ASSAY OF FERRITIN: CPT

## 2021-10-26 DIAGNOSIS — D46.1 RARS (REFRACTORY ANEMIA WITH RINGED SIDEROBLASTS): Primary | ICD-10-CM

## 2021-10-31 ENCOUNTER — EXTERNAL CHRONIC CARE MANAGEMENT (OUTPATIENT)
Dept: PRIMARY CARE CLINIC | Facility: CLINIC | Age: 81
End: 2021-10-31
Payer: MEDICARE

## 2021-10-31 PROCEDURE — 99490 PR CHRONIC CARE MGMT, 1ST 20 MIN: ICD-10-PCS | Mod: S$PBB,,, | Performed by: INTERNAL MEDICINE

## 2021-10-31 PROCEDURE — 99490 CHRNC CARE MGMT STAFF 1ST 20: CPT | Mod: S$PBB,,, | Performed by: INTERNAL MEDICINE

## 2021-10-31 PROCEDURE — 99490 CHRNC CARE MGMT STAFF 1ST 20: CPT | Mod: PBBFAC | Performed by: INTERNAL MEDICINE

## 2021-11-09 ENCOUNTER — TELEPHONE (OUTPATIENT)
Dept: INTERNAL MEDICINE | Facility: CLINIC | Age: 81
End: 2021-11-09
Payer: MEDICARE

## 2021-11-16 ENCOUNTER — PATIENT MESSAGE (OUTPATIENT)
Dept: OBSTETRICS AND GYNECOLOGY | Facility: CLINIC | Age: 81
End: 2021-11-16
Payer: MEDICARE

## 2021-11-16 ENCOUNTER — TELEPHONE (OUTPATIENT)
Dept: OBSTETRICS AND GYNECOLOGY | Facility: CLINIC | Age: 81
End: 2021-11-16
Payer: MEDICARE

## 2021-11-16 ENCOUNTER — CLINICAL SUPPORT (OUTPATIENT)
Dept: REHABILITATION | Facility: HOSPITAL | Age: 81
End: 2021-11-16
Attending: INTERNAL MEDICINE
Payer: MEDICARE

## 2021-11-16 DIAGNOSIS — Z74.09 IMPAIRED FUNCTIONAL MOBILITY, BALANCE, GAIT, AND ENDURANCE: ICD-10-CM

## 2021-11-16 DIAGNOSIS — R26.89 BALANCE DISORDER: ICD-10-CM

## 2021-11-16 DIAGNOSIS — Z79.890 POSTMENOPAUSAL HORMONE REPLACEMENT THERAPY: ICD-10-CM

## 2021-11-16 PROCEDURE — 97162 PT EVAL MOD COMPLEX 30 MIN: CPT | Mod: PO

## 2021-11-16 RX ORDER — ESTRADIOL AND NORETHINDRONE ACETATE .5; .1 MG/1; MG/1
TABLET ORAL
Qty: 28 TABLET | Refills: 6 | OUTPATIENT
Start: 2021-11-16

## 2021-11-16 RX ORDER — ESTRADIOL AND NORETHINDRONE ACETATE .5; .1 MG/1; MG/1
1 TABLET ORAL DAILY
Qty: 28 TABLET | Refills: 0 | Status: SHIPPED | OUTPATIENT
Start: 2021-11-16 | End: 2021-11-24 | Stop reason: SDUPTHER

## 2021-11-24 ENCOUNTER — CLINICAL SUPPORT (OUTPATIENT)
Dept: INTERNAL MEDICINE | Facility: CLINIC | Age: 81
End: 2021-11-24

## 2021-11-24 ENCOUNTER — CLINICAL SUPPORT (OUTPATIENT)
Dept: INTERNAL MEDICINE | Facility: CLINIC | Age: 81
End: 2021-11-24
Payer: MEDICARE

## 2021-11-24 ENCOUNTER — OFFICE VISIT (OUTPATIENT)
Dept: INTERNAL MEDICINE | Facility: CLINIC | Age: 81
End: 2021-11-24
Payer: MEDICARE

## 2021-11-24 VITALS
BODY MASS INDEX: 22.86 KG/M2 | HEART RATE: 73 BPM | HEIGHT: 68 IN | SYSTOLIC BLOOD PRESSURE: 105 MMHG | WEIGHT: 150.81 LBS | TEMPERATURE: 97 F | DIASTOLIC BLOOD PRESSURE: 61 MMHG

## 2021-11-24 DIAGNOSIS — Z86.79 HISTORY OF ATRIAL FIBRILLATION: Chronic | ICD-10-CM

## 2021-11-24 DIAGNOSIS — Z79.890 POSTMENOPAUSAL HORMONE REPLACEMENT THERAPY: Chronic | ICD-10-CM

## 2021-11-24 DIAGNOSIS — R73.03 PREDIABETES: ICD-10-CM

## 2021-11-24 DIAGNOSIS — G25.81 RLS (RESTLESS LEGS SYNDROME): Chronic | ICD-10-CM

## 2021-11-24 DIAGNOSIS — D46.1 REFRACTORY ANEMIA WITH RINGED SIDEROBLASTS: Chronic | ICD-10-CM

## 2021-11-24 DIAGNOSIS — Z00.00 ANNUAL PHYSICAL EXAM: ICD-10-CM

## 2021-11-24 DIAGNOSIS — E55.9 VITAMIN D DEFICIENCY, UNSPECIFIED: ICD-10-CM

## 2021-11-24 DIAGNOSIS — D46.9 MDS (MYELODYSPLASTIC SYNDROME): Chronic | ICD-10-CM

## 2021-11-24 DIAGNOSIS — Z00.00 ROUTINE GENERAL MEDICAL EXAMINATION AT A HEALTH CARE FACILITY: Primary | ICD-10-CM

## 2021-11-24 DIAGNOSIS — E55.9 VITAMIN D DEFICIENCY: ICD-10-CM

## 2021-11-24 DIAGNOSIS — D51.8 OTHER VITAMIN B12 DEFICIENCY ANEMIA: Primary | ICD-10-CM

## 2021-11-24 DIAGNOSIS — Z00.00 ANNUAL PHYSICAL EXAM: Primary | ICD-10-CM

## 2021-11-24 DIAGNOSIS — R79.9 ABNORMAL FINDING OF BLOOD CHEMISTRY, UNSPECIFIED: ICD-10-CM

## 2021-11-24 DIAGNOSIS — F51.01 PRIMARY INSOMNIA: Chronic | ICD-10-CM

## 2021-11-24 DIAGNOSIS — Z98.890 HISTORY OF RADIOFREQUENCY ABLATION (RFA) PROCEDURE FOR CARDIAC ARRHYTHMIA: Chronic | ICD-10-CM

## 2021-11-24 LAB
25(OH)D3+25(OH)D2 SERPL-MCNC: 39 NG/ML (ref 30–96)
ALBUMIN SERPL BCP-MCNC: 4.1 G/DL (ref 3.5–5.2)
ALP SERPL-CCNC: 33 U/L (ref 55–135)
ALT SERPL W/O P-5'-P-CCNC: 13 U/L (ref 10–44)
ANION GAP SERPL CALC-SCNC: 7 MMOL/L (ref 8–16)
AST SERPL-CCNC: 15 U/L (ref 10–40)
BACTERIA #/AREA URNS AUTO: NORMAL /HPF
BILIRUB SERPL-MCNC: 0.7 MG/DL (ref 0.1–1)
BUN SERPL-MCNC: 17 MG/DL (ref 8–23)
CALCIUM SERPL-MCNC: 9.4 MG/DL (ref 8.7–10.5)
CHLORIDE SERPL-SCNC: 104 MMOL/L (ref 95–110)
CHOLEST SERPL-MCNC: 150 MG/DL (ref 120–199)
CHOLEST/HDLC SERPL: 2 {RATIO} (ref 2–5)
CO2 SERPL-SCNC: 26 MMOL/L (ref 23–29)
CREAT SERPL-MCNC: 1 MG/DL (ref 0.5–1.4)
ERYTHROCYTE [DISTWIDTH] IN BLOOD BY AUTOMATED COUNT: 16.3 % (ref 11.5–14.5)
EST. GFR  (AFRICAN AMERICAN): >60 ML/MIN/1.73 M^2
EST. GFR  (NON AFRICAN AMERICAN): 52.6 ML/MIN/1.73 M^2
ESTIMATED AVG GLUCOSE: 91 MG/DL (ref 68–131)
GLUCOSE SERPL-MCNC: 81 MG/DL (ref 70–110)
HBA1C MFR BLD: 4.8 % (ref 4–5.6)
HCT VFR BLD AUTO: 25.5 % (ref 37–48.5)
HCYS SERPL-SCNC: 12.9 UMOL/L (ref 4–15.5)
HDLC SERPL-MCNC: 74 MG/DL (ref 40–75)
HDLC SERPL: 49.3 % (ref 20–50)
HGB BLD-MCNC: 8.5 G/DL (ref 12–16)
LDLC SERPL CALC-MCNC: 66.2 MG/DL (ref 63–159)
MCH RBC QN AUTO: 36.6 PG (ref 27–31)
MCHC RBC AUTO-ENTMCNC: 33.3 G/DL (ref 32–36)
MCV RBC AUTO: 110 FL (ref 82–98)
MICROSCOPIC COMMENT: NORMAL
NONHDLC SERPL-MCNC: 76 MG/DL
PLATELET # BLD AUTO: 391 K/UL (ref 150–450)
PMV BLD AUTO: 9.7 FL (ref 9.2–12.9)
POTASSIUM SERPL-SCNC: 4.3 MMOL/L (ref 3.5–5.1)
PROT SERPL-MCNC: 6.1 G/DL (ref 6–8.4)
RBC # BLD AUTO: 2.32 M/UL (ref 4–5.4)
RBC #/AREA URNS AUTO: 1 /HPF (ref 0–4)
SODIUM SERPL-SCNC: 137 MMOL/L (ref 136–145)
SQUAMOUS #/AREA URNS AUTO: 6 /HPF
TRIGL SERPL-MCNC: 49 MG/DL (ref 30–150)
TSH SERPL DL<=0.005 MIU/L-ACNC: 1.22 UIU/ML (ref 0.4–4)
WBC # BLD AUTO: 5.71 K/UL (ref 3.9–12.7)
WBC #/AREA URNS AUTO: 5 /HPF (ref 0–5)

## 2021-11-24 PROCEDURE — 99999 PR PBB SHADOW E&M-EST. PATIENT-LVL II: CPT | Mod: PBBFAC,,, | Performed by: INTERNAL MEDICINE

## 2021-11-24 PROCEDURE — 99212 OFFICE O/P EST SF 10 MIN: CPT | Mod: PBBFAC | Performed by: INTERNAL MEDICINE

## 2021-11-24 PROCEDURE — 83036 HEMOGLOBIN GLYCOSYLATED A1C: CPT | Performed by: INTERNAL MEDICINE

## 2021-11-24 PROCEDURE — 80061 LIPID PANEL: CPT | Performed by: INTERNAL MEDICINE

## 2021-11-24 PROCEDURE — 81001 URINALYSIS AUTO W/SCOPE: CPT | Performed by: INTERNAL MEDICINE

## 2021-11-24 PROCEDURE — 83090 ASSAY OF HOMOCYSTEINE: CPT | Performed by: INTERNAL MEDICINE

## 2021-11-24 PROCEDURE — 85027 COMPLETE CBC AUTOMATED: CPT | Performed by: INTERNAL MEDICINE

## 2021-11-24 PROCEDURE — 82306 VITAMIN D 25 HYDROXY: CPT | Performed by: INTERNAL MEDICINE

## 2021-11-24 PROCEDURE — 99397 PER PM REEVAL EST PAT 65+ YR: CPT | Mod: S$PBB,,, | Performed by: INTERNAL MEDICINE

## 2021-11-24 PROCEDURE — 84443 ASSAY THYROID STIM HORMONE: CPT | Performed by: INTERNAL MEDICINE

## 2021-11-24 PROCEDURE — 83921 ORGANIC ACID SINGLE QUANT: CPT | Performed by: INTERNAL MEDICINE

## 2021-11-24 PROCEDURE — 99397 PR PREVENTIVE VISIT,EST,65 & OVER: ICD-10-PCS | Mod: S$PBB,,, | Performed by: INTERNAL MEDICINE

## 2021-11-24 PROCEDURE — 80053 COMPREHEN METABOLIC PANEL: CPT | Performed by: INTERNAL MEDICINE

## 2021-11-24 PROCEDURE — 99999 PR PBB SHADOW E&M-EST. PATIENT-LVL II: ICD-10-PCS | Mod: PBBFAC,,, | Performed by: INTERNAL MEDICINE

## 2021-11-24 RX ORDER — ESTRADIOL AND NORETHINDRONE ACETATE .5; .1 MG/1; MG/1
1 TABLET ORAL DAILY
Qty: 90 TABLET | Refills: 1 | Status: SHIPPED | OUTPATIENT
Start: 2021-11-24 | End: 2021-12-14 | Stop reason: SDUPTHER

## 2021-11-24 RX ORDER — ONDANSETRON 8 MG/1
8 TABLET, ORALLY DISINTEGRATING ORAL EVERY 6 HOURS PRN
COMMUNITY
Start: 2021-08-09 | End: 2022-05-12

## 2021-11-24 RX ORDER — ESTRADIOL AND NORETHINDRONE ACETATE .5; .1 MG/1; MG/1
1 TABLET ORAL DAILY
Qty: 84 TABLET | Refills: 1 | Status: SHIPPED | OUTPATIENT
Start: 2021-11-24 | End: 2021-11-24 | Stop reason: SDUPTHER

## 2021-11-24 RX ORDER — PRAMIPEXOLE DIHYDROCHLORIDE 0.25 MG/1
0.25 TABLET ORAL NIGHTLY
Qty: 90 TABLET | Refills: 1 | Status: SHIPPED | OUTPATIENT
Start: 2021-11-24 | End: 2022-05-19

## 2021-11-25 PROBLEM — G24.9 DYSTONIA: Status: RESOLVED | Noted: 2021-02-18 | Resolved: 2021-11-25

## 2021-11-25 PROBLEM — Z86.79 HISTORY OF ATRIAL FIBRILLATION: Status: ACTIVE | Noted: 2018-03-22

## 2021-11-25 PROBLEM — E61.1 IRON DEFICIENCY: Status: RESOLVED | Noted: 2021-04-13 | Resolved: 2021-11-25

## 2021-11-25 PROBLEM — D75.839 THROMBOCYTOSIS: Status: RESOLVED | Noted: 2018-04-06 | Resolved: 2021-11-25

## 2021-11-30 LAB — METHYLMALONATE SERPL-SCNC: 0.21 UMOL/L

## 2021-12-01 ENCOUNTER — CLINICAL SUPPORT (OUTPATIENT)
Dept: REHABILITATION | Facility: HOSPITAL | Age: 81
End: 2021-12-01
Attending: INTERNAL MEDICINE
Payer: MEDICARE

## 2021-12-01 DIAGNOSIS — Z74.09 IMPAIRED FUNCTIONAL MOBILITY, BALANCE, GAIT, AND ENDURANCE: ICD-10-CM

## 2021-12-01 PROCEDURE — 97112 NEUROMUSCULAR REEDUCATION: CPT | Mod: PO

## 2021-12-01 PROCEDURE — 97110 THERAPEUTIC EXERCISES: CPT | Mod: PO

## 2021-12-03 ENCOUNTER — CLINICAL SUPPORT (OUTPATIENT)
Dept: REHABILITATION | Facility: HOSPITAL | Age: 81
End: 2021-12-03
Attending: INTERNAL MEDICINE
Payer: MEDICARE

## 2021-12-03 DIAGNOSIS — Z74.09 IMPAIRED FUNCTIONAL MOBILITY, BALANCE, GAIT, AND ENDURANCE: ICD-10-CM

## 2021-12-03 PROCEDURE — 97112 NEUROMUSCULAR REEDUCATION: CPT | Mod: PO

## 2021-12-06 ENCOUNTER — CLINICAL SUPPORT (OUTPATIENT)
Dept: REHABILITATION | Facility: HOSPITAL | Age: 81
End: 2021-12-06
Attending: INTERNAL MEDICINE
Payer: MEDICARE

## 2021-12-06 DIAGNOSIS — Z74.09 IMPAIRED FUNCTIONAL MOBILITY, BALANCE, GAIT, AND ENDURANCE: ICD-10-CM

## 2021-12-06 PROCEDURE — 97112 NEUROMUSCULAR REEDUCATION: CPT | Mod: PO

## 2021-12-08 ENCOUNTER — CLINICAL SUPPORT (OUTPATIENT)
Dept: REHABILITATION | Facility: HOSPITAL | Age: 81
End: 2021-12-08
Attending: INTERNAL MEDICINE
Payer: MEDICARE

## 2021-12-08 DIAGNOSIS — Z74.09 IMPAIRED FUNCTIONAL MOBILITY, BALANCE, GAIT, AND ENDURANCE: ICD-10-CM

## 2021-12-08 PROCEDURE — 97110 THERAPEUTIC EXERCISES: CPT | Mod: PO

## 2021-12-08 PROCEDURE — 97112 NEUROMUSCULAR REEDUCATION: CPT | Mod: PO

## 2021-12-17 ENCOUNTER — CLINICAL SUPPORT (OUTPATIENT)
Dept: REHABILITATION | Facility: HOSPITAL | Age: 81
End: 2021-12-17
Attending: INTERNAL MEDICINE
Payer: MEDICARE

## 2021-12-17 DIAGNOSIS — Z74.09 IMPAIRED FUNCTIONAL MOBILITY, BALANCE, GAIT, AND ENDURANCE: ICD-10-CM

## 2021-12-17 PROCEDURE — 97112 NEUROMUSCULAR REEDUCATION: CPT | Mod: PO

## 2021-12-17 PROCEDURE — 97110 THERAPEUTIC EXERCISES: CPT | Mod: PO

## 2021-12-17 RX ORDER — TRAMADOL HYDROCHLORIDE 50 MG/1
50 TABLET ORAL
Qty: 30 TABLET | Refills: 0 | Status: SHIPPED | OUTPATIENT
Start: 2021-12-17 | End: 2022-01-25 | Stop reason: SDUPTHER

## 2021-12-22 ENCOUNTER — CLINICAL SUPPORT (OUTPATIENT)
Dept: REHABILITATION | Facility: HOSPITAL | Age: 81
End: 2021-12-22
Attending: INTERNAL MEDICINE
Payer: MEDICARE

## 2021-12-22 DIAGNOSIS — Z74.09 IMPAIRED FUNCTIONAL MOBILITY, BALANCE, GAIT, AND ENDURANCE: ICD-10-CM

## 2021-12-22 PROCEDURE — 97110 THERAPEUTIC EXERCISES: CPT | Mod: PO

## 2021-12-22 PROCEDURE — 97112 NEUROMUSCULAR REEDUCATION: CPT | Mod: PO

## 2021-12-23 ENCOUNTER — TELEPHONE (OUTPATIENT)
Dept: INTERNAL MEDICINE | Facility: CLINIC | Age: 81
End: 2021-12-23
Payer: MEDICARE

## 2021-12-27 ENCOUNTER — CLINICAL SUPPORT (OUTPATIENT)
Dept: REHABILITATION | Facility: HOSPITAL | Age: 81
End: 2021-12-27
Attending: INTERNAL MEDICINE
Payer: MEDICARE

## 2021-12-27 DIAGNOSIS — Z74.09 IMPAIRED FUNCTIONAL MOBILITY, BALANCE, GAIT, AND ENDURANCE: ICD-10-CM

## 2021-12-27 PROCEDURE — 97110 THERAPEUTIC EXERCISES: CPT | Mod: PO

## 2021-12-31 ENCOUNTER — EXTERNAL CHRONIC CARE MANAGEMENT (OUTPATIENT)
Dept: PRIMARY CARE CLINIC | Facility: CLINIC | Age: 81
End: 2021-12-31
Payer: MEDICARE

## 2021-12-31 PROCEDURE — 99490 CHRNC CARE MGMT STAFF 1ST 20: CPT | Mod: PBBFAC | Performed by: INTERNAL MEDICINE

## 2021-12-31 PROCEDURE — 99490 PR CHRONIC CARE MGMT, 1ST 20 MIN: ICD-10-PCS | Mod: S$PBB,,, | Performed by: INTERNAL MEDICINE

## 2021-12-31 PROCEDURE — 99490 CHRNC CARE MGMT STAFF 1ST 20: CPT | Mod: S$PBB,,, | Performed by: INTERNAL MEDICINE

## 2022-01-03 ENCOUNTER — CLINICAL SUPPORT (OUTPATIENT)
Dept: REHABILITATION | Facility: HOSPITAL | Age: 82
End: 2022-01-03
Attending: INTERNAL MEDICINE
Payer: MEDICARE

## 2022-01-03 DIAGNOSIS — Z74.09 IMPAIRED FUNCTIONAL MOBILITY, BALANCE, GAIT, AND ENDURANCE: ICD-10-CM

## 2022-01-03 PROCEDURE — 97112 NEUROMUSCULAR REEDUCATION: CPT | Mod: PO,CQ

## 2022-01-03 PROCEDURE — 97110 THERAPEUTIC EXERCISES: CPT | Mod: PO,CQ

## 2022-01-03 NOTE — PROGRESS NOTES
Physical Therapy Treatment Note     Name: Shelly Vásquez  Clinic Number: [id number]    Therapy Diagnosis:   Encounter Diagnosis   Name Primary?    Impaired functional mobility, balance, gait, and endurance      Physician: Joana Doty MD    Visit Date: 1/3/2022    Physician: Joana Doty MD  Physician Orders: PT Eval and Treat  Medical Diagnosis from Referral: R26.89 (ICD-10-CM) - Balance disorder   Evaluation Date: 11/16/2021  Authorization Period Expiration: 09/24/2021 - 09/24/2022   Plan of Care Expiration: 1/14/22  Visit # / Visits authorized: 7/20 (+ initial eval)    Time In: 11:10  Time Out: 11:50  Total Billable Time: 40 minutes    Precautions: Standard and Fall    Subjective     Pt reports: no new falls.  She was compliant with home exercise program.   Response to previous treatment: no adverse effects  Functional change: ongoing    Pain: n/a    Objective   Arrived 10 minutes late for her appointment  Shelly received therapeutic exercises to develop strength, endurance, ROM, flexibility, posture and core stabilization for 7 minutes including:    X 5 min scifit level 3 for cardiovascular endurane    3 x 30 sec incline calf stretch     2 x 30 sec hamstring stretch on steps      Shelly participated in neuromuscular re-education activities to improve: Balance, Coordination, Kinesthetic, Sense, Proprioception and Posture for 33  minutes. The following activities were included:  X 4 laps tandem ambulation along ballet bar, standby assist, occ touchdown support  X 4 laps high knee walking, occ upper extremity touchdown, standby assist      2 x 10 right lower extremity step up to foam fitter, left lower extremity orange cone tap, no upper extremity support, standby assist   2 x 10 left lower extremity step up to foam fitter, right lower extremity orange cone tap, no upper extremity support, standby assist      2 x 30 sec tandem stance, eyes closed,  no upper extremity support, contact guard assist       On airex, no upper extremity support:   2 x 30 NBOS eyes closed, standby assist   2 x 30 NBOS eyes closed horizontal head turns  2 x 30 sec NBOS eyes closed vertical head turns     Hallway walking:   2 x 100 ft horizontal head turns, contact guard assist   2 x 100 ft vertical head turns, contact guard assist   2 x 100 ft RUQ><LLQ, standby assist   2 x 100 ft LUQ><RLQ, standby assist     Home Exercises Provided and Patient Education Provided     Education provided:   - plan of care  - home exercise program     Written Home Exercises Provided: yes.  Exercises were reviewed and Shelly was able to demonstrate them prior to the end of the session.  Shelly demonstrated good  understanding of the education provided.     See EMR under Patient Instructions for exercises provided 12/1/2021.    Assessment     Shelly tolerated today's therapeutic interventions well. She exhibited mild veering and several stops starts during head turns in hallway. .  Pt appropriate for continued PT to address strength and balance.     Shelly is progressing well towards her goals.   Pt prognosis is Good.     Pt will continue to benefit from skilled outpatient physical therapy to address the deficits listed in the problem list box on initial evaluation, provide pt/family education and to maximize pt's level of independence in the home and community environment.     Pt's spiritual, cultural and educational needs considered and pt agreeable to plan of care and goals.     Anticipated barriers to physical therapy: co morbidities     Goals:  Short Term Goals: 4 weeks   1. Patient will be (I) with HEP. Ongoing  2. Patient will improve condition 3 on [place] Sensory Test (GST) to 8 seconds for improved balance with narrow base of support (NBOS). MET 12/27/21  3. Patient will improve condition 6 on [place] Sensory Test (GST) to 20 seconds for improved balance in low vision environments. MET 12/27/21  4. Patient will improve time on 5 time sit to stand test to  16 seconds for improved lower extremity (LE) functional strength. MET 12/27/21  5. Patient will improve score on Functional Gait Assessment (FGA) to 20/30 for decreased risk of falls. MET 12/27/21     Long Term Goals: 8 weeks   1. Patient will be (I) with advanced HEP. Ongoing  2. Patient will improve condition 3 on [place] Sensory Test (GST) to 10 seconds for improved balance with narrow base of support (NBOS). MET 12/27/21  3. Patient will improve condition 6 on [place] Sensory Test (GST) to 24 seconds for improved balance in low vision environments. MET 12/27/21  4. Patient will improve time on 5 time sit to stand test to 16 seconds for improved lower extremity (LE) functional strength. MET 12/27/21  Updated: Patient will improve time on 5 time sit to stand test to 10 seconds for improved lower extremity (LE) functional strength. ongoing  5. Patient will improve score on Functional Gait Assessment (FGA) to 22/30 for decreased risk of falls. MET 12/27/21  Updated: Patient will improve score on Functional Gait Assessment (FGA) to 25/30 for decreased risk of falls. ongoing    Plan     Progress static and dynamic balance  Add walking in hallway conditions     Albert Zurita, KRISSY

## 2022-01-04 NOTE — PROGRESS NOTES
Physical Therapy Treatment Note     Name: Shelly Vásquez  Clinic Number: [id number]    Therapy Diagnosis:   Encounter Diagnosis   Name Primary?    Impaired functional mobility, balance, gait, and endurance      Physician: Joana Doty MD    Visit Date: 1/5/2022    Physician: Joana Doty MD  Physician Orders: PT Eval and Treat  Medical Diagnosis from Referral: R26.89 (ICD-10-CM) - Balance disorder   Evaluation Date: 11/16/2021  Authorization Period Expiration: 09/24/2021 - 09/24/2022   Plan of Care Expiration: 1/14/22  Visit # / Visits authorized: 8/20 (+ initial eval)    Time In: 11:00  Time Out: 11:45  Total Billable Time: 45 minutes    Precautions: Standard and Fall    Subjective     Pt reports: had a small fall tripping over a curb on Monday, denies hurting herself and said she was able to catch herself and jump up quickly.   She was compliant with home exercise program.   Response to previous treatment: no adverse effects  Functional change: ongoing    Pain: n/a    Objective   Arrived 10 minutes late for her appointment  Shelly received therapeutic exercises to develop strength, endurance, ROM, flexibility, posture and core stabilization for 10 minutes including:    X 8 min recumbent bike, level 4 for cardiovascular endurance and general tissue extensibility    2 x 30 sec incline calf stretch     2 x 30 sec hamstring stretch on steps      Shelly participated in neuromuscular re-education activities to improve: Balance, Coordination, Kinesthetic, Sense, Proprioception and Posture for 35  minutes. The following activities were included:    2 x 10 right lower extremity stepping up to 8 in step from uneven wedge, 1 upper extremity support, contact guard assist   2 x 10 left lower extremity stepping up to 8 in step from uneven wedge, 1 upper extremity support, contact guard assist      2 x 10 right lower extremity step up to foam fitter, left lower extremity orange cone tap, no upper extremity support,  standby assist   2 x 10 left lower extremity step up to foam fitter, right lower extremity orange cone tap, no upper extremity support, standby assist     2 x 10 side stepping on and over fitter board >< then over 8 in step, occ upper extremity touchdown, contact guard assist   2 x 10 forward stepping on and over fitter board >< then over 8 in step, occ upper extremity touchdown, contact guard assist         Hallway walking:   2 x 100 ft horizontal head turns, contact guard assist   2 x 100 ft vertical head turns, contact guard assist   2 x 100 ft RUQ><LLQ, standby assist   2 x 100 ft LUQ><RLQ, standby assist     Home Exercises Provided and Patient Education Provided     Education provided:   - plan of care  - home exercise program     Written Home Exercises Provided: yes.  Exercises were reviewed and Shelly was able to demonstrate them prior to the end of the session.  Shelly demonstrated good  understanding of the education provided.     See EMR under Patient Instructions for exercises provided 12/1/2021.    Assessment     Shelly tolerated today's session well. She reported having a fall on Monday, tripping over a curb she did not see. Today's session focused mainly on curb/step up negotiation, no loss of balance throughout. We also discussed general safety awareness (I.e. being aware of her surroundings), as she generally displays good stability with balance activities. Pt appropriate for continued PT to address strength and balance.     Shelly is progressing well towards her goals.   Pt prognosis is Good.     Pt will continue to benefit from skilled outpatient physical therapy to address the deficits listed in the problem list box on initial evaluation, provide pt/family education and to maximize pt's level of independence in the home and community environment.     Pt's spiritual, cultural and educational needs considered and pt agreeable to plan of care and goals.     Anticipated barriers to physical therapy: co  morbidities     Goals:  Short Term Goals: 4 weeks   1. Patient will be (I) with HEP. Ongoing  2. Patient will improve condition 3 on [place] Sensory Test (GST) to 8 seconds for improved balance with narrow base of support (NBOS). MET 12/27/21  3. Patient will improve condition 6 on [place] Sensory Test (GST) to 20 seconds for improved balance in low vision environments. MET 12/27/21  4. Patient will improve time on 5 time sit to stand test to 16 seconds for improved lower extremity (LE) functional strength. MET 12/27/21  5. Patient will improve score on Functional Gait Assessment (FGA) to 20/30 for decreased risk of falls. MET 12/27/21     Long Term Goals: 8 weeks   1. Patient will be (I) with advanced HEP. Ongoing  2. Patient will improve condition 3 on [place] Sensory Test (GST) to 10 seconds for improved balance with narrow base of support (NBOS). MET 12/27/21  3. Patient will improve condition 6 on [place] Sensory Test (GST) to 24 seconds for improved balance in low vision environments. MET 12/27/21  4. Patient will improve time on 5 time sit to stand test to 16 seconds for improved lower extremity (LE) functional strength. MET 12/27/21  Updated: Patient will improve time on 5 time sit to stand test to 10 seconds for improved lower extremity (LE) functional strength. ongoing  5. Patient will improve score on Functional Gait Assessment (FGA) to 22/30 for decreased risk of falls. MET 12/27/21  Updated: Patient will improve score on Functional Gait Assessment (FGA) to 25/30 for decreased risk of falls. ongoing    Plan     Progress static and dynamic balance    Jaja Caldera, PT

## 2022-01-05 ENCOUNTER — CLINICAL SUPPORT (OUTPATIENT)
Dept: REHABILITATION | Facility: HOSPITAL | Age: 82
End: 2022-01-05
Attending: INTERNAL MEDICINE
Payer: MEDICARE

## 2022-01-05 DIAGNOSIS — Z74.09 IMPAIRED FUNCTIONAL MOBILITY, BALANCE, GAIT, AND ENDURANCE: ICD-10-CM

## 2022-01-05 PROCEDURE — 97112 NEUROMUSCULAR REEDUCATION: CPT | Mod: PO

## 2022-01-05 PROCEDURE — 97110 THERAPEUTIC EXERCISES: CPT | Mod: PO

## 2022-01-10 ENCOUNTER — DOCUMENTATION ONLY (OUTPATIENT)
Dept: REHABILITATION | Facility: HOSPITAL | Age: 82
End: 2022-01-10

## 2022-01-10 ENCOUNTER — CLINICAL SUPPORT (OUTPATIENT)
Dept: REHABILITATION | Facility: HOSPITAL | Age: 82
End: 2022-01-10
Attending: INTERNAL MEDICINE
Payer: MEDICARE

## 2022-01-10 DIAGNOSIS — Z74.09 IMPAIRED FUNCTIONAL MOBILITY, BALANCE, GAIT, AND ENDURANCE: ICD-10-CM

## 2022-01-10 PROCEDURE — 97112 NEUROMUSCULAR REEDUCATION: CPT | Mod: PO

## 2022-01-10 PROCEDURE — 97110 THERAPEUTIC EXERCISES: CPT | Mod: PO

## 2022-01-10 NOTE — PROGRESS NOTES
Face to face meeting completed with SANJAY YU PT regarding current status and progress of   Shelly Vásquez .  Albert Zurita, PTA

## 2022-01-10 NOTE — PROGRESS NOTES
Physical Therapy Treatment Note     Name: Shelly Vásquez  Clinic Number: [id number]    Therapy Diagnosis:   Encounter Diagnosis   Name Primary?    Impaired functional mobility, balance, gait, and endurance      Physician: Joana Doty MD    Visit Date: 1/10/2022    Physician: Joana Doty MD  Physician Orders: PT Eval and Treat  Medical Diagnosis from Referral: R26.89 (ICD-10-CM) - Balance disorder   Evaluation Date: 11/16/2021  Authorization Period Expiration: 09/24/2021 - 09/24/2022   Plan of Care Expiration: 1/14/22  Visit # / Visits authorized: 3/20  (9 total + initial eval)    Time In: 11:00  Time Out: 11:45  Total Billable Time: 45 minutes    Precautions: Standard and Fall    Subjective     Pt reports: no new falls.   She was compliant with home exercise program.   Response to previous treatment: no adverse effects  Functional change: ongoing    Pain: n/a    Objective   Arrived 10 minutes late for her appointment  Shelly received therapeutic exercises to develop strength, endurance, ROM, flexibility, posture and core stabilization for 10 minutes including:    X 8 min nustep, level 4 for cardiovascular endurance and general tissue extensibility    2 x 30 sec incline calf stretch     2 x 30 sec hamstring stretch on steps      Shelly participated in neuromuscular re-education activities to improve: Balance, Coordination, Kinesthetic, Sense, Proprioception and Posture for 35  minutes. The following activities were included:    2 x 10 right lower extremity stepping up to 8 in step from uneven wedge, 1 upper extremity support, contact guard assist   2 x 10 left lower extremity stepping up to 8 in step from uneven wedge, 1 upper extremity support, contact guard assist      2 x 10 right lower extremity step up to foam fitter, left lower extremity orange cone tap, no upper extremity support, standby assist   2 x 10 left lower extremity step up to foam fitter, right lower extremity orange cone tap, no upper  extremity support, standby assist     x4 laps side stepping on and over fitter board >< then over 8 in step>< blue foam, occ upper extremity touchdown, contact guard assist   X 4 laps  forward stepping on and over fitter board >< then over 8 in step, occ upper extremity touchdown, contact guard assist         Hallway walking:   2 x 100 ft horizontal head turns, contact guard assist   2 x 100 ft vertical head turns, contact guard assist   2 x 100 ft RUQ><LLQ, standby assist   2 x 100 ft LUQ><RLQ, standby assist     Home Exercises Provided and Patient Education Provided     Education provided:   - plan of care  - home exercise program     Written Home Exercises Provided: yes.  Exercises were reviewed and Shelly was able to demonstrate them prior to the end of the session.  Shelly demonstrated good  understanding of the education provided.     See EMR under Patient Instructions for exercises provided 12/1/2021.    Assessment     Shelly tolerated today's session well. Continued to focus on static and dynamic balance with today's interventions providing appropriate challenge. Side stepping was most challenging today, requiring 1 instance of PT support required to avoid loss of balance.  Pt appropriate for continued PT to address strength and balance.     Shelly is progressing well towards her goals.   Pt prognosis is Good.     Pt will continue to benefit from skilled outpatient physical therapy to address the deficits listed in the problem list box on initial evaluation, provide pt/family education and to maximize pt's level of independence in the home and community environment.     Pt's spiritual, cultural and educational needs considered and pt agreeable to plan of care and goals.     Anticipated barriers to physical therapy: co morbidities     Goals:  Short Term Goals: 4 weeks   1. Patient will be (I) with HEP. Ongoing  2. Patient will improve condition 3 on [place] Sensory Test (GST) to 8 seconds for improved balance with  narrow base of support (NBOS). MET 12/27/21  3. Patient will improve condition 6 on [place] Sensory Test (GST) to 20 seconds for improved balance in low vision environments. MET 12/27/21  4. Patient will improve time on 5 time sit to stand test to 16 seconds for improved lower extremity (LE) functional strength. MET 12/27/21  5. Patient will improve score on Functional Gait Assessment (FGA) to 20/30 for decreased risk of falls. MET 12/27/21     Long Term Goals: 8 weeks   1. Patient will be (I) with advanced HEP. Ongoing  2. Patient will improve condition 3 on [place] Sensory Test (GST) to 10 seconds for improved balance with narrow base of support (NBOS). MET 12/27/21  3. Patient will improve condition 6 on [place] Sensory Test (GST) to 24 seconds for improved balance in low vision environments. MET 12/27/21  4. Patient will improve time on 5 time sit to stand test to 16 seconds for improved lower extremity (LE) functional strength. MET 12/27/21  Updated: Patient will improve time on 5 time sit to stand test to 10 seconds for improved lower extremity (LE) functional strength. ongoing  5. Patient will improve score on Functional Gait Assessment (FGA) to 22/30 for decreased risk of falls. MET 12/27/21  Updated: Patient will improve score on Functional Gait Assessment (FGA) to 25/30 for decreased risk of falls. ongoing    Plan     Progress static and dynamic balance    Jaja Caldera, PT

## 2022-01-11 NOTE — PROGRESS NOTES
Physical Therapy Treatment Note     Name: Shelly Vásquez  Clinic Number: [id number]    Therapy Diagnosis:   Encounter Diagnosis   Name Primary?    Impaired functional mobility, balance, gait, and endurance      Physician: Joana Doty MD    Visit Date: 1/12/2022    Physician: Joana Doty MD  Physician Orders: PT Eval and Treat  Medical Diagnosis from Referral: R26.89 (ICD-10-CM) - Balance disorder   Evaluation Date: 11/16/2021  Authorization Period Expiration: 09/24/2021 - 09/24/2022   Plan of Care Expiration: 1/14/22  Visit # / Visits authorized: 4/20  (10 total + initial eval)    Time In: 11:00  Time Out: 11:45  Total Billable Time: 45 minutes    Precautions: Standard and Fall    Subjective     Pt reports: no change since last visit.   She was compliant with home exercise program.   Response to previous treatment: no adverse effects  Functional change: ongoing    Pain: n/a    Objective   Arrived 10 minutes late for her appointment  Shelly received therapeutic exercises to develop strength, endurance, ROM, flexibility, posture and core stabilization for 10 minutes including:    X 10 min nustep, level 4 for cardiovascular endurance and general tissue extensibility    2 x 30 sec incline calf stretch     2 x 30 sec hamstring stretch on steps      Shelly participated in neuromuscular re-education activities to improve: Balance, Coordination, Kinesthetic, Sense, Proprioception and Posture for 35  minutes. The following activities were included:    2 x 10 bilateral eccentric step down from 4 in step     2 x 10 right lower extremity step up to foam fitter, left lower extremity orange cone tap, no upper extremity support, standby assist   2 x 10 left lower extremity step up to foam fitter, right lower extremity orange cone tap, no upper extremity support, standby assist     X 10 B SLS on fitter board tapping 2 orange cones, no upper extremity support, contact guard assist     Hallway walking:   2 x 100 ft  horizontal head turns, contact guard assist   2 x 100 ft vertical head turns, contact guard assist   2 x 100 ft RUQ><LLQ, standby assist   2 x 100 ft LUQ><RLQ, standby assist   X 100 ft eyes closed walking   2 x 100 ft forward >< backward ball toss     Home Exercises Provided and Patient Education Provided     Education provided:   - plan of care  - home exercise program     Written Home Exercises Provided: yes.  Exercises were reviewed and Shelly was able to demonstrate them prior to the end of the session.  Shelly demonstrated good  understanding of the education provided.     See EMR under Patient Instructions for exercises provided 12/1/2021.    Assessment     hSelly tolerated today's session well. Continued to focus on static and dynamic balance with today's interventions providing appropriate challenge. Balance interventions continue to focus on stair/curb navigation.   Pt appropriate for continued PT to address strength and balance.     Shelly is progressing well towards her goals.   Pt prognosis is Good.     Pt will continue to benefit from skilled outpatient physical therapy to address the deficits listed in the problem list box on initial evaluation, provide pt/family education and to maximize pt's level of independence in the home and community environment.     Pt's spiritual, cultural and educational needs considered and pt agreeable to plan of care and goals.     Anticipated barriers to physical therapy: co morbidities     Goals:  Short Term Goals: 4 weeks   1. Patient will be (I) with HEP. Ongoing  2. Patient will improve condition 3 on [place] Sensory Test (GST) to 8 seconds for improved balance with narrow base of support (NBOS). MET 12/27/21  3. Patient will improve condition 6 on [place] Sensory Test (GST) to 20 seconds for improved balance in low vision environments. MET 12/27/21  4. Patient will improve time on 5 time sit to stand test to 16 seconds for improved lower extremity (LE) functional strength.  MET 12/27/21  5. Patient will improve score on Functional Gait Assessment (FGA) to 20/30 for decreased risk of falls. MET 12/27/21     Long Term Goals: 8 weeks   1. Patient will be (I) with advanced HEP. Ongoing  2. Patient will improve condition 3 on [place] Sensory Test (GST) to 10 seconds for improved balance with narrow base of support (NBOS). MET 12/27/21  3. Patient will improve condition 6 on [place] Sensory Test (GST) to 24 seconds for improved balance in low vision environments. MET 12/27/21  4. Patient will improve time on 5 time sit to stand test to 16 seconds for improved lower extremity (LE) functional strength. MET 12/27/21  Updated: Patient will improve time on 5 time sit to stand test to 10 seconds for improved lower extremity (LE) functional strength. ongoing  5. Patient will improve score on Functional Gait Assessment (FGA) to 22/30 for decreased risk of falls. MET 12/27/21  Updated: Patient will improve score on Functional Gait Assessment (FGA) to 25/30 for decreased risk of falls. ongoing    Plan     Progress static and dynamic balance    Jaja Caldera, PT

## 2022-01-12 ENCOUNTER — CLINICAL SUPPORT (OUTPATIENT)
Dept: REHABILITATION | Facility: HOSPITAL | Age: 82
End: 2022-01-12
Attending: INTERNAL MEDICINE
Payer: MEDICARE

## 2022-01-12 DIAGNOSIS — Z74.09 IMPAIRED FUNCTIONAL MOBILITY, BALANCE, GAIT, AND ENDURANCE: ICD-10-CM

## 2022-01-12 PROCEDURE — 97110 THERAPEUTIC EXERCISES: CPT | Mod: PO

## 2022-01-12 PROCEDURE — 97112 NEUROMUSCULAR REEDUCATION: CPT | Mod: PO

## 2022-01-18 NOTE — PROGRESS NOTES
Physical Therapy Treatment Note     Name: Shelly Vásquez  Clinic Number: [id number]    Therapy Diagnosis:   Encounter Diagnosis   Name Primary?    Impaired functional mobility, balance, gait, and endurance      Physician: Joana Doty MD    Visit Date: 1/19/2022    Physician: Joana Doty MD  Physician Orders: PT Eval and Treat  Medical Diagnosis from Referral: R26.89 (ICD-10-CM) - Balance disorder   Evaluation Date: 11/16/2021  Authorization Period Expiration: 09/24/2021 - 09/24/2022   Plan of Care Expiration: 1/14/22  updated plan of care: 2/11/22  Visit # / Visits authorized: 5/20  (11 total + initial eval)    Time In: 1015  Time Out: 11:00  Total Billable Time: 45 minutes    Precautions: Standard and Fall    Subjective     Pt reports: no change since last visit. No new complaints.   She was compliant with home exercise program.   Response to previous treatment: no adverse effects  Functional change: ongoing    Pain: n/a    Objective     Shelly received therapeutic exercises to develop strength, endurance, ROM, flexibility, posture and core stabilization for 15 minutes including:    X 10 min nustep, level 4 for cardiovascular endurance and general tissue extensibility    2 x 30 sec incline calf stretch     2 x 30 sec hamstring stretch on steps, bilateral       Shelly participated in neuromuscular re-education activities to improve: Balance, Coordination, Kinesthetic, Sense, Proprioception and Posture for 30  minutes. The following activities were included:    X 10 reps forward step up to fitter board> up to 8 in step > down to airex, contact guard assist, no upper extremity support   X 10 reps side step up to fitter board> up to 8 in step > down to airex, contact guard assist, no upper extremity support     Hallway walking:   2 x 100 ft horizontal head turns, standby assist   2 x 100 ft vertical head turns, standby assist   2 x 100 ft RUQ><LLQ, standby assist   2 x 100 ft LUQ><RLQ, standby assist      Home Exercises Provided and Patient Education Provided     Education provided:   - plan of care  - home exercise program     Written Home Exercises Provided: yes.  Exercises were reviewed and Shelly was able to demonstrate them prior to the end of the session.  Shelly demonstrated good  understanding of the education provided.     See EMR under Patient Instructions for exercises provided 12/1/2021.    Assessment     Shelly tolerated today's session well. No loss of balance with stair and curb negotiation interventions today - appropriately challenging as she endorses difficulty with these activities. Imbalance/sway continues to be observed with ambulation with head turns; remains appropriate intervention.  Pt appropriate for continued PT to address strength and balance.     Shelly is progressing well towards her goals.   Pt prognosis is Good.     Pt will continue to benefit from skilled outpatient physical therapy to address the deficits listed in the problem list box on initial evaluation, provide pt/family education and to maximize pt's level of independence in the home and community environment.     Pt's spiritual, cultural and educational needs considered and pt agreeable to plan of care and goals.     Anticipated barriers to physical therapy: co morbidities     Goals:  Short Term Goals: 4 weeks   1. Patient will be (I) with HEP. Ongoing  2. Patient will improve condition 3 on [place] Sensory Test (GST) to 8 seconds for improved balance with narrow base of support (NBOS). MET 12/27/21  3. Patient will improve condition 6 on [place] Sensory Test (GST) to 20 seconds for improved balance in low vision environments. MET 12/27/21  4. Patient will improve time on 5 time sit to stand test to 16 seconds for improved lower extremity (LE) functional strength. MET 12/27/21  5. Patient will improve score on Functional Gait Assessment (FGA) to 20/30 for decreased risk of falls. MET 12/27/21     Long Term Goals: 8 weeks    1. Patient will be (I) with advanced HEP. Ongoing  2. Patient will improve condition 3 on [place] Sensory Test (GST) to 10 seconds for improved balance with narrow base of support (NBOS). MET 12/27/21  3. Patient will improve condition 6 on [place] Sensory Test (GST) to 24 seconds for improved balance in low vision environments. MET 12/27/21  4. Patient will improve time on 5 time sit to stand test to 16 seconds for improved lower extremity (LE) functional strength. MET 12/27/21  Updated: Patient will improve time on 5 time sit to stand test to 10 seconds for improved lower extremity (LE) functional strength. ongoing  5. Patient will improve score on Functional Gait Assessment (FGA) to 22/30 for decreased risk of falls. MET 12/27/21  Updated: Patient will improve score on Functional Gait Assessment (FGA) to 25/30 for decreased risk of falls. ongoing    Plan     Progress static and dynamic balance    Jaja Caldera, PT

## 2022-01-19 ENCOUNTER — CLINICAL SUPPORT (OUTPATIENT)
Dept: REHABILITATION | Facility: HOSPITAL | Age: 82
End: 2022-01-19
Attending: INTERNAL MEDICINE
Payer: MEDICARE

## 2022-01-19 DIAGNOSIS — Z74.09 IMPAIRED FUNCTIONAL MOBILITY, BALANCE, GAIT, AND ENDURANCE: ICD-10-CM

## 2022-01-19 PROCEDURE — 97110 THERAPEUTIC EXERCISES: CPT | Mod: PO

## 2022-01-19 PROCEDURE — 97112 NEUROMUSCULAR REEDUCATION: CPT | Mod: PO

## 2022-01-23 ENCOUNTER — TELEPHONE (OUTPATIENT)
Dept: INTERNAL MEDICINE | Facility: CLINIC | Age: 82
End: 2022-01-23
Payer: MEDICARE

## 2022-01-24 ENCOUNTER — LAB VISIT (OUTPATIENT)
Dept: INTERNAL MEDICINE | Facility: CLINIC | Age: 82
End: 2022-01-24
Payer: MEDICARE

## 2022-01-24 DIAGNOSIS — Z11.52 ENCOUNTER FOR SCREENING FOR COVID-19: Primary | ICD-10-CM

## 2022-01-24 DIAGNOSIS — U07.1 COVID-19 VIRUS DETECTED: ICD-10-CM

## 2022-01-24 LAB
CTP QC/QA: YES
SARS-COV-2 RDRP RESP QL NAA+PROBE: POSITIVE

## 2022-01-24 PROCEDURE — U0002 COVID-19 LAB TEST NON-CDC: HCPCS | Mod: PBBFAC

## 2022-01-25 ENCOUNTER — TELEPHONE (OUTPATIENT)
Dept: INTERNAL MEDICINE | Facility: CLINIC | Age: 82
End: 2022-01-25
Payer: MEDICARE

## 2022-01-25 ENCOUNTER — LAB VISIT (OUTPATIENT)
Dept: INTERNAL MEDICINE | Facility: CLINIC | Age: 82
End: 2022-01-25
Payer: MEDICARE

## 2022-01-25 ENCOUNTER — OFFICE VISIT (OUTPATIENT)
Dept: INTERNAL MEDICINE | Facility: CLINIC | Age: 82
End: 2022-01-25
Payer: MEDICARE

## 2022-01-25 VITALS
SYSTOLIC BLOOD PRESSURE: 113 MMHG | TEMPERATURE: 98 F | WEIGHT: 151.25 LBS | DIASTOLIC BLOOD PRESSURE: 63 MMHG | BODY MASS INDEX: 23 KG/M2 | HEART RATE: 73 BPM

## 2022-01-25 DIAGNOSIS — N18.31 STAGE 3A CHRONIC KIDNEY DISEASE: ICD-10-CM

## 2022-01-25 DIAGNOSIS — Z11.59 SPECIAL SCREENING EXAMINATION FOR VIRAL DISEASE: Primary | ICD-10-CM

## 2022-01-25 DIAGNOSIS — D46.9 MDS (MYELODYSPLASTIC SYNDROME): ICD-10-CM

## 2022-01-25 DIAGNOSIS — U07.1 LAB TEST POSITIVE FOR DETECTION OF COVID-19 VIRUS: Primary | ICD-10-CM

## 2022-01-25 LAB
CTP QC/QA: YES
SARS-COV-2 RDRP RESP QL NAA+PROBE: POSITIVE

## 2022-01-25 PROCEDURE — 99213 PR OFFICE/OUTPT VISIT, EST, LEVL III, 20-29 MIN: ICD-10-PCS | Mod: S$PBB,CR,, | Performed by: INTERNAL MEDICINE

## 2022-01-25 PROCEDURE — 99999 PR PBB SHADOW E&M-EST. PATIENT-LVL II: CPT | Mod: PBBFAC,CR,, | Performed by: INTERNAL MEDICINE

## 2022-01-25 PROCEDURE — 99212 OFFICE O/P EST SF 10 MIN: CPT | Mod: PBBFAC | Performed by: INTERNAL MEDICINE

## 2022-01-25 PROCEDURE — 99213 OFFICE O/P EST LOW 20 MIN: CPT | Mod: S$PBB,CR,, | Performed by: INTERNAL MEDICINE

## 2022-01-25 PROCEDURE — 99999 PR PBB SHADOW E&M-EST. PATIENT-LVL II: ICD-10-PCS | Mod: PBBFAC,CR,, | Performed by: INTERNAL MEDICINE

## 2022-01-25 PROCEDURE — U0002 COVID-19 LAB TEST NON-CDC: HCPCS | Mod: PBBFAC

## 2022-01-25 RX ORDER — TRAMADOL HYDROCHLORIDE 50 MG/1
50 TABLET ORAL
Qty: 30 TABLET | Refills: 3 | Status: SHIPPED | OUTPATIENT
Start: 2022-01-25 | End: 2022-07-28 | Stop reason: SDUPTHER

## 2022-01-25 RX ORDER — VERAPAMIL HYDROCHLORIDE 120 MG/1
120 CAPSULE, EXTENDED RELEASE ORAL DAILY
Qty: 90 CAPSULE | Refills: 3 | Status: SHIPPED | OUTPATIENT
Start: 2022-01-25 | End: 2022-04-25 | Stop reason: SDUPTHER

## 2022-01-25 RX ORDER — TEMAZEPAM 15 MG/1
15 CAPSULE ORAL NIGHTLY
Qty: 30 CAPSULE | Refills: 3 | Status: SHIPPED | OUTPATIENT
Start: 2022-01-25 | End: 2022-03-02 | Stop reason: SDUPTHER

## 2022-01-27 NOTE — PROGRESS NOTES
Subjective:       Patient ID: Shelly Vásquez is a 81 y.o. female.    Chief Complaint: Covid positive    81-year-old female who came in yesterday to be tested for as she had some stomach upset the day prior.  Her  had intractable vomiting 2 days ago without fever and then recovered shortly after she her test was positive and his test was negative.  She did have cold symptoms the week before on Tuesday and Wednesday of a sore throat and a congested nose but had no fever or residual symptoms throughout the weekend.  They did go to [employer] on Friday and had turtle soup which tasted somewhat different.    Review of Systems   Constitutional: Negative for activity change, appetite change, chills, diaphoresis, fatigue, fever and unexpected weight change.   HENT: Negative for nasal congestion, ear pain, mouth sores, postnasal drip, sinus pressure/congestion, sore throat and trouble swallowing.    Eyes: Negative for pain, redness and visual disturbance.   Respiratory: Negative for apnea, cough, chest tightness, shortness of breath and wheezing.    Cardiovascular: Negative for chest pain, palpitations and leg swelling.   Gastrointestinal: Negative for abdominal distention, abdominal pain, blood in stool, constipation, diarrhea, nausea and vomiting.   Endocrine: Negative for cold intolerance, polydipsia, polyphagia and polyuria.   Genitourinary: Negative for difficulty urinating, dysuria, flank pain, frequency, hematuria, menstrual problem, pelvic pain and urgency.   Musculoskeletal: Negative for arthralgias, back pain, joint swelling and neck pain.   Integumentary:  Negative for color change, rash and wound.   Neurological: Negative for dizziness, tremors, seizures, syncope, weakness, light-headedness, numbness and headaches.   Hematological: Negative for adenopathy. Does not bruise/bleed easily.   Psychiatric/Behavioral: Negative for confusion, decreased concentration, dysphoric mood, hallucinations,  self-injury, sleep disturbance and suicidal ideas. The patient is not nervous/anxious.          Objective:      Physical Exam  Constitutional:       Appearance: Normal appearance. She is normal weight.   HENT:      Head: Normocephalic and atraumatic.   Eyes:      General: No scleral icterus.  Cardiovascular:      Rate and Rhythm: Normal rate and regular rhythm.      Pulses: Normal pulses.      Heart sounds: Normal heart sounds.   Pulmonary:      Effort: Pulmonary effort is normal.      Breath sounds: Normal breath sounds.   Neurological:      Mental Status: She is alert.   Psychiatric:         Mood and Affect: Mood normal.         Behavior: Behavior normal.         Thought Content: Thought content normal.         Judgment: Judgment normal.         Assessment/plan       Problem List Items Addressed This Visit     MDS (myelodysplastic syndrome)  If patient develops worsening symptoms ; covid risk score is a 3 so could consider Paxlovidx       Other Visit Diagnoses     Lab test positive for detection of COVID-19 virus    -  Primary    patient likely contracted the virus a week ago as she had cold symptoms, sore throat for one or two days  5 plus 5 day rule will be relaeased thursday/friday     Stage 3a chronic kidney disease   Avoid NSAIDs.(ibuprofen/Motrin/Advil and naproxen/Aleve)

## 2022-01-31 ENCOUNTER — CLINICAL SUPPORT (OUTPATIENT)
Dept: REHABILITATION | Facility: HOSPITAL | Age: 82
End: 2022-01-31
Attending: INTERNAL MEDICINE
Payer: MEDICARE

## 2022-01-31 ENCOUNTER — EXTERNAL CHRONIC CARE MANAGEMENT (OUTPATIENT)
Dept: PRIMARY CARE CLINIC | Facility: CLINIC | Age: 82
End: 2022-01-31
Payer: MEDICARE

## 2022-01-31 DIAGNOSIS — Z74.09 IMPAIRED FUNCTIONAL MOBILITY, BALANCE, GAIT, AND ENDURANCE: ICD-10-CM

## 2022-01-31 PROCEDURE — 97112 NEUROMUSCULAR REEDUCATION: CPT | Mod: PO

## 2022-01-31 PROCEDURE — 99490 PR CHRONIC CARE MGMT, 1ST 20 MIN: ICD-10-PCS | Mod: S$PBB,,, | Performed by: INTERNAL MEDICINE

## 2022-01-31 PROCEDURE — 99490 CHRNC CARE MGMT STAFF 1ST 20: CPT | Mod: PBBFAC | Performed by: INTERNAL MEDICINE

## 2022-01-31 PROCEDURE — 97110 THERAPEUTIC EXERCISES: CPT | Mod: PO

## 2022-01-31 PROCEDURE — 99490 CHRNC CARE MGMT STAFF 1ST 20: CPT | Mod: S$PBB,,, | Performed by: INTERNAL MEDICINE

## 2022-01-31 NOTE — PROGRESS NOTES
Physical Therapy Treatment Note     Name: Shelly Vásquez  Clinic Number: [id number]    Therapy Diagnosis:   Encounter Diagnosis   Name Primary?    Impaired functional mobility, balance, gait, and endurance      Physician: Joana Doty MD    Visit Date: 1/31/2022    Physician: Joana Doty MD  Physician Orders: PT Eval and Treat  Medical Diagnosis from Referral: R26.89 (ICD-10-CM) - Balance disorder   Evaluation Date: 11/16/2021  Authorization Period Expiration: 09/24/2021 - 09/24/2022   Plan of Care Expiration: 1/14/22  updated plan of care: 2/11/22  Visit # / Visits authorized: 6/20  (12 total + initial eval)    Time In:1100  Time Out: 1145  Total Billable Time: 45 minutes    Precautions: Standard and Fall    Subjective     Pt reports: she had covid last week, but did not feel sick, no change. Feels her balance is getting better, [place] she felt steady - less fear of falling.   She was compliant with home exercise program.   Response to previous treatment: no adverse effects  Functional change: ongoing    Pain: n/a    Objective     Shelly received therapeutic exercises to develop strength, endurance, ROM, flexibility, posture and core stabilization for 15 minutes including:    X 10 min scifit, level 2 for cardiovascular endurance and general tissue extensibility    2 x 30 sec incline calf stretch     2 x 30 sec hamstring stretch on steps, bilateral       Shelly participated in neuromuscular re-education activities to improve: Balance, Coordination, Kinesthetic, Sense, Proprioception and Posture for 30  minutes. The following activities were included:    Soft side bosu up:   2 x 10 forward squat to bosu, bilateral     2 x 10 eccentric step down from foam fitter board, no upper extremity support, standby assist     Rocker board: no upper extremity support  X 60 sec right >< left tapping, contact guard assist   X 60 sec A><P tapping, contact guard assist      Hallway walking:   2 x 100 ft horizontal head turns, standby assist   2 x 100 ft vertical head turns, standby assist   2 x 100 ft RUQ><LLQ, standby assist   2 x 100 ft LUQ><RLQ, standby assist     Home Exercises Provided and Patient Education Provided     Education provided:   - plan of care  - home exercise program     Written Home Exercises Provided: yes.  Exercises were reviewed and Shelly was able to demonstrate them prior to the end of the session.  Shelly demonstrated good  understanding of the education provided.     See EMR under Patient Instructions for exercises provided 12/1/2021.    Assessment     Shelly tolerated today's session well. She was out last week due to having covid, but did not observe any regression in progress from missed visits. Walking with head turns appears improved in stability with very minimal sway with all conditions. Rocker board provided good challenge for balance today.  Pt appropriate for continued PT to address strength and balance.     Shelly is progressing well towards her goals.   Pt prognosis is Good.     Pt will continue to benefit from skilled outpatient physical therapy to address the deficits listed in the problem list box on initial evaluation, provide pt/family education and to maximize pt's level of independence in the home and community environment.     Pt's spiritual, cultural and educational needs considered and pt agreeable to plan of care and goals.     Anticipated barriers to physical therapy: co morbidities     Goals:  Short Term Goals: 4 weeks   1. Patient will be (I) with HEP. Ongoing  2. Patient will improve condition 3 on Domi Sensory Test (GST) to 8 seconds for improved balance with narrow base of support (NBOS). MET 12/27/21  3. Patient will improve condition 6 on Domi Sensory Test (GST) to 20 seconds for improved balance in low vision environments. MET 12/27/21  4. Patient will improve time on 5 time sit to stand test to 16 seconds for improved lower  extremity (LE) functional strength. MET 12/27/21  5. Patient will improve score on Functional Gait Assessment (FGA) to 20/30 for decreased risk of falls. MET 12/27/21     Long Term Goals: 8 weeks   1. Patient will be (I) with advanced HEP. Ongoing  2. Patient will improve condition 3 on [place] Sensory Test (GST) to 10 seconds for improved balance with narrow base of support (NBOS). MET 12/27/21  3. Patient will improve condition 6 on Domi Sensory Test (GST) to 24 seconds for improved balance in low vision environments. MET 12/27/21  4. Patient will improve time on 5 time sit to stand test to 16 seconds for improved lower extremity (LE) functional strength. MET 12/27/21  Updated: Patient will improve time on 5 time sit to stand test to 10 seconds for improved lower extremity (LE) functional strength. ongoing  5. Patient will improve score on Functional Gait Assessment (FGA) to 22/30 for decreased risk of falls. MET 12/27/21  Updated: Patient will improve score on Functional Gait Assessment (FGA) to 25/30 for decreased risk of falls. ongoing    Plan     Progress static and dynamic balance    Jaja Caldera, PT

## 2022-02-02 ENCOUNTER — DOCUMENTATION ONLY (OUTPATIENT)
Dept: REHABILITATION | Facility: HOSPITAL | Age: 82
End: 2022-02-02
Payer: MEDICARE

## 2022-02-02 ENCOUNTER — CLINICAL SUPPORT (OUTPATIENT)
Dept: REHABILITATION | Facility: HOSPITAL | Age: 82
End: 2022-02-02
Attending: INTERNAL MEDICINE
Payer: MEDICARE

## 2022-02-02 DIAGNOSIS — Z74.09 IMPAIRED FUNCTIONAL MOBILITY, BALANCE, GAIT, AND ENDURANCE: ICD-10-CM

## 2022-02-02 PROCEDURE — 97110 THERAPEUTIC EXERCISES: CPT | Mod: PO,CQ

## 2022-02-02 PROCEDURE — 97112 NEUROMUSCULAR REEDUCATION: CPT | Mod: PO,CQ

## 2022-02-02 NOTE — PROGRESS NOTES
Physical Therapy Treatment Note     Name: Shelly Vásquez  Clinic Number: [id number]    Therapy Diagnosis:   No diagnosis found.  Physician: Joana Doty MD    Visit Date: 2/2/2022    Physician: Joana Doty MD  Physician Orders: PT Eval and Treat  Medical Diagnosis from Referral: R26.89 (ICD-10-CM) - Balance disorder   Evaluation Date: 11/16/2021  Authorization Period Expiration: 09/24/2021 - 09/24/2022   Plan of Care Expiration: 1/14/22  updated plan of care: 2/11/22  Visit # / Visits authorized: 7/20  (12 total + initial eval)    Time In:1445  Time Out: 1530  Total Billable Time: 45 minutes    Precautions: Standard and Fall    Subjective     Pt reports: Patients expresses more confidence in her balance.  She was compliant with home exercise program.   Response to previous treatment: no adverse effects  Functional change: ongoing    Pain: n/a    Objective     Shelly received therapeutic exercises to develop strength, endurance, ROM, flexibility, posture and core stabilization for 15 minutes including:    X 10 min scifit, level 3 for cardiovascular endurance and general tissue extensibility    2 x 30 sec incline calf stretch     2 x 30 sec hamstring stretch on steps, bilateral       Shelly participated in neuromuscular re-education activities to improve: Balance, Coordination, Kinesthetic, Sense, Proprioception and Posture for 30  minutes. The following activities were included:    Soft side bosu up:   2 x 10 forward squat to bosu, bilateral     2 x 10 eccentric step down from foam fitter board, no upper extremity support, standby assist     Rocker board: no upper extremity support  X 60 sec right >< left tapping, CGA   X 60 sec A><P, in staggered stance right fwd, weight shifts CGA  X 60 sec A><P, in staggered stance left fwd, weight shifts CGA    2x10  Double cone taps from single leg stance right on 4 point fitter CGA No UE support   2x10  Double cone taps from single leg stance left on 4 point fitter  CGA No UE support     Hallway walking:   2 x 100 ft horizontal head turns, standby assist   2 x 100 ft vertical head turns, standby assist   2 x 100 ft RUQ><LLQ, standby assist   2 x 100 ft LUQ><RLQ, standby assist       Home Exercises Provided and Patient Education Provided     Education provided:   - plan of care  - home exercise program     Written Home Exercises Provided: yes.  Exercises were reviewed and Shelly was able to demonstrate them prior to the end of the session.  Shelly demonstrated good  understanding of the education provided.     See EMR under Patient Instructions for exercises provided 12/1/2021.    Assessment     Shelly tolerated today's session well. Added interventions in bold. Progressed with balance challenges.  Pt appropriate for continued PT to address strength and balance.     Shelly is progressing well towards her goals.   Pt prognosis is Good.     Pt will continue to benefit from skilled outpatient physical therapy to address the deficits listed in the problem list box on initial evaluation, provide pt/family education and to maximize pt's level of independence in the home and community environment.     Pt's spiritual, cultural and educational needs considered and pt agreeable to plan of care and goals.     Anticipated barriers to physical therapy: co morbidities     Goals:  Short Term Goals: 4 weeks   1. Patient will be (I) with HEP. Ongoing  2. Patient will improve condition 3 on Domi Sensory Test (GST) to 8 seconds for improved balance with narrow base of support (NBOS). MET 12/27/21  3. Patient will improve condition 6 on Domi Sensory Test (GST) to 20 seconds for improved balance in low vision environments. MET 12/27/21  4. Patient will improve time on 5 time sit to stand test to 16 seconds for improved lower extremity (LE) functional strength. MET 12/27/21  5. Patient will improve score on Functional Gait Assessment (FGA) to 20/30 for decreased risk of falls. MET 12/27/21     Long Term  Goals: 8 weeks   1. Patient will be (I) with advanced HEP. Ongoing  2. Patient will improve condition 3 on Domi Sensory Test (GST) to 10 seconds for improved balance with narrow base of support (NBOS). MET 12/27/21  3. Patient will improve condition 6 on [place] Sensory Test (GST) to 24 seconds for improved balance in low vision environments. MET 12/27/21  4. Patient will improve time on 5 time sit to stand test to 16 seconds for improved lower extremity (LE) functional strength. MET 12/27/21  Updated: Patient will improve time on 5 time sit to stand test to 10 seconds for improved lower extremity (LE) functional strength. ongoing  5. Patient will improve score on Functional Gait Assessment (FGA) to 22/30 for decreased risk of falls. MET 12/27/21  Updated: Patient will improve score on Functional Gait Assessment (FGA) to 25/30 for decreased risk of falls. ongoing    Plan     Progress static and dynamic balance    Albert Zurita, PTA

## 2022-02-02 NOTE — PROGRESS NOTES
Face to face meeting completed with Jaja Adair DPT  PT regarding current status and progress of   Shelly Vásquez .  Albert Zurita, PTA

## 2022-02-02 NOTE — PROGRESS NOTES
PT/PTA met face to face to discuss pt's treatment plan and progress towards established goals.  Continue with current PT POC with focus on balance.  Patient will be seen by physical therapist at least every sixth treatment or 30 days, whichever occurs first.    Sunita Alvarez, PTA  02/02/2022

## 2022-02-04 NOTE — PROGRESS NOTES
Physical Therapy Treatment Note     Name: Shelly Vásquez  Clinic Number: [id number]    Therapy Diagnosis:   Encounter Diagnosis   Name Primary?    Impaired functional mobility, balance, gait, and endurance      Physician: Joana Doty MD    Visit Date: 2/7/2022    Physician: Joana Doty MD  Physician Orders: PT Eval and Treat  Medical Diagnosis from Referral: R26.89 (ICD-10-CM) - Balance disorder   Evaluation Date: 11/16/2021  Authorization Period Expiration: 09/24/2021 - 09/24/2022   Plan of Care Expiration: 1/14/22  updated plan of care: 2/11/22  Visit # / Visits authorized: 8/20  (13 total + initial eval)    Time In:1110  Time Out: 1150  Total Billable Time: 40 minutes    Precautions: Standard and Fall    Subjective     Pt reports: went to a ball this weekend, wore high heels the whole night and felt steady.   She was compliant with home exercise program.   Response to previous treatment: no adverse effects  Functional change: ongoing    Pain: n/a    Objective     Sehlly received therapeutic exercises to develop strength, endurance, ROM, flexibility, posture and core stabilization for 15 minutes including:    X 10 min scifit, level 3 for cardiovascular endurance and general tissue extensibility    2 x 30 sec incline calf stretch     2 x 30 sec hamstring stretch on steps, bilateral       Shelly participated in neuromuscular re-education activities to improve: Balance, Coordination, Kinesthetic, Sense, Proprioception and Posture for 25  minutes. The following activities were included:    2 x 10 right lower extremity step ups to 8 in step with blue foam on top, no upper extremity support, contact guard assist   2 x 10 left lower extremity step ups to 8 in step with blue foam on top, no upper extremity support, contact guard assist     2 x 10 right lower extremity step down from 8 in step to fitter board, no upper extremity support, contact guard assist   2 x 10 left lower extremity step down from 8 in  step to fitter board, no upper extremity support, contact guard assist     X 10 side stepping on 8 in step >< to fitter board, no upper extremity support, contact guard assist   Hallway walking:   2 x 100 ft horizontal head turns, standby assist   2 x 100 ft vertical head turns, standby assist   2 x 100 ft RUQ><LLQ, standby assist   2 x 100 ft LUQ><RLQ, standby assist       Home Exercises Provided and Patient Education Provided     Education provided:   - plan of care  - home exercise program     Written Home Exercises Provided: yes.  Exercises were reviewed and Shelly was able to demonstrate them prior to the end of the session.  Shelly demonstrated good  understanding of the education provided.     See EMR under Patient Instructions for exercises provided 12/1/2021.    Assessment     Shelly tolerated today's session well. Continued to focus on step up/curb navigation challenges with compliant surfaces, and multidirectional stepping. Appropriately challenged by today's inteventions and is endorsing improved balance confidence with community involvement.  Pt appropriate for continued PT to address strength and balance.     Shelly is progressing well towards her goals.   Pt prognosis is Good.     Pt will continue to benefit from skilled outpatient physical therapy to address the deficits listed in the problem list box on initial evaluation, provide pt/family education and to maximize pt's level of independence in the home and community environment.     Pt's spiritual, cultural and educational needs considered and pt agreeable to plan of care and goals.     Anticipated barriers to physical therapy: co morbidities     Goals:  Short Term Goals: 4 weeks   1. Patient will be (I) with HEP. Ongoing  2. Patient will improve condition 3 on Domi Sensory Test (GST) to 8 seconds for improved balance with narrow base of support (NBOS). MET 12/27/21  3. Patient will improve condition 6 on [place] Sensory Test (GST) to 20 seconds for  improved balance in low vision environments. MET 12/27/21  4. Patient will improve time on 5 time sit to stand test to 16 seconds for improved lower extremity (LE) functional strength. MET 12/27/21  5. Patient will improve score on Functional Gait Assessment (FGA) to 20/30 for decreased risk of falls. MET 12/27/21     Long Term Goals: 8 weeks   1. Patient will be (I) with advanced HEP. Ongoing  2. Patient will improve condition 3 on [place] Sensory Test (GST) to 10 seconds for improved balance with narrow base of support (NBOS). MET 12/27/21  3. Patient will improve condition 6 on [place] Sensory Test (GST) to 24 seconds for improved balance in low vision environments. MET 12/27/21  4. Patient will improve time on 5 time sit to stand test to 16 seconds for improved lower extremity (LE) functional strength. MET 12/27/21  Updated: Patient will improve time on 5 time sit to stand test to 10 seconds for improved lower extremity (LE) functional strength. ongoing  5. Patient will improve score on Functional Gait Assessment (FGA) to 22/30 for decreased risk of falls. MET 12/27/21  Updated: Patient will improve score on Functional Gait Assessment (FGA) to 25/30 for decreased risk of falls. ongoing    Plan     Progress static and dynamic balance    Jaja Caldera, PT

## 2022-02-07 ENCOUNTER — CLINICAL SUPPORT (OUTPATIENT)
Dept: REHABILITATION | Facility: HOSPITAL | Age: 82
End: 2022-02-07
Attending: INTERNAL MEDICINE
Payer: MEDICARE

## 2022-02-07 DIAGNOSIS — Z74.09 IMPAIRED FUNCTIONAL MOBILITY, BALANCE, GAIT, AND ENDURANCE: ICD-10-CM

## 2022-02-07 PROCEDURE — 97112 NEUROMUSCULAR REEDUCATION: CPT | Mod: PO

## 2022-02-07 PROCEDURE — 97110 THERAPEUTIC EXERCISES: CPT | Mod: PO

## 2022-02-09 ENCOUNTER — CLINICAL SUPPORT (OUTPATIENT)
Dept: REHABILITATION | Facility: HOSPITAL | Age: 82
End: 2022-02-09
Attending: INTERNAL MEDICINE
Payer: MEDICARE

## 2022-02-09 DIAGNOSIS — Z74.09 IMPAIRED FUNCTIONAL MOBILITY, BALANCE, GAIT, AND ENDURANCE: ICD-10-CM

## 2022-02-09 PROCEDURE — 97112 NEUROMUSCULAR REEDUCATION: CPT | Mod: PO,CQ

## 2022-02-09 NOTE — PROGRESS NOTES
"  Physical Therapy Treatment Note     Name: Shelly Vásquez  Clinic Number: [id number]    Therapy Diagnosis:   Physician: Joana Doty MD    Visit Date: 2/9/2022    Physician: Joana Doty MD  Physician Orders: PT Eval and Treat  Medical Diagnosis from Referral: R26.89 (ICD-10-CM) - Balance disorder   Evaluation Date: 11/16/2021  Authorization Period Expiration: 09/24/2021 - 09/24/2022   Plan of Care Expiration: 1/14/22  updated plan of care: 2/11/22  Visit # / Visits authorized: 9/20  (13 total + initial eval)    Time In:1330  Time Out: 1400  Total Billable Time: 30 minutes    Precautions: Standard and Fall    Subjective     Pt reports: "sorry I'm late again. I/ll leave earlier next time."  She was compliant with home exercise program.   Response to previous treatment: no adverse effects  Functional change: ongoing    Pain: n/a    Objective   Patient arrived 15 minutes late for her scheduled appointment. Unable to accommodate due to schedule conflict  Shelly received therapeutic exercises to develop strength, endurance, ROM, flexibility, posture and core stabilization for 5 minutes including:    X0 min scifit, level 3 for cardiovascular endurance and general tissue extensibility Not performed     2 x 30 sec incline calf stretch     2 x 30 sec hamstring stretch on steps, bilateral       Shelly participated in neuromuscular re-education activities to improve: Balance, Coordination, Kinesthetic, Sense, Proprioception and Posture for 25  minutes. The following activities were included:    2 x 10 right lower extremity step ups to 8 in step with blue foam on top, no upper extremity support, contact guard assist   2 x 10 left lower extremity step ups to 8 in step with blue foam on top, no upper extremity support, contact guard assist     2 x 10 right lower extremity step down from 8 in step to fitter board, no upper extremity support, contact guard assist   2 x 10 left lower extremity step down from 8 in step to " fitter board, no upper extremity support, contact guard assist     5 x CW/5x CCW 4 square w/4 hurdles  Fwd/laterally and retro  on foam floor mat   CGA     X 10 side stepping on 8 in step >< to fitter board, no upper extremity support, contact guard assist   Hallway walking:   2 x 100 ft horizontal head turns, standby assist   2 x 100 ft vertical head turns, standby assist   2 x 100 ft RUQ><LLQ, standby assist   2 x 100 ft LUQ><RLQ, standby assist       Home Exercises Provided and Patient Education Provided     Education provided:   - plan of care  - home exercise program     Written Home Exercises Provided: yes.  Exercises were reviewed and Shelly was able to demonstrate them prior to the end of the session.  Shelly demonstrated good  understanding of the education provided.     See EMR under Patient Instructions for exercises provided 12/1/2021.    Assessment     Shelly tolerated today's session well. Continued to focus on step up/curb navigation challenges with compliant surfaces, and multidirectional stepping. Appropriately challenged by today's inteventions and is endorsing improved balance confidence with community involvement. Treatment interventions somewhat limited due to patient's late arrival.  Pt appropriate for continued PT to address strength and balance.     Shelly is progressing well towards her goals.   Pt prognosis is Good.     Pt will continue to benefit from skilled outpatient physical therapy to address the deficits listed in the problem list box on initial evaluation, provide pt/family education and to maximize pt's level of independence in the home and community environment.     Pt's spiritual, cultural and educational needs considered and pt agreeable to plan of care and goals.     Anticipated barriers to physical therapy: co morbidities     Goals:  Short Term Goals: 4 weeks   1. Patient will be (I) with HEP. Ongoing  2. Patient will improve condition 3 on [place] Sensory Test (GST) to 8 seconds for  improved balance with narrow base of support (NBOS). MET 12/27/21  3. Patient will improve condition 6 on [place] Sensory Test (GST) to 20 seconds for improved balance in low vision environments. MET 12/27/21  4. Patient will improve time on 5 time sit to stand test to 16 seconds for improved lower extremity (LE) functional strength. MET 12/27/21  5. Patient will improve score on Functional Gait Assessment (FGA) to 20/30 for decreased risk of falls. MET 12/27/21     Long Term Goals: 8 weeks   1. Patient will be (I) with advanced HEP. Ongoing  2. Patient will improve condition 3 on [place] Sensory Test (GST) to 10 seconds for improved balance with narrow base of support (NBOS). MET 12/27/21  3. Patient will improve condition 6 on [place] Sensory Test (GST) to 24 seconds for improved balance in low vision environments. MET 12/27/21  4. Patient will improve time on 5 time sit to stand test to 16 seconds for improved lower extremity (LE) functional strength. MET 12/27/21  Updated: Patient will improve time on 5 time sit to stand test to 10 seconds for improved lower extremity (LE) functional strength. ongoing  5. Patient will improve score on Functional Gait Assessment (FGA) to 22/30 for decreased risk of falls. MET 12/27/21  Updated: Patient will improve score on Functional Gait Assessment (FGA) to 25/30 for decreased risk of falls. ongoing    Plan     Progress static and dynamic balance    Albert Zurita, KRISSY

## 2022-02-14 ENCOUNTER — CLINICAL SUPPORT (OUTPATIENT)
Dept: REHABILITATION | Facility: HOSPITAL | Age: 82
End: 2022-02-14
Attending: INTERNAL MEDICINE
Payer: MEDICARE

## 2022-02-14 DIAGNOSIS — Z74.09 IMPAIRED FUNCTIONAL MOBILITY, BALANCE, GAIT, AND ENDURANCE: ICD-10-CM

## 2022-02-14 PROCEDURE — 97112 NEUROMUSCULAR REEDUCATION: CPT | Mod: PO

## 2022-02-14 NOTE — PLAN OF CARE
Physical Therapy Treatment Note     Name: Shelly Vásquez  Clinic Number: [id number]    Therapy Diagnosis:   Physician: Joana Doty MD    Visit Date: 2/14/2022    Physician: Joana Doty MD  Physician Orders: PT Eval and Treat  Medical Diagnosis from Referral: R26.89 (ICD-10-CM) - Balance disorder   Evaluation Date: 11/16/2021  Authorization Period Expiration: 09/24/2021 - 09/24/2022   Plan of Care Expiration: 1/14/22  updated plan of care: 2/11/22 updated plan of care: 3/25/22  Visit # / Visits authorized: 10/20  (14 total + initial eval)    Time In:1145  Time Out: 1230  Total Billable Time: 45 minutes    Precautions: Standard and Fall    Subjective     Pt reports: feels like balance is getting better.   She was compliant with home exercise program.   Response to previous treatment: no adverse effects  Functional change: ongoing    Pain: n/a    Objective   Patient arrived 15 minutes late for her scheduled appointment. Unable to accommodate due to schedule conflict  Shelly received therapeutic exercises to develop strength, endurance, ROM, flexibility, posture and core stabilization for 5 minutes including:    X0 min scifit, level 3 for cardiovascular endurance and general tissue extensibility Not performed     2 x 30 sec incline calf stretch     2 x 30 sec hamstring stretch on steps, bilateral       Shelly participated in neuromuscular re-education activities to improve: Balance, Coordination, Kinesthetic, Sense, Proprioception and Posture for 25  minutes. The following activities were included:     Lower Extremity Strength  Right LE   12/27/21 2/14/22 Left LE   12/27/21 2/14/22   Hip Flexion: 4+/5 5/5 5/5 Hip Flexion: 4+/5 5/5 5/5   Hip Extension:  4-/5 4/5 5/5 Hip Extension: 4-/5 4/5 5/5   Hip Abduction: 4/5 4+/5 5/5 Hip Abduction: 4/5 4+/5 5/5   Hip Adduction: 4/5 5/5 5/5 Hip Adduction 4/5 5/5 5/5   Knee Extension: 4+/5 5/5 5/5 Knee Extension: 4+/5 5/5 5/5   Knee Flexion: 5/5 5/5 5/5 Knee Flexion: 5/5  "5/5 5/5   Ankle Dorsiflexion: 5/5 5/5 5/5 Ankle Dorsiflexion: 5/5 5/5 5/5   Ankle Plantarflexion: 4+/5 5/5 5/5 Ankle Plantarflexion: 4+/5 5/5 5/5           Evaluation 21   5 times sit-stand  (adults 18-65 y/o) 18 seconds, push off from thighs, back of legs hit chair  >12 sec= fall risk for general elderly  >16 sec= fall risk for Parkinson's disease  >10 sec= balance/vestibular dysfunction (<59 y/o)  >14.2 sec= balance/vestibular dysfunction (>59 y/o)  >12 sec= fall risk for CVA 11 sec push off from thighs 10 seconds no upper extremity push off          LIAM SENSORY TESTING:  (P= Pass, F= Fail; note any sway; hold each position for 30")  Condition 1: (firm surface/feet together/eyes open) P 30  Condition 2: (firm surface/feet together/eyes closed) P 30 min sway   Condition 3: (firm surface/feet in tandem/eyes open) F 6 sec   Condition 4: (firm surface/feet in tandem/eyes closed) F 1 sec   Condition 5: (soft surface/feet together/eyes open) P  Condition 6: (soft surface/feet together/eyes closed) F 15 sec   Condition 7: (Fakuda step test), measure distance varied from center starting position NT     LIAM SENSORY TESTIN21  (P= Pass, F= Fail; note any sway; hold each position for 30")  Condition 1: (firm surface/feet together/eyes open) P 30  Condition 2: (firm surface/feet together/eyes closed) P 30   Condition 3: (firm surface/feet in tandem/eyes open) P   Condition 4: (firm surface/feet in tandem/eyes closed) F 4 sec   Condition 5: (soft surface/feet together/eyes open) P  Condition 6: (soft surface/feet together/eyes closed) P  Condition 7: ([place]uda step test), measure distance varied from center starting position NT      LIAM SENSORY TESTIN22  (P= Pass, F= Fail; note any sway; hold each position for 30")  Condition 1: (firm surface/feet together/eyes open) P 30  Condition 2: (firm surface/feet together/eyes closed) P 30   Condition 3: (firm surface/feet in tandem/eyes open) P "   Condition 4: (firm surface/feet in tandem/eyes closed) F 10 sec   Condition 5: (soft surface/feet together/eyes open) P  Condition 6: (soft surface/feet together/eyes closed) P          Functional Gait Assessment:   1. Gait on level surface =  3  2. Change in Gait Speed = 3  3. Gait with horizontal head turns  = 2  4. Gait with vertical head turns = 2  5. Gait with pivot turns = 2  6. Step over obstacle = 2  7. Gait with Narrow ANNEMARIE = 2  8. Gait with eyes closed = 2  9. Ambulating Backwards = 2  10. Steps = 2     Eval: 18/22  Score 22/30 2/14/22 22/30     <22/30 fall risk   <20/30 fall risk in older adults   <18/30 fall risk in Parkinsons           Evaluation 2/14/22   Self Selected Walking Speed 0.85 m/sec (6m/7s) 0.85 m/sec (6m/7s)                   Home Exercises Provided and Patient Education Provided     Education provided:   - plan of care  - home exercise program     Written Home Exercises Provided: yes.  Exercises were reviewed and Shelly was able to demonstrate them prior to the end of the session.  Shelly demonstrated good  understanding of the education provided.     See EMR under Patient Instructions for exercises provided 12/1/2021.    Assessment     Shelly showed good progress with today's re assessment; re assessment period 12/27/21 - 2/14/22. Manual muscle testing shows intact strength of bilateral lower extremities. Improved performance on [place] Sensory Test with patient able to pass all conditions, but tandem eyes closed, which has improved. Improved performance on 5 time sit to stand with time placing patient out of fall's risk cut off and suggesting improved lower extremity functional strength. Score on FGA has remained grossly unchanged (remains in fall's risk category) and PT would like to extend plan of care x 5 weeks to address remaining dynamic balance deficits. Pt appropriate for continued PT to address strength and balance.     Shelly is progressing well towards her goals.   Pt prognosis is  Good.     Pt will continue to benefit from skilled outpatient physical therapy to address the deficits listed in the problem list box on initial evaluation, provide pt/family education and to maximize pt's level of independence in the home and community environment.     Pt's spiritual, cultural and educational needs considered and pt agreeable to plan of care and goals.     Anticipated barriers to physical therapy: co morbidities     Goals:  Short Term Goals: 4 weeks   1. Patient will be (I) with HEP. MET 2/14/22  2. Patient will improve condition 3 on [place] Sensory Test (GST) to 8 seconds for improved balance with narrow base of support (NBOS). MET 12/27/21  3. Patient will improve condition 6 on [place] Sensory Test (GST) to 20 seconds for improved balance in low vision environments. MET 12/27/21  4. Patient will improve time on 5 time sit to stand test to 16 seconds for improved lower extremity (LE) functional strength. MET 12/27/21  5. Patient will improve score on Functional Gait Assessment (FGA) to 20/30 for decreased risk of falls. MET 12/27/21     Long Term Goals: 8 weeks   1. Patient will be (I) with advanced HEP. Ongoing  2. Patient will improve condition 3 on [place] Sensory Test (GST) to 10 seconds for improved balance with narrow base of support (NBOS). MET 12/27/21  3. Patient will improve condition 6 on [place] Sensory Test (GST) to 24 seconds for improved balance in low vision environments. MET 12/27/21  4. Patient will improve time on 5 time sit to stand test to 16 seconds for improved lower extremity (LE) functional strength. MET 12/27/21  Updated: Patient will improve time on 5 time sit to stand test to 10 seconds for improved lower extremity (LE) functional strength. MET 2/14/22  5. Patient will improve score on Functional Gait Assessment (FGA) to 22/30 for decreased risk of falls. MET 12/27/21  Updated: Patient will improve score on Functional Gait Assessment (FGA) to 25/30 for decreased risk of falls.  ongoing    Plan     Continue PT 2x weekly under established Plan of Care, with treatment to include: pt education, HEP, gait training, therapeutic exercises, neuromuscular re-education/balance exercises, therapeutic activities, and modalities PRN, to work towards established goals. Pt may be seen by PTA to carry out plan of care.     Dynamic balance     Jaja Caldera, PT

## 2022-02-16 ENCOUNTER — CLINICAL SUPPORT (OUTPATIENT)
Dept: REHABILITATION | Facility: HOSPITAL | Age: 82
End: 2022-02-16
Attending: INTERNAL MEDICINE
Payer: MEDICARE

## 2022-02-16 DIAGNOSIS — Z74.09 IMPAIRED FUNCTIONAL MOBILITY, BALANCE, GAIT, AND ENDURANCE: ICD-10-CM

## 2022-02-16 PROCEDURE — 97112 NEUROMUSCULAR REEDUCATION: CPT | Mod: PO,CQ

## 2022-02-16 PROCEDURE — 97110 THERAPEUTIC EXERCISES: CPT | Mod: PO,CQ

## 2022-02-16 NOTE — PROGRESS NOTES
Physical Therapy Treatment Note     Name: Shelly Vásquez  Clinic Number: [id number]    Therapy Diagnosis:  Encounter Diagnosis   Name Primary?    Impaired functional mobility, balance, gait, and endurance          Physician: Joana Doty MD    Visit Date: 2/16/2022    Physician: Joana Doty MD  Physician Orders: PT Eval and Treat  Medical Diagnosis from Referral: R26.89 (ICD-10-CM) - Balance disorder   Evaluation Date: 11/16/2021  Authorization Period Expiration: 09/24/2021 - 09/24/2022   Plan of Care Expiration: 1/14/22  updated plan of care: 2/11/22  updated plan of care: 3/25/22  Visit # / Visits authorized: 9/20  (13 total + initial eval)    Time In:1315  Time Out: 1400  Total Billable Time: 45 minutes    Precautions: Standard and Fall    Subjective     Pt reports: no new complaints   She was compliant with home exercise program.   Response to previous treatment: no adverse effects  Functional change: ongoing    Pain: n/a    Objective     Shelly received therapeutic exercises to develop strength, endurance, ROM, flexibility, posture and core stabilization for 15 minutes including:    X10 min scifit, level 3 for cardiovascular endurance and general tissue extensibility     2 x 30 sec incline calf stretch     2 x 30 sec hamstring stretch on steps, bilateral       Shelly participated in neuromuscular re-education activities to improve: Balance, Coordination, Kinesthetic, Sense, Proprioception and Posture for 30  minutes. The following activities were included:    2 x 10 right lower extremity step ups to 8 in step with blue foam on top, no upper extremity support, contact guard assist   2 x 10 left lower extremity step ups to 8 in step with blue foam on top, no upper extremity support, contact guard assist     2 x 10 right lower extremity step down from 8 in step to fitter board, no upper extremity support, contact guard assist   2 x 10 left lower extremity step down from 8 in step to fitter board, no  "upper extremity support, contact guard assist     2 x 30" of lateral weight shifts standing on flat side Bosu CGA Sergio  2 x 30" of static standing on round side Bosu CGA MIn A      5 x CW/5x CCW 4 square w/4 hurdles  Fwd/laterally and retro  on foam floor mat   CGA     X 10 side stepping on 8 in step >< to fitter board, no upper extremity support, contact guard assist   Hallway walking:   2 x 100 ft horizontal head turns, standby assist   2 x 100 ft vertical head turns, standby assist   2 x 100 ft RUQ><LLQ, standby assist   2 x 100 ft LUQ><RLQ, standby assist        Home Exercises Provided and Patient Education Provided     Education provided:   - plan of care  - home exercise program     Written Home Exercises Provided: yes.  Exercises were reviewed and Shelly was able to demonstrate them prior to the end of the session.  Shelly demonstrated good  understanding of the education provided.     See EMR under Patient Instructions for exercises provided 12/1/2021.    Assessment     Shelly tolerated today's session well. Continued to focus on step up/curb navigation challenges with compliant surfaces, and multidirectional stepping. Appropriately challenged by today's inteventions and is endorsing improved balance confidence with community involvement. Treatment interventions somewhat limited due to patient's late arrival.  Pt appropriate for continued PT to address strength and balance.     Shelly is progressing well towards her goals.   Pt prognosis is Good.     Pt will continue to benefit from skilled outpatient physical therapy to address the deficits listed in the problem list box on initial evaluation, provide pt/family education and to maximize pt's level of independence in the home and community environment.     Pt's spiritual, cultural and educational needs considered and pt agreeable to plan of care and goals.     Anticipated barriers to physical therapy: co morbidities     Goals:  Short Term Goals: 4 weeks "   1. Patient will be (I) with HEP. Ongoing  2. Patient will improve condition 3 on [place] Sensory Test (GST) to 8 seconds for improved balance with narrow base of support (NBOS). MET 12/27/21  3. Patient will improve condition 6 on [place] Sensory Test (GST) to 20 seconds for improved balance in low vision environments. MET 12/27/21  4. Patient will improve time on 5 time sit to stand test to 16 seconds for improved lower extremity (LE) functional strength. MET 12/27/21  5. Patient will improve score on Functional Gait Assessment (FGA) to 20/30 for decreased risk of falls. MET 12/27/21     Long Term Goals: 8 weeks   1. Patient will be (I) with advanced HEP. Ongoing  2. Patient will improve condition 3 on [place] Sensory Test (GST) to 10 seconds for improved balance with narrow base of support (NBOS). MET 12/27/21  3. Patient will improve condition 6 on [place] Sensory Test (GST) to 24 seconds for improved balance in low vision environments. MET 12/27/21  4. Patient will improve time on 5 time sit to stand test to 16 seconds for improved lower extremity (LE) functional strength. MET 12/27/21  Updated: Patient will improve time on 5 time sit to stand test to 10 seconds for improved lower extremity (LE) functional strength. ongoing  5. Patient will improve score on Functional Gait Assessment (FGA) to 22/30 for decreased risk of falls. MET 12/27/21  Updated: Patient will improve score on Functional Gait Assessment (FGA) to 25/30 for decreased risk of falls. ongoing    Plan     Progress static and dynamic balance    Albert Zurita, KRISSY

## 2022-02-28 ENCOUNTER — EXTERNAL CHRONIC CARE MANAGEMENT (OUTPATIENT)
Dept: PRIMARY CARE CLINIC | Facility: CLINIC | Age: 82
End: 2022-02-28
Payer: MEDICARE

## 2022-02-28 PROCEDURE — 99490 CHRNC CARE MGMT STAFF 1ST 20: CPT | Mod: PBBFAC | Performed by: INTERNAL MEDICINE

## 2022-02-28 PROCEDURE — 99490 PR CHRONIC CARE MGMT, 1ST 20 MIN: ICD-10-PCS | Mod: S$PBB,,, | Performed by: INTERNAL MEDICINE

## 2022-02-28 PROCEDURE — 99490 CHRNC CARE MGMT STAFF 1ST 20: CPT | Mod: S$PBB,,, | Performed by: INTERNAL MEDICINE

## 2022-03-02 DIAGNOSIS — Z79.890 POSTMENOPAUSAL HORMONE REPLACEMENT THERAPY: Chronic | ICD-10-CM

## 2022-03-02 RX ORDER — ESTRADIOL AND NORETHINDRONE ACETATE .5; .1 MG/1; MG/1
1 TABLET ORAL DAILY
Qty: 90 TABLET | Refills: 1 | Status: SHIPPED | OUTPATIENT
Start: 2022-03-02 | End: 2022-05-26 | Stop reason: SDUPTHER

## 2022-03-02 RX ORDER — TEMAZEPAM 15 MG/1
15 CAPSULE ORAL NIGHTLY
Qty: 30 CAPSULE | Refills: 3 | Status: SHIPPED | OUTPATIENT
Start: 2022-03-02 | End: 2022-03-03 | Stop reason: SDUPTHER

## 2022-03-02 NOTE — TELEPHONE ENCOUNTER
No new care gaps identified.  Powered by [employer]. Reference number: [id number].   3/02/2022 2:34:02 PM CST

## 2022-03-03 DIAGNOSIS — M65.30 TRIGGER FINGER OF RIGHT HAND, UNSPECIFIED FINGER: Primary | ICD-10-CM

## 2022-03-03 RX ORDER — TEMAZEPAM 15 MG/1
15 CAPSULE ORAL NIGHTLY
Qty: 30 CAPSULE | Refills: 3 | Status: SHIPPED | OUTPATIENT
Start: 2022-03-03 | End: 2022-03-28 | Stop reason: SDUPTHER

## 2022-03-03 NOTE — TELEPHONE ENCOUNTER
Referral placed for richar johnson     Also yesterday I did the temazepam for 90 days and today 30 days with 2 refills    Can you call her pharmacy and tell them I am ok with 90 on the temazepam?

## 2022-03-03 NOTE — TELEPHONE ENCOUNTER
No new care gaps identified.  Powered by [employer]. Reference number: [id number].   3/03/2022 9:31:44 AM CST

## 2022-03-04 ENCOUNTER — TELEPHONE (OUTPATIENT)
Dept: ORTHOPEDICS | Facility: CLINIC | Age: 82
End: 2022-03-04
Payer: MEDICARE

## 2022-03-04 ENCOUNTER — PATIENT MESSAGE (OUTPATIENT)
Dept: ORTHOPEDICS | Facility: CLINIC | Age: 82
End: 2022-03-04
Payer: MEDICARE

## 2022-03-04 NOTE — TELEPHONE ENCOUNTER
3/4/22 [facility] for pt to call the referrals department to schedule appt with [facility]

## 2022-03-04 NOTE — TELEPHONE ENCOUNTER
Left VM for pt. Confirmed appt location & time c Dr. Jin 03/22/21    Informed pt that XRs are needed prior to appt. Requested a call back to the [facility] at [phone] with laterality of her pain.     MCM sent.

## 2022-03-07 DIAGNOSIS — M79.641 RIGHT HAND PAIN: Primary | ICD-10-CM

## 2022-03-08 NOTE — PROGRESS NOTES
Physical Therapy Treatment Note     Name: Shelly Dyerman  Clinic Number: [id number]    Therapy Diagnosis:  Encounter Diagnosis   Name Primary?    Impaired functional mobility, balance, gait, and endurance Yes         Physician: Joana Doty MD    Visit Date: 3/9/2022    Physician: Joana Doty MD  Physician Orders: PT Eval and Treat  Medical Diagnosis from Referral: R26.89 (ICD-10-CM) - Balance disorder   Evaluation Date: 11/16/2021  Authorization Period Expiration: 09/24/2021 - 09/24/2022   Plan of Care Expiration: 1/14/22  updated plan of care: 2/11/22  updated plan of care: 3/25/22  Visit # / Visits authorized: 10/20  (14 total + initial eval)    Time In:1020  Time Out: 1100  Total Billable Time: 40 minutes    Precautions: Standard and Fall    Subjective     Pt reports: she did have a fall over [employer] where she was walking in the street and stepping in a pot hole at night time. Denies any subsequent injury, and was able to get up right after having the fall.   She was compliant with home exercise program.   Response to previous treatment: no adverse effects  Functional change: ongoing    Pain: n/a    Objective     Shelly received therapeutic exercises to develop strength, endurance, ROM, flexibility, posture and core stabilization for 25 minutes including:    X10 min scifit, level 3 for cardiovascular endurance and general tissue extensibility     2 x 30 sec incline calf stretch     2 x 30 sec hamstring stretch on steps, bilateral     Shelly participated in neuromuscular re-education activities to improve: Balance, Coordination, Kinesthetic, Sense, Proprioception and Posture for 15  minutes. The following activities were included:    Hallway walking:   2 x 100 ft horizontal head turns, standby assist   2 x 100 ft vertical head turns, standby assist   2 x 100 ft RUQ><LLQ, standby assist   2 x 100 ft LUQ><RLQ, standby assist        Home Exercises Provided and Patient Education Provided      Education provided:   - plan of care  - home exercise program     Written Home Exercises Provided: yes.  Exercises were reviewed and Shelly was able to demonstrate them prior to the end of the session.  Shelly demonstrated good  understanding of the education provided.     See EMR under Patient Instructions for exercises provided 12/1/2021.    Assessment     Ms. Bravo tolerated today's session well. Continued to address endurance, lower extremity flexibility and dynamic balance as this is where patient experiences most imbalance in community. patient did report fall over [employer] where she stepping into a pot hole, but denies subsequent injury. Endorses improved safety awareness and improved ease with getting in and out of a car since starting PT. No loss of balance throughout today's dynamic balance and good balance reactions observed when path deviation occurred.  Pt appropriate for continued PT to address strength and balance.     Shelly is progressing well towards her goals.   Pt prognosis is Good.     Pt will continue to benefit from skilled outpatient physical therapy to address the deficits listed in the problem list box on initial evaluation, provide pt/family education and to maximize pt's level of independence in the home and community environment.     Pt's spiritual, cultural and educational needs considered and pt agreeable to plan of care and goals.     Anticipated barriers to physical therapy: co morbidities     Goals:  Short Term Goals: 4 weeks   1. Patient will be (I) with HEP. Ongoing  2. Patient will improve condition 3 on [place] Sensory Test (GST) to 8 seconds for improved balance with narrow base of support (NBOS). MET 12/27/21  3. Patient will improve condition 6 on [place] Sensory Test (GST) to 20 seconds for improved balance in low vision environments. MET 12/27/21  4. Patient will improve time on 5 time sit to stand test to 16 seconds for improved lower extremity (LE) functional strength. MET  12/27/21  5. Patient will improve score on Functional Gait Assessment (FGA) to 20/30 for decreased risk of falls. MET 12/27/21     Long Term Goals: 8 weeks   1. Patient will be (I) with advanced HEP. Ongoing  2. Patient will improve condition 3 on [place] Sensory Test (GST) to 10 seconds for improved balance with narrow base of support (NBOS). MET 12/27/21  3. Patient will improve condition 6 on Domi Sensory Test (GST) to 24 seconds for improved balance in low vision environments. MET 12/27/21  4. Patient will improve time on 5 time sit to stand test to 16 seconds for improved lower extremity (LE) functional strength. MET 12/27/21  Updated: Patient will improve time on 5 time sit to stand test to 10 seconds for improved lower extremity (LE) functional strength. ongoing  5. Patient will improve score on Functional Gait Assessment (FGA) to 22/30 for decreased risk of falls. MET 12/27/21  Updated: Patient will improve score on Functional Gait Assessment (FGA) to 25/30 for decreased risk of falls. ongoing    Plan     Progress static and dynamic balance    Jaja Caldera, PT

## 2022-03-09 ENCOUNTER — CLINICAL SUPPORT (OUTPATIENT)
Dept: REHABILITATION | Facility: HOSPITAL | Age: 82
End: 2022-03-09
Payer: MEDICARE

## 2022-03-09 DIAGNOSIS — Z74.09 IMPAIRED FUNCTIONAL MOBILITY, BALANCE, GAIT, AND ENDURANCE: Primary | ICD-10-CM

## 2022-03-09 PROCEDURE — 97112 NEUROMUSCULAR REEDUCATION: CPT | Mod: PO

## 2022-03-09 PROCEDURE — 97110 THERAPEUTIC EXERCISES: CPT | Mod: PO

## 2022-03-16 ENCOUNTER — DOCUMENTATION ONLY (OUTPATIENT)
Dept: REHABILITATION | Facility: HOSPITAL | Age: 82
End: 2022-03-16
Payer: MEDICARE

## 2022-03-16 NOTE — PROGRESS NOTES
PHYSICAL THERAPY DISCHARGE SUMMARY     Physician: Joana Doty MD  Physician Orders: PT Eval and Treat  Medical Diagnosis from Referral: R26.89 (ICD-10-CM) - Balance disorder   Evaluation Date: 11/16/2021  Authorization Period Expiration: 09/24/2021 - 09/24/2022   Plan of Care Expiration: 1/14/22  updated plan of care: 2/11/22  updated plan of care: 3/25/22  Visit # / Visits authorized: 10/20  (14 total + initial eval)      Status Towards Goals Met: met all goals, patient wished to self discharge to manage symptoms on independent basis.       Goals:  Short Term Goals: 4 weeks   1. Patient will be (I) with HEP. MET 12/27/21  2. Patient will improve condition 3 on Dom[place] Sensory Test (GST) to 8 seconds for improved balance with narrow base of support (NBOS). MET 12/27/21  3. Patient will improve condition 6 on [place] Sensory Test (GST) to 20 seconds for improved balance in low vision environments. MET 12/27/21  4. Patient will improve time on 5 time sit to stand test to 16 seconds for improved lower extremity (LE) functional strength. MET 12/27/21  5. Patient will improve score on Functional Gait Assessment (FGA) to 20/30 for decreased risk of falls. MET 12/27/21     Long Term Goals: 8 weeks   1. Patient will be (I) with advanced HEP. MET 3/9/22  2. Patient will improve condition 3 on [place] Sensory Test (GST) to 10 seconds for improved balance with narrow base of support (NBOS). MET 12/27/21  3. Patient will improve condition 6 on [place] Sensory Test (GST) to 24 seconds for improved balance in low vision environments. MET 12/27/21  4. Patient will improve time on 5 time sit to stand test to 16 seconds for improved lower extremity (LE) functional strength. MET 12/27/21  Updated: Patient will improve time on 5 time sit to stand test to 10 seconds for improved lower extremity (LE) functional strength. MET 3/9/22  5. Patient will improve score on Functional Gait Assessment (FGA) to 22/30 for decreased risk of falls. MET  12/27/21  Updated: Patient will improve score on Functional Gait Assessment (FGA) to 25/30 for decreased risk of falls. Not assessed      Discharge reason : Met goals and Patient self discharge    PLAN   This patient is discharged from Outpatient Physical Therapy Services.     Jaja Caldera, PT  03/16/2022

## 2022-03-21 ENCOUNTER — PATIENT OUTREACH (OUTPATIENT)
Dept: ADMINISTRATIVE | Facility: OTHER | Age: 82
End: 2022-03-21
Payer: MEDICARE

## 2022-03-22 ENCOUNTER — HOSPITAL ENCOUNTER (OUTPATIENT)
Dept: RADIOLOGY | Facility: OTHER | Age: 82
Discharge: HOME | End: 2022-03-22
Attending: ORTHOPAEDIC SURGERY
Payer: MEDICARE

## 2022-03-22 ENCOUNTER — OFFICE VISIT (OUTPATIENT)
Dept: ORTHOPEDICS | Facility: CLINIC | Age: 82
End: 2022-03-22
Payer: MEDICARE

## 2022-03-22 VITALS — WEIGHT: 151 LBS | BODY MASS INDEX: 22.88 KG/M2 | HEIGHT: 68 IN

## 2022-03-22 DIAGNOSIS — G56.03 CARPAL TUNNEL SYNDROME ON BOTH SIDES: ICD-10-CM

## 2022-03-22 DIAGNOSIS — M65.321 TRIGGER INDEX FINGER OF RIGHT HAND: ICD-10-CM

## 2022-03-22 DIAGNOSIS — M72.0 DUPUYTREN'S DISEASE OF PALM OF BOTH HANDS: Primary | ICD-10-CM

## 2022-03-22 DIAGNOSIS — M65.311 TRIGGER FINGER OF RIGHT THUMB: ICD-10-CM

## 2022-03-22 DIAGNOSIS — M79.641 RIGHT HAND PAIN: ICD-10-CM

## 2022-03-22 DIAGNOSIS — M18.11 ARTHRITIS OF CARPOMETACARPAL (CMC) JOINT OF RIGHT THUMB: ICD-10-CM

## 2022-03-22 PROCEDURE — 99999 PR PBB SHADOW E&M-EST. PATIENT-LVL III: ICD-10-PCS | Mod: PBBFAC,,, | Performed by: ORTHOPAEDIC SURGERY

## 2022-03-22 PROCEDURE — 99213 PR OFFICE/OUTPT VISIT, EST, LEVL III, 20-29 MIN: ICD-10-PCS | Mod: 25,S$PBB,, | Performed by: ORTHOPAEDIC SURGERY

## 2022-03-22 PROCEDURE — 73130 XR HAND COMPLETE 3 VIEW RIGHT: ICD-10-PCS | Mod: 26,RT,, | Performed by: RADIOLOGY

## 2022-03-22 PROCEDURE — 99213 OFFICE O/P EST LOW 20 MIN: CPT | Mod: 25,S$PBB,, | Performed by: ORTHOPAEDIC SURGERY

## 2022-03-22 PROCEDURE — 20550 NJX 1 TENDON SHEATH/LIGAMENT: CPT | Mod: S$PBB,RT,, | Performed by: ORTHOPAEDIC SURGERY

## 2022-03-22 PROCEDURE — 20550 TENDON SHEATH: ICD-10-PCS | Mod: S$PBB,RT,, | Performed by: ORTHOPAEDIC SURGERY

## 2022-03-22 PROCEDURE — 73130 X-RAY EXAM OF HAND: CPT | Mod: TC,FY,RT

## 2022-03-22 PROCEDURE — 99999 PR PBB SHADOW E&M-EST. PATIENT-LVL III: CPT | Mod: PBBFAC,,, | Performed by: ORTHOPAEDIC SURGERY

## 2022-03-22 PROCEDURE — 99213 OFFICE O/P EST LOW 20 MIN: CPT | Mod: PBBFAC,25 | Performed by: ORTHOPAEDIC SURGERY

## 2022-03-22 PROCEDURE — 76942 ECHO GUIDE FOR BIOPSY: CPT | Mod: PBBFAC | Performed by: ORTHOPAEDIC SURGERY

## 2022-03-22 PROCEDURE — 73130 X-RAY EXAM OF HAND: CPT | Mod: 26,RT,, | Performed by: RADIOLOGY

## 2022-03-22 RX ADMIN — DEXAMETHASONE SODIUM PHOSPHATE 4 MG: 4 INJECTION INTRA-ARTICULAR; INTRALESIONAL; INTRAMUSCULAR; INTRAVENOUS; SOFT TISSUE at 09:03

## 2022-03-22 NOTE — PROCEDURES
Tendon Sheath    Date/Time: 3/22/2022 9:15 AM  Performed by: Ava Lind MD  Authorized by: Ava Lind MD     Consent Done?:  Yes (Verbal)  Indications:  Pain  Timeout: prior to procedure the correct patient, procedure, and site was verified    Prep: patient was prepped and draped in usual sterile fashion      Local anesthesia used?: Yes    Anesthesia:  Local infiltration  Local anesthetic:  Lidocaine 1% with epinephrine  Location:  Index finger  Site:  R index MCP  Ultrasonic guidance for needle placement?: Yes    Needle size:  25 G  Approach:  Volar  Medications:  4 mg dexamethasone 4 mg/mL

## 2022-03-22 NOTE — PROGRESS NOTES
Patient has trigger finger thumb and index finger we will do an injection for both it worked for her ring finger in the past in 2019  Patient also has numbness and tingling occasionally does not wake her up for bother her too much but it does happen enough she does wear brace yet  She also has Dupuytren's and involved the right 4th over the palm she is able to make a flat hand on the table only minimal contracture of MCP joint    We talked about Dupuytren's we discussed carpal tunnel she will wear brace in his 1st trigger finger will she will do injections today did work very well for her

## 2022-03-22 NOTE — PROCEDURES
Tendon Sheath    Date/Time: 3/22/2022 9:15 AM  Performed by: Ava Lind MD  Authorized by: Ava Lind MD     Consent Done?:  Yes (Verbal)  Indications:  Pain  Timeout: prior to procedure the correct patient, procedure, and site was verified    Prep: patient was prepped and draped in usual sterile fashion      Local anesthesia used?: Yes    Anesthesia:  Local infiltration  Local anesthetic:  Lidocaine 1% without epinephrine  Location:  Thumb  Site:  R thumb MCP  Ultrasonic guidance for needle placement?: Yes    Needle size:  25 G  Medications:  4 mg dexamethasone 4 mg/mL

## 2022-03-22 NOTE — PROGRESS NOTES
[facility]  History & Physical  Orthopedics    SUBJECTIVE:      COVID-19 attestation:  This patient was treated during the COVID-19 pandemic.  This was discussed with the patient, they are aware of our current policies and procedures, were given the option of delaying their visit and or switching to a virtual visit, delaying their surgery when applicable, and they elect to proceed.    Chief Complaint: Right hand pain, triggering, numbness and tingling     Referring Provider: N/A     History of Present Illness:  Patient is a 81 y.o. right hand dominant female who presents today with complaints of right hand pain and triggering localized to the right thumb and index finger which has been present for several months. She reports associated numbness and tingling with weakness of the right hand difficulty opening jars. She denies the use of any splints or antiinflammatory medications. She reports a remote history of right palmar injection for similar symptoms over 4 years ago.      The patient is retired.    Onset of symptoms/DOI was several months.    Symptoms are aggravated by activity, movement and at night.    Symptoms are alleviated by rest and medication.    Symptoms consist of pain, deformity, decreased ROM and numbness/tingling.    The patient rates their pain as a 3/10.    Attempted treatment(s) and/or interventions include activity modifications, rest, rest and activity modification.     The patient denies any fevers, chills, N/V, D/C and presents for evaluation.       Past Medical History:   Diagnosis Date    Arthritis     Atrial fibrillation     Basal cell cancer     on the face    Encounter for blood transfusion     Gastric ulcer     Herpes simplex without mention of complication     rare outbreaks    Postmenopausal HRT (hormone replacement therapy)     Supraventricular tachycardia     s/p AVNRT ablation (Dr Brady)     Past Surgical History:   Procedure Laterality Date     APPENDECTOMY  age 21    BACK SURGERY      Beast Lift  2004    BONE MARROW BIOPSY Right 12/19/2019    Procedure: Biopsy-bone marrow;  Surgeon: Aidan Spring MD;  Location: [facility] OR [address] [place] [address];  Service: Oncology;  Laterality: Right;    BREAST BIOPSY  50yrs ago    unable to see scar    COSMETIC SURGERY      DILATION AND CURETTAGE OF UTERUS  2008    PMB    ENDOMETRIAL BIOPSY      EYE SURGERY      ptsosis      RADIOFREQUENCY ABLATION  03/2018    SMALL INTESTINE SURGERY      TONSILLECTOMY, ADENOIDECTOMY  age 8    TOTAL REDUCTION MAMMOPLASTY Bilateral 2003    TUBAL LIGATION       Review of patient's allergies indicates:   Allergen Reactions    Nsaids (non-steroidal anti-inflammatory drug)      GI Bleed  Had 5 blood transfusions     Social History     Social History Narrative    Not on file     Family History   Problem Relation Age of Onset    No Known Problems Father     Cirrhosis Mother     No Known Problems Brother     No Known Problems Maternal Aunt     No Known Problems Maternal Uncle     No Known Problems Paternal Aunt     No Known Problems Paternal Uncle     No Known Problems Maternal Grandmother     No Known Problems Maternal Grandfather     No Known Problems Paternal Grandmother     No Known Problems Paternal Grandfather     No Known Problems Daughter     Pulmonary embolism Son     Breast cancer Neg Hx     Colon cancer Neg Hx     Ovarian cancer Neg Hx     Amblyopia Neg Hx     Blindness Neg Hx     Cancer Neg Hx     Cataracts Neg Hx     Diabetes Neg Hx     Glaucoma Neg Hx     Hypertension Neg Hx     Macular degeneration Neg Hx     Retinal detachment Neg Hx     Strabismus Neg Hx     Stroke Neg Hx     Thyroid disease Neg Hx          Current Outpatient Medications:     acetaminophen (TYLENOL) 325 MG tablet, Take 325 mg by mouth continuous prn for Pain., Disp: , Rfl:     aspirin (ECOTRIN) 81 MG EC tablet, Take 1 tablet (81 mg total) by mouth once daily., Disp: 30 tablet,  "Rfl: 0    estradiol-norethindrone acet 0.5-0.1 mg per tablet, Take 1 tablet by mouth once daily., Disp: 90 tablet, Rfl: 1    hydrocortisone 2.5 % cream, CED AA BID. MAY MIX WITH KETOCONAZOLE CREAM, Disp: , Rfl: 3    ketoconazole (NIZORAL) 2 % cream, CED BID AA, Disp: , Rfl: 11    ondansetron (ZOFRAN-ODT) 8 MG TbDL, Take 8 mg by mouth every 6 (six) hours as needed., Disp: , Rfl:     pramipexole (MIRAPEX) 0.25 MG tablet, Take 1 tablet (0.25 mg total) by mouth every evening., Disp: 90 tablet, Rfl: 1    temazepam (RESTORIL) 15 mg Cap, Take 1 capsule (15 mg total) by mouth every evening., Disp: 30 capsule, Rfl: 3    traMADoL (ULTRAM) 50 mg tablet, Take 1 tablet (50 mg total) by mouth every 24 hours as needed for Pain., Disp: 30 tablet, Rfl: 3    verapamiL (VERELAN) 120 MG C24P, Take 1 capsule (120 mg total) by mouth once daily., Disp: 90 capsule, Rfl: 3    vitamin D (VITAMIN D3) 1000 units Tab, Take 1,000 Units by mouth once daily., Disp: , Rfl:       Review of Systems:  As per HPI otherwise noncontributory    OBJECTIVE:      Vital Signs (Most Recent):  Vitals:    03/22/22 0906   Weight: 68.5 kg (151 lb)   Height: 5' 8" (1.727 m)     Body mass index is 22.96 kg/m².      Physical Exam:  Constitutional: The patient appears well-developed and well-nourished. No distress.   Skin: No lesions appreciated  Head: Normocephalic and atraumatic.   Nose: Nose normal.   Ears: No deformities seen  Eyes: Conjunctivae and EOM are normal.   Neck: No tracheal deviation present.   Cardiovascular: Normal rate and intact distal pulses.    Pulmonary/Chest: Effort normal. No respiratory distress.   Abdominal: There is no guarding.   Neurological: The patient is alert.   Psychiatric: The patient has a normal mood and affect.     Bilateral Hand/Wrist Examination:    Observation/Inspection:  Swelling  none    Deformity  B/l pretendinous Dupuytren nodules of b/l 4th digits, flexor nodules of right 1st and 2nd digits with obvious " triggering    Discoloration  none     Scars   none    Atrophy  none    HAND/WRIST EXAMINATION:  Finkelstein's Test   Neg  WHAT Test    Neg  Snuff box tenderness   Neg  Tinoco's Test    Neg  Hook of Hamate Tenderness  Neg  CMC grind    Neg  Circumduction test   Neg    Neurovascular Exam:  Digits WWP, brisk CR < 3s throughout  NVI motor/LTS to M/R/U nerves, radial pulse 2+  Tinel's Test - Carpal Tunnel  Positive b/l  Tinel's Test - Cubital Tunnel  Neg  Phalen's Test    Positive b/l  Median Nerve Compression Test Positive b/l    ROM hand full, painless    ROM wrist full, painless    ROM elbow full, painless    Abdomen not guarded  Respirations nonlabored  Perfusion intact    Diagnostic Results:     Imaging - I independently viewed the patient's imaging as well as the radiology report.  Xrays of the patient's right hand demonstrate no evidence of any acute fractures or dislocations with significant 1st cmc arthritis     EMG - none    ASSESSMENT/PLAN:      81 y.o. yo female with right thumb/index trigger finger, b/l carpal tunnel syndrome, b/l Dupuytren nodules  Plan: The patient and I had a thorough discussion today.  We discussed the working diagnosis as well as several other potential alternative diagnoses.  Treatment options were discussed, both conservative and surgical.  Conservative treatment options would include things such as activity modifications, workplace modifications, a period of rest, oral vs topical OTC and prescription anti-inflammatory medications, occupational therapy, splinting/bracing, immobilization, corticosteroid injections, and others.  Surgical options were discussed as well.     At this time, the patient would like to proceed with a trial of night time splints, EMG/NCS b/l, CSI of right thumb and index trigger fingers.    Should the patient's symptoms worsen, persist, or fail to improve they should return for reevaluation and I would be happy to see them back anytime.

## 2022-03-28 ENCOUNTER — TELEPHONE (OUTPATIENT)
Dept: INTERNAL MEDICINE | Facility: CLINIC | Age: 82
End: 2022-03-28
Payer: MEDICARE

## 2022-03-28 DIAGNOSIS — K64.4 EXTERNAL HEMORRHOID: Primary | ICD-10-CM

## 2022-03-28 RX ORDER — TEMAZEPAM 15 MG/1
15 CAPSULE ORAL NIGHTLY
Qty: 90 CAPSULE | Refills: 1 | Status: SHIPPED | OUTPATIENT
Start: 2022-03-28 | End: 2022-06-23 | Stop reason: SDUPTHER

## 2022-03-31 ENCOUNTER — EXTERNAL CHRONIC CARE MANAGEMENT (OUTPATIENT)
Dept: PRIMARY CARE CLINIC | Facility: CLINIC | Age: 82
End: 2022-03-31
Payer: MEDICARE

## 2022-03-31 ENCOUNTER — TELEPHONE (OUTPATIENT)
Dept: SURGERY | Facility: CLINIC | Age: 82
End: 2022-03-31
Payer: MEDICARE

## 2022-03-31 PROCEDURE — 99490 CHRNC CARE MGMT STAFF 1ST 20: CPT | Mod: S$PBB,,, | Performed by: INTERNAL MEDICINE

## 2022-03-31 PROCEDURE — 99490 CHRNC CARE MGMT STAFF 1ST 20: CPT | Mod: PBBFAC | Performed by: INTERNAL MEDICINE

## 2022-03-31 PROCEDURE — 99490 PR CHRONIC CARE MGMT, 1ST 20 MIN: ICD-10-PCS | Mod: S$PBB,,, | Performed by: INTERNAL MEDICINE

## 2022-03-31 NOTE — TELEPHONE ENCOUNTER
Spoke with patient regarding sooner appointment to see Dr. Martin. Appointment details confirmed verbally with patient.

## 2022-04-05 RX ORDER — DEXAMETHASONE SODIUM PHOSPHATE 4 MG/ML
4 INJECTION, SOLUTION INTRA-ARTICULAR; INTRALESIONAL; INTRAMUSCULAR; INTRAVENOUS; SOFT TISSUE
Status: DISCONTINUED | OUTPATIENT
Start: 2022-03-22 | End: 2022-04-05 | Stop reason: HOSPADM

## 2022-04-07 ENCOUNTER — TELEPHONE (OUTPATIENT)
Dept: SURGERY | Facility: CLINIC | Age: 82
End: 2022-04-07
Payer: MEDICARE

## 2022-04-08 ENCOUNTER — OFFICE VISIT (OUTPATIENT)
Dept: SURGERY | Facility: CLINIC | Age: 82
End: 2022-04-08
Attending: COLON & RECTAL SURGERY
Payer: MEDICARE

## 2022-04-08 VITALS
SYSTOLIC BLOOD PRESSURE: 91 MMHG | WEIGHT: 150.44 LBS | HEIGHT: 68 IN | BODY MASS INDEX: 22.8 KG/M2 | HEART RATE: 72 BPM | DIASTOLIC BLOOD PRESSURE: 55 MMHG

## 2022-04-08 DIAGNOSIS — K64.4 EXTERNAL HEMORRHOID: ICD-10-CM

## 2022-04-08 PROCEDURE — 99214 OFFICE O/P EST MOD 30 MIN: CPT | Mod: PBBFAC | Performed by: COLON & RECTAL SURGERY

## 2022-04-08 PROCEDURE — 99999 PR PBB SHADOW E&M-EST. PATIENT-LVL IV: CPT | Mod: PBBFAC,,, | Performed by: COLON & RECTAL SURGERY

## 2022-04-08 PROCEDURE — 46600 PR DIAG2STIC A2SCOPY: ICD-10-PCS | Mod: S$PBB,,, | Performed by: COLON & RECTAL SURGERY

## 2022-04-08 PROCEDURE — 99999 PR PBB SHADOW E&M-EST. PATIENT-LVL IV: ICD-10-PCS | Mod: PBBFAC,,, | Performed by: COLON & RECTAL SURGERY

## 2022-04-08 PROCEDURE — 99203 PR OFFICE/OUTPT VISIT, NEW, LEVL III, 30-44 MIN: ICD-10-PCS | Mod: 25,S$PBB,, | Performed by: COLON & RECTAL SURGERY

## 2022-04-08 PROCEDURE — 99203 OFFICE O/P NEW LOW 30 MIN: CPT | Mod: 25,S$PBB,, | Performed by: COLON & RECTAL SURGERY

## 2022-04-08 PROCEDURE — 46600 DIAGNOSTIC ANOSCOPY SPX: CPT | Mod: PBBFAC | Performed by: COLON & RECTAL SURGERY

## 2022-04-08 PROCEDURE — 46600 DIAGNOSTIC ANOSCOPY SPX: CPT | Mod: S$PBB,,, | Performed by: COLON & RECTAL SURGERY

## 2022-04-08 RX ORDER — PRAMIPEXOLE DIHYDROCHLORIDE 0.12 MG/1
TABLET ORAL
COMMUNITY
Start: 2022-01-17 | End: 2022-04-08

## 2022-04-08 NOTE — PROGRESS NOTES
CRS Office Visit History and Physical    Referring Md:   Marlene Andrew Md  [address] [place] [address]    SUBJECTIVE:     Chief Complaint: Hemorrhoids    History of Present Illness:  The patient is new patient to this practice.   Course is as follows:  Patient is a 81 y.o. female presents with hemorrhoids. Has 1 area that has been there for 2 weeks.  Not painful, but was irritating. Never drained fluid or blood. Used Lubriderm. This was an isolated episode.  Had hemorrhoids when she was pregnant.  Her OB/Gyn removed them, after the delivery and in the office. Does not recall this being painful.  Started MiraLax (takes 1/2 capful at night) 6 months ago. She feels like she has gas with this. With this she goes 1-2 times per day. Has a long stool, comes out easily, less than 5min on the toilet. Has tried Wilde stool softener.   Associated bleeding: no  Previous anorectal procedures: yes  denies straining/prolonged time on toilet with bowel movements.  is currently taking fiber supplement of stool softener  Blood thinners: No    Last Colonoscopy: 2014 (no polyps), all before were normal  Family history of colorectal cancer or IBD: None.    Review of patient's allergies indicates:   Allergen Reactions    Nsaids (non-steroidal anti-inflammatory drug)      GI Bleed  Had 5 blood transfusions       Past Medical History:   Diagnosis Date    Arthritis     Atrial fibrillation     Basal cell cancer     on the face    Encounter for blood transfusion     Gastric ulcer     Herpes simplex without mention of complication     rare outbreaks    Postmenopausal HRT (hormone replacement therapy)     Supraventricular tachycardia     s/p AVNRT ablation (Dr Brady)     Past Surgical History:   Procedure Laterality Date    APPENDECTOMY  age 21    BACK SURGERY      Beast Lift  2004    BONE MARROW BIOPSY Right 12/19/2019    Procedure: Biopsy-bone marrow;  Surgeon: Aidan Spring MD;  Location: [facility] OR  2ND FLR;  Service: Oncology;  Laterality: Right;    BREAST BIOPSY  50yrs ago    unable to see scar    COSMETIC SURGERY      DILATION AND CURETTAGE OF UTERUS      PMB    ENDOMETRIAL BIOPSY      EYE SURGERY      ptsosis      RADIOFREQUENCY ABLATION  2018    SMALL INTESTINE SURGERY      TONSILLECTOMY, ADENOIDECTOMY  age 8    TOTAL REDUCTION MAMMOPLASTY Bilateral 2003    TUBAL LIGATION       Family History   Problem Relation Age of Onset    No Known Problems Father     Cirrhosis Mother     No Known Problems Brother     No Known Problems Maternal Aunt     No Known Problems Maternal Uncle     No Known Problems Paternal Aunt     No Known Problems Paternal Uncle     No Known Problems Maternal Grandmother     No Known Problems Maternal Grandfather     No Known Problems Paternal Grandmother     No Known Problems Paternal Grandfather     No Known Problems Daughter     Pulmonary embolism Son     Breast cancer Neg Hx     Colon cancer Neg Hx     Ovarian cancer Neg Hx     Amblyopia Neg Hx     Blindness Neg Hx     Cancer Neg Hx     Cataracts Neg Hx     Diabetes Neg Hx     Glaucoma Neg Hx     Hypertension Neg Hx     Macular degeneration Neg Hx     Retinal detachment Neg Hx     Strabismus Neg Hx     Stroke Neg Hx     Thyroid disease Neg Hx      Social History     Tobacco Use    Smoking status: Former Smoker     Packs/day: 1.00     Years: 22.00     Pack years: 22.00     Types: Cigarettes     Quit date: 1980     Years since quittin.7    Smokeless tobacco: Never Used   Substance Use Topics    Alcohol use: Yes     Alcohol/week: 2.0 standard drinks     Types: 2 Glasses of wine per week     Comment: 1-2 glasses of wine daily    Drug use: No        Review of Systems:  Review of Systems   All other systems reviewed and are negative.      OBJECTIVE:     Vital Signs (Most Recent)  BP (!) 91/55 (BP Location: Right arm, Patient Position: Sitting, BP Method: Large (Automatic))   Pulse  "72   Ht 5' 8" (1.727 m)   Wt 68.2 kg (150 lb 7.4 oz)   LMP  (LMP Unknown)   BMI 22.88 kg/m²     Physical Exam:  General: White female in no distress   Neuro: Alert and oriented x 4.  Moves all extremities.     HEENT: No icterus.  Trachea midline  Respiratory: Respirations are even and unlabored  Cardiac: Regular rate  Extremities: Warm dry and intact  Skin: No rashes    Anorectal:   External exam: Perianal skin with resolving thrombosed left lateral external hemorrhoid  Digital exam revealed normal tone. No masses.  No blood    Anoscopy:  Verbal consent was obtained.   Clear plastic anoscope inserted.    Grade I/II hemorrhoids seen.      ASSESSMENT/PLAN:     80yo F w/ resolving thrombosed external hemorrhoid    The patient was instructed to:  Increase water intake to at least 8-10 glasses of water per day.  Continue MiraLax  Take a daily fiber supplement (Konsyl, Benefiber, Metamucil) and increase dietary intake to 20-30 grams/day.  Avoid straining or spending >5min/event with bowel movements.  Follow-up in clinic prn or if symptoms recur    Briseida Martin MD  Staff Surgeon, Colon and Rectal Surgery  [facility]    "

## 2022-04-10 ENCOUNTER — PATIENT MESSAGE (OUTPATIENT)
Dept: INTERNAL MEDICINE | Facility: CLINIC | Age: 82
End: 2022-04-10
Payer: MEDICARE

## 2022-04-10 RX ORDER — ROPINIROLE HYDROCHLORIDE 2 MG/1
2 TABLET, FILM COATED, EXTENDED RELEASE ORAL NIGHTLY
Qty: 30 TABLET | Refills: 0 | Status: SHIPPED | OUTPATIENT
Start: 2022-04-10 | End: 2022-05-12

## 2022-04-12 ENCOUNTER — TELEPHONE (OUTPATIENT)
Dept: INTERNAL MEDICINE | Facility: CLINIC | Age: 82
End: 2022-04-12
Payer: MEDICARE

## 2022-04-25 RX ORDER — VERAPAMIL HYDROCHLORIDE 120 MG/1
120 CAPSULE, EXTENDED RELEASE ORAL DAILY
Qty: 90 CAPSULE | Refills: 3 | Status: SHIPPED | OUTPATIENT
Start: 2022-04-25 | End: 2022-10-25 | Stop reason: SDUPTHER

## 2022-04-25 NOTE — TELEPHONE ENCOUNTER
----- Message from Sunita Leiva sent at 4/25/2022  3:56 PM CDT -----  Regarding: refill  Shelly Sargent called and needs her Verapamil 120ml capsules refilled.  Asking that it be automatic every 90 days.    Thank you,  Sunita

## 2022-04-25 NOTE — TELEPHONE ENCOUNTER
No new care gaps identified.  Powered by [employer]. Reference number: [id number].   4/25/2022 4:02:40 PM CDT

## 2022-04-28 ENCOUNTER — PES CALL (OUTPATIENT)
Dept: ADMINISTRATIVE | Facility: CLINIC | Age: 82
End: 2022-04-28
Payer: MEDICARE

## 2022-04-30 ENCOUNTER — EXTERNAL CHRONIC CARE MANAGEMENT (OUTPATIENT)
Dept: PRIMARY CARE CLINIC | Facility: CLINIC | Age: 82
End: 2022-04-30
Payer: MEDICARE

## 2022-04-30 PROCEDURE — 99490 PR CHRONIC CARE MGMT, 1ST 20 MIN: ICD-10-PCS | Mod: S$PBB,,, | Performed by: INTERNAL MEDICINE

## 2022-04-30 PROCEDURE — 99490 CHRNC CARE MGMT STAFF 1ST 20: CPT | Mod: S$PBB,,, | Performed by: INTERNAL MEDICINE

## 2022-04-30 PROCEDURE — 99490 CHRNC CARE MGMT STAFF 1ST 20: CPT | Mod: PBBFAC | Performed by: INTERNAL MEDICINE

## 2022-05-12 ENCOUNTER — LAB VISIT (OUTPATIENT)
Dept: LAB | Facility: HOSPITAL | Age: 82
End: 2022-05-12
Attending: INTERNAL MEDICINE
Payer: MEDICARE

## 2022-05-12 ENCOUNTER — OFFICE VISIT (OUTPATIENT)
Dept: INTERNAL MEDICINE | Facility: CLINIC | Age: 82
End: 2022-05-12
Payer: MEDICARE

## 2022-05-12 VITALS
HEIGHT: 68 IN | DIASTOLIC BLOOD PRESSURE: 68 MMHG | WEIGHT: 154.56 LBS | TEMPERATURE: 98 F | HEART RATE: 66 BPM | SYSTOLIC BLOOD PRESSURE: 125 MMHG | BODY MASS INDEX: 23.43 KG/M2

## 2022-05-12 DIAGNOSIS — R53.83 FATIGUE, UNSPECIFIED TYPE: ICD-10-CM

## 2022-05-12 DIAGNOSIS — M25.551 CHRONIC HIP PAIN, BILATERAL: ICD-10-CM

## 2022-05-12 DIAGNOSIS — R73.03 PREDIABETES: ICD-10-CM

## 2022-05-12 DIAGNOSIS — G89.29 CHRONIC HIP PAIN, BILATERAL: ICD-10-CM

## 2022-05-12 DIAGNOSIS — N18.31 STAGE 3A CHRONIC KIDNEY DISEASE: ICD-10-CM

## 2022-05-12 DIAGNOSIS — M25.552 CHRONIC HIP PAIN, BILATERAL: ICD-10-CM

## 2022-05-12 DIAGNOSIS — H26.9 CATARACT, UNSPECIFIED CATARACT TYPE, UNSPECIFIED LATERALITY: ICD-10-CM

## 2022-05-12 DIAGNOSIS — K21.9 GASTROESOPHAGEAL REFLUX DISEASE, UNSPECIFIED WHETHER ESOPHAGITIS PRESENT: ICD-10-CM

## 2022-05-12 DIAGNOSIS — M65.9 STENOSING TENOSYNOVITIS: ICD-10-CM

## 2022-05-12 DIAGNOSIS — G25.81 RLS (RESTLESS LEGS SYNDROME): ICD-10-CM

## 2022-05-12 DIAGNOSIS — D64.9 ANEMIA, UNSPECIFIED TYPE: Chronic | ICD-10-CM

## 2022-05-12 DIAGNOSIS — D64.9 ANEMIA, UNSPECIFIED TYPE: ICD-10-CM

## 2022-05-12 DIAGNOSIS — M19.041 PRIMARY OSTEOARTHRITIS OF RIGHT HAND: Primary | ICD-10-CM

## 2022-05-12 DIAGNOSIS — D46.9 MDS (MYELODYSPLASTIC SYNDROME): Chronic | ICD-10-CM

## 2022-05-12 LAB
ALBUMIN SERPL BCP-MCNC: 4.1 G/DL (ref 3.5–5.2)
ALP SERPL-CCNC: 37 U/L (ref 55–135)
ALT SERPL W/O P-5'-P-CCNC: 17 U/L (ref 10–44)
ANION GAP SERPL CALC-SCNC: 10 MMOL/L (ref 8–16)
AST SERPL-CCNC: 18 U/L (ref 10–40)
BASOPHILS # BLD AUTO: 0.08 K/UL (ref 0–0.2)
BASOPHILS NFR BLD: 1.2 % (ref 0–1.9)
BILIRUB SERPL-MCNC: 0.7 MG/DL (ref 0.1–1)
BUN SERPL-MCNC: 11 MG/DL (ref 8–23)
CALCIUM SERPL-MCNC: 9.1 MG/DL (ref 8.7–10.5)
CHLORIDE SERPL-SCNC: 104 MMOL/L (ref 95–110)
CO2 SERPL-SCNC: 22 MMOL/L (ref 23–29)
CREAT SERPL-MCNC: 0.9 MG/DL (ref 0.5–1.4)
DIFFERENTIAL METHOD: ABNORMAL
EOSINOPHIL # BLD AUTO: 0.2 K/UL (ref 0–0.5)
EOSINOPHIL NFR BLD: 3.6 % (ref 0–8)
ERYTHROCYTE [DISTWIDTH] IN BLOOD BY AUTOMATED COUNT: 16.4 % (ref 11.5–14.5)
EST. GFR  (AFRICAN AMERICAN): >60 ML/MIN/1.73 M^2
EST. GFR  (NON AFRICAN AMERICAN): 59.3 ML/MIN/1.73 M^2
ESTIMATED AVG GLUCOSE: 94 MG/DL (ref 68–131)
FERRITIN SERPL-MCNC: 580 NG/ML (ref 20–300)
GLUCOSE SERPL-MCNC: 104 MG/DL (ref 70–110)
HBA1C MFR BLD: 4.9 % (ref 4–5.6)
HCT VFR BLD AUTO: 26.7 % (ref 37–48.5)
HGB BLD-MCNC: 8.6 G/DL (ref 12–16)
IMM GRANULOCYTES # BLD AUTO: 0.02 K/UL (ref 0–0.04)
IMM GRANULOCYTES NFR BLD AUTO: 0.3 % (ref 0–0.5)
IRON SERPL-MCNC: 114 UG/DL (ref 30–160)
LYMPHOCYTES # BLD AUTO: 2.9 K/UL (ref 1–4.8)
LYMPHOCYTES NFR BLD: 43.3 % (ref 18–48)
MCH RBC QN AUTO: 35.8 PG (ref 27–31)
MCHC RBC AUTO-ENTMCNC: 32.2 G/DL (ref 32–36)
MCV RBC AUTO: 111 FL (ref 82–98)
MONOCYTES # BLD AUTO: 0.6 K/UL (ref 0.3–1)
MONOCYTES NFR BLD: 9.3 % (ref 4–15)
NEUTROPHILS # BLD AUTO: 2.8 K/UL (ref 1.8–7.7)
NEUTROPHILS NFR BLD: 42.3 % (ref 38–73)
NRBC BLD-RTO: 0 /100 WBC
PLATELET # BLD AUTO: 491 K/UL (ref 150–450)
PMV BLD AUTO: 10 FL (ref 9.2–12.9)
POTASSIUM SERPL-SCNC: 4.2 MMOL/L (ref 3.5–5.1)
PROT SERPL-MCNC: 6.5 G/DL (ref 6–8.4)
RBC # BLD AUTO: 2.4 M/UL (ref 4–5.4)
SATURATED IRON: 44 % (ref 20–50)
SODIUM SERPL-SCNC: 136 MMOL/L (ref 136–145)
TOTAL IRON BINDING CAPACITY: 260 UG/DL (ref 250–450)
TRANSFERRIN SERPL-MCNC: 176 MG/DL (ref 200–375)
TSH SERPL DL<=0.005 MIU/L-ACNC: 1.6 UIU/ML (ref 0.4–4)
WBC # BLD AUTO: 6.58 K/UL (ref 3.9–12.7)

## 2022-05-12 PROCEDURE — 85025 COMPLETE CBC W/AUTO DIFF WBC: CPT | Performed by: INTERNAL MEDICINE

## 2022-05-12 PROCEDURE — 99999 PR PBB SHADOW E&M-EST. PATIENT-LVL IV: CPT | Mod: PBBFAC,,, | Performed by: INTERNAL MEDICINE

## 2022-05-12 PROCEDURE — 36415 COLL VENOUS BLD VENIPUNCTURE: CPT | Performed by: INTERNAL MEDICINE

## 2022-05-12 PROCEDURE — 99999 PR PBB SHADOW E&M-EST. PATIENT-LVL IV: ICD-10-PCS | Mod: PBBFAC,,, | Performed by: INTERNAL MEDICINE

## 2022-05-12 PROCEDURE — 86677 HELICOBACTER PYLORI ANTIBODY: CPT | Performed by: INTERNAL MEDICINE

## 2022-05-12 PROCEDURE — 83036 HEMOGLOBIN GLYCOSYLATED A1C: CPT | Performed by: INTERNAL MEDICINE

## 2022-05-12 PROCEDURE — 80053 COMPREHEN METABOLIC PANEL: CPT | Performed by: INTERNAL MEDICINE

## 2022-05-12 PROCEDURE — 84466 ASSAY OF TRANSFERRIN: CPT | Performed by: INTERNAL MEDICINE

## 2022-05-12 PROCEDURE — 99214 OFFICE O/P EST MOD 30 MIN: CPT | Mod: PBBFAC | Performed by: INTERNAL MEDICINE

## 2022-05-12 PROCEDURE — 99214 PR OFFICE/OUTPT VISIT, EST, LEVL IV, 30-39 MIN: ICD-10-PCS | Mod: S$PBB,,, | Performed by: INTERNAL MEDICINE

## 2022-05-12 PROCEDURE — 84443 ASSAY THYROID STIM HORMONE: CPT | Performed by: INTERNAL MEDICINE

## 2022-05-12 PROCEDURE — 82728 ASSAY OF FERRITIN: CPT | Performed by: INTERNAL MEDICINE

## 2022-05-12 PROCEDURE — 99214 OFFICE O/P EST MOD 30 MIN: CPT | Mod: S$PBB,,, | Performed by: INTERNAL MEDICINE

## 2022-05-12 RX ORDER — PANTOPRAZOLE SODIUM 40 MG/1
40 TABLET, DELAYED RELEASE ORAL DAILY
Qty: 90 TABLET | Refills: 1 | Status: SHIPPED | OUTPATIENT
Start: 2022-05-12 | End: 2022-06-28 | Stop reason: SDUPTHER

## 2022-05-12 RX ORDER — PRASTERONE 6.5 MG/1
1 INSERT VAGINAL NIGHTLY
Qty: 28 EACH | Refills: 2 | Status: SHIPPED | OUTPATIENT
Start: 2022-05-12 | End: 2022-07-07 | Stop reason: ALTCHOICE

## 2022-05-13 LAB — H PYLORI IGG SERPL QL IA: NEGATIVE

## 2022-05-15 ENCOUNTER — TELEPHONE (OUTPATIENT)
Dept: INTERNAL MEDICINE | Facility: CLINIC | Age: 82
End: 2022-05-15
Payer: MEDICARE

## 2022-05-15 NOTE — PROGRESS NOTES
"Subjective:       Patient ID: Shelly Vásquez is a 81 y.o. female.    Chief Complaint: Fatigue    81-year-old nonsmoking female here for few issues.  She has a past medical history significant for myelodysplastic syndrome the hemoglobin chronically of approximately 8.5, mild renal insufficiency in the past she had a mild elevation in hemoglobin A1c, history of osteoarthritis, mild hypertension, GERD and restless leg syndrome which is been acting up lately.  She is quite active and not only does Pilates a 6 a few times a week, works with the  for strengthening but also does pretty much every morning leg strengthening exercises.  She just got back from a 10 day trip blood physical activity to [place].  She went to see her massage therapist and she was complaining of achiness in both hips and he noticed her to have when he felt was a "hematoma" on the right with significant swelling on the right hip.     She also has chronic right hand Dupuytren's contractures with osteoarthritis in the PIP and D IP joints as well as likely tendinitis.  She cannot take NSAIDs due to the renal insufficiency and so she struggles with discomfort in her right hand which is her dominant hand.  She saw orthopedic Hand [facility] last month as well.      Review of Systems   Constitutional: Negative for activity change, appetite change, chills, diaphoresis, fatigue, fever and unexpected weight change.   HENT: Positive for voice change. Negative for nasal congestion, ear pain, mouth sores, postnasal drip, sinus pressure/congestion, sore throat and trouble swallowing.    Eyes: Positive for visual disturbance. Negative for pain and redness.   Respiratory: Negative for apnea, cough, chest tightness, shortness of breath and wheezing.    Cardiovascular: Negative for chest pain, palpitations and leg swelling.   Gastrointestinal: Positive for abdominal distention. Negative for abdominal pain, blood in stool, constipation, diarrhea, nausea and " vomiting.        Notice increased bloating over the past couple weeks that coincides with a deeper quality of her voice.   Endocrine: Negative for cold intolerance, polydipsia, polyphagia and polyuria.   Genitourinary: Positive for vaginal dryness. Negative for difficulty urinating, dysuria, flank pain, frequency, hematuria, menstrual problem, pelvic pain and urgency.   Musculoskeletal: Positive for arthralgias and leg pain. Negative for back pain, joint swelling and neck pain.        Right hand Dupuytren's contractures with osteoarthritis for his tendinitis.  Patient has some mild chronic renal insufficiency so cannot take NSAIDs  In addition she is having bilateral hip where her massage therapist felt like she had a hematoma on the right hip and some swelling on the left hip.   Integumentary:  Negative for color change, rash and wound.   Neurological: Negative for dizziness, tremors, seizures, syncope, weakness, light-headedness, numbness and headaches.   Hematological: Negative for adenopathy. Does not bruise/bleed easily.   Psychiatric/Behavioral: Positive for sleep disturbance. Negative for confusion, decreased concentration, dysphoric mood, hallucinations, self-injury and suicidal ideas. The patient is not nervous/anxious.          Objective:      Physical Exam  HENT:      Head: Normocephalic and atraumatic.   Eyes:      General: No scleral icterus.  Cardiovascular:      Rate and Rhythm: Normal rate and regular rhythm.      Heart sounds: No murmur heard.  Pulmonary:      Effort: Pulmonary effort is normal.      Breath sounds: Normal breath sounds. No rales.   Abdominal:      General: Bowel sounds are normal. There is no distension.      Palpations: Abdomen is soft.      Tenderness: There is no abdominal tenderness.   Musculoskeletal:         General: No tenderness.      Cervical back: Normal range of motion and neck supple.      Right hip: Decreased range of motion.      Left hip: Deformity present. Decreased  range of motion.      Comments: Some fullness of left hip as well as right hip.   No trochanteric tenderness  Discomfort with hip adduction and extension  No visible hematoma   Neurological:      General: No focal deficit present.      Mental Status: She is alert and oriented to person, place, and time.   Psychiatric:         Mood and Affect: Mood normal.         Behavior: Behavior normal.         Thought Content: Thought content normal.         Judgment: Judgment normal.         Lab Results   Component Value Date    WBC 6.58 05/12/2022    HGB 8.6 (L) 05/12/2022    HCT 26.7 (L) 05/12/2022     (H) 05/12/2022    CHOL 150 11/24/2021    TRIG 49 11/24/2021    HDL 74 11/24/2021    ALT 17 05/12/2022    AST 18 05/12/2022     05/12/2022    K 4.2 05/12/2022     05/12/2022    CREATININE 0.9 05/12/2022    BUN 11 05/12/2022    CO2 22 (L) 05/12/2022    TSH 1.597 05/12/2022    INR 1.0 07/20/2021    HGBA1C 4.9 05/12/2022     Assessment/plan       Problem List Items Addressed This Visit     Osteoarthritis - Primary (Chronic)    Relevant Orders    Ambulatory referral/consult to Physical/Occupational Therapy    MDS (myelodysplastic syndrome)        Other Visit Diagnoses     RLS (restless legs syndrome)        Chronic hip pain, bilateral        Relevant Orders    Ambulatory referral/consult to Sports Medicine    Stenosing tenosynovitis        Relevant Orders    Ambulatory referral/consult to Physical/Occupational Therapy    Stage 3a chronic kidney disease        Relevant Orders    CBC Auto Differential (Completed)    Comprehensive Metabolic Panel (Completed)    Anemia, unspecified type  (Chronic)       secondary to above    Relevant Orders    Ferritin (Completed)    Iron and TIBC (Completed)    Gastroesophageal reflux disease, unspecified whether esophagitis present        Relevant Medications    pantoprazole (PROTONIX) 40 MG tablet    Other Relevant Orders    H. PYLORI ANTIBODY, IGG (Completed)    Prediabetes          Relevant Orders    Hemoglobin A1C (Completed)    Fatigue, unspecified type        Relevant Orders    TSH (Completed)    Cataract, unspecified cataract type, unspecified laterality        Relevant Orders    Ambulatory referral/consult to Ophthalmology

## 2022-05-15 NOTE — TELEPHONE ENCOUNTER
I do not think we got to set her up with opthalmology     She likely has cataracts     Please let me know who and when . Dr gnia Whitley would be good.     Also I placed a consult for OT for her hand but I do not yet see that booked yet. Just remind patient that they will call her.

## 2022-05-15 NOTE — TELEPHONE ENCOUNTER
Sebas,   I did a cbc on her if you can review    She should be due for her yearly visit with you next month .

## 2022-05-16 ENCOUNTER — PATIENT OUTREACH (OUTPATIENT)
Dept: ADMINISTRATIVE | Facility: OTHER | Age: 82
End: 2022-05-16
Payer: MEDICARE

## 2022-05-16 ENCOUNTER — PATIENT MESSAGE (OUTPATIENT)
Dept: SPORTS MEDICINE | Facility: CLINIC | Age: 82
End: 2022-05-16
Payer: MEDICARE

## 2022-05-17 ENCOUNTER — PES CALL (OUTPATIENT)
Dept: ADMINISTRATIVE | Facility: CLINIC | Age: 82
End: 2022-05-17
Payer: MEDICARE

## 2022-05-17 ENCOUNTER — TELEPHONE (OUTPATIENT)
Dept: ORTHOPEDICS | Facility: CLINIC | Age: 82
End: 2022-05-17
Payer: MEDICARE

## 2022-05-17 ENCOUNTER — TELEPHONE (OUTPATIENT)
Dept: INTERNAL MEDICINE | Facility: CLINIC | Age: 82
End: 2022-05-17
Payer: MEDICARE

## 2022-05-17 DIAGNOSIS — M16.0 PRIMARY OSTEOARTHRITIS OF BOTH HIPS: ICD-10-CM

## 2022-05-17 DIAGNOSIS — G89.29 CHRONIC HIP PAIN, BILATERAL: Primary | ICD-10-CM

## 2022-05-17 DIAGNOSIS — M25.552 CHRONIC HIP PAIN, BILATERAL: Primary | ICD-10-CM

## 2022-05-17 DIAGNOSIS — M47.816 ARTHROPATHY OF LUMBAR FACET JOINT: ICD-10-CM

## 2022-05-17 DIAGNOSIS — M25.551 CHRONIC HIP PAIN, BILATERAL: Primary | ICD-10-CM

## 2022-05-17 DIAGNOSIS — M79.604 LEG PAIN, BILATERAL: ICD-10-CM

## 2022-05-17 DIAGNOSIS — M79.605 LEG PAIN, BILATERAL: ICD-10-CM

## 2022-05-17 DIAGNOSIS — M54.10: ICD-10-CM

## 2022-05-17 DIAGNOSIS — G25.81 RLS (RESTLESS LEGS SYNDROME): ICD-10-CM

## 2022-05-17 DIAGNOSIS — M25.551 RIGHT HIP PAIN: Primary | ICD-10-CM

## 2022-05-17 NOTE — TELEPHONE ENCOUNTER
Rosetta,     Please set patient up for xrays ordered tomorrow midmorning at the imaging center across the street      Vicki,   Our benefactor and  patient has a longstanding hx of RLS and unstable gate BUT now is having bilateral upper leg radiating pian with spasms from the hip area.   She also has myelodysplasia     Can you find a neurologist and set her up for a visit ? (can be early next month too)

## 2022-05-18 ENCOUNTER — TELEPHONE (OUTPATIENT)
Dept: INTERNAL MEDICINE | Facility: CLINIC | Age: 82
End: 2022-05-18
Payer: MEDICARE

## 2022-05-18 ENCOUNTER — TELEPHONE (OUTPATIENT)
Dept: ORTHOPEDICS | Facility: CLINIC | Age: 82
End: 2022-05-18
Payer: MEDICARE

## 2022-05-18 DIAGNOSIS — Z79.890 ON HORMONE REPLACEMENT THERAPY: ICD-10-CM

## 2022-05-18 DIAGNOSIS — Z00.00 ANNUAL PHYSICAL EXAM: Primary | ICD-10-CM

## 2022-05-18 NOTE — TELEPHONE ENCOUNTER
I called the patient regarding their appointment with Dr. Gary. I told the patient that Dr. Gary had a family emergency. I rescheduled the patient for 5/26/2022. The patient verbalized understanding and has no further questions.

## 2022-05-19 RX ORDER — PRAMIPEXOLE DIHYDROCHLORIDE 0.25 MG/1
TABLET ORAL
Qty: 90 TABLET | Refills: 1 | Status: SHIPPED | OUTPATIENT
Start: 2022-05-19 | End: 2022-10-14

## 2022-05-20 ENCOUNTER — TELEPHONE (OUTPATIENT)
Dept: OBSTETRICS AND GYNECOLOGY | Facility: CLINIC | Age: 82
End: 2022-05-20
Payer: MEDICARE

## 2022-05-20 NOTE — TELEPHONE ENCOUNTER
----- Message from Rosetta Tony MA sent at 5/20/2022  7:55 AM CDT -----  For a wellness exam and to establish care. She is on HRT and wants to get in with Dr. Fairchild so next available is fine.  Thanks!  Rosetta  ----- Message -----  From: Nava Moore MA  Sent: 5/19/2022   5:06 PM CDT  To: Rosetta Tony MA    Hey Rosetta  Did the pt say how soon she needed to be seen? Is this for a annual or a problem?   Nava  ----- Message -----  From: Rosetta Tony MA  Sent: 5/19/2022   4:36 PM CDT  To: Devorah SINGLETARY Staff    Hi,  Can we please get this  patient an appt with Dr. Fairchild please.  Thank You,  Rosetta         Detail Level: Generalized Detail Level: Simple Detail Level: Zone

## 2022-05-23 ENCOUNTER — TELEPHONE (OUTPATIENT)
Dept: OBSTETRICS AND GYNECOLOGY | Facility: CLINIC | Age: 82
End: 2022-05-23
Payer: MEDICARE

## 2022-05-23 NOTE — TELEPHONE ENCOUNTER
----- Message from Rosetta Tony MA sent at 5/23/2022  8:33 AM CDT -----  Any luck in getting an appointment for this patient yet?  Thank You,  Rosetta Tony

## 2022-05-26 ENCOUNTER — OFFICE VISIT (OUTPATIENT)
Dept: ORTHOPEDICS | Facility: CLINIC | Age: 82
End: 2022-05-26
Payer: MEDICARE

## 2022-05-26 ENCOUNTER — TELEPHONE (OUTPATIENT)
Dept: INTERNAL MEDICINE | Facility: CLINIC | Age: 82
End: 2022-05-26
Payer: MEDICARE

## 2022-05-26 ENCOUNTER — HOSPITAL ENCOUNTER (OUTPATIENT)
Dept: RADIOLOGY | Facility: HOSPITAL | Age: 82
Discharge: HOME | End: 2022-05-26
Attending: ORTHOPAEDIC SURGERY
Payer: MEDICARE

## 2022-05-26 ENCOUNTER — OFFICE VISIT (OUTPATIENT)
Dept: OBSTETRICS AND GYNECOLOGY | Facility: CLINIC | Age: 82
End: 2022-05-26
Attending: OBSTETRICS & GYNECOLOGY
Payer: MEDICARE

## 2022-05-26 VITALS
HEIGHT: 68 IN | DIASTOLIC BLOOD PRESSURE: 70 MMHG | SYSTOLIC BLOOD PRESSURE: 106 MMHG | BODY MASS INDEX: 23.01 KG/M2 | WEIGHT: 151.81 LBS

## 2022-05-26 VITALS — HEIGHT: 68 IN | WEIGHT: 154.13 LBS | BODY MASS INDEX: 23.36 KG/M2

## 2022-05-26 DIAGNOSIS — Z79.890 POSTMENOPAUSAL HORMONE REPLACEMENT THERAPY: Chronic | ICD-10-CM

## 2022-05-26 DIAGNOSIS — D46.4 REFRACTORY ANEMIA: Primary | ICD-10-CM

## 2022-05-26 DIAGNOSIS — M16.10 OSTEOARTHRITIS OF ONE HIP: ICD-10-CM

## 2022-05-26 DIAGNOSIS — Z12.31 SCREENING MAMMOGRAM, ENCOUNTER FOR: ICD-10-CM

## 2022-05-26 DIAGNOSIS — M16.11 PRIMARY OSTEOARTHRITIS OF RIGHT HIP: Primary | ICD-10-CM

## 2022-05-26 DIAGNOSIS — Z01.419 ENCOUNTER FOR GYNECOLOGICAL EXAMINATION WITHOUT ABNORMAL FINDING: Primary | ICD-10-CM

## 2022-05-26 DIAGNOSIS — M25.551 RIGHT HIP PAIN: ICD-10-CM

## 2022-05-26 PROCEDURE — 73502 X-RAY EXAM HIP UNI 2-3 VIEWS: CPT | Mod: TC,RT

## 2022-05-26 PROCEDURE — G0101 CA SCREEN;PELVIC/BREAST EXAM: HCPCS | Mod: PBBFAC | Performed by: OBSTETRICS & GYNECOLOGY

## 2022-05-26 PROCEDURE — G0101 PR CA SCREEN;PELVIC/BREAST EXAM: ICD-10-PCS | Mod: S$PBB,,, | Performed by: OBSTETRICS & GYNECOLOGY

## 2022-05-26 PROCEDURE — 99999 PR PBB SHADOW E&M-EST. PATIENT-LVL IV: ICD-10-PCS | Mod: PBBFAC,,, | Performed by: OBSTETRICS & GYNECOLOGY

## 2022-05-26 PROCEDURE — 99214 PR OFFICE/OUTPT VISIT, EST, LEVL IV, 30-39 MIN: ICD-10-PCS | Mod: S$PBB,,, | Performed by: ORTHOPAEDIC SURGERY

## 2022-05-26 PROCEDURE — 73502 XR HIP WITH PELVIS WHEN PERFORMED, 2 OR 3  VIEWS RIGHT: ICD-10-PCS | Mod: 26,RT,, | Performed by: RADIOLOGY

## 2022-05-26 PROCEDURE — 99214 OFFICE O/P EST MOD 30 MIN: CPT | Mod: S$PBB,,, | Performed by: ORTHOPAEDIC SURGERY

## 2022-05-26 PROCEDURE — 99999 PR PBB SHADOW E&M-EST. PATIENT-LVL III: CPT | Mod: PBBFAC,,, | Performed by: ORTHOPAEDIC SURGERY

## 2022-05-26 PROCEDURE — 73502 X-RAY EXAM HIP UNI 2-3 VIEWS: CPT | Mod: 26,RT,, | Performed by: RADIOLOGY

## 2022-05-26 PROCEDURE — 99999 PR PBB SHADOW E&M-EST. PATIENT-LVL IV: CPT | Mod: PBBFAC,,, | Performed by: OBSTETRICS & GYNECOLOGY

## 2022-05-26 PROCEDURE — 99999 PR PBB SHADOW E&M-EST. PATIENT-LVL III: ICD-10-PCS | Mod: PBBFAC,,, | Performed by: ORTHOPAEDIC SURGERY

## 2022-05-26 PROCEDURE — G0101 CA SCREEN;PELVIC/BREAST EXAM: HCPCS | Mod: S$PBB,,, | Performed by: OBSTETRICS & GYNECOLOGY

## 2022-05-26 PROCEDURE — 99214 OFFICE O/P EST MOD 30 MIN: CPT | Mod: PBBFAC | Performed by: OBSTETRICS & GYNECOLOGY

## 2022-05-26 PROCEDURE — 99213 OFFICE O/P EST LOW 20 MIN: CPT | Mod: PBBFAC,25,27 | Performed by: ORTHOPAEDIC SURGERY

## 2022-05-26 RX ORDER — ESTRADIOL AND NORETHINDRONE ACETATE .5; .1 MG/1; MG/1
1 TABLET ORAL DAILY
Qty: 90 TABLET | Refills: 3 | Status: SHIPPED | OUTPATIENT
Start: 2022-05-26 | End: 2022-08-08 | Stop reason: SDUPTHER

## 2022-05-26 NOTE — PROGRESS NOTES
Subjective:       Patient ID: Shelly Vásquez is a 81 y.o. female.    Chief Complaint:  Well Woman and Gynecologic Exam      History of Present Illness  HPI    Shelly Vásquez is a 81 y.o. female  here for her annual GYN exam.  She has not been seen in several years. She is on low dose HRT for many years, and wishes to continue it, feels better on it.   She is menopausal since age 48.  denies break through bleeding.   denies vaginal itching or irritation.  Denies vaginal discharge.  She is not currently sexually active. She has had 1 partner for the past 14 years. .     History of abnormal pap: No  Last Pap: was normal and patient does not recall when last pap was  Last MMG: normal--routine follow-up in 12 months  Last Colonoscopy:  colonoscopy 8 years ago with abnormalities.(polyps)  Last Dexa: 2018: Normal  denies domestic violence. She does feel safe at home.     Past Medical History:   Diagnosis Date    Arthritis     Atrial fibrillation     Basal cell cancer     on the face    Encounter for blood transfusion     Gastric ulcer     Herpes simplex without mention of complication     rare outbreaks    Postmenopausal HRT (hormone replacement therapy)     Supraventricular tachycardia     s/p AVNRT ablation (Dr Brady)     Past Surgical History:   Procedure Laterality Date    APPENDECTOMY  age 21    BACK SURGERY      Beast Lift  2004    BONE MARROW BIOPSY Right 2019    Procedure: Biopsy-bone marrow;  Surgeon: Aidan Spring MD;  Location: [facility] OR [address] [place] [address];  Service: Oncology;  Laterality: Right;    BREAST BIOPSY  50yrs ago    unable to see scar    COSMETIC SURGERY      DILATION AND CURETTAGE OF UTERUS      PMB    ENDOMETRIAL BIOPSY      EYE SURGERY      ptsosis      RADIOFREQUENCY ABLATION  2018    SMALL INTESTINE SURGERY      TONSILLECTOMY, ADENOIDECTOMY  age 8    TOTAL REDUCTION MAMMOPLASTY Bilateral 2003    TUBAL LIGATION       Social History  "    Socioeconomic History    Marital status:    Tobacco Use    Smoking status: Former Smoker     Packs/day: 1.00     Years: 22.00     Pack years: 22.00     Types: Cigarettes     Quit date: 1980     Years since quittin.8    Smokeless tobacco: Never Used   Substance and Sexual Activity    Alcohol use: Yes     Alcohol/week: 2.0 standard drinks     Types: 2 Glasses of wine per week     Comment: 1-2 glasses of wine daily    Drug use: No    Sexual activity: Not Currently     Partners: Male     Birth control/protection: Post-menopausal     Comment:  since      Family History   Problem Relation Age of Onset    No Known Problems Father     Cirrhosis Mother     No Known Problems Brother     No Known Problems Maternal Aunt     No Known Problems Maternal Uncle     No Known Problems Paternal Aunt     No Known Problems Paternal Uncle     No Known Problems Maternal Grandmother     No Known Problems Maternal Grandfather     No Known Problems Paternal Grandmother     No Known Problems Paternal Grandfather     No Known Problems Daughter     Pulmonary embolism Son     Breast cancer Neg Hx     Colon cancer Neg Hx     Ovarian cancer Neg Hx     Amblyopia Neg Hx     Blindness Neg Hx     Cancer Neg Hx     Cataracts Neg Hx     Diabetes Neg Hx     Glaucoma Neg Hx     Hypertension Neg Hx     Macular degeneration Neg Hx     Retinal detachment Neg Hx     Strabismus Neg Hx     Stroke Neg Hx     Thyroid disease Neg Hx      OB History        2    Para   2    Term   2            AB        Living   2       SAB        IAB        Ectopic        Multiple        Live Births   2                 /70   Ht 5' 8" (1.727 m)   Wt 68.9 kg (151 lb 12.8 oz)   LMP  (LMP Unknown)   BMI 23.08 kg/m²         GYN & OB History  No LMP recorded (lmp unknown). Patient is postmenopausal.   Date of Last Pap: No result found    OB History    Para Term  AB Living   2 2 2     2 "   SAB IAB Ectopic Multiple Live Births           2      # Outcome Date GA Lbr Marquez/2nd Weight Sex Delivery Anes PTL Lv   2 Term      Vag-Spont   DANIS   1 Term      Vag-Spont   DANIS       Review of Systems  Review of Systems   Constitutional: Negative for activity change, appetite change, fatigue and unexpected weight change.   HENT: Negative.    Eyes: Negative for visual disturbance.   Respiratory: Negative for shortness of breath and wheezing.    Cardiovascular: Negative for chest pain, palpitations and leg swelling.   Gastrointestinal: Negative for abdominal pain, bloating and blood in stool.   Endocrine: Negative for diabetes, hair loss and hot flashes.   Genitourinary: Negative for decreased libido, dyspareunia, hot flashes, urinary incontinence and postmenopausal bleeding.   Musculoskeletal: Negative for back pain and joint swelling.   Integumentary:  Negative for acne, hair changes and nipple discharge.   Neurological: Negative for headaches.   Hematological: Does not bruise/bleed easily.   Psychiatric/Behavioral: Negative for depression and sleep disturbance. The patient is not nervous/anxious.    Breast: Negative for mastodynia and nipple discharge          Objective:      Physical Exam:   Constitutional: She is oriented to person, place, and time. She appears well-developed and well-nourished.    HENT:   Head: Normocephalic and atraumatic.    Eyes: Pupils are equal, round, and reactive to light. EOM are normal.     Cardiovascular: Normal rate and regular rhythm.     Pulmonary/Chest: Effort normal and breath sounds normal.   BREASTS:  no mass, no tenderness, no deformity and no retraction. Right breast exhibits no inverted nipple, no mass, no nipple discharge, no skin change, no tenderness, no bleeding and no swelling. Left breast exhibits no inverted nipple, no mass, no nipple discharge, no skin change, no tenderness, no bleeding and no swelling. Breasts are symmetrical.              Abdominal: Soft. Bowel  sounds are normal.     Genitourinary:    Pelvic exam was performed with patient supine.      Genitourinary Comments: PELVIC: Normal external genitalia without lesions.  Normal hair distribution.  Adequate perineal body, normal urethral meatus.  Vagina  Dry and poorly rugated, atrophic, without lesions or discharge.  Cervix pink, without lesions, discharge or tenderness.  No significant cystocele or rectocele.  Bimanual exam shows uterus to be normal size, regular, mobile and nontender.  Adnexa without masses or tenderness.    RECTAL:Deferred               Musculoskeletal: Normal range of motion and moves all extremeties.       Neurological: She is alert and oriented to person, place, and time.    Skin: Skin is warm and dry.    Psychiatric: She has a normal mood and affect.              Assessment:        1. Encounter for gynecological examination without abnormal finding    2. Postmenopausal hormone replacement therapy    3. Screening mammogram, encounter for                Plan:        1. Encounter for gynecological examination without abnormal finding  COUNSELING:  The patient was counseled today on regular weight bearing exercise. Patient was counseled today on the new ACS guidelines for cervical cytology screening as well as the current recommendations for breast cancer screening. Counseling session lasted approximately 10 minutes, and all her questions were answered. She was advised to see her primary care physician for all other health maintenance.   FOLLOW-UP with me for next routine visit.         2. Postmenopausal hormone replacement therapy  Long discussion with patient regarding different forms of HRT. We discussed r/b/a. I do not see any health reason why she cannot continue with her HRT. There are many proven benefits to HRT including decreased risk for osteoporosis, colon cancer. Decreased risk for cardiovascular disease and dementia. Further, there is recent data to suggest that discontinuing HT is  harmful, and that there is an increased risk of cardiac and stroke mortality within the first year of discontinuing HT, especially in women who initiated HT at less than 60 years of age.(This is thought to be related to decreased production of nitric oxide, resulting in vasoconstriction, exposure to activated inflammatory processes that have implications for acute events resulting from rupture of underlying susceptible atherosclerotic placque, and direct myocardial effects that may predispose to fatal arrythmias through calcium channel dysregulation.) Other organ systems also show rapid deterioration upon withdrawal of HT, as shown in the skeletal system where withdrawal of HT results in rapid bone loss of bone mineral density, and an increased risk of bone fracture, including hip fractures that are associated with significant mortality.         - estradiol-norethindrone acet 0.5-0.1 mg per tablet; Take 1 tablet by mouth once daily.  Dispense: 90 tablet; Refill: 3    3. Screening mammogram, encounter for        - Mammo Digital Screening Bilat w/ Humza; Future       Follow up in about 1 year (around 5/26/2023), or if symptoms worsen or fail to improve.

## 2022-05-26 NOTE — PROGRESS NOTES
Subjective:     HPI:   Shelly Vásquez is a 81 y.o. female who presents for eval R hip pain    She complains of right-sided groin and anterior hip pain anterior thigh pain down to the knee and proximal shin.  She has difficulty getting up feels like the hip wants to give way on her.  She also complains of some numbness and tingling in her whole leg.    She says her symptoms started with restless leg symptoms a couple years ago and she has uncontrolled tremors.  She says a couple weeks ago she had some type of uncontrollable troubling episodes.    She was seen in the Neurology movement disorder clinic in 2021 for RLS and has a f/u appointment with neuro tomorrow    Medications: Tylenol just about daily, Tramadol rarely (last Rx 22 from PCP, #7, still has most of them) can't take nsaids - Hx of bleeding ulcer and multiple blood transfusions about 5 years ago, on baclofen and mirapex QHS from neuro    Injections: None     Physical Therapy: Yes, completed OP-PT this week and has been going for at least 2 months, 1x per week for 8 weeks, the patient stated that the PT helped significantly while going     Assistive Devices: None     Walkin mile    Limitations: General walking, difficulty going up/down steps, difficulty getting in/out of the car, difficulty sleeping at night , difficulty rising from sitting  and difficulty standing for long periods of time        Occupation: Retired     Social support: The patient stated that they live at home with their . The patient stated that their  would be able to help take care of them if they were to have surgery.      ROS:  The updated medical history is in the chart and has been reviewed. A review of systems is updated and there is no reported vision changes, ear/nose/mouth/throat complaints,  chest pain, shortness of breath, abdominal pain, urological complaints, fevers or chills, psychiatric complaints. Musculoskeletal and neurologcial symptoms  are as documented. All other systems are negative.      Objective:   Exam:  There were no vitals filed for this visit.  Body mass index is 23.43 kg/m².    Physical examination included assessment of the patient's general appearance with particular attention to development, nutrition, body habitus, attention to grooming, and any evidence of distress.  Constitutional: The patient is a well-developed, well-nourished patient in no acute distress.   Cardiovascular: Vascular examination included warmth and capillary refill as well inspection for edema and assessment of pedal pulses. Pulses are palpable and regular.  Musculoskeletal: Gait was assessed as to whether it was steady, non-antalgic, and/or required the use of an assist device. The patient was also asked to walk independently and get onto the examination table.  Skin: The skin was examined for any obvious rashes or lesions in the extremity.  Neurologic: Sensation is intact to light touch in the extremity. The patient has good coordination without hyperreflexia and is alert and oriented to person, place and time and has normal mood and affect.     All of the above were examined and found to be within normal limits except for the following pertinent clinical findings:    Slight limp slight antalgic gait negative Trendelenburg.  Tender palpation of the great greater trochanter good abductor strength groin pain with active straight leg raise 0 90 hip flexion 30 abduction 20 abduction 30 external 20 internal rotation which recreates her symptoms.  1 centimeter leg length discrepancy right side long patient feels like her right-sided long      Imaging:    HIP R ARTHRITIS        Indication:  Right hip pain  Exam Ordered: Radiographs include an anteroposterior pelvis, an anteroposterior and lateral view of the proximal femur including the hip joint.  Details of Examination: Exam shows evidence of joint space narrowing, osteophyte formation, and subchondral sclerosis, all  consistent with degenerative arthritis of the hip.  No other significant findings are noted.  Impression:  Degenerative Arthritis, Right Hip    Severe grade 3 R hip bone on bone hip arthritis  significant lumbosacral degenerative changes      Assessment:       ICD-10-CM ICD-9-CM   1. Primary osteoarthritis of right hip  M16.11 715.15      Spinal stenosis s/p L spine elsie, c/o RLE numbness and tingling consistent with radicular symptoms as well    ?neuro: RLS, tremors/trembling, rpt neuro eval tomorrow  Cardiac: a fib, PSVT s/p RFA, loop recorder, on ASA 81md daily  HRT  MDS/anemia: WBC 6.5, Hgb 8.6, , last saw hematology 7/20/21, recommended f/u Q6 months, Hx of transfusions       Plan:       The above findings were discussed with patient length. We discussed the risks of conservative versus surgical management of the patients hip arthritis. Conservative management consisting of anti-inflammatory medications, weight loss, physical therapy, and activity modification was discussed at length. At this point considering the patient's level of mobility and age I recommend NAT when medically optimized    The patient was given a handout with treatment strategies for hip and knee joint care prior to surgery from [employer], the [employer].   This included information regarding medications, injections, weight loss, exercise, braces, physical therapy, and alternative therapies.      Tylenol    Aleve    Voltaren Gel   x AAKS non-op arthritis info    Bursitis info   x Total Joint Info    HEP: [employer] home exercise conditioning program    continue PT    CSI: intra-articular steroid injection    CSI: greater trochanter bursitis    HA: hyaluronic acid injection   Cane left hand Assistive devices   X sports medicine for IA R hip CSI Referral:      F/u with neuro for tremors/trembling, does not appear ortho related  RLE numbness/tingling, Hx of L spine elsie - f/u back/spine clinic/spine  "surgeon    Will send message to  PCP Dr Méndez and Dr De Anda (heme appointment upcoming) re: need for Hgb optimization to 10-11 range for NAT    Depending on heme dispo would recommend sports for US guided IA CSI v NAT    Update:   Neuro has ordered an MRI of her L spine    She made another appointment for 6/28 "for short in my right hip"  Will go ahead and place referral for R hip US IA SCI pending heme optimization  F/u 2 weeks post injection    No orders of the defined types were placed in this encounter.            Past Medical History:   Diagnosis Date    Arthritis     Atrial fibrillation     Basal cell cancer     on the face    Encounter for blood transfusion     Gastric ulcer     Herpes simplex without mention of complication     rare outbreaks    Postmenopausal HRT (hormone replacement therapy)     Supraventricular tachycardia     s/p AVNRT ablation (Dr Brady)       Past Surgical History:   Procedure Laterality Date    APPENDECTOMY  age 21    BACK SURGERY      Beast Lift  2004    BONE MARROW BIOPSY Right 12/19/2019    Procedure: Biopsy-bone marrow;  Surgeon: Aidan Spring MD;  Location: [facility] OR [address] [place] [address];  Service: Oncology;  Laterality: Right;    BREAST BIOPSY  50yrs ago    unable to see scar    COSMETIC SURGERY      DILATION AND CURETTAGE OF UTERUS  2008    PMB    ENDOMETRIAL BIOPSY      EYE SURGERY      ptsosis      RADIOFREQUENCY ABLATION  03/2018    SMALL INTESTINE SURGERY      TONSILLECTOMY, ADENOIDECTOMY  age 8    TOTAL REDUCTION MAMMOPLASTY Bilateral 2003    TUBAL LIGATION         Family History   Problem Relation Age of Onset    No Known Problems Father     Cirrhosis Mother     No Known Problems Brother     No Known Problems Maternal Aunt     No Known Problems Maternal Uncle     No Known Problems Paternal Aunt     No Known Problems Paternal Uncle     No Known Problems Maternal Grandmother     No Known Problems Maternal Grandfather     No Known " Problems Paternal Grandmother     No Known Problems Paternal Grandfather     No Known Problems Daughter     Pulmonary embolism Son     Breast cancer Neg Hx     Colon cancer Neg Hx     Ovarian cancer Neg Hx     Amblyopia Neg Hx     Blindness Neg Hx     Cancer Neg Hx     Cataracts Neg Hx     Diabetes Neg Hx     Glaucoma Neg Hx     Hypertension Neg Hx     Macular degeneration Neg Hx     Retinal detachment Neg Hx     Strabismus Neg Hx     Stroke Neg Hx     Thyroid disease Neg Hx        Social History     Socioeconomic History    Marital status:    Tobacco Use    Smoking status: Former Smoker     Packs/day: 1.00     Years: 22.00     Pack years: 22.00     Types: Cigarettes     Quit date: 1980     Years since quittin.8    Smokeless tobacco: Never Used   Substance and Sexual Activity    Alcohol use: Yes     Alcohol/week: 2.0 standard drinks     Types: 2 Glasses of wine per week     Comment: 1-2 glasses of wine daily    Drug use: No    Sexual activity: Not Currently     Partners: Male     Birth control/protection: Post-menopausal     Comment:  since

## 2022-05-26 NOTE — TELEPHONE ENCOUNTER
I messaged Vicki to get her in with neurology week of 6/6 hopefully and I will need to eventually get her in with hematology

## 2022-05-27 ENCOUNTER — PATIENT MESSAGE (OUTPATIENT)
Dept: NEUROLOGY | Facility: CLINIC | Age: 82
End: 2022-05-27

## 2022-05-27 ENCOUNTER — OFFICE VISIT (OUTPATIENT)
Dept: NEUROLOGY | Facility: CLINIC | Age: 82
End: 2022-05-27
Payer: MEDICARE

## 2022-05-27 ENCOUNTER — LAB VISIT (OUTPATIENT)
Dept: LAB | Facility: HOSPITAL | Age: 82
End: 2022-05-27
Payer: MEDICARE

## 2022-05-27 VITALS
BODY MASS INDEX: 23.43 KG/M2 | WEIGHT: 154.13 LBS | HEART RATE: 69 BPM | DIASTOLIC BLOOD PRESSURE: 71 MMHG | SYSTOLIC BLOOD PRESSURE: 123 MMHG

## 2022-05-27 DIAGNOSIS — R26.89 IMBALANCE: ICD-10-CM

## 2022-05-27 DIAGNOSIS — G25.81 RESTLESS LEG: Primary | ICD-10-CM

## 2022-05-27 DIAGNOSIS — M54.16 LUMBAR RADICULOPATHY, CHRONIC: ICD-10-CM

## 2022-05-27 DIAGNOSIS — G25.81 RESTLESS LEG: ICD-10-CM

## 2022-05-27 PROCEDURE — 99215 OFFICE O/P EST HI 40 MIN: CPT | Mod: S$PBB,,, | Performed by: PHYSICIAN ASSISTANT

## 2022-05-27 PROCEDURE — 99999 PR PBB SHADOW E&M-EST. PATIENT-LVL III: ICD-10-PCS | Mod: PBBFAC,,, | Performed by: PHYSICIAN ASSISTANT

## 2022-05-27 PROCEDURE — 99215 PR OFFICE/OUTPT VISIT, EST, LEVL V, 40-54 MIN: ICD-10-PCS | Mod: S$PBB,,, | Performed by: PHYSICIAN ASSISTANT

## 2022-05-27 PROCEDURE — 84425 ASSAY OF VITAMIN B-1: CPT | Performed by: PHYSICIAN ASSISTANT

## 2022-05-27 PROCEDURE — 84207 ASSAY OF VITAMIN B-6: CPT | Performed by: PHYSICIAN ASSISTANT

## 2022-05-27 PROCEDURE — 99213 OFFICE O/P EST LOW 20 MIN: CPT | Mod: PBBFAC | Performed by: PHYSICIAN ASSISTANT

## 2022-05-27 PROCEDURE — 36415 COLL VENOUS BLD VENIPUNCTURE: CPT | Performed by: PHYSICIAN ASSISTANT

## 2022-05-27 PROCEDURE — 99999 PR PBB SHADOW E&M-EST. PATIENT-LVL III: CPT | Mod: PBBFAC,,, | Performed by: PHYSICIAN ASSISTANT

## 2022-05-27 NOTE — PROGRESS NOTES
Name: Shelly Vásquez  MRN: [id number]   CSN: [id number]      Date: 05/30/2022    Referring physician:  No referring provider defined for this encounter.    Chief Complaint: RLS    Interval History:  - currently taking requip 0.25 mg QHS for RLS   - accompanied by spouse today   - dose works well if she takes it early, sometimes an hour or two before she gets in bed   - if she wakes up at night or going out for the evening, if she takes 0.125 -- this will get her through a social event   - if she gets up in the middle of the night she will take another 0.125  - some involuntary jerking movements, lying down reading on the sofa and had these involuntary movements  - no loss of awareness, no loss of bowel or bladder function  - saw an osteopath who told her they thought her femoral nerve was inflamed  - some lower back pain, has not had lumbar MRI   - was told she needs a hip replacement  - still doing pilates and swimming/strength training  - one small fall during mardi gras, no major injuries     - supplements with vitamin B         From April 2021  - mirapex 0.125 mg QHS helping legs significantly   - only had cramps one night since I saw her last   - takes baclofen 10 mg QHS   - now taking 1/4 dose of miralax and that has helped significantly with constipation   - denies dizziness or lightheadedness  - [facility] at [facility]   - restarted oral iron       From 2/2021: Shelly Vásquez is R handed a81 y.o. 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 Uses restoril to sleep. Muscle cramps in the legs x 2 years, now legs jerking. Toes are cramping, curling. Fingers are also curling/cramping. Curling in hands started several years ago, had a shot in her hands and it helped. Legs involuntarily moving. Denies any odd sensation in her  legs such as ants crawling or snakes to where she has to voluntarily move her legs to get the sensation out. Postural tremors in right hand when putting on makeup. Not sure if tremor is at rest. Balance issues started a few years ago. Fell once coming out of bathtub, held onto towel fay and it came out of the wall and fell backwards. No vision changes. Very careful when she walks. Sometimes slower to get out of the car just to make sure she doesn't fall out. Has fallen 3 times in the past four years. Has slowed down some, makes sure to plant her feet correctly. Wearing out shoes, possibly some shuffling -- had to have her first paid of shoes resoled. Two nights ago had a terrible night of jerking and cramping, couldn't sleep at night at all. Only cramps at night time, not necessarily during the day. Denies neck pain or cramping. Denies head tremor. Not sure if she has noticed feet inversion or not.     Exercises 2-3 times a week. Walks in the park when the weather is better.     Currently not taking iron supplementation due to constipation but plans to restart now that she has started taking miralax.     Medication history:  - none     Neuroleptic exposure:  - none     Family History: son with cramping     Past medical, family, and social history reviewed as documented in chart with pertinent positive medical, family, and social history detailed in HPI.      PD Review of Symptoms:  Anosmia: normal  Dysarthria/Hypophonia: she notes mild hypophonia   Dysphagia/Sialorrhea: none   Depression: some whenever she is cramping   Cognitive slowing: good, sometimes she forgets short term   Urinary changes: yes, frequency   Constipation: takes miralax, started taking recently   Orthostasis: none   Falls: as above   Freezing: none   Micrographia: some   Sleep issues:   -YAJAIRA: none   -RBD: none     Review of Systems:   Review of Systems   Constitutional: Negative for chills, fever and malaise/fatigue.   HENT: Negative for  hearing loss.    Eyes: Negative for blurred vision and double vision.   Respiratory: Negative for cough, shortness of breath and stridor.    Cardiovascular: Negative for chest pain and leg swelling.   Gastrointestinal: Negative for constipation, diarrhea and nausea.   Genitourinary: Negative for frequency and urgency.   Musculoskeletal: Negative for falls.   Skin: Negative for itching and rash.   Neurological: Positive for sensory change. Negative for dizziness, tremors, loss of consciousness and weakness.   Psychiatric/Behavioral: Negative for hallucinations and memory loss.           Past Medical History: The patient  has a past medical history of Arthritis, Atrial fibrillation, Basal cell cancer, Encounter for blood transfusion, Gastric ulcer, Herpes simplex without mention of complication, Postmenopausal HRT (hormone replacement therapy), and Supraventricular tachycardia.    Social History: The patient  reports that she quit smoking about 41 years ago. Her smoking use included cigarettes. She has a 22.00 pack-year smoking history. She has never used smokeless tobacco. She reports current alcohol use of about 2.0 standard drinks of alcohol per week. She reports that she does not use drugs.    Family History: Their family history includes Cirrhosis in her mother; No Known Problems in her brother, daughter, father, maternal aunt, maternal grandfather, maternal grandmother, maternal uncle, paternal aunt, paternal grandfather, paternal grandmother, and paternal uncle; Pulmonary embolism in her son.    Allergies: Nsaids (non-steroidal anti-inflammatory drug)     Meds:   Current Outpatient Medications on File Prior to Visit   Medication Sig Dispense Refill    acetaminophen (TYLENOL) 325 MG tablet Take 325 mg by mouth continuous prn for Pain.      aspirin (ECOTRIN) 81 MG EC tablet Take 1 tablet (81 mg total) by mouth once daily. 30 tablet 0    estradiol-norethindrone acet 0.5-0.1 mg per tablet Take 1 tablet by mouth  once daily. 90 tablet 3    pantoprazole (PROTONIX) 40 MG tablet Take 1 tablet (40 mg total) by mouth once daily. 90 tablet 1    pramipexole (MIRAPEX) 0.25 MG tablet TAKE 1 TABLET(0.25 MG) BY MOUTH EVERY EVENING 90 tablet 1    prasterone, dhea, (INTRAROSA) 6.5 mg Inst Place 1 suppository vaginally every evening. 28 each 2    temazepam (RESTORIL) 15 mg Cap Take 1 capsule (15 mg total) by mouth every evening. 90 capsule 1    traMADoL (ULTRAM) 50 mg tablet Take 1 tablet (50 mg total) by mouth every 24 hours as needed for Pain. 30 tablet 3    verapamiL (VERELAN) 120 MG C24P Take 1 capsule (120 mg total) by mouth once daily. 90 capsule 3    vitamin D (VITAMIN D3) 1000 units Tab Take 1,000 Units by mouth once daily.       No current facility-administered medications on file prior to visit.       Exam:  /71   Pulse 69   Wt 69.9 kg (154 lb 1.6 oz)   LMP  (LMP Unknown)   BMI 23.43 kg/m²     Constitutional  Well-developed, well-nourished, appears stated age   Ophthalmoscopic  No papilledema with no hemorrhages or exudates bilaterally   Cardiovascular  Radial pulses 2+ and symmetric, no LE edema bilaterally   Neurological    * Mental status  MOCA =      - Orientation  Oriented to person, place, time, and situation     - Memory   Intact recent and remote     - Attention/concentration  Attentive, vigilant during exam     - Language  Naming & repetition intact, +2-step commands     - Fund of knowledge  Aware of current events     - Executive  Well-organized thoughts     - Other     * Cranial nerves       - CN II  PERRL, visual fields full to confrontation     - CN III, IV, VI  Extraocular movements full, normal pursuits and saccades     - CN V  Sensation V1 - V3 intact     - CN VII  Face strong and symmetric bilaterally     - CN VIII  Hearing intact bilaterally     - CN IX, X  Palate raises midline and symmetric     - CN XI  SCM and trapezius 5/5 bilaterally     - CN XII  Tongue midline   * Motor  Muscle bulk  normal, strength 5/5 throughout   * Sensory   Intact to temperature and vibration throughout   * Coordination  No dysmetria with finger-to-nose or heel-to-shin   * Gait  See below.   * Deep tendon reflexes  2+ and symmetric throughout   Babinski downgoing bilaterally   * Specialized movement exam  No hypophonic speech.    No facial masking.   No cogwheel rigidity.     mild bradykinesia, likely age appropriate    No tremor with rest, posture, kinesis, or intention.    No other dystonia, chorea, athetosis, myoclonus, or tics.   No motor impersistence.   Normal-based gait, cautious gait    unable to tandem    No shortened stride length.   No abnormal arm swing.     No postural instability.      Laboratory/Radiological:  - Results:  Lab Visit on 05/27/2022   Component Date Value Ref Range Status    Vitamin B-12 05/27/2022 583  180 - 914 ng/L Final   Lab Visit on 05/12/2022   Component Date Value Ref Range Status    WBC 05/12/2022 6.58  3.90 - 12.70 K/uL Final    RBC 05/12/2022 2.40 (A) 4.00 - 5.40 M/uL Final    Hemoglobin 05/12/2022 8.6 (A) 12.0 - 16.0 g/dL Final    Hematocrit 05/12/2022 26.7 (A) 37.0 - 48.5 % Final    MCV 05/12/2022 111 (A) 82 - 98 fL Final    MCH 05/12/2022 35.8 (A) 27.0 - 31.0 pg Final    MCHC 05/12/2022 32.2  32.0 - 36.0 g/dL Final    RDW 05/12/2022 16.4 (A) 11.5 - 14.5 % Final    Platelets 05/12/2022 491 (A) 150 - 450 K/uL Final    MPV 05/12/2022 10.0  9.2 - 12.9 fL Final    Immature Granulocytes 05/12/2022 0.3  0.0 - 0.5 % Final    Gran # (ANC) 05/12/2022 2.8  1.8 - 7.7 K/uL Final    Immature Grans (Abs) 05/12/2022 0.02  0.00 - 0.04 K/uL Final    Lymph # 05/12/2022 2.9  1.0 - 4.8 K/uL Final    Mono # 05/12/2022 0.6  0.3 - 1.0 K/uL Final    Eos # 05/12/2022 0.2  0.0 - 0.5 K/uL Final    Baso # 05/12/2022 0.08  0.00 - 0.20 K/uL Final    nRBC 05/12/2022 0  0 /100 WBC Final    Gran % 05/12/2022 42.3  38.0 - 73.0 % Final    Lymph % 05/12/2022 43.3  18.0 - 48.0 % Final    Mono %  05/12/2022 9.3  4.0 - 15.0 % Final    Eosinophil % 05/12/2022 3.6  0.0 - 8.0 % Final    Basophil % 05/12/2022 1.2  0.0 - 1.9 % Final    Differential Method 05/12/2022 Automated   Final    Sodium 05/12/2022 136  136 - 145 mmol/L Final    Potassium 05/12/2022 4.2  3.5 - 5.1 mmol/L Final    Chloride 05/12/2022 104  95 - 110 mmol/L Final    CO2 05/12/2022 22 (A) 23 - 29 mmol/L Final    Glucose 05/12/2022 104  70 - 110 mg/dL Final    BUN 05/12/2022 11  8 - 23 mg/dL Final    Creatinine 05/12/2022 0.9  0.5 - 1.4 mg/dL Final    Calcium 05/12/2022 9.1  8.7 - 10.5 mg/dL Final    Total Protein 05/12/2022 6.5  6.0 - 8.4 g/dL Final    Albumin 05/12/2022 4.1  3.5 - 5.2 g/dL Final    Total Bilirubin 05/12/2022 0.7  0.1 - 1.0 mg/dL Final    Alkaline Phosphatase 05/12/2022 37 (A) 55 - 135 U/L Final    AST 05/12/2022 18  10 - 40 U/L Final    ALT 05/12/2022 17  10 - 44 U/L Final    Anion Gap 05/12/2022 10  8 - 16 mmol/L Final    eGFR if African American 05/12/2022 >60.0  >60 mL/min/1.73 m^2 Final    eGFR if non African American 05/12/2022 59.3 (A) >60 mL/min/1.73 m^2 Final    TSH 05/12/2022 1.597  0.400 - 4.000 uIU/mL Final    Ferritin 05/12/2022 580 (A) 20.0 - 300.0 ng/mL Final    Iron 05/12/2022 114  30 - 160 ug/dL Final    Transferrin 05/12/2022 176 (A) 200 - 375 mg/dL Final    TIBC 05/12/2022 260  250 - 450 ug/dL Final    Saturated Iron 05/12/2022 44  20 - 50 % Final    Hemoglobin A1C 05/12/2022 4.9  4.0 - 5.6 % Final    Estimated Avg Glucose 05/12/2022 94  68 - 131 mg/dL Final    H Pylori IgG Interp 05/12/2022 Negative  Negative Final       - Independent review of images:      - Independent review of consultant's notes: Dev     ASSESSMENT:  1. Restless leg    2. Imbalance    3. Lumbar radiculopathy, chronic          PLAN:  - some confusion on what she is currently taking -- when reviewing meds in hand, it appears she was taking what she had left of the 0.125 mg at night and taking the 0.25 mg for  outings or other events where she would be seated and legs would bother her   - some involuntary jerking when lying down, discussed periodic limb movements and its associated with RLS   - rec'd that  record a video when this occurs and attach it to [facility] message   - rec'd taking 0.25 pramipexole QHS (1-2 hours before bedtime)  - take 0.125 mg tablets as needed during the day/when going out  - lower back pain that radiates down her leg -- some associated with hip (following with ortho, needs hip replacement  - repeat lumbar MRI (last done in 2019)  - r/o reversible causes of imbalance as below   - no longer supplementing with iron, discussed the importance of iron supplementation when there is iron deficiency and RLS -- did not find that this helped when she was supplementing (Although it can take time)     Orders Placed This Encounter    MRI Lumbar Spine Without Contrast    Vitamin B12 Deficiency Panel    Vitamin B1    VITAMIN B6           Follow up: in 3 months with [facility]     Collaborating Physician, Dr. Espinoza, was available during today's encounter. Any change to plan along with cosign to appear in the EMR.       Total time spent with the patient: 55 minutes.  40 minutes of face-to-face consultation and 15 minutes of chart review and coordination of care, on the day of the visit. This includes face to face time and non-face to face time preparing to see the patient (eg, review of tests), obtaining and/or reviewing separately obtained history, documenting clinical information in the electronic or other health record, independently interpreting resultsand communicating results to the patient/family/caregiver, or care coordination.         María Hendrickson PA-C   [facility]  Department of Neurology  Movement Disorders

## 2022-05-28 LAB — VIT B12 SERPL-MCNC: 583 NG/L (ref 180–914)

## 2022-05-30 ENCOUNTER — TELEPHONE (OUTPATIENT)
Dept: INTERNAL MEDICINE | Facility: CLINIC | Age: 82
End: 2022-05-30
Payer: MEDICARE

## 2022-05-30 NOTE — TELEPHONE ENCOUNTER
Can you reach out to josé to get this CH and yearly TRINO and get her in with an MD within the next 2 weeks

## 2022-05-31 ENCOUNTER — CLINICAL SUPPORT (OUTPATIENT)
Dept: URGENT CARE | Facility: CLINIC | Age: 82
End: 2022-05-31
Payer: MEDICARE

## 2022-05-31 ENCOUNTER — EXTERNAL CHRONIC CARE MANAGEMENT (OUTPATIENT)
Dept: PRIMARY CARE CLINIC | Facility: CLINIC | Age: 82
End: 2022-05-31
Payer: MEDICARE

## 2022-05-31 DIAGNOSIS — Z11.9 ENCOUNTER FOR SCREENING EXAMINATION FOR INFECTIOUS DISEASE: Primary | ICD-10-CM

## 2022-05-31 LAB
CTP QC/QA: YES
SARS-COV-2 RDRP RESP QL NAA+PROBE: NEGATIVE

## 2022-05-31 PROCEDURE — 99490 CHRNC CARE MGMT STAFF 1ST 20: CPT | Mod: S$PBB,,, | Performed by: INTERNAL MEDICINE

## 2022-05-31 PROCEDURE — 99211 OFF/OP EST MAY X REQ PHY/QHP: CPT | Mod: S$GLB,,, | Performed by: NURSE PRACTITIONER

## 2022-05-31 PROCEDURE — U0002: ICD-10-PCS | Mod: QW,CR,S$GLB, | Performed by: NURSE PRACTITIONER

## 2022-05-31 PROCEDURE — 99490 CHRNC CARE MGMT STAFF 1ST 20: CPT | Mod: PBBFAC | Performed by: INTERNAL MEDICINE

## 2022-05-31 PROCEDURE — U0002 COVID-19 LAB TEST NON-CDC: HCPCS | Mod: QW,CR,S$GLB, | Performed by: NURSE PRACTITIONER

## 2022-05-31 PROCEDURE — 99211 PR OFFICE/OUTPT VISIT, EST, LEVL I: ICD-10-PCS | Mod: S$GLB,,, | Performed by: NURSE PRACTITIONER

## 2022-05-31 PROCEDURE — 99490 PR CHRONIC CARE MGMT, 1ST 20 MIN: ICD-10-PCS | Mod: S$PBB,,, | Performed by: INTERNAL MEDICINE

## 2022-06-01 ENCOUNTER — TELEPHONE (OUTPATIENT)
Dept: HEMATOLOGY/ONCOLOGY | Facility: CLINIC | Age: 82
End: 2022-06-01
Payer: MEDICARE

## 2022-06-01 LAB — PYRIDOXAL SERPL-MCNC: 30 UG/L (ref 5–50)

## 2022-06-01 NOTE — TELEPHONE ENCOUNTER
"----- Message from Elias Gonsalez sent at 6/1/2022 10:20 AM CDT -----  Reschedule Existing Appointment        Appt Date: 6/14    Type of appt: F/u    Physician: Neetu    Reason for rescheduling? Requesting to r/s for 6/17    Caller: Self    Contact Preference: [phone] (home)               Additional Information:  "Thank you for all that you do for our patients"     "

## 2022-06-02 LAB — VIT B1 BLD-MCNC: 41 UG/L (ref 38–122)

## 2022-06-05 ENCOUNTER — PATIENT MESSAGE (OUTPATIENT)
Dept: NEUROLOGY | Facility: CLINIC | Age: 82
End: 2022-06-05
Payer: MEDICARE

## 2022-06-06 ENCOUNTER — TELEPHONE (OUTPATIENT)
Dept: ORTHOPEDICS | Facility: CLINIC | Age: 82
End: 2022-06-06
Payer: MEDICARE

## 2022-06-06 ENCOUNTER — PATIENT MESSAGE (OUTPATIENT)
Dept: ORTHOPEDICS | Facility: CLINIC | Age: 82
End: 2022-06-06
Payer: MEDICARE
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
## 2022-06-07 ENCOUNTER — PATIENT MESSAGE (OUTPATIENT)
Dept: INTERNAL MEDICINE | Facility: CLINIC | Age: 82
End: 2022-06-07
Payer: MEDICARE

## 2022-06-08 RX ORDER — DIAZEPAM 2 MG/1
2 TABLET ORAL NIGHTLY PRN
Qty: 30 TABLET | Refills: 0 | Status: SHIPPED | OUTPATIENT
Start: 2022-06-08 | End: 2022-07-28 | Stop reason: SDUPTHER

## 2022-06-09 ENCOUNTER — PATIENT MESSAGE (OUTPATIENT)
Dept: NEUROLOGY | Facility: CLINIC | Age: 82
End: 2022-06-09
Payer: MEDICARE

## 2022-06-14 ENCOUNTER — OFFICE VISIT (OUTPATIENT)
Dept: HEMATOLOGY/ONCOLOGY | Facility: CLINIC | Age: 82
End: 2022-06-14
Payer: MEDICARE

## 2022-06-14 VITALS
OXYGEN SATURATION: 99 % | HEIGHT: 68 IN | HEART RATE: 69 BPM | BODY MASS INDEX: 23.14 KG/M2 | DIASTOLIC BLOOD PRESSURE: 56 MMHG | RESPIRATION RATE: 17 BRPM | SYSTOLIC BLOOD PRESSURE: 115 MMHG | WEIGHT: 152.69 LBS | TEMPERATURE: 98 F

## 2022-06-14 DIAGNOSIS — M16.10 OSTEOARTHRITIS OF ONE HIP: ICD-10-CM

## 2022-06-14 DIAGNOSIS — D46.9 MDS (MYELODYSPLASTIC SYNDROME): Primary | ICD-10-CM

## 2022-06-14 DIAGNOSIS — D46.1 RARS (REFRACTORY ANEMIA WITH RINGED SIDEROBLASTS): ICD-10-CM

## 2022-06-14 DIAGNOSIS — D46.4 REFRACTORY ANEMIA: ICD-10-CM

## 2022-06-14 PROCEDURE — 99999 PR PBB SHADOW E&M-EST. PATIENT-LVL IV: ICD-10-PCS | Mod: PBBFAC,GC,,

## 2022-06-14 PROCEDURE — 99999 PR PBB SHADOW E&M-EST. PATIENT-LVL IV: CPT | Mod: PBBFAC,GC,,

## 2022-06-14 PROCEDURE — 99214 OFFICE O/P EST MOD 30 MIN: CPT | Mod: PBBFAC

## 2022-06-14 PROCEDURE — 99214 OFFICE O/P EST MOD 30 MIN: CPT | Mod: S$PBB,GC,,

## 2022-06-14 PROCEDURE — 99214 PR OFFICE/OUTPT VISIT, EST, LEVL IV, 30-39 MIN: ICD-10-PCS | Mod: S$PBB,GC,,

## 2022-06-14 NOTE — Clinical Note
Fyi, she needs her luspatercept ordered and authorized/scheduled, not sure when you want follow up or what you discussed with her so I did not order anything for follow up, lmk if you need anything

## 2022-06-14 NOTE — PROGRESS NOTES
[facility]  [facility]  Hematology/Medical Oncology Clinic      PATIENT: Shelly Vásquez  MRN: [id number]  DATE: 6/14/2022    Chief Complaint: f/u for RARS, No chief complaint on file.    Other MDs:  Marlene Andrew MD    Subjective:   Initial History: Ms. Vásquez is a 81 y.o. 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     She has a pMHx of GIB requiring transfusions in the past as well as pAF - not on anticoagulation (aspirin only) with a loop recorder in place.     Today:   Patients to the clinic for first time since July 2021. She has an upcoming surgery planned on her hip and requires medical optimization.     Her blood counts, including her plt, hgb and wbc, have remained stable for past 5+ years. She has no concerning ROS complaints today other than SOB, fatigue and DALE.     For her elective procedure, she requires a Hgb >10.    Past Medical History:   Diagnosis Date    Arthritis     Atrial fibrillation     Basal cell cancer     on the face    Encounter for blood transfusion     Gastric ulcer     Herpes simplex without mention of complication     rare outbreaks    Postmenopausal HRT (hormone replacement therapy)     Supraventricular tachycardia     s/p AVNRT ablation (Dr Brady)     Past Surgical History:   Procedure Laterality Date    APPENDECTOMY  age 21    BACK SURGERY      Beast Lift  2004    BONE MARROW BIOPSY Right 12/19/2019    Procedure: Biopsy-bone marrow;  Surgeon: Aidan Spring MD;  Location: [facility] OR [address] [place] [address];  Service: Oncology;  Laterality: Right;    BREAST BIOPSY  50yrs ago    unable to see scar    COSMETIC SURGERY      DILATION AND CURETTAGE OF UTERUS  2008    PMB    ENDOMETRIAL BIOPSY      EYE SURGERY      ptsosis      RADIOFREQUENCY ABLATION  03/2018    SMALL INTESTINE SURGERY      TONSILLECTOMY, ADENOIDECTOMY  age 8    TOTAL REDUCTION MAMMOPLASTY Bilateral 2003    TUBAL LIGATION       Family History    Problem Relation Age of Onset    No Known Problems Father     Cirrhosis Mother     No Known Problems Brother     No Known Problems Maternal Aunt     No Known Problems Maternal Uncle     No Known Problems Paternal Aunt     No Known Problems Paternal Uncle     No Known Problems Maternal Grandmother     No Known Problems Maternal Grandfather     No Known Problems Paternal Grandmother     No Known Problems Paternal Grandfather     No Known Problems Daughter     Pulmonary embolism Son     Breast cancer Neg Hx     Colon cancer Neg Hx     Ovarian cancer Neg Hx     Amblyopia Neg Hx     Blindness Neg Hx     Cancer Neg Hx     Cataracts Neg Hx     Diabetes Neg Hx     Glaucoma Neg Hx     Hypertension Neg Hx     Macular degeneration Neg Hx     Retinal detachment Neg Hx     Strabismus Neg Hx     Stroke Neg Hx     Thyroid disease Neg Hx       reports that she quit smoking about 41 years ago. Her smoking use included cigarettes. She has a 22.00 pack-year smoking history. She has never used smokeless tobacco. She reports current alcohol use of about 2.0 standard drinks of alcohol per week. She reports that she does not use drugs.  Review of patient's allergies indicates:   Allergen Reactions    Nsaids (non-steroidal anti-inflammatory drug)      GI Bleed  Had 5 blood transfusions     Current Outpatient Medications   Medication Sig Dispense Refill    acetaminophen (TYLENOL) 325 MG tablet Take 325 mg by mouth continuous prn for Pain.      aspirin (ECOTRIN) 81 MG EC tablet Take 1 tablet (81 mg total) by mouth once daily. 30 tablet 0    diazePAM (VALIUM) 2 MG tablet Take 1 tablet (2 mg total) by mouth nightly as needed (muscle tremors/spasms). 30 tablet 0    estradiol-norethindrone acet 0.5-0.1 mg per tablet Take 1 tablet by mouth once daily. 90 tablet 3    pantoprazole (PROTONIX) 40 MG tablet Take 1 tablet (40 mg total) by mouth once daily. 90 tablet 1    pramipexole (MIRAPEX) 0.25 MG tablet TAKE 1  "TABLET(0.25 MG) BY MOUTH EVERY EVENING 90 tablet 1    prasterone, dhea, (INTRAROSA) 6.5 mg Inst Place 1 suppository vaginally every evening. 28 each 2    temazepam (RESTORIL) 15 mg Cap Take 1 capsule (15 mg total) by mouth every evening. 90 capsule 1    traMADoL (ULTRAM) 50 mg tablet Take 1 tablet (50 mg total) by mouth every 24 hours as needed for Pain. 30 tablet 3    verapamiL (VERELAN) 120 MG C24P Take 1 capsule (120 mg total) by mouth once daily. 90 capsule 3    vitamin D (VITAMIN D3) 1000 units Tab Take 1,000 Units by mouth once daily.       No current facility-administered medications for this visit.     Review of Systems   Constitutional: Positive for fatigue. Negative for appetite change, chills and unexpected weight change.   HENT: Negative for dental problem, mouth sores, nosebleeds, trouble swallowing and voice change.    Eyes: Negative for visual disturbance.   Respiratory: Positive for shortness of breath. Negative for chest tightness and wheezing.    Cardiovascular: Negative for chest pain, palpitations and leg swelling.   Gastrointestinal: Negative for abdominal distention, abdominal pain, blood in stool, diarrhea, nausea and vomiting.   Endocrine: Negative for cold intolerance.   Genitourinary: Negative for hematuria.   Musculoskeletal: Negative for neck pain.   Skin: Negative for pallor and rash.   Allergic/Immunologic: Negative for immunocompromised state.   Neurological: Negative for dizziness, weakness and headaches.   Hematological: Negative for adenopathy. Does not bruise/bleed easily.   Psychiatric/Behavioral: Negative for agitation and behavioral problems.     ECOG Performance Status: 1     Objective:      Vitals:   Vitals:    06/14/22 1355   BP: (!) 115/56   Pulse: 69   Resp: 17   Temp: 97.8 °F (36.6 °C)   SpO2: 99%   Weight: 69.3 kg (152 lb 10.7 oz)   Height: 5' 8" (1.727 m)     BMI: Body mass index is 23.21 kg/m².    Physical Exam  Vitals reviewed.   Constitutional:       Appearance: " She is well-developed.      Comments: Well appearing, thin, white, elderly female   HENT:      Head: Normocephalic and atraumatic.   Eyes:      Pupils: Pupils are equal, round, and reactive to light.   Cardiovascular:      Rate and Rhythm: Normal rate and regular rhythm.   Pulmonary:      Effort: Pulmonary effort is normal.      Breath sounds: Normal breath sounds. No wheezing.   Chest:   Breasts:      Right: No supraclavicular adenopathy.      Left: No supraclavicular adenopathy.       Abdominal:      General: Bowel sounds are normal.      Palpations: Abdomen is soft. There is no mass.      Comments: No HSM   Musculoskeletal:         General: Normal range of motion.      Cervical back: Normal range of motion and neck supple.   Lymphadenopathy:      Head:      Right side of head: No submandibular, posterior auricular or occipital adenopathy.      Left side of head: No submandibular, posterior auricular or occipital adenopathy.      Cervical: No cervical adenopathy.      Upper Body:      Right upper body: No supraclavicular adenopathy.      Left upper body: No supraclavicular adenopathy.   Skin:     General: Skin is warm and dry.   Neurological:      Mental Status: She is alert and oriented to person, place, and time.   Psychiatric:         Behavior: Behavior normal.       Laboratory Data:  No visits with results within 1 Week(s) from this visit.   Latest known visit with results is:   Clinical Support on 05/31/2022   Component Date Value Ref Range Status    POC Rapid COVID 05/31/2022 Negative  Negative Final     Acceptable 05/31/2022 Yes   Final       Assessment:       1. MDS (myelodysplastic syndrome)    2. RARS (refractory anemia with ringed sideroblasts)    3. Refractory anemia    4. Osteoarthritis of one hip      Hematologic History:      2019 November   -initial consult for macrocytic anemia, work up negative  December   -bmbx: Hypercellular marrow 60% with ringed sideroblasts (50%), MF 0/3,  Increased iron storage, ME 1.3:1, Normal chromosomes, Normal leuko/lymph screen, NGS showing SF3B1- VUS+ (favorable)  2020 January   -discuss findings and normal OS, routine follow up    Plan:     MDS-RS; SF3B1 mutation; IPSS-0 (low), IPSS-R 2 (low risk)  Counts have been steady for >5 years and last bone marrow in 2019 without marked concern and stable. Continues to have IPSS-R low risk and no role for bone marrow. Appears she could be having some symptom related to her anemia and with her hip replacement surgery coming up, plan is to start luspatercept in hopes of decreasing her symptoms by improving her anemia while also making surgery safer.    -auth for luspatercept  - schedule  - f/u with Dr. Lopez   - sent message to Dr. Gary    Follow up:  Obtain auth for luspat, f/u with Dr. Lopez with labs per him    Discussed with Dr. Lopez.    Sebas De Anda MD  Hematology/Medical Oncology Fellow  [phone] (pager)      Route Chart for Scheduling    BMT Chart Routing      Follow up with physician . 8/16/22 with labs prior and infusion following visit (luspatercept)   Follow up with [facility]    Labs CBC, CMP, magnesium and phosphorus   Lab interval:  Labs prior to visit (CBC, CMP, mg, phos, t&S)   Imaging None      Pharmacy appointment No pharmacy appointment needed      Other referrals No additional referrals needed

## 2022-06-16 ENCOUNTER — HOSPITAL ENCOUNTER (OUTPATIENT)
Dept: RADIOLOGY | Facility: HOSPITAL | Age: 82
Discharge: HOME | End: 2022-06-16
Attending: PHYSICIAN ASSISTANT
Payer: MEDICARE

## 2022-06-16 DIAGNOSIS — M54.16 LUMBAR RADICULOPATHY, CHRONIC: ICD-10-CM

## 2022-06-16 PROCEDURE — 72148 MRI LUMBAR SPINE W/O DYE: CPT | Mod: TC

## 2022-06-16 PROCEDURE — 72148 MRI LUMBAR SPINE W/O DYE: CPT | Mod: 26,,, | Performed by: RADIOLOGY

## 2022-06-16 PROCEDURE — 72148 MRI LUMBAR SPINE WITHOUT CONTRAST: ICD-10-PCS | Mod: 26,,, | Performed by: RADIOLOGY

## 2022-06-17 ENCOUNTER — TELEPHONE (OUTPATIENT)
Dept: ORTHOPEDICS | Facility: CLINIC | Age: 82
End: 2022-06-17
Payer: MEDICARE

## 2022-06-17 NOTE — TELEPHONE ENCOUNTER
----- Message from Sebas De Anda MD sent at 6/15/2022  1:44 PM CDT -----  Regarding: RE: anemia optimization for NAT  Hey Dr. Gary,     Not really within protocol/approval but we are going to try to her a medication approved for her MDS. Likely will take 1-2 months to see improvement with maximum improvement seen at 3 months.     50/50 on if shell be ready in 90 days, but likely will be by 120.     Let me know if there are any questions.     I will be leaving fellowship in 2 weeks and Dr. Lopez - our MDS/AML specialist, will be taking over her care.     I'm here till then so feel free to reach out.     Thanks  Reed   ----- Message -----  From: Keon Gary III, MD  Sent: 6/5/2022   2:17 PM CDT  To: Marlene Méndez MD, Sebas De Anda MD, #  Subject: anemia optimization for NAT                      Hi,   I met Ms Vásquez in clinic. She has an arthritic right hip and could be a candidate for total hip.   Touching base re: medical optimization for surgery:   Hgb needs to be in the 10-11 range for elective arthroplasty surgery.   She has not followed up with Dr Spring Q6 months as recommended July 2021  Looks like she has an appointment with Dr De Anda on 6/14  Please keep me updated.     I'm sending her for a right hip US guided steroid injection to try to buy us some time and be more definitive about which of her symptoms are coming from her hip joint    Thank  you,  Will

## 2022-06-21 ENCOUNTER — PATIENT MESSAGE (OUTPATIENT)
Dept: NEUROLOGY | Facility: CLINIC | Age: 82
End: 2022-06-21
Payer: MEDICARE

## 2022-06-21 ENCOUNTER — PATIENT MESSAGE (OUTPATIENT)
Dept: INTERNAL MEDICINE | Facility: CLINIC | Age: 82
End: 2022-06-21
Payer: MEDICARE

## 2022-06-21 DIAGNOSIS — M54.16 LUMBAR RADICULOPATHY, CHRONIC: Primary | ICD-10-CM

## 2022-06-22 ENCOUNTER — OFFICE VISIT (OUTPATIENT)
Dept: SPORTS MEDICINE | Facility: CLINIC | Age: 82
End: 2022-06-22
Payer: MEDICARE

## 2022-06-22 ENCOUNTER — PES CALL (OUTPATIENT)
Dept: ADMINISTRATIVE | Facility: CLINIC | Age: 82
End: 2022-06-22
Payer: MEDICARE

## 2022-06-22 VITALS — HEIGHT: 68 IN | BODY MASS INDEX: 22.78 KG/M2 | WEIGHT: 150.31 LBS

## 2022-06-22 DIAGNOSIS — G89.29 CHRONIC PAIN OF RIGHT HIP: ICD-10-CM

## 2022-06-22 DIAGNOSIS — M25.551 CHRONIC PAIN OF RIGHT HIP: ICD-10-CM

## 2022-06-22 DIAGNOSIS — M16.11 PRIMARY OSTEOARTHRITIS OF RIGHT HIP: Primary | ICD-10-CM

## 2022-06-22 PROCEDURE — 99214 OFFICE O/P EST MOD 30 MIN: CPT | Mod: 25,S$PBB,, | Performed by: FAMILY MEDICINE

## 2022-06-22 PROCEDURE — 99214 PR OFFICE/OUTPT VISIT, EST, LEVL IV, 30-39 MIN: ICD-10-PCS | Mod: 25,S$PBB,, | Performed by: FAMILY MEDICINE

## 2022-06-22 PROCEDURE — 20611 DRAIN/INJ JOINT/BURSA W/US: CPT | Mod: PBBFAC | Performed by: FAMILY MEDICINE

## 2022-06-22 PROCEDURE — 99999 PR PBB SHADOW E&M-EST. PATIENT-LVL III: ICD-10-PCS | Mod: PBBFAC,,, | Performed by: FAMILY MEDICINE

## 2022-06-22 PROCEDURE — 99213 OFFICE O/P EST LOW 20 MIN: CPT | Mod: PBBFAC,25 | Performed by: FAMILY MEDICINE

## 2022-06-22 PROCEDURE — 20611 LARGE JOINT ASPIRATION/INJECTION: R HIP JOINT: ICD-10-PCS | Mod: S$PBB,RT,, | Performed by: FAMILY MEDICINE

## 2022-06-22 PROCEDURE — 99999 PR PBB SHADOW E&M-EST. PATIENT-LVL III: CPT | Mod: PBBFAC,,, | Performed by: FAMILY MEDICINE

## 2022-06-22 RX ORDER — GABAPENTIN 100 MG/1
100 CAPSULE ORAL 3 TIMES DAILY
Qty: 90 CAPSULE | Refills: 1 | Status: SHIPPED | OUTPATIENT
Start: 2022-06-22 | End: 2022-06-28 | Stop reason: SDUPTHER

## 2022-06-22 RX ORDER — TRIAMCINOLONE ACETONIDE 40 MG/ML
40 INJECTION, SUSPENSION INTRA-ARTICULAR; INTRAMUSCULAR
Status: DISCONTINUED | OUTPATIENT
Start: 2022-06-22 | End: 2022-06-22 | Stop reason: HOSPADM

## 2022-06-22 RX ADMIN — TRIAMCINOLONE ACETONIDE 40 MG: 40 INJECTION, SUSPENSION INTRA-ARTICULAR; INTRAMUSCULAR at 02:06

## 2022-06-22 NOTE — PROGRESS NOTES
Shelly Vásquez, a 81 y.o. female, is here for evaluation of right hip.     HISTORY OF PRESENT ILLNESS   Location: proximal thigh   Onset: chronic, insidious  Palliative:    relative rest   oral analgesics, OTC    Provocative: prolonged ambulation  Prior: none  Progression: plateau discomfort   Quality: sharp  Radiation: none  Severity: per nursing documentation  Timing: intermittent with use  Trauma: none    Review of systems (ROS):  A 10+ review of systems was performed with pertinent positives and negatives noted above in the history of present illness. Other systems were negative unless otherwise specified.    PHYSICAL EXAMINATION  General:  The patient is alert and oriented x 3.  Mood is pleasant.  Observation of ears, eyes and nose reveal no gross abnormalities.  HEENT: NCAT, sclera nonicteric  Lungs: Respirations are equal and unlabored.   Gait is coordinated. Patient can toe walk and heel walk without difficulty.    HIP/PELVIS EXAMINATION    Observation/Inspection  Gait:   Nonantalgic   Alignment:  Neutral   Scars:   None   Muscle atrophy: None   Effusion:  None   Warmth:  None   Discoloration:   None   Leg lengths:   Equal   Pelvis:    Level     Tenderness/Crepitus (T/C):      T / C  Trochanteric bursa   - / -  Piriformis    - / -  SI joint    - / -  Psoas tendon   - / -  Rectus insertion  - / -  Adductor insertion  - / -  Pubic symphysis  - / -    ROM: (* = pain)    Flexion:      120 degrees  External rotation:   40 degrees  Internal rotation with axial load:  30 degrees  Internal rotation without axial load:  40 degrees  Abduction:    45 degrees  Adduction:     20 degrees    Special Tests:  Pain w/ forced internal rotation (FADIR):  -   Pain w/ forced external rotation (CONCETTA):  -   Circumduction test:     -  Stinchfield test:     -   Log roll:       -   Snapping hip (internal):    -   Sit-up pain:      -   Resisted sit-up pain:     -   Resisted sit-up with adductor contraction pain:  -   Step-down  test:     +  Trendelenburg test:     -  Bridge test      +     Extremity Neuro-vascular Examination:   Sensation:  Grossly intact to light touch all dermatomal regions.   Motor Function:  Fully intact motor function at hip, knee, foot and ankle    DTRs;  quadriceps and  achilles 2+.  No clonus and downgoing Babinski.    Vascular status:  DP and PT pulses 2+, brisk capillary refill, symmetric.    Skin:  intact, compartments soft.    Other Findings:    ASSESSMENT & PLAN  Assessment  #1 Tonnis Grade III osteoarthritis of hip, right   W/ tendinosis of lateral gluteal tendons    No evidence of neurologic pathology  No evidence of vascular pathology    Imaging studies reviewed:  X-ray pelvis and hip, right 22.05  MRI    xPlan  We discussed the importance of appropriate diet, weight, and regular exercise    We discussed options including    Watchful waiting / relative rest    Physical therapy x   Injection therapy CSI iaHip, r   Consultation    The patient chooses As above   x = prescribed  CSI = corticosteroid injection  VSI = viscosupplement injection  PRPI = platelet rich plasma injection  ia = intra articular  R = right  L = left  B = bilateral   nfSx = surgical consultation was recommended, but patient is not interested in consultation at this time    Physical Therapy        Formal (fPT), @ [facility] facility t   Formal (fPT), @ [facility] facility        Homegoing (hgPT), per concurrent fPT recommendations    Homegoing (hgPT), per prior fPT recommendations    Homegoing (hgPT), handout provided        w/  (atPT)    [blank] = not prescribed  x = prescribed  b = prescribed, and begin as indicated  t = continue as indicated  r = prescribed, and restart as indicated  p = completed prior as indicated  hs = prescribed, and with high school   col = prescribed, and with college or university   nfPT = physical therapy was recommended, but patient is not interested in PT at this  time    Activity (e.g. sports, work) restrictions    [blank] = as tolerated  pt = per physical therapist  at = per   NWB = non weight bearing on affected lower extremity, with crutches assistance for ambulation    Bracing    [blank] = not prescribed  r = recommended, but not fit with at todays visit  f = prescribed and fit with at todays visit  t = continue as indicated  d = d/c  p = as needed  rare = use on rare, as-needed basis; advised against chronic use    Pain management    [blank] = No prescription necessary. A handout detailing dosing of appropriate   over-the-counter musculoskeletal analgesics was made available to the patient.   m = meloxicam x 14 days  mp = 14 day course of meloxicam prescribed prior    Follow up WAida Gary as scheduled   How was [place]? How was [facility]?    [blank] = as needed  [number] = in [number] weeks  CSI = for corticosteroid injection  VSI = for viscosupplement injection or injection series  PRP = for platelet rich plasma injection or injection series  MRI = after MRI imaging  ns = should surgical options be deferred (no surgery)  o = appointment offered, deferred by patient    Should symptoms worsen or fail to resolve, consider    Revisiting the above options and / or NAT     Vocation:   Travels  swims

## 2022-06-22 NOTE — PROCEDURES
"Large Joint Aspiration/Injection: R hip joint    Date/Time: 6/22/2022 2:30 PM  Performed by: Aidan Yang MD  Authorized by: Aidan Yang MD     Consent Done?:  Yes (Verbal)  Indications:  Pain  Site marked: the procedure site was marked    Timeout: prior to procedure the correct patient, procedure, and site was verified    Prep: patient was prepped and draped in usual sterile fashion    Local anesthesia used?: No      Details:  Needle Size:  20 G  Ultrasonic Guidance for needle placement?: Yes    Images are saved and documented.  Approach:  Anterior  Location:  Hip  Site:  R hip joint  Medications:  40 mg triamcinolone acetonide 40 mg/mL  Patient tolerance:  Patient tolerated the procedure well with no immediate complications     Description of ultrasound utilization for needle guidance:   Ultrasound guidance used for needle localization. Images saved and stored for documentation. The hip joint was visualized. Dynamic visualization of the 20g x 6.0" needle was continuous throughout the procedure.    Precautionary:  The patient's femoral artery, vein, and nerve were identified, marked with a skin marker, and visualized throughout the procedure, so as to avoid needle contact with those structures.       "

## 2022-06-30 ENCOUNTER — EXTERNAL CHRONIC CARE MANAGEMENT (OUTPATIENT)
Dept: PRIMARY CARE CLINIC | Facility: CLINIC | Age: 82
End: 2022-06-30
Payer: MEDICARE

## 2022-06-30 PROCEDURE — 99439 CHRNC CARE MGMT STAF EA ADDL: CPT | Mod: S$PBB,,, | Performed by: INTERNAL MEDICINE

## 2022-06-30 PROCEDURE — 99490 PR CHRONIC CARE MGMT, 1ST 20 MIN: ICD-10-PCS | Mod: S$PBB,,, | Performed by: INTERNAL MEDICINE

## 2022-06-30 PROCEDURE — 99439 CHRNC CARE MGMT STAF EA ADDL: CPT | Mod: PBBFAC | Performed by: INTERNAL MEDICINE

## 2022-06-30 PROCEDURE — 99490 CHRNC CARE MGMT STAFF 1ST 20: CPT | Mod: S$PBB,,, | Performed by: INTERNAL MEDICINE

## 2022-06-30 PROCEDURE — 99490 CHRNC CARE MGMT STAFF 1ST 20: CPT | Mod: PBBFAC | Performed by: INTERNAL MEDICINE

## 2022-06-30 PROCEDURE — 99439 PR CHRONIC CARE MGMT, EA ADDTL 20 MIN: ICD-10-PCS | Mod: S$PBB,,, | Performed by: INTERNAL MEDICINE

## 2022-07-03 ENCOUNTER — PATIENT MESSAGE (OUTPATIENT)
Dept: HEMATOLOGY/ONCOLOGY | Facility: CLINIC | Age: 82
End: 2022-07-03
Payer: MEDICARE

## 2022-07-03 ENCOUNTER — PATIENT MESSAGE (OUTPATIENT)
Dept: INTERNAL MEDICINE | Facility: CLINIC | Age: 82
End: 2022-07-03
Payer: MEDICARE

## 2022-07-04 ENCOUNTER — PATIENT MESSAGE (OUTPATIENT)
Dept: INTERNAL MEDICINE | Facility: CLINIC | Age: 82
End: 2022-07-04
Payer: MEDICARE

## 2022-07-07 ENCOUNTER — PES CALL (OUTPATIENT)
Dept: ADMINISTRATIVE | Facility: CLINIC | Age: 82
End: 2022-07-07
Payer: MEDICARE

## 2022-07-07 ENCOUNTER — OFFICE VISIT (OUTPATIENT)
Dept: INTERNAL MEDICINE | Facility: CLINIC | Age: 82
End: 2022-07-07
Payer: MEDICARE

## 2022-07-07 ENCOUNTER — PATIENT MESSAGE (OUTPATIENT)
Dept: ORTHOPEDICS | Facility: CLINIC | Age: 82
End: 2022-07-07
Payer: MEDICARE

## 2022-07-07 VITALS
TEMPERATURE: 98 F | HEART RATE: 76 BPM | SYSTOLIC BLOOD PRESSURE: 92 MMHG | DIASTOLIC BLOOD PRESSURE: 55 MMHG | BODY MASS INDEX: 23.14 KG/M2 | HEIGHT: 68 IN | WEIGHT: 152.69 LBS

## 2022-07-07 DIAGNOSIS — D46.4 REFRACTORY ANEMIA: ICD-10-CM

## 2022-07-07 DIAGNOSIS — M16.10 OSTEOARTHRITIS OF ONE HIP: ICD-10-CM

## 2022-07-07 DIAGNOSIS — M48.062 SPINAL STENOSIS OF LUMBAR REGION WITH NEUROGENIC CLAUDICATION: ICD-10-CM

## 2022-07-07 DIAGNOSIS — G25.81 RLS (RESTLESS LEGS SYNDROME): Primary | ICD-10-CM

## 2022-07-07 PROCEDURE — 99213 PR OFFICE/OUTPT VISIT, EST, LEVL III, 20-29 MIN: ICD-10-PCS | Mod: S$PBB,,, | Performed by: INTERNAL MEDICINE

## 2022-07-07 PROCEDURE — 99999 PR PBB SHADOW E&M-EST. PATIENT-LVL III: ICD-10-PCS | Mod: PBBFAC,,, | Performed by: INTERNAL MEDICINE

## 2022-07-07 PROCEDURE — 99999 PR PBB SHADOW E&M-EST. PATIENT-LVL III: CPT | Mod: PBBFAC,,, | Performed by: INTERNAL MEDICINE

## 2022-07-07 PROCEDURE — 99213 OFFICE O/P EST LOW 20 MIN: CPT | Mod: PBBFAC | Performed by: INTERNAL MEDICINE

## 2022-07-07 PROCEDURE — 99213 OFFICE O/P EST LOW 20 MIN: CPT | Mod: S$PBB,,, | Performed by: INTERNAL MEDICINE

## 2022-07-10 RX ORDER — ROPINIROLE HYDROCHLORIDE 2 MG/1
2 TABLET, FILM COATED, EXTENDED RELEASE ORAL NIGHTLY
COMMUNITY
End: 2022-10-06

## 2022-07-10 NOTE — PROGRESS NOTES
Subjective:       Patient ID: Shelly Vásquez is a 81 y.o. female.    Chief Complaint: Medication Problem    81-year-old very pleasant patient here for medication clarification.  She has had breast like syndrome for quite sometime in about 6 months ago we increased her low-dose Mirapex 0.25.  She also takes temazepam for sleep at 15 mg for chronic insomnia at that time was taking a tramadol if her leg symptoms worsened and or a baclofen.  She has seen Neurology and Orthopedics does have lumbar foraminal stenosis fairly severe at L5-S1 but does a good bit exercise daily with stretching and personal trainers.  Few months ago we attempted to change her Mirapex to Requip 2 mg extended release and this still did not seem to ralph the symptoms especially as the day goes on and with increased activity.  She also has MDS and is chronically anemic.    Review of Systems   Constitutional: Negative for activity change, appetite change, chills, diaphoresis, fatigue, fever and unexpected weight change.   HENT: Negative for nasal congestion, ear pain, mouth sores, postnasal drip, sinus pressure/congestion, sore throat and trouble swallowing.    Eyes: Negative for pain, redness and visual disturbance.   Respiratory: Negative for apnea, cough, chest tightness, shortness of breath and wheezing.    Cardiovascular: Negative for chest pain, palpitations and leg swelling.   Gastrointestinal: Negative for abdominal distention, abdominal pain, blood in stool, constipation, diarrhea, nausea and vomiting.   Endocrine: Negative for cold intolerance, polydipsia, polyphagia and polyuria.   Genitourinary: Negative for difficulty urinating, dysuria, flank pain, frequency, hematuria, menstrual problem, pelvic pain and urgency.   Musculoskeletal: Positive for leg pain. Negative for arthralgias, back pain, joint swelling and neck pain.   Integumentary:  Negative for color change, rash and wound.   Neurological: Positive for tremors. Negative for  dizziness, seizures, syncope, weakness, light-headedness, numbness and headaches.        If RLS gets really bad    Hematological: Negative for adenopathy. Does not bruise/bleed easily.   Psychiatric/Behavioral: Negative for confusion, decreased concentration, dysphoric mood, hallucinations, self-injury, sleep disturbance and suicidal ideas. The patient is not nervous/anxious.          Objective:      Physical Exam  Constitutional:       Appearance: Normal appearance.   Neurological:      General: No focal deficit present.      Mental Status: She is alert and oriented to person, place, and time.   Psychiatric:         Mood and Affect: Mood normal.         Behavior: Behavior normal.         Thought Content: Thought content normal.         Judgment: Judgment normal.         Assessment:       Problem List Items Addressed This Visit     Spinal stenosis, lumbar      Other Visit Diagnoses     RLS (restless legs syndrome)    -  Primary    Refractory anemia        Osteoarthritis of one hip              Plan:       1) continue with gabapentin 100 mg in the morning, 100 mg at mid day and 3 at night  .2) Requip extended release 2 mg nightly as well along with temazepam 15 mg nightly.  3) can also take another short acting near packs as the evening goes on and if need be she has an emergency prescription for Valium at 5 mg as well as the tramadol and baclofen she still.

## 2022-07-12 ENCOUNTER — PES CALL (OUTPATIENT)
Dept: ADMINISTRATIVE | Facility: CLINIC | Age: 82
End: 2022-07-12
Payer: MEDICARE

## 2022-07-19 ENCOUNTER — TELEPHONE (OUTPATIENT)
Dept: INTERNAL MEDICINE | Facility: CLINIC | Age: 82
End: 2022-07-19
Payer: MEDICARE

## 2022-07-26 ENCOUNTER — PES CALL (OUTPATIENT)
Dept: ADMINISTRATIVE | Facility: CLINIC | Age: 82
End: 2022-07-26
Payer: MEDICARE

## 2022-07-26 ENCOUNTER — PES CALL (OUTPATIENT)
Dept: ADMINISTRATIVE | Facility: OTHER | Age: 82
End: 2022-07-26
Payer: MEDICARE

## 2022-07-26 ENCOUNTER — PATIENT MESSAGE (OUTPATIENT)
Dept: INTERNAL MEDICINE | Facility: CLINIC | Age: 82
End: 2022-07-26
Payer: MEDICARE

## 2022-07-28 ENCOUNTER — IMMUNIZATION (OUTPATIENT)
Dept: INTERNAL MEDICINE | Facility: CLINIC | Age: 82
End: 2022-07-28
Payer: MEDICARE

## 2022-07-28 ENCOUNTER — OFFICE VISIT (OUTPATIENT)
Dept: INTERNAL MEDICINE | Facility: CLINIC | Age: 82
End: 2022-07-28
Payer: MEDICARE

## 2022-07-28 VITALS
HEART RATE: 67 BPM | WEIGHT: 153.31 LBS | BODY MASS INDEX: 23.31 KG/M2 | TEMPERATURE: 98 F | SYSTOLIC BLOOD PRESSURE: 110 MMHG | DIASTOLIC BLOOD PRESSURE: 60 MMHG

## 2022-07-28 DIAGNOSIS — G25.81 RLS (RESTLESS LEGS SYNDROME): Primary | ICD-10-CM

## 2022-07-28 DIAGNOSIS — G62.9 NEUROPATHY: ICD-10-CM

## 2022-07-28 DIAGNOSIS — I77.71 CAROTID ARTERY DISSECTION: ICD-10-CM

## 2022-07-28 DIAGNOSIS — I48.0 PAF (PAROXYSMAL ATRIAL FIBRILLATION): ICD-10-CM

## 2022-07-28 DIAGNOSIS — I73.9 CLAUDICATION OF BOTH LOWER EXTREMITIES: ICD-10-CM

## 2022-07-28 DIAGNOSIS — Z23 NEED FOR VACCINATION: Primary | ICD-10-CM

## 2022-07-28 DIAGNOSIS — M43.17 SPONDYLOLISTHESIS, LUMBOSACRAL REGION: ICD-10-CM

## 2022-07-28 DIAGNOSIS — D46.1 REFRACTORY ANEMIA WITH RING SIDEROBLASTS: ICD-10-CM

## 2022-07-28 DIAGNOSIS — M16.11 PRIMARY OSTEOARTHRITIS OF RIGHT HIP: ICD-10-CM

## 2022-07-28 PROCEDURE — 99999 PR PBB SHADOW E&M-EST. PATIENT-LVL III: ICD-10-PCS | Mod: PBBFAC,,, | Performed by: INTERNAL MEDICINE

## 2022-07-28 PROCEDURE — 99213 OFFICE O/P EST LOW 20 MIN: CPT | Mod: PBBFAC,25 | Performed by: INTERNAL MEDICINE

## 2022-07-28 PROCEDURE — 99999 PR PBB SHADOW E&M-EST. PATIENT-LVL III: CPT | Mod: PBBFAC,,, | Performed by: INTERNAL MEDICINE

## 2022-07-28 PROCEDURE — 91305 COVID-19, MRNA, LNP-S, PF, 30 MCG/0.3 ML DOSE VACCINE (PFIZER): CPT | Mod: PBBFAC

## 2022-07-28 PROCEDURE — 99213 OFFICE O/P EST LOW 20 MIN: CPT | Mod: S$PBB,,, | Performed by: INTERNAL MEDICINE

## 2022-07-28 PROCEDURE — 99213 PR OFFICE/OUTPT VISIT, EST, LEVL III, 20-29 MIN: ICD-10-PCS | Mod: S$PBB,,, | Performed by: INTERNAL MEDICINE

## 2022-07-28 RX ORDER — TRAMADOL HYDROCHLORIDE 50 MG/1
50 TABLET ORAL
Qty: 30 TABLET | Refills: 3 | Status: SHIPPED | OUTPATIENT
Start: 2022-07-28 | End: 2022-10-06

## 2022-07-28 RX ORDER — DIAZEPAM 2 MG/1
2 TABLET ORAL NIGHTLY PRN
Qty: 30 TABLET | Refills: 0 | Status: SHIPPED | OUTPATIENT
Start: 2022-07-28 | End: 2022-10-06

## 2022-07-28 RX ORDER — GABAPENTIN 300 MG/1
300 CAPSULE ORAL 3 TIMES DAILY
Qty: 90 CAPSULE | Refills: 2 | Status: SHIPPED | OUTPATIENT
Start: 2022-07-28 | End: 2022-08-09 | Stop reason: SDUPTHER

## 2022-07-31 ENCOUNTER — TELEPHONE (OUTPATIENT)
Dept: INTERNAL MEDICINE | Facility: CLINIC | Age: 82
End: 2022-07-31
Payer: MEDICARE

## 2022-07-31 ENCOUNTER — EXTERNAL CHRONIC CARE MANAGEMENT (OUTPATIENT)
Dept: PRIMARY CARE CLINIC | Facility: CLINIC | Age: 82
End: 2022-07-31
Payer: MEDICARE

## 2022-07-31 PROBLEM — I77.71 CAROTID ARTERY DISSECTION: Status: ACTIVE | Noted: 2022-07-31

## 2022-07-31 PROCEDURE — 99490 CHRNC CARE MGMT STAFF 1ST 20: CPT | Mod: PBBFAC | Performed by: INTERNAL MEDICINE

## 2022-07-31 PROCEDURE — 99490 CHRNC CARE MGMT STAFF 1ST 20: CPT | Mod: S$PBB,,, | Performed by: INTERNAL MEDICINE

## 2022-07-31 PROCEDURE — 99490 PR CHRONIC CARE MGMT, 1ST 20 MIN: ICD-10-PCS | Mod: S$PBB,,, | Performed by: INTERNAL MEDICINE

## 2022-08-01 ENCOUNTER — PATIENT MESSAGE (OUTPATIENT)
Dept: ADMINISTRATIVE | Facility: OTHER | Age: 82
End: 2022-08-01
Payer: MEDICARE

## 2022-08-01 ENCOUNTER — OUTPATIENT CASE MANAGEMENT (OUTPATIENT)
Dept: ADMINISTRATIVE | Facility: OTHER | Age: 82
End: 2022-08-01
Payer: MEDICARE

## 2022-08-01 NOTE — LETTER
August 1, 2022    Shelly Vásquez  [address] [place] [address]             [facility]  [address] [address] Dear Mrs. Vásquez:    Welcome to [facility]s Complex Care Management Program.  It was a pleasure talking with you today.  My name is Sunita Clayton and I look forward to being your Care Manager.   I am available Monday - Wednesday 8:00 am - 4:30 pm. My goal is to help you function at the healthiest and highest level possible.  You can contact me directly at [phone]. Monday through Wednesday 8 - 4:30 pm.     As an [facility] patient, some of the services we may be able to provide include:      Development of an individualized care plan with a Registered Nurse    Connection with a    Connection with available resources and services     Coordinate communication among your care team members    Provide coaching and education    Help you understand your doctors treatment plan   Help you obtain information about your insurance coverage.     All services provided by [facility]s Complex Care Managers and other care team members are coordinated with and communicated to your primary care team.    As part of your enrollment, you will be receiving education materials and more information about these services in your [facility] account, by phone or through the mail.  If you do not wish to participate or receive information, please contact our office at [phone].      Sincerely,          Sunita Clayton RN,[facility]  Outpatient Complex Case Management  [facility]   [phone] cell phone   [phone] office phone

## 2022-08-01 NOTE — PROGRESS NOTES
Outpatient Care Management  Initial Patient Assessment    Patient: Shelly Vásquez  MRN: [id number]  Date of Service: 08/01/2022  Completed by: Sunita Clayton RN  Referral Date: 07/31/2022  Program: High Risk  Status: Ongoing  Effective Dates: 8/1/2022 - present  Responsible Staff: Sunita Clayton RN        Reason for Visit   Patient presents with    OPCM Chart Review    OPCM Enrollment Call    Initial Assessment    Nursing Assessment    Plan Of Care       Brief Summary:  Shelly Vásquez was referred by Dr. Méndez for restless leg syndrome.   Patient qualifies for program based on risk score of 63.   Active problem list, medical, surgical and social history reviewed.   Active comorbidities include a fib, svt, arthritis, anemia , gastric ulcer, .   Areas of need identified by patient include assistance with managing chronic pain in legs.   Complex care plan created with patient input.  By next encounter, patient agrees to continue to use the non narcotic rx for pain relief.   7 am  1pm  4pm  9 pm    Leg stretches standing against wall arms above head and lean into wall  emg was also ordered. Pt states it is not scheduled yet.  Call to neurology department and unable to contact anyone.  Message to dr. Espinoza office asking about schedule for emg  Moreno donaldson ma in neurology clinic contacted for assistance with scheduling the emg.       Medication list reviewed  No problems noted      Intervetnions  · Nursing screen, assessment and medication review completed today telephonically.  · Review of problem list and medical history   · Advised the after visit summary has most recent list of medications.   Please review for accuracy and notify PCP of any discrepancies.  Keep a copy after each md visit for your records  · Plan of care and  Short and long term goals developed with patient and verbalized understanding and agreement to these goals  · Future appointment schedule reviewed in epic and discussed with  the patient  · Welcome statement and contact information as well as [employer] information sent via pt portal   · Admit notifications sent to pcp  · Complete forms section with review of diagnosis        Next steps    Fu on message to dr. Espinoza for emg schedule done  Fu on message to geeta in neurology who does the emg scheduling x [phone]    Did pt do what they agreed on at admit  Follow up on receipt of education material sent in mail        Assessment Documentation     OPCM Initial Assessment    Involvement of Care  Identified Areas of Need  *Active medication list was reviewed and reconciled with patient and/or caregiver:           Problem List and History         Reviewed medical and social history with patient and/or caregiver. A complex care plan was discussed and completed today, with input from patient and/or caregiver.    Patient Instructions     Instructions were provided via the [employer] patient resources and are available   Todays OPCM Self-Management Care Plan was developed with the patients/caregivers input and was based on identified barriers from todays assessment.  Goals were written today with the patient/caregiver and the patient has agreed to work towards these goals to improve his/her overall well-being. Patient verbalized understanding of the care plan, goals, and all of today's instructions. Encouraged patient/caregiver to communicate with his/her physician and health care team about health conditions and the treatment plan.  Provided my contact information today and encouraged patient/caregiver to call me with any questions as needed.

## 2022-08-01 NOTE — PROGRESS NOTES
Subjective:       Patient ID: Shelly Vásquez is a 81 y.o. female.    Chief Complaint: Follow-up    Patient is a very pleasant 81-year-old female who has a past medical history of sideroblastic anemia with an average hemoglobin of 9.5.  She also has restless leg syndrome which is become worsening over the past couple of months.  In addition to taking restless legs syndrome medications to include pramipexole low dose and ropinirole XL 2 mg the patient is still having pretty significant discomfort in the lower extremities. She also takes temazepam 15 mg and tramadol as needed,as well as Tylenol ..  We started her on gabapentin a few months ago with tapering up dosing and now she is on 500 mg daily in all total . She is to follow with hematology .      Review of Systems   Constitutional: Negative for activity change, appetite change, chills, diaphoresis, fatigue, fever and unexpected weight change.   HENT: Negative for nasal congestion, ear pain, mouth sores, postnasal drip, sinus pressure/congestion, sore throat and trouble swallowing.    Eyes: Negative for pain, redness and visual disturbance.   Respiratory: Negative for apnea, cough, chest tightness, shortness of breath and wheezing.    Cardiovascular: Negative for chest pain, palpitations and leg swelling.   Gastrointestinal: Negative for abdominal distention, abdominal pain, blood in stool, constipation, diarrhea, nausea and vomiting.   Endocrine: Negative for cold intolerance, polydipsia, polyphagia and polyuria.   Genitourinary: Negative for difficulty urinating, dysuria, flank pain, frequency, hematuria, menstrual problem, pelvic pain and urgency.   Musculoskeletal: Positive for arthralgias, leg pain and myalgias. Negative for back pain, joint swelling and neck pain.   Integumentary:  Negative for color change, rash and wound.   Neurological: Negative for dizziness, tremors, seizures, syncope, weakness, light-headedness, numbness and headaches.    Hematological: Negative for adenopathy. Does not bruise/bleed easily.   Psychiatric/Behavioral: Negative for confusion, decreased concentration, dysphoric mood, hallucinations, self-injury, sleep disturbance and suicidal ideas. The patient is not nervous/anxious.          Objective:      Physical Exam  Cardiovascular:      Rate and Rhythm: Normal rate and regular rhythm.      Heart sounds: No murmur heard.  Pulmonary:      Effort: Pulmonary effort is normal.      Breath sounds: Normal breath sounds. No rales.   Abdominal:      General: Bowel sounds are normal. There is no distension.      Palpations: Abdomen is soft.      Tenderness: There is no abdominal tenderness.   Musculoskeletal:         General: No tenderness.   Neurological:      Mental Status: She is alert and oriented to person, place, and time.         Assessment:       RLS (restless legs syndrome  Refracory anemia with ring sideroblasts  Primary osteoarthritis of right hip  -     gabapentin (NEURONTIN) 300 MG capsule; Take 1 capsule (300 mg total) by mouth 3 (three) times daily.  Dispense: 90 capsule; Refill: 2  -     traMADoL (ULTRAM) 50 mg tablet; Take 1 tablet (50 mg total) by mouth every 24 hours as needed for Pain.  Dispense: 30 tablet; Refill: 3  -     diazePAM (VALIUM) 2 MG tablet; Take 1 tablet (2 mg total) by mouth nightly as needed (muscle tremors/spasms).  Dispense: 30 tablet; Refill: 0    Emg/ncs to TO LOWER EXTREMITIES HOP FULLY BEFORE FOLLOW UP WITH

## 2022-08-01 NOTE — PATIENT INSTRUCTIONS
Welcome to [facility]s Complex Care Management Program.  It was a pleasure talking with you today.  My name is Sunita Clayton. I look forward to working with you as your .  My goal is to help you function at the healthiest and highest level possible.  You can contact me directly at [phone].    As an [facility] patient with [employer], some of the services we may be able to provide include:     Development of an individualized care plan with a Registered Nurse   Connection with a   Connection with available resources and services    Coordinate communication among your care team members   Provide coaching and education   Help you understand your doctors treatment plan  Help you obtain information about your insurance coverage.     All services provided by [facility]s Complex Care Managers and other care team members are coordinated with and communicated to your primary care team.    As part of your enrollment, you will be receiving education materials and more information about these services in your [facility] account, by phone or through the mail.  If you do not wish to participate or receive information, please contact our office at [phone].      Sincerely,        Sunita Clayton RN, CCM [facility]   Out-patient RN Complex Care Manager

## 2022-08-03 ENCOUNTER — OFFICE VISIT (OUTPATIENT)
Dept: OPHTHALMOLOGY | Facility: CLINIC | Age: 82
End: 2022-08-03
Payer: MEDICARE

## 2022-08-03 DIAGNOSIS — H25.13 NUCLEAR SCLEROSIS, BILATERAL: Primary | ICD-10-CM

## 2022-08-03 PROCEDURE — 99213 OFFICE O/P EST LOW 20 MIN: CPT | Mod: PBBFAC | Performed by: OPHTHALMOLOGY

## 2022-08-03 PROCEDURE — 99999 PR PBB SHADOW E&M-EST. PATIENT-LVL III: ICD-10-PCS | Mod: PBBFAC,,, | Performed by: OPHTHALMOLOGY

## 2022-08-03 PROCEDURE — 92136 IOL MASTER - OU - BOTH EYES: ICD-10-PCS | Mod: 26,S$PBB,RT, | Performed by: OPHTHALMOLOGY

## 2022-08-03 PROCEDURE — 92136 OPHTHALMIC BIOMETRY: CPT | Mod: PBBFAC | Performed by: OPHTHALMOLOGY

## 2022-08-03 PROCEDURE — 99204 PR OFFICE/OUTPT VISIT, NEW, LEVL IV, 45-59 MIN: ICD-10-PCS | Mod: S$PBB,,, | Performed by: OPHTHALMOLOGY

## 2022-08-03 PROCEDURE — 99999 PR PBB SHADOW E&M-EST. PATIENT-LVL III: CPT | Mod: PBBFAC,,, | Performed by: OPHTHALMOLOGY

## 2022-08-03 PROCEDURE — 99204 OFFICE O/P NEW MOD 45 MIN: CPT | Mod: S$PBB,,, | Performed by: OPHTHALMOLOGY

## 2022-08-03 RX ORDER — PREDNISOLONE ACETATE-GATIFLOXACIN-BROMFENAC .75; 5; 1 MG/ML; MG/ML; MG/ML
1 SUSPENSION/ DROPS OPHTHALMIC 3 TIMES DAILY
Qty: 5 ML | Refills: 3 | Status: SHIPPED | OUTPATIENT
Start: 2022-08-03 | End: 2022-08-16

## 2022-08-03 RX ORDER — TROPICAMIDE 10 MG/ML
1 SOLUTION/ DROPS OPHTHALMIC
Status: CANCELLED | OUTPATIENT
Start: 2022-08-03

## 2022-08-03 RX ORDER — MOXIFLOXACIN 5 MG/ML
1 SOLUTION/ DROPS OPHTHALMIC
Status: CANCELLED | OUTPATIENT
Start: 2022-08-03

## 2022-08-03 RX ORDER — PHENYLEPHRINE HYDROCHLORIDE 100 MG/ML
1 SOLUTION/ DROPS OPHTHALMIC
Status: CANCELLED | OUTPATIENT
Start: 2022-08-03

## 2022-08-03 RX ORDER — TETRACAINE HYDROCHLORIDE 5 MG/ML
1 SOLUTION OPHTHALMIC
Status: CANCELLED | OUTPATIENT
Start: 2022-08-03

## 2022-08-03 RX ORDER — PHENYLEPHRINE HYDROCHLORIDE 25 MG/ML
1 SOLUTION/ DROPS OPHTHALMIC
Status: CANCELLED | OUTPATIENT
Start: 2022-08-03

## 2022-08-03 NOTE — PROGRESS NOTES
HPI     82 y/o female presents to clinic for cataract eval    +blepharoplasty    Pt states only problem she has is she cant read the writing on the TV when   watching tennis. Its all fuzzy. Doesn't have any problems driving at night  Wears contacts OS only. Uses it for near vision.     Last edited by Landy Wright on 8/3/2022  8:08 AM. (History)            Assessment /Plan     For exam results, see Encounter Report.    Nuclear sclerosis, bilateral      Visually Significant Cataract: Patient reports decreased vision consistent with the clinical amount of lenticular opacity, which reaches the level of visual significance and affects activities of daily living.     Specifically, this patient describes difficulty with:  - driving safely at night  - reading road signs  - reading small print  - deciphering medicine bottles  - reading the newspaper  - using the phone  - reading texts     Risks, benefits, and alternatives to cataract surgery were discussed and the consent reviewed. IOL options were discussed, including ATIOLs and the associated side effects and additional patient cost associated with them.   IOL Selections:   Right eye  IOL:  TFNT00 18.5     Left eye  IOL: TFNT30 VS TFNT00 19.0      Pt wishes to have right eye done first. Hx monovision, but preferes binocularity.  The patient expresses a desire to reduce spectacle dependence. I reviewed various IOL and LASER refractive surgical options and we will attempt to minimize spectacle dependence by managing astigmatism and optimizing IOL selection. Femtosecond LASER assisted cataract surgery (FLACS) technology was explained to the patient with educational videos and discussion.  The patient voices understanding and wishes to implement this technology during the cataract procedure.  I explained the increased precision of the LASER versus manual techniques, especially as it relates to astigmatism reduction with arcuate incisions.  I emphasized that although our  goal is to reduce the need for refractive correction after surgery, there may still be a need for spectacle correction to achieve optimal visual acuity, and that a reasonable range of functional vision should be the expectation.  No guarantees are made about post operative refraction or visual acuity, as the eye may heal in unpredictable ways, and the standard risks, benefits, and alternatives to cataract surgery were explained.  The patient understands that the refractive portions of this cataract procedure are not covered by insurance, and that there is an out of pocket expense of $2250 per eye. I also explained that even though our pre-operative plan is to utilize advanced refractive technologies during surgery, that I may decide to eliminate part or all of this plan if surgical challenges or complications arise, or I feel that it is not in the patient's best interest. Consent forms and an ABN form were given to the patient to review.    [facility] ONLY

## 2022-08-08 ENCOUNTER — OFFICE VISIT (OUTPATIENT)
Dept: NEUROLOGY | Facility: CLINIC | Age: 82
End: 2022-08-08
Payer: MEDICARE

## 2022-08-08 ENCOUNTER — TELEPHONE (OUTPATIENT)
Dept: INTERNAL MEDICINE | Facility: CLINIC | Age: 82
End: 2022-08-08
Payer: MEDICARE

## 2022-08-08 ENCOUNTER — OUTPATIENT CASE MANAGEMENT (OUTPATIENT)
Dept: ADMINISTRATIVE | Facility: OTHER | Age: 82
End: 2022-08-08
Payer: MEDICARE

## 2022-08-08 ENCOUNTER — PATIENT MESSAGE (OUTPATIENT)
Dept: OPHTHALMOLOGY | Facility: CLINIC | Age: 82
End: 2022-08-08
Payer: MEDICARE

## 2022-08-08 VITALS
HEIGHT: 68 IN | SYSTOLIC BLOOD PRESSURE: 108 MMHG | BODY MASS INDEX: 23.41 KG/M2 | WEIGHT: 154.44 LBS | DIASTOLIC BLOOD PRESSURE: 64 MMHG | HEART RATE: 60 BPM

## 2022-08-08 DIAGNOSIS — M25.552 CHRONIC HIP PAIN, BILATERAL: ICD-10-CM

## 2022-08-08 DIAGNOSIS — M25.551 CHRONIC HIP PAIN, BILATERAL: ICD-10-CM

## 2022-08-08 DIAGNOSIS — M54.10: ICD-10-CM

## 2022-08-08 DIAGNOSIS — G89.29 CHRONIC HIP PAIN, BILATERAL: ICD-10-CM

## 2022-08-08 DIAGNOSIS — Z79.890 POSTMENOPAUSAL HORMONE REPLACEMENT THERAPY: Chronic | ICD-10-CM

## 2022-08-08 DIAGNOSIS — M54.16 LUMBAR RADICULOPATHY, CHRONIC: Primary | ICD-10-CM

## 2022-08-08 DIAGNOSIS — G25.81 RLS (RESTLESS LEGS SYNDROME): ICD-10-CM

## 2022-08-08 PROCEDURE — 99999 PR PBB SHADOW E&M-EST. PATIENT-LVL III: ICD-10-PCS | Mod: PBBFAC,,, | Performed by: PSYCHIATRY & NEUROLOGY

## 2022-08-08 PROCEDURE — 99214 OFFICE O/P EST MOD 30 MIN: CPT | Mod: S$PBB,,, | Performed by: PSYCHIATRY & NEUROLOGY

## 2022-08-08 PROCEDURE — 99213 OFFICE O/P EST LOW 20 MIN: CPT | Mod: PBBFAC | Performed by: PSYCHIATRY & NEUROLOGY

## 2022-08-08 PROCEDURE — 99214 PR OFFICE/OUTPT VISIT, EST, LEVL IV, 30-39 MIN: ICD-10-PCS | Mod: S$PBB,,, | Performed by: PSYCHIATRY & NEUROLOGY

## 2022-08-08 PROCEDURE — 99999 PR PBB SHADOW E&M-EST. PATIENT-LVL III: CPT | Mod: PBBFAC,,, | Performed by: PSYCHIATRY & NEUROLOGY

## 2022-08-08 RX ORDER — ESTRADIOL AND NORETHINDRONE ACETATE .5; .1 MG/1; MG/1
1 TABLET ORAL DAILY
Qty: 90 TABLET | Refills: 3 | Status: SHIPPED | OUTPATIENT
Start: 2022-08-08 | End: 2022-10-25 | Stop reason: SDUPTHER

## 2022-08-08 NOTE — PROGRESS NOTES
Outpatient Care Management  Plan of Care Follow Up Visit    Patient: Shelly Vásquez  MRN: [id number]  Date of Service: 08/08/2022  Completed by: Sunita Clayton RN  Referral Date: 07/31/2022  Program: Case Management (High Risk)    No chief complaint on file.      Brief Summary:       emg is scheduled for 9/6    Next steps from previous encounter  Fu on message to dr. Negrete for emg schedule done  Fu on message to geeta in neurology who does the emg scheduling x [phone]   Did pt do what they agreed on at admit: continue to use the non narcotic rx for pain relief. 7 am  1pm  4pm  9 pm  Leg stretches standing against wall arms above head and lean into wall  Follow up on receipt of education material sent in mail not read yet    Interventions  Chart reviewed  Reviewed upcoming appt schedule    Assess/review short term goals met       Next steps  Fu on appt with dr negrete  Fu on pain status and any pain management stratagies  Leg stretches standing against wall arms above head and lean into wall  Follow up on receipt of education material sent in alyssa        Patient Summary     Involvement of Care:  Do I have permission to speak with other family members about your care?       Patient Reported Labs & Vitals:  1.  Any Patient Reported Labs & Vitals?     2.  Patient Reported Blood Pressure:     3.  Patient Reported Pulse:     4.  Patient Reported Weight (Kg):     5.  Patient Reported Blood Glucose (mg/dl):       Medical and social history was reviewed with patient and/or caregiver.     Clinical Assessment     Reviewed and provided basic information on available community resources for mental health, transportation, wellness resources, and palliative care programs with patient and/or caregiver.     Complex Care Plan     Care plan was discussed and completed today with input from patient and/or caregiver.    Patient Instructions     Instructions were provided via the [employer] patient resources and are available for the  patient to view on the patient portal.      Todays OPCM Self-Management Care Plan was developed with the patients/caregivers input and was based on identified barriers from todays assessment.  Goals were written today with the patient/caregiver and the patient has agreed to work towards these goals to improve his/her overall well-being. Patient verbalized understanding of the care plan, goals, and all of today's instructions. Encouraged patient/caregiver to communicate with his/her physician and health care team about health conditions and the treatment plan.  Provided my contact information today and encouraged patient/caregiver to call me with any questions as needed.

## 2022-08-08 NOTE — H&P (VIEW-ONLY)
"Name: Shelly Vásquez  MRN: [id number]   CSN: [id number]      Date: 08/08/2022    Referring physician:  No referring provider defined for this encounter.    Chief Complaint: RLS    Interval History:  - requip 0.25 mg QHS, added gabapentin after last visit   - new to me, has seen [facility]   - having more pain into her R groin, mostly anterior and medial  - reviewed MRI with her  - does show L1-2 disc  - balance is "ok"  -  agrees her meory and mood are stable        From May 2022  - currently taking requip 0.25 mg QHS for RLS   - accompanied by spouse today   - dose works well if she takes it early, sometimes an hour or two before she gets in bed   - if she wakes up at night or going out for the evening, if she takes 0.125 -- this will get her through a social event   - if she gets up in the middle of the night she will take another 0.125  - some involuntary jerking movements, lying down reading on the sofa and had these involuntary movements  - no loss of awareness, no loss of bowel or bladder function  - saw an osteopath who told her they thought her femoral nerve was inflamed  - some lower back pain, has not had lumbar MRI   - was told she needs a hip replacement  - still doing pilates and swimming/strength training  - one small fall during mardi gras, no major injuries     - supplements with vitamin B         From April 2021  - mirapex 0.125 mg QHS helping legs significantly   - only had cramps one night since I saw her last   - takes baclofen 10 mg QHS   - now taking 1/4 dose of miralax and that has helped significantly with constipation   - denies dizziness or lightheadedness  [facility]   - restarted oral iron       From 2/2021: Shelly Vásquez is R handed a81 y.o. 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 " Uses restoril to sleep. Muscle cramps in the legs x 2 years, now legs jerking. Toes are cramping, curling. Fingers are also curling/cramping. Curling in hands started several years ago, had a shot in her hands and it helped. Legs involuntarily moving. Denies any odd sensation in her legs such as ants crawling or snakes to where she has to voluntarily move her legs to get the sensation out. Postural tremors in right hand when putting on makeup. Not sure if tremor is at rest. Balance issues started a few years ago. Fell once coming out of bathtub, held onto towel fay and it came out of the wall and fell backwards. No vision changes. Very careful when she walks. Sometimes slower to get out of the car just to make sure she doesn't fall out. Has fallen 3 times in the past four years. Has slowed down some, makes sure to plant her feet correctly. Wearing out shoes, possibly some shuffling -- had to have her first paid of shoes resoled. Two nights ago had a terrible night of jerking and cramping, couldn't sleep at night at all. Only cramps at night time, not necessarily during the day. Denies neck pain or cramping. Denies head tremor. Not sure if she has noticed feet inversion or not.     Exercises 2-3 times a week. Walks in the park when the weather is better.     Currently not taking iron supplementation due to constipation but plans to restart now that she has started taking miralax.     Medication history:  - none     Neuroleptic exposure:  - none     Family History: son with cramping     Past medical, family, and social history reviewed as documented in chart with pertinent positive medical, family, and social history detailed in HPI.      PD Review of Symptoms:  Anosmia: normal  Dysarthria/Hypophonia: she notes mild hypophonia   Dysphagia/Sialorrhea: none   Depression: some whenever she is cramping   Cognitive slowing: good, sometimes she forgets short term   Urinary changes: yes, frequency   Constipation: takes  miralax, started taking recently   Orthostasis: none   Falls: as above   Freezing: none   Micrographia: some   Sleep issues:   -YAJAIRA: none   -RBD: none     Review of Systems:   Review of Systems   Constitutional: Negative for chills, fever and malaise/fatigue.   HENT: Negative for hearing loss.    Eyes: Negative for blurred vision and double vision.   Respiratory: Negative for cough, shortness of breath and stridor.    Cardiovascular: Negative for chest pain and leg swelling.   Gastrointestinal: Negative for constipation, diarrhea and nausea.   Genitourinary: Negative for frequency and urgency.   Musculoskeletal: Negative for falls.   Skin: Negative for itching and rash.   Neurological: Positive for sensory change. Negative for dizziness, tremors, loss of consciousness and weakness.   Psychiatric/Behavioral: Negative for hallucinations and memory loss.           Past Medical History: The patient  has a past medical history of Arthritis, Atrial fibrillation, Basal cell cancer, Encounter for blood transfusion, Gastric ulcer, Herpes simplex without mention of complication, Postmenopausal HRT (hormone replacement therapy), and Supraventricular tachycardia.    Social History: The patient  reports that she quit smoking about 42 years ago. Her smoking use included cigarettes. She has a 22.00 pack-year smoking history. She has never used smokeless tobacco. She reports current alcohol use of about 2.0 standard drinks of alcohol per week. She reports that she does not use drugs.    Family History: Their family history includes Cirrhosis in her mother; No Known Problems in her brother, daughter, father, maternal aunt, maternal grandfather, maternal grandmother, maternal uncle, paternal aunt, paternal grandfather, paternal grandmother, and paternal uncle; Pulmonary embolism in her son.    Allergies: Nsaids (non-steroidal anti-inflammatory drug)     Meds:   Current Outpatient Medications on File Prior to Visit   Medication Sig  "Dispense Refill    acetaminophen (TYLENOL) 325 MG tablet Take 325 mg by mouth continuous prn for Pain.      aspirin (ECOTRIN) 81 MG EC tablet Take 1 tablet (81 mg total) by mouth once daily. 30 tablet 0    diazePAM (VALIUM) 2 MG tablet Take 1 tablet (2 mg total) by mouth nightly as needed (muscle tremors/spasms). (Patient not taking: Reported on 8/1/2022) 30 tablet 0    estradiol-norethindrone acet 0.5-0.1 mg per tablet Take 1 tablet by mouth once daily. 90 tablet 3    gabapentin (NEURONTIN) 300 MG capsule Take 1 capsule (300 mg total) by mouth 3 (three) times daily. 90 capsule 2    pantoprazole (PROTONIX) 40 MG tablet Take 1 tablet (40 mg total) by mouth once daily. 90 tablet 1    pramipexole (MIRAPEX) 0.25 MG tablet TAKE 1 TABLET(0.25 MG) BY MOUTH EVERY EVENING (Patient not taking: Reported on 8/1/2022) 90 tablet 1    prednisolon/gatiflox/bromfenac (PREDNISOL ACE-GATIFLOX-BROMFEN) 1-0.5-0.075 % DrpS Apply 1 drop to eye 3 (three) times daily. in operative eye for 1 month after surgery 5 mL 3    rOPINIRole (REQUIP XL) 2 mg 24 hr tablet Take 2 mg by mouth every evening.      temazepam (RESTORIL) 15 mg Cap Take 1 capsule (15 mg total) by mouth every evening. 90 capsule 1    traMADoL (ULTRAM) 50 mg tablet Take 1 tablet (50 mg total) by mouth every 24 hours as needed for Pain. (Patient not taking: Reported on 8/1/2022) 30 tablet 3    verapamiL (VERELAN) 120 MG C24P Take 1 capsule (120 mg total) by mouth once daily. 90 capsule 3    vitamin D (VITAMIN D3) 1000 units Tab Take 1,000 Units by mouth once daily.       No current facility-administered medications on file prior to visit.       Exam:  LMP  (LMP Unknown)     /64 (BP Location: Left arm, Patient Position: Sitting, BP Method: Medium (Automatic))   Pulse 60   Ht 5' 8" (1.727 m)   Wt 70.1 kg (154 lb 6.9 oz)   LMP  (LMP Unknown)   BMI 23.48 kg/m²       Constitutional  Well-developed, well-nourished, appears stated age   Ophthalmoscopic  No " papilledema with no hemorrhages or exudates bilaterally   Cardiovascular  Radial pulses 2+ and symmetric, no LE edema bilaterally   Neurological    * Mental status  MOCA deferred     - Orientation  Oriented to person, place, time, and situation     - Memory   Intact recent and remote     - Attention/concentration  Attentive, vigilant during exam     - Language  Naming & repetition intact, +2-step commands     - Fund of knowledge  Aware of current events     - Executive  Well-organized thoughts     - Other     * Cranial nerves       - CN II  PERRL, visual fields full to confrontation     - CN III, IV, VI  Extraocular movements full, normal pursuits and saccades     - CN V  Sensation V1 - V3 intact     - CN VII  Face strong and symmetric bilaterally     - CN VIII  Hearing intact bilaterally     - CN IX, X  Palate raises midline and symmetric     - CN XI  SCM and trapezius 5/5 bilaterally     - CN XII  Tongue midline   * Motor  Muscle bulk normal, strength 5/5 throughout   * Sensory   Intact to temperature and vibration throughout   * Coordination  No dysmetria with finger-to-nose or heel-to-shin   * Gait  See below.   * Deep tendon reflexes  2+ and symmetric throughout, 1+ at ankles, no deficits   Babinski downgoing bilaterally   * Specialized movement exam  No hypophonic speech.    No facial masking.   No cogwheel rigidity.     mild bradykinesia, likely age appropriate    No tremor with rest, posture, kinesis, or intention.    No other dystonia, chorea, athetosis, myoclonus, or tics.   No motor impersistence.   Normal-based gait, cautious gait    Unable to tandem    No shortened stride length.   No abnormal arm swing.     No postural instability.      Laboratory/Radiological:  - Results:  Clinical Support on 05/31/2022   Component Date Value Ref Range Status    POC Rapid COVID 05/31/2022 Negative  Negative Final     Acceptable 05/31/2022 Yes   Final   Lab Visit on 05/27/2022   Component Date Value  Ref Range Status    Vitamin B-12 05/27/2022 583  180 - 914 ng/L Final    Thiamine 05/27/2022 41  38 - 122 ug/L Final    Vitamin B6 05/27/2022 30  5 - 50 ug/L Final   Lab Visit on 05/12/2022   Component Date Value Ref Range Status    WBC 05/12/2022 6.58  3.90 - 12.70 K/uL Final    RBC 05/12/2022 2.40 (A) 4.00 - 5.40 M/uL Final    Hemoglobin 05/12/2022 8.6 (A) 12.0 - 16.0 g/dL Final    Hematocrit 05/12/2022 26.7 (A) 37.0 - 48.5 % Final    MCV 05/12/2022 111 (A) 82 - 98 fL Final    MCH 05/12/2022 35.8 (A) 27.0 - 31.0 pg Final    MCHC 05/12/2022 32.2  32.0 - 36.0 g/dL Final    RDW 05/12/2022 16.4 (A) 11.5 - 14.5 % Final    Platelets 05/12/2022 491 (A) 150 - 450 K/uL Final    MPV 05/12/2022 10.0  9.2 - 12.9 fL Final    Immature Granulocytes 05/12/2022 0.3  0.0 - 0.5 % Final    Gran # (ANC) 05/12/2022 2.8  1.8 - 7.7 K/uL Final    Immature Grans (Abs) 05/12/2022 0.02  0.00 - 0.04 K/uL Final    Lymph # 05/12/2022 2.9  1.0 - 4.8 K/uL Final    Mono # 05/12/2022 0.6  0.3 - 1.0 K/uL Final    Eos # 05/12/2022 0.2  0.0 - 0.5 K/uL Final    Baso # 05/12/2022 0.08  0.00 - 0.20 K/uL Final    nRBC 05/12/2022 0  0 /100 WBC Final    Gran % 05/12/2022 42.3  38.0 - 73.0 % Final    Lymph % 05/12/2022 43.3  18.0 - 48.0 % Final    Mono % 05/12/2022 9.3  4.0 - 15.0 % Final    Eosinophil % 05/12/2022 3.6  0.0 - 8.0 % Final    Basophil % 05/12/2022 1.2  0.0 - 1.9 % Final    Differential Method 05/12/2022 Automated   Final    Sodium 05/12/2022 136  136 - 145 mmol/L Final    Potassium 05/12/2022 4.2  3.5 - 5.1 mmol/L Final    Chloride 05/12/2022 104  95 - 110 mmol/L Final    CO2 05/12/2022 22 (A) 23 - 29 mmol/L Final    Glucose 05/12/2022 104  70 - 110 mg/dL Final    BUN 05/12/2022 11  8 - 23 mg/dL Final    Creatinine 05/12/2022 0.9  0.5 - 1.4 mg/dL Final    Calcium 05/12/2022 9.1  8.7 - 10.5 mg/dL Final    Total Protein 05/12/2022 6.5  6.0 - 8.4 g/dL Final    Albumin 05/12/2022 4.1  3.5 - 5.2 g/dL Final     Total Bilirubin 05/12/2022 0.7  0.1 - 1.0 mg/dL Final    Alkaline Phosphatase 05/12/2022 37 (A) 55 - 135 U/L Final    AST 05/12/2022 18  10 - 40 U/L Final    ALT 05/12/2022 17  10 - 44 U/L Final    Anion Gap 05/12/2022 10  8 - 16 mmol/L Final    eGFR if African American 05/12/2022 >60.0  >60 mL/min/1.73 m^2 Final    eGFR if non African American 05/12/2022 59.3 (A) >60 mL/min/1.73 m^2 Final    TSH 05/12/2022 1.597  0.400 - 4.000 uIU/mL Final    Ferritin 05/12/2022 580 (A) 20.0 - 300.0 ng/mL Final    Iron 05/12/2022 114  30 - 160 ug/dL Final    Transferrin 05/12/2022 176 (A) 200 - 375 mg/dL Final    TIBC 05/12/2022 260  250 - 450 ug/dL Final    Saturated Iron 05/12/2022 44  20 - 50 % Final    Hemoglobin A1C 05/12/2022 4.9  4.0 - 5.6 % Final    Estimated Avg Glucose 05/12/2022 94  68 - 131 mg/dL Final    H Pylori IgG Interp 05/12/2022 Negative  Negative Final       - Independent review of images: MRI          - Independent review of consultant's notes: Dev     ASSESSMENT:  1) RLS  2) L1-2 radic      PLAN:  - may need nerve block sooner than later  - keep MPX and gabapentin in low doses  - query iron needs  - pt as needed

## 2022-08-08 NOTE — TELEPHONE ENCOUNTER
No new care gaps identified.  [facility] Embedded Care Gaps. Reference number: [id number]. 8/08/2022   4:11:50 PM CDT

## 2022-08-08 NOTE — TELEPHONE ENCOUNTER
----- Message from Nava Edmondson sent at 8/8/2022  3:51 PM CDT -----  Regarding: refills  Mrs Vásquez is here and is requesting the below medication, she said she sent multiple requests on [facility]     estradiol-norethindrone acet 0.5-0.1 mg per tablet    [facility] [address]   Phone: [phone]  Fax:  [phone]

## 2022-08-08 NOTE — PROGRESS NOTES
"Name: Shelly Vásquez  MRN: [id number]   CSN: [id number]      Date: 08/08/2022    Referring physician:  No referring provider defined for this encounter.    Chief Complaint: RLS    Interval History:  - requip 0.25 mg QHS, added gabapentin after last visit   - new to me, has seen [facility]   - having more pain into her R groin, mostly anterior and medial  - reviewed MRI with her  - does show L1-2 disc  - balance is "ok"  -  agrees her meory and mood are stable        From May 2022  - currently taking requip 0.25 mg QHS for RLS   - accompanied by spouse today   - dose works well if she takes it early, sometimes an hour or two before she gets in bed   - if she wakes up at night or going out for the evening, if she takes 0.125 -- this will get her through a social event   - if she gets up in the middle of the night she will take another 0.125  - some involuntary jerking movements, lying down reading on the sofa and had these involuntary movements  - no loss of awareness, no loss of bowel or bladder function  - saw an osteopath who told her they thought her femoral nerve was inflamed  - some lower back pain, has not had lumbar MRI   - was told she needs a hip replacement  - still doing pilates and swimming/strength training  - one small fall during mardi gras, no major injuries     - supplements with vitamin B         From April 2021  - mirapex 0.125 mg QHS helping legs significantly   - only had cramps one night since I saw her last   - takes baclofen 10 mg QHS   - now taking 1/4 dose of miralax and that has helped significantly with constipation   - denies dizziness or lightheadedness  [facility]   - restarted oral iron       From 2/2021: Shelly Vásquez is R handed a81 y.o. 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 " Uses restoril to sleep. Muscle cramps in the legs x 2 years, now legs jerking. Toes are cramping, curling. Fingers are also curling/cramping. Curling in hands started several years ago, had a shot in her hands and it helped. Legs involuntarily moving. Denies any odd sensation in her legs such as ants crawling or snakes to where she has to voluntarily move her legs to get the sensation out. Postural tremors in right hand when putting on makeup. Not sure if tremor is at rest. Balance issues started a few years ago. Fell once coming out of bathtub, held onto towel fay and it came out of the wall and fell backwards. No vision changes. Very careful when she walks. Sometimes slower to get out of the car just to make sure she doesn't fall out. Has fallen 3 times in the past four years. Has slowed down some, makes sure to plant her feet correctly. Wearing out shoes, possibly some shuffling -- had to have her first paid of shoes resoled. Two nights ago had a terrible night of jerking and cramping, couldn't sleep at night at all. Only cramps at night time, not necessarily during the day. Denies neck pain or cramping. Denies head tremor. Not sure if she has noticed feet inversion or not.     Exercises 2-3 times a week. Walks in the park when the weather is better.     Currently not taking iron supplementation due to constipation but plans to restart now that she has started taking miralax.     Medication history:  - none     Neuroleptic exposure:  - none     Family History: son with cramping     Past medical, family, and social history reviewed as documented in chart with pertinent positive medical, family, and social history detailed in HPI.      PD Review of Symptoms:  Anosmia: normal  Dysarthria/Hypophonia: she notes mild hypophonia   Dysphagia/Sialorrhea: none   Depression: some whenever she is cramping   Cognitive slowing: good, sometimes she forgets short term   Urinary changes: yes, frequency   Constipation: takes  miralax, started taking recently   Orthostasis: none   Falls: as above   Freezing: none   Micrographia: some   Sleep issues:   -YAJAIRA: none   -RBD: none     Review of Systems:   Review of Systems   Constitutional: Negative for chills, fever and malaise/fatigue.   HENT: Negative for hearing loss.    Eyes: Negative for blurred vision and double vision.   Respiratory: Negative for cough, shortness of breath and stridor.    Cardiovascular: Negative for chest pain and leg swelling.   Gastrointestinal: Negative for constipation, diarrhea and nausea.   Genitourinary: Negative for frequency and urgency.   Musculoskeletal: Negative for falls.   Skin: Negative for itching and rash.   Neurological: Positive for sensory change. Negative for dizziness, tremors, loss of consciousness and weakness.   Psychiatric/Behavioral: Negative for hallucinations and memory loss.           Past Medical History: The patient  has a past medical history of Arthritis, Atrial fibrillation, Basal cell cancer, Encounter for blood transfusion, Gastric ulcer, Herpes simplex without mention of complication, Postmenopausal HRT (hormone replacement therapy), and Supraventricular tachycardia.    Social History: The patient  reports that she quit smoking about 42 years ago. Her smoking use included cigarettes. She has a 22.00 pack-year smoking history. She has never used smokeless tobacco. She reports current alcohol use of about 2.0 standard drinks of alcohol per week. She reports that she does not use drugs.    Family History: Their family history includes Cirrhosis in her mother; No Known Problems in her brother, daughter, father, maternal aunt, maternal grandfather, maternal grandmother, maternal uncle, paternal aunt, paternal grandfather, paternal grandmother, and paternal uncle; Pulmonary embolism in her son.    Allergies: Nsaids (non-steroidal anti-inflammatory drug)     Meds:   Current Outpatient Medications on File Prior to Visit   Medication Sig  "Dispense Refill    acetaminophen (TYLENOL) 325 MG tablet Take 325 mg by mouth continuous prn for Pain.      aspirin (ECOTRIN) 81 MG EC tablet Take 1 tablet (81 mg total) by mouth once daily. 30 tablet 0    diazePAM (VALIUM) 2 MG tablet Take 1 tablet (2 mg total) by mouth nightly as needed (muscle tremors/spasms). (Patient not taking: Reported on 8/1/2022) 30 tablet 0    estradiol-norethindrone acet 0.5-0.1 mg per tablet Take 1 tablet by mouth once daily. 90 tablet 3    gabapentin (NEURONTIN) 300 MG capsule Take 1 capsule (300 mg total) by mouth 3 (three) times daily. 90 capsule 2    pantoprazole (PROTONIX) 40 MG tablet Take 1 tablet (40 mg total) by mouth once daily. 90 tablet 1    pramipexole (MIRAPEX) 0.25 MG tablet TAKE 1 TABLET(0.25 MG) BY MOUTH EVERY EVENING (Patient not taking: Reported on 8/1/2022) 90 tablet 1    prednisolon/gatiflox/bromfenac (PREDNISOL ACE-GATIFLOX-BROMFEN) 1-0.5-0.075 % DrpS Apply 1 drop to eye 3 (three) times daily. in operative eye for 1 month after surgery 5 mL 3    rOPINIRole (REQUIP XL) 2 mg 24 hr tablet Take 2 mg by mouth every evening.      temazepam (RESTORIL) 15 mg Cap Take 1 capsule (15 mg total) by mouth every evening. 90 capsule 1    traMADoL (ULTRAM) 50 mg tablet Take 1 tablet (50 mg total) by mouth every 24 hours as needed for Pain. (Patient not taking: Reported on 8/1/2022) 30 tablet 3    verapamiL (VERELAN) 120 MG C24P Take 1 capsule (120 mg total) by mouth once daily. 90 capsule 3    vitamin D (VITAMIN D3) 1000 units Tab Take 1,000 Units by mouth once daily.       No current facility-administered medications on file prior to visit.       Exam:  LMP  (LMP Unknown)     /64 (BP Location: Left arm, Patient Position: Sitting, BP Method: Medium (Automatic))   Pulse 60   Ht 5' 8" (1.727 m)   Wt 70.1 kg (154 lb 6.9 oz)   LMP  (LMP Unknown)   BMI 23.48 kg/m²       Constitutional  Well-developed, well-nourished, appears stated age   Ophthalmoscopic  No " papilledema with no hemorrhages or exudates bilaterally   Cardiovascular  Radial pulses 2+ and symmetric, no LE edema bilaterally   Neurological    * Mental status  MOCA deferred     - Orientation  Oriented to person, place, time, and situation     - Memory   Intact recent and remote     - Attention/concentration  Attentive, vigilant during exam     - Language  Naming & repetition intact, +2-step commands     - Fund of knowledge  Aware of current events     - Executive  Well-organized thoughts     - Other     * Cranial nerves       - CN II  PERRL, visual fields full to confrontation     - CN III, IV, VI  Extraocular movements full, normal pursuits and saccades     - CN V  Sensation V1 - V3 intact     - CN VII  Face strong and symmetric bilaterally     - CN VIII  Hearing intact bilaterally     - CN IX, X  Palate raises midline and symmetric     - CN XI  SCM and trapezius 5/5 bilaterally     - CN XII  Tongue midline   * Motor  Muscle bulk normal, strength 5/5 throughout   * Sensory   Intact to temperature and vibration throughout   * Coordination  No dysmetria with finger-to-nose or heel-to-shin   * Gait  See below.   * Deep tendon reflexes  2+ and symmetric throughout, 1+ at ankles, no deficits   Babinski downgoing bilaterally   * Specialized movement exam  No hypophonic speech.    No facial masking.   No cogwheel rigidity.     mild bradykinesia, likely age appropriate    No tremor with rest, posture, kinesis, or intention.    No other dystonia, chorea, athetosis, myoclonus, or tics.   No motor impersistence.   Normal-based gait, cautious gait    Unable to tandem    No shortened stride length.   No abnormal arm swing.     No postural instability.      Laboratory/Radiological:  - Results:  Clinical Support on 05/31/2022   Component Date Value Ref Range Status    POC Rapid COVID 05/31/2022 Negative  Negative Final     Acceptable 05/31/2022 Yes   Final   Lab Visit on 05/27/2022   Component Date Value  Ref Range Status    Vitamin B-12 05/27/2022 583  180 - 914 ng/L Final    Thiamine 05/27/2022 41  38 - 122 ug/L Final    Vitamin B6 05/27/2022 30  5 - 50 ug/L Final   Lab Visit on 05/12/2022   Component Date Value Ref Range Status    WBC 05/12/2022 6.58  3.90 - 12.70 K/uL Final    RBC 05/12/2022 2.40 (A) 4.00 - 5.40 M/uL Final    Hemoglobin 05/12/2022 8.6 (A) 12.0 - 16.0 g/dL Final    Hematocrit 05/12/2022 26.7 (A) 37.0 - 48.5 % Final    MCV 05/12/2022 111 (A) 82 - 98 fL Final    MCH 05/12/2022 35.8 (A) 27.0 - 31.0 pg Final    MCHC 05/12/2022 32.2  32.0 - 36.0 g/dL Final    RDW 05/12/2022 16.4 (A) 11.5 - 14.5 % Final    Platelets 05/12/2022 491 (A) 150 - 450 K/uL Final    MPV 05/12/2022 10.0  9.2 - 12.9 fL Final    Immature Granulocytes 05/12/2022 0.3  0.0 - 0.5 % Final    Gran # (ANC) 05/12/2022 2.8  1.8 - 7.7 K/uL Final    Immature Grans (Abs) 05/12/2022 0.02  0.00 - 0.04 K/uL Final    Lymph # 05/12/2022 2.9  1.0 - 4.8 K/uL Final    Mono # 05/12/2022 0.6  0.3 - 1.0 K/uL Final    Eos # 05/12/2022 0.2  0.0 - 0.5 K/uL Final    Baso # 05/12/2022 0.08  0.00 - 0.20 K/uL Final    nRBC 05/12/2022 0  0 /100 WBC Final    Gran % 05/12/2022 42.3  38.0 - 73.0 % Final    Lymph % 05/12/2022 43.3  18.0 - 48.0 % Final    Mono % 05/12/2022 9.3  4.0 - 15.0 % Final    Eosinophil % 05/12/2022 3.6  0.0 - 8.0 % Final    Basophil % 05/12/2022 1.2  0.0 - 1.9 % Final    Differential Method 05/12/2022 Automated   Final    Sodium 05/12/2022 136  136 - 145 mmol/L Final    Potassium 05/12/2022 4.2  3.5 - 5.1 mmol/L Final    Chloride 05/12/2022 104  95 - 110 mmol/L Final    CO2 05/12/2022 22 (A) 23 - 29 mmol/L Final    Glucose 05/12/2022 104  70 - 110 mg/dL Final    BUN 05/12/2022 11  8 - 23 mg/dL Final    Creatinine 05/12/2022 0.9  0.5 - 1.4 mg/dL Final    Calcium 05/12/2022 9.1  8.7 - 10.5 mg/dL Final    Total Protein 05/12/2022 6.5  6.0 - 8.4 g/dL Final    Albumin 05/12/2022 4.1  3.5 - 5.2 g/dL Final     Total Bilirubin 05/12/2022 0.7  0.1 - 1.0 mg/dL Final    Alkaline Phosphatase 05/12/2022 37 (A) 55 - 135 U/L Final    AST 05/12/2022 18  10 - 40 U/L Final    ALT 05/12/2022 17  10 - 44 U/L Final    Anion Gap 05/12/2022 10  8 - 16 mmol/L Final    eGFR if African American 05/12/2022 >60.0  >60 mL/min/1.73 m^2 Final    eGFR if non African American 05/12/2022 59.3 (A) >60 mL/min/1.73 m^2 Final    TSH 05/12/2022 1.597  0.400 - 4.000 uIU/mL Final    Ferritin 05/12/2022 580 (A) 20.0 - 300.0 ng/mL Final    Iron 05/12/2022 114  30 - 160 ug/dL Final    Transferrin 05/12/2022 176 (A) 200 - 375 mg/dL Final    TIBC 05/12/2022 260  250 - 450 ug/dL Final    Saturated Iron 05/12/2022 44  20 - 50 % Final    Hemoglobin A1C 05/12/2022 4.9  4.0 - 5.6 % Final    Estimated Avg Glucose 05/12/2022 94  68 - 131 mg/dL Final    H Pylori IgG Interp 05/12/2022 Negative  Negative Final       - Independent review of images: MRI          - Independent review of consultant's notes: Dev     ASSESSMENT:  1) RLS  2) L1-2 radic      PLAN:  - may need nerve block sooner than later  - keep MPX and gabapentin in low doses  - query iron needs  - pt as needed

## 2022-08-09 DIAGNOSIS — G25.81 RLS (RESTLESS LEGS SYNDROME): Primary | ICD-10-CM

## 2022-08-09 DIAGNOSIS — M48.062 SPINAL STENOSIS OF LUMBAR REGION WITH NEUROGENIC CLAUDICATION: ICD-10-CM

## 2022-08-09 RX ORDER — GABAPENTIN 300 MG/1
300 CAPSULE ORAL 3 TIMES DAILY
Qty: 90 CAPSULE | Refills: 2 | Status: SHIPPED | OUTPATIENT
Start: 2022-08-09 | End: 2022-08-16

## 2022-08-09 NOTE — TELEPHONE ENCOUNTER
No new care gaps identified.  [facility] Embedded Care Gaps. Reference number: [id number]. 8/09/2022   8:45:45 AM CDT

## 2022-08-10 ENCOUNTER — PATIENT MESSAGE (OUTPATIENT)
Dept: NEUROLOGY | Facility: CLINIC | Age: 82
End: 2022-08-10
Payer: MEDICARE

## 2022-08-10 DIAGNOSIS — M54.16 LUMBAR RADICULOPATHY, CHRONIC: Primary | ICD-10-CM

## 2022-08-11 ENCOUNTER — PATIENT MESSAGE (OUTPATIENT)
Dept: NEUROLOGY | Facility: CLINIC | Age: 82
End: 2022-08-11
Payer: MEDICARE

## 2022-08-11 ENCOUNTER — TELEPHONE (OUTPATIENT)
Dept: NEUROLOGY | Facility: CLINIC | Age: 82
End: 2022-08-11
Payer: MEDICARE

## 2022-08-11 ENCOUNTER — PATIENT MESSAGE (OUTPATIENT)
Dept: OPHTHALMOLOGY | Facility: CLINIC | Age: 82
End: 2022-08-11
Payer: MEDICARE

## 2022-08-11 ENCOUNTER — HOSPITAL ENCOUNTER (OUTPATIENT)
Facility: OTHER | Age: 82
Discharge: HOME | End: 2022-08-11
Attending: ANESTHESIOLOGY | Admitting: ANESTHESIOLOGY
Payer: MEDICARE

## 2022-08-11 VITALS
RESPIRATION RATE: 16 BRPM | BODY MASS INDEX: 22.73 KG/M2 | DIASTOLIC BLOOD PRESSURE: 63 MMHG | WEIGHT: 150 LBS | HEIGHT: 68 IN | OXYGEN SATURATION: 100 % | HEART RATE: 66 BPM | TEMPERATURE: 98 F | SYSTOLIC BLOOD PRESSURE: 131 MMHG

## 2022-08-11 DIAGNOSIS — M54.16 LUMBAR RADICULOPATHY, CHRONIC: Primary | ICD-10-CM

## 2022-08-11 DIAGNOSIS — M54.16 LUMBAR RADICULOPATHY: Primary | ICD-10-CM

## 2022-08-11 PROCEDURE — 64479 NJX AA&/STRD TFRM EPI C/T 1: CPT | Mod: RT | Performed by: ANESTHESIOLOGY

## 2022-08-11 PROCEDURE — 64479 PR INJECT ANES/STEROID FORAMEN CERV/THORACIC W IMG GUIDE ,1 LEVEL: ICD-10-PCS | Mod: RT,,, | Performed by: ANESTHESIOLOGY

## 2022-08-11 PROCEDURE — 63600175 PHARM REV CODE 636 W HCPCS: Performed by: ANESTHESIOLOGY

## 2022-08-11 PROCEDURE — 25000003 PHARM REV CODE 250: Performed by: ANESTHESIOLOGY

## 2022-08-11 PROCEDURE — 64479 NJX AA&/STRD TFRM EPI C/T 1: CPT | Mod: RT,,, | Performed by: ANESTHESIOLOGY

## 2022-08-11 PROCEDURE — 25500020 PHARM REV CODE 255: Performed by: ANESTHESIOLOGY

## 2022-08-11 RX ORDER — FENTANYL CITRATE 50 UG/ML
INJECTION, SOLUTION INTRAMUSCULAR; INTRAVENOUS
Status: DISCONTINUED | OUTPATIENT
Start: 2022-08-11 | End: 2022-08-11 | Stop reason: HOSPADM

## 2022-08-11 RX ORDER — LIDOCAINE HYDROCHLORIDE 10 MG/ML
INJECTION, SOLUTION EPIDURAL; INFILTRATION; INTRACAUDAL; PERINEURAL
Status: DISCONTINUED | OUTPATIENT
Start: 2022-08-11 | End: 2022-08-11 | Stop reason: HOSPADM

## 2022-08-11 RX ORDER — MIDAZOLAM HYDROCHLORIDE 1 MG/ML
INJECTION INTRAMUSCULAR; INTRAVENOUS
Status: DISCONTINUED | OUTPATIENT
Start: 2022-08-11 | End: 2022-08-11 | Stop reason: HOSPADM

## 2022-08-11 RX ORDER — DEXAMETHASONE SODIUM PHOSPHATE 10 MG/ML
INJECTION INTRAMUSCULAR; INTRAVENOUS
Status: DISCONTINUED | OUTPATIENT
Start: 2022-08-11 | End: 2022-08-11 | Stop reason: HOSPADM

## 2022-08-11 RX ORDER — LIDOCAINE HYDROCHLORIDE 20 MG/ML
INJECTION, SOLUTION INFILTRATION; PERINEURAL
Status: DISCONTINUED | OUTPATIENT
Start: 2022-08-11 | End: 2022-08-11 | Stop reason: HOSPADM

## 2022-08-11 NOTE — DISCHARGE SUMMARY
Discharge Note  Short Stay      SUMMARY     Admit Date: 8/11/2022    Attending Physician: Rj Hernandez      Discharge Physician: Rj Hernandez      Discharge Date: 8/11/2022 2:54 PM    Procedure(s) (LRB):  Injection,steroid,epidural Right L1/2 TF DIrect Referral Instructed to provide intervention per MRI results (Right)    Final Diagnosis: Lumbar radiculopathy [M54.16]    Disposition: Home or self care    Patient Instructions:   Current Discharge Medication List      CONTINUE these medications which have NOT CHANGED    Details   gabapentin (NEURONTIN) 300 MG capsule Take 1 capsule (300 mg total) by mouth 3 (three) times daily.  Qty: 90 capsule, Refills: 2    Associated Diagnoses: RLS (restless legs syndrome); Spinal stenosis of lumbar region with neurogenic claudication      acetaminophen (TYLENOL) 325 MG tablet Take 325 mg by mouth continuous prn for Pain.      aspirin (ECOTRIN) 81 MG EC tablet Take 1 tablet (81 mg total) by mouth once daily.  Qty: 30 tablet, Refills: 0      diazePAM (VALIUM) 2 MG tablet Take 1 tablet (2 mg total) by mouth nightly as needed (muscle tremors/spasms).  Qty: 30 tablet, Refills: 0      estradiol-norethindrone acet 0.5-0.1 mg per tablet Take 1 tablet by mouth once daily.  Qty: 90 tablet, Refills: 3    Associated Diagnoses: Postmenopausal hormone replacement therapy      pantoprazole (PROTONIX) 40 MG tablet Take 1 tablet (40 mg total) by mouth once daily.  Qty: 90 tablet, Refills: 1    Associated Diagnoses: Gastroesophageal reflux disease, unspecified whether esophagitis present      pramipexole (MIRAPEX) 0.25 MG tablet TAKE 1 TABLET(0.25 MG) BY MOUTH EVERY EVENING  Qty: 90 tablet, Refills: 1      prednisolon/gatiflox/bromfenac (PREDNISOL ACE-GATIFLOX-BROMFEN) 1-0.5-0.075 % DrpS Apply 1 drop to eye 3 (three) times daily. in operative eye for 1 month after surgery  Qty: 5 mL, Refills: 3    Comments: Allergies reviewed by physician, and ok to dispense as written.      rOPINIRole  (REQUIP XL) 2 mg 24 hr tablet Take 2 mg by mouth every evening.      temazepam (RESTORIL) 15 mg Cap Take 1 capsule (15 mg total) by mouth every evening.  Qty: 90 capsule, Refills: 1      traMADoL (ULTRAM) 50 mg tablet Take 1 tablet (50 mg total) by mouth every 24 hours as needed for Pain.  Qty: 30 tablet, Refills: 3    Comments: Quantity prescribed more than 7 day supply? Yes, quantity medically necessary      verapamiL (VERELAN) 120 MG C24P Take 1 capsule (120 mg total) by mouth once daily.  Qty: 90 capsule, Refills: 3    Comments: .      vitamin D (VITAMIN D3) 1000 units Tab Take 1,000 Units by mouth once daily.                 Discharge Diagnosis: Lumbar radiculopathy [M54.16]  Condition on Discharge: Stable with no complications to procedure   Diet on Discharge: Same as before.  Activity: as per instruction sheet.  Discharge to: Home with a responsible adult.  Follow up: 2-4 weeks

## 2022-08-11 NOTE — DISCHARGE INSTRUCTIONS
Thank you for allowing us to care for you today. You may receive a survey about the care we provided. Your feedback is valuable and helps us provide excellent care throughout the community.     Home Care Instructions for Pain Management:    1. DIET:   You may resume your normal diet today.   2. BATHING:   You may shower with luke warm water. No tub baths or anything that will soak injection sites under water for the next 24 hours.  3. DRESSING:   You may remove your bandage today.   4. ACTIVITY LEVEL:   You may resume your normal activities 24 hrs after your procedure. Nothing strenuous today.  5. MEDICATIONS:   You may resume your normal medications today. To restart blood thinners, ask your doctor.  6. DRIVING    If you have received any sedatives by mouth today, you may not drive for 12 hours.    If you have received any sedation through your IV, you may not drive for 24 hrs.   7. SPECIAL INSTRUCTIONS:   No heat to the injection site for 24 hrs including, hot bath or shower, heating pad, moist heat, or hot tubs.    Use ice pack to injection site for any pain or discomfort.  Apply ice packs for 20 minute intervals as needed.    IF you have diabetes, be sure to monitor your blood sugar more closely. IF your injection contained steroids your blood sugar levels may become higher than normal.    If you are still having pain upon discharge:  Your pain may improve over the next 48 hours. The anesthetic (numbing medication) works immediately to 48 hours. IF your injection contained a steroid (anti-inflammatory medication), it takes approximately 3 days to start feeling relief and 7-10 days to see your greatest results from the medication. It is possible you may need subsequent injections. This would be discussed at your follow up appointment with pain management or your referring doctor.    Please call the PAIN MANAGEMENT office at [phone] or ON CALL pager at [phone] if you experienced any:   -Weakness or  loss of sensation  -Fever > 101.5  -Pain uncontrolled with oral medications   -Persistent nausea, vomiting, or diarrhea  -Redness or drainage from the injection sites, or any other worrisome concerns.   If physician on call was not reached or could not communicate with our office for any reason please go to the nearest emergency department. Adult Procedural Sedation Instructions    Recovery After Procedural Sedation (Adult)  You have been given medicine by vein to make you sleep during your surgery. This may have included both a pain medicine and sleeping medicine. Most of the effects have worn off. But you may still have some drowsiness for the next 6 to 8 hours.  Home care  Follow these guidelines when you get home:  For the next 8 hours, you should be watched by a responsible adult. This person should make sure your condition is not getting worse.  Don't drink any alcohol for the next 24 hours.  Don't drive, operate dangerous machinery, or make important business or personal decisions during the next 24 hours.  Note: Your healthcare provider may tell you not to take any medicine by mouth for pain or sleep in the next 4 hours. These medicines may react with the medicines you were given in the hospital. This could cause a much stronger response than usual.  Follow-up care  Follow up with your healthcare provider if you are not alert and back to your usual level of activity within 12 hours.  When to seek medical advice  Call your healthcare provider right away if any of these occur:  Drowsiness gets worse  Weakness or dizziness gets worse  Repeated vomiting  You can't be awakened   Date Last Reviewed: 10/18/2016  © 5041-2165 [employer]. [address] [place] [address], [place] [address]. All rights reserved. This information is not intended as a substitute for professional medical care. Always follow your healthcare professional's instructions.

## 2022-08-11 NOTE — INTERVAL H&P NOTE
The patient has been examined and the H&P has been reviewed:    I concur with the findings and no changes have occurred since H&P was written.  Takes ASA 81mg.    Procedure risks, benefits and alternative options discussed and understood by patient/family. Plan for right L1/2 transforaminal epidural steroid injection.          There are no hospital problems to display for this patient.

## 2022-08-11 NOTE — OP NOTE
Thoracic Transforaminal Epidural Steroid Injection  Right  T11/12 under Fluoroscopic Guidance    The procedure, risks, benefits, and options were discussed with the patient. There are no contraindications to the procedure. The patent expressed understanding and agreed to the procedure. Informed written consent was obtained prior to the start of the procedure and can be found in the patient's chart.    PATIENT NAME: Shelly Vásquez   MRN: [id number]     DATE OF PROCEDURE: 08/11/2022    PROCEDURE: Thoracic Transforaminal Epidural Steroid Injection Right  T12/L1 under Fluoroscopic Guidance    PRE-OP DIAGNOSIS: Lumbar radiculopathy [M54.16] Thoracic radiculopathy [M54.14]    POST-OP DIAGNOSIS: Same    PHYSICIAN: Rj Hernandez MD    ASSISTANTS: Dr. Sheehan     MEDICATIONS INJECTED: Preservative-free Decadron 10mg with 1cc of Lidocaine 1% MPF     LOCAL ANESTHETIC INJECTED: Xylocaine 2%     SEDATION:   Versed 2mg and Fentanyl 50mcg                                                                                                                                                                                     Conscious sedation ordered by M.D. Patient re-evaluation prior to administration of conscious sedation. No changes noted in patient's status from initial evaluation. The patient's vital signs were monitored by RN and patient remained hemodynamically stable throughout the procedure.    Event Time In   Sedation Start 1448   Sedation End 1453       ESTIMATED BLOOD LOSS: None    COMPLICATIONS: None    TECHNIQUE: Time-out was performed to identify the patient and procedure to be performed. With the patient laying in the prone position, the surgical area was prepped and draped in the usual sterile fashion using ChloraPrep and a fenestrated drape. The levels were determined under fluoroscopy guidance. Skin anesthesia was achieved by injecting Lidocaine 2% over the injection sites. The transforaminal spaces were then  approached with a 25 gauge, 3.5 inch spinal quinke needle that was introduced under fluoroscopic guidance in the AP and Lateral views. Once the needle tip was in the area of the transforaminal space, and there was no blood, CSF or paraesthesias, contrast dye Omnipaque (240mg/mL) was injected to confirm placement and there was no vascular runoff. Fluoroscopic imaging in the AP and lateral views revealed a clear outline of the spinal nerve with proximal spread of agent through the neural foramen into the epidural space. 3 mL of the medication mixture listed above was injected slowly. Displacement of the radio opaque contrast after injection of the medication confirmed that the medication went into the area of the transforaminal spaces. The needles were removed and bleeding was nil. A sterile dressing was applied. No specimens collected. The patient tolerated the procedure well.       The patient was monitored after the procedure in the recovery area. They were given post-procedure and discharge instructions to follow at home. The patient was discharged in a stable condition.    I reviewed and edited the fellow's note. I conducted my own interview and physical examination. I agree with the findings. I was present and supervising all critical portions of the procedure.    Rj Hernandez MD

## 2022-08-11 NOTE — TELEPHONE ENCOUNTER
Spoke with Erika Fahad to advise of developments with Pain Management for same day access. I was able to confirm she connected with their service.

## 2022-08-11 NOTE — TELEPHONE ENCOUNTER
They can maybe see her at 2:30 but we would need to place the order for the injection type, appropriate level etc. Will reach out to her with instructions, but could not provide much guidance on what she is requesting.

## 2022-08-16 RX ORDER — GABAPENTIN 400 MG/1
400 CAPSULE ORAL 4 TIMES DAILY
Qty: 120 CAPSULE | Refills: 0 | Status: SHIPPED | OUTPATIENT
Start: 2022-08-16 | End: 2022-08-29 | Stop reason: SDUPTHER

## 2022-08-18 ENCOUNTER — TELEPHONE (OUTPATIENT)
Dept: INTERNAL MEDICINE | Facility: CLINIC | Age: 82
End: 2022-08-18
Payer: MEDICARE

## 2022-08-18 DIAGNOSIS — M54.16 CHRONIC RIGHT-SIDED LUMBAR RADICULOPATHY: Primary | ICD-10-CM

## 2022-08-18 NOTE — TELEPHONE ENCOUNTER
Hi team.   Pt is having pretty severe increased right-sided radicular sciatic pain.  I increased her gabapentin and tramadol.  She is out of town now but will be back this weekend was hoping to have an L5/S1 epidural.  What your thoughts Rj?    I PLACED ORDER:  PROCEDURE ORDER TO PAIN MANAGEMENT     Catrachita can you please help.   Shelly really wants this done next week

## 2022-08-19 ENCOUNTER — PATIENT MESSAGE (OUTPATIENT)
Dept: PAIN MEDICINE | Facility: OTHER | Age: 82
End: 2022-08-19
Payer: MEDICARE

## 2022-08-19 DIAGNOSIS — M54.16 CHRONIC RIGHT-SIDED LUMBAR RADICULOPATHY: Primary | ICD-10-CM

## 2022-08-24 ENCOUNTER — OUTPATIENT CASE MANAGEMENT (OUTPATIENT)
Dept: ADMINISTRATIVE | Facility: OTHER | Age: 82
End: 2022-08-24
Payer: MEDICARE

## 2022-08-25 ENCOUNTER — TELEPHONE (OUTPATIENT)
Dept: PAIN MEDICINE | Facility: CLINIC | Age: 82
End: 2022-08-25
Payer: MEDICARE

## 2022-08-25 ENCOUNTER — HOSPITAL ENCOUNTER (OUTPATIENT)
Facility: OTHER | Age: 82
Discharge: HOME | End: 2022-08-25
Attending: ANESTHESIOLOGY | Admitting: ANESTHESIOLOGY
Payer: MEDICARE

## 2022-08-25 VITALS
HEIGHT: 68 IN | SYSTOLIC BLOOD PRESSURE: 129 MMHG | OXYGEN SATURATION: 98 % | WEIGHT: 150 LBS | DIASTOLIC BLOOD PRESSURE: 63 MMHG | RESPIRATION RATE: 16 BRPM | TEMPERATURE: 98 F | HEART RATE: 59 BPM | BODY MASS INDEX: 22.73 KG/M2

## 2022-08-25 DIAGNOSIS — M47.27 SPONDYLOSIS OF LUMBOSACRAL SPINE WITH RADICULOPATHY: Primary | ICD-10-CM

## 2022-08-25 DIAGNOSIS — G89.29 CHRONIC PAIN: ICD-10-CM

## 2022-08-25 DIAGNOSIS — M51.37 DDD (DEGENERATIVE DISC DISEASE), LUMBOSACRAL: ICD-10-CM

## 2022-08-25 PROCEDURE — 25500020 PHARM REV CODE 255: Performed by: ANESTHESIOLOGY

## 2022-08-25 PROCEDURE — 62323 NJX INTERLAMINAR LMBR/SAC: CPT | Mod: ,,, | Performed by: ANESTHESIOLOGY

## 2022-08-25 PROCEDURE — 25000003 PHARM REV CODE 250: Performed by: ANESTHESIOLOGY

## 2022-08-25 PROCEDURE — 62323 PR INJ LUMBAR/SACRAL, W/IMAGING GUIDANCE: ICD-10-PCS | Mod: ,,, | Performed by: ANESTHESIOLOGY

## 2022-08-25 PROCEDURE — 25000003 PHARM REV CODE 250: Performed by: STUDENT IN AN ORGANIZED HEALTH CARE EDUCATION/TRAINING PROGRAM

## 2022-08-25 PROCEDURE — 62323 NJX INTERLAMINAR LMBR/SAC: CPT | Performed by: ANESTHESIOLOGY

## 2022-08-25 PROCEDURE — 63600175 PHARM REV CODE 636 W HCPCS: Performed by: ANESTHESIOLOGY

## 2022-08-25 RX ORDER — ALPRAZOLAM 0.5 MG/1
0.5 TABLET ORAL ONCE
Status: COMPLETED | OUTPATIENT
Start: 2022-08-25 | End: 2022-08-25

## 2022-08-25 RX ORDER — LIDOCAINE HYDROCHLORIDE 20 MG/ML
INJECTION, SOLUTION INFILTRATION; PERINEURAL
Status: DISCONTINUED | OUTPATIENT
Start: 2022-08-25 | End: 2022-08-25 | Stop reason: HOSPADM

## 2022-08-25 RX ORDER — LIDOCAINE HYDROCHLORIDE 10 MG/ML
INJECTION, SOLUTION EPIDURAL; INFILTRATION; INTRACAUDAL; PERINEURAL
Status: DISCONTINUED | OUTPATIENT
Start: 2022-08-25 | End: 2022-08-25 | Stop reason: HOSPADM

## 2022-08-25 RX ORDER — SODIUM CHLORIDE 9 MG/ML
500 INJECTION, SOLUTION INTRAVENOUS CONTINUOUS
Status: DISCONTINUED | OUTPATIENT
Start: 2022-08-25 | End: 2022-08-25 | Stop reason: HOSPADM

## 2022-08-25 RX ORDER — DEXAMETHASONE SODIUM PHOSPHATE 10 MG/ML
INJECTION INTRAMUSCULAR; INTRAVENOUS
Status: DISCONTINUED | OUTPATIENT
Start: 2022-08-25 | End: 2022-08-25 | Stop reason: HOSPADM

## 2022-08-25 RX ADMIN — ALPRAZOLAM 0.5 MG: 0.5 TABLET ORAL at 12:08

## 2022-08-25 NOTE — TELEPHONE ENCOUNTER
Staff left pt a voicemail letting pt know to reach back out to us at [phone] so that we can discuss this matter

## 2022-08-25 NOTE — DISCHARGE SUMMARY
Discharge Note  Short Stay      SUMMARY     Admit Date: 8/25/2022    Attending Physician: Rj Hernandez      Discharge Physician: Rj Hernandez      Discharge Date: 8/25/2022 1:15 PM    Procedure(s) (LRB):  LUMBAR CELINA CONTRAST DIRECT REFERRAL (N/A)    Final Diagnosis: Chronic right-sided lumbar radiculopathy [M54.16]    Disposition: Home or self care    Patient Instructions:   Current Discharge Medication List      CONTINUE these medications which have NOT CHANGED    Details   gabapentin (NEURONTIN) 400 MG capsule Take 1 capsule (400 mg total) by mouth 4 (four) times daily.  Qty: 120 capsule, Refills: 0      rOPINIRole (REQUIP XL) 2 mg 24 hr tablet Take 2 mg by mouth every evening.      acetaminophen (TYLENOL) 325 MG tablet Take 325 mg by mouth continuous prn for Pain.      aspirin (ECOTRIN) 81 MG EC tablet Take 1 tablet (81 mg total) by mouth once daily.  Qty: 30 tablet, Refills: 0      diazePAM (VALIUM) 2 MG tablet Take 1 tablet (2 mg total) by mouth nightly as needed (muscle tremors/spasms).  Qty: 30 tablet, Refills: 0      estradiol-norethindrone acet 0.5-0.1 mg per tablet Take 1 tablet by mouth once daily.  Qty: 90 tablet, Refills: 3    Associated Diagnoses: Postmenopausal hormone replacement therapy      pantoprazole (PROTONIX) 40 MG tablet Take 1 tablet (40 mg total) by mouth once daily.  Qty: 90 tablet, Refills: 1    Associated Diagnoses: Gastroesophageal reflux disease, unspecified whether esophagitis present      pramipexole (MIRAPEX) 0.25 MG tablet TAKE 1 TABLET(0.25 MG) BY MOUTH EVERY EVENING  Qty: 90 tablet, Refills: 1      temazepam (RESTORIL) 15 mg Cap Take 1 capsule (15 mg total) by mouth every evening.  Qty: 90 capsule, Refills: 1      traMADoL (ULTRAM) 50 mg tablet Take 1 tablet (50 mg total) by mouth every 24 hours as needed for Pain.  Qty: 30 tablet, Refills: 3    Comments: Quantity prescribed more than 7 day supply? Yes, quantity medically necessary      verapamiL (VERELAN) 120 MG C24P Take  1 capsule (120 mg total) by mouth once daily.  Qty: 90 capsule, Refills: 3    Comments: .      vitamin D (VITAMIN D3) 1000 units Tab Take 1,000 Units by mouth once daily.                 Discharge Diagnosis: Chronic right-sided lumbar radiculopathy [M54.16]  Condition on Discharge: Stable with no complications to procedure   Diet on Discharge: Same as before.  Activity: as per instruction sheet.  Discharge to: Home with a responsible adult.  Follow up: 2-4 weeks

## 2022-08-25 NOTE — INTERVAL H&P NOTE
The patient has been examined and the H&P has been reviewed:    I concur with the findings and no changes have occurred since H&P was written.    Procedure risks, benefits and alternative options discussed and understood by patient/family.    Plan for L5/S1 IESI      There are no hospital problems to display for this patient.

## 2022-08-25 NOTE — OP NOTE
Lumbar Interlaminar Epidural Steroid Injection under Fluoroscopic Guidance    The procedure, risks, benefits, and options were discussed with the patient. There are no contraindications to the procedure. The patent expressed understanding and agreed to the procedure. Informed written consent was obtained prior to the start of the procedure and can be found in the patient's chart.    PATIENT NAME: Shelly Vásquez   MRN: [id number]     DATE OF PROCEDURE: 08/25/2022    PROCEDURE: Lumbar Interlaminar Epidural Steroid Injection L5/S1 under Fluoroscopic Guidance    PRE-OP DIAGNOSIS: Chronic right-sided lumbar radiculopathy [M54.16] Lumbar radiculopathy [M54.16]    POST-OP DIAGNOSIS: Same    PHYSICIAN: Rj Hernandez MD    ASSISTANTS: Dr. Branch     MEDICATIONS INJECTED: Preservative-free Decadron 10mg with 4cc of Lidocaine 1% MPF and preservative free normal saline    LOCAL ANESTHETIC INJECTED: Xylocaine 2%     SEDATION: None    ESTIMATED BLOOD LOSS: None    COMPLICATIONS: None    TECHNIQUE: Time-out was performed to identify the patient and procedure to be performed. With the patient laying in a prone position, the surgical area was prepped and draped in the usual sterile fashion using ChloraPrep and a fenestrated drape. The level was determined under fluoroscopy guidance. Skin anesthesia was achieved by injecting Lidocaine 2% over the injection site. The interlaminar space was then approached with a 20 gauge,  3.5 inch Tuohy needle that was introduced under fluoroscopic guidance in the AP, lateral and/or contralateral oblique imaging. Once the Ligamentum flavum was encountered loss of resistance to saline was used to enter the epidural space. With positive loss of resistance and negative aspiration for CSF or Blood, contrast dye Omnipaque (240mg/mL) was injected to confirm placement and there was no vascular runoff. 3 mL of the medication mixture listed above was injected slowly. Displacement of the radio opaque  contrast after injection of the medication confirmed that the medication went into the area of the epidural space. The needles were removed and bleeding was nil. A sterile dressing was applied. No specimens collected. The patient tolerated the procedure well.       The patient was monitored after the procedure in the recovery area. They were given post-procedure and discharge instructions to follow at home. The patient was discharged in a stable condition.    I reviewed and edited the fellow's note. I conducted my own interview and physical examination. I agree with the findings. I was present and supervising all critical portions of the procedure.    Rj Hernandez MD

## 2022-08-25 NOTE — TELEPHONE ENCOUNTER
----- Message from Teresa Hugo sent at 8/25/2022 11:53 AM CDT -----  Name of Who is Calling: NEIL REDMOND [[id number]]            What is the request in detail: Patient is requesting call back about appointment today              Can the clinic reply by [facility]: no              What Number to Call Back if not in [facility]: [phone]

## 2022-08-25 NOTE — DISCHARGE INSTRUCTIONS

## 2022-08-26 ENCOUNTER — PATIENT MESSAGE (OUTPATIENT)
Dept: PAIN MEDICINE | Facility: OTHER | Age: 82
End: 2022-08-26
Payer: MEDICARE

## 2022-08-26 NOTE — PROGRESS NOTES
Outpatient Care Management  Plan of Care Follow Up Visit    Patient: Shelly Vásquez  MRN: [id number]  Date of Service: 08/24/2022  Completed by: Sunita Clayton RN  Referral Date: 07/31/2022    Reason for Visit   Patient presents with    OPCM Chart Review    Follow-up    Update Plan Of Care       Brief Summary:         Next steps from previous encounter  Fu on appt with dr negrete done  Fu on pain status and any pain management stratagies done  Leg stretches standing against wall arms above head and lean into wall noted  Follow up on receipt of education material sent in alyssa noted    Interventions  Chart reviewed  Reviewed upcoming appt schedule    Assess/review short term goals met       Next steps  Fu on pain status 2 week post michael #2  Review pain management stratagies        Patient Summary     Involvement of Care:  Do I have permission to speak with other family members about your care?       Patient Reported Labs & Vitals:  1.  Any Patient Reported Labs & Vitals?     2.  Patient Reported Blood Pressure:     3.  Patient Reported Pulse:     4.  Patient Reported Weight (Kg):     5.  Patient Reported Blood Glucose (mg/dl):       Medical and social history was reviewed with patient and/or caregiver.     Clinical Assessment     Reviewed and provided basic information on available community resources for mental health, transportation, wellness resources, and palliative care programs with patient and/or caregiver.     Complex Care Plan     Care plan was discussed and completed today with input from patient and/or caregiver.    Patient Instructions     Instructions were provided via the [facility] patient resources and are available for the patient to view on the patient portal.      [facility] OPCM Self-Management Care Plan was developed with the patients/caregivers input and was based on identified barriers from todays assessment.  Goals were written today with the patient/caregiver and the patient has agreed to work  towards these goals to improve his/her overall well-being. Patient verbalized understanding of the care plan, goals, and all of today's instructions. Encouraged patient/caregiver to communicate with his/her physician and health care team about health conditions and the treatment plan.  Provided my contact information today and encouraged patient/caregiver to call me with any questions as needed.

## 2022-08-29 ENCOUNTER — PATIENT MESSAGE (OUTPATIENT)
Dept: NEUROLOGY | Facility: CLINIC | Age: 82
End: 2022-08-29
Payer: MEDICARE

## 2022-08-29 ENCOUNTER — PATIENT MESSAGE (OUTPATIENT)
Dept: OPHTHALMOLOGY | Facility: CLINIC | Age: 82
End: 2022-08-29
Payer: MEDICARE

## 2022-08-29 RX ORDER — GABAPENTIN 400 MG/1
400 CAPSULE ORAL 4 TIMES DAILY
Qty: 120 CAPSULE | Refills: 2 | Status: SHIPPED | OUTPATIENT
Start: 2022-08-29 | End: 2022-09-06 | Stop reason: SDUPTHER

## 2022-08-30 ENCOUNTER — TELEPHONE (OUTPATIENT)
Dept: NEUROLOGY | Facility: CLINIC | Age: 82
End: 2022-08-30
Payer: MEDICARE

## 2022-08-30 NOTE — TELEPHONE ENCOUNTER
Spoke with Mrs. Vásquez to confirm cancellation of today's appointment as she had already been able to meet with Dr. Espinoza on 8/8/22. Debriefed on work with Pain. I will [place] back with f/u dates as needed.

## 2022-09-02 ENCOUNTER — PATIENT MESSAGE (OUTPATIENT)
Dept: NEUROLOGY | Facility: CLINIC | Age: 82
End: 2022-09-02
Payer: MEDICARE

## 2022-09-02 ENCOUNTER — PATIENT MESSAGE (OUTPATIENT)
Dept: INTERNAL MEDICINE | Facility: CLINIC | Age: 82
End: 2022-09-02
Payer: MEDICARE

## 2022-09-06 ENCOUNTER — PATIENT MESSAGE (OUTPATIENT)
Dept: INTERNAL MEDICINE | Facility: CLINIC | Age: 82
End: 2022-09-06
Payer: MEDICARE

## 2022-09-06 ENCOUNTER — OUTPATIENT CASE MANAGEMENT (OUTPATIENT)
Dept: ADMINISTRATIVE | Facility: OTHER | Age: 82
End: 2022-09-06
Payer: MEDICARE

## 2022-09-06 ENCOUNTER — PROCEDURE VISIT (OUTPATIENT)
Dept: NEUROLOGY | Facility: CLINIC | Age: 82
End: 2022-09-06
Payer: MEDICARE

## 2022-09-06 DIAGNOSIS — G25.81 RLS (RESTLESS LEGS SYNDROME): ICD-10-CM

## 2022-09-06 DIAGNOSIS — M43.17 SPONDYLOLISTHESIS, LUMBOSACRAL REGION: ICD-10-CM

## 2022-09-06 DIAGNOSIS — D46.1 REFRACTORY ANEMIA WITH RING SIDEROBLASTS: ICD-10-CM

## 2022-09-06 DIAGNOSIS — M16.11 PRIMARY OSTEOARTHRITIS OF RIGHT HIP: ICD-10-CM

## 2022-09-06 PROCEDURE — 95885 MUSC TST DONE W/NERV TST LIM: CPT | Mod: PBBFAC | Performed by: PSYCHIATRY & NEUROLOGY

## 2022-09-06 PROCEDURE — 95860 NEEDLE EMG 1 EXTREMITY: CPT | Mod: PBBFAC | Performed by: PSYCHIATRY & NEUROLOGY

## 2022-09-06 PROCEDURE — 95910 PR NERVE CONDUCTION STUDY; 7-8 STUDIES: ICD-10-PCS | Mod: 26,S$PBB,, | Performed by: PSYCHIATRY & NEUROLOGY

## 2022-09-06 PROCEDURE — 95910 NRV CNDJ TEST 7-8 STUDIES: CPT | Mod: PBBFAC | Performed by: PSYCHIATRY & NEUROLOGY

## 2022-09-06 PROCEDURE — 95885 MUSC TST DONE W/NERV TST LIM: CPT | Mod: 26,S$PBB,, | Performed by: PSYCHIATRY & NEUROLOGY

## 2022-09-06 PROCEDURE — 95910 NRV CNDJ TEST 7-8 STUDIES: CPT | Mod: 26,S$PBB,, | Performed by: PSYCHIATRY & NEUROLOGY

## 2022-09-06 PROCEDURE — 95885 PR MUSC TST DONE W/NERV TST LIM: ICD-10-PCS | Mod: 26,S$PBB,, | Performed by: PSYCHIATRY & NEUROLOGY

## 2022-09-06 RX ORDER — GABAPENTIN 400 MG/1
400 CAPSULE ORAL 4 TIMES DAILY
Qty: 336 CAPSULE | Refills: 0 | Status: SHIPPED | OUTPATIENT
Start: 2022-09-06 | End: 2022-10-03 | Stop reason: SDUPTHER

## 2022-09-06 NOTE — TELEPHONE ENCOUNTER
No new care gaps identified.  [facility] Embedded Care Gaps. Reference number: [id number]. 9/06/2022   12:57:52 PM CDT

## 2022-09-06 NOTE — PROGRESS NOTES
Outpatient Care Management  Plan of Care Follow Up Visit    Patient: Shelly Vásquez  MRN: [id number]  Date of Service: 09/06/2022  Completed by: Sunita Clayton RN  Referral Date: 07/31/2022    Reason for Visit   Patient presents with    OPCM Chart Review    Follow-up    Update Plan Of Care       Brief Summary:   Pt having her emg with ultrasound for 1 pm today    Next steps from previous encounter  Fu on pain status 2 week post michael #2  Review pain management stratagies  Fu on emg done    Interventions  Chart reviewed  Reviewed upcoming appt schedule    Assess/review short term goals met       Next steps  Fu on elevated toilet seat install  Fu on any pain management recommendations from emg results  Review pain status      Patient Summary     Involvement of Care:  Do I have permission to speak with other family members about your care?       Patient Reported Labs & Vitals:  1.  Any Patient Reported Labs & Vitals?     2.  Patient Reported Blood Pressure:     3.  Patient Reported Pulse:     4.  Patient Reported Weight (Kg):     5.  Patient Reported Blood Glucose (mg/dl):       Medical and social history was reviewed with patient and/or caregiver.     Clinical Assessment     Reviewed and provided basic information on available community resources for mental health, transportation, wellness resources, and palliative care programs with patient and/or caregiver.     Complex Care Plan     Care plan was discussed and completed today with input from patient and/or caregiver.    Patient Instructions     Instructions were provided via the [facility] patient resources and are available for the patient to view on the patient portal.        Todays OPCM Self-Management Care Plan was developed with the patients/caregivers input and was based on identified barriers from todays assessment.  Goals were written today with the patient/caregiver and the patient has agreed to work towards these goals to improve his/her overall well-being.  Patient verbalized understanding of the care plan, goals, and all of today's instructions. Encouraged patient/caregiver to communicate with his/her physician and health care team about health conditions and the treatment plan.  Provided my contact information today and encouraged patient/caregiver to call me with any questions as needed.

## 2022-09-10 ENCOUNTER — PATIENT MESSAGE (OUTPATIENT)
Dept: INTERNAL MEDICINE | Facility: CLINIC | Age: 82
End: 2022-09-10
Payer: MEDICARE

## 2022-09-11 RX ORDER — ZOLPIDEM TARTRATE 5 MG/1
5 TABLET ORAL NIGHTLY PRN
Qty: 30 TABLET | Refills: 0 | Status: SHIPPED | OUTPATIENT
Start: 2022-09-11 | End: 2022-09-11 | Stop reason: SDUPTHER

## 2022-09-11 NOTE — TELEPHONE ENCOUNTER
No new care gaps identified.  [facility] Embedded Care Gaps. Reference number: [id number]. 9/11/2022   11:39:24 AM CDT

## 2022-09-12 RX ORDER — ZOLPIDEM TARTRATE 5 MG/1
5 TABLET ORAL NIGHTLY PRN
Qty: 30 TABLET | Refills: 0 | Status: SHIPPED | OUTPATIENT
Start: 2022-09-12 | End: 2022-10-06

## 2022-09-12 RX ORDER — TEMAZEPAM 15 MG/1
15 CAPSULE ORAL NIGHTLY
Qty: 90 CAPSULE | Refills: 1 | Status: SHIPPED | OUTPATIENT
Start: 2022-09-12 | End: 2022-10-06

## 2022-09-13 ENCOUNTER — PATIENT MESSAGE (OUTPATIENT)
Dept: INTERNAL MEDICINE | Facility: CLINIC | Age: 82
End: 2022-09-13
Payer: MEDICARE

## 2022-09-14 ENCOUNTER — TELEPHONE (OUTPATIENT)
Dept: OPHTHALMOLOGY | Facility: CLINIC | Age: 82
End: 2022-09-14
Payer: MEDICARE

## 2022-09-14 DIAGNOSIS — H25.11 NUCLEAR SCLEROTIC CATARACT OF RIGHT EYE: Primary | ICD-10-CM

## 2022-09-23 ENCOUNTER — TELEPHONE (OUTPATIENT)
Dept: OPHTHALMOLOGY | Facility: CLINIC | Age: 82
End: 2022-09-23
Payer: MEDICARE

## 2022-09-23 DIAGNOSIS — H25.12 NUCLEAR SCLEROTIC CATARACT OF LEFT EYE: Primary | ICD-10-CM

## 2022-10-03 RX ORDER — GABAPENTIN 400 MG/1
400 CAPSULE ORAL 4 TIMES DAILY
Qty: 120 CAPSULE | Refills: 0 | Status: SHIPPED | OUTPATIENT
Start: 2022-10-03 | End: 2022-10-25 | Stop reason: SDUPTHER

## 2022-10-06 DIAGNOSIS — G25.81 RLS (RESTLESS LEGS SYNDROME): Primary | ICD-10-CM

## 2022-10-06 RX ORDER — DIAZEPAM 2 MG/1
2 TABLET ORAL NIGHTLY PRN
Qty: 30 TABLET | Refills: 0 | Status: SHIPPED | OUTPATIENT
Start: 2022-10-06 | End: 2022-10-14

## 2022-10-06 NOTE — PROGRESS NOTES
Notes leg symptoms flairing with burning sensations. Reviewed meds and had her put them in front of her to confirm. Has been off pramipexole, possibly was getting confused with this and pantoprazole both being in regimen. She will restart. Is taking gabapentin still. Using Valium prn sleep. Confirmed she is NOT taking previous meds of Restoril or Ambien additionally. Seeing Neuro for f/u soon. Hx of lumbar radiculopathy as well. PCP will return after the weekend. F/U in clinic if symptoms worsen or do not ralph.

## 2022-10-10 ENCOUNTER — TELEPHONE (OUTPATIENT)
Dept: INTERNAL MEDICINE | Facility: CLINIC | Age: 82
End: 2022-10-10
Payer: MEDICARE

## 2022-10-13 ENCOUNTER — OFFICE VISIT (OUTPATIENT)
Dept: NEUROLOGY | Facility: CLINIC | Age: 82
End: 2022-10-13
Payer: MEDICARE

## 2022-10-13 DIAGNOSIS — G25.81 RLS (RESTLESS LEGS SYNDROME): Primary | ICD-10-CM

## 2022-10-13 PROCEDURE — 99213 OFFICE O/P EST LOW 20 MIN: CPT | Mod: 95,,, | Performed by: PSYCHIATRY & NEUROLOGY

## 2022-10-13 PROCEDURE — 99213 PR OFFICE/OUTPT VISIT, EST, LEVL III, 20-29 MIN: ICD-10-PCS | Mod: 95,,, | Performed by: PSYCHIATRY & NEUROLOGY

## 2022-10-13 NOTE — PROGRESS NOTES
Neurology Telemedicine Note     Name: Shelly Vásquez  MRN: [id number]   CSN: [id number]      Date: 10/13/2022    The patient location is: Home  The chief complaint leading to consultation is: f/u  Visit type: Virtual visit with synchronous audio and video    TOTAL TIME SPENT: 15 min    Each patient to whom I provide medical services by telemedicine is:  (1) informed of the relationship between the physician and patient and the respective role of any other health care provider with respect to management of the patient; and (2) notified that they may decline to receive medical services by telemedicine and may withdraw from such care at any time.    History of Present Illness (HPI):  Chief Complaint: RLS/ Lumbar radiculopathy    0 pain today, muscle spasms often  0 falls     Interval History:   Hx:  - last seen 8/8/22  - subsequently received epidural steroid injection via pain management with relief  - noted early September that the gabapentin is helping, but persistent pain in right leg  - latter reported improved response to steroid injection later in the month    Rx:  - additions via [facility] medicine  --- gabapentin increased to 400 mg TID  --- pramipexole increased 0.125 mg TID, much improvement (some ambiguity about previous dose and schedule)    Pain/Radiculopathy:  - reporting substantial improvements with the above adjustments  - describes as electrical zapping pain  - still hurts to stand straight, as well as bent over  - pulling sensation in quads, but notes very different from muscle tension or cramp  - hates that she cannot readily stand up, requires two hands to support herself or w/o help  - visiting with an osteopath (actual DO?) giving exercises for her to practice  - feel incredibly debilitated, feels that she is an entirely different woman previously  --- states that she felt under-treated by María, who Shelly felt was very conservative with medications        RLS and other involuntary  movement:  - gabapentin controls RLS symptoms to great degree per pt.  - Shelly was unsure of the role of pramipexole, but feels that it makes it less stressful  - primarily concerned about the radiculopathy as distinct from the RLS discomfort      Nonmotor/Premotor ROS: as per HPI, and all other systems are negative    Past Medical History: The patient  has a past medical history of Arthritis, Atrial fibrillation, Basal cell cancer, Encounter for blood transfusion, Gastric ulcer, Herpes simplex without mention of complication, Postmenopausal HRT (hormone replacement therapy), and Supraventricular tachycardia.    Social History: The patient  reports that she quit smoking about 42 years ago. Her smoking use included cigarettes. She has a 22.00 pack-year smoking history. She has never used smokeless tobacco. She reports current alcohol use of about 2.0 standard drinks per week. She reports that she does not use drugs.    Family History: Their family history includes Cirrhosis in her mother; No Known Problems in her brother, daughter, father, maternal aunt, maternal grandfather, maternal grandmother, maternal uncle, paternal aunt, paternal grandfather, paternal grandmother, and paternal uncle; Pulmonary embolism in her son.    Allergies: Nsaids (non-steroidal anti-inflammatory drug)     Meds:   Current Outpatient Medications on File Prior to Visit   Medication Sig Dispense Refill    acetaminophen (TYLENOL) 325 MG tablet Take 325 mg by mouth continuous prn for Pain.      aspirin (ECOTRIN) 81 MG EC tablet Take 1 tablet (81 mg total) by mouth once daily. 30 tablet 0    diazePAM (VALIUM) 2 MG tablet Take 1 tablet (2 mg total) by mouth nightly as needed (muscle tremors/spasms). 30 tablet 0    estradiol-norethindrone acet 0.5-0.1 mg per tablet Take 1 tablet by mouth once daily. 90 tablet 3    gabapentin (NEURONTIN) 400 MG capsule Take 1 capsule (400 mg total) by mouth 4 (four) times daily. 120 capsule 0    pantoprazole  (PROTONIX) 40 MG tablet Take 1 tablet (40 mg total) by mouth once daily. 90 tablet 1    pramipexole (MIRAPEX) 0.25 MG tablet TAKE 1 TABLET(0.25 MG) BY MOUTH EVERY EVENING (Patient not taking: No sig reported) 90 tablet 1    verapamiL (VERELAN) 120 MG C24P Take 1 capsule (120 mg total) by mouth once daily. 90 capsule 3    vitamin D (VITAMIN D3) 1000 units Tab Take 1,000 Units by mouth once daily.       No current facility-administered medications on file prior to visit.       Exam:  Vital Signs deferred with home visit    Constitutional  Well-developed, well-nourished, appears stated age   Eyes  No scleral icterus   ENT  Moist oral mucosa   Cardiovascular  No lower extremity edema    Respiratory  No labored breathing    Skin  No rashes   Hematologic  No bruising   Psychiatric  Normal mood and affect   Other  GI/ deferred    Neurological     * Mental status  Alert and oriented to person, place, time, and situation; no dysarthria; no aphasia; normal recent and remote memory; follows commands   * Cranial nerves     - CN II  Pupils midposition and symmetric   - CN III, IV, VI  Extraocular movements full, no nystagmus visualized   - CN V  Unable to test   - CN VII  Face strong and symmetric bilaterally    - CN VIII  Hearing grossly intact with conversation    - CN IX, X  Palate raises midline and symmetric    - CN XI  Strong shoulder shrug B    - CN XII  Tongue appears midline    * Motor  Normal bulk by appearance, no drift    * Sensory  Not tested objectively, no changes described by the patient   * Deep tendon reflexes  Not tested   * Coord/Movemt/Gait No hypophonic speech.  No facial masking.  No B bradykinesia.  No tremor with rest, posture, kinesis, or intention.   No chorea, athetosis, dystonia, myoclonus, or tics.   No motor impersistence.  Gait is deferred for safety.       Medical Record Review:  - Lab Results:  No visits with results within 3 Month(s) from this visit.   Latest known visit with results is:    Clinical Support on 05/31/2022   Component Date Value Ref Range Status    POC Rapid COVID 05/31/2022 Negative  Negative Final     Acceptable 05/31/2022 Yes   Final       ASSESSMENT:  1) RLS  2) L1-2 radic        PLAN:  - may need nerve block sooner than later  - keep MPX and gabapentin in low doses  - query iron needs  - pt as needed           Artem Espinoza MD, MPH  Division of Movement and Memory Disorders  [facility]  [phone]

## 2022-10-14 ENCOUNTER — OFFICE VISIT (OUTPATIENT)
Dept: INTERNAL MEDICINE | Facility: CLINIC | Age: 82
End: 2022-10-14
Payer: MEDICARE

## 2022-10-14 ENCOUNTER — CLINICAL SUPPORT (OUTPATIENT)
Dept: INTERNAL MEDICINE | Facility: CLINIC | Age: 82
End: 2022-10-14
Payer: MEDICARE

## 2022-10-14 ENCOUNTER — TELEPHONE (OUTPATIENT)
Dept: HEMATOLOGY/ONCOLOGY | Facility: CLINIC | Age: 82
End: 2022-10-14
Payer: MEDICARE

## 2022-10-14 VITALS
BODY MASS INDEX: 23.77 KG/M2 | DIASTOLIC BLOOD PRESSURE: 65 MMHG | SYSTOLIC BLOOD PRESSURE: 137 MMHG | HEART RATE: 71 BPM | WEIGHT: 156.31 LBS

## 2022-10-14 DIAGNOSIS — D46.1 REFRACTORY ANEMIA WITH RING SIDEROBLASTS: ICD-10-CM

## 2022-10-14 DIAGNOSIS — M54.16 CHRONIC RIGHT-SIDED LUMBAR RADICULOPATHY: ICD-10-CM

## 2022-10-14 DIAGNOSIS — D46.1 RARS (REFRACTORY ANEMIA WITH RINGED SIDEROBLASTS): ICD-10-CM

## 2022-10-14 DIAGNOSIS — G25.81 RLS (RESTLESS LEGS SYNDROME): Primary | ICD-10-CM

## 2022-10-14 DIAGNOSIS — D46.9 MDS (MYELODYSPLASTIC SYNDROME): ICD-10-CM

## 2022-10-14 DIAGNOSIS — D46.9 MDS (MYELODYSPLASTIC SYNDROME): Primary | ICD-10-CM

## 2022-10-14 LAB
ALBUMIN SERPL BCP-MCNC: 4.2 G/DL (ref 3.5–5.2)
ALP SERPL-CCNC: 42 U/L (ref 55–135)
ALT SERPL W/O P-5'-P-CCNC: 17 U/L (ref 10–44)
ANION GAP SERPL CALC-SCNC: 6 MMOL/L (ref 8–16)
AST SERPL-CCNC: 18 U/L (ref 10–40)
BASOPHILS # BLD AUTO: 0.07 K/UL (ref 0–0.2)
BASOPHILS NFR BLD: 1.1 % (ref 0–1.9)
BILIRUB SERPL-MCNC: 1 MG/DL (ref 0.1–1)
BUN SERPL-MCNC: 16 MG/DL (ref 8–23)
CALCIUM SERPL-MCNC: 9.2 MG/DL (ref 8.7–10.5)
CHLORIDE SERPL-SCNC: 103 MMOL/L (ref 95–110)
CO2 SERPL-SCNC: 25 MMOL/L (ref 23–29)
CREAT SERPL-MCNC: 1 MG/DL (ref 0.5–1.4)
DIFFERENTIAL METHOD: ABNORMAL
EOSINOPHIL # BLD AUTO: 0.2 K/UL (ref 0–0.5)
EOSINOPHIL NFR BLD: 3.2 % (ref 0–8)
ERYTHROCYTE [DISTWIDTH] IN BLOOD BY AUTOMATED COUNT: 17 % (ref 11.5–14.5)
EST. GFR  (NO RACE VARIABLE): 55.9 ML/MIN/1.73 M^2
FERRITIN SERPL-MCNC: 698 NG/ML (ref 20–300)
GLUCOSE SERPL-MCNC: 99 MG/DL (ref 70–110)
HCT VFR BLD AUTO: 24.6 % (ref 37–48.5)
HGB BLD-MCNC: 8.4 G/DL (ref 12–16)
IMM GRANULOCYTES # BLD AUTO: 0.02 K/UL (ref 0–0.04)
IMM GRANULOCYTES NFR BLD AUTO: 0.3 % (ref 0–0.5)
IRON SERPL-MCNC: 198 UG/DL (ref 30–160)
LYMPHOCYTES # BLD AUTO: 2.9 K/UL (ref 1–4.8)
LYMPHOCYTES NFR BLD: 44.4 % (ref 18–48)
MAGNESIUM SERPL-MCNC: 2.1 MG/DL (ref 1.6–2.6)
MCH RBC QN AUTO: 36.2 PG (ref 27–31)
MCHC RBC AUTO-ENTMCNC: 34.1 G/DL (ref 32–36)
MCV RBC AUTO: 106 FL (ref 82–98)
MONOCYTES # BLD AUTO: 0.7 K/UL (ref 0.3–1)
MONOCYTES NFR BLD: 10.9 % (ref 4–15)
NEUTROPHILS # BLD AUTO: 2.6 K/UL (ref 1.8–7.7)
NEUTROPHILS NFR BLD: 40.1 % (ref 38–73)
NRBC BLD-RTO: 0 /100 WBC
PHOSPHATE SERPL-MCNC: 3.3 MG/DL (ref 2.7–4.5)
PLATELET # BLD AUTO: 476 K/UL (ref 150–450)
PMV BLD AUTO: 9.8 FL (ref 9.2–12.9)
POTASSIUM SERPL-SCNC: 4.6 MMOL/L (ref 3.5–5.1)
PROT SERPL-MCNC: 6.5 G/DL (ref 6–8.4)
RBC # BLD AUTO: 2.32 M/UL (ref 4–5.4)
SATURATED IRON: 83 % (ref 20–50)
SODIUM SERPL-SCNC: 134 MMOL/L (ref 136–145)
TOTAL IRON BINDING CAPACITY: 240 UG/DL (ref 250–450)
TRANSFERRIN SERPL-MCNC: 162 MG/DL (ref 200–375)
WBC # BLD AUTO: 6.49 K/UL (ref 3.9–12.7)

## 2022-10-14 PROCEDURE — 84466 ASSAY OF TRANSFERRIN: CPT | Performed by: INTERNAL MEDICINE

## 2022-10-14 PROCEDURE — 83735 ASSAY OF MAGNESIUM: CPT | Performed by: INTERNAL MEDICINE

## 2022-10-14 PROCEDURE — 99213 PR OFFICE/OUTPT VISIT, EST, LEVL III, 20-29 MIN: ICD-10-PCS | Mod: S$PBB,,, | Performed by: INTERNAL MEDICINE

## 2022-10-14 PROCEDURE — 99999 PR PBB SHADOW E&M-EST. PATIENT-LVL III: ICD-10-PCS | Mod: PBBFAC,,, | Performed by: INTERNAL MEDICINE

## 2022-10-14 PROCEDURE — 36415 COLL VENOUS BLD VENIPUNCTURE: CPT | Performed by: INTERNAL MEDICINE

## 2022-10-14 PROCEDURE — 85025 COMPLETE CBC W/AUTO DIFF WBC: CPT | Performed by: INTERNAL MEDICINE

## 2022-10-14 PROCEDURE — 84100 ASSAY OF PHOSPHORUS: CPT | Performed by: INTERNAL MEDICINE

## 2022-10-14 PROCEDURE — 82728 ASSAY OF FERRITIN: CPT | Performed by: INTERNAL MEDICINE

## 2022-10-14 PROCEDURE — 99213 OFFICE O/P EST LOW 20 MIN: CPT | Mod: PBBFAC | Performed by: INTERNAL MEDICINE

## 2022-10-14 PROCEDURE — 99213 OFFICE O/P EST LOW 20 MIN: CPT | Mod: S$PBB,,, | Performed by: INTERNAL MEDICINE

## 2022-10-14 PROCEDURE — 80053 COMPREHEN METABOLIC PANEL: CPT | Performed by: INTERNAL MEDICINE

## 2022-10-14 PROCEDURE — 99999 PR PBB SHADOW E&M-EST. PATIENT-LVL III: CPT | Mod: PBBFAC,,, | Performed by: INTERNAL MEDICINE

## 2022-10-14 RX ORDER — DIAZEPAM 2 MG/1
2 TABLET ORAL NIGHTLY PRN
Status: ON HOLD | COMMUNITY
End: 2022-12-02 | Stop reason: SDUPTHER

## 2022-10-14 RX ORDER — PRAMIPEXOLE DIHYDROCHLORIDE 0.25 MG/1
0.25 TABLET ORAL 3 TIMES DAILY
COMMUNITY
End: 2022-11-07

## 2022-10-14 NOTE — TELEPHONE ENCOUNTER
----- Message from Briseida Bui RN sent at 10/14/2022  2:15 PM CDT -----  Regarding: appointment needed  Hi,  Dr Méndez is requesting a follow up appointment for this pt with Dr Lopez. Can you please reach out to her to help schedule.  Briseida Bui

## 2022-10-16 ENCOUNTER — TELEPHONE (OUTPATIENT)
Dept: INTERNAL MEDICINE | Facility: CLINIC | Age: 82
End: 2022-10-16
Payer: MEDICARE

## 2022-10-16 NOTE — TELEPHONE ENCOUNTER
Catrachita,   We need to get this pt in for radiofrequency lumbar ablation on the right side.   Dr negrete recommended. She has seen dr Hernandez for an epidural in the past but it does not have to be him. It can be any pain anesthesiologist     Pt and  nelson would like this asap

## 2022-10-16 NOTE — PROGRESS NOTES
Subjective:       Patient ID: Shelly Vásquez is a 81 y.o. female.    Chief Complaint: Follow-up and Spinal Stenosis    81-year-old very pleasant nonsmoking female here for follow-up for restless leg syndrome plus right lumbar radiculopathy.  She saw Neurology yesterday and he agreed with continuing gabapentin at 400 mg twice a day and then 800 mg before bedtime.  The patient when she went out of town did not bring her temazepam 15 mg so is more or less been off of benzodiazepines for few weeks.  She has taken as needed including last night Valium 2 mg which seemed to help with electricity like feeling in her lower extremities.  This starts as the day goes on and some days are worse than others.  She is for the past day or so has been back on her pramipexole at 0.25 mg 3 times a day as well and she feels like this may start to help.  Neurology recommended the penis easy a do a radiofrequency right-sided ablation.  We are reaching out to the pain anesthesiologist she saw in the past.    Follow-up  Associated symptoms include arthralgias. Pertinent negatives include no abdominal pain, chest pain, chills, congestion, coughing, diaphoresis, fatigue, fever, headaches, joint swelling, nausea, neck pain, numbness, rash, sore throat, vomiting or weakness.   Review of Systems   Constitutional:  Negative for activity change, appetite change, chills, diaphoresis, fatigue, fever and unexpected weight change.   HENT:  Negative for nasal congestion, ear pain, mouth sores, postnasal drip, sinus pressure/congestion, sore throat and trouble swallowing.    Eyes:  Negative for pain, redness and visual disturbance.   Respiratory:  Negative for apnea, cough, chest tightness, shortness of breath and wheezing.    Cardiovascular:  Negative for chest pain, palpitations and leg swelling.   Gastrointestinal:  Negative for abdominal distention, abdominal pain, blood in stool, constipation, diarrhea, nausea and vomiting.   Endocrine:  Negative for cold intolerance, polydipsia, polyphagia and polyuria.   Genitourinary:  Negative for difficulty urinating, dysuria, flank pain, frequency, hematuria, menstrual problem, pelvic pain and urgency.   Musculoskeletal:  Positive for arthralgias, back pain, gait problem and leg pain. Negative for joint swelling and neck pain.   Integumentary:  Negative for color change, rash and wound.   Neurological:  Negative for dizziness, tremors, seizures, syncope, weakness, light-headedness, numbness and headaches.   Hematological:  Negative for adenopathy. Does not bruise/bleed easily.   Psychiatric/Behavioral:  Positive for sleep disturbance. Negative for confusion, decreased concentration, dysphoric mood, hallucinations, self-injury and suicidal ideas. The patient is not nervous/anxious.        Objective:      Physical Exam  Constitutional:       Appearance: Normal appearance.   Cardiovascular:      Rate and Rhythm: Normal rate and regular rhythm.   Musculoskeletal:      Cervical back: Normal range of motion.   Neurological:      Mental Status: She is alert.   Psychiatric:         Mood and Affect: Mood normal.         Behavior: Behavior normal.         Thought Content: Thought content normal.         Judgment: Judgment normal.       Assessment/plan       Problem List Items Addressed This Visit    None  Visit Diagnoses       RLS (restless legs syndrome)    -  Primary    Relevant Orders    Magnesium    Phosphorus    Chronic right-sided lumbar radiculopathy        Refractory anemia with ring sideroblasts        Relevant Orders    CBC Auto Differential    FERRITIN (Completed)    IRON AND TIBC (Completed)    Comprehensive Metabolic Panel    Ambulatory referral/consult to Hematology / Oncology

## 2022-10-18 ENCOUNTER — PATIENT MESSAGE (OUTPATIENT)
Dept: SURGERY | Facility: OTHER | Age: 82
End: 2022-10-18
Payer: MEDICARE

## 2022-10-18 ENCOUNTER — PATIENT MESSAGE (OUTPATIENT)
Dept: HEMATOLOGY/ONCOLOGY | Facility: CLINIC | Age: 82
End: 2022-10-18
Payer: MEDICARE

## 2022-10-18 ENCOUNTER — TELEPHONE (OUTPATIENT)
Dept: OPHTHALMOLOGY | Facility: CLINIC | Age: 82
End: 2022-10-18
Payer: MEDICARE

## 2022-10-18 ENCOUNTER — OFFICE VISIT (OUTPATIENT)
Dept: PAIN MEDICINE | Facility: CLINIC | Age: 82
End: 2022-10-18
Payer: MEDICARE

## 2022-10-18 ENCOUNTER — OUTPATIENT CASE MANAGEMENT (OUTPATIENT)
Dept: ADMINISTRATIVE | Facility: OTHER | Age: 82
End: 2022-10-18
Payer: MEDICARE

## 2022-10-18 VITALS
SYSTOLIC BLOOD PRESSURE: 126 MMHG | DIASTOLIC BLOOD PRESSURE: 71 MMHG | WEIGHT: 154.13 LBS | HEIGHT: 68 IN | BODY MASS INDEX: 23.36 KG/M2 | HEART RATE: 81 BPM

## 2022-10-18 DIAGNOSIS — M47.816 LUMBAR SPONDYLOSIS: ICD-10-CM

## 2022-10-18 DIAGNOSIS — M54.16 LUMBAR RADICULOPATHY: Primary | ICD-10-CM

## 2022-10-18 DIAGNOSIS — M51.36 DDD (DEGENERATIVE DISC DISEASE), LUMBAR: ICD-10-CM

## 2022-10-18 DIAGNOSIS — Z74.09 IMPAIRED FUNCTIONAL MOBILITY, BALANCE, GAIT, AND ENDURANCE: ICD-10-CM

## 2022-10-18 DIAGNOSIS — M48.061 SPINAL STENOSIS OF LUMBAR REGION, UNSPECIFIED WHETHER NEUROGENIC CLAUDICATION PRESENT: ICD-10-CM

## 2022-10-18 DIAGNOSIS — Z98.890 S/P LUMBAR LAMINECTOMY: ICD-10-CM

## 2022-10-18 DIAGNOSIS — G89.4 CHRONIC PAIN DISORDER: ICD-10-CM

## 2022-10-18 PROCEDURE — 99214 PR OFFICE/OUTPT VISIT, EST, LEVL IV, 30-39 MIN: ICD-10-PCS | Mod: S$PBB,,, | Performed by: ANESTHESIOLOGY

## 2022-10-18 PROCEDURE — 99214 OFFICE O/P EST MOD 30 MIN: CPT | Mod: S$PBB,,, | Performed by: ANESTHESIOLOGY

## 2022-10-18 PROCEDURE — 99213 OFFICE O/P EST LOW 20 MIN: CPT | Mod: PBBFAC | Performed by: ANESTHESIOLOGY

## 2022-10-18 PROCEDURE — 99999 PR PBB SHADOW E&M-EST. PATIENT-LVL III: CPT | Mod: PBBFAC,,, | Performed by: ANESTHESIOLOGY

## 2022-10-18 PROCEDURE — 99999 PR PBB SHADOW E&M-EST. PATIENT-LVL III: ICD-10-PCS | Mod: PBBFAC,,, | Performed by: ANESTHESIOLOGY

## 2022-10-18 NOTE — PROGRESS NOTES
Outpatient Care Management  Plan of Care Follow Up Visit    Patient: Shelly Vásquez  MRN: [id number]  Date of Service: 10/18/2022  Completed by: Sunita Clayton RN  Referral Date: 07/31/2022    No chief complaint on file.      Brief Summary:       Next steps from previous encounter  Fu on elevated toilet seat install  Fu on any pain management recommendations from emg results  Review pain status    Interventions  Chart reviewed  Reviewed upcoming appt schedule    Assess/review short term goals met       Next steps  Fu on elevated toilet seat install  Fu on any pain management recommendations from emg results  Review pain status    Patient Summary     Involvement of Care:  Do I have permission to speak with other family members about your care?       Patient Reported Labs & Vitals:  1.  Any Patient Reported Labs & Vitals?     2.  Patient Reported Blood Pressure:     3.  Patient Reported Pulse:     4.  Patient Reported Weight (Kg):     5.  Patient Reported Blood Glucose (mg/dl):       Medical and social history was reviewed with patient and/or caregiver.     Clinical Assessment     Reviewed and provided basic information on available community resources for mental health, transportation, wellness resources, and palliative care programs with patient and/or caregiver.     Complex Care Plan     Care plan was discussed and completed today with input from patient and/or caregiver.    Patient Instructions     Instructions were provided via the [facility] patient resources and are available for the patient to view on the patient portal.        Todays OPCM Self-Management Care Plan was developed with the patients/caregivers input and was based on identified barriers from todays assessment.  Goals were written today with the patient/caregiver and the patient has agreed to work towards these goals to improve his/her overall well-being. Patient verbalized understanding of the care plan, goals, and all of today's instructions.  Encouraged patient/caregiver to communicate with his/her physician and health care team about health conditions and the treatment plan.  Provided my contact information today and encouraged patient/caregiver to call me with any questions as needed.

## 2022-10-18 NOTE — TELEPHONE ENCOUNTER
Patient given arrival time of 9:00 am on Thursday October 20. Nothing to eat or drink after 9 pm.  Water, Gatorade after 9 pm until leaves home.  Start drops into the operative eye today. [address]

## 2022-10-19 NOTE — H&P (VIEW-ONLY)
Chronic Pain - New Consult    Referring Physician: Marlene Méndez MD    Chief Complaint: Lower back and leg pain     SUBJECTIVE:    Shelly Vásquez presents to the clinic for the evaluation of chronic lower back and leg pain. The pain started several years ago and symptoms have been worsening.The pain is located in the lower back area and radiates to the right leg and foot.  The pain is described as aching, dull, and shooting and is rated as 8/10. The pain is rated with a score of  3/10 on the BEST day and a score of 10/10 on the WORST day.  Symptoms interfere with daily activity and sleeping. The pain is exacerbated by Standing and Walking.  The pain is mitigated by laying down. She has history of lumbar laminectomy several years ago and follow up with Neurology team. She reports history of RLS as well. She is S/P  right T12/L1 TFESI and LESI with temporary relief.     Patient denies night fever/night sweats, urinary incontinence, bowel incontinence, significant weight loss, significant motor weakness, and loss of sensations.    Physical Therapy/Home Exercise: yes      Pain Disability Index Review:  No flowsheet data found.    Pain Medications:  Gabapentin  Tylenol       report:  Not applicable    Pain Procedures:    08/25/2022: Lumbar Interlaminar Epidural Steroid Injection L5/S1 under Fluoroscopic Guidance  08/11/2022: Thoracic Transforaminal Epidural Steroid Injection Right  T12/L1 under Fluoroscopic Guidance    Imaging:     EXAMINATION:  MRI LUMBAR SPINE WITHOUT CONTRAST     CLINICAL HISTORY:  Lumbar radiculopathy, symptoms persist with conservative treatment; Radiculopathy, lumbar region     TECHNIQUE:  Multiplanar, multisequence MR images were acquired from the thoracolumbar junction to the sacrum without the administration of contrast.     COMPARISON:  MRI 01/24/2017.     FINDINGS:  Examination is degraded by patient motion artifact.     Postoperative change of L4-L5 laminectomies.  Lumbar spine  alignment demonstrates mild dextrocurvature, grade 1 retrolisthesis of L1 on L2 and grade 1 anterolisthesis of L4 on L5, similar when compared to the previous MRI.  No spondylolysis.  Vertebral body heights are well maintained without evidence for fracture.  Stable low signal intensity lesion within the right lateral aspect of the L4 vertebral body, likely a bone island.  Remaining bone marrow demonstrates heterogeneous signal, similar when compared to the previous MRI and likely related to benign reconversion.  No convincing MR imaging findings to suggest a marrow infiltrative process.  Decreased interspinous distances at L3-L4 and L4-L5 with associated chronic subcortical irregularity, findings that can be seen in the setting of Baastrup's disease.     There is multilevel degenerative disc space narrowing and desiccation most pronounced at L1-L2, L3-L4, L4-L5 and L5-S1, slightly progressed when compared to the previous exam.  Worsening chronic degenerative endplate changes and mild edema at L1-L2.  Stable chronic degenerative endplate changes at L4-L5 and L5-S1.     Distal spinal cord demonstrates normal contour and signal intensity.  Cauda equina is not well evaluated secondary to aforementioned motion artifact.  Conus medullaris terminates at L1.     Hepatic parenchyma demonstrates diffuse low T2 signal, incompletely evaluated on this exam.  There is a left renal cortical cyst.  Remaining visualized abdominal organs demonstrate no significant abnormalities.  There is fatty degeneration of the posterior-inferior paraspinal musculature.  SI joints are symmetric.     T12-L1: No spinal canal stenosis or neural foraminal narrowing.     L1-L2: Mild grade 1 retrolisthesis.  Circumferential disc bulge encroaches into the bilateral foraminal zones.  Bilateral facet arthropathy and bilateral ligamentum flavum buckling.  Findings contribute to mild left neural foraminal narrowing.  No spinal canal stenosis.     L2-L3:  Bilateral facet arthropathy and bilateral ligamentum flavum buckling.  No spinal canal stenosis or neural foraminal narrowing.     L3-L4: Circumferential disc bulge.  Bilateral facet arthropathy and bilateral ligamentum flavum buckling.  Findings contribute to mild-to-moderate spinal canal stenosis.  No neural foraminal narrowing.     L4-L5: Grade 1 anterolisthesis with uncovering of the intervertebral disc.  Bilateral facet arthropathy and bilateral ligamentum flavum buckling.  Findings contribute to moderate spinal canal stenosis and mild left neural foraminal narrowing.     L5-S1: Circumferential disc bulge encroaches into the bilateral foraminal zones and demonstrates a superimposed posterior broad-based disc protrusion that likely abuts the bilateral descending S1 nerve roots, similar when compared to the previous MRI.  Bilateral facet arthropathy.  Findings contribute to moderate to severe bilateral neural foraminal narrowing.     Impression:     1. Examination degraded by patient motion artifact.  2. Postoperative change of L4-L5 laminectomies.  3. Mild lumbar dextroscoliosis with superimposed degenerative changes contributing to multilevel mild-to-moderate spinal canal stenosis and mild to severe neural foraminal narrowing as detailed above.  Findings have slightly progressed when compared to MRI dated 01/24/2017.    Past Medical History:   Diagnosis Date    Arthritis     Atrial fibrillation     Basal cell cancer     on the face    Encounter for blood transfusion     Gastric ulcer     Herpes simplex without mention of complication     rare outbreaks    Postmenopausal HRT (hormone replacement therapy)     Supraventricular tachycardia     s/p AVNRT ablation (Dr Brady)     Past Surgical History:   Procedure Laterality Date    APPENDECTOMY  age 21    BACK SURGERY      Beast Lift  2004    BONE MARROW BIOPSY Right 12/19/2019    Procedure: Biopsy-bone marrow;  Surgeon: Aidan Spring MD;  Location: [facility] OR  2ND FLR;  Service: Oncology;  Laterality: Right;    BREAST BIOPSY  50yrs ago    unable to see scar    COSMETIC SURGERY      DILATION AND CURETTAGE OF UTERUS      PMB    ENDOMETRIAL BIOPSY      EPIDURAL STEROID INJECTION N/A 2022    Procedure: LUMBAR CELINA CONTRAST DIRECT REFERRAL;  Surgeon: Rj Hernandez MD;  Location: [facility] PAIN MGT;  Service: Pain Management;  Laterality: N/A;    EYE SURGERY      ptsosis      RADIOFREQUENCY ABLATION  2018    SMALL INTESTINE SURGERY      TONSILLECTOMY, ADENOIDECTOMY  age 8    TOTAL REDUCTION MAMMOPLASTY Bilateral     TRANSFORAMINAL EPIDURAL INJECTION OF STEROID Right 2022    Procedure: Injection,steroid,epidural Right L1/2 TF DIrect Referral Instructed to provide intervention per MRI results;  Surgeon: Rj Hernandez MD;  Location: [facility] PAIN MGT;  Service: Pain Management;  Laterality: Right;    TUBAL LIGATION       Social History     Socioeconomic History    Marital status:    Tobacco Use    Smoking status: Former     Packs/day: 1.00     Years: 22.00     Pack years: 22.00     Types: Cigarettes     Quit date: 1980     Years since quittin.2    Smokeless tobacco: Never   Substance and Sexual Activity    Alcohol use: Yes     Alcohol/week: 2.0 standard drinks     Types: 2 Glasses of wine per week     Comment: 1-2 glasses of wine daily    Drug use: No    Sexual activity: Not Currently     Partners: Male     Birth control/protection: Post-menopausal     Comment:  since      Family History   Problem Relation Age of Onset    No Known Problems Father     Cirrhosis Mother     No Known Problems Brother     No Known Problems Maternal Aunt     No Known Problems Maternal Uncle     No Known Problems Paternal Aunt     No Known Problems Paternal Uncle     No Known Problems Maternal Grandmother     No Known Problems Maternal Grandfather     No Known Problems Paternal Grandmother     No Known Problems Paternal Grandfather     No Known Problems  Daughter     Pulmonary embolism Son     Breast cancer Neg Hx     Colon cancer Neg Hx     Ovarian cancer Neg Hx     Amblyopia Neg Hx     Blindness Neg Hx     Cancer Neg Hx     Cataracts Neg Hx     Diabetes Neg Hx     Glaucoma Neg Hx     Hypertension Neg Hx     Macular degeneration Neg Hx     Retinal detachment Neg Hx     Strabismus Neg Hx     Stroke Neg Hx     Thyroid disease Neg Hx        Review of patient's allergies indicates:   Allergen Reactions    Nsaids (non-steroidal anti-inflammatory drug)      GI Bleed  Had 5 blood transfusions       Current Outpatient Medications   Medication Sig    acetaminophen (TYLENOL) 325 MG tablet Take 325 mg by mouth continuous prn for Pain.    aspirin (ECOTRIN) 81 MG EC tablet Take 1 tablet (81 mg total) by mouth once daily.    diazePAM (VALIUM) 2 MG tablet Take 2 mg by mouth nightly as needed.    estradiol-norethindrone acet 0.5-0.1 mg per tablet Take 1 tablet by mouth once daily.    gabapentin (NEURONTIN) 400 MG capsule Take 1 capsule (400 mg total) by mouth 4 (four) times daily.    pantoprazole (PROTONIX) 40 MG tablet Take 1 tablet (40 mg total) by mouth once daily.    pramipexole (MIRAPEX) 0.25 MG tablet Take 0.25 mg by mouth 3 (three) times daily.    verapamiL (VERELAN) 120 MG C24P Take 1 capsule (120 mg total) by mouth once daily.    vitamin D (VITAMIN D3) 1000 units Tab Take 1,000 Units by mouth once daily.     No current facility-administered medications for this visit.       REVIEW OF SYSTEMS:    GENERAL:  No weight loss, malaise or fevers.  HEENT:  Negative for frequent or significant headaches.  NECK:  Negative for lumps, goiter, pain and significant neck swelling.  RESPIRATORY:  Negative for cough, wheezing or shortness of breath.  CARDIOVASCULAR:  Negative for chest pain, leg swelling or palpitations.  GI:  Negative for abdominal discomfort, blood in stools or black stools or change in bowel habits.  MUSCULOSKELETAL:  See HPI.  SKIN:  Negative for lesions, rash, and  "itching.  PSYCH:  Negative for sleep disturbance, mood disorder and recent psychosocial stressors.  HEMATOLOGY/LYMPHOLOGY:  Negative for prolonged bleeding, bruising easily or swollen nodes.  NEURO:   No history of headaches, syncope, paralysis, seizures or tremors.  All other reviewed and negative other than HPI.    OBJECTIVE:    /71 (BP Location: Right arm, Patient Position: Sitting, BP Method: Large (Automatic))   Pulse 81   Ht 5' 8" (1.727 m)   Wt 69.9 kg (154 lb 1.6 oz)   LMP  (LMP Unknown)   BMI 23.43 kg/m²     PHYSICAL EXAMINATION:    General appearance: Well appearing, in no acute distress, alert and oriented x3.  Psych:  Mood and affect appropriate.  Skin: Skin color, texture, turgor normal, no rashes or lesions, in both upper and lower body.  Head/face:  Normocephalic, atraumatic. No palpable lymph nodes.  Neck: No pain to palpation over the cervical paraspinous muscles. Spurling Negative. No pain with neck flexion, extension, or lateral flexion.   Cor: RRR  Pulm: CTA  GI:  Soft and non-tender.  Back: Straight leg raising in the sitting and supine positions is negative to radicular pain. Tenderness ot palpation over bilateral lumbar paraspinal muscles. Positive facet loading. Extremities: Peripheral joint ROM is full and pain free without obvious instability or laxity in all four extremities. No deformities, edema, or skin discoloration. Good capillary refill.  Musculoskeletal: Shoulder, hip, sacroiliac and knee provocative maneuvers are negative. Bilateral upper and lower extremity strength is normal and symmetric.  No atrophy or tone abnormalities are noted.  Neuro: Bilateral upper and lower extremity coordination and muscle stretch reflexes are physiologic and symmetric.  Plantar response are downgoing. No loss of sensation is noted.  Gait: [place].    ASSESSMENT: 81 y.o. year old female with chronic lower back and leg  pain, consistent with      1. Lumbar radiculopathy  Procedure Order to " Pain Management      2. S/P lumbar laminectomy        3. Spinal stenosis of lumbar region, unspecified whether neurogenic claudication present        4. Impaired functional mobility, balance, gait, and endurance        5. Chronic pain disorder        6. DDD (degenerative disc disease), lumbar        7. Lumbar spondylosis              PLAN:     - I have stressed the importance of physical activity and a home exercise plan to help with pain and improve health.  - Schedule for right L5/S1 ans S1 TFESI.  - We will consider bilateral L2, L3, L4 and L5 MBB and possible RFA in the future.   - RTC in 2 weeks after the procedure.   - Counseled patient regarding the importance of activity modification and physical therapy.    The above plan and management options were discussed at length with patient. Patient is in agreement with the above and verbalized understanding. It will be communicated with the referring physician via electronic record, fax, or mail.    Rj Hernandez  10/19/2022

## 2022-10-19 NOTE — H&P (VIEW-ONLY)
Chronic Pain - New Consult    Referring Physician: Marlene Méndez MD    Chief Complaint: Lower back and leg pain     SUBJECTIVE:    Shelly Vásquez presents to the clinic for the evaluation of chronic lower back and leg pain. The pain started several years ago and symptoms have been worsening.The pain is located in the lower back area and radiates to the right leg and foot.  The pain is described as aching, dull, and shooting and is rated as 8/10. The pain is rated with a score of  3/10 on the BEST day and a score of 10/10 on the WORST day.  Symptoms interfere with daily activity and sleeping. The pain is exacerbated by Standing and Walking.  The pain is mitigated by laying down. She has history of lumbar laminectomy several years ago and follow up with Neurology team. She reports history of RLS as well. She is S/P  right T12/L1 TFESI and LESI with temporary relief.     Patient denies night fever/night sweats, urinary incontinence, bowel incontinence, significant weight loss, significant motor weakness, and loss of sensations.    Physical Therapy/Home Exercise: yes      Pain Disability Index Review:  No flowsheet data found.    Pain Medications:  Gabapentin  Tylenol       report:  Not applicable    Pain Procedures:    08/25/2022: Lumbar Interlaminar Epidural Steroid Injection L5/S1 under Fluoroscopic Guidance  08/11/2022: Thoracic Transforaminal Epidural Steroid Injection Right  T12/L1 under Fluoroscopic Guidance    Imaging:     EXAMINATION:  MRI LUMBAR SPINE WITHOUT CONTRAST     CLINICAL HISTORY:  Lumbar radiculopathy, symptoms persist with conservative treatment; Radiculopathy, lumbar region     TECHNIQUE:  Multiplanar, multisequence MR images were acquired from the thoracolumbar junction to the sacrum without the administration of contrast.     COMPARISON:  MRI 01/24/2017.     FINDINGS:  Examination is degraded by patient motion artifact.     Postoperative change of L4-L5 laminectomies.  Lumbar spine  alignment demonstrates mild dextrocurvature, grade 1 retrolisthesis of L1 on L2 and grade 1 anterolisthesis of L4 on L5, similar when compared to the previous MRI.  No spondylolysis.  Vertebral body heights are well maintained without evidence for fracture.  Stable low signal intensity lesion within the right lateral aspect of the L4 vertebral body, likely a bone island.  Remaining bone marrow demonstrates heterogeneous signal, similar when compared to the previous MRI and likely related to benign reconversion.  No convincing MR imaging findings to suggest a marrow infiltrative process.  Decreased interspinous distances at L3-L4 and L4-L5 with associated chronic subcortical irregularity, findings that can be seen in the setting of Baastrup's disease.     There is multilevel degenerative disc space narrowing and desiccation most pronounced at L1-L2, L3-L4, L4-L5 and L5-S1, slightly progressed when compared to the previous exam.  Worsening chronic degenerative endplate changes and mild edema at L1-L2.  Stable chronic degenerative endplate changes at L4-L5 and L5-S1.     Distal spinal cord demonstrates normal contour and signal intensity.  Cauda equina is not well evaluated secondary to aforementioned motion artifact.  Conus medullaris terminates at L1.     Hepatic parenchyma demonstrates diffuse low T2 signal, incompletely evaluated on this exam.  There is a left renal cortical cyst.  Remaining visualized abdominal organs demonstrate no significant abnormalities.  There is fatty degeneration of the posterior-inferior paraspinal musculature.  SI joints are symmetric.     T12-L1: No spinal canal stenosis or neural foraminal narrowing.     L1-L2: Mild grade 1 retrolisthesis.  Circumferential disc bulge encroaches into the bilateral foraminal zones.  Bilateral facet arthropathy and bilateral ligamentum flavum buckling.  Findings contribute to mild left neural foraminal narrowing.  No spinal canal stenosis.     L2-L3:  Bilateral facet arthropathy and bilateral ligamentum flavum buckling.  No spinal canal stenosis or neural foraminal narrowing.     L3-L4: Circumferential disc bulge.  Bilateral facet arthropathy and bilateral ligamentum flavum buckling.  Findings contribute to mild-to-moderate spinal canal stenosis.  No neural foraminal narrowing.     L4-L5: Grade 1 anterolisthesis with uncovering of the intervertebral disc.  Bilateral facet arthropathy and bilateral ligamentum flavum buckling.  Findings contribute to moderate spinal canal stenosis and mild left neural foraminal narrowing.     L5-S1: Circumferential disc bulge encroaches into the bilateral foraminal zones and demonstrates a superimposed posterior broad-based disc protrusion that likely abuts the bilateral descending S1 nerve roots, similar when compared to the previous MRI.  Bilateral facet arthropathy.  Findings contribute to moderate to severe bilateral neural foraminal narrowing.     Impression:     1. Examination degraded by patient motion artifact.  2. Postoperative change of L4-L5 laminectomies.  3. Mild lumbar dextroscoliosis with superimposed degenerative changes contributing to multilevel mild-to-moderate spinal canal stenosis and mild to severe neural foraminal narrowing as detailed above.  Findings have slightly progressed when compared to MRI dated 01/24/2017.    Past Medical History:   Diagnosis Date    Arthritis     Atrial fibrillation     Basal cell cancer     on the face    Encounter for blood transfusion     Gastric ulcer     Herpes simplex without mention of complication     rare outbreaks    Postmenopausal HRT (hormone replacement therapy)     Supraventricular tachycardia     s/p AVNRT ablation (Dr Brady)     Past Surgical History:   Procedure Laterality Date    APPENDECTOMY  age 21    BACK SURGERY      Beast Lift  2004    BONE MARROW BIOPSY Right 12/19/2019    Procedure: Biopsy-bone marrow;  Surgeon: Aidan Spring MD;  Location: [facility] OR  2ND FLR;  Service: Oncology;  Laterality: Right;    BREAST BIOPSY  50yrs ago    unable to see scar    COSMETIC SURGERY      DILATION AND CURETTAGE OF UTERUS      PMB    ENDOMETRIAL BIOPSY      EPIDURAL STEROID INJECTION N/A 2022    Procedure: LUMBAR CELINA CONTRAST DIRECT REFERRAL;  Surgeon: Rj Hernandez MD;  Location: [facility], [place], operated by [facility];  Service: Pain Management;  Laterality: N/A;    EYE SURGERY      ptsosis      RADIOFREQUENCY ABLATION  2018    SMALL INTESTINE SURGERY      TONSILLECTOMY, ADENOIDECTOMY  age 8    TOTAL REDUCTION MAMMOPLASTY Bilateral     TRANSFORAMINAL EPIDURAL INJECTION OF STEROID Right 2022    Procedure: Injection,steroid,epidural Right L1/2 TF DIrect Referral Instructed to provide intervention per MRI results;  Surgeon: Rj Hernandez MD;  Location: [facility], [place], operated by [facility];  Service: Pain Management;  Laterality: Right;    TUBAL LIGATION       Social History     Socioeconomic History    Marital status:    Tobacco Use    Smoking status: Former     Packs/day: 1.00     Years: 22.00     Pack years: 22.00     Types: Cigarettes     Quit date: 1980     Years since quittin.2    Smokeless tobacco: Never   Substance and Sexual Activity    Alcohol use: Yes     Alcohol/week: 2.0 standard drinks     Types: 2 Glasses of wine per week     Comment: 1-2 glasses of wine daily    Drug use: No    Sexual activity: Not Currently     Partners: Male     Birth control/protection: Post-menopausal     Comment:  since      Family History   Problem Relation Age of Onset    No Known Problems Father     Cirrhosis Mother     No Known Problems Brother     No Known Problems Maternal Aunt     No Known Problems Maternal Uncle     No Known Problems Paternal Aunt     No Known Problems Paternal Uncle     No Known Problems Maternal Grandmother     No Known Problems Maternal Grandfather     No Known Problems Paternal Grandmother     No Known Problems Paternal Grandfather     No Known Problems  Daughter     Pulmonary embolism Son     Breast cancer Neg Hx     Colon cancer Neg Hx     Ovarian cancer Neg Hx     Amblyopia Neg Hx     Blindness Neg Hx     Cancer Neg Hx     Cataracts Neg Hx     Diabetes Neg Hx     Glaucoma Neg Hx     Hypertension Neg Hx     Macular degeneration Neg Hx     Retinal detachment Neg Hx     Strabismus Neg Hx     Stroke Neg Hx     Thyroid disease Neg Hx        Review of patient's allergies indicates:   Allergen Reactions    Nsaids (non-steroidal anti-inflammatory drug)      GI Bleed  Had 5 blood transfusions       Current Outpatient Medications   Medication Sig    acetaminophen (TYLENOL) 325 MG tablet Take 325 mg by mouth continuous prn for Pain.    aspirin (ECOTRIN) 81 MG EC tablet Take 1 tablet (81 mg total) by mouth once daily.    diazePAM (VALIUM) 2 MG tablet Take 2 mg by mouth nightly as needed.    estradiol-norethindrone acet 0.5-0.1 mg per tablet Take 1 tablet by mouth once daily.    gabapentin (NEURONTIN) 400 MG capsule Take 1 capsule (400 mg total) by mouth 4 (four) times daily.    pantoprazole (PROTONIX) 40 MG tablet Take 1 tablet (40 mg total) by mouth once daily.    pramipexole (MIRAPEX) 0.25 MG tablet Take 0.25 mg by mouth 3 (three) times daily.    verapamiL (VERELAN) 120 MG C24P Take 1 capsule (120 mg total) by mouth once daily.    vitamin D (VITAMIN D3) 1000 units Tab Take 1,000 Units by mouth once daily.     No current facility-administered medications for this visit.       REVIEW OF SYSTEMS:    GENERAL:  No weight loss, malaise or fevers.  HEENT:  Negative for frequent or significant headaches.  NECK:  Negative for lumps, goiter, pain and significant neck swelling.  RESPIRATORY:  Negative for cough, wheezing or shortness of breath.  CARDIOVASCULAR:  Negative for chest pain, leg swelling or palpitations.  GI:  Negative for abdominal discomfort, blood in stools or black stools or change in bowel habits.  MUSCULOSKELETAL:  See HPI.  SKIN:  Negative for lesions, rash, and  "itching.  PSYCH:  Negative for sleep disturbance, mood disorder and recent psychosocial stressors.  HEMATOLOGY/LYMPHOLOGY:  Negative for prolonged bleeding, bruising easily or swollen nodes.  NEURO:   No history of headaches, syncope, paralysis, seizures or tremors.  All other reviewed and negative other than HPI.    OBJECTIVE:    /71 (BP Location: Right arm, Patient Position: Sitting, BP Method: Large (Automatic))   Pulse 81   Ht 5' 8" (1.727 m)   Wt 69.9 kg (154 lb 1.6 oz)   LMP  (LMP Unknown)   BMI 23.43 kg/m²     PHYSICAL EXAMINATION:    General appearance: Well appearing, in no acute distress, alert and oriented x3.  Psych:  Mood and affect appropriate.  Skin: Skin color, texture, turgor normal, no rashes or lesions, in both upper and lower body.  Head/face:  Normocephalic, atraumatic. No palpable lymph nodes.  Neck: No pain to palpation over the cervical paraspinous muscles. Spurling Negative. No pain with neck flexion, extension, or lateral flexion.   Cor: RRR  Pulm: CTA  GI:  Soft and non-tender.  Back: Straight leg raising in the sitting and supine positions is negative to radicular pain. Tenderness ot palpation over bilateral lumbar paraspinal muscles. Positive facet loading. Extremities: Peripheral joint ROM is full and pain free without obvious instability or laxity in all four extremities. No deformities, edema, or skin discoloration. Good capillary refill.  Musculoskeletal: Shoulder, hip, sacroiliac and knee provocative maneuvers are negative. Bilateral upper and lower extremity strength is normal and symmetric.  No atrophy or tone abnormalities are noted.  Neuro: Bilateral upper and lower extremity coordination and muscle stretch reflexes are physiologic and symmetric.  Plantar response are downgoing. No loss of sensation is noted.  Gait: [place]alig.    ASSESSMENT: 81 y.o. year old female with chronic lower back and leg  pain, consistent with      1. Lumbar radiculopathy  Procedure Order to " Pain Management      2. S/P lumbar laminectomy        3. Spinal stenosis of lumbar region, unspecified whether neurogenic claudication present        4. Impaired functional mobility, balance, gait, and endurance        5. Chronic pain disorder        6. DDD (degenerative disc disease), lumbar        7. Lumbar spondylosis              PLAN:     - I have stressed the importance of physical activity and a home exercise plan to help with pain and improve health.  - Schedule for right L5/S1 ans S1 TFESI.  - We will consider bilateral L2, L3, L4 and L5 MBB and possible RFA in the future.   - RTC in 2 weeks after the procedure.   - Counseled patient regarding the importance of activity modification and physical therapy.    The above plan and management options were discussed at length with patient. Patient is in agreement with the above and verbalized understanding. It will be communicated with the referring physician via electronic record, fax, or mail.    Rj Hernandez  10/19/2022

## 2022-10-19 NOTE — PROGRESS NOTES
Chronic Pain - New Consult    Referring Physician: Marlene Méndez MD    Chief Complaint: Lower back and leg pain     SUBJECTIVE:    Shelly Vásquez presents to the clinic for the evaluation of chronic lower back and leg pain. The pain started several years ago and symptoms have been worsening.The pain is located in the lower back area and radiates to the right leg and foot.  The pain is described as aching, dull, and shooting and is rated as 8/10. The pain is rated with a score of  3/10 on the BEST day and a score of 10/10 on the WORST day.  Symptoms interfere with daily activity and sleeping. The pain is exacerbated by Standing and Walking.  The pain is mitigated by laying down. She has history of lumbar laminectomy several years ago and follow up with Neurology team. She reports history of RLS as well. She is S/P  right T12/L1 TFESI and LESI with temporary relief.     Patient denies night fever/night sweats, urinary incontinence, bowel incontinence, significant weight loss, significant motor weakness, and loss of sensations.    Physical Therapy/Home Exercise: yes      Pain Disability Index Review:  No flowsheet data found.    Pain Medications:  Gabapentin  Tylenol       report:  Not applicable    Pain Procedures:    08/25/2022: Lumbar Interlaminar Epidural Steroid Injection L5/S1 under Fluoroscopic Guidance  08/11/2022: Thoracic Transforaminal Epidural Steroid Injection Right  T12/L1 under Fluoroscopic Guidance    Imaging:     EXAMINATION:  MRI LUMBAR SPINE WITHOUT CONTRAST     CLINICAL HISTORY:  Lumbar radiculopathy, symptoms persist with conservative treatment; Radiculopathy, lumbar region     TECHNIQUE:  Multiplanar, multisequence MR images were acquired from the thoracolumbar junction to the sacrum without the administration of contrast.     COMPARISON:  MRI 01/24/2017.     FINDINGS:  Examination is degraded by patient motion artifact.     Postoperative change of L4-L5 laminectomies.  Lumbar spine  alignment demonstrates mild dextrocurvature, grade 1 retrolisthesis of L1 on L2 and grade 1 anterolisthesis of L4 on L5, similar when compared to the previous MRI.  No spondylolysis.  Vertebral body heights are well maintained without evidence for fracture.  Stable low signal intensity lesion within the right lateral aspect of the L4 vertebral body, likely a bone island.  Remaining bone marrow demonstrates heterogeneous signal, similar when compared to the previous MRI and likely related to benign reconversion.  No convincing MR imaging findings to suggest a marrow infiltrative process.  Decreased interspinous distances at L3-L4 and L4-L5 with associated chronic subcortical irregularity, findings that can be seen in the setting of Baastrup's disease.     There is multilevel degenerative disc space narrowing and desiccation most pronounced at L1-L2, L3-L4, L4-L5 and L5-S1, slightly progressed when compared to the previous exam.  Worsening chronic degenerative endplate changes and mild edema at L1-L2.  Stable chronic degenerative endplate changes at L4-L5 and L5-S1.     Distal spinal cord demonstrates normal contour and signal intensity.  Cauda equina is not well evaluated secondary to aforementioned motion artifact.  Conus medullaris terminates at L1.     Hepatic parenchyma demonstrates diffuse low T2 signal, incompletely evaluated on this exam.  There is a left renal cortical cyst.  Remaining visualized abdominal organs demonstrate no significant abnormalities.  There is fatty degeneration of the posterior-inferior paraspinal musculature.  SI joints are symmetric.     T12-L1: No spinal canal stenosis or neural foraminal narrowing.     L1-L2: Mild grade 1 retrolisthesis.  Circumferential disc bulge encroaches into the bilateral foraminal zones.  Bilateral facet arthropathy and bilateral ligamentum flavum buckling.  Findings contribute to mild left neural foraminal narrowing.  No spinal canal stenosis.     L2-L3:  Bilateral facet arthropathy and bilateral ligamentum flavum buckling.  No spinal canal stenosis or neural foraminal narrowing.     L3-L4: Circumferential disc bulge.  Bilateral facet arthropathy and bilateral ligamentum flavum buckling.  Findings contribute to mild-to-moderate spinal canal stenosis.  No neural foraminal narrowing.     L4-L5: Grade 1 anterolisthesis with uncovering of the intervertebral disc.  Bilateral facet arthropathy and bilateral ligamentum flavum buckling.  Findings contribute to moderate spinal canal stenosis and mild left neural foraminal narrowing.     L5-S1: Circumferential disc bulge encroaches into the bilateral foraminal zones and demonstrates a superimposed posterior broad-based disc protrusion that likely abuts the bilateral descending S1 nerve roots, similar when compared to the previous MRI.  Bilateral facet arthropathy.  Findings contribute to moderate to severe bilateral neural foraminal narrowing.     Impression:     1. Examination degraded by patient motion artifact.  2. Postoperative change of L4-L5 laminectomies.  3. Mild lumbar dextroscoliosis with superimposed degenerative changes contributing to multilevel mild-to-moderate spinal canal stenosis and mild to severe neural foraminal narrowing as detailed above.  Findings have slightly progressed when compared to MRI dated 01/24/2017.    Past Medical History:   Diagnosis Date    Arthritis     Atrial fibrillation     Basal cell cancer     on the face    Encounter for blood transfusion     Gastric ulcer     Herpes simplex without mention of complication     rare outbreaks    Postmenopausal HRT (hormone replacement therapy)     Supraventricular tachycardia     s/p AVNRT ablation (Dr Brady)     Past Surgical History:   Procedure Laterality Date    APPENDECTOMY  age 21    BACK SURGERY      Beast Lift  2004    BONE MARROW BIOPSY Right 12/19/2019    Procedure: Biopsy-bone marrow;  Surgeon: Aidan Spring MD;  Location: [facility] OR  2ND FLR;  Service: Oncology;  Laterality: Right;    BREAST BIOPSY  50yrs ago    unable to see scar    COSMETIC SURGERY      DILATION AND CURETTAGE OF UTERUS      PMB    ENDOMETRIAL BIOPSY      EPIDURAL STEROID INJECTION N/A 2022    Procedure: LUMBAR CELINA CONTRAST DIRECT REFERRAL;  Surgeon: Rj Hernandez MD;  Location: [facility] PAIN MGT;  Service: Pain Management;  Laterality: N/A;    EYE SURGERY      ptsosis      RADIOFREQUENCY ABLATION  2018    SMALL INTESTINE SURGERY      TONSILLECTOMY, ADENOIDECTOMY  age 8    TOTAL REDUCTION MAMMOPLASTY Bilateral     TRANSFORAMINAL EPIDURAL INJECTION OF STEROID Right 2022    Procedure: Injection,steroid,epidural Right L1/2 TF DIrect Referral Instructed to provide intervention per MRI results;  Surgeon: Rj Hernandez MD;  Location: [facility] PAIN MGT;  Service: Pain Management;  Laterality: Right;    TUBAL LIGATION       Social History     Socioeconomic History    Marital status:    Tobacco Use    Smoking status: Former     Packs/day: 1.00     Years: 22.00     Pack years: 22.00     Types: Cigarettes     Quit date: 1980     Years since quittin.2    Smokeless tobacco: Never   Substance and Sexual Activity    Alcohol use: Yes     Alcohol/week: 2.0 standard drinks     Types: 2 Glasses of wine per week     Comment: 1-2 glasses of wine daily    Drug use: No    Sexual activity: Not Currently     Partners: Male     Birth control/protection: Post-menopausal     Comment:  since      Family History   Problem Relation Age of Onset    No Known Problems Father     Cirrhosis Mother     No Known Problems Brother     No Known Problems Maternal Aunt     No Known Problems Maternal Uncle     No Known Problems Paternal Aunt     No Known Problems Paternal Uncle     No Known Problems Maternal Grandmother     No Known Problems Maternal Grandfather     No Known Problems Paternal Grandmother     No Known Problems Paternal Grandfather     No Known Problems  Daughter     Pulmonary embolism Son     Breast cancer Neg Hx     Colon cancer Neg Hx     Ovarian cancer Neg Hx     Amblyopia Neg Hx     Blindness Neg Hx     Cancer Neg Hx     Cataracts Neg Hx     Diabetes Neg Hx     Glaucoma Neg Hx     Hypertension Neg Hx     Macular degeneration Neg Hx     Retinal detachment Neg Hx     Strabismus Neg Hx     Stroke Neg Hx     Thyroid disease Neg Hx        Review of patient's allergies indicates:   Allergen Reactions    Nsaids (non-steroidal anti-inflammatory drug)      GI Bleed  Had 5 blood transfusions       Current Outpatient Medications   Medication Sig    acetaminophen (TYLENOL) 325 MG tablet Take 325 mg by mouth continuous prn for Pain.    aspirin (ECOTRIN) 81 MG EC tablet Take 1 tablet (81 mg total) by mouth once daily.    diazePAM (VALIUM) 2 MG tablet Take 2 mg by mouth nightly as needed.    estradiol-norethindrone acet 0.5-0.1 mg per tablet Take 1 tablet by mouth once daily.    gabapentin (NEURONTIN) 400 MG capsule Take 1 capsule (400 mg total) by mouth 4 (four) times daily.    pantoprazole (PROTONIX) 40 MG tablet Take 1 tablet (40 mg total) by mouth once daily.    pramipexole (MIRAPEX) 0.25 MG tablet Take 0.25 mg by mouth 3 (three) times daily.    verapamiL (VERELAN) 120 MG C24P Take 1 capsule (120 mg total) by mouth once daily.    vitamin D (VITAMIN D3) 1000 units Tab Take 1,000 Units by mouth once daily.     No current facility-administered medications for this visit.       REVIEW OF SYSTEMS:    GENERAL:  No weight loss, malaise or fevers.  HEENT:  Negative for frequent or significant headaches.  NECK:  Negative for lumps, goiter, pain and significant neck swelling.  RESPIRATORY:  Negative for cough, wheezing or shortness of breath.  CARDIOVASCULAR:  Negative for chest pain, leg swelling or palpitations.  GI:  Negative for abdominal discomfort, blood in stools or black stools or change in bowel habits.  MUSCULOSKELETAL:  See HPI.  SKIN:  Negative for lesions, rash, and  "itching.  PSYCH:  Negative for sleep disturbance, mood disorder and recent psychosocial stressors.  HEMATOLOGY/LYMPHOLOGY:  Negative for prolonged bleeding, bruising easily or swollen nodes.  NEURO:   No history of headaches, syncope, paralysis, seizures or tremors.  All other reviewed and negative other than HPI.    OBJECTIVE:    /71 (BP Location: Right arm, Patient Position: Sitting, BP Method: Large (Automatic))   Pulse 81   Ht 5' 8" (1.727 m)   Wt 69.9 kg (154 lb 1.6 oz)   LMP  (LMP Unknown)   BMI 23.43 kg/m²     PHYSICAL EXAMINATION:    General appearance: Well appearing, in no acute distress, alert and oriented x3.  Psych:  Mood and affect appropriate.  Skin: Skin color, texture, turgor normal, no rashes or lesions, in both upper and lower body.  Head/face:  Normocephalic, atraumatic. No palpable lymph nodes.  Neck: No pain to palpation over the cervical paraspinous muscles. Spurling Negative. No pain with neck flexion, extension, or lateral flexion.   Cor: RRR  Pulm: CTA  GI:  Soft and non-tender.  Back: Straight leg raising in the sitting and supine positions is negative to radicular pain. Tenderness ot palpation over bilateral lumbar paraspinal muscles. Positive facet loading. Extremities: Peripheral joint ROM is full and pain free without obvious instability or laxity in all four extremities. No deformities, edema, or skin discoloration. Good capillary refill.  Musculoskeletal: Shoulder, hip, sacroiliac and knee provocative maneuvers are negative. Bilateral upper and lower extremity strength is normal and symmetric.  No atrophy or tone abnormalities are noted.  Neuro: Bilateral upper and lower extremity coordination and muscle stretch reflexes are physiologic and symmetric.  Plantar response are downgoing. No loss of sensation is noted.  Gait: Antalig.    ASSESSMENT: 81 y.o. year old female with chronic lower back and leg  pain, consistent with      1. Lumbar radiculopathy  Procedure Order to " Pain Management      2. S/P lumbar laminectomy        3. Spinal stenosis of lumbar region, unspecified whether neurogenic claudication present        4. Impaired functional mobility, balance, gait, and endurance        5. Chronic pain disorder        6. DDD (degenerative disc disease), lumbar        7. Lumbar spondylosis              PLAN:     - I have stressed the importance of physical activity and a home exercise plan to help with pain and improve health.  - Schedule for right L5/S1 ans S1 TFESI.  - We will consider bilateral L2, L3, L4 and L5 MBB and possible RFA in the future.   - RTC in 2 weeks after the procedure.   - Counseled patient regarding the importance of activity modification and physical therapy.    The above plan and management options were discussed at length with patient. Patient is in agreement with the above and verbalized understanding. It will be communicated with the referring physician via electronic record, fax, or mail.    Rj Hernandez  10/19/2022

## 2022-10-20 ENCOUNTER — PATIENT MESSAGE (OUTPATIENT)
Dept: PAIN MEDICINE | Facility: OTHER | Age: 82
End: 2022-10-20
Payer: MEDICARE

## 2022-10-21 ENCOUNTER — HOSPITAL ENCOUNTER (OUTPATIENT)
Facility: OTHER | Age: 82
Discharge: HOME | End: 2022-10-21
Attending: ANESTHESIOLOGY | Admitting: ANESTHESIOLOGY
Payer: MEDICARE

## 2022-10-21 VITALS
DIASTOLIC BLOOD PRESSURE: 60 MMHG | HEART RATE: 58 BPM | BODY MASS INDEX: 22.73 KG/M2 | RESPIRATION RATE: 16 BRPM | TEMPERATURE: 98 F | WEIGHT: 150 LBS | OXYGEN SATURATION: 100 % | SYSTOLIC BLOOD PRESSURE: 131 MMHG | HEIGHT: 68 IN

## 2022-10-21 DIAGNOSIS — M51.36 DDD (DEGENERATIVE DISC DISEASE), LUMBAR: Primary | ICD-10-CM

## 2022-10-21 DIAGNOSIS — G89.29 CHRONIC PAIN: ICD-10-CM

## 2022-10-21 DIAGNOSIS — M54.17 LUMBOSACRAL RADICULOPATHY: ICD-10-CM

## 2022-10-21 PROCEDURE — 64483 NJX AA&/STRD TFRM EPI L/S 1: CPT | Mod: RT | Performed by: ANESTHESIOLOGY

## 2022-10-21 PROCEDURE — 25500020 PHARM REV CODE 255: Performed by: ANESTHESIOLOGY

## 2022-10-21 PROCEDURE — 64483 NJX AA&/STRD TFRM EPI L/S 1: CPT | Mod: RT,,, | Performed by: ANESTHESIOLOGY

## 2022-10-21 PROCEDURE — 64483 PR EPIDURAL INJ, ANES/STEROID, TRANSFORAMINAL, LUMB/SACR, SNGL LEVL: ICD-10-PCS | Mod: RT,,, | Performed by: ANESTHESIOLOGY

## 2022-10-21 PROCEDURE — 63600175 PHARM REV CODE 636 W HCPCS: Performed by: ANESTHESIOLOGY

## 2022-10-21 PROCEDURE — 25000003 PHARM REV CODE 250: Performed by: ANESTHESIOLOGY

## 2022-10-21 PROCEDURE — 64484 PRA INJECT ANES/STEROID FORAMEN LUMBAR/SACRAL W IMG GUIDE ,EA ADD LEVEL: ICD-10-PCS | Mod: RT,,, | Performed by: ANESTHESIOLOGY

## 2022-10-21 PROCEDURE — 64484 NJX AA&/STRD TFRM EPI L/S EA: CPT | Mod: RT | Performed by: ANESTHESIOLOGY

## 2022-10-21 PROCEDURE — 64484 NJX AA&/STRD TFRM EPI L/S EA: CPT | Mod: RT,,, | Performed by: ANESTHESIOLOGY

## 2022-10-21 RX ORDER — DEXAMETHASONE SODIUM PHOSPHATE 10 MG/ML
INJECTION INTRAMUSCULAR; INTRAVENOUS
Status: DISCONTINUED | OUTPATIENT
Start: 2022-10-21 | End: 2022-10-21 | Stop reason: HOSPADM

## 2022-10-21 RX ORDER — LIDOCAINE HYDROCHLORIDE 10 MG/ML
INJECTION, SOLUTION EPIDURAL; INFILTRATION; INTRACAUDAL; PERINEURAL
Status: DISCONTINUED | OUTPATIENT
Start: 2022-10-21 | End: 2022-10-21 | Stop reason: HOSPADM

## 2022-10-21 RX ORDER — SODIUM CHLORIDE 9 MG/ML
500 INJECTION, SOLUTION INTRAVENOUS CONTINUOUS
Status: DISCONTINUED | OUTPATIENT
Start: 2022-10-21 | End: 2022-10-21 | Stop reason: HOSPADM

## 2022-10-21 RX ORDER — ALPRAZOLAM 0.5 MG/1
0.5 TABLET ORAL ONCE
Status: COMPLETED | OUTPATIENT
Start: 2022-10-21 | End: 2022-10-21

## 2022-10-21 RX ORDER — LIDOCAINE HYDROCHLORIDE 20 MG/ML
INJECTION, SOLUTION INFILTRATION; PERINEURAL
Status: DISCONTINUED | OUTPATIENT
Start: 2022-10-21 | End: 2022-10-21 | Stop reason: HOSPADM

## 2022-10-21 RX ADMIN — ALPRAZOLAM 0.5 MG: 0.5 TABLET ORAL at 11:10

## 2022-10-21 NOTE — DISCHARGE INSTRUCTIONS

## 2022-10-21 NOTE — OP NOTE
Lumbar Transforaminal Epidural Steroid Injection under Fluoroscopic Guidance    The procedure, risks, benefits, and options were discussed with the patient. There are no contraindications to the procedure. The patent expressed understanding and agreed to the procedure. Informed written consent was obtained prior to the start of the procedure and can be found in the patient's chart.    PATIENT NAME: Shelly Vásquez   MRN: [id number]     DATE OF PROCEDURE: 10/21/2022    PROCEDURE:  Right  L5/S1 and S1 Lumbar Transforaminal Epidural Steroid Injection under Fluoroscopic Guidance    PRE-OP DIAGNOSIS: Lumbar radiculopathy [M54.16] Lumbar radiculopathy [M54.16]    POST-OP DIAGNOSIS: Same    PHYSICIAN: Rj Hernandez MD    ASSISTANTS: Dr. Gruber     MEDICATIONS INJECTED: Preservative-free Decadron 10mg with 5cc of Lidocaine 1% MPF     LOCAL ANESTHETIC INJECTED: Xylocaine 2%     SEDATION: None    ESTIMATED BLOOD LOSS: None    COMPLICATIONS: None    TECHNIQUE: Time-out was performed to identify the patient and procedure to be performed. With the patient laying in a prone position, the surgical area was prepped and draped in the usual sterile fashion using ChloraPrep and a fenestrated drape.The levels were determined under fluoroscopy guidance. Skin anesthesia was achieved by injecting Lidocaine 2% over the injection sites. The transforaminal spaces were then approached with a 22 gauge, 5 inch spinal quinke needle for L5/S1 and 25 G 3.5 inch for S1 that was introduced under fluoroscopic guidance in the AP and Lateral views. Once the needle tip was in the area of the transforaminal space, and there was no blood, CSF or paraesthesias, contrast dye Omnipaque (240mg/mL) was injected to confirm placement and there was no vascular runoff. Fluoroscopic imaging in the AP and lateral views revealed a clear outline of the spinal nerve with proximal spread of agent through the neural foramen into the epidural space. 3 mL of the  medication mixture listed above was injected slowly at each site. Displacement of the radio opaque contrast after injection of the medication confirmed that the medication went into the area of the transforaminal spaces. The needles were removed and bleeding was nil. A sterile dressing was applied. No specimens collected. The patient tolerated the procedure well.       The patient was monitored after the procedure in the recovery area. They were given post-procedure and discharge instructions to follow at home. The patient was discharged in a stable condition.    I reviewed and edited the fellow's note. I conducted my own interview and physical examination. I agree with the findings. I was present and supervising all critical portions of the procedure.    Rj Hernandez MD

## 2022-10-21 NOTE — DISCHARGE SUMMARY
Discharge Note  Short Stay      SUMMARY     Admit Date: 10/21/2022    Attending Physician: jR Hernandez      Discharge Physician: Rj Hernandez      Discharge Date: 10/21/2022 12:16 PM    Procedure(s) (LRB):  INJECTION, STEROID, EPIDURAL, TRANSFORAMINAL APPROACH, L5-S1 and S1, RIGHT CONTRAST (Right)    Final Diagnosis: Lumbar radiculopathy [M54.16]    Disposition: Home or self care    Patient Instructions:   Current Discharge Medication List        CONTINUE these medications which have NOT CHANGED    Details   acetaminophen (TYLENOL) 325 MG tablet Take 325 mg by mouth continuous prn for Pain.      aspirin (ECOTRIN) 81 MG EC tablet Take 1 tablet (81 mg total) by mouth once daily.  Qty: 30 tablet, Refills: 0      diazePAM (VALIUM) 2 MG tablet Take 2 mg by mouth nightly as needed.      estradiol-norethindrone acet 0.5-0.1 mg per tablet Take 1 tablet by mouth once daily.  Qty: 90 tablet, Refills: 3    Associated Diagnoses: Postmenopausal hormone replacement therapy      gabapentin (NEURONTIN) 400 MG capsule Take 1 capsule (400 mg total) by mouth 4 (four) times daily.  Qty: 120 capsule, Refills: 0      pantoprazole (PROTONIX) 40 MG tablet Take 1 tablet (40 mg total) by mouth once daily.  Qty: 90 tablet, Refills: 1    Associated Diagnoses: Gastroesophageal reflux disease, unspecified whether esophagitis present      pramipexole (MIRAPEX) 0.25 MG tablet Take 0.25 mg by mouth 3 (three) times daily.      verapamiL (VERELAN) 120 MG C24P Take 1 capsule (120 mg total) by mouth once daily.  Qty: 90 capsule, Refills: 3    Comments: .      vitamin D (VITAMIN D3) 1000 units Tab Take 1,000 Units by mouth once daily.                 Discharge Diagnosis: Lumbar radiculopathy [M54.16]  Condition on Discharge: Stable with no complications to procedure   Diet on Discharge: Same as before.  Activity: as per instruction sheet.  Discharge to: Home with a responsible adult.  Follow up: 2-4 weeks

## 2022-10-24 ENCOUNTER — PATIENT MESSAGE (OUTPATIENT)
Dept: PAIN MEDICINE | Facility: OTHER | Age: 82
End: 2022-10-24
Payer: MEDICARE

## 2022-10-24 ENCOUNTER — PATIENT MESSAGE (OUTPATIENT)
Dept: OPHTHALMOLOGY | Facility: CLINIC | Age: 82
End: 2022-10-24
Payer: MEDICARE

## 2022-10-25 ENCOUNTER — OFFICE VISIT (OUTPATIENT)
Dept: HEMATOLOGY/ONCOLOGY | Facility: CLINIC | Age: 82
End: 2022-10-25
Payer: MEDICARE

## 2022-10-25 ENCOUNTER — LAB VISIT (OUTPATIENT)
Dept: LAB | Facility: HOSPITAL | Age: 82
End: 2022-10-25
Payer: MEDICARE

## 2022-10-25 ENCOUNTER — PATIENT MESSAGE (OUTPATIENT)
Dept: PAIN MEDICINE | Facility: CLINIC | Age: 82
End: 2022-10-25
Payer: MEDICARE

## 2022-10-25 VITALS
SYSTOLIC BLOOD PRESSURE: 97 MMHG | BODY MASS INDEX: 23.08 KG/M2 | OXYGEN SATURATION: 98 % | DIASTOLIC BLOOD PRESSURE: 53 MMHG | RESPIRATION RATE: 16 BRPM | WEIGHT: 152.31 LBS | HEART RATE: 72 BPM | HEIGHT: 68 IN

## 2022-10-25 DIAGNOSIS — D46.9 MDS (MYELODYSPLASTIC SYNDROME): Primary | ICD-10-CM

## 2022-10-25 DIAGNOSIS — D46.1 RARS (REFRACTORY ANEMIA WITH RINGED SIDEROBLASTS): ICD-10-CM

## 2022-10-25 DIAGNOSIS — D46.1 REFRACTORY ANEMIA WITH RING SIDEROBLASTS: ICD-10-CM

## 2022-10-25 DIAGNOSIS — D46.9 MDS (MYELODYSPLASTIC SYNDROME): ICD-10-CM

## 2022-10-25 DIAGNOSIS — D53.9 MACROCYTIC ANEMIA: ICD-10-CM

## 2022-10-25 LAB
ALBUMIN SERPL BCP-MCNC: 4.3 G/DL (ref 3.5–5.2)
ALP SERPL-CCNC: 37 U/L (ref 55–135)
ALT SERPL W/O P-5'-P-CCNC: 15 U/L (ref 10–44)
ANION GAP SERPL CALC-SCNC: 8 MMOL/L (ref 8–16)
AST SERPL-CCNC: 17 U/L (ref 10–40)
BASOPHILS # BLD AUTO: 0.06 K/UL (ref 0–0.2)
BASOPHILS NFR BLD: 0.9 % (ref 0–1.9)
BILIRUB SERPL-MCNC: 1.1 MG/DL (ref 0.1–1)
BUN SERPL-MCNC: 15 MG/DL (ref 8–23)
CALCIUM SERPL-MCNC: 9.5 MG/DL (ref 8.7–10.5)
CHLORIDE SERPL-SCNC: 100 MMOL/L (ref 95–110)
CO2 SERPL-SCNC: 24 MMOL/L (ref 23–29)
CREAT SERPL-MCNC: 0.9 MG/DL (ref 0.5–1.4)
DIFFERENTIAL METHOD: ABNORMAL
EOSINOPHIL # BLD AUTO: 0.2 K/UL (ref 0–0.5)
EOSINOPHIL NFR BLD: 3.3 % (ref 0–8)
ERYTHROCYTE [DISTWIDTH] IN BLOOD BY AUTOMATED COUNT: 17.6 % (ref 11.5–14.5)
EST. GFR  (NO RACE VARIABLE): >60 ML/MIN/1.73 M^2
GLUCOSE SERPL-MCNC: 91 MG/DL (ref 70–110)
HCT VFR BLD AUTO: 27.3 % (ref 37–48.5)
HGB BLD-MCNC: 9.1 G/DL (ref 12–16)
IMM GRANULOCYTES # BLD AUTO: 0.02 K/UL (ref 0–0.04)
IMM GRANULOCYTES NFR BLD AUTO: 0.3 % (ref 0–0.5)
LYMPHOCYTES # BLD AUTO: 2.4 K/UL (ref 1–4.8)
LYMPHOCYTES NFR BLD: 37.9 % (ref 18–48)
MAGNESIUM SERPL-MCNC: 2.1 MG/DL (ref 1.6–2.6)
MCH RBC QN AUTO: 36 PG (ref 27–31)
MCHC RBC AUTO-ENTMCNC: 33.3 G/DL (ref 32–36)
MCV RBC AUTO: 108 FL (ref 82–98)
MONOCYTES # BLD AUTO: 0.7 K/UL (ref 0.3–1)
MONOCYTES NFR BLD: 10.1 % (ref 4–15)
NEUTROPHILS # BLD AUTO: 3.1 K/UL (ref 1.8–7.7)
NEUTROPHILS NFR BLD: 47.5 % (ref 38–73)
NRBC BLD-RTO: 0 /100 WBC
PHOSPHATE SERPL-MCNC: 3.3 MG/DL (ref 2.7–4.5)
PLATELET # BLD AUTO: 511 K/UL (ref 150–450)
PMV BLD AUTO: 9.9 FL (ref 9.2–12.9)
POTASSIUM SERPL-SCNC: 4.4 MMOL/L (ref 3.5–5.1)
PROT SERPL-MCNC: 6.6 G/DL (ref 6–8.4)
RBC # BLD AUTO: 2.53 M/UL (ref 4–5.4)
SODIUM SERPL-SCNC: 132 MMOL/L (ref 136–145)
WBC # BLD AUTO: 6.42 K/UL (ref 3.9–12.7)

## 2022-10-25 PROCEDURE — 84100 ASSAY OF PHOSPHORUS: CPT | Performed by: INTERNAL MEDICINE

## 2022-10-25 PROCEDURE — 80053 COMPREHEN METABOLIC PANEL: CPT | Performed by: INTERNAL MEDICINE

## 2022-10-25 PROCEDURE — 99999 PR PBB SHADOW E&M-EST. PATIENT-LVL IV: ICD-10-PCS | Mod: PBBFAC,,, | Performed by: INTERNAL MEDICINE

## 2022-10-25 PROCEDURE — 99213 PR OFFICE/OUTPT VISIT, EST, LEVL III, 20-29 MIN: ICD-10-PCS | Mod: S$PBB,,, | Performed by: INTERNAL MEDICINE

## 2022-10-25 PROCEDURE — 99999 PR PBB SHADOW E&M-EST. PATIENT-LVL IV: CPT | Mod: PBBFAC,,, | Performed by: INTERNAL MEDICINE

## 2022-10-25 PROCEDURE — 99214 OFFICE O/P EST MOD 30 MIN: CPT | Mod: PBBFAC | Performed by: INTERNAL MEDICINE

## 2022-10-25 PROCEDURE — 99213 OFFICE O/P EST LOW 20 MIN: CPT | Mod: S$PBB,,, | Performed by: INTERNAL MEDICINE

## 2022-10-25 PROCEDURE — 85025 COMPLETE CBC W/AUTO DIFF WBC: CPT | Performed by: INTERNAL MEDICINE

## 2022-10-25 PROCEDURE — 82668 ASSAY OF ERYTHROPOIETIN: CPT | Performed by: INTERNAL MEDICINE

## 2022-10-25 PROCEDURE — 36415 COLL VENOUS BLD VENIPUNCTURE: CPT | Performed by: INTERNAL MEDICINE

## 2022-10-25 PROCEDURE — 83735 ASSAY OF MAGNESIUM: CPT | Performed by: INTERNAL MEDICINE

## 2022-10-25 NOTE — PROGRESS NOTES
[facility]  [facility]  Hematology & Stem Cell Transplantation Clinic      PATIENT: Shelly Vásquez  MRN: [id number]  DATE: 10/25/2022    Chief Complaint: f/u for RARS, No chief complaint on file.    Other MDs:  Marlene Andrew MD    Subjective:   Initial History: Ms. Vásquez is a 81 y.o. 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     She has a pMHx of GIB requiring transfusions in the past as well as pAF - not on anticoagulation (aspirin only) with a loop recorder in place.     Bone marrow biopsy 12/19/19 revealed a hypercellular marrow (60%) with trilineage hematopoiesis, mild megakaryocytic hyperplasia, and markedly increased ring siderblasts (50%). Diploid karyotype. NGS with SF3B1 (VAF 35%).    She has not started any treatment for MDS.    Interval History:   Mrs. Vásquez return for follow up regarding MDS.  More recently she has had significant neuropathic pain.  She has not had any recent infections or hospitalizations.  She is not required any recent transfusions.  She feels well aside from her neuropathic pain.      Past Medical History:   Diagnosis Date    Arthritis     Atrial fibrillation     Basal cell cancer     on the face    Encounter for blood transfusion     Gastric ulcer     Herpes simplex without mention of complication     rare outbreaks    Postmenopausal HRT (hormone replacement therapy)     Supraventricular tachycardia     s/p AVNRT ablation (Dr Brady)     Past Surgical History:   Procedure Laterality Date    APPENDECTOMY  age 21    BACK SURGERY      Beast Lift  2004    BONE MARROW BIOPSY Right 12/19/2019    Procedure: Biopsy-bone marrow;  Surgeon: Aidan Spring MD;  Location: [facility] OR [address] [place] [address];  Service: Oncology;  Laterality: Right;    BREAST BIOPSY  50yrs ago    unable to see scar    COSMETIC SURGERY      DILATION AND CURETTAGE OF UTERUS  2008    PMB    ENDOMETRIAL BIOPSY      EPIDURAL STEROID INJECTION N/A 8/25/2022     Procedure: LUMBAR CELINA CONTRAST DIRECT REFERRAL;  Surgeon: Rj Hernandez MD;  Location: [facility] PAIN MGT;  Service: Pain Management;  Laterality: N/A;    EYE SURGERY      ptsosis      RADIOFREQUENCY ABLATION  03/2018    SMALL INTESTINE SURGERY      TONSILLECTOMY, ADENOIDECTOMY  age 8    TOTAL REDUCTION MAMMOPLASTY Bilateral 2003    TRANSFORAMINAL EPIDURAL INJECTION OF STEROID Right 8/11/2022    Procedure: Injection,steroid,epidural Right L1/2 TF DIrect Referral Instructed to provide intervention per MRI results;  Surgeon: Rj Hernandez MD;  Location: [facility] PAIN MGT;  Service: Pain Management;  Laterality: Right;    TRANSFORAMINAL EPIDURAL INJECTION OF STEROID Right 10/21/2022    Procedure: INJECTION, STEROID, EPIDURAL, TRANSFORAMINAL APPROACH, L5-S1 and S1, RIGHT CONTRAST;  Surgeon: Rj Hernandez MD;  Location: [facility] PAIN [facility]GT;  Service: Pain Management;  Laterality: Right;    TUBAL LIGATION       Family History   Problem Relation Age of Onset    No Known Problems Father     Cirrhosis Mother     No Known Problems Brother     No Known Problems Maternal Aunt     No Known Problems Maternal Uncle     No Known Problems Paternal Aunt     No Known Problems Paternal Uncle     No Known Problems Maternal Grandmother     No Known Problems Maternal Grandfather     No Known Problems Paternal Grandmother     No Known Problems Paternal Grandfather     No Known Problems Daughter     Pulmonary embolism Son     Breast cancer Neg Hx     Colon cancer Neg Hx     Ovarian cancer Neg Hx     Amblyopia Neg Hx     Blindness Neg Hx     Cancer Neg Hx     Cataracts Neg Hx     Diabetes Neg Hx     Glaucoma Neg Hx     Hypertension Neg Hx     Macular degeneration Neg Hx     Retinal detachment Neg Hx     Strabismus Neg Hx     Stroke Neg Hx     Thyroid disease Neg Hx       reports that she quit smoking about 42 years ago. Her smoking use included cigarettes. She has a 22.00 pack-year smoking history. She has never used smokeless tobacco. She  reports current alcohol use of about 2.0 standard drinks per week. She reports that she does not use drugs.  Review of patient's allergies indicates:   Allergen Reactions    Nsaids (non-steroidal anti-inflammatory drug)      GI Bleed  Had 5 blood transfusions     Current Outpatient Medications   Medication Sig Dispense Refill    acetaminophen (TYLENOL) 325 MG tablet Take 325 mg by mouth continuous prn for Pain.      aspirin (ECOTRIN) 81 MG EC tablet Take 1 tablet (81 mg total) by mouth once daily. 30 tablet 0    diazePAM (VALIUM) 2 MG tablet Take 2 mg by mouth nightly as needed.      estradiol-norethindrone acet 0.5-0.1 mg per tablet Take 1 tablet by mouth once daily. 90 tablet 3    gabapentin (NEURONTIN) 400 MG capsule Take 1 capsule (400 mg total) by mouth 4 (four) times daily. 120 capsule 2    pantoprazole (PROTONIX) 40 MG tablet Take 1 tablet (40 mg total) by mouth once daily. 90 tablet 1    pramipexole (MIRAPEX) 0.25 MG tablet Take 0.25 mg by mouth 3 (three) times daily.      verapamiL (VERELAN) 120 MG C24P Take 1 capsule (120 mg total) by mouth once daily. 90 capsule 3    vitamin D (VITAMIN D3) 1000 units Tab Take 1,000 Units by mouth once daily.       No current facility-administered medications for this visit.     Review of Systems   Constitutional:  Negative for appetite change, chills, fatigue and unexpected weight change.   HENT:  Negative for dental problem, mouth sores, nosebleeds, trouble swallowing and voice change.    Eyes:  Negative for visual disturbance.   Respiratory:  Negative for chest tightness, shortness of breath and wheezing.    Cardiovascular:  Negative for chest pain, palpitations and leg swelling.   Gastrointestinal:  Negative for abdominal distention, abdominal pain, blood in stool, diarrhea, nausea and vomiting.   Endocrine: Negative for cold intolerance.   Genitourinary:  Negative for hematuria.   Musculoskeletal:  Negative for neck pain.   Skin:  Negative for pallor and rash.  "  Allergic/Immunologic: Negative for immunocompromised state.   Neurological:  Negative for dizziness, weakness and headaches.   Hematological:  Negative for adenopathy. Does not bruise/bleed easily.   Psychiatric/Behavioral:  Negative for agitation and behavioral problems.    ECOG Performance Status: 1     Objective:      Vitals:   Vitals:    10/25/22 1142   BP: (!) 97/53   Pulse: 72   Resp: 16   SpO2: 98%   Weight: 69.1 kg (152 lb 5.4 oz)   Height: 5' 8" (1.727 m)     BMI: Body mass index is 23.16 kg/m².    Physical Exam  Vitals reviewed.   Constitutional:       Appearance: Normal appearance. She is well-developed.   HENT:      Head: Normocephalic and atraumatic.   Cardiovascular:      Rate and Rhythm: Normal rate and regular rhythm.      Pulses: Normal pulses.      Heart sounds: No murmur heard.    No friction rub. No gallop.   Pulmonary:      Effort: Pulmonary effort is normal.      Breath sounds: Normal breath sounds. No wheezing.   Abdominal:      General: Bowel sounds are normal.      Palpations: Abdomen is soft. There is no mass.      Comments: No HSM   Musculoskeletal:         General: Normal range of motion.      Cervical back: Normal range of motion and neck supple.   Lymphadenopathy:      Head:      Right side of head: No submandibular, posterior auricular or occipital adenopathy.      Left side of head: No submandibular, posterior auricular or occipital adenopathy.      Cervical: No cervical adenopathy.      Upper Body:      Right upper body: No supraclavicular adenopathy.      Left upper body: No supraclavicular adenopathy.   Skin:     General: Skin is warm and dry.   Neurological:      Mental Status: She is alert and oriented to person, place, and time.   Psychiatric:         Behavior: Behavior normal.     Laboratory Data:  Lab Visit on 10/25/2022   Component Date Value Ref Range Status    WBC 10/25/2022 6.42  3.90 - 12.70 K/uL Final    RBC 10/25/2022 2.53 (L)  4.00 - 5.40 M/uL Final    Hemoglobin " 10/25/2022 9.1 (L)  12.0 - 16.0 g/dL Final    Hematocrit 10/25/2022 27.3 (L)  37.0 - 48.5 % Final    MCV 10/25/2022 108 (H)  82 - 98 fL Final    MCH 10/25/2022 36.0 (H)  27.0 - 31.0 pg Final    MCHC 10/25/2022 33.3  32.0 - 36.0 g/dL Final    RDW 10/25/2022 17.6 (H)  11.5 - 14.5 % Final    Platelets 10/25/2022 511 (H)  150 - 450 K/uL Final    MPV 10/25/2022 9.9  9.2 - 12.9 fL Final    Immature Granulocytes 10/25/2022 0.3  0.0 - 0.5 % Final    Gran # (ANC) 10/25/2022 3.1  1.8 - 7.7 K/uL Final    Immature Grans (Abs) 10/25/2022 0.02  0.00 - 0.04 K/uL Final    Comment: Mild elevation in immature granulocytes is non specific and   can be seen in a variety of conditions including stress response,   acute inflammation, trauma and pregnancy. Correlation with other   laboratory and clinical findings is essential.      Lymph # 10/25/2022 2.4  1.0 - 4.8 K/uL Final    Mono # 10/25/2022 0.7  0.3 - 1.0 K/uL Final    Eos # 10/25/2022 0.2  0.0 - 0.5 K/uL Final    Baso # 10/25/2022 0.06  0.00 - 0.20 K/uL Final    nRBC 10/25/2022 0  0 /100 WBC Final    Gran % 10/25/2022 47.5  38.0 - 73.0 % Final    Lymph % 10/25/2022 37.9  18.0 - 48.0 % Final    Mono % 10/25/2022 10.1  4.0 - 15.0 % Final    Eosinophil % 10/25/2022 3.3  0.0 - 8.0 % Final    Basophil % 10/25/2022 0.9  0.0 - 1.9 % Final    Differential Method 10/25/2022 Automated   Final    Sodium 10/25/2022 132 (L)  136 - 145 mmol/L Final    Potassium 10/25/2022 4.4  3.5 - 5.1 mmol/L Final    Chloride 10/25/2022 100  95 - 110 mmol/L Final    CO2 10/25/2022 24  23 - 29 mmol/L Final    Glucose 10/25/2022 91  70 - 110 mg/dL Final    BUN 10/25/2022 15  8 - 23 mg/dL Final    Creatinine 10/25/2022 0.9  0.5 - 1.4 mg/dL Final    Calcium 10/25/2022 9.5  8.7 - 10.5 mg/dL Final    Total Protein 10/25/2022 6.6  6.0 - 8.4 g/dL Final    Albumin 10/25/2022 4.3  3.5 - 5.2 g/dL Final    Total Bilirubin 10/25/2022 1.1 (H)  0.1 - 1.0 mg/dL Final    Comment: For infants and newborns, interpretation of  results should be based  on gestational age, weight and in agreement with clinical  observations.    Premature Infant recommended reference ranges:  Up to 24 hours.............<8.0 mg/dL  Up to 48 hours............<12.0 mg/dL  3-5 days..................<15.0 mg/dL  6-29 days.................<15.0 mg/dL      Alkaline Phosphatase 10/25/2022 37 (L)  55 - 135 U/L Final    AST 10/25/2022 17  10 - 40 U/L Final    ALT 10/25/2022 15  10 - 44 U/L Final    Anion Gap 10/25/2022 8  8 - 16 mmol/L Final    eGFR 10/25/2022 >60.0  >60 mL/min/1.73 m^2 Final    Magnesium 10/25/2022 2.1  1.6 - 2.6 mg/dL Final    Phosphorus 10/25/2022 3.3  2.7 - 4.5 mg/dL Final       Assessment:       1. MDS (myelodysplastic syndrome)    2. Refractory anemia with ring sideroblasts    3. Macrocytic anemia        Plan:     MDS-RS; SF3B1 mutation; IPSS-0 (low), IPSS-R 2 (low risk), IPSS-M (very low risk)  She has low risk disease by IPSS/IPSS-R/IPSS-M, and she is not had any significant symptoms related to her MDS.  She is had a mild anemia which has been stable for years, and she has not required any transfusions.  We initially discussed treatment with luspatercept; however, since she is transfusion independent, there likely would be very little benefit from treatment at this point.  She also is not interested in any treatment at this point.  We will continue to monitor with labs every 6 months.    Normocytic anemia  As noted above will continue to monitor twice per year.      Route Chart for Scheduling    BMT Chart Routing      Follow up with physician 6 months. RTC 6 months with labs prior   Follow up with CED    Provider visit type    Infusion scheduling note    Injection scheduling note    Labs CBC, CMP, phosphorus and magnesium   Lab interval:     Imaging    Pharmacy appointment    Other referrals             Karan Lopez MD  Hematology, Oncology, and Stem Cell Transplantation  [facility]

## 2022-10-27 ENCOUNTER — TELEPHONE (OUTPATIENT)
Dept: INTERNAL MEDICINE | Facility: CLINIC | Age: 82
End: 2022-10-27
Payer: MEDICARE

## 2022-10-27 ENCOUNTER — TELEPHONE (OUTPATIENT)
Dept: PAIN MEDICINE | Facility: CLINIC | Age: 82
End: 2022-10-27
Payer: MEDICARE

## 2022-10-27 DIAGNOSIS — M47.816 LUMBAR SPONDYLOSIS: Primary | ICD-10-CM

## 2022-10-27 DIAGNOSIS — M25.559 PAIN IN UNSPECIFIED HIP: Primary | ICD-10-CM

## 2022-10-27 LAB — EPO SERPL-ACNC: 43.5 MIU/ML (ref 2.6–18.5)

## 2022-10-27 RX ORDER — GABAPENTIN 600 MG/1
600 TABLET ORAL 4 TIMES DAILY PRN
Qty: 120 TABLET | Refills: 0 | Status: SHIPPED | OUTPATIENT
Start: 2022-10-27 | End: 2022-11-18 | Stop reason: SDUPTHER

## 2022-10-27 NOTE — TELEPHONE ENCOUNTER
CH pt and benefactor needs asap:    1) right hip MRI - order in [facility]    2) ov with Dr villareal who she saw in [place]

## 2022-10-28 ENCOUNTER — PATIENT MESSAGE (OUTPATIENT)
Dept: PAIN MEDICINE | Facility: OTHER | Age: 82
End: 2022-10-28
Payer: MEDICARE

## 2022-10-29 ENCOUNTER — PATIENT MESSAGE (OUTPATIENT)
Dept: PAIN MEDICINE | Facility: OTHER | Age: 82
End: 2022-10-29
Payer: MEDICARE

## 2022-11-07 ENCOUNTER — PATIENT MESSAGE (OUTPATIENT)
Dept: PAIN MEDICINE | Facility: OTHER | Age: 82
End: 2022-11-07
Payer: MEDICARE

## 2022-11-07 ENCOUNTER — TELEPHONE (OUTPATIENT)
Dept: INTERNAL MEDICINE | Facility: CLINIC | Age: 82
End: 2022-11-07
Payer: MEDICARE

## 2022-11-07 ENCOUNTER — HOSPITAL ENCOUNTER (OUTPATIENT)
Dept: RADIOLOGY | Facility: HOSPITAL | Age: 82
Discharge: HOME | End: 2022-11-07
Attending: INTERNAL MEDICINE
Payer: MEDICARE

## 2022-11-07 DIAGNOSIS — M25.559 PAIN IN UNSPECIFIED HIP: ICD-10-CM

## 2022-11-07 DIAGNOSIS — G25.81 RLS (RESTLESS LEGS SYNDROME): Primary | ICD-10-CM

## 2022-11-07 PROCEDURE — 73721 MRI JNT OF LWR EXTRE W/O DYE: CPT | Mod: 26,RT,, | Performed by: RADIOLOGY

## 2022-11-07 PROCEDURE — 73721 MRI JNT OF LWR EXTRE W/O DYE: CPT | Mod: TC,RT

## 2022-11-07 PROCEDURE — 73721 MRI HIP WITHOUT CONTRAST RIGHT: ICD-10-PCS | Mod: 26,RT,, | Performed by: RADIOLOGY

## 2022-11-07 RX ORDER — PRAMIPEXOLE DIHYDROCHLORIDE 0.5 MG/1
0.5 TABLET ORAL 3 TIMES DAILY
Qty: 90 TABLET | Refills: 2 | Status: ON HOLD | OUTPATIENT
Start: 2022-11-07 | End: 2022-11-11

## 2022-11-08 ENCOUNTER — OFFICE VISIT (OUTPATIENT)
Dept: SPORTS MEDICINE | Facility: CLINIC | Age: 82
End: 2022-11-08
Payer: MEDICARE

## 2022-11-08 VITALS — HEIGHT: 68 IN | WEIGHT: 154.5 LBS | BODY MASS INDEX: 23.42 KG/M2 | TEMPERATURE: 98 F

## 2022-11-08 DIAGNOSIS — M47.816 LUMBAR SPONDYLOSIS: ICD-10-CM

## 2022-11-08 DIAGNOSIS — M25.551 CHRONIC PAIN OF RIGHT HIP: ICD-10-CM

## 2022-11-08 DIAGNOSIS — G89.29 CHRONIC PAIN OF RIGHT HIP: ICD-10-CM

## 2022-11-08 DIAGNOSIS — M16.11 PRIMARY OSTEOARTHRITIS OF RIGHT HIP: Primary | ICD-10-CM

## 2022-11-08 PROCEDURE — 99214 PR OFFICE/OUTPT VISIT, EST, LEVL IV, 30-39 MIN: ICD-10-PCS | Mod: S$PBB,,, | Performed by: FAMILY MEDICINE

## 2022-11-08 PROCEDURE — 99213 OFFICE O/P EST LOW 20 MIN: CPT | Mod: PBBFAC | Performed by: FAMILY MEDICINE

## 2022-11-08 PROCEDURE — 99999 PR PBB SHADOW E&M-EST. PATIENT-LVL III: CPT | Mod: PBBFAC,,, | Performed by: FAMILY MEDICINE

## 2022-11-08 PROCEDURE — 99214 OFFICE O/P EST MOD 30 MIN: CPT | Mod: S$PBB,,, | Performed by: FAMILY MEDICINE

## 2022-11-08 PROCEDURE — 99999 PR PBB SHADOW E&M-EST. PATIENT-LVL III: ICD-10-PCS | Mod: PBBFAC,,, | Performed by: FAMILY MEDICINE

## 2022-11-08 NOTE — PROGRESS NOTES
"Shelly Vásquez, a 81 y.o. female, is here for evaluation of right hip. Pt reports her hip is feeling better but is now having nerve pain "all over". Reports hx of RLS and increasing pain in bilat LE recently. She is established with pain management at [facility]. She has been unable to get EMG and MRI testing done because they are too painful.     HISTORY OF PRESENT ILLNESS   Location: proximal thigh   Onset: chronic, insidious  Palliative:    relative rest   oral analgesics, OTC   CSI iaHip, r 6/22/22 good relief, ongoing  Provocative: prolonged ambulation  Prior: none  Progression: plateau discomfort   Quality: sharp  Radiation: none  Severity: per nursing documentation  Timing: intermittent with use  Trauma: none    Review of systems (ROS):  A 10+ review of systems was performed with pertinent positives and negatives noted above in the history of present illness. Other systems were negative unless otherwise specified.    PHYSICAL EXAMINATION  General:  The patient is alert and oriented x 3.  Mood is pleasant.  Observation of ears, eyes and nose reveal no gross abnormalities.  HEENT: NCAT, sclera nonicteric  Lungs: Respirations are equal and unlabored.   Gait is coordinated. Patient can toe walk and heel walk without difficulty.    HIP/PELVIS EXAMINATION    Observation/Inspection  Gait:   Nonantalgic   Alignment:  Neutral   Scars:   None   Muscle atrophy: None   Effusion:  None   Warmth:  None   Discoloration:   None   Leg lengths:   Equal   Pelvis:    Level     Tenderness/Crepitus (T/C):      T / C  Trochanteric bursa   - / -  Piriformis    - / -  SI joint    - / -  Psoas tendon   - / -  Rectus insertion  - / -  Adductor insertion  - / -  Pubic symphysis  - / -    ROM: (* = pain)    Flexion:      120 degrees  External rotation:   40 degrees  Internal rotation with axial load:  30 degrees  Internal rotation without axial load:  40 degrees  Abduction:    45 degrees  Adduction:     20 degrees    Special " Tests:  Pain w/ forced internal rotation (FADIR):  -   Pain w/ forced external rotation (CONCETTA):  -   Circumduction test:     -  Stinchfield test:     -   Log roll:       -   Snapping hip (internal):    -   Sit-up pain:      -   Resisted sit-up pain:     -   Resisted sit-up with adductor contraction pain:  -   Step-down test:     +  Trendelenburg test:     -  Bridge test      +     Extremity Neuro-vascular Examination:   Sensation:  Grossly intact to light touch all dermatomal regions.   Motor Function:  Fully intact motor function at hip, knee, foot and ankle    DTRs;  quadriceps and  achilles 2+.  No clonus and downgoing Babinski.    Vascular status:  DP and PT pulses 2+, brisk capillary refill, symmetric.    Skin:  intact, compartments soft.    Other Findings:    ASSESSMENT & PLAN  Assessment  #1 h/o lumbar surgery  #2 Tonnis Grade III osteoarthritis of hip, right    Imaging studies reviewed:  X-ray pelvis and hip, right 22.05  MRI hip right yesterday --> not tolerated    Plan  Eval today suggests majority of discomfort is coming from her hip    We discussed options including    Watchful waiting / relative rest    Physical therapy    Injection therapy    Consultation Discuss NAT w/ Team Abdirahman   The patient chooses As above   x = prescribed  CSI = corticosteroid injection  VSI = viscosupplement injection  PRPI = platelet rich plasma injection  ia = intra articular  R = right  L = left  B = bilateral   nfSx = surgical consultation was recommended, but patient is not interested in consultation at this time    Physical Therapy        Formal (fPT), @ [facility] facility t   Formal (fPT), @ [facility] facility        Homegoing (hgPT), per concurrent fPT recommendations t   Homegoing (hgPT), per prior fPT recommendations    Homegoing (hgPT), handout provided        w/  (atPT)    [blank] = not prescribed  x = prescribed  b = prescribed, and begin as indicated  t = continue as indicated  r = prescribed, and restart  as indicated  p = completed prior as indicated  hs = prescribed, and with high school   col = prescribed, and with college or university   nfPT = physical therapy was recommended, but patient is not interested in PT at this time    Activity (e.g. sports, work) restrictions    [blank] = as tolerated  pt = per physical therapist  at = per   NWB = non weight bearing on affected lower extremity, with crutches assistance for ambulation    Bracing    [blank] = not prescribed  r = recommended, but not fit with at todays visit  f = prescribed and fit with at todays visit  t = continue as indicated  d = d/c  p = as needed  rare = use on rare, as-needed basis; advised against chronic use    Pain management    [blank] = No prescription necessary. A handout detailing dosing of appropriate   over-the-counter musculoskeletal analgesics was made available to the patient.   m = meloxicam x 14 days  mp = 14 day course of meloxicam prescribed prior    Follow up Ns  Re inject csi iahip right  Re start fPT   [blank] = as needed  [number] = in [number] weeks  CSI = for corticosteroid injection  VSI = for viscosupplement injection or injection series  PRP = for platelet rich plasma injection or injection series  MRI = after MRI imaging  ns = should surgical options be deferred (no surgery)  o = appointment offered, deferred by patient    Should symptoms worsen or fail to resolve, consider    Revisiting the above options and / or      Vocation:   Travels  swims

## 2022-11-09 ENCOUNTER — PATIENT MESSAGE (OUTPATIENT)
Dept: ORTHOPEDICS | Facility: CLINIC | Age: 82
End: 2022-11-09
Payer: MEDICARE

## 2022-11-09 ENCOUNTER — TELEPHONE (OUTPATIENT)
Dept: ORTHOPEDICS | Facility: HOSPITAL | Age: 82
End: 2022-11-09
Payer: MEDICARE

## 2022-11-09 NOTE — TELEPHONE ENCOUNTER
Thank you, requirement for treatment to be able to have surgery may change her interest  Will see her again and discuss timing, will keep you updated    ===View-only below this line===  ----- Message -----  From: Erlin Lopez MD  Sent: 11/8/2022   5:58 PM CST  To: Marlene Méndez MD, *    I'm happy to see her again to chat about treatment options. Last time I talked to her she was not interested in any therapy for MDS. We could certainly consider luspatercept or Retacrit to see if these would improve her Hgb from 9 to 10, but we would have to stop either if the Hgb is above 10 due to increased risk for thrombosis. It can take up to 3-4 months to see an improvement. If that doesn't work then I would recommend transfusing PRBCs perioperatively, as the other treatment options would be too myelosuppressive.    Thanks,  Karan    ----- Message -----  From: Keon Gary III, MD  Sent: 11/8/2022   5:36 PM CST  To: Marlene Méndez MD, *    Hi,   Touching base re: MS Vásquez who has MDS    Happy to see her in clinic again to discuss NAT    I saw her in May, 2022. She has right hip arthritis, I felt that she could be a candidate for NAT but would ideally need her Hgb to be in the 10-11 range to minimize risk of post op transfusion as it is an independent risk factor for complications and infection.  I reached out to Dr De Anda last May and they discussed potentially starting some MDS treatment?     Dr Hammer has seen her and I think we have exhausted all non-op options.     Dr Lopez, I think you have taken over for Dr De Anda?   Looks like her last Hgb was 9.1  Do we have any options for optimizing her Hgb for surgery?     In terms of timing, my next available is February so we have some time to sort things out.     Thank you all,   Issa Gary        ----- Message -----  From: Cindy De La Torre  Sent: 11/8/2022   4:10 PM CST  To: Aidan Yang MD, *    Good afternoon,  This is a patient that   Abdirahman has seen in the past and has seen Dr. Yang for injections of her hip. She would like to re-visit the idea of a NAT. Is that something you would like to see her in clinic to discuss?    Thanks,  Cindy De La Torre MS, OTC  Clinical Assistant, [facility]

## 2022-11-11 ENCOUNTER — PATIENT MESSAGE (OUTPATIENT)
Dept: INTERNAL MEDICINE | Facility: CLINIC | Age: 82
End: 2022-11-11
Payer: MEDICARE

## 2022-11-11 ENCOUNTER — HOSPITAL ENCOUNTER (OUTPATIENT)
Facility: OTHER | Age: 82
Discharge: HOME | End: 2022-11-11
Attending: ANESTHESIOLOGY | Admitting: ANESTHESIOLOGY
Payer: MEDICARE

## 2022-11-11 VITALS
BODY MASS INDEX: 23.34 KG/M2 | HEIGHT: 68 IN | RESPIRATION RATE: 16 BRPM | OXYGEN SATURATION: 95 % | TEMPERATURE: 98 F | DIASTOLIC BLOOD PRESSURE: 62 MMHG | WEIGHT: 154 LBS | SYSTOLIC BLOOD PRESSURE: 137 MMHG | HEART RATE: 62 BPM

## 2022-11-11 DIAGNOSIS — M47.812 OSTEOARTHRITIS OF CERVICAL SPINE, UNSPECIFIED SPINAL OSTEOARTHRITIS COMPLICATION STATUS: ICD-10-CM

## 2022-11-11 DIAGNOSIS — G89.29 CHRONIC PAIN: ICD-10-CM

## 2022-11-11 DIAGNOSIS — M47.816 LUMBAR SPONDYLOSIS: Primary | ICD-10-CM

## 2022-11-11 PROCEDURE — 64493 PR INJ DX/THER AGNT PARAVERT FACET JOINT,IMG GUIDE,LUMBAR/SAC,1ST LVL: ICD-10-PCS | Mod: 50,KX,, | Performed by: ANESTHESIOLOGY

## 2022-11-11 PROCEDURE — 64494 INJ PARAVERT F JNT L/S 2 LEV: CPT | Mod: 50,KX,, | Performed by: ANESTHESIOLOGY

## 2022-11-11 PROCEDURE — 64495 INJ PARAVERT F JNT L/S 3 LEV: CPT | Mod: 50,KX,, | Performed by: ANESTHESIOLOGY

## 2022-11-11 PROCEDURE — 64494 INJ PARAVERT F JNT L/S 2 LEV: CPT | Mod: 50,KX | Performed by: ANESTHESIOLOGY

## 2022-11-11 PROCEDURE — 64493 INJ PARAVERT F JNT L/S 1 LEV: CPT | Mod: 50,KX | Performed by: ANESTHESIOLOGY

## 2022-11-11 PROCEDURE — 25000003 PHARM REV CODE 250: Performed by: ANESTHESIOLOGY

## 2022-11-11 PROCEDURE — 64494 PR INJ DX/THER AGNT PARAVERT FACET JOINT,IMG GUIDE,LUMBAR/SAC, 2ND LEVEL: ICD-10-PCS | Mod: 50,KX,, | Performed by: ANESTHESIOLOGY

## 2022-11-11 PROCEDURE — 25000003 PHARM REV CODE 250: Performed by: STUDENT IN AN ORGANIZED HEALTH CARE EDUCATION/TRAINING PROGRAM

## 2022-11-11 PROCEDURE — 64495 INJ PARAVERT F JNT L/S 3 LEV: CPT | Mod: 50,KX | Performed by: ANESTHESIOLOGY

## 2022-11-11 PROCEDURE — 64493 INJ PARAVERT F JNT L/S 1 LEV: CPT | Mod: 50,KX,, | Performed by: ANESTHESIOLOGY

## 2022-11-11 PROCEDURE — 64495 PR INJ DX/THER AGNT PARAVERT FACET JOINT,IMG GUIDE,LUMBAR/SAC, ADD LEVEL: ICD-10-PCS | Mod: 50,KX,, | Performed by: ANESTHESIOLOGY

## 2022-11-11 RX ORDER — ALPRAZOLAM 0.5 MG/1
0.5 TABLET ORAL ONCE
Status: DISCONTINUED | OUTPATIENT
Start: 2022-11-11 | End: 2022-11-11 | Stop reason: HOSPADM

## 2022-11-11 RX ORDER — BUPIVACAINE HYDROCHLORIDE 2.5 MG/ML
INJECTION, SOLUTION EPIDURAL; INFILTRATION; INTRACAUDAL
Status: DISCONTINUED | OUTPATIENT
Start: 2022-11-11 | End: 2022-11-11 | Stop reason: HOSPADM

## 2022-11-11 RX ORDER — LIDOCAINE HYDROCHLORIDE 20 MG/ML
INJECTION, SOLUTION INFILTRATION; PERINEURAL
Status: DISCONTINUED | OUTPATIENT
Start: 2022-11-11 | End: 2022-11-11 | Stop reason: HOSPADM

## 2022-11-11 RX ORDER — ALPRAZOLAM 0.5 MG/1
0.5 TABLET ORAL
Status: DISCONTINUED | OUTPATIENT
Start: 2022-11-11 | End: 2022-11-11 | Stop reason: HOSPADM

## 2022-11-11 RX ADMIN — ALPRAZOLAM 0.5 MG: 0.5 TABLET ORAL at 12:11

## 2022-11-11 NOTE — OP NOTE
Diagnostic Lumbar Medial Branch Block Under Fluoroscopy    The procedure, risks, benefits, and options were discussed with the patient. There are no contraindications to the procedure. The patent expressed understanding and agreed to the procedure. Informed written consent was obtained prior to the start of the procedure and can be found in the patient's chart.    PATIENT NAME: Shelly Vásquez   MRN: [id number]     DATE OF PROCEDURE: 11/11/2022                                           PROCEDURE:  Diagnostic Bilateral L2, L3, L4, and L5 Lumbar Medial Branch Block under Fluoroscopy    PRE-OP DIAGNOSIS: Lumbar spondylosis [M47.816] Lumbar spondylosis [M47.816]    POST-OP DIAGNOSIS: Same    PHYSICIAN: Rj Hernandez MD    ASSISTANTS: Dr. Sheehan    MEDICATIONS INJECTED:  Bupivicaine 0.25%    LOCAL ANESTHETIC INJECTED:   Xylocaine 2%    SEDATION: None    ESTIMATED BLOOD LOSS:  None    COMPLICATIONS:  None.    INTERVAL HISTORY: Patient has clinical and imaging findings suggestive of facet mediated pain.    TECHNIQUE: Time-out was performed to identify the patient and procedure to be performed. With the patient laying in a prone position, the surgical area was prepped and draped in the usual sterile fashion using ChloraPrep and fenestrated drape. The levels were determined under fluoroscopic guidance. Skin anesthesia was achieved by injecting Lidocaine 2% over the injection sites. A 25 gauge, 3.5 inch needle was introduced into the medial branch nerves at the junctions of the superior articular process and the transverse processes of the targeted sites using AP, lateral and/or contralateral oblique fluoroscopic imaging. After negative aspiration for blood or CSF was confirmed, 1 mL of the anesthetic listed above was then slowly injected at each site. The needles were removed and bleeding was nil. A sterile dressing was applied. No specimens collected. The patient tolerated the procedure well.     The patient was monitored  after the procedure in the recovery area. They were given post-procedure and discharge instructions to follow at home. The patient was discharged in a stable condition.        Rj Hernandez MD

## 2022-11-11 NOTE — DISCHARGE INSTRUCTIONS
Adult Procedural Sedation Instructions    Recovery After Procedural Sedation (Adult)  You have been given medicine by vein to make you sleep during your surgery. This may have included both a pain medicine and sleeping medicine. Most of the effects have worn off. But you may still have some drowsiness for the next 6 to 8 hours.  Home care  Follow these guidelines when you get home:  For the next 8 hours, you should be watched by a responsible adult. This person should make sure your condition is not getting worse.  Don't drink any alcohol for the next 24 hours.  Don't drive, operate dangerous machinery, or make important business or personal decisions during the next 24 hours.  Note: Your healthcare provider may tell you not to take any medicine by mouth for pain or sleep in the next 4 hours. These medicines may react with the medicines you were given in the hospital. This could cause a much stronger response than usual.  Follow-up care  Follow up with your healthcare provider if you are not alert and back to your usual level of activity within 12 hours.  When to seek medical advice  Call your healthcare provider right away if any of these occur:  Drowsiness gets worse  Weakness or dizziness gets worse  Repeated vomiting  You can't be awakened   Date Last Reviewed: 10/18/2016  © 2451-9294 [employer]. [address] [place] [address], [place] [address]. All rights reserved. This information is not intended as a substitute for professional medical care. Always follow your healthcare professional's instructions.          Thank you for allowing us to care for you today. You may receive a survey about the care we provided. Your feedback is valuable and helps us provide excellent care throughout the community.     Home Care Instructions for Pain Management:    1. DIET:   You may resume your normal diet today.   2. BATHING:   You may shower with luke warm water. No tub baths or anything that will soak injection sites  under water for the next 24 hours.  3. DRESSING:   You may remove your bandage today.   4. ACTIVITY LEVEL:   You may resume your normal activities 24 hrs after your procedure. Nothing strenuous today.  5. MEDICATIONS:   You may resume your normal medications today. To restart blood thinners, ask your doctor.  6. DRIVING    If you have received any sedatives by mouth today, you may not drive for 12 hours.    If you have received any sedation through your IV, you may not drive for 24 hrs.   7. SPECIAL INSTRUCTIONS:   No heat to the injection site for 24 hrs including, hot bath or shower, heating pad, moist heat, or hot tubs.    Use ice pack to injection site for any pain or discomfort.  Apply ice packs for 20 minute intervals as needed.    IF you have diabetes, be sure to monitor your blood sugar more closely. IF your injection contained steroids your blood sugar levels may become higher than normal.    If you are still having pain upon discharge:  Your pain may improve over the next 48 hours. The anesthetic (numbing medication) works immediately to 48 hours. IF your injection contained a steroid (anti-inflammatory medication), it takes approximately 3 days to start feeling relief and 7-10 days to see your greatest results from the medication. It is possible you may need subsequent injections. This would be discussed at your follow up appointment with pain management or your referring doctor.    Please call the PAIN MANAGEMENT office at [phone] or ON CALL pager at [phone] if you experienced any:   -Weakness or loss of sensation  -Fever > 101.5  -Pain uncontrolled with oral medications   -Persistent nausea, vomiting, or diarrhea  -Redness or drainage from the injection sites, or any other worrisome concerns.   If physician on call was not reached or could not communicate with our office for any reason please go to the nearest emergency department. Adult Procedural Sedation Instructions    Recovery After  Procedural Sedation (Adult)  You have been given medicine by vein to make you sleep during your surgery. This may have included both a pain medicine and sleeping medicine. Most of the effects have worn off. But you may still have some drowsiness for the next 6 to 8 hours.  Home care  Follow these guidelines when you get home:  For the next 8 hours, you should be watched by a responsible adult. This person should make sure your condition is not getting worse.  Don't drink any alcohol for the next 24 hours.  Don't drive, operate dangerous machinery, or make important business or personal decisions during the next 24 hours.  Note: Your healthcare provider may tell you not to take any medicine by mouth for pain or sleep in the next 4 hours. These medicines may react with the medicines you were given in the hospital. This could cause a much stronger response than usual.  Follow-up care  Follow up with your healthcare provider if you are not alert and back to your usual level of activity within 12 hours.  When to seek medical advice  Call your healthcare provider right away if any of these occur:  Drowsiness gets worse  Weakness or dizziness gets worse  Repeated vomiting  You can't be awakened   Date Last Reviewed: 10/18/2016  © 8277-4568 [employer]. [address] [place] [address], [place] [address]. All rights reserved. This information is not intended as a substitute for professional medical care. Always follow your healthcare professional's instructions.

## 2022-11-11 NOTE — INTERVAL H&P NOTE
The patient was examined and no significant changes were noted from the updated H&P or last clinic note.    The risks and benefits of this procedure, including alternative therapies, were discussed with the patient.  The discussion of risks included infection, bleeding, need for additional procedures or surgery, nerve damage, paralysis, adverse medication reaction(s), stroke, and if appropriate for the procedure, death.  Questions regarding the procedure, risks, expected outcome, and possible side effects were solicited and answered to Shelly's satisfaction.  Shelly Vásquez wishes to proceed with the injection or procedure as confirmed by written consent.

## 2022-11-11 NOTE — DISCHARGE SUMMARY
Discharge Note  Short Stay      SUMMARY     Admit Date: 11/11/2022    Attending Physician: Rj Hernandez      Discharge Physician: Rj Hernandez      Discharge Date: 11/11/2022 1:32 PM    Procedure(s) (LRB):  BLOCK, NERVE BILATERAL L2,L3,L4 AND L5 MEDIAL BRANCH ONE OF TWO (Bilateral)    Final Diagnosis: Lumbar spondylosis [M47.816]    Disposition: Home or self care    Patient Instructions:   Current Discharge Medication List        CONTINUE these medications which have NOT CHANGED    Details   acetaminophen (TYLENOL) 325 MG tablet Take 325 mg by mouth continuous prn for Pain.      aspirin (ECOTRIN) 81 MG EC tablet Take 1 tablet (81 mg total) by mouth once daily.  Qty: 30 tablet, Refills: 0      diazePAM (VALIUM) 2 MG tablet Take 2 mg by mouth nightly as needed.      estradiol-norethindrone acet 0.5-0.1 mg per tablet Take 1 tablet by mouth once daily.  Qty: 90 tablet, Refills: 3    Associated Diagnoses: Postmenopausal hormone replacement therapy      gabapentin (NEURONTIN) 600 MG tablet Take 1 tablet (600 mg total) by mouth 4 (four) times daily as needed.  Qty: 120 tablet, Refills: 0      pantoprazole (PROTONIX) 40 MG tablet Take 1 tablet (40 mg total) by mouth once daily.  Qty: 90 tablet, Refills: 1    Associated Diagnoses: Gastroesophageal reflux disease, unspecified whether esophagitis present      pramipexole (MIRAPEX) 0.5 MG tablet Take 1 tablet (0.5 mg total) by mouth 3 (three) times daily.  Qty: 90 tablet, Refills: 2    Associated Diagnoses: RLS (restless legs syndrome)      verapamiL (VERELAN) 120 MG C24P Take 1 capsule (120 mg total) by mouth once daily.  Qty: 90 capsule, Refills: 3    Comments: .      vitamin D (VITAMIN D3) 1000 units Tab Take 1,000 Units by mouth once daily.                 Discharge Diagnosis: Lumbar spondylosis [M47.816]  Condition on Discharge: Stable with no complications to procedure   Diet on Discharge: Same as before.  Activity: as per instruction sheet.  Discharge to: Home with  a responsible adult.  Follow up: 2-4 weeks

## 2022-11-14 ENCOUNTER — PATIENT MESSAGE (OUTPATIENT)
Dept: PAIN MEDICINE | Facility: CLINIC | Age: 82
End: 2022-11-14
Payer: MEDICARE

## 2022-11-14 ENCOUNTER — TELEPHONE (OUTPATIENT)
Dept: PAIN MEDICINE | Facility: CLINIC | Age: 82
End: 2022-11-14
Payer: MEDICARE

## 2022-11-14 NOTE — TELEPHONE ENCOUNTER
----- Message from Hattie Stacy sent at 11/14/2022  7:26 AM CST -----  Regarding: pt procedure appt  Name of Who is Calling:NEIL REDMOND [[id number]]          What is the request in detail: Pt is calling to make an appt for her ablation procedure. She states that she is going out of town 12/17 and would like tos schedule her procedure sometime between now and then. Please assist           Can the clinic reply by [facility]:yes          What Number to Call Back if not [facility]:[phone]

## 2022-11-16 ENCOUNTER — TELEPHONE (OUTPATIENT)
Dept: ORTHOPEDICS | Facility: CLINIC | Age: 82
End: 2022-11-16
Payer: MEDICARE

## 2022-11-16 NOTE — TELEPHONE ENCOUNTER
Spoke to Pt on 11/16/22 @ 9:12 am. Pt wanted her December appointment to February . Rescheduled appointment is rescheduled for 2/3/2023 @ 10:30 am

## 2022-11-16 NOTE — TELEPHONE ENCOUNTER
I called the patient to discuss rescheduling her appointment with Dr. Gary. The patient did not answer. I left a voicemail with a call back number.     ----- Message from Catrachita Beaver sent at 11/16/2022  9:02 AM CST -----  Regarding: Reschedule Request  Mrs. Shelly Vásquez, MRN [id number], wants to move her 12/9 appointment with Dr. Keon Gary out to the first or second week of January and all I'm getting is Jan 31st. Can you please assist with access? Thank you!

## 2022-11-21 ENCOUNTER — PATIENT MESSAGE (OUTPATIENT)
Dept: PAIN MEDICINE | Facility: CLINIC | Age: 82
End: 2022-11-21
Payer: MEDICARE

## 2022-11-22 ENCOUNTER — PATIENT MESSAGE (OUTPATIENT)
Dept: PAIN MEDICINE | Facility: OTHER | Age: 82
End: 2022-11-22
Payer: MEDICARE

## 2022-11-22 ENCOUNTER — PATIENT MESSAGE (OUTPATIENT)
Dept: HEMATOLOGY/ONCOLOGY | Facility: CLINIC | Age: 82
End: 2022-11-22
Payer: MEDICARE

## 2022-11-22 ENCOUNTER — OFFICE VISIT (OUTPATIENT)
Dept: PAIN MEDICINE | Facility: CLINIC | Age: 82
End: 2022-11-22
Payer: MEDICARE

## 2022-11-22 ENCOUNTER — TELEPHONE (OUTPATIENT)
Dept: ORTHOPEDICS | Facility: CLINIC | Age: 82
End: 2022-11-22
Payer: MEDICARE

## 2022-11-22 VITALS
DIASTOLIC BLOOD PRESSURE: 68 MMHG | HEART RATE: 87 BPM | WEIGHT: 147 LBS | HEIGHT: 68 IN | BODY MASS INDEX: 22.28 KG/M2 | RESPIRATION RATE: 18 BRPM | SYSTOLIC BLOOD PRESSURE: 115 MMHG

## 2022-11-22 DIAGNOSIS — M47.27 SPONDYLOSIS OF LUMBOSACRAL SPINE WITH RADICULOPATHY: ICD-10-CM

## 2022-11-22 DIAGNOSIS — M48.061 SPINAL STENOSIS OF LUMBAR REGION, UNSPECIFIED WHETHER NEUROGENIC CLAUDICATION PRESENT: ICD-10-CM

## 2022-11-22 DIAGNOSIS — M51.37 DDD (DEGENERATIVE DISC DISEASE), LUMBOSACRAL: Primary | ICD-10-CM

## 2022-11-22 DIAGNOSIS — G89.4 CHRONIC PAIN DISORDER: ICD-10-CM

## 2022-11-22 DIAGNOSIS — Z98.890 S/P LUMBAR LAMINECTOMY: ICD-10-CM

## 2022-11-22 DIAGNOSIS — M16.11 OSTEOARTHRITIS OF RIGHT HIP, UNSPECIFIED OSTEOARTHRITIS TYPE: ICD-10-CM

## 2022-11-22 DIAGNOSIS — M54.17 LUMBOSACRAL RADICULOPATHY: ICD-10-CM

## 2022-11-22 PROCEDURE — 99213 OFFICE O/P EST LOW 20 MIN: CPT | Mod: PBBFAC | Performed by: ANESTHESIOLOGY

## 2022-11-22 PROCEDURE — 99214 PR OFFICE/OUTPT VISIT, EST, LEVL IV, 30-39 MIN: ICD-10-PCS | Mod: S$PBB,GC,, | Performed by: ANESTHESIOLOGY

## 2022-11-22 PROCEDURE — 99999 PR PBB SHADOW E&M-EST. PATIENT-LVL III: CPT | Mod: PBBFAC,,, | Performed by: ANESTHESIOLOGY

## 2022-11-22 PROCEDURE — 99214 OFFICE O/P EST MOD 30 MIN: CPT | Mod: S$PBB,GC,, | Performed by: ANESTHESIOLOGY

## 2022-11-22 PROCEDURE — 99999 PR PBB SHADOW E&M-EST. PATIENT-LVL III: ICD-10-PCS | Mod: PBBFAC,,, | Performed by: ANESTHESIOLOGY

## 2022-11-22 NOTE — PROGRESS NOTES
Chronic Pain - Established visit (Follow up)        Chief Complaint: Lower back and leg pain     SUBJECTIVE:    Interval History 11/22/2022  Ms. Vásquez returns regarding low back pain. Patient has had 10/21/22 Right L5/S1 and S1 TF CELINA with 80% relief for about one day. On 11/11/22 we proceeded with Bilateral L2, L3, L4, L5 MBB with good benefit only for the first 2 hours. Patient has repeat MBB scheduled for 12/2/2022. Patient today is complain of right sided radiating groin pain. Patient has known osteoarthritis of the right hip. She has an appointment with Orthopedics, Dr. Gary on 2/3/23. Dr. Gary would like her hg to be improved prior to considering right NAT. Patient is currently in physical therapy for her back and hip pain.  Patient has no new weakness, new sensation changes, changes to bowel/bladder habits, or saddle anesthesia. No recent falls.     Initial History 10/18/2022  Shelly Vásquez presents to the clinic for the evaluation of chronic lower back and leg pain. The pain started several years ago and symptoms have been worsening.The pain is located in the lower back area and radiates to the right leg and foot.  The pain is described as aching, dull, and shooting and is rated as 8/10. The pain is rated with a score of  3/10 on the BEST day and a score of 10/10 on the WORST day.  Symptoms interfere with daily activity and sleeping. The pain is exacerbated by Standing and Walking.  The pain is mitigated by laying down. She has history of lumbar laminectomy several years ago and follow up with Neurology team. She reports history of RLS as well. She is S/P  right T12/L1 TFESI and LESI with temporary relief.     Patient denies night fever/night sweats, urinary incontinence, bowel incontinence, significant weight loss, significant motor weakness, and loss of sensations.    Physical Therapy/Home Exercise: yes      Pain Disability Index Review:  Last 3 PDI Scores 11/22/2022   Pain Disability Index (PDI)  33       Pain Medications:  Gabapentin  Tylenol       report:  Not applicable    Pain Procedures:  11/11/22 Bilateral L2, L3, L4, L5 MBB  10/21/22 Right L5/S1 ans S1 TFESI.  08/25/2022: Lumbar Interlaminar Epidural Steroid Injection L5/S1 under Fluoroscopic Guidance  08/11/2022: Thoracic Transforaminal Epidural Steroid Injection Right  T12/L1 under Fluoroscopic Guidance    Imaging:     EXAMINATION:  MRI LUMBAR SPINE WITHOUT CONTRAST     CLINICAL HISTORY:  Lumbar radiculopathy, symptoms persist with conservative treatment; Radiculopathy, lumbar region     TECHNIQUE:  Multiplanar, multisequence MR images were acquired from the thoracolumbar junction to the sacrum without the administration of contrast.     COMPARISON:  MRI 01/24/2017.     FINDINGS:  Examination is degraded by patient motion artifact.     Postoperative change of L4-L5 laminectomies.  Lumbar spine alignment demonstrates mild dextrocurvature, grade 1 retrolisthesis of L1 on L2 and grade 1 anterolisthesis of L4 on L5, similar when compared to the previous MRI.  No spondylolysis.  Vertebral body heights are well maintained without evidence for fracture.  Stable low signal intensity lesion within the right lateral aspect of the L4 vertebral body, likely a bone island.  Remaining bone marrow demonstrates heterogeneous signal, similar when compared to the previous MRI and likely related to benign reconversion.  No convincing MR imaging findings to suggest a marrow infiltrative process.  Decreased interspinous distances at L3-L4 and L4-L5 with associated chronic subcortical irregularity, findings that can be seen in the setting of Baastrup's disease.     There is multilevel degenerative disc space narrowing and desiccation most pronounced at L1-L2, L3-L4, L4-L5 and L5-S1, slightly progressed when compared to the previous exam.  Worsening chronic degenerative endplate changes and mild edema at L1-L2.  Stable chronic degenerative endplate changes at L4-L5 and  L5-S1.     Distal spinal cord demonstrates normal contour and signal intensity.  Cauda equina is not well evaluated secondary to aforementioned motion artifact.  Conus medullaris terminates at L1.     Hepatic parenchyma demonstrates diffuse low T2 signal, incompletely evaluated on this exam.  There is a left renal cortical cyst.  Remaining visualized abdominal organs demonstrate no significant abnormalities.  There is fatty degeneration of the posterior-inferior paraspinal musculature.  SI joints are symmetric.     T12-L1: No spinal canal stenosis or neural foraminal narrowing.     L1-L2: Mild grade 1 retrolisthesis.  Circumferential disc bulge encroaches into the bilateral foraminal zones.  Bilateral facet arthropathy and bilateral ligamentum flavum buckling.  Findings contribute to mild left neural foraminal narrowing.  No spinal canal stenosis.     L2-L3: Bilateral facet arthropathy and bilateral ligamentum flavum buckling.  No spinal canal stenosis or neural foraminal narrowing.     L3-L4: Circumferential disc bulge.  Bilateral facet arthropathy and bilateral ligamentum flavum buckling.  Findings contribute to mild-to-moderate spinal canal stenosis.  No neural foraminal narrowing.     L4-L5: Grade 1 anterolisthesis with uncovering of the intervertebral disc.  Bilateral facet arthropathy and bilateral ligamentum flavum buckling.  Findings contribute to moderate spinal canal stenosis and mild left neural foraminal narrowing.     L5-S1: Circumferential disc bulge encroaches into the bilateral foraminal zones and demonstrates a superimposed posterior broad-based disc protrusion that likely abuts the bilateral descending S1 nerve roots, similar when compared to the previous MRI.  Bilateral facet arthropathy.  Findings contribute to moderate to severe bilateral neural foraminal narrowing.     Impression:     1. Examination degraded by patient motion artifact.  2. Postoperative change of L4-L5 laminectomies.  3. Mild  lumbar dextroscoliosis with superimposed degenerative changes contributing to multilevel mild-to-moderate spinal canal stenosis and mild to severe neural foraminal narrowing as detailed above.  Findings have slightly progressed when compared to MRI dated 01/24/2017.    Past Medical History:   Diagnosis Date    Arthritis     Atrial fibrillation     Basal cell cancer     on the face    Encounter for blood transfusion     Gastric ulcer     Herpes simplex without mention of complication     rare outbreaks    Postmenopausal HRT (hormone replacement therapy)     Supraventricular tachycardia     s/p AVNRT ablation (Dr Brady)     Past Surgical History:   Procedure Laterality Date    APPENDECTOMY  age 21    BACK SURGERY      Beast Lift  2004    BONE MARROW BIOPSY Right 12/19/2019    Procedure: Biopsy-bone marrow;  Surgeon: Aidan Spring MD;  Location: [facility] OR [address] [place] [address];  Service: Oncology;  Laterality: Right;    BREAST BIOPSY  50yrs ago    unable to see scar    COSMETIC SURGERY      DILATION AND CURETTAGE OF UTERUS  2008    PMB    ENDOMETRIAL BIOPSY      EPIDURAL STEROID INJECTION N/A 8/25/2022    Procedure: LUMBAR CELINA CONTRAST DIRECT REFERRAL;  Surgeon: Rj Hernandez MD;  Location: [facility], [place], operated by [facility];  Service: Pain Management;  Laterality: N/A;    EYE SURGERY      INJECTION OF ANESTHETIC AGENT AROUND NERVE Bilateral 11/11/2022    Procedure: BLOCK, NERVE BILATERAL L2,L3,L4 AND L5 MEDIAL BRANCH ONE OF TWO;  Surgeon: Rj Hernandez MD;  Location: [facility], [place], operated by [facility];  Service: Pain Management;  Laterality: Bilateral;    ptsosis      RADIOFREQUENCY ABLATION  03/2018    SMALL INTESTINE SURGERY      TONSILLECTOMY, ADENOIDECTOMY  age 8    TOTAL REDUCTION MAMMOPLASTY Bilateral 2003    TRANSFORAMINAL EPIDURAL INJECTION OF STEROID Right 8/11/2022    Procedure: Injection,steroid,epidural Right L1/2 TF DIrect Referral Instructed to provide intervention per MRI results;  Surgeon: Rj Hernandez MD;  Location: [facility], [place], operated by [facility];  Service:  Pain Management;  Laterality: Right;    TRANSFORAMINAL EPIDURAL INJECTION OF STEROID Right 10/21/2022    Procedure: INJECTION, STEROID, EPIDURAL, TRANSFORAMINAL APPROACH, L5-S1 and S1, RIGHT CONTRAST;  Surgeon: Rj Hernandez MD;  Location: [facility];  Service: Pain Management;  Laterality: Right;    TUBAL LIGATION       Social History     Socioeconomic History    Marital status:    Tobacco Use    Smoking status: Former     Packs/day: 1.00     Years: 22.00     Pack years: 22.00     Types: Cigarettes     Quit date: 1980     Years since quittin.3    Smokeless tobacco: Never   Substance and Sexual Activity    Alcohol use: Yes     Alcohol/week: 2.0 standard drinks     Types: 2 Glasses of wine per week     Comment: 1-2 glasses of wine daily    Drug use: No    Sexual activity: Not Currently     Partners: Male     Birth control/protection: Post-menopausal     Comment:  since      Family History   Problem Relation Age of Onset    No Known Problems Father     Cirrhosis Mother     No Known Problems Brother     No Known Problems Maternal Aunt     No Known Problems Maternal Uncle     No Known Problems Paternal Aunt     No Known Problems Paternal Uncle     No Known Problems Maternal Grandmother     No Known Problems Maternal Grandfather     No Known Problems Paternal Grandmother     No Known Problems Paternal Grandfather     No Known Problems Daughter     Pulmonary embolism Son     Breast cancer Neg Hx     Colon cancer Neg Hx     Ovarian cancer Neg Hx     Amblyopia Neg Hx     Blindness Neg Hx     Cancer Neg Hx     Cataracts Neg Hx     Diabetes Neg Hx     Glaucoma Neg Hx     Hypertension Neg Hx     Macular degeneration Neg Hx     Retinal detachment Neg Hx     Strabismus Neg Hx     Stroke Neg Hx     Thyroid disease Neg Hx        Review of patient's allergies indicates:   Allergen Reactions    Nsaids (non-steroidal anti-inflammatory drug)      GI Bleed  Had 5 blood transfusions       Current  "Outpatient Medications   Medication Sig    acetaminophen (TYLENOL) 325 MG tablet Take 325 mg by mouth continuous prn for Pain.    aspirin (ECOTRIN) 81 MG EC tablet Take 1 tablet (81 mg total) by mouth once daily.    diazePAM (VALIUM) 2 MG tablet Take 2 mg by mouth nightly as needed.    estradiol-norethindrone acet 0.5-0.1 mg per tablet Take 1 tablet by mouth once daily.    gabapentin (NEURONTIN) 600 MG tablet Take 1 tablet (600 mg total) by mouth 4 (four) times daily as needed.    pantoprazole (PROTONIX) 40 MG tablet Take 1 tablet (40 mg total) by mouth once daily.    pramipexole (MIRAPEX) 0.5 MG tablet TAKE 1 TABLET BY MOUTH THREE TIMES DAILY    verapamiL (VERELAN) 120 MG C24P Take 1 capsule (120 mg total) by mouth once daily.    vitamin D (VITAMIN D3) 1000 units Tab Take 1,000 Units by mouth once daily.     No current facility-administered medications for this visit.       REVIEW OF SYSTEMS:    GENERAL:  No weight loss, malaise or fevers.  HEENT:  Negative for frequent or significant headaches.  NECK:  Negative for lumps, goiter, pain and significant neck swelling.  RESPIRATORY:  Negative for cough, wheezing or shortness of breath.  CARDIOVASCULAR:  Negative for chest pain, leg swelling or palpitations.  GI:  Negative for abdominal discomfort, blood in stools or black stools or change in bowel habits.  MUSCULOSKELETAL:  See HPI.  SKIN:  Negative for lesions, rash, and itching.  PSYCH:  Negative for sleep disturbance, mood disorder and recent psychosocial stressors.  HEMATOLOGY/LYMPHOLOGY:  Negative for prolonged bleeding, bruising easily or swollen nodes.  NEURO:   No history of headaches, syncope, paralysis, seizures or tremors.  All other reviewed and negative other than HPI.    OBJECTIVE:    /68 (BP Location: Right arm, Patient Position: Sitting, BP Method: Medium (Automatic))   Pulse 87   Resp 18   Ht 5' 8" (1.727 m)   Wt 66.7 kg (147 lb)   LMP  (LMP Unknown)   BMI 22.35 kg/m²     PHYSICAL " EXAMINATION:    General appearance: Well appearing, in no acute distress, alert and oriented x3.  Psych:  Mood and affect appropriate.  Skin: Skin color, texture, turgor normal, no rashes or lesions, in both upper and lower body.  Head/face:  Normocephalic, atraumatic.  Cor: RRR  Pulm: CTA  GI:  Soft and non-tender.  Back: Straight leg raising in the sitting position is negative to radicular pain. Tenderness to palpation over bilateral lumbar paraspinal muscles. Positive facet loading. Pain worsened with extension>flexion.   Extremities: Peripheral joint ROM is full and pain free without obvious instability or laxity in all four extremities. No deformities, edema, or skin discoloration. Good capillary refill.   Musculoskeletal: Shoulder, sacroiliac and knee provocative maneuvers are negative. Bilateral upper and lower extremity strength is normal and symmetric.  No atrophy or tone abnormalities are noted. FADIR positive. CONCETTA with right groin pain. +Log Roll.  Neuro: Bilateral upper and lower extremity coordination and muscle stretch reflexes are physiologic and symmetric.  Plantar response are downgoing. No loss of sensation is noted.  Gait: Antaligic    ASSESSMENT: 81 y.o. year old female with chronic lower back and leg  pain, consistent with      1. DDD (degenerative disc disease), lumbosacral        2. Spondylosis of lumbosacral spine with radiculopathy        3. Lumbosacral radiculopathy                PLAN:     - I have stressed the importance of physical activity and a home exercise plan to help with pain and improve health.  - Continue with physical therapy  - Continue with gabapentin 600mg TID. Prescribed by Dr. Méndez for RLS  - Schedule for Right Intraarticular Hip injection  - Encouraged follow up with Dr. Gary regarding Right NAT. Appointment scheduled for 2/3/2023.  - RTC 2 weeks after the procedure.   - Counseled patient regarding the importance of activity modification and physical  therapy.    The above plan and management options were discussed at length with patient. Patient is in agreement with the above and verbalized understanding. It will be communicated with the referring physician via electronic record, fax, or mail.    Simon Chacon  11/22/2022    I have reviewed and concur with the resident's history, physical, assessment, and plan.  I have personally interviewed and examined the patient at bedside.  See below addendum for my evaluation and additional findings.    Rj Hernandez MD

## 2022-11-22 NOTE — TELEPHONE ENCOUNTER
I returned the patients phone call. The patient did not answer. I left a voicemail with a call back number.     ----- Message from Rikki Galvan sent at 11/22/2022 11:01 AM CST -----  Regarding: FW: RETURN CALL  Contact: Self    ----- Message -----  From: Susan Goddard  Sent: 11/22/2022  10:34 AM CST  To: Abdirahman SINGLETARY Staff  Subject: RETURN CALL                                      Pt stated she received a message from staff Mukul James stating to contact the office to reschedule her appointment Pt ask for a call      Contact info   [phone] (home)

## 2022-11-22 NOTE — H&P (VIEW-ONLY)
Chronic Pain - Established visit (Follow up)        Chief Complaint: Lower back and leg pain     SUBJECTIVE:    Interval History 11/22/2022  Ms. Vásquez returns regarding low back pain. Patient has had 10/21/22 Right L5/S1 and S1 TF CELINA with 80% relief for about one day. On 11/11/22 we proceeded with Bilateral L2, L3, L4, L5 MBB with good benefit only for the first 2 hours. Patient has repeat MBB scheduled for 12/2/2022. Patient today is complain of right sided radiating groin pain. Patient has known osteoarthritis of the right hip. She has an appointment with Orthopedics, Dr. Gary on 2/3/23. Dr. Gary would like her hg to be improved prior to considering right NAT. Patient is currently in physical therapy for her back and hip pain.  Patient has no new weakness, new sensation changes, changes to bowel/bladder habits, or saddle anesthesia. No recent falls.     Initial History 10/18/2022  Shelly Vásquez presents to the clinic for the evaluation of chronic lower back and leg pain. The pain started several years ago and symptoms have been worsening.The pain is located in the lower back area and radiates to the right leg and foot.  The pain is described as aching, dull, and shooting and is rated as 8/10. The pain is rated with a score of  3/10 on the BEST day and a score of 10/10 on the WORST day.  Symptoms interfere with daily activity and sleeping. The pain is exacerbated by Standing and Walking.  The pain is mitigated by laying down. She has history of lumbar laminectomy several years ago and follow up with Neurology team. She reports history of RLS as well. She is S/P  right T12/L1 TFESI and LESI with temporary relief.     Patient denies night fever/night sweats, urinary incontinence, bowel incontinence, significant weight loss, significant motor weakness, and loss of sensations.    Physical Therapy/Home Exercise: yes      Pain Disability Index Review:  Last 3 PDI Scores 11/22/2022   Pain Disability Index (PDI)  33       Pain Medications:  Gabapentin  Tylenol       report:  Not applicable    Pain Procedures:  11/11/22 Bilateral L2, L3, L4, L5 MBB  10/21/22 Right L5/S1 ans S1 TFESI.  08/25/2022: Lumbar Interlaminar Epidural Steroid Injection L5/S1 under Fluoroscopic Guidance  08/11/2022: Thoracic Transforaminal Epidural Steroid Injection Right  T12/L1 under Fluoroscopic Guidance    Imaging:     EXAMINATION:  MRI LUMBAR SPINE WITHOUT CONTRAST     CLINICAL HISTORY:  Lumbar radiculopathy, symptoms persist with conservative treatment; Radiculopathy, lumbar region     TECHNIQUE:  Multiplanar, multisequence MR images were acquired from the thoracolumbar junction to the sacrum without the administration of contrast.     COMPARISON:  MRI 01/24/2017.     FINDINGS:  Examination is degraded by patient motion artifact.     Postoperative change of L4-L5 laminectomies.  Lumbar spine alignment demonstrates mild dextrocurvature, grade 1 retrolisthesis of L1 on L2 and grade 1 anterolisthesis of L4 on L5, similar when compared to the previous MRI.  No spondylolysis.  Vertebral body heights are well maintained without evidence for fracture.  Stable low signal intensity lesion within the right lateral aspect of the L4 vertebral body, likely a bone island.  Remaining bone marrow demonstrates heterogeneous signal, similar when compared to the previous MRI and likely related to benign reconversion.  No convincing MR imaging findings to suggest a marrow infiltrative process.  Decreased interspinous distances at L3-L4 and L4-L5 with associated chronic subcortical irregularity, findings that can be seen in the setting of Baastrup's disease.     There is multilevel degenerative disc space narrowing and desiccation most pronounced at L1-L2, L3-L4, L4-L5 and L5-S1, slightly progressed when compared to the previous exam.  Worsening chronic degenerative endplate changes and mild edema at L1-L2.  Stable chronic degenerative endplate changes at L4-L5 and  L5-S1.     Distal spinal cord demonstrates normal contour and signal intensity.  Cauda equina is not well evaluated secondary to aforementioned motion artifact.  Conus medullaris terminates at L1.     Hepatic parenchyma demonstrates diffuse low T2 signal, incompletely evaluated on this exam.  There is a left renal cortical cyst.  Remaining visualized abdominal organs demonstrate no significant abnormalities.  There is fatty degeneration of the posterior-inferior paraspinal musculature.  SI joints are symmetric.     T12-L1: No spinal canal stenosis or neural foraminal narrowing.     L1-L2: Mild grade 1 retrolisthesis.  Circumferential disc bulge encroaches into the bilateral foraminal zones.  Bilateral facet arthropathy and bilateral ligamentum flavum buckling.  Findings contribute to mild left neural foraminal narrowing.  No spinal canal stenosis.     L2-L3: Bilateral facet arthropathy and bilateral ligamentum flavum buckling.  No spinal canal stenosis or neural foraminal narrowing.     L3-L4: Circumferential disc bulge.  Bilateral facet arthropathy and bilateral ligamentum flavum buckling.  Findings contribute to mild-to-moderate spinal canal stenosis.  No neural foraminal narrowing.     L4-L5: Grade 1 anterolisthesis with uncovering of the intervertebral disc.  Bilateral facet arthropathy and bilateral ligamentum flavum buckling.  Findings contribute to moderate spinal canal stenosis and mild left neural foraminal narrowing.     L5-S1: Circumferential disc bulge encroaches into the bilateral foraminal zones and demonstrates a superimposed posterior broad-based disc protrusion that likely abuts the bilateral descending S1 nerve roots, similar when compared to the previous MRI.  Bilateral facet arthropathy.  Findings contribute to moderate to severe bilateral neural foraminal narrowing.     Impression:     1. Examination degraded by patient motion artifact.  2. Postoperative change of L4-L5 laminectomies.  3. Mild  lumbar dextroscoliosis with superimposed degenerative changes contributing to multilevel mild-to-moderate spinal canal stenosis and mild to severe neural foraminal narrowing as detailed above.  Findings have slightly progressed when compared to MRI dated 01/24/2017.    Past Medical History:   Diagnosis Date    Arthritis     Atrial fibrillation     Basal cell cancer     on the face    Encounter for blood transfusion     Gastric ulcer     Herpes simplex without mention of complication     rare outbreaks    Postmenopausal HRT (hormone replacement therapy)     Supraventricular tachycardia     s/p AVNRT ablation (Dr Brady)     Past Surgical History:   Procedure Laterality Date    APPENDECTOMY  age 21    BACK SURGERY      Beast Lift  2004    BONE MARROW BIOPSY Right 12/19/2019    Procedure: Biopsy-bone marrow;  Surgeon: Aidan Spring MD;  Location: [facility] OR [address] [place] [address];  Service: Oncology;  Laterality: Right;    BREAST BIOPSY  50yrs ago    unable to see scar    COSMETIC SURGERY      DILATION AND CURETTAGE OF UTERUS  2008    PMB    ENDOMETRIAL BIOPSY      EPIDURAL STEROID INJECTION N/A 8/25/2022    Procedure: LUMBAR CELINA CONTRAST DIRECT REFERRAL;  Surgeon: Rj Hernandez MD;  Location: [facility] PAIN MGT;  Service: Pain Management;  Laterality: N/A;    EYE SURGERY      INJECTION OF ANESTHETIC AGENT AROUND NERVE Bilateral 11/11/2022    Procedure: BLOCK, NERVE BILATERAL L2,L3,L4 AND L5 MEDIAL BRANCH ONE OF TWO;  Surgeon: Rj Hernandez MD;  Location: [facility] PAIN MGT;  Service: Pain Management;  Laterality: Bilateral;    ptsosis      RADIOFREQUENCY ABLATION  03/2018    SMALL INTESTINE SURGERY      TONSILLECTOMY, ADENOIDECTOMY  age 8    TOTAL REDUCTION MAMMOPLASTY Bilateral 2003    TRANSFORAMINAL EPIDURAL INJECTION OF STEROID Right 8/11/2022    Procedure: Injection,steroid,epidural Right L1/2 TF DIrect Referral Instructed to provide intervention per MRI results;  Surgeon: Rj Hernandez MD;  Location: [facility] PAIN MGT;  Service:  Pain Management;  Laterality: Right;    TRANSFORAMINAL EPIDURAL INJECTION OF STEROID Right 10/21/2022    Procedure: INJECTION, STEROID, EPIDURAL, TRANSFORAMINAL APPROACH, L5-S1 and S1, RIGHT CONTRAST;  Surgeon: Rj Hernandez MD;  Location: [facility] PAIN T;  Service: Pain Management;  Laterality: Right;    TUBAL LIGATION       Social History     Socioeconomic History    Marital status:    Tobacco Use    Smoking status: Former     Packs/day: 1.00     Years: 22.00     Pack years: 22.00     Types: Cigarettes     Quit date: 1980     Years since quittin.3    Smokeless tobacco: Never   Substance and Sexual Activity    Alcohol use: Yes     Alcohol/week: 2.0 standard drinks     Types: 2 Glasses of wine per week     Comment: 1-2 glasses of wine daily    Drug use: No    Sexual activity: Not Currently     Partners: Male     Birth control/protection: Post-menopausal     Comment:  since      Family History   Problem Relation Age of Onset    No Known Problems Father     Cirrhosis Mother     No Known Problems Brother     No Known Problems Maternal Aunt     No Known Problems Maternal Uncle     No Known Problems Paternal Aunt     No Known Problems Paternal Uncle     No Known Problems Maternal Grandmother     No Known Problems Maternal Grandfather     No Known Problems Paternal Grandmother     No Known Problems Paternal Grandfather     No Known Problems Daughter     Pulmonary embolism Son     Breast cancer Neg Hx     Colon cancer Neg Hx     Ovarian cancer Neg Hx     Amblyopia Neg Hx     Blindness Neg Hx     Cancer Neg Hx     Cataracts Neg Hx     Diabetes Neg Hx     Glaucoma Neg Hx     Hypertension Neg Hx     Macular degeneration Neg Hx     Retinal detachment Neg Hx     Strabismus Neg Hx     Stroke Neg Hx     Thyroid disease Neg Hx        Review of patient's allergies indicates:   Allergen Reactions    Nsaids (non-steroidal anti-inflammatory drug)      GI Bleed  Had 5 blood transfusions       Current  "Outpatient Medications   Medication Sig    acetaminophen (TYLENOL) 325 MG tablet Take 325 mg by mouth continuous prn for Pain.    aspirin (ECOTRIN) 81 MG EC tablet Take 1 tablet (81 mg total) by mouth once daily.    diazePAM (VALIUM) 2 MG tablet Take 2 mg by mouth nightly as needed.    estradiol-norethindrone acet 0.5-0.1 mg per tablet Take 1 tablet by mouth once daily.    gabapentin (NEURONTIN) 600 MG tablet Take 1 tablet (600 mg total) by mouth 4 (four) times daily as needed.    pantoprazole (PROTONIX) 40 MG tablet Take 1 tablet (40 mg total) by mouth once daily.    pramipexole (MIRAPEX) 0.5 MG tablet TAKE 1 TABLET BY MOUTH THREE TIMES DAILY    verapamiL (VERELAN) 120 MG C24P Take 1 capsule (120 mg total) by mouth once daily.    vitamin D (VITAMIN D3) 1000 units Tab Take 1,000 Units by mouth once daily.     No current facility-administered medications for this visit.       REVIEW OF SYSTEMS:    GENERAL:  No weight loss, malaise or fevers.  HEENT:  Negative for frequent or significant headaches.  NECK:  Negative for lumps, goiter, pain and significant neck swelling.  RESPIRATORY:  Negative for cough, wheezing or shortness of breath.  CARDIOVASCULAR:  Negative for chest pain, leg swelling or palpitations.  GI:  Negative for abdominal discomfort, blood in stools or black stools or change in bowel habits.  MUSCULOSKELETAL:  See HPI.  SKIN:  Negative for lesions, rash, and itching.  PSYCH:  Negative for sleep disturbance, mood disorder and recent psychosocial stressors.  HEMATOLOGY/LYMPHOLOGY:  Negative for prolonged bleeding, bruising easily or swollen nodes.  NEURO:   No history of headaches, syncope, paralysis, seizures or tremors.  All other reviewed and negative other than HPI.    OBJECTIVE:    /68 (BP Location: Right arm, Patient Position: Sitting, BP Method: Medium (Automatic))   Pulse 87   Resp 18   Ht 5' 8" (1.727 m)   Wt 66.7 kg (147 lb)   LMP  (LMP Unknown)   BMI 22.35 kg/m²     PHYSICAL " EXAMINATION:    General appearance: Well appearing, in no acute distress, alert and oriented x3.  Psych:  Mood and affect appropriate.  Skin: Skin color, texture, turgor normal, no rashes or lesions, in both upper and lower body.  Head/face:  Normocephalic, atraumatic.  Cor: RRR  Pulm: CTA  GI:  Soft and non-tender.  Back: Straight leg raising in the sitting position is negative to radicular pain. Tenderness to palpation over bilateral lumbar paraspinal muscles. Positive facet loading. Pain worsened with extension>flexion.   Extremities: Peripheral joint ROM is full and pain free without obvious instability or laxity in all four extremities. No deformities, edema, or skin discoloration. Good capillary refill.   Musculoskeletal: Shoulder, sacroiliac and knee provocative maneuvers are negative. Bilateral upper and lower extremity strength is normal and symmetric.  No atrophy or tone abnormalities are noted. FADIR positive. CONCETTA with right groin pain. +Log Roll.  Neuro: Bilateral upper and lower extremity coordination and muscle stretch reflexes are physiologic and symmetric.  Plantar response are downgoing. No loss of sensation is noted.  Gait: Antaligic    ASSESSMENT: 81 y.o. year old female with chronic lower back and leg  pain, consistent with      1. DDD (degenerative disc disease), lumbosacral        2. Spondylosis of lumbosacral spine with radiculopathy        3. Lumbosacral radiculopathy                PLAN:     - I have stressed the importance of physical activity and a home exercise plan to help with pain and improve health.  - Continue with physical therapy  - Continue with gabapentin 600mg TID. Prescribed by Dr. Méndez for RLS  - Schedule for Right Intraarticular Hip injection  - Encouraged follow up with Dr. Gary regarding Right NAT. Appointment scheduled for 2/3/2023.  - RTC 2 weeks after the procedure.   - Counseled patient regarding the importance of activity modification and physical  therapy.    The above plan and management options were discussed at length with patient. Patient is in agreement with the above and verbalized understanding. It will be communicated with the referring physician via electronic record, fax, or mail.    Simon Chacon  11/22/2022    I have reviewed and concur with the resident's history, physical, assessment, and plan.  I have personally interviewed and examined the patient at bedside.  See below addendum for my evaluation and additional findings.    Rj Hernandez MD

## 2022-11-28 ENCOUNTER — PATIENT OUTREACH (OUTPATIENT)
Dept: ADMINISTRATIVE | Facility: OTHER | Age: 82
End: 2022-11-28
Payer: MEDICARE

## 2022-11-28 ENCOUNTER — OUTPATIENT CASE MANAGEMENT (OUTPATIENT)
Dept: ADMINISTRATIVE | Facility: OTHER | Age: 82
End: 2022-11-28
Payer: MEDICARE

## 2022-11-28 NOTE — PROGRESS NOTES
Outpatient Care Management  Plan of Care Follow Up Visit    Patient: Shelly Vásquez  MRN: [id number]  Date of Service: 11/28/2022  Completed by: Sunita Clayton RN  Referral Date: 07/31/2022    Reason for Visit   Patient presents with    OPCM Chart Review    Follow-up    Update Plan Of Care    Case Closure       Brief Summary:   Chart review and noted pt had michael x 2.  She is considering total hip replacement surgery on the right as the team feels this is the source of major pain.  Pt was advised she needs to fu again with hematology as her h and h are low and need to be optimied prior to surgery    Next steps from previous encounter  Fu on elevated toilet seat install completed  Fu on any pain management recommendations from emg results done  Review pain status done    Interventions  Chart reviewed  Reviewed upcoming appt schedule    Assess/review short term goals met    Discussed progress from enrollment.      Reviewed short and long term goals met with pt.  Advised of case closure today.  Pt verbalized understanding.  Reviewed upcoming appointments with pt and noted them on calender.No further needs at this time.  Pt was advised that they could contact if any new needs arise and verbalized understanding.       Patient Summary     Involvement of Care:  Do I have permission to speak with other family members about your care?       Patient Reported Labs & Vitals:  1.  Any Patient Reported Labs & Vitals?     2.  Patient Reported Blood Pressure:     3.  Patient Reported Pulse:     4.  Patient Reported Weight (Kg):     5.  Patient Reported Blood Glucose (mg/dl):       Medical and social history was reviewed with patient and/or caregiver.     Clinical Assessment     Reviewed and provided basic information on available community resources for mental health, transportation, wellness resources, and palliative care programs with patient and/or caregiver.     Complex Care Plan     Care plan was discussed and completed today  with input from patient and/or caregiver.    Patient Instructions     Instructions were provided via the [employer] patient resources and are available for the patient to view on the patient portal.        Todays OPCM Self-Management Care Plan was developed with the patients/caregivers input and was based on identified barriers from todays assessment.  Goals were written today with the patient/caregiver and the patient has agreed to work towards these goals to improve his/her overall well-being. Patient verbalized understanding of the care plan, goals, and all of today's instructions. Encouraged patient/caregiver to communicate with his/her physician and health care team about health conditions and the treatment plan.  Provided my contact information today and encouraged patient/caregiver to call me with any questions as needed.

## 2022-11-30 ENCOUNTER — EXTERNAL CHRONIC CARE MANAGEMENT (OUTPATIENT)
Dept: PRIMARY CARE CLINIC | Facility: CLINIC | Age: 82
End: 2022-11-30
Payer: MEDICARE

## 2022-11-30 PROCEDURE — 99490 PR CHRONIC CARE MGMT, 1ST 20 MIN: ICD-10-PCS | Mod: S$PBB,,, | Performed by: INTERNAL MEDICINE

## 2022-11-30 PROCEDURE — 99490 CHRNC CARE MGMT STAFF 1ST 20: CPT | Mod: S$PBB,,, | Performed by: INTERNAL MEDICINE

## 2022-11-30 PROCEDURE — 99490 CHRNC CARE MGMT STAFF 1ST 20: CPT | Mod: PBBFAC | Performed by: INTERNAL MEDICINE

## 2022-12-02 ENCOUNTER — HOSPITAL ENCOUNTER (OUTPATIENT)
Facility: OTHER | Age: 82
Discharge: HOME | End: 2022-12-02
Attending: ANESTHESIOLOGY | Admitting: ANESTHESIOLOGY
Payer: MEDICARE

## 2022-12-02 ENCOUNTER — PATIENT MESSAGE (OUTPATIENT)
Dept: INTERNAL MEDICINE | Facility: CLINIC | Age: 82
End: 2022-12-02
Payer: MEDICARE

## 2022-12-02 VITALS
RESPIRATION RATE: 14 BRPM | BODY MASS INDEX: 22.73 KG/M2 | DIASTOLIC BLOOD PRESSURE: 58 MMHG | WEIGHT: 150 LBS | SYSTOLIC BLOOD PRESSURE: 125 MMHG | HEART RATE: 59 BPM | TEMPERATURE: 97 F | OXYGEN SATURATION: 95 % | HEIGHT: 68 IN

## 2022-12-02 DIAGNOSIS — M16.11 PRIMARY OSTEOARTHRITIS OF RIGHT HIP: Primary | ICD-10-CM

## 2022-12-02 DIAGNOSIS — G89.29 CHRONIC PAIN: ICD-10-CM

## 2022-12-02 PROCEDURE — 63600175 PHARM REV CODE 636 W HCPCS: Performed by: ANESTHESIOLOGY

## 2022-12-02 PROCEDURE — 77002 PR FLUOROSCOPIC GUIDANCE NEEDLE PLACEMENT: ICD-10-PCS | Mod: 26,,, | Performed by: ANESTHESIOLOGY

## 2022-12-02 PROCEDURE — 20610 DRAIN/INJ JOINT/BURSA W/O US: CPT | Mod: RT,,, | Performed by: ANESTHESIOLOGY

## 2022-12-02 PROCEDURE — 20610 DRAIN/INJ JOINT/BURSA W/O US: CPT | Mod: RT | Performed by: ANESTHESIOLOGY

## 2022-12-02 PROCEDURE — 25000003 PHARM REV CODE 250: Performed by: ANESTHESIOLOGY

## 2022-12-02 PROCEDURE — 77002 NEEDLE LOCALIZATION BY XRAY: CPT | Performed by: ANESTHESIOLOGY

## 2022-12-02 PROCEDURE — 25500020 PHARM REV CODE 255: Performed by: ANESTHESIOLOGY

## 2022-12-02 PROCEDURE — 77002 NEEDLE LOCALIZATION BY XRAY: CPT | Mod: 26,,, | Performed by: ANESTHESIOLOGY

## 2022-12-02 PROCEDURE — 20610 PR DRAIN/INJECT LARGE JOINT/BURSA: ICD-10-PCS | Mod: RT,,, | Performed by: ANESTHESIOLOGY

## 2022-12-02 RX ORDER — BUPIVACAINE HYDROCHLORIDE 5 MG/ML
INJECTION, SOLUTION EPIDURAL; INTRACAUDAL
Status: DISCONTINUED | OUTPATIENT
Start: 2022-12-02 | End: 2022-12-02 | Stop reason: HOSPADM

## 2022-12-02 RX ORDER — DIAZEPAM 2 MG/1
2 TABLET ORAL NIGHTLY PRN
Qty: 30 TABLET | Refills: 1 | Status: SHIPPED | OUTPATIENT
Start: 2022-12-02 | End: 2023-01-03 | Stop reason: SDUPTHER

## 2022-12-02 RX ORDER — BUPIVACAINE HYDROCHLORIDE 2.5 MG/ML
INJECTION, SOLUTION EPIDURAL; INFILTRATION; INTRACAUDAL
Status: DISCONTINUED | OUTPATIENT
Start: 2022-12-02 | End: 2022-12-02 | Stop reason: HOSPADM

## 2022-12-02 RX ORDER — TRIAMCINOLONE ACETONIDE 40 MG/ML
INJECTION, SUSPENSION INTRA-ARTICULAR; INTRAMUSCULAR
Status: DISCONTINUED | OUTPATIENT
Start: 2022-12-02 | End: 2022-12-02 | Stop reason: HOSPADM

## 2022-12-02 RX ORDER — SODIUM CHLORIDE 9 MG/ML
500 INJECTION, SOLUTION INTRAVENOUS CONTINUOUS
Status: DISCONTINUED | OUTPATIENT
Start: 2022-12-02 | End: 2022-12-02 | Stop reason: HOSPADM

## 2022-12-02 RX ORDER — ALPRAZOLAM 0.5 MG/1
0.5 TABLET ORAL ONCE
Status: COMPLETED | OUTPATIENT
Start: 2022-12-02 | End: 2022-12-02

## 2022-12-02 RX ORDER — LIDOCAINE HYDROCHLORIDE 20 MG/ML
INJECTION, SOLUTION INFILTRATION; PERINEURAL
Status: DISCONTINUED | OUTPATIENT
Start: 2022-12-02 | End: 2022-12-02 | Stop reason: HOSPADM

## 2022-12-02 RX ADMIN — ALPRAZOLAM 0.5 MG: 0.5 TABLET ORAL at 11:12

## 2022-12-02 NOTE — DISCHARGE INSTRUCTIONS

## 2022-12-02 NOTE — OP NOTE
Hip Joint Injection under Fluoroscopic Guidance    The procedure, risks, benefits, and options were discussed with the patient. There are no contraindications to the procedure. The patent expressed understanding and agreed to the procedure. Informed written consent was obtained prior to the start of the procedure and can be found in the patient's chart.    PATIENT NAME: Shelly Vásquez   MRN: [id number]     DATE OF PROCEDURE: 12/02/2022    PROCEDURE: Right Hip Joint Injection under Fluoroscopic Guidance    PRE-OP DIAGNOSIS: Osteoarthritis of right hip, unspecified osteoarthritis type [M16.11]    POST-OP DIAGNOSIS: Osteoarthritis of right hip, unspecified osteoarthritis type [M16.11]    PHYSICIAN: Rj Hernandez MD    ASSISTANTS: Dr. Korin Sheehan     MEDICATIONS INJECTED: Preservative-free Kenalog 40mg with 3cc of Bupivacine 0.25%     LOCAL ANESTHETIC INJECTED: Xylocaine 2%     SEDATION: None    ESTIMATED BLOOD LOSS: None    COMPLICATIONS: None    TECHNIQUE: Time-out was performed to identify the patient and procedure to be performed. With the patient laying in a supine position, the surgical area was prepped and draped in the usual sterile fashion using ChloraPrep and a fenestrated drape. The area overlying the hip joint was determined under fluoroscopy guidance. Skin anesthesia was achieved by injecting Lidocaine 2% over the injection site. A 25 gauge, 3.5 inch spinal quinke needle was introduced under fluoroscopy until the tip reached the greater trochanter. The tip of the needle was hinged cephalad from the greater trochanter into the joint space.  When the needle tip was in the appropriate position, and there was no blood aspiration, Contrast dye  Omnipaque (240mg/mL) was injected to confirm placement and there was no vascular runoff. 5 mL of the medication mixture listed above was injected slowly. The needles were removed and bleeding was nil. A sterile dressing was applied. No specimens collected. The patient  tolerated the procedure well.    The patient was monitored after the procedure in the recovery area. They were given post-procedure and discharge instructions to follow at home. The patient was discharged in a stable condition.        Rj Hernandez MD

## 2022-12-05 ENCOUNTER — PATIENT MESSAGE (OUTPATIENT)
Dept: SPINE | Facility: CLINIC | Age: 82
End: 2022-12-05
Payer: MEDICARE

## 2022-12-06 ENCOUNTER — TELEPHONE (OUTPATIENT)
Dept: PAIN MEDICINE | Facility: CLINIC | Age: 82
End: 2022-12-06
Payer: MEDICARE

## 2022-12-06 NOTE — TELEPHONE ENCOUNTER
----- Message from Rj Hernandez MD sent at 12/6/2022  1:56 PM CST -----  Has anyone reached out to this patient for increased hip pain she call earlier today?

## 2022-12-06 NOTE — TELEPHONE ENCOUNTER
Patient was contact and informed to use cool compressor on area. Continue with medication provided and try to relax. If pain continue to increase by the weekend to give us a call so we can get patient schedule for early visit to be seen in clinic.    Verbalized understanding

## 2022-12-14 ENCOUNTER — TELEPHONE (OUTPATIENT)
Dept: SPINE | Facility: CLINIC | Age: 82
End: 2022-12-14
Payer: MEDICARE

## 2022-12-14 ENCOUNTER — PATIENT MESSAGE (OUTPATIENT)
Dept: INTERNAL MEDICINE | Facility: CLINIC | Age: 82
End: 2022-12-14
Payer: MEDICARE

## 2022-12-14 NOTE — TELEPHONE ENCOUNTER
This message is for patient in regards to his/her appointment 12/15/22 at 9:30a   With Rj York.      For the safety of all patients and staff members. We are requesting during this visit that all patients and visitors to wear required face mask at all times here at [facility].     If you have any questions or concerns please contact [phone]

## 2022-12-15 ENCOUNTER — OFFICE VISIT (OUTPATIENT)
Dept: SPINE | Facility: CLINIC | Age: 82
End: 2022-12-15
Payer: MEDICARE

## 2022-12-15 VITALS
SYSTOLIC BLOOD PRESSURE: 137 MMHG | DIASTOLIC BLOOD PRESSURE: 65 MMHG | HEART RATE: 65 BPM | BODY MASS INDEX: 22.72 KG/M2 | HEIGHT: 68 IN | TEMPERATURE: 98 F | WEIGHT: 149.94 LBS

## 2022-12-15 DIAGNOSIS — M16.11 OSTEOARTHRITIS OF RIGHT HIP, UNSPECIFIED OSTEOARTHRITIS TYPE: ICD-10-CM

## 2022-12-15 DIAGNOSIS — Z98.890 S/P LUMBAR LAMINECTOMY: ICD-10-CM

## 2022-12-15 DIAGNOSIS — G89.29 CHRONIC RIGHT HIP PAIN: Primary | ICD-10-CM

## 2022-12-15 DIAGNOSIS — M25.551 CHRONIC RIGHT HIP PAIN: Primary | ICD-10-CM

## 2022-12-15 DIAGNOSIS — M24.151 DEGENERATIVE TEAR OF ACETABULAR LABRUM OF RIGHT HIP: ICD-10-CM

## 2022-12-15 DIAGNOSIS — G25.81 RESTLESS LEG: ICD-10-CM

## 2022-12-15 DIAGNOSIS — G89.4 CHRONIC PAIN DISORDER: ICD-10-CM

## 2022-12-15 PROCEDURE — 99999 PR PBB SHADOW E&M-EST. PATIENT-LVL III: CPT | Mod: PBBFAC,,, | Performed by: ANESTHESIOLOGY

## 2022-12-15 PROCEDURE — 99999 PR PBB SHADOW E&M-EST. PATIENT-LVL III: ICD-10-PCS | Mod: PBBFAC,,, | Performed by: ANESTHESIOLOGY

## 2022-12-15 PROCEDURE — 99214 OFFICE O/P EST MOD 30 MIN: CPT | Mod: S$PBB,GC,, | Performed by: ANESTHESIOLOGY

## 2022-12-15 PROCEDURE — 99214 PR OFFICE/OUTPT VISIT, EST, LEVL IV, 30-39 MIN: ICD-10-PCS | Mod: S$PBB,GC,, | Performed by: ANESTHESIOLOGY

## 2022-12-15 PROCEDURE — 99213 OFFICE O/P EST LOW 20 MIN: CPT | Mod: PBBFAC | Performed by: ANESTHESIOLOGY

## 2022-12-15 NOTE — PROGRESS NOTES
"Chronic Pain - Established visit (Follow up)        Chief Complaint: Lower back, hip and leg pain     SUBJECTIVE:    Interval History 12/15/2022  Ms. Vásquez returns regarding low back pain. Patient had 70-80% relief from right hip injection on 12/2/2022, but denied any relief "after the lidocaine wore off."  Pain is localized to the low back with radiation down the right lower extremity from the groin stopping in the arch of the foot. Pain is described as 3-4/10 constant pain in the groin, and the pain in the leg is "firecrackers". The pain is aggravated by walking. The pain is alleviated by Gabapentin 600mg TID for neuropathy (diagnosed on NCS/EMG). Denied weakness. Endorsed numbness in the right lower extremity. Dr. Méndez is her  doctor. She has muscles and cramps.  Used to walk 1 mile daily. Completed PT 2 years ago. MRI of right hip " There is high-grade partial to full-thickness chondral loss throughout the right femoral head and acetabulum with associated subchondral edema".Denies changes in bowel or bladder function. Denies saddle anesthesia. Denies recent fevers or infections. Denies unexplained weight loss.     Interval History 11/22/2022  Ms. Vásquez returns regarding low back pain. Patient has had 10/21/22 Right L5/S1 and S1 TF CELINA with 80% relief for about one day. On 11/11/22 we proceeded with Bilateral L2, L3, L4, L5 MBB with good benefit only for the first 2 hours. Patient has repeat MBB scheduled for 12/2/2022. Patient today is complain of right sided radiating groin pain. Patient has known osteoarthritis of the right hip. She has an appointment with Orthopedics, Dr. Gary on 2/3/23. Dr. Gary would like her hg to be improved prior to considering right NAT. Patient is currently in physical therapy for her back and hip pain.  Patient has no new weakness, new sensation changes, changes to bowel/bladder habits, or saddle anesthesia. No recent falls.     Initial History 10/18/2022  Shelly " Minh Vásquez presents to the clinic for the evaluation of chronic lower back and leg pain. The pain started several years ago and symptoms have been worsening.The pain is located in the lower back area and radiates to the right leg and foot.  The pain is described as aching, dull, and shooting and is rated as 8/10. The pain is rated with a score of  3/10 on the BEST day and a score of 10/10 on the WORST day.  Symptoms interfere with daily activity and sleeping. The pain is exacerbated by Standing and Walking.  The pain is mitigated by laying down. She has history of lumbar laminectomy several years ago and follow up with Neurology team. She reports history of RLS as well. She is S/P  right T12/L1 TFESI and LESI with temporary relief.     Patient denies night fever/night sweats, urinary incontinence, bowel incontinence, significant weight loss, significant motor weakness, and loss of sensations.    Physical Therapy/Home Exercise: yes      Pain Disability Index Review:  Last 3 PDI Scores 11/22/2022   Pain Disability Index (PDI) 33       Pain Medications:  Gabapentin  Tylenol       report:  Not applicable    Pain Procedures:  12/2/22 Right hip Injection 70-80% relief for 1 day  11/11/22 Bilateral L2, L3, L4, L5 MBB  10/21/22 Right L5/S1 ans S1 TFESI.  08/25/2022: Lumbar Interlaminar Epidural Steroid Injection L5/S1 under Fluoroscopic Guidance  08/11/2022: Thoracic Transforaminal Epidural Steroid Injection Right  T12/L1 under Fluoroscopic Guidance    Imaging:     EXAMINATION:  MRI LUMBAR SPINE WITHOUT CONTRAST     CLINICAL HISTORY:  Lumbar radiculopathy, symptoms persist with conservative treatment; Radiculopathy, lumbar region     TECHNIQUE:  Multiplanar, multisequence MR images were acquired from the thoracolumbar junction to the sacrum without the administration of contrast.     COMPARISON:  MRI 01/24/2017.     FINDINGS:  Examination is degraded by patient motion artifact.     Postoperative change of L4-L5  laminectomies.  Lumbar spine alignment demonstrates mild dextrocurvature, grade 1 retrolisthesis of L1 on L2 and grade 1 anterolisthesis of L4 on L5, similar when compared to the previous MRI.  No spondylolysis.  Vertebral body heights are well maintained without evidence for fracture.  Stable low signal intensity lesion within the right lateral aspect of the L4 vertebral body, likely a bone island.  Remaining bone marrow demonstrates heterogeneous signal, similar when compared to the previous MRI and likely related to benign reconversion.  No convincing MR imaging findings to suggest a marrow infiltrative process.  Decreased interspinous distances at L3-L4 and L4-L5 with associated chronic subcortical irregularity, findings that can be seen in the setting of Baastrup's disease.     There is multilevel degenerative disc space narrowing and desiccation most pronounced at L1-L2, L3-L4, L4-L5 and L5-S1, slightly progressed when compared to the previous exam.  Worsening chronic degenerative endplate changes and mild edema at L1-L2.  Stable chronic degenerative endplate changes at L4-L5 and L5-S1.     Distal spinal cord demonstrates normal contour and signal intensity.  Cauda equina is not well evaluated secondary to aforementioned motion artifact.  Conus medullaris terminates at L1.     Hepatic parenchyma demonstrates diffuse low T2 signal, incompletely evaluated on this exam.  There is a left renal cortical cyst.  Remaining visualized abdominal organs demonstrate no significant abnormalities.  There is fatty degeneration of the posterior-inferior paraspinal musculature.  SI joints are symmetric.     T12-L1: No spinal canal stenosis or neural foraminal narrowing.     L1-L2: Mild grade 1 retrolisthesis.  Circumferential disc bulge encroaches into the bilateral foraminal zones.  Bilateral facet arthropathy and bilateral ligamentum flavum buckling.  Findings contribute to mild left neural foraminal narrowing.  No spinal  canal stenosis.     L2-L3: Bilateral facet arthropathy and bilateral ligamentum flavum buckling.  No spinal canal stenosis or neural foraminal narrowing.     L3-L4: Circumferential disc bulge.  Bilateral facet arthropathy and bilateral ligamentum flavum buckling.  Findings contribute to mild-to-moderate spinal canal stenosis.  No neural foraminal narrowing.     L4-L5: Grade 1 anterolisthesis with uncovering of the intervertebral disc.  Bilateral facet arthropathy and bilateral ligamentum flavum buckling.  Findings contribute to moderate spinal canal stenosis and mild left neural foraminal narrowing.     L5-S1: Circumferential disc bulge encroaches into the bilateral foraminal zones and demonstrates a superimposed posterior broad-based disc protrusion that likely abuts the bilateral descending S1 nerve roots, similar when compared to the previous MRI.  Bilateral facet arthropathy.  Findings contribute to moderate to severe bilateral neural foraminal narrowing.     Impression:     1. Examination degraded by patient motion artifact.  2. Postoperative change of L4-L5 laminectomies.  3. Mild lumbar dextroscoliosis with superimposed degenerative changes contributing to multilevel mild-to-moderate spinal canal stenosis and mild to severe neural foraminal narrowing as detailed above.  Findings have slightly progressed when compared to MRI dated 01/24/2017.    Past Medical History:   Diagnosis Date    Arthritis     Atrial fibrillation     Basal cell cancer     on the face    Encounter for blood transfusion     Gastric ulcer     Herpes simplex without mention of complication     rare outbreaks    Postmenopausal HRT (hormone replacement therapy)     Supraventricular tachycardia     s/p AVNRT ablation (Dr Brady)     Past Surgical History:   Procedure Laterality Date    APPENDECTOMY  age 21    BACK SURGERY      Beast Lift  2004    BONE MARROW BIOPSY Right 12/19/2019    Procedure: Biopsy-bone marrow;  Surgeon: Aidan BROCK  MD Saw;  Location: [facility] OR [address] [place] [address];  Service: Oncology;  Laterality: Right;    BREAST BIOPSY  50yrs ago    unable to see scar    COSMETIC SURGERY      DILATION AND CURETTAGE OF UTERUS      PMB    ENDOMETRIAL BIOPSY      EPIDURAL STEROID INJECTION N/A 2022    Procedure: LUMBAR CELINA CONTRAST DIRECT REFERRAL;  Surgeon: Rj Hernandez MD;  Location: [facility] PAIN MGT;  Service: Pain Management;  Laterality: N/A;    EYE SURGERY      INJECTION OF ANESTHETIC AGENT AROUND NERVE Bilateral 2022    Procedure: BLOCK, NERVE BILATERAL L2,L3,L4 AND L5 MEDIAL BRANCH ONE OF TWO;  Surgeon: Rj Hernandez MD;  Location: [facility] PAIN MGT;  Service: Pain Management;  Laterality: Bilateral;    INJECTION OF JOINT Right 2022    Procedure: INJECTION, JOINT RIGHT INTRARTICULAR HIP CONTRAST;  Surgeon: Rj Hernandez MD;  Location: [facility] PAIN MGT;  Service: Pain Management;  Laterality: Right;    ptsosis      RADIOFREQUENCY ABLATION  2018    SMALL INTESTINE SURGERY      TONSILLECTOMY, ADENOIDECTOMY  age 8    TOTAL REDUCTION MAMMOPLASTY Bilateral     TRANSFORAMINAL EPIDURAL INJECTION OF STEROID Right 2022    Procedure: Injection,steroid,epidural Right L1/2 TF DIrect Referral Instructed to provide intervention per MRI results;  Surgeon: Rj Hernandez MD;  Location: [facility] PAIN MGT;  Service: Pain Management;  Laterality: Right;    TRANSFORAMINAL EPIDURAL INJECTION OF STEROID Right 10/21/2022    Procedure: INJECTION, STEROID, EPIDURAL, TRANSFORAMINAL APPROACH, L5-S1 and S1, RIGHT CONTRAST;  Surgeon: Rj Hernandez MD;  Location: [facility] PAIN MGT;  Service: Pain Management;  Laterality: Right;    TUBAL LIGATION       Social History     Socioeconomic History    Marital status:    Tobacco Use    Smoking status: Former     Packs/day: 1.00     Years: 22.00     Pack years: 22.00     Types: Cigarettes     Quit date: 1980     Years since quittin.4    Smokeless tobacco: Never   Substance and Sexual  Activity    Alcohol use: Yes     Alcohol/week: 2.0 standard drinks     Types: 2 Glasses of wine per week     Comment: 1-2 glasses of wine daily    Drug use: No    Sexual activity: Not Currently     Partners: Male     Birth control/protection: Post-menopausal     Comment:  since 2010     Family History   Problem Relation Age of Onset    No Known Problems Father     Cirrhosis Mother     No Known Problems Brother     No Known Problems Maternal Aunt     No Known Problems Maternal Uncle     No Known Problems Paternal Aunt     No Known Problems Paternal Uncle     No Known Problems Maternal Grandmother     No Known Problems Maternal Grandfather     No Known Problems Paternal Grandmother     No Known Problems Paternal Grandfather     No Known Problems Daughter     Pulmonary embolism Son     Breast cancer Neg Hx     Colon cancer Neg Hx     Ovarian cancer Neg Hx     Amblyopia Neg Hx     Blindness Neg Hx     Cancer Neg Hx     Cataracts Neg Hx     Diabetes Neg Hx     Glaucoma Neg Hx     Hypertension Neg Hx     Macular degeneration Neg Hx     Retinal detachment Neg Hx     Strabismus Neg Hx     Stroke Neg Hx     Thyroid disease Neg Hx        Review of patient's allergies indicates:   Allergen Reactions    Nsaids (non-steroidal anti-inflammatory drug)      GI Bleed  Had 5 blood transfusions       Current Outpatient Medications   Medication Sig    acetaminophen (TYLENOL) 325 MG tablet Take 325 mg by mouth continuous prn for Pain.    aspirin (ECOTRIN) 81 MG EC tablet Take 1 tablet (81 mg total) by mouth once daily.    diazePAM (VALIUM) 2 MG tablet Take 1 tablet (2 mg total) by mouth nightly as needed for Insomnia (RLS).    estradiol-norethindrone acet 0.5-0.1 mg per tablet Take 1 tablet by mouth once daily.    gabapentin (NEURONTIN) 600 MG tablet Take 1 tablet (600 mg total) by mouth 4 (four) times daily as needed.    pantoprazole (PROTONIX) 40 MG tablet Take 1 tablet (40 mg total) by mouth once daily.    pramipexole  "(MIRAPEX) 0.5 MG tablet TAKE 1 TABLET BY MOUTH THREE TIMES DAILY    verapamiL (VERELAN) 120 MG C24P Take 1 capsule (120 mg total) by mouth once daily.    vitamin D (VITAMIN D3) 1000 units Tab Take 1,000 Units by mouth once daily.     No current facility-administered medications for this visit.       REVIEW OF SYSTEMS:    GENERAL:  No weight loss, malaise or fevers.  HEENT:  Negative for frequent or significant headaches.  NECK:  Negative for lumps, goiter, pain and significant neck swelling.  RESPIRATORY:  Negative for cough, wheezing or shortness of breath.  CARDIOVASCULAR:  Negative for chest pain, leg swelling or palpitations.  GI:  Negative for abdominal discomfort, blood in stools or black stools or change in bowel habits.  MUSCULOSKELETAL:  See HPI.  SKIN:  Negative for lesions, rash, and itching.  PSYCH:  Negative for sleep disturbance, mood disorder and recent psychosocial stressors.  HEMATOLOGY/LYMPHOLOGY:  Negative for prolonged bleeding, bruising easily or swollen nodes.  NEURO:   No history of headaches, syncope, paralysis, seizures or tremors.  All other reviewed and negative other than HPI.    OBJECTIVE:    /65   Pulse 65   Temp 97.6 °F (36.4 °C)   Ht 5' 8" (1.727 m)   Wt 68 kg (149 lb 14.6 oz)   LMP  (LMP Unknown)   BMI 22.79 kg/m²     PHYSICAL EXAMINATION:    General appearance: Well appearing, in no acute distress, alert and oriented x3.  Psych:  Mood and affect appropriate.  Skin: Skin color, texture, turgor normal, no rashes or lesions, in both upper and lower body.  Head/face:  Normocephalic, atraumatic.  Cor: RRR  Pulm: CTA  GI:  Soft and non-tender.  Back: Straight leg raising in the sitting position is negative to radicular pain. Tenderness to palpation over bilateral lumbar paraspinal muscles. Positive facet loading. Pain worsened with extension>flexion.   Extremities: Peripheral joint ROM is full and pain free without obvious instability or laxity in all four extremities. No " deformities, edema, or skin discoloration. Good capillary refill.   Musculoskeletal: Shoulder, sacroiliac and knee provocative maneuvers are negative. Bilateral upper and lower extremity strength is normal and symmetric.  No atrophy or tone abnormalities are noted. FADIR positive. CONCETTA with right groin pain. +Log Roll. Patient cannot extend him during internal rotation of hip due to typical pain reproduced  Neuro: Bilateral upper and lower extremity coordination and muscle stretch reflexes are physiologic and symmetric.  Plantar response are downgoing. No loss of sensation is noted.  Gait: Antaligic    ASSESSMENT: 81 y.o. year old female with chronic lower back and leg  pain, consistent with      1. Chronic right hip pain        2. Restless leg        3. S/P lumbar laminectomy        4. Osteoarthritis of right hip, unspecified osteoarthritis type        5. 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 Chronic pain disorder              PLAN:     - I have stressed the importance of physical activity and a home exercise plan to help with pain and improve health.  - Continue with home exercise program  - We recommended surgical evaluation of the right hip. Patient declined today  - Explained that she has 3 treatment options: 1) Surgery 2) Regenerative medicine, PRP 3) Hip block then RFA if diagnostic block is successful.  - Patient said that she would like to think about these options and then will contact our office at a later date    - Counseled patient regarding the importance of activity modification and physical therapy.    The above plan and management options were discussed at length with patient. Patient is in agreement with the above and verbalized understanding. It will be communicated with the referring physician via electronic record, fax, or mail.    Grzegorz Andrews MD  Pain Medicine Fellow     I have reviewed and concur with the resident's history, physical, assessment, and plan.  I have  personally interviewed and examined the patient at bedside.  See below addendum for my evaluation and additional findings.    Rj Hernandez MD

## 2022-12-20 RX ORDER — GABAPENTIN 600 MG/1
600 TABLET ORAL 4 TIMES DAILY
Qty: 50 TABLET | Refills: 0 | Status: SHIPPED | OUTPATIENT
Start: 2022-12-20 | End: 2023-01-23 | Stop reason: SDUPTHER

## 2022-12-23 ENCOUNTER — PES CALL (OUTPATIENT)
Dept: ADMINISTRATIVE | Facility: CLINIC | Age: 82
End: 2022-12-23
Payer: MEDICARE

## 2022-12-29 ENCOUNTER — PATIENT MESSAGE (OUTPATIENT)
Dept: INTERNAL MEDICINE | Facility: CLINIC | Age: 82
End: 2022-12-29
Payer: MEDICARE

## 2022-12-31 ENCOUNTER — EXTERNAL CHRONIC CARE MANAGEMENT (OUTPATIENT)
Dept: PRIMARY CARE CLINIC | Facility: CLINIC | Age: 82
End: 2022-12-31
Payer: MEDICARE

## 2022-12-31 ENCOUNTER — PATIENT MESSAGE (OUTPATIENT)
Dept: INTERNAL MEDICINE | Facility: CLINIC | Age: 82
End: 2022-12-31

## 2022-12-31 PROCEDURE — 99490 PR CHRONIC CARE MGMT, 1ST 20 MIN: ICD-10-PCS | Mod: S$PBB,,, | Performed by: INTERNAL MEDICINE

## 2022-12-31 PROCEDURE — 99490 CHRNC CARE MGMT STAFF 1ST 20: CPT | Mod: S$PBB,,, | Performed by: INTERNAL MEDICINE

## 2022-12-31 PROCEDURE — 99439 CHRNC CARE MGMT STAF EA ADDL: CPT | Mod: S$PBB,,, | Performed by: INTERNAL MEDICINE

## 2022-12-31 PROCEDURE — 99490 CHRNC CARE MGMT STAFF 1ST 20: CPT | Mod: PBBFAC | Performed by: INTERNAL MEDICINE

## 2022-12-31 PROCEDURE — 99439 PR CHRONIC CARE MGMT, EA ADDTL 20 MIN: ICD-10-PCS | Mod: S$PBB,,, | Performed by: INTERNAL MEDICINE

## 2022-12-31 PROCEDURE — 99439 CHRNC CARE MGMT STAF EA ADDL: CPT | Mod: PBBFAC,27 | Performed by: INTERNAL MEDICINE

## 2023-01-04 ENCOUNTER — TELEPHONE (OUTPATIENT)
Dept: ORTHOPEDICS | Facility: CLINIC | Age: 83
End: 2023-01-04
Payer: MEDICARE

## 2023-01-04 ENCOUNTER — OFFICE VISIT (OUTPATIENT)
Dept: OPHTHALMOLOGY | Facility: CLINIC | Age: 83
End: 2023-01-04
Payer: MEDICARE

## 2023-01-04 DIAGNOSIS — H25.13 NUCLEAR SCLEROTIC CATARACT, BILATERAL: Primary | ICD-10-CM

## 2023-01-04 PROCEDURE — 99212 OFFICE O/P EST SF 10 MIN: CPT | Mod: PBBFAC | Performed by: OPHTHALMOLOGY

## 2023-01-04 PROCEDURE — 99999 PR PBB SHADOW E&M-EST. PATIENT-LVL II: ICD-10-PCS | Mod: PBBFAC,,, | Performed by: OPHTHALMOLOGY

## 2023-01-04 PROCEDURE — 99214 OFFICE O/P EST MOD 30 MIN: CPT | Mod: S$PBB,,, | Performed by: OPHTHALMOLOGY

## 2023-01-04 PROCEDURE — 99214 PR OFFICE/OUTPT VISIT, EST, LEVL IV, 30-39 MIN: ICD-10-PCS | Mod: S$PBB,,, | Performed by: OPHTHALMOLOGY

## 2023-01-04 PROCEDURE — 99999 PR PBB SHADOW E&M-EST. PATIENT-LVL II: CPT | Mod: PBBFAC,,, | Performed by: OPHTHALMOLOGY

## 2023-01-04 RX ORDER — MOXIFLOXACIN 5 MG/ML
1 SOLUTION/ DROPS OPHTHALMIC
Status: CANCELLED | OUTPATIENT
Start: 2023-01-04

## 2023-01-04 RX ORDER — TETRACAINE HYDROCHLORIDE 5 MG/ML
1 SOLUTION OPHTHALMIC
Status: CANCELLED | OUTPATIENT
Start: 2023-01-04

## 2023-01-04 RX ORDER — PHENYLEPHRINE HYDROCHLORIDE 100 MG/ML
1 SOLUTION/ DROPS OPHTHALMIC
Status: CANCELLED | OUTPATIENT
Start: 2023-01-04

## 2023-01-04 RX ORDER — PHENYLEPHRINE HYDROCHLORIDE 25 MG/ML
1 SOLUTION/ DROPS OPHTHALMIC
Status: CANCELLED | OUTPATIENT
Start: 2023-01-04

## 2023-01-04 RX ORDER — TROPICAMIDE 10 MG/ML
1 SOLUTION/ DROPS OPHTHALMIC
Status: CANCELLED | OUTPATIENT
Start: 2023-01-04

## 2023-01-04 NOTE — PROGRESS NOTES
HPI    Patient presents today for a Cataract Evaluation. Patient notes vision as   blurry. Patient notes difficulty driving at night and has issues with   glare. Patient notes no eye pain.   Last edited by Bob Liu on 1/4/2023  9:50 AM.            Assessment /Plan     For exam results, see Encounter Report.    Nuclear sclerotic cataract, bilateral        Visually Significant Cataract: Patient reports decreased vision consistent with the clinical amount of lenticular opacity, which reaches the level of visual significance and affects activities of daily living.     Specifically, this patient describes difficulty with:  - driving safely at night  - reading road signs  - reading small print  - deciphering medicine bottles  - reading the newspaper  - using the phone  - reading texts     Risks, benefits, and alternatives to cataract surgery were discussed and the consent reviewed. IOL options were discussed, including ATIOLs and the associated side effects and additional patient cost associated with them.   IOL Selections:   Right eye  IOL:  CNWTT3 18.5      Left eye  IOL: CNWTT3 18.5   (RECHECK CURLY FOR CYL OS)    Pt wishes to have right eye done first. Hx monovision, but preferes binocularity.    The patient expresses a desire to reduce spectacle dependence. I reviewed various IOL and LASER refractive surgical options and we will attempt to minimize spectacle dependence by managing astigmatism and optimizing IOL selection. Femtosecond LASER assisted cataract surgery (FLACS) technology was explained to the patient with educational videos and discussion.  The patient voices understanding and wishes to implement this technology during the cataract procedure.  I explained the increased precision of the LASER versus manual techniques, especially as it relates to astigmatism reduction with arcuate incisions.  I emphasized that although our goal is to reduce the need for refractive correction after surgery,  there may still be a need for spectacle correction to achieve optimal visual acuity, and that a reasonable range of functional vision should be the expectation.  No guarantees are made about post operative refraction or visual acuity, as the eye may heal in unpredictable ways, and the standard risks, benefits, and alternatives to cataract surgery were explained.  The patient understands that the refractive portions of this cataract procedure are not covered by insurance, and that there is an out of pocket expense of $2250 per eye. I also explained that even though our pre-operative plan is to utilize advanced refractive technologies during surgery, that I may decide to eliminate part or all of this plan if surgical challenges or complications arise, or I feel that it is not in the patient's best interest. Consent forms and an ABN form were given to the patient to review.    CALLISTO ONLY

## 2023-01-04 NOTE — TELEPHONE ENCOUNTER
I called the patient today regarding her appointment. The patient is going to seek treatment at [facility] for her neurological problems. The patient verbalized understanding and has no further questions.         ----- Message from Rikki Galvan sent at 1/4/2023  2:07 PM CST -----  Call to offer sx on 3/22

## 2023-01-09 ENCOUNTER — TELEPHONE (OUTPATIENT)
Dept: ORTHOPEDICS | Facility: HOSPITAL | Age: 83
End: 2023-01-09
Payer: MEDICARE

## 2023-01-09 NOTE — TELEPHONE ENCOUNTER
----- Message from Rikki Galvan sent at 1/4/2023  3:24 PM CST -----  Good afternoon,     The patient stated that she does not want surgery. She is seeking treatment at [facility].     Sincerely,   Kenny Galvan MS, OTC  OR & Clinical Assistant to Dr. Keon Gary III  Phone: [phone]  Fax: [phone]    ----- Message -----  From: Rikki Galvan  Sent: 1/4/2023   2:08 PM CST  To: Rikki Galvan    Call to offer sx on 3/22

## 2023-01-11 ENCOUNTER — PATIENT MESSAGE (OUTPATIENT)
Dept: INTERNAL MEDICINE | Facility: CLINIC | Age: 83
End: 2023-01-11
Payer: MEDICARE

## 2023-01-12 ENCOUNTER — PATIENT MESSAGE (OUTPATIENT)
Dept: INTERNAL MEDICINE | Facility: CLINIC | Age: 83
End: 2023-01-12
Payer: MEDICARE

## 2023-01-12 ENCOUNTER — TELEPHONE (OUTPATIENT)
Dept: INTERNAL MEDICINE | Facility: CLINIC | Age: 83
End: 2023-01-12
Payer: MEDICARE

## 2023-01-12 DIAGNOSIS — M79.604 LEG PAIN, BILATERAL: ICD-10-CM

## 2023-01-12 DIAGNOSIS — G25.81 RLS (RESTLESS LEGS SYNDROME): ICD-10-CM

## 2023-01-12 DIAGNOSIS — M79.605 LEG PAIN, BILATERAL: ICD-10-CM

## 2023-01-12 DIAGNOSIS — G62.9 NEUROPATHY: ICD-10-CM

## 2023-01-12 DIAGNOSIS — D46.1 RARS (REFRACTORY ANEMIA WITH RINGED SIDEROBLASTS): Primary | ICD-10-CM

## 2023-01-12 DIAGNOSIS — M54.10 RADICULOPATHY WITH LOWER EXTREMITY SYMPTOMS: ICD-10-CM

## 2023-01-18 ENCOUNTER — PATIENT MESSAGE (OUTPATIENT)
Dept: INTERNAL MEDICINE | Facility: CLINIC | Age: 83
End: 2023-01-18
Payer: MEDICARE

## 2023-01-19 ENCOUNTER — TELEPHONE (OUTPATIENT)
Dept: INTERNAL MEDICINE | Facility: CLINIC | Age: 83
End: 2023-01-19
Payer: MEDICARE

## 2023-01-19 DIAGNOSIS — G25.81 RLS (RESTLESS LEGS SYNDROME): Primary | ICD-10-CM

## 2023-01-19 DIAGNOSIS — M54.10 RADICULOPATHY WITH LOWER EXTREMITY SYMPTOMS: ICD-10-CM

## 2023-01-19 DIAGNOSIS — G62.9 NEUROPATHY: ICD-10-CM

## 2023-01-21 ENCOUNTER — PATIENT MESSAGE (OUTPATIENT)
Dept: INTERNAL MEDICINE | Facility: CLINIC | Age: 83
End: 2023-01-21
Payer: MEDICARE

## 2023-01-23 RX ORDER — DIAZEPAM 2 MG/1
2 TABLET ORAL EVERY 8 HOURS PRN
Qty: 45 TABLET | Refills: 1 | Status: SHIPPED | OUTPATIENT
Start: 2023-01-23 | End: 2023-03-29 | Stop reason: SDUPTHER

## 2023-01-23 RX ORDER — GABAPENTIN 600 MG/1
600 TABLET ORAL 4 TIMES DAILY
Qty: 120 TABLET | Refills: 5 | Status: SHIPPED | OUTPATIENT
Start: 2023-01-23 | End: 2023-04-29

## 2023-01-24 ENCOUNTER — PATIENT MESSAGE (OUTPATIENT)
Dept: INTERNAL MEDICINE | Facility: CLINIC | Age: 83
End: 2023-01-24
Payer: MEDICARE

## 2023-01-30 ENCOUNTER — PATIENT MESSAGE (OUTPATIENT)
Dept: INTERNAL MEDICINE | Facility: CLINIC | Age: 83
End: 2023-01-30
Payer: MEDICARE

## 2023-01-31 ENCOUNTER — EXTERNAL CHRONIC CARE MANAGEMENT (OUTPATIENT)
Dept: PRIMARY CARE CLINIC | Facility: CLINIC | Age: 83
End: 2023-01-31
Payer: MEDICARE

## 2023-01-31 PROCEDURE — 99490 CHRNC CARE MGMT STAFF 1ST 20: CPT | Mod: PBBFAC | Performed by: INTERNAL MEDICINE

## 2023-01-31 PROCEDURE — 99490 PR CHRONIC CARE MGMT, 1ST 20 MIN: ICD-10-PCS | Mod: S$PBB,,, | Performed by: INTERNAL MEDICINE

## 2023-01-31 PROCEDURE — 99490 CHRNC CARE MGMT STAFF 1ST 20: CPT | Mod: S$PBB,,, | Performed by: INTERNAL MEDICINE

## 2023-02-02 ENCOUNTER — PES CALL (OUTPATIENT)
Dept: ADMINISTRATIVE | Facility: CLINIC | Age: 83
End: 2023-02-02
Payer: MEDICARE

## 2023-02-13 ENCOUNTER — OFFICE VISIT (OUTPATIENT)
Dept: URGENT CARE | Facility: CLINIC | Age: 83
End: 2023-02-13
Payer: MEDICARE

## 2023-02-13 VITALS
TEMPERATURE: 97 F | RESPIRATION RATE: 18 BRPM | OXYGEN SATURATION: 99 % | SYSTOLIC BLOOD PRESSURE: 110 MMHG | DIASTOLIC BLOOD PRESSURE: 57 MMHG | HEART RATE: 70 BPM

## 2023-02-13 DIAGNOSIS — Z20.822 EXPOSURE TO COVID-19 VIRUS: Primary | ICD-10-CM

## 2023-02-13 DIAGNOSIS — R05.9 COUGH, UNSPECIFIED TYPE: ICD-10-CM

## 2023-02-13 LAB
CTP QC/QA: YES
SARS-COV-2 AG RESP QL IA.RAPID: NEGATIVE

## 2023-02-13 PROCEDURE — 99213 PR OFFICE/OUTPT VISIT, EST, LEVL III, 20-29 MIN: ICD-10-PCS | Mod: S$GLB,,, | Performed by: FAMILY MEDICINE

## 2023-02-13 PROCEDURE — 99213 OFFICE O/P EST LOW 20 MIN: CPT | Mod: S$GLB,,, | Performed by: FAMILY MEDICINE

## 2023-02-13 PROCEDURE — 87811 SARS CORONAVIRUS 2 ANTIGEN POCT, MANUAL READ: ICD-10-PCS | Mod: QW,CR,S$GLB, | Performed by: FAMILY MEDICINE

## 2023-02-13 PROCEDURE — 87811 SARS-COV-2 COVID19 W/OPTIC: CPT | Mod: QW,CR,S$GLB, | Performed by: FAMILY MEDICINE

## 2023-02-13 NOTE — PATIENT INSTRUCTIONS
I RECOMMEND THAT YOU REPEAT YOUR HOME COVID TEST DAILY FOR THE NEXT 2-3 DAYS.    WITH POSITIVE TEST (OR CHANGE IN SYMPTOMS), CALL YOUR  DOCTOR FOR A PRESCRIPTION OF PAXLOVID.    Make sure that you follow up with your primary care doctor in the next 2-5 days if needed .  Return to the Urgent Care if signs or symptoms change and certainly if you have worsening symptoms go to the nearest emergency department for further evaluation.

## 2023-02-13 NOTE — PROGRESS NOTES
Patient ID: Shelly Vásquez is a 82 y.o. female.     Vitals:  oral temperature is 97.4 °F (36.3 °C). Her blood pressure is 110/57 (abnormal) and her pulse is 70. Her respiration is 18 and oxygen saturation is 99%.      Chief Complaint: COVID-19 Concerns   82-year-old female with recent house guest positive for COVID.  She has an infrequent cough, although no change in baseline symptoms.  Requesting COVID testing     Cough  This is a new problem. Episode onset: 2days ago. The problem has been unchanged. The problem occurs every few minutes. The cough is Non-productive. Associated symptoms comments: Coughing . Nothing aggravates the symptoms. The treatment provided no relief.      Respiratory:  Positive for cough.      Objective:   Physical Exam\    Normocephalic atraumatic skull.  TMs clear bilaterally.  Heart is regular rate and rhythm.  Lungs are clear to auscultation bilaterally.            Results for orders placed or performed in visit on 02/13/23   SARS Coronavirus 2 Antigen, POCT Manual Read   Result Value Ref Range     SARS Coronavirus 2 Antigen Negative Negative      Acceptable Yes        *Note: Due to a large number of results and/or encounters for the requested time period, some results have not been displayed. A complete set of results can be found in Results Review.      Assessment:       1. Exposure to COVID-19 virus    2. Cough, unspecified type           Plan:          Exposure to COVID-19 virus     Cough, unspecified type  -     SARS Coronavirus 2 Antigen, POCT Manual Read     I RECOMMEND THAT YOU REPEAT YOUR HOME COVID TEST DAILY FOR THE NEXT 2-3 DAYS.     WITH POSITIVE TEST (OR CHANGE IN SYMPTOMS), CALL YOUR  DOCTOR FOR A PRESCRIPTION OF PAXLOVID.     Make sure that you follow up with your primary care doctor in the next 2-5 days if needed .  Return to the Urgent Care if signs or symptoms change and certainly if you have worsening symptoms go to the nearest emergency  department for further evaluation.                              Aspen Gaytan MA Medical Assistant     9:45 AM     Edit  Cosign  Attest  Copy     Subjective:       Patient ID: Shelly Vásquez is a 82 y.o. female.     Vitals:  vitals were not taken for this visit.      Chief Complaint: COVID-19 Concerns     Pts. Is having covid concerns   Pt mention she had a guest over and was covid post  Pt also has been coughing on and off for about a week before her guest arrived         Cough  This is a new problem. Episode onset: 2days ago. The problem has been unchanged. The problem occurs every few minutes. The cough is Non-productive. Associated symptoms comments: Coughing . Nothing aggravates the symptoms. The treatment provided no relief.      Respiratory:  Positive for cough.      Objective:   Physical Exam      Assessment:       No diagnosis found.       Plan:          There are no diagnoses linked to this encounter.

## 2023-02-13 NOTE — PROGRESS NOTES
Subjective:       Patient ID: Shelly Vásquez is a 82 y.o. female.    Vitals:  vitals were not taken for this visit.     Chief Complaint: COVID-19 Concerns    Pts. Is having covid concerns   Pt mention she had a guest over and was covid post  Pt also has been coughing on and off for about a week before her guest arrived       Cough  This is a new problem. Episode onset: 2days ago. The problem has been unchanged. The problem occurs every few minutes. The cough is Non-productive. Associated symptoms comments: Coughing . Nothing aggravates the symptoms. The treatment provided no relief.     Respiratory:  Positive for cough.      Objective:      Physical Exam      Assessment:       No diagnosis found.      Plan:         There are no diagnoses linked to this encounter.

## 2023-02-22 ENCOUNTER — PATIENT MESSAGE (OUTPATIENT)
Dept: SPORTS MEDICINE | Facility: CLINIC | Age: 83
End: 2023-02-22
Payer: MEDICARE

## 2023-02-28 ENCOUNTER — EXTERNAL CHRONIC CARE MANAGEMENT (OUTPATIENT)
Dept: PRIMARY CARE CLINIC | Facility: CLINIC | Age: 83
End: 2023-02-28
Payer: MEDICARE

## 2023-02-28 ENCOUNTER — PATIENT MESSAGE (OUTPATIENT)
Dept: INTERNAL MEDICINE | Facility: CLINIC | Age: 83
End: 2023-02-28

## 2023-02-28 PROCEDURE — 99439 CHRNC CARE MGMT STAF EA ADDL: CPT | Mod: PBBFAC,27 | Performed by: INTERNAL MEDICINE

## 2023-02-28 PROCEDURE — 99439 PR CHRONIC CARE MGMT, EA ADDTL 20 MIN: ICD-10-PCS | Mod: S$PBB,,, | Performed by: INTERNAL MEDICINE

## 2023-02-28 PROCEDURE — 99490 CHRNC CARE MGMT STAFF 1ST 20: CPT | Mod: PBBFAC,25 | Performed by: INTERNAL MEDICINE

## 2023-02-28 PROCEDURE — 99490 CHRNC CARE MGMT STAFF 1ST 20: CPT | Mod: S$PBB,,, | Performed by: INTERNAL MEDICINE

## 2023-02-28 PROCEDURE — 99490 PR CHRONIC CARE MGMT, 1ST 20 MIN: ICD-10-PCS | Mod: S$PBB,,, | Performed by: INTERNAL MEDICINE

## 2023-02-28 PROCEDURE — 99439 CHRNC CARE MGMT STAF EA ADDL: CPT | Mod: S$PBB,,, | Performed by: INTERNAL MEDICINE

## 2023-03-01 ENCOUNTER — TELEPHONE (OUTPATIENT)
Dept: INTERNAL MEDICINE | Facility: CLINIC | Age: 83
End: 2023-03-01
Payer: MEDICARE

## 2023-03-02 ENCOUNTER — PATIENT MESSAGE (OUTPATIENT)
Dept: INTERNAL MEDICINE | Facility: CLINIC | Age: 83
End: 2023-03-02
Payer: MEDICARE

## 2023-03-13 ENCOUNTER — OFFICE VISIT (OUTPATIENT)
Dept: INTERNAL MEDICINE | Facility: CLINIC | Age: 83
End: 2023-03-13
Payer: MEDICARE

## 2023-03-13 ENCOUNTER — LAB VISIT (OUTPATIENT)
Dept: LAB | Facility: HOSPITAL | Age: 83
End: 2023-03-13
Attending: INTERNAL MEDICINE
Payer: MEDICARE

## 2023-03-13 VITALS
HEART RATE: 70 BPM | WEIGHT: 139.25 LBS | SYSTOLIC BLOOD PRESSURE: 89 MMHG | DIASTOLIC BLOOD PRESSURE: 55 MMHG | BODY MASS INDEX: 21.17 KG/M2

## 2023-03-13 DIAGNOSIS — D46.1 REFRACTORY ANEMIA WITH RING SIDEROBLASTS: ICD-10-CM

## 2023-03-13 DIAGNOSIS — G25.81 RLS (RESTLESS LEGS SYNDROME): ICD-10-CM

## 2023-03-13 DIAGNOSIS — I10 ESSENTIAL HYPERTENSION: ICD-10-CM

## 2023-03-13 DIAGNOSIS — M16.11 PRIMARY OSTEOARTHRITIS OF RIGHT HIP: Primary | ICD-10-CM

## 2023-03-13 DIAGNOSIS — R93.6 ABNORMAL X-RAY OF FEMUR: ICD-10-CM

## 2023-03-13 DIAGNOSIS — M25.551 CHRONIC RIGHT HIP PAIN: Primary | ICD-10-CM

## 2023-03-13 DIAGNOSIS — Z78.0 ASYMPTOMATIC MENOPAUSE: ICD-10-CM

## 2023-03-13 DIAGNOSIS — G89.29 CHRONIC RIGHT HIP PAIN: Primary | ICD-10-CM

## 2023-03-13 DIAGNOSIS — I47.10 PAROXYSMAL SVT (SUPRAVENTRICULAR TACHYCARDIA): ICD-10-CM

## 2023-03-13 LAB
ALBUMIN SERPL BCP-MCNC: 4.3 G/DL (ref 3.5–5.2)
ALP SERPL-CCNC: 42 U/L (ref 55–135)
ALT SERPL W/O P-5'-P-CCNC: 14 U/L (ref 10–44)
ANION GAP SERPL CALC-SCNC: 6 MMOL/L (ref 8–16)
AST SERPL-CCNC: 15 U/L (ref 10–40)
BASOPHILS # BLD AUTO: 0.1 K/UL (ref 0–0.2)
BASOPHILS NFR BLD: 1.5 % (ref 0–1.9)
BILIRUB SERPL-MCNC: 0.7 MG/DL (ref 0.1–1)
BUN SERPL-MCNC: 19 MG/DL (ref 8–23)
CALCIUM SERPL-MCNC: 9.5 MG/DL (ref 8.7–10.5)
CHLORIDE SERPL-SCNC: 105 MMOL/L (ref 95–110)
CHOLEST SERPL-MCNC: 153 MG/DL (ref 120–199)
CHOLEST/HDLC SERPL: 2.1 {RATIO} (ref 2–5)
CO2 SERPL-SCNC: 25 MMOL/L (ref 23–29)
CREAT SERPL-MCNC: 0.9 MG/DL (ref 0.5–1.4)
DIFFERENTIAL METHOD: ABNORMAL
EOSINOPHIL # BLD AUTO: 0.2 K/UL (ref 0–0.5)
EOSINOPHIL NFR BLD: 3.4 % (ref 0–8)
ERYTHROCYTE [DISTWIDTH] IN BLOOD BY AUTOMATED COUNT: 17.2 % (ref 11.5–14.5)
EST. GFR  (NO RACE VARIABLE): >60 ML/MIN/1.73 M^2
FERRITIN SERPL-MCNC: 609 NG/ML (ref 20–300)
GLUCOSE SERPL-MCNC: 96 MG/DL (ref 70–110)
HCT VFR BLD AUTO: 26.4 % (ref 37–48.5)
HDLC SERPL-MCNC: 74 MG/DL (ref 40–75)
HDLC SERPL: 48.4 % (ref 20–50)
HGB BLD-MCNC: 8.5 G/DL (ref 12–16)
IMM GRANULOCYTES # BLD AUTO: 0.02 K/UL (ref 0–0.04)
IMM GRANULOCYTES NFR BLD AUTO: 0.3 % (ref 0–0.5)
IRON SERPL-MCNC: 213 UG/DL (ref 30–160)
LDLC SERPL CALC-MCNC: 70 MG/DL (ref 63–159)
LYMPHOCYTES # BLD AUTO: 2.8 K/UL (ref 1–4.8)
LYMPHOCYTES NFR BLD: 41.9 % (ref 18–48)
MCH RBC QN AUTO: 35.3 PG (ref 27–31)
MCHC RBC AUTO-ENTMCNC: 32.2 G/DL (ref 32–36)
MCV RBC AUTO: 110 FL (ref 82–98)
MONOCYTES # BLD AUTO: 0.7 K/UL (ref 0.3–1)
MONOCYTES NFR BLD: 10.5 % (ref 4–15)
NEUTROPHILS # BLD AUTO: 2.8 K/UL (ref 1.8–7.7)
NEUTROPHILS NFR BLD: 42.4 % (ref 38–73)
NONHDLC SERPL-MCNC: 79 MG/DL
NRBC BLD-RTO: 0 /100 WBC
PLATELET # BLD AUTO: 487 K/UL (ref 150–450)
PMV BLD AUTO: 10 FL (ref 9.2–12.9)
POTASSIUM SERPL-SCNC: 4.2 MMOL/L (ref 3.5–5.1)
PROT SERPL-MCNC: 6.7 G/DL (ref 6–8.4)
RBC # BLD AUTO: 2.41 M/UL (ref 4–5.4)
SATURATED IRON: 88 % (ref 20–50)
SODIUM SERPL-SCNC: 136 MMOL/L (ref 136–145)
TOTAL IRON BINDING CAPACITY: 243 UG/DL (ref 250–450)
TRANSFERRIN SERPL-MCNC: 164 MG/DL (ref 200–375)
TRIGL SERPL-MCNC: 45 MG/DL (ref 30–150)
TSH SERPL DL<=0.005 MIU/L-ACNC: 1.27 UIU/ML (ref 0.4–4)
WBC # BLD AUTO: 6.69 K/UL (ref 3.9–12.7)

## 2023-03-13 PROCEDURE — 36415 COLL VENOUS BLD VENIPUNCTURE: CPT | Performed by: INTERNAL MEDICINE

## 2023-03-13 PROCEDURE — 99214 PR OFFICE/OUTPT VISIT, EST, LEVL IV, 30-39 MIN: ICD-10-PCS | Mod: S$PBB,,, | Performed by: INTERNAL MEDICINE

## 2023-03-13 PROCEDURE — 99999 PR PBB SHADOW E&M-EST. PATIENT-LVL III: ICD-10-PCS | Mod: PBBFAC,,, | Performed by: INTERNAL MEDICINE

## 2023-03-13 PROCEDURE — 84443 ASSAY THYROID STIM HORMONE: CPT | Performed by: INTERNAL MEDICINE

## 2023-03-13 PROCEDURE — 99213 OFFICE O/P EST LOW 20 MIN: CPT | Mod: PBBFAC | Performed by: INTERNAL MEDICINE

## 2023-03-13 PROCEDURE — 80053 COMPREHEN METABOLIC PANEL: CPT | Performed by: INTERNAL MEDICINE

## 2023-03-13 PROCEDURE — 84466 ASSAY OF TRANSFERRIN: CPT | Performed by: INTERNAL MEDICINE

## 2023-03-13 PROCEDURE — 82728 ASSAY OF FERRITIN: CPT | Performed by: INTERNAL MEDICINE

## 2023-03-13 PROCEDURE — 99214 OFFICE O/P EST MOD 30 MIN: CPT | Mod: S$PBB,,, | Performed by: INTERNAL MEDICINE

## 2023-03-13 PROCEDURE — 85025 COMPLETE CBC W/AUTO DIFF WBC: CPT | Performed by: INTERNAL MEDICINE

## 2023-03-13 PROCEDURE — 80061 LIPID PANEL: CPT | Performed by: INTERNAL MEDICINE

## 2023-03-13 PROCEDURE — 99999 PR PBB SHADOW E&M-EST. PATIENT-LVL III: CPT | Mod: PBBFAC,,, | Performed by: INTERNAL MEDICINE

## 2023-03-14 ENCOUNTER — OFFICE VISIT (OUTPATIENT)
Dept: ORTHOPEDICS | Facility: CLINIC | Age: 83
End: 2023-03-14
Payer: MEDICARE

## 2023-03-14 ENCOUNTER — HOSPITAL ENCOUNTER (OUTPATIENT)
Dept: RADIOLOGY | Facility: HOSPITAL | Age: 83
Discharge: HOME | End: 2023-03-14
Attending: ORTHOPAEDIC SURGERY
Payer: MEDICARE

## 2023-03-14 VITALS — HEIGHT: 68 IN | BODY MASS INDEX: 21.07 KG/M2 | WEIGHT: 139 LBS

## 2023-03-14 DIAGNOSIS — M16.11 PRIMARY OSTEOARTHRITIS OF RIGHT HIP: Primary | ICD-10-CM

## 2023-03-14 DIAGNOSIS — M16.11 PRIMARY OSTEOARTHRITIS OF RIGHT HIP: ICD-10-CM

## 2023-03-14 PROCEDURE — 99212 OFFICE O/P EST SF 10 MIN: CPT | Mod: PBBFAC | Performed by: ORTHOPAEDIC SURGERY

## 2023-03-14 PROCEDURE — 73502 X-RAY EXAM HIP UNI 2-3 VIEWS: CPT | Mod: 26,RT,, | Performed by: RADIOLOGY

## 2023-03-14 PROCEDURE — 99215 PR OFFICE/OUTPT VISIT, EST, LEVL V, 40-54 MIN: ICD-10-PCS | Mod: S$PBB,,, | Performed by: ORTHOPAEDIC SURGERY

## 2023-03-14 PROCEDURE — 73502 XR HIP WITH PELVIS WHEN PERFORMED, 2 OR 3  VIEWS RIGHT: ICD-10-PCS | Mod: 26,RT,, | Performed by: RADIOLOGY

## 2023-03-14 PROCEDURE — 99215 OFFICE O/P EST HI 40 MIN: CPT | Mod: S$PBB,,, | Performed by: ORTHOPAEDIC SURGERY

## 2023-03-14 PROCEDURE — 73502 X-RAY EXAM HIP UNI 2-3 VIEWS: CPT | Mod: TC,RT

## 2023-03-14 PROCEDURE — 99999 PR PBB SHADOW E&M-EST. PATIENT-LVL II: CPT | Mod: PBBFAC,,, | Performed by: ORTHOPAEDIC SURGERY

## 2023-03-14 PROCEDURE — 99999 PR PBB SHADOW E&M-EST. PATIENT-LVL II: ICD-10-PCS | Mod: PBBFAC,,, | Performed by: ORTHOPAEDIC SURGERY

## 2023-03-14 NOTE — PROGRESS NOTES
Subjective:       Patient ID: Shelly Vásquez is a 82 y.o. female.    Chief Complaint: Discuss Hip Surgery    82-YEAR-OLD VERY PLEASANT NONSMOKING FEMALE HERE TO DISCUSS POTENTIAL HIP SURGERY/HIP REPLACEMENT ON THE RIGHT.  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  SHE WILL SEE THE ORTHOPEDIC SURGEON TOMORROW AND WOULD LIKE TO HAVE SURGERY SOON TO HELP RELIEVE SOME OF THIS PAIN.  SHE HAS REMOTE HISTORY OF NONSUSTAINED SUPRAVENTRICULAR TACHYCARDIA AND HAS BEEN ON VERAPAMIL LOW-DOSE EVER SINCE.  SHE HAS NOT SEEN CARDIOLOGY IN QUITE SOME TIME BUT STILL HAS A LOOP RECORDER IN PLACE.  SHE HAS NOT HAD A BONE MINERAL DENSITY IN QUITE SOME TIME.  SO WE WILL ORDER 1 TODAY.  IN ADDITION PLAIN X-RAY OF THE RIGHT HIP SHOWED AN ABNORMALITY IN THE FEMUR WITH RECOMMENDATION TO DO AN MRI.  PATIENT'S LAST MRI OF THE HIP WAS IN NOVEMBER OF 2022 BUT THE PATIENT COULD NOT SIT FOR THE COMPLETE MRI.  REPORT:2. Moderate right and mild left femoroacetabular osteoarthritis with associated joint effusions.      Review of Systems   Constitutional:  Positive for fatigue. Negative for activity change, appetite change, chills, diaphoresis, fever and unexpected weight change.   HENT:  Negative for nasal congestion, ear pain, mouth sores, postnasal drip, sinus pressure/congestion, sore throat and trouble swallowing.    Eyes:  Negative for pain, redness and visual disturbance.   Respiratory:  Negative for apnea, cough, chest tightness, shortness of breath and wheezing.    Cardiovascular:  Negative for chest pain, palpitations and leg swelling.   Gastrointestinal:  Negative for abdominal distention, abdominal pain, blood in stool, constipation, diarrhea, nausea and vomiting.   Endocrine: Negative for cold intolerance, polydipsia, polyphagia and polyuria.   Genitourinary:  Negative for difficulty  urinating, dysuria, flank pain, frequency, hematuria, menstrual problem, pelvic pain and urgency.   Musculoskeletal:  Positive for arthralgias. Negative for back pain, joint swelling and neck pain.        AS PER HPI   Integumentary:  Negative for color change, rash and wound.   Neurological:  Negative for dizziness, tremors, seizures, syncope, weakness, light-headedness, numbness and headaches.   Hematological:  Negative for adenopathy. Does not bruise/bleed easily.   Psychiatric/Behavioral:  Positive for sleep disturbance. Negative for confusion, decreased concentration, dysphoric mood, hallucinations, self-injury and suicidal ideas. The patient is not nervous/anxious.         DUE TO PAIN.       Objective:      Physical Exam  Eyes:      General: No scleral icterus.  Cardiovascular:      Rate and Rhythm: Normal rate and regular rhythm.      Pulses:           Posterior tibial pulses are 2+ on the right side and 2+ on the left side.   Pulmonary:      Effort: Pulmonary effort is normal.      Breath sounds: Normal breath sounds.   Musculoskeletal:      Right lower leg: No edema.      Left lower leg: No edema.   Neurological:      Mental Status: She is alert.   Psychiatric:         Mood and Affect: Mood normal.         Behavior: Behavior normal.         Thought Content: Thought content normal.         Judgment: Judgment normal.       Lab Results   Component Value Date    WBC 6.69 03/13/2023    HGB 8.5 (L) 03/13/2023    HCT 26.4 (L) 03/13/2023     (H) 03/13/2023    CHOL 153 03/13/2023    TRIG 45 03/13/2023    HDL 74 03/13/2023    ALT 14 03/13/2023    AST 15 03/13/2023     03/13/2023    K 4.2 03/13/2023     03/13/2023    CREATININE 0.9 03/13/2023    BUN 19 03/13/2023    CO2 25 03/13/2023    TSH 1.272 03/13/2023    INR 1.0 07/20/2021    HGBA1C 4.9 05/12/2022     Assessment  plan         Chronic right hip pain- osteoarthritis   Comments:  pt to see Dr Gary tomorrow     Abnormal x-ray of  femur  Comments:  Seen at [facility] on plain x-ray.  Will follow-up with MRI as per Dr. Gary    RLS (restless legs syndrome)  Comments:  Will reduce gabapentin to 600 mg twice a day along with her Mirapex 2 twice a day    Refractory anemia with ring sideroblasts  Comments:  will check with Dr Lopez to rosita -op help co manage    Orders:  -     CBC Auto Differential; Future; Expected date: 03/13/2023  -     Comprehensive Metabolic Panel; Future; Expected date: 03/13/2023  -     FERRITIN; Future; Expected date: 03/13/2023  -     IRON AND TIBC; Future; Expected date: 03/13/2023    Essential hypertension- with relative hypotension today   Comments:  On verapamil really for a remote history of SVT.  She still has a loop recorder in but has not seen Dr. Brady in quite some time.   Will get an EKG for pre-op   MONITOR BLOOD PRESSURE WITH DECREASE IN GABAPENTIN AND WE CAN REDUCE DOSE OF VERAPAMIL OR CHANGE TO LOW DOSE TOPROL   Orders:  -     TSH; Future; Expected date: 03/13/2023  -     Lipid Panel; Future; Expected date: 03/13/2023

## 2023-03-14 NOTE — PROGRESS NOTES
"  Subjective:     HPI:   Shelly Vásquez is a 82 y.o. female who presents for repeat eval R hip arthritis.    She was seen and diagnosed with severe right hip arthritis 22.  At the time she was ongoing workup for neurology tremors and trembling.  We also discussed the need for  hemoglobin optimization with Hematology preoperatively     She pursued extensive non operative management.  She had a right intra-articular hip injection 2022 which did help for some period of time.     We had discussions with her hematologist Dr. Lopez who offered her luspatercept but she chose not to take it.     She now returns and would like a hip replacement.   She says she saw someone for neuro testing at [facility] and they told her "go ahead and have her total hip replacement"    She has severe groin and anterior thigh pain.  It is activity related and relieved with rest.  She is taking Advil every other night.  She has done physical therapy.  She is had an intra-articular steroid injection.    To review from 22:     Medications: Tylenol just about daily, Tramadol rarely (last Rx 22 from PCP, #7, still has most of them) Hx of bleeding ulcer and multiple blood transfusions about 5 years ago, on baclofen and mirapex QHS from neuro     Physical Therapy: Yes, completed OP-PT this week and has been going for at least 2 months, 1x per week for 8 weeks, the patient stated that the PT helped significantly while going      Assistive Devices: None      Walkin-5 blocks     Limitations: General walking, difficulty going up/down steps, difficulty getting in/out of the car, difficulty sleeping at night , difficulty rising from sitting  and difficulty standing for long periods of time                                Occupation: Retired      Social support: The patient stated that they live at home with their . The patient stated that their  would be able to help take care of them if they were to have " surgery.      ROS:  The updated medical history is in the chart and has been reviewed. A review of systems is updated and there is no reported vision changes, ear/nose/mouth/throat complaints,  chest pain, shortness of breath, abdominal pain, urological complaints, fevers or chills, psychiatric complaints. Musculoskeletal and neurologcial symptoms are as documented. All other systems are negative       Objective:   Body mass index is 21.13 kg/m².  Exam:   Slow shuffling gait antalgic gait with a slight limp on the right.  Groin pain with active straight leg raise 10-90 hip flexion 20 abduction 20 abduction 25 external 10 internal rotation which recreates her symptoms.  1 centimeter leg length discrepancy left side short right side long.  Skin is intact to the anterior hip.  She is a 5 degree knee flexion contracture as well      Imaging:  Indication:  Right hip pain  Exam Ordered: Radiographs include an anteroposterior pelvis, an anteroposterior and lateral view of the proximal femur including the hip joint.  Details of Examination: Exam shows evidence of joint space narrowing, osteophyte formation, and subchondral sclerosis, all consistent with degenerative arthritis of the hip.  No other significant findings are noted.  Impression:  Degenerative Arthritis, Right Hip    Severe R Tg3 hip arthritis, bone on bone  Appears to be stress shielding osteopenia proximal femur, read as normal by radiology  No bony lesions seen on MRI 11/22    EXAMINATION:  MRI HIP WITHOUT CONTRAST RIGHT     CLINICAL HISTORY:  Hip pain, chronic, impingement suspected, xray done;  Pain in unspecified hip     TECHNIQUE:  Routine MRI evaluation of the pelvis performed without contrast.  Examination was terminated early at patient's request secondary to pain.     COMPARISON:  Radiograph 05/26/2022.     FINDINGS:  BONES: No fracture.  No definite avascular necrosis.     FEMOROACETABULAR JOINTS: There is high-grade partial to full-thickness  "chondral loss throughout the right femoral head and acetabulum with associated subchondral edema.  Questionable partial-thickness chondral fissuring along the weight-bearing left acetabular rim, not well evaluated on this exam.  There is abnormal morphology and signal intensity of the right acetabular labrum consistent with degenerative tearing.  There is abnormal signal intensity of the left acetabular labrum concerning for a tear with small adjacent paralabral cysts.  Bilateral joint effusions.     TENDONS: There is attenuation of the insertional fibers of the left gluteus minimus tendon, not well evaluated.  Right gluteus minimus, bilateral rectus femoris, gluteus medius, hamstring, adductors, iliopsoas and hip external rotators appear grossly intact.     MUSCLES: Normal signal intensity.     MISCELLANEOUS: Trace fluid within the bilateral trochanteric bursa.  Ischiofemoral spaces are symmetric.  Pubic symphysis is unremarkable.  Sciatic nerves demonstrate symmetric contour and signal intensity.  Degenerative changes of the visualized lower lumbar spine, better evaluated on previous MRI.  Visualized lower abdominal and pelvic organs demonstrate no significant abnormalities.     Impression:     1. Examination terminated early at patient's request secondary to pain.  2. Moderate right and mild left femoroacetabular osteoarthritis with associated joint effusions.        Electronically signed by: James Blackmon MD  Date:                                            11/07/2022  Time:                                           11:53      Assessment:       ICD-10-CM ICD-9-CM   1. Primary osteoarthritis of right hip  M16.11 715.15      Spinal stenosis s/p L spine elsie, c/o RLE numbness and tingling consistent with radicular symptoms as well     Neuro eval: went to [facility], "they told me to go ahead with NAT"  Cardiac: a fib, PSVT s/p RFA, loop recorder, on ASA 81mg daily  HRT  MDS/anemia: WBC 7.01, Hgb 8.6, PLT " 481, Dr Lopez: recommended f/u Q6 months, Hx of transfusions     Plan:       The above findings were discussed with patient length. We discussed the risks of conservative versus surgical management of the patients hip arthritis. Conservative management consisting of anti-inflammatory medications, weight loss, physical therapy, and activity modification was discussed at length. At this point considering the patient's level of activity, pain, and radiographic findings I recommend NAT    This patient has significant symptoms in their hip that are affecting their quality of life and daily activities.  They have tried non-operative treatment including analgesics, an exercise program, and activity modification, but the symptoms have persisted. I believe they make a good candidate for hip arthroplasty.     Approach: Anterior    Implants:   Company: [employer]  Cup: [place]  Stem: hybrid, c-stem   Send fem head and prox femur cancellous bone for perm path  [place]  3gm TXA  2u PRBC on hold  Requests 1st case  1-2 nights  Rx cefadroxil  CBC POD#1  *R side long pre-op    Have discussed case with PCP Dr Méndez, hematology Dr Lopez, and [facility] Dr Escobar    DVT prophylaxis: ASA 81mg BID x1 month  Dispo: home health PT    Admission status:     Inpatient       Location:                  [facility] 2nd floor     F/u for pre-op visit      INPATIENT ADMISSION STATUS  Per CMS MLN Matters Number [id number]:  X   I believe that this patient meets inpatient admission status criteria as there is a reasonable expectation of medically necessary hospital services for 2 midnights or longer including all outpatient/observation and inpatient care time       Please see the supporting documentation in the medical record that supports the admitting physicians determination that the patient required inpatient care despite the lack of a 2-midnight expectation based upon complex medical factors including but not limited to:  -Patients history,  co-morbidities and current medical needs  -Risk of adverse events  -Severity of signs and symptoms   X          ----- Message -----  From: Erlin Lopez MD  Sent: 3/23/2023  10:33 PM CDT  To: Clarissa Escobar MD, *    I previously offered luspatercept (a stimulating agent) months ago, but she was not interested at the time. At this point it wouldn't work quickly enough. There are no contraindications to transfusions. After seeing her earlier this month, I arranged for transfusion in our infusion suite on 4/24/23. Our consult service can see her during her hospitalization if hospitalization is anticipated.     No orders of the defined types were placed in this encounter.            Past Medical History:   Diagnosis Date    Arthritis     Atrial fibrillation     Basal cell cancer     on the face    Encounter for blood transfusion     Gastric ulcer     Herpes simplex without mention of complication     rare outbreaks    Postmenopausal HRT (hormone replacement therapy)     Supraventricular tachycardia     s/p AVNRT ablation (Dr Brady)       Past Surgical History:   Procedure Laterality Date    APPENDECTOMY  age 21    BACK SURGERY      Beast Lift  2004    BONE MARROW BIOPSY Right 12/19/2019    Procedure: Biopsy-bone marrow;  Surgeon: Aidan Spring MD;  Location: [facility] OR [address] [place] [address];  Service: Oncology;  Laterality: Right;    BREAST BIOPSY  50yrs ago    unable to see scar    COSMETIC SURGERY      DILATION AND CURETTAGE OF UTERUS  2008    PMB    ENDOMETRIAL BIOPSY      EPIDURAL STEROID INJECTION N/A 8/25/2022    Procedure: LUMBAR CELINA CONTRAST DIRECT REFERRAL;  Surgeon: Rj Hernandez MD;  [facility]: [facility];  Service: Pain Management;  Laterality: N/A;    EYE SURGERY      INJECTION OF ANESTHETIC AGENT AROUND NERVE Bilateral 11/11/2022    Procedure: BLOCK, NERVE BILATERAL L2,L3,L4 AND L5 MEDIAL BRANCH ONE OF TWO;  Surgeon: Rj Hernandez MD;  [facility];  Service: Pain Management;   Laterality: Bilateral;    INJECTION OF JOINT Right 12/2/2022    Procedure: INJECTION, JOINT RIGHT INTRARTICULAR HIP CONTRAST;  Surgeon: Rj Hernandez MD;  Location: [facility] PAIN MGT;  Service: Pain Management;  Laterality: Right;    ptsosis      RADIOFREQUENCY ABLATION  03/2018    SMALL INTESTINE SURGERY      TONSILLECTOMY, ADENOIDECTOMY  age 8    TOTAL REDUCTION MAMMOPLASTY Bilateral 2003    TRANSFORAMINAL EPIDURAL INJECTION OF STEROID Right 8/11/2022    Procedure: Injection,steroid,epidural Right L1/2 TF DIrect Referral Instructed to provide intervention per MRI results;  Surgeon: Rj Hernandez MD;  Location: [facility] PAIN MGT;  Service: Pain Management;  Laterality: Right;    TRANSFORAMINAL EPIDURAL INJECTION OF STEROID Right 10/21/2022    Procedure: INJECTION, STEROID, EPIDURAL, TRANSFORAMINAL APPROACH, L5-S1 and S1, RIGHT CONTRAST;  Surgeon: Rj Hernandez MD;  Location: [facility] PAIN MGT;  Service: Pain Management;  Laterality: Right;    TUBAL LIGATION         Family History   Problem Relation Age of Onset    No Known Problems Father     Cirrhosis Mother     No Known Problems Brother     No Known Problems Maternal Aunt     No Known Problems Maternal Uncle     No Known Problems Paternal Aunt     No Known Problems Paternal Uncle     No Known Problems Maternal Grandmother     No Known Problems Maternal Grandfather     No Known Problems Paternal Grandmother     No Known Problems Paternal Grandfather     No Known Problems Daughter     Pulmonary embolism Son     Breast cancer Neg Hx     Colon cancer Neg Hx     Ovarian cancer Neg Hx     Amblyopia Neg Hx     Blindness Neg Hx     Cancer Neg Hx     Cataracts Neg Hx     Diabetes Neg Hx     Glaucoma Neg Hx     Hypertension Neg Hx     Macular degeneration Neg Hx     Retinal detachment Neg Hx     Strabismus Neg Hx     Stroke Neg Hx     Thyroid disease Neg Hx        Social History     Socioeconomic History    Marital status:    Tobacco Use    Smoking status: Former      Packs/day: 1.00     Years: 22.00     Pack years: 22.00     Types: Cigarettes     Quit date: 1980     Years since quittin.6    Smokeless tobacco: Never   Substance and Sexual Activity    Alcohol use: Yes     Alcohol/week: 2.0 standard drinks     Types: 2 Glasses of wine per week     Comment: 1-2 glasses of wine daily    Drug use: No    Sexual activity: Not Currently     Partners: Male     Birth control/protection: Post-menopausal     Comment:  since

## 2023-03-15 ENCOUNTER — OFFICE VISIT (OUTPATIENT)
Dept: HEMATOLOGY/ONCOLOGY | Facility: CLINIC | Age: 83
End: 2023-03-15
Payer: MEDICARE

## 2023-03-15 ENCOUNTER — TELEPHONE (OUTPATIENT)
Dept: INTERNAL MEDICINE | Facility: CLINIC | Age: 83
End: 2023-03-15
Payer: MEDICARE

## 2023-03-15 VITALS
DIASTOLIC BLOOD PRESSURE: 58 MMHG | WEIGHT: 154.13 LBS | SYSTOLIC BLOOD PRESSURE: 123 MMHG | TEMPERATURE: 97 F | HEART RATE: 61 BPM | HEIGHT: 68 IN | OXYGEN SATURATION: 97 % | BODY MASS INDEX: 23.36 KG/M2 | RESPIRATION RATE: 18 BRPM

## 2023-03-15 DIAGNOSIS — D46.1 REFRACTORY ANEMIA WITH RING SIDEROBLASTS: ICD-10-CM

## 2023-03-15 DIAGNOSIS — D64.9 SYMPTOMATIC ANEMIA: ICD-10-CM

## 2023-03-15 DIAGNOSIS — D46.9 MDS (MYELODYSPLASTIC SYNDROME): Primary | ICD-10-CM

## 2023-03-15 PROCEDURE — 99999 PR PBB SHADOW E&M-EST. PATIENT-LVL III: CPT | Mod: PBBFAC,,, | Performed by: INTERNAL MEDICINE

## 2023-03-15 PROCEDURE — 99214 OFFICE O/P EST MOD 30 MIN: CPT | Mod: S$PBB,,, | Performed by: INTERNAL MEDICINE

## 2023-03-15 PROCEDURE — 99214 PR OFFICE/OUTPT VISIT, EST, LEVL IV, 30-39 MIN: ICD-10-PCS | Mod: S$PBB,,, | Performed by: INTERNAL MEDICINE

## 2023-03-15 PROCEDURE — 99213 OFFICE O/P EST LOW 20 MIN: CPT | Mod: PBBFAC | Performed by: INTERNAL MEDICINE

## 2023-03-15 PROCEDURE — 99999 PR PBB SHADOW E&M-EST. PATIENT-LVL III: ICD-10-PCS | Mod: PBBFAC,,, | Performed by: INTERNAL MEDICINE

## 2023-03-15 RX ORDER — OFLOXACIN 3 MG/ML
1 SOLUTION/ DROPS OPHTHALMIC 3 TIMES DAILY
COMMUNITY
Start: 2022-10-19 | End: 2023-09-08

## 2023-03-15 RX ORDER — PREDNISOLONE ACETATE 10 MG/ML
1 SUSPENSION/ DROPS OPHTHALMIC 3 TIMES DAILY
COMMUNITY
Start: 2022-10-19 | End: 2023-09-08

## 2023-03-15 NOTE — PROGRESS NOTES
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 Diploid karyotype. NGS with SF3B1 (VAF 35%). Observation recommended.     History of Present Ilness:   Shelly Byrnes) is a pleasant 82 y.o.female with a past medical history of low risk MDS who presents for follow up. She will have hip arthroplasty on 4/26/23. She has not had any recent symptoms. Her hemoglobin has been stable.    PAST MEDICAL HISTORY:   Past Medical History:   Diagnosis Date    Arthritis     Atrial fibrillation     Basal cell cancer     on the face    Encounter for blood transfusion     Gastric ulcer     Herpes simplex without mention of complication     rare outbreaks    Postmenopausal HRT (hormone replacement therapy)     Supraventricular tachycardia     s/p AVNRT ablation (Dr Brady)       PAST SURGICAL HISTORY:   Past Surgical History:   Procedure Laterality Date    APPENDECTOMY  age 21    BACK SURGERY      Beast Lift  2004    BONE MARROW BIOPSY Right 12/19/2019    Procedure: Biopsy-bone marrow;  Surgeon: Aidan Spring MD;  Location: [facility] OR [address] [place] [address];  Service: Oncology;  Laterality: Right;    BREAST BIOPSY  50yrs ago    unable to see scar    COSMETIC SURGERY      DILATION AND CURETTAGE OF UTERUS  2008    PMB    ENDOMETRIAL BIOPSY      EPIDURAL STEROID INJECTION N/A 8/25/2022    Procedure: LUMBAR CELINA CONTRAST DIRECT REFERRAL;  Surgeon: Rj Hernandez MD;  Location: [facility] PAIN MGT;  Service: Pain Management;  Laterality: N/A;    EYE SURGERY      INJECTION OF ANESTHETIC AGENT AROUND NERVE Bilateral 11/11/2022     Procedure: BLOCK, NERVE BILATERAL L2,L3,L4 AND L5 MEDIAL BRANCH ONE OF TWO;  Surgeon: Rj Hernandez MD;  Location: [facility] PAIN MGT;  Service: Pain Management;  Laterality: Bilateral;    INJECTION OF JOINT Right 2022    Procedure: INJECTION, JOINT RIGHT INTRARTICULAR HIP CONTRAST;  Surgeon: Rj Hernandez MD;  Location: [facility] PAIN [facility]GT;  Service: Pain Management;  Laterality: Right;    ptsosis      RADIOFREQUENCY ABLATION  2018    SMALL INTESTINE SURGERY      TONSILLECTOMY, ADENOIDECTOMY  age 8    TOTAL REDUCTION MAMMOPLASTY Bilateral     TRANSFORAMINAL EPIDURAL INJECTION OF STEROID Right 2022    Procedure: Injection,steroid,epidural Right L1/2 TF DIrect Referral Instructed to provide intervention per MRI results;  Surgeon: Rj Hernandez MD;  Location: [facility] PAIN [facility]GT;  Service: Pain Management;  Laterality: Right;    TRANSFORAMINAL EPIDURAL INJECTION OF STEROID Right 10/21/2022    Procedure: INJECTION, STEROID, EPIDURAL, TRANSFORAMINAL APPROACH, L5-S1 and S1, RIGHT CONTRAST;  Surgeon: Rj Hernandez MD;  Location: [facility] PAIN [facility]GT;  Service: Pain Management;  Laterality: Right;    TUBAL LIGATION         PAST SOCIAL HISTORY:  Social History     Tobacco Use    Smoking status: Former     Packs/day: 1.00     Years: 22.00     Pack years: 22.00     Types: Cigarettes     Quit date: 1980     Years since quittin.6    Smokeless tobacco: Never   Substance Use Topics    Alcohol use: Yes     Alcohol/week: 2.0 standard drinks     Types: 2 Glasses of wine per week     Comment: 1-2 glasses of wine daily    Drug use: No       FAMILY HISTORY:  Family History   Problem Relation Age of Onset    No Known Problems Father     Cirrhosis Mother     No Known Problems Brother     No Known Problems Maternal Aunt     No Known Problems Maternal Uncle     No Known Problems Paternal Aunt     No Known Problems Paternal Uncle     No Known Problems Maternal Grandmother     No Known Problems Maternal Grandfather     No  Known Problems Paternal Grandmother     No Known Problems Paternal Grandfather     No Known Problems Daughter     Pulmonary embolism Son     Breast cancer Neg Hx     Colon cancer Neg Hx     Ovarian cancer Neg Hx     Amblyopia Neg Hx     Blindness Neg Hx     Cancer Neg Hx     Cataracts Neg Hx     Diabetes Neg Hx     Glaucoma Neg Hx     Hypertension Neg Hx     Macular degeneration Neg Hx     Retinal detachment Neg Hx     Strabismus Neg Hx     Stroke Neg Hx     Thyroid disease Neg Hx        CURRENT MEDICATIONS:   Current Outpatient Medications   Medication Sig    acetaminophen (TYLENOL) 325 MG tablet Take 325 mg by mouth continuous prn for Pain.    aspirin (ECOTRIN) 81 MG EC tablet Take 1 tablet (81 mg total) by mouth once daily.    diazePAM (VALIUM) 2 MG tablet Take 1 tablet (2 mg total) by mouth every 8 (eight) hours as needed for Insomnia (RLS).    estradiol-norethindrone acet 0.5-0.1 mg per tablet Take 1 tablet by mouth once daily.    gabapentin (NEURONTIN) 600 MG tablet Take 1 tablet (600 mg total) by mouth 4 (four) times daily.    pantoprazole (PROTONIX) 40 MG tablet Take 1 tablet (40 mg total) by mouth once daily.    pramipexole (MIRAPEX) 0.5 MG tablet Take 1 tablet (0.5 mg total) by mouth 3 (three) times daily.    verapamiL (VERELAN) 120 MG C24P Take 1 capsule (120 mg total) by mouth once daily.    vitamin D (VITAMIN D3) 1000 units Tab Take 1,000 Units by mouth once daily.    ofloxacin (OCUFLOX) 0.3 % ophthalmic solution Place 1 drop into the right eye 3 (three) times daily.    prednisoLONE acetate (PRED FORTE) 1 % DrpS Place 1 drop into the right eye 3 (three) times daily.     No current facility-administered medications for this visit.       ALLERGIES:   Review of patient's allergies indicates:   Allergen Reactions    Nsaids (non-steroidal anti-inflammatory drug)      GI Bleed  Had 5 blood transfusions       Review of Systems:     Pertinent positives and negatives included in the HPI. Otherwise a complete  review of systems is negative.    Physical Exam:     Vitals:    03/15/23 0807   BP: (!) 123/58   Pulse: 61   Resp: 18   Temp: 97.4 °F (36.3 °C)     General: Appears well, NAD  HEENT: MMM, no OP lesions  Pulmonary: CTAB, no increased work of breathing, no W/R/C  Cardiovascular: S1S2 normal, RRR, no M/R/G  Abdominal: Soft, NT, ND, BS+, no HSM  Extremities: No C/C/E  Neurological: AAOx4, grossly normal, no focal deficits  Dermatologic: No appreciable rashes or lesions  Lymphatic: No cervical, axillary, or inguinal lymphadenopathy     ECOG Performance Status: (foot note - ECOG PS provided by [employer]cology [employer]) 0 - Asymptomatic    Karnofsky Performance Score:  100%- Normal, No Complaints, No Evidence of Disease    Labs:   Lab Results   Component Value Date    WBC 7.01 03/14/2023    RBC 2.35 (L) 03/14/2023    HGB 8.6 (L) 03/14/2023    HCT 25.7 (L) 03/14/2023     (H) 03/14/2023    MCH 36.6 (H) 03/14/2023    MCHC 33.5 03/14/2023    RDW 16.9 (H) 03/14/2023     (H) 03/14/2023    MPV 10.0 03/14/2023    GRAN 3.3 03/14/2023    GRAN 47.6 03/14/2023    LYMPH 2.7 03/14/2023    LYMPH 38.5 03/14/2023    MONO 0.6 03/14/2023    MONO 9.1 03/14/2023    EOS 0.2 03/14/2023    BASO 0.10 03/14/2023    EOSINOPHIL 3.1 03/14/2023    BASOPHIL 1.4 03/14/2023       CMP  Sodium   Date Value Ref Range Status   03/14/2023 136 136 - 145 mmol/L Final     Potassium   Date Value Ref Range Status   03/14/2023 4.1 3.5 - 5.1 mmol/L Final     Chloride   Date Value Ref Range Status   03/14/2023 104 95 - 110 mmol/L Final     CO2   Date Value Ref Range Status   03/14/2023 25 23 - 29 mmol/L Final     Glucose   Date Value Ref Range Status   03/14/2023 97 70 - 110 mg/dL Final     BUN   Date Value Ref Range Status   03/14/2023 15 8 - 23 mg/dL Final     Creatinine   Date Value Ref Range Status   03/14/2023 0.9 0.5 - 1.4 mg/dL Final     Calcium   Date Value Ref Range Status   03/14/2023 9.5 8.7 - 10.5 mg/dL Final     Total Protein    Date Value Ref Range Status   03/14/2023 6.7 6.0 - 8.4 g/dL Final     Albumin   Date Value Ref Range Status   03/14/2023 4.3 3.5 - 5.2 g/dL Final     Total Bilirubin   Date Value Ref Range Status   03/14/2023 1.0 0.1 - 1.0 mg/dL Final     Comment:     For infants and newborns, interpretation of results should be based  on gestational age, weight and in agreement with clinical  observations.    Premature Infant recommended reference ranges:  Up to 24 hours.............<8.0 mg/dL  Up to 48 hours............<12.0 mg/dL  3-5 days..................<15.0 mg/dL  6-29 days.................<15.0 mg/dL       Alkaline Phosphatase   Date Value Ref Range Status   03/14/2023 45 (L) 55 - 135 U/L Final     AST ([place])   Date Value Ref Range Status   12/31/2015 14 14 - 36 U/L Final     AST   Date Value Ref Range Status   03/14/2023 16 10 - 40 U/L Final     ALT   Date Value Ref Range Status   03/14/2023 15 10 - 44 U/L Final     Anion Gap   Date Value Ref Range Status   03/14/2023 7 (L) 8 - 16 mmol/L Final     eGFR if    Date Value Ref Range Status   05/12/2022 >60.0 >60 mL/min/1.73 m^2 Final     eGFR if non    Date Value Ref Range Status   05/12/2022 59.3 (A) >60 mL/min/1.73 m^2 Final     Comment:     Calculation used to obtain the estimated glomerular filtration  rate (eGFR) is the CKD-EPI equation.        Pathology:  Reviewed      Assessment and Plan:   Shelly Vásquez (Shelly) is a pleasant 82 y.o.female with a past medical history of low risk MDS who presents for follow up.    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  She is had a mild anemia which has been stable for years, and she has not required any transfusions. We discussed luspatercept as an option to increase her hemoglobin, but this would likely not be effective by the time of  surgery. I would recommend transfusions pre/post surgery as needed.  Will arrange 1u PRBC transfusion on 4/24/23. Blood consent signed today. Can arrange outpatient transfusion post-op if need be.  Will follow up in May to discuss luspatercept.     Normocytic anemia: Stable. Secondary to MDS. Transfuse pre-op as above.    Orders/Follow Up:           Route Chart for Scheduling    BMT Chart Routing  Urgent    Follow up with physician 2 months.   Follow up with CED    Provider visit type Malignant hem   Infusion scheduling note Please arrange transfusion of 1u PRBCs on 4/24/23   Injection scheduling note    Labs CBC, CMP, type and screen, phosphorus and magnesium   Scheduling:  Preferred lab:  Lab interval:  Please arrange lab appt on 4/21/23 (CBC, CMP, Mg, Phos, T&S) and prior to visit in May   Imaging None      Pharmacy appointment No pharmacy appointment needed      Other referrals no Refer to Oncology Primary Care -  No additional referrals needed              Advance Care Planning   Date: 03/15/2023  She has low risk MDS. We previously discussed the good overall prognosis associated with this diagnosis. She is interested in treatment if need be. At this time will continue supportive care.       Total time of this visit was 35, including time spent face to face with patient and/or via video/audio, and also in preparing for today's visit for MDM and documentation. (Medical Decision Making, including consideration of possible diagnoses, management options, complex medical record review, review of diagnostic tests and information, consideration and discussion of significant complications based on comorbidities, and discussion with providers involved with the care of the patient). Greater than 50% was spent face to face with the patient counseling and coordinating care.      Karan Lopez MD  Hematology, Oncology, and Stem Cell Transplantation  [facility]

## 2023-03-15 NOTE — PROGRESS NOTES
[facility]  [facility]  Hematology & Stem Cell Transplantation Clinic      PATIENT: Shelly Vásquez  MRN: [id number]  DATE: 3/15/2023    Chief Complaint: f/u for RARS, No chief complaint on file.    Other MDs:  Marlene Andrew MD    Subjective:   Initial History: Ms. Vásquez is a 82 y.o. 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     She has a pMHx of GIB requiring transfusions in the past as well as pAF - not on anticoagulation (aspirin only) with a loop recorder in place.     Bone marrow biopsy 12/19/19 revealed a hypercellular marrow (60%) with trilineage hematopoiesis, mild megakaryocytic hyperplasia, and markedly increased ring siderblasts (50%). Diploid karyotype. NGS with SF3B1 (VAF 35%).    She has not started any treatment for MDS.    Interval History:   Mrs. Vásquez return for follow up regarding MDS.  More recently she has had significant neuropathic pain.  She has not had any recent infections or hospitalizations.  She is not required any recent transfusions.  She feels well aside from her neuropathic pain.      Past Medical History:   Diagnosis Date    Arthritis     Atrial fibrillation     Basal cell cancer     on the face    Encounter for blood transfusion     Gastric ulcer     Herpes simplex without mention of complication     rare outbreaks    Postmenopausal HRT (hormone replacement therapy)     Supraventricular tachycardia     s/p AVNRT ablation (Dr Brady)     Past Surgical History:   Procedure Laterality Date    APPENDECTOMY  age 21    BACK SURGERY      Beast Lift  2004    BONE MARROW BIOPSY Right 12/19/2019    Procedure: Biopsy-bone marrow;  Surgeon: Aidan Spring MD;  Location: [facility] OR [address] [place] [address];  Service: Oncology;  Laterality: Right;    BREAST BIOPSY  50yrs ago    unable to see scar    COSMETIC SURGERY      DILATION AND CURETTAGE OF UTERUS  2008    PMB    ENDOMETRIAL BIOPSY      EPIDURAL STEROID INJECTION N/A 8/25/2022     Procedure: LUMBAR CELINA CONTRAST DIRECT REFERRAL;  Surgeon: Rj Hernandez MD;  Location: [facility] PAIN MGT;  Service: Pain Management;  Laterality: N/A;    EYE SURGERY      INJECTION OF ANESTHETIC AGENT AROUND NERVE Bilateral 11/11/2022    Procedure: BLOCK, NERVE BILATERAL L2,L3,L4 AND L5 MEDIAL BRANCH ONE OF TWO;  Surgeon: Rj Hernandez MD;  Location: [facility] PAIN [facility]GT;  Service: Pain Management;  Laterality: Bilateral;    INJECTION OF JOINT Right 12/2/2022    Procedure: INJECTION, JOINT RIGHT INTRARTICULAR HIP CONTRAST;  Surgeon: Rj Hernandez MD;  Location: [facility] PAIN [facility]GT;  Service: Pain Management;  Laterality: Right;    ptsosis      RADIOFREQUENCY ABLATION  03/2018    SMALL INTESTINE SURGERY      TONSILLECTOMY, ADENOIDECTOMY  age 8    TOTAL REDUCTION MAMMOPLASTY Bilateral 2003    TRANSFORAMINAL EPIDURAL INJECTION OF STEROID Right 8/11/2022    Procedure: Injection,steroid,epidural Right L1/2 TF DIrect Referral Instructed to provide intervention per MRI results;  Surgeon: Rj Hernandez MD;  Location: [facility] PAIN [facility]GT;  Service: Pain Management;  Laterality: Right;    TRANSFORAMINAL EPIDURAL INJECTION OF STEROID Right 10/21/2022    Procedure: INJECTION, STEROID, EPIDURAL, TRANSFORAMINAL APPROACH, L5-S1 and S1, RIGHT CONTRAST;  Surgeon: Rj Hernandez MD;  Location: [facility] PAIN [facility]GT;  Service: Pain Management;  Laterality: Right;    TUBAL LIGATION       Family History   Problem Relation Age of Onset    No Known Problems Father     Cirrhosis Mother     No Known Problems Brother     No Known Problems Maternal Aunt     No Known Problems Maternal Uncle     No Known Problems Paternal Aunt     No Known Problems Paternal Uncle     No Known Problems Maternal Grandmother     No Known Problems Maternal Grandfather     No Known Problems Paternal Grandmother     No Known Problems Paternal Grandfather     No Known Problems Daughter     Pulmonary embolism Son     Breast cancer Neg Hx     Colon cancer Neg Hx     Ovarian cancer Neg  Hx     Amblyopia Neg Hx     Blindness Neg Hx     Cancer Neg Hx     Cataracts Neg Hx     Diabetes Neg Hx     Glaucoma Neg Hx     Hypertension Neg Hx     Macular degeneration Neg Hx     Retinal detachment Neg Hx     Strabismus Neg Hx     Stroke Neg Hx     Thyroid disease Neg Hx       reports that she quit smoking about 42 years ago. Her smoking use included cigarettes. She has a 22.00 pack-year smoking history. She has never used smokeless tobacco. She reports current alcohol use of about 2.0 standard drinks per week. She reports that she does not use drugs.  Review of patient's allergies indicates:   Allergen Reactions    Nsaids (non-steroidal anti-inflammatory drug)      GI Bleed  Had 5 blood transfusions     Current Outpatient Medications   Medication Sig Dispense Refill    acetaminophen (TYLENOL) 325 MG tablet Take 325 mg by mouth continuous prn for Pain.      aspirin (ECOTRIN) 81 MG EC tablet Take 1 tablet (81 mg total) by mouth once daily. 30 tablet 0    diazePAM (VALIUM) 2 MG tablet Take 1 tablet (2 mg total) by mouth every 8 (eight) hours as needed for Insomnia (RLS). 45 tablet 1    estradiol-norethindrone acet 0.5-0.1 mg per tablet Take 1 tablet by mouth once daily. 90 tablet 3    gabapentin (NEURONTIN) 600 MG tablet Take 1 tablet (600 mg total) by mouth 4 (four) times daily. 120 tablet 5    pantoprazole (PROTONIX) 40 MG tablet Take 1 tablet (40 mg total) by mouth once daily. 90 tablet 1    pramipexole (MIRAPEX) 0.5 MG tablet Take 1 tablet (0.5 mg total) by mouth 3 (three) times daily. 90 tablet 2    verapamiL (VERELAN) 120 MG C24P Take 1 capsule (120 mg total) by mouth once daily. 90 capsule 3    vitamin D (VITAMIN D3) 1000 units Tab Take 1,000 Units by mouth once daily.      ofloxacin (OCUFLOX) 0.3 % ophthalmic solution Place 1 drop into the right eye 3 (three) times daily.      prednisoLONE acetate (PRED FORTE) 1 % DrpS Place 1 drop into the right eye 3 (three) times daily.       No current  "facility-administered medications for this visit.     Review of Systems   Constitutional:  Negative for appetite change, chills, fatigue and unexpected weight change.   HENT:  Negative for dental problem, mouth sores, nosebleeds, trouble swallowing and voice change.    Eyes:  Negative for visual disturbance.   Respiratory:  Negative for chest tightness, shortness of breath and wheezing.    Cardiovascular:  Negative for chest pain, palpitations and leg swelling.   Gastrointestinal:  Negative for abdominal distention, abdominal pain, blood in stool, diarrhea, nausea and vomiting.   Endocrine: Negative for cold intolerance.   Genitourinary:  Negative for hematuria.   Musculoskeletal:  Negative for neck pain.   Skin:  Negative for pallor and rash.   Allergic/Immunologic: Negative for immunocompromised state.   Neurological:  Negative for dizziness, weakness and headaches.   Hematological:  Negative for adenopathy. Does not bruise/bleed easily.   Psychiatric/Behavioral:  Negative for agitation and behavioral problems.    ECOG Performance Status: 1     Objective:      Vitals:   Vitals:    03/15/23 0807   BP: (!) 123/58   BP Location: Left arm   Patient Position: Sitting   Pulse: 61   Resp: 18   Temp: 97.4 °F (36.3 °C)   TempSrc: Oral   SpO2: 97%   Weight: 69.9 kg (154 lb 1.6 oz)   Height: 5' 8" (1.727 m)     BMI: Body mass index is 23.43 kg/m².    Physical Exam  Vitals reviewed.   Constitutional:       Appearance: Normal appearance. She is well-developed.   HENT:      Head: Normocephalic and atraumatic.   Cardiovascular:      Rate and Rhythm: Normal rate and regular rhythm.      Pulses: Normal pulses.      Heart sounds: No murmur heard.    No friction rub. No gallop.   Pulmonary:      Effort: Pulmonary effort is normal.      Breath sounds: Normal breath sounds. No wheezing.   Abdominal:      General: Bowel sounds are normal.      Palpations: Abdomen is soft. There is no mass.      Comments: No HSM   Musculoskeletal:       "   General: Normal range of motion.      Cervical back: Normal range of motion and neck supple.   Lymphadenopathy:      Head:      Right side of head: No submandibular, posterior auricular or occipital adenopathy.      Left side of head: No submandibular, posterior auricular or occipital adenopathy.      Cervical: No cervical adenopathy.      Upper Body:      Right upper body: No supraclavicular adenopathy.      Left upper body: No supraclavicular adenopathy.   Skin:     General: Skin is warm and dry.   Neurological:      Mental Status: She is alert and oriented to person, place, and time.   Psychiatric:         Behavior: Behavior normal.     Laboratory Data:  Lab Visit on 03/14/2023   Component Date Value Ref Range Status    Group & Rh 03/14/2023 A POS   Final    Indirect Berry 03/14/2023 NEG   Final    WBC 03/14/2023 7.01  3.90 - 12.70 K/uL Final    RBC 03/14/2023 2.35 (L)  4.00 - 5.40 M/uL Final    Hemoglobin 03/14/2023 8.6 (L)  12.0 - 16.0 g/dL Final    Hematocrit 03/14/2023 25.7 (L)  37.0 - 48.5 % Final    MCV 03/14/2023 109 (H)  82 - 98 fL Final    MCH 03/14/2023 36.6 (H)  27.0 - 31.0 pg Final    MCHC 03/14/2023 33.5  32.0 - 36.0 g/dL Final    RDW 03/14/2023 16.9 (H)  11.5 - 14.5 % Final    Platelets 03/14/2023 481 (H)  150 - 450 K/uL Final    MPV 03/14/2023 10.0  9.2 - 12.9 fL Final    Immature Granulocytes 03/14/2023 0.3  0.0 - 0.5 % Final    Gran # (ANC) 03/14/2023 3.3  1.8 - 7.7 K/uL Final    Immature Grans (Abs) 03/14/2023 0.02  0.00 - 0.04 K/uL Final    Comment: Mild elevation in immature granulocytes is non specific and   can be seen in a variety of conditions including stress response,   acute inflammation, trauma and pregnancy. Correlation with other   laboratory and clinical findings is essential.      Lymph # 03/14/2023 2.7  1.0 - 4.8 K/uL Final    Mono # 03/14/2023 0.6  0.3 - 1.0 K/uL Final    Eos # 03/14/2023 0.2  0.0 - 0.5 K/uL Final    Baso # 03/14/2023 0.10  0.00 - 0.20 K/uL Final    nRBC  03/14/2023 0  0 /100 WBC Final    Gran % 03/14/2023 47.6  38.0 - 73.0 % Final    Lymph % 03/14/2023 38.5  18.0 - 48.0 % Final    Mono % 03/14/2023 9.1  4.0 - 15.0 % Final    Eosinophil % 03/14/2023 3.1  0.0 - 8.0 % Final    Basophil % 03/14/2023 1.4  0.0 - 1.9 % Final    Differential Method 03/14/2023 Automated   Final    Sodium 03/14/2023 136  136 - 145 mmol/L Final    Potassium 03/14/2023 4.1  3.5 - 5.1 mmol/L Final    Chloride 03/14/2023 104  95 - 110 mmol/L Final    CO2 03/14/2023 25  23 - 29 mmol/L Final    Glucose 03/14/2023 97  70 - 110 mg/dL Final    BUN 03/14/2023 15  8 - 23 mg/dL Final    Creatinine 03/14/2023 0.9  0.5 - 1.4 mg/dL Final    Calcium 03/14/2023 9.5  8.7 - 10.5 mg/dL Final    Total Protein 03/14/2023 6.7  6.0 - 8.4 g/dL Final    Albumin 03/14/2023 4.3  3.5 - 5.2 g/dL Final    Total Bilirubin 03/14/2023 1.0  0.1 - 1.0 mg/dL Final    Comment: For infants and newborns, interpretation of results should be based  on gestational age, weight and in agreement with clinical  observations.    Premature Infant recommended reference ranges:  Up to 24 hours.............<8.0 mg/dL  Up to 48 hours............<12.0 mg/dL  3-5 days..................<15.0 mg/dL  6-29 days.................<15.0 mg/dL      Alkaline Phosphatase 03/14/2023 45 (L)  55 - 135 U/L Final    AST 03/14/2023 16  10 - 40 U/L Final    ALT 03/14/2023 15  10 - 44 U/L Final    Anion Gap 03/14/2023 7 (L)  8 - 16 mmol/L Final    eGFR 03/14/2023 >60.0  >60 mL/min/1.73 m^2 Final    Magnesium 03/14/2023 2.2  1.6 - 2.6 mg/dL Final    Phosphorus 03/14/2023 3.1  2.7 - 4.5 mg/dL Final   Lab Visit on 03/13/2023   Component Date Value Ref Range Status    WBC 03/13/2023 6.69  3.90 - 12.70 K/uL Final    RBC 03/13/2023 2.41 (L)  4.00 - 5.40 M/uL Final    Hemoglobin 03/13/2023 8.5 (L)  12.0 - 16.0 g/dL Final    Hematocrit 03/13/2023 26.4 (L)  37.0 - 48.5 % Final    MCV 03/13/2023 110 (H)  82 - 98 fL Final    MCH 03/13/2023 35.3 (H)  27.0 - 31.0 pg Final     MCHC 03/13/2023 32.2  32.0 - 36.0 g/dL Final    RDW 03/13/2023 17.2 (H)  11.5 - 14.5 % Final    Platelets 03/13/2023 487 (H)  150 - 450 K/uL Final    MPV 03/13/2023 10.0  9.2 - 12.9 fL Final    Immature Granulocytes 03/13/2023 0.3  0.0 - 0.5 % Final    Gran # (ANC) 03/13/2023 2.8  1.8 - 7.7 K/uL Final    Immature Grans (Abs) 03/13/2023 0.02  0.00 - 0.04 K/uL Final    Comment: Mild elevation in immature granulocytes is non specific and   can be seen in a variety of conditions including stress response,   acute inflammation, trauma and pregnancy. Correlation with other   laboratory and clinical findings is essential.      Lymph # 03/13/2023 2.8  1.0 - 4.8 K/uL Final    Mono # 03/13/2023 0.7  0.3 - 1.0 K/uL Final    Eos # 03/13/2023 0.2  0.0 - 0.5 K/uL Final    Baso # 03/13/2023 0.10  0.00 - 0.20 K/uL Final    nRBC 03/13/2023 0  0 /100 WBC Final    Gran % 03/13/2023 42.4  38.0 - 73.0 % Final    Lymph % 03/13/2023 41.9  18.0 - 48.0 % Final    Mono % 03/13/2023 10.5  4.0 - 15.0 % Final    Eosinophil % 03/13/2023 3.4  0.0 - 8.0 % Final    Basophil % 03/13/2023 1.5  0.0 - 1.9 % Final    Differential Method 03/13/2023 Automated   Final    Sodium 03/13/2023 136  136 - 145 mmol/L Final    Potassium 03/13/2023 4.2  3.5 - 5.1 mmol/L Final    Chloride 03/13/2023 105  95 - 110 mmol/L Final    CO2 03/13/2023 25  23 - 29 mmol/L Final    Glucose 03/13/2023 96  70 - 110 mg/dL Final    BUN 03/13/2023 19  8 - 23 mg/dL Final    Creatinine 03/13/2023 0.9  0.5 - 1.4 mg/dL Final    Calcium 03/13/2023 9.5  8.7 - 10.5 mg/dL Final    Total Protein 03/13/2023 6.7  6.0 - 8.4 g/dL Final    Albumin 03/13/2023 4.3  3.5 - 5.2 g/dL Final    Total Bilirubin 03/13/2023 0.7  0.1 - 1.0 mg/dL Final    Comment: For infants and newborns, interpretation of results should be based  on gestational age, weight and in agreement with clinical  observations.    Premature Infant recommended reference ranges:  Up to 24 hours.............<8.0 mg/dL  Up to 48  hours............<12.0 mg/dL  3-5 days..................<15.0 mg/dL  6-29 days.................<15.0 mg/dL      Alkaline Phosphatase 03/13/2023 42 (L)  55 - 135 U/L Final    AST 03/13/2023 15  10 - 40 U/L Final    ALT 03/13/2023 14  10 - 44 U/L Final    Anion Gap 03/13/2023 6 (L)  8 - 16 mmol/L Final    eGFR 03/13/2023 >60.0  >60 mL/min/1.73 m^2 Final    Ferritin 03/13/2023 609 (H)  20.0 - 300.0 ng/mL Final    TSH 03/13/2023 1.272  0.400 - 4.000 uIU/mL Final    Cholesterol 03/13/2023 153  120 - 199 mg/dL Final    Comment: The National Cholesterol Education Program (NCEP) has set the  following guidelines (reference ranges) for Cholesterol:  Optimal.....................<200 mg/dL  Borderline High.............200-239 mg/dL  High........................> or = 240 mg/dL      Triglycerides 03/13/2023 45  30 - 150 mg/dL Final    Comment: The National Cholesterol Education Program (NCEP) has set the  following guidelines (reference values) for triglycerides:  Normal......................<150 mg/dL  Borderline High.............150-199 mg/dL  High........................200-499 mg/dL      HDL 03/13/2023 74  40 - 75 mg/dL Final    Comment: The National Cholesterol Education Program (NCEP) has set the  following guidelines (reference values) for HDL Cholesterol:  Low...............<40 mg/dL  Optimal...........>60 mg/dL      LDL Cholesterol 03/13/2023 70.0  63.0 - 159.0 mg/dL Final    Comment: The National Cholesterol Education Program (NCEP) has set the  following guidelines (reference values) for LDL Cholesterol:  Optimal.......................<130 mg/dL  Borderline High...............130-159 mg/dL  High..........................160-189 mg/dL  Very High.....................>190 mg/dL      HDL/Cholesterol Ratio 03/13/2023 48.4  20.0 - 50.0 % Final    Total Cholesterol/HDL Ratio 03/13/2023 2.1  2.0 - 5.0 Final    Non-HDL Cholesterol 03/13/2023 79  mg/dL Final    Comment: Risk category and Non-HDL cholesterol goals:  Coronary  heart disease (CHD)or equivalent (10-year risk of CHD >20%):  Non-HDL cholesterol goal     <130 mg/dL  Two or more CHD risk factors and 10-year risk of CHD <= 20%:  Non-HDL cholesterol goal     <160 mg/dL  0 to 1 CHD risk factor:  Non-HDL cholesterol goal     <190 mg/dL      Iron 03/13/2023 213 (H)  30 - 160 ug/dL Final    Transferrin 03/13/2023 164 (L)  200 - 375 mg/dL Final    TIBC 03/13/2023 243 (L)  250 - 450 ug/dL Final    Saturated Iron 03/13/2023 88 (H)  20 - 50 % Final       Assessment:       No diagnosis found.      Plan:     MDS-RS; SF3B1 mutation; IPSS-0 (low), IPSS-R 2 (low risk), IPSS-M (very low risk)  She has low risk disease by IPSS/IPSS-R/IPSS-M, and she is not had any significant symptoms related to her MDS.  She is had a mild anemia which has been stable for years, and she has not required any transfusions.  We initially discussed treatment with luspatercept; however, since she is transfusion independent, there likely would be very little benefit from treatment at this point.  She also is not interested in any treatment at this point.  We will continue to monitor with labs every 6 months.    Normocytic anemia  As noted above will continue to monitor twice per year.      Route Chart for Scheduling    BMT Chart Routing      Follow up with physician    Follow up with CED    Provider visit type    Infusion scheduling note PRBC transfusion on 4/24/23   Injection scheduling note    Labs    Imaging    Pharmacy appointment    Other referrals                 Karan Lopez MD  Hematology, Oncology, and Stem Cell Transplantation  [facility]

## 2023-03-17 ENCOUNTER — PATIENT MESSAGE (OUTPATIENT)
Dept: INTERNAL MEDICINE | Facility: CLINIC | Age: 83
End: 2023-03-17
Payer: MEDICARE

## 2023-03-17 DIAGNOSIS — M16.11 PRIMARY OSTEOARTHRITIS OF RIGHT HIP: Primary | ICD-10-CM

## 2023-03-20 ENCOUNTER — TELEPHONE (OUTPATIENT)
Dept: OPHTHALMOLOGY | Facility: CLINIC | Age: 83
End: 2023-03-20
Payer: MEDICARE

## 2023-03-20 DIAGNOSIS — H25.11 NUCLEAR SCLEROTIC CATARACT OF RIGHT EYE: Primary | ICD-10-CM

## 2023-03-21 ENCOUNTER — TELEPHONE (OUTPATIENT)
Dept: INTERNAL MEDICINE | Facility: CLINIC | Age: 83
End: 2023-03-21
Payer: MEDICARE

## 2023-03-21 NOTE — TELEPHONE ENCOUNTER
Keon,     Do I need to do a rosita op eval on Shelly?  If so to my staff : let us get an EKG - order in     Also keon do you want to get the MRI of the hip before surgery as recommended by [facility] based on the plain xray they did ?    And should I get an updated bone density before surgery ?

## 2023-03-24 ENCOUNTER — PATIENT MESSAGE (OUTPATIENT)
Dept: ORTHOPEDICS | Facility: CLINIC | Age: 83
End: 2023-03-24
Payer: MEDICARE

## 2023-03-24 ENCOUNTER — PATIENT MESSAGE (OUTPATIENT)
Dept: SPORTS MEDICINE | Facility: CLINIC | Age: 83
End: 2023-03-24
Payer: MEDICARE

## 2023-03-27 ENCOUNTER — TELEPHONE (OUTPATIENT)
Dept: INTERNAL MEDICINE | Facility: CLINIC | Age: 83
End: 2023-03-27
Payer: MEDICARE

## 2023-03-27 NOTE — TELEPHONE ENCOUNTER
RAE and TRINO pt is to have hip surgery on 4/26.     I would like her to have a bone mineral density before then please.

## 2023-03-30 ENCOUNTER — PATIENT MESSAGE (OUTPATIENT)
Dept: HEMATOLOGY/ONCOLOGY | Facility: CLINIC | Age: 83
End: 2023-03-30
Payer: MEDICARE

## 2023-03-31 ENCOUNTER — EXTERNAL CHRONIC CARE MANAGEMENT (OUTPATIENT)
Dept: PRIMARY CARE CLINIC | Facility: CLINIC | Age: 83
End: 2023-03-31
Payer: MEDICARE

## 2023-03-31 ENCOUNTER — PES CALL (OUTPATIENT)
Dept: ADMINISTRATIVE | Facility: CLINIC | Age: 83
End: 2023-03-31
Payer: MEDICARE

## 2023-03-31 PROCEDURE — 99490 CHRNC CARE MGMT STAFF 1ST 20: CPT | Mod: PBBFAC | Performed by: INTERNAL MEDICINE

## 2023-03-31 PROCEDURE — 99490 CHRNC CARE MGMT STAFF 1ST 20: CPT | Mod: S$PBB,,, | Performed by: INTERNAL MEDICINE

## 2023-03-31 PROCEDURE — 99490 PR CHRONIC CARE MGMT, 1ST 20 MIN: ICD-10-PCS | Mod: S$PBB,,, | Performed by: INTERNAL MEDICINE

## 2023-04-03 ENCOUNTER — TELEPHONE (OUTPATIENT)
Dept: OPHTHALMOLOGY | Facility: CLINIC | Age: 83
End: 2023-04-03
Payer: MEDICARE

## 2023-04-04 ENCOUNTER — PATIENT MESSAGE (OUTPATIENT)
Dept: INTERNAL MEDICINE | Facility: CLINIC | Age: 83
End: 2023-04-04
Payer: MEDICARE

## 2023-04-04 ENCOUNTER — ANESTHESIA EVENT (OUTPATIENT)
Dept: SURGERY | Facility: HOSPITAL | Age: 83
DRG: 470 | End: 2023-04-04
Payer: MEDICARE

## 2023-04-04 ENCOUNTER — PATIENT MESSAGE (OUTPATIENT)
Dept: ADMINISTRATIVE | Facility: OTHER | Age: 83
End: 2023-04-04
Payer: MEDICARE

## 2023-04-04 ENCOUNTER — HOSPITAL ENCOUNTER (OUTPATIENT)
Dept: CARDIOLOGY | Facility: CLINIC | Age: 83
Discharge: HOME | End: 2023-04-04
Payer: MEDICARE

## 2023-04-04 ENCOUNTER — HOSPITAL ENCOUNTER (OUTPATIENT)
Dept: PREADMISSION TESTING | Facility: HOSPITAL | Age: 83
Discharge: HOME | End: 2023-04-04
Attending: OPHTHALMOLOGY | Admitting: OPHTHALMOLOGY
Payer: MEDICARE

## 2023-04-04 ENCOUNTER — OFFICE VISIT (OUTPATIENT)
Dept: ORTHOPEDICS | Facility: CLINIC | Age: 83
End: 2023-04-04
Payer: MEDICARE

## 2023-04-04 ENCOUNTER — HOSPITAL ENCOUNTER (OUTPATIENT)
Dept: RADIOLOGY | Facility: HOSPITAL | Age: 83
Discharge: HOME | End: 2023-04-04
Attending: ORTHOPAEDIC SURGERY
Payer: MEDICARE

## 2023-04-04 ENCOUNTER — TELEPHONE (OUTPATIENT)
Dept: OPHTHALMOLOGY | Facility: CLINIC | Age: 83
End: 2023-04-04
Payer: MEDICARE

## 2023-04-04 VITALS — HEIGHT: 67 IN | WEIGHT: 152 LBS | BODY MASS INDEX: 23.86 KG/M2

## 2023-04-04 VITALS
SYSTOLIC BLOOD PRESSURE: 113 MMHG | HEART RATE: 76 BPM | HEIGHT: 69 IN | TEMPERATURE: 98 F | DIASTOLIC BLOOD PRESSURE: 60 MMHG | RESPIRATION RATE: 16 BRPM | BODY MASS INDEX: 22.38 KG/M2 | WEIGHT: 151.13 LBS | OXYGEN SATURATION: 98 %

## 2023-04-04 DIAGNOSIS — H25.12 NUCLEAR SCLEROTIC CATARACT OF LEFT EYE: Primary | ICD-10-CM

## 2023-04-04 DIAGNOSIS — M16.10 HIP ARTHRITIS: ICD-10-CM

## 2023-04-04 DIAGNOSIS — M16.11 PRIMARY OSTEOARTHRITIS OF RIGHT HIP: Primary | ICD-10-CM

## 2023-04-04 DIAGNOSIS — M16.11 PRIMARY OSTEOARTHRITIS OF RIGHT HIP: ICD-10-CM

## 2023-04-04 DIAGNOSIS — D64.9 ANEMIA, UNSPECIFIED TYPE: ICD-10-CM

## 2023-04-04 DIAGNOSIS — I10 ESSENTIAL HYPERTENSION: ICD-10-CM

## 2023-04-04 PROCEDURE — 99212 OFFICE O/P EST SF 10 MIN: CPT | Mod: PBBFAC,27 | Performed by: NURSE PRACTITIONER

## 2023-04-04 PROCEDURE — 72170 X-RAY EXAM OF PELVIS: CPT | Mod: TC

## 2023-04-04 PROCEDURE — 72170 X-RAY EXAM OF PELVIS: CPT | Mod: 26,,, | Performed by: RADIOLOGY

## 2023-04-04 PROCEDURE — 99214 PR OFFICE/OUTPT VISIT, EST, LEVL IV, 30-39 MIN: ICD-10-PCS | Mod: S$PBB,,, | Performed by: NURSE PRACTITIONER

## 2023-04-04 PROCEDURE — 93010 ELECTROCARDIOGRAM REPORT: CPT | Mod: S$PBB,,, | Performed by: INTERNAL MEDICINE

## 2023-04-04 PROCEDURE — 93005 ELECTROCARDIOGRAM TRACING: CPT | Mod: PBBFAC | Performed by: INTERNAL MEDICINE

## 2023-04-04 PROCEDURE — 99999 PR PBB SHADOW E&M-EST. PATIENT-LVL III: CPT | Mod: PBBFAC,,, | Performed by: ORTHOPAEDIC SURGERY

## 2023-04-04 PROCEDURE — 99213 OFFICE O/P EST LOW 20 MIN: CPT | Mod: PBBFAC | Performed by: ORTHOPAEDIC SURGERY

## 2023-04-04 PROCEDURE — 99999 PR PBB SHADOW E&M-EST. PATIENT-LVL II: CPT | Mod: PBBFAC,,, | Performed by: NURSE PRACTITIONER

## 2023-04-04 PROCEDURE — 99499 NO LOS: ICD-10-PCS | Mod: S$PBB,,, | Performed by: ORTHOPAEDIC SURGERY

## 2023-04-04 PROCEDURE — 99214 OFFICE O/P EST MOD 30 MIN: CPT | Mod: S$PBB,,, | Performed by: NURSE PRACTITIONER

## 2023-04-04 PROCEDURE — 72170 XR PELVIS ROUTINE AP: ICD-10-PCS | Mod: 26,,, | Performed by: RADIOLOGY

## 2023-04-04 PROCEDURE — 99499 UNLISTED E&M SERVICE: CPT | Mod: S$PBB,,, | Performed by: ORTHOPAEDIC SURGERY

## 2023-04-04 PROCEDURE — 99999 PR PBB SHADOW E&M-EST. PATIENT-LVL III: ICD-10-PCS | Mod: PBBFAC,,, | Performed by: ORTHOPAEDIC SURGERY

## 2023-04-04 PROCEDURE — 93010 EKG 12-LEAD: ICD-10-PCS | Mod: S$PBB,,, | Performed by: INTERNAL MEDICINE

## 2023-04-04 PROCEDURE — 99999 PR PBB SHADOW E&M-EST. PATIENT-LVL II: ICD-10-PCS | Mod: PBBFAC,,, | Performed by: NURSE PRACTITIONER

## 2023-04-04 NOTE — PROGRESS NOTES
Pre-op R ant NAT 4/26/23  PRBC transfusion planned 4/24/23  Doing aquatic exercise classes, helping    This patient has significant symptoms in their hip that are affecting their quality of life and daily activities.  They have tried non-operative treatment including analgesics, an exercise program, and activity modification, but the symptoms have persisted. I believe they make a good candidate for hip arthroplasty.     The risks and benefits of hip arthroplasty have been discussed with the patient which include, but are not limited to infections (including severe sequelae), potential component failure, fracture, DVT, pulmonary embolus, nerve palsy, dislocation, leg length inequality, persistent pain, wound healing complications, transfusions, medical complications and death.     We discussed surgical options including implant type and why I believe the implant selected is a good match for the patient's needs. The patient expressed understanding and agrees to proceed with the planned surgery.     Pre-operative planning will include the following:  A pre-surgical evaluation will be arranged.  Pre-op orders will be placed.  We will make arrangements with the operating room for proper time and staffing.  We will make Social Service arrangements for the patient.  Approach: Anterior     Implants:   Company: [employer]  Cup: [employer]  Stem: hybrid, c-stem   Send fem head and prox femur cancellous bone for perm path  [place]  3gm TXA  2u PRBC on hold  Requests 1st case  1-2 nights  Rx cefadroxil  Hgb POD#1  *R side long pre-op     Have discussed case with PCP Dr Méndez, hematology Dr Lopez, and [facility] Dr Escobar    Pre-op to be done by Dr Méndez per Dr Escobar         DVT prophylaxis: ASA 81mg BID x1 month  Dispo: home health PT     Admission status:     Inpatient       Location:                  [facility] 2nd floor              INPATIENT ADMISSION STATUS  Per CMS MLN Matters Number [id number]:  X   I believe that this  patient meets inpatient admission status criteria as there is a reasonable expectation of medically necessary hospital services for 2 midnights or longer including all outpatient/observation and inpatient care time         Please see the supporting documentation in the medical record that supports the admitting physicians determination that the patient required inpatient care despite the lack of a 2-midnight expectation based upon complex medical factors including but not limited to:  -Patients history, co-morbidities and current medical needs  -Risk of adverse events  -Severity of signs and symptoms    X             ----- Message -----  From: Erlin Lopez MD  Sent: 3/23/2023  10:33 PM CDT  To: Clarissa Escobar MD, *     I previously offered luspatercept (a stimulating agent) months ago, but she was not interested at the time. At this point it wouldn't work quickly enough. There are no contraindications to transfusions. After seeing her earlier this month, I arranged for transfusion in our infusion suite on 4/24/23. Our consult service can see her during her hospitalization if hospitalization is anticipated.      No orders of the defined types were placed in this encounter.    Has cataracts planned for 4/6 and 4/24, sent message to dr zen lin re: delaying that for 90 days after her NAT

## 2023-04-04 NOTE — ANESTHESIA PREPROCEDURE EVALUATION
04/04/2023  Shelly Vásquez is a 82 y.o., female.    Pre-operative evaluation for Procedure(s) (LRB):  ARTHROPLASTY, HIP, TOTAL, ANTERIOR APPROACH: RIGHT: DEPUY - C-STEM + PINNACLE (Right)      Patient Active Problem List   Diagnosis    Insomnia    Osteoarthritis    Spinal stenosis, lumbar    Spondylolisthesis at L4-L5 level    History of GI bleed    S/P lumbar laminectomy    Status post placement of implantable loop recorder    PSVT (paroxysmal supraventricular tachycardia)    History of atrial fibrillation    Arthropathy of lumbar facet joint    History of radiofrequency ablation (RFA) procedure for cardiac arrhythmia    MDS (myelodysplastic syndrome)    Restless leg    Constipation    Refractory anemia with ringed sideroblasts    Impaired functional mobility, balance, gait, and endurance    Postmenopausal HRT (hormone replacement therapy)    Carotid artery dissection       Review of patient's allergies indicates:   Allergen Reactions    Nsaids (non-steroidal anti-inflammatory drug)      GI Bleed  Had 5 blood transfusions       Current Outpatient Medications on File Prior to Encounter   Medication Sig Dispense Refill    acetaminophen (TYLENOL) 325 MG tablet Take 325 mg by mouth continuous prn for Pain.      aspirin (ECOTRIN) 81 MG EC tablet Take 1 tablet (81 mg total) by mouth once daily. 30 tablet 0    diazePAM (VALIUM) 2 MG tablet Take 1 tablet (2 mg total) by mouth every 8 (eight) hours as needed for Insomnia (RLS). 45 tablet 1    estradiol-norethindrone acet 0.5-0.1 mg per tablet Take 1 tablet by mouth once daily. 90 tablet 3    gabapentin (NEURONTIN) 600 MG tablet Take 1 tablet (600 mg total) by mouth 4 (four) times daily. 120 tablet 5    ofloxacin (OCUFLOX) 0.3 % ophthalmic solution Place 1 drop into the right eye 3 (three) times daily.      pantoprazole (PROTONIX) 40 MG  tablet Take 1 tablet (40 mg total) by mouth once daily. 90 tablet 1    pramipexole (MIRAPEX) 0.5 MG tablet Take 1 tablet (0.5 mg total) by mouth 3 (three) times daily. 90 tablet 2    prednisoLONE acetate (PRED FORTE) 1 % DrpS Place 1 drop into the right eye 3 (three) times daily.      verapamiL (VERELAN) 120 MG C24P Take 1 capsule (120 mg total) by mouth once daily. 90 capsule 3    vitamin D (VITAMIN D3) 1000 units Tab Take 1,000 Units by mouth once daily.       No current facility-administered medications on file prior to encounter.       Past Surgical History:   Procedure Laterality Date    APPENDECTOMY  age 21    BACK SURGERY      Beast Lift  2004    BONE MARROW BIOPSY Right 12/19/2019    Procedure: Biopsy-bone marrow;  Surgeon: Aidan Spring MD;  Location: [facility] OR [address] [place] [address];  Service: Oncology;  Laterality: Right;    BREAST BIOPSY  50yrs ago    unable to see scar    COSMETIC SURGERY      DILATION AND CURETTAGE OF UTERUS  2008    PMB    ENDOMETRIAL BIOPSY      EPIDURAL STEROID INJECTION N/A 8/25/2022    Procedure: LUMBAR CELINA CONTRAST DIRECT REFERRAL;  Surgeon: Rj Hernandez MD;  Location: [facility] PAIN MGT;  Service: Pain Management;  Laterality: N/A;    EYE SURGERY      INJECTION OF ANESTHETIC AGENT AROUND NERVE Bilateral 11/11/2022    Procedure: BLOCK, NERVE BILATERAL L2,L3,L4 AND L5 MEDIAL BRANCH ONE OF TWO;  Surgeon: Rj Hernandez MD;  Location: [facility] PAIN MGT;  Service: Pain Management;  Laterality: Bilateral;    INJECTION OF JOINT Right 12/2/2022    Procedure: INJECTION, JOINT RIGHT INTRARTICULAR HIP CONTRAST;  Surgeon: Rj Hernandez MD;  Location: [facility] PAIN MGT;  Service: Pain Management;  Laterality: Right;    ptsosis      RADIOFREQUENCY ABLATION  03/2018    SMALL INTESTINE SURGERY      TONSILLECTOMY, ADENOIDECTOMY  age 8    TOTAL REDUCTION MAMMOPLASTY Bilateral 2003    TRANSFORAMINAL EPIDURAL INJECTION OF STEROID Right 8/11/2022    Procedure: Injection,steroid,epidural  Right L1/2 TF DIrect Referral Instructed to provide intervention per MRI results;  Surgeon: Rj Hernandez MD;  Location: [facility] PAIN MGT;  Service: Pain Management;  Laterality: Right;    TRANSFORAMINAL EPIDURAL INJECTION OF STEROID Right 10/21/2022    Procedure: INJECTION, STEROID, EPIDURAL, TRANSFORAMINAL APPROACH, L5-S1 and S1, RIGHT CONTRAST;  Surgeon: Rj Hernandez MD;  Location: [facility] PAIN MGT;  Service: Pain Management;  Laterality: Right;    TUBAL LIGATION         CBC:     23 16:52   WBC  7.01   RBC  2.35 (L)   Hemoglobin  8.6 (L)   Hematocrit  25.7 (L)   MCV  109 (H)   MCH  36.6 (H)   MCHC  33.5   RDW  16.9 (H)   Platelets  481 (H)     CMP:     23 16:52   Sodium  136   Potassium  4.1   Chloride  104   CO2  25   Anion Gap  7 (L)   BUN  15   Creatinine  0.9   eGFR  >60.0   Glucose  97   Calcium  9.5   Phosphorus  3.1   Magnesium  2.2   Alkaline Phosphatase  45 (L)   PROTEIN TOTAL  6.7   Albumin  4.3   BILIRUBIN TOTAL  1.0   AST  16   ALT  15     EK21  Normal sinus rhythm   Normal ECG   When compared with ECG of 2019 10:44,   No significant change was found   Confirmed by Sebas Esparza MD ([id number]) on 2021 3:52:46 PM     INR: pending for morning of surgery    2D Echo:  Results for orders placed or performed during the hospital encounter of 17   2D echo with color flow doppler   Result Value Ref Range    EF + QEF 60 55 - 65    Mitral Valve Regurgitation MILD     Diastolic Dysfunction No     Aortic Valve Regurgitation MILD     Est. PA Systolic Pressure 26.81     Tricuspid Valve Regurgitation TRIVIAL          Pre-op Assessment    I have reviewed the Patient Summary Reports.     I have reviewed the Nursing Notes. I have reviewed the NPO Status.   I have reviewed the Medications.     Review of Systems  Anesthesia Hx:  No problems with previous Anesthesia  History of prior surgery of interest to airway management or planning:  Denies Personal Hx of Anesthesia complications.    Social:  Non-Smoker    Hematology/Oncology:         -- Anemia: Hematology Comments: Myelodysplasic syndrome  --  Cancer in past history:    EENT/Dental:EENT/Dental Normal   Cardiovascular:   Exercise tolerance: good Denies Hypertension. Dysrhythmias atrial fibrillation  Denies Angina.  Denies DALE. ECG has been reviewed. H/o a fib and SVT s/p ablation, patient is on verapamil  Patient is active, does a 45 minute swimming class 3 times per week   Pulmonary:  Pulmonary Normal  Denies Shortness of breath.    Renal/:  Renal/ Normal     Hepatic/GI:   PUD, GERD, well controlled    Musculoskeletal:   Arthritis  Prior surgery at L4-L5 Spine Disorders:    Neurological:  Neurology Normal    Endocrine:  Endocrine Normal    Psych:  Psychiatric Normal           Physical Exam  General: Well nourished, Cooperative, Alert and Oriented    Airway:  Mallampati: I   Mouth Opening: Normal  TM Distance: Normal  Tongue: Normal  Neck ROM: Normal ROM    Dental:  Intact    Chest/Lungs:  Clear to auscultation, Normal Respiratory Rate    Heart:  Rate: Normal  Rhythm: Regular Rhythm  Sounds: Normal        Anesthesia Plan  Type of Anesthesia, risks & benefits discussed:    Anesthesia Type: Gen Natural Airway, MAC, Epidural, Spinal, CSE, Regional  Intra-op Monitoring Plan: Standard ASA Monitors  Post Op Pain Control Plan: multimodal analgesia, IV/PO Opioids PRN, peripheral nerve block and epidural analgesia  Induction:  IV  Informed Consent: Informed consent signed with the Patient and all parties understand the risks and agree with anesthesia plan.  All questions answered.   ASA Score: 3  Day of Surgery Review of History & Physical: H&P Update referred to the surgeon/provider.  Anesthesia Plan Notes:   1. Anesthesia E consent signed with patient in pre-op center.  2. Patient has MDS and will receive 1 unit of pRBCs as an outpatient the day before her surgery.  3. She is asymptomatic from the anemia, she is active and takes a swimming class  3x per week.  4. 2 units of pRBCs have been ordered on hold for her surgery.  5. PT/INR to be drawn the AM of surgery.  6. Normal EKG 4/4/23.  -RACHEL Garnett MD      Ready For Surgery From Anesthesia Perspective.     .

## 2023-04-05 ENCOUNTER — TELEPHONE (OUTPATIENT)
Dept: OPHTHALMOLOGY | Facility: CLINIC | Age: 83
End: 2023-04-05
Payer: MEDICARE

## 2023-04-05 RX ORDER — SODIUM CHLORIDE 9 MG/ML
INJECTION, SOLUTION INTRAVENOUS
Status: CANCELLED | OUTPATIENT
Start: 2023-04-05

## 2023-04-05 RX ORDER — AMOXICILLIN 250 MG
1 CAPSULE ORAL 2 TIMES DAILY
Status: CANCELLED | OUTPATIENT
Start: 2023-04-05

## 2023-04-05 RX ORDER — OXYCODONE HYDROCHLORIDE 5 MG/1
5 TABLET ORAL
Status: CANCELLED | OUTPATIENT
Start: 2023-04-05

## 2023-04-05 RX ORDER — NALOXONE HCL 0.4 MG/ML
0.02 VIAL (ML) INJECTION
Status: CANCELLED | OUTPATIENT
Start: 2023-04-05

## 2023-04-05 RX ORDER — METHOCARBAMOL 750 MG/1
750 TABLET, FILM COATED ORAL 3 TIMES DAILY
Status: CANCELLED | OUTPATIENT
Start: 2023-04-05

## 2023-04-05 RX ORDER — ONDANSETRON 2 MG/ML
4 INJECTION INTRAMUSCULAR; INTRAVENOUS EVERY 8 HOURS PRN
Status: CANCELLED | OUTPATIENT
Start: 2023-04-05

## 2023-04-05 RX ORDER — OXYCODONE HYDROCHLORIDE 5 MG/1
10 TABLET ORAL
Status: CANCELLED | OUTPATIENT
Start: 2023-04-05

## 2023-04-05 RX ORDER — CEFAZOLIN SODIUM 2 G/50ML
2 SOLUTION INTRAVENOUS
Status: CANCELLED | OUTPATIENT
Start: 2023-04-05 | End: 2023-04-06

## 2023-04-05 RX ORDER — FAMOTIDINE 20 MG/1
20 TABLET, FILM COATED ORAL 2 TIMES DAILY
Status: CANCELLED | OUTPATIENT
Start: 2023-04-05

## 2023-04-05 RX ORDER — MIDAZOLAM HYDROCHLORIDE 1 MG/ML
4 INJECTION INTRAMUSCULAR; INTRAVENOUS
Status: CANCELLED | OUTPATIENT
Start: 2023-04-05 | End: 2023-04-06

## 2023-04-05 RX ORDER — ACETAMINOPHEN 500 MG
1000 TABLET ORAL
Status: CANCELLED | OUTPATIENT
Start: 2023-04-05

## 2023-04-05 RX ORDER — LIDOCAINE HYDROCHLORIDE 10 MG/ML
1 INJECTION, SOLUTION EPIDURAL; INFILTRATION; INTRACAUDAL; PERINEURAL
Status: CANCELLED | OUTPATIENT
Start: 2023-04-05

## 2023-04-05 RX ORDER — MUPIROCIN 20 MG/G
1 OINTMENT TOPICAL
Status: CANCELLED | OUTPATIENT
Start: 2023-04-05

## 2023-04-05 RX ORDER — MUPIROCIN 20 MG/G
1 OINTMENT TOPICAL 2 TIMES DAILY
Status: CANCELLED | OUTPATIENT
Start: 2023-04-05 | End: 2023-04-10

## 2023-04-05 RX ORDER — POLYETHYLENE GLYCOL 3350 17 G/17G
17 POWDER, FOR SOLUTION ORAL DAILY
Status: CANCELLED | OUTPATIENT
Start: 2023-04-05

## 2023-04-05 RX ORDER — MORPHINE SULFATE 2 MG/ML
2 INJECTION, SOLUTION INTRAMUSCULAR; INTRAVENOUS
Status: CANCELLED | OUTPATIENT
Start: 2023-04-05

## 2023-04-05 RX ORDER — SODIUM CHLORIDE 9 MG/ML
INJECTION, SOLUTION INTRAVENOUS CONTINUOUS
Status: CANCELLED | OUTPATIENT
Start: 2023-04-05 | End: 2023-04-06

## 2023-04-05 RX ORDER — ASPIRIN 81 MG/1
81 TABLET ORAL 2 TIMES DAILY
Status: CANCELLED | OUTPATIENT
Start: 2023-04-05

## 2023-04-05 RX ORDER — CEFAZOLIN SODIUM 2 G/50ML
2 SOLUTION INTRAVENOUS
Status: CANCELLED | OUTPATIENT
Start: 2023-04-05

## 2023-04-05 RX ORDER — ACETAMINOPHEN 500 MG
1000 TABLET ORAL EVERY 6 HOURS
Status: CANCELLED | OUTPATIENT
Start: 2023-04-05

## 2023-04-05 RX ORDER — POLYETHYLENE GLYCOL 3350 17 G/17G
17 POWDER, FOR SOLUTION ORAL DAILY PRN
Status: CANCELLED | OUTPATIENT
Start: 2023-04-05

## 2023-04-05 RX ORDER — PREGABALIN 75 MG/1
75 CAPSULE ORAL NIGHTLY
Status: CANCELLED | OUTPATIENT
Start: 2023-04-05

## 2023-04-05 RX ORDER — FENTANYL CITRATE 50 UG/ML
25 INJECTION, SOLUTION INTRAMUSCULAR; INTRAVENOUS EVERY 5 MIN PRN
Status: CANCELLED | OUTPATIENT
Start: 2023-04-05

## 2023-04-05 RX ORDER — FENTANYL CITRATE 50 UG/ML
100 INJECTION, SOLUTION INTRAMUSCULAR; INTRAVENOUS
Status: CANCELLED | OUTPATIENT
Start: 2023-04-05 | End: 2023-04-06

## 2023-04-05 RX ORDER — PREGABALIN 75 MG/1
75 CAPSULE ORAL
Status: CANCELLED | OUTPATIENT
Start: 2023-04-05

## 2023-04-05 RX ORDER — PROCHLORPERAZINE EDISYLATE 5 MG/ML
5 INJECTION INTRAMUSCULAR; INTRAVENOUS EVERY 6 HOURS PRN
Status: CANCELLED | OUTPATIENT
Start: 2023-04-05

## 2023-04-05 NOTE — H&P (VIEW-ONLY)
CC:  Right hip pain    Shelly Vásquez is a 82 y.o. female with Right hip pain.  Pain is worse with activity and weight bearing.  Patient has experienced interference of activities of daily living due to increased pain and decreased range of motion. Patient has failed non-operative treatment including NSAIDs, as well as greater than 3 months of activity modification. Shelly Vásquez ambulates independently.     Relevant medical conditions of significance in perioperative period:  HTN- on medication managed by pcp  GERD- on medication managed by pcp     Given documented medical comorbidities including those listed above and based off of CMS criteria patient would meet inpatient admission status guidelines. This case has been approved as inpatient.     Past Medical History:   Diagnosis Date    Arthritis     Atrial fibrillation     Basal cell cancer     on the face    Encounter for blood transfusion     Gastric ulcer     Herpes simplex without mention of complication     rare outbreaks    Postmenopausal HRT (hormone replacement therapy)     Supraventricular tachycardia     s/p AVNRT ablation (Dr Brady)       Past Surgical History:   Procedure Laterality Date    APPENDECTOMY  age 21    BACK SURGERY      Beast Lift  2004    BONE MARROW BIOPSY Right 12/19/2019    Procedure: Biopsy-bone marrow;  Surgeon: Aidan Spring MD;  Location: [facility] OR [address] [place] [address];  Service: Oncology;  Laterality: Right;    BREAST BIOPSY  50yrs ago    unable to see scar    COSMETIC SURGERY      DILATION AND CURETTAGE OF UTERUS  2008    PMB    ENDOMETRIAL BIOPSY      EPIDURAL STEROID INJECTION N/A 8/25/2022    Procedure: LUMBAR CELINA CONTRAST DIRECT REFERRAL;  Surgeon: Rj Hernandez MD;  [facility];  Service: Pain Management;  Laterality: N/A;    EYE SURGERY      INJECTION OF ANESTHETIC AGENT AROUND NERVE Bilateral 11/11/2022    Procedure: BLOCK, NERVE BILATERAL L2,L3,L4 AND L5 MEDIAL BRANCH ONE OF TWO;  Surgeon: Rj  MD David;  Location: [facility] PAIN MGT;  Service: Pain Management;  Laterality: Bilateral;    INJECTION OF JOINT Right 12/2/2022    Procedure: INJECTION, JOINT RIGHT INTRARTICULAR HIP CONTRAST;  Surgeon: Rj Hernandez MD;  Location: [facility] PAIN MGT;  Service: Pain Management;  Laterality: Right;    ptsosis      RADIOFREQUENCY ABLATION  03/2018    SMALL INTESTINE SURGERY      TONSILLECTOMY, ADENOIDECTOMY  age 8    TOTAL REDUCTION MAMMOPLASTY Bilateral 2003    TRANSFORAMINAL EPIDURAL INJECTION OF STEROID Right 8/11/2022    Procedure: Injection,steroid,epidural Right L1/2 TF DIrect Referral Instructed to provide intervention per MRI results;  Surgeon: Rj Hernandez MD;  Location: [facility] PAIN MGT;  Service: Pain Management;  Laterality: Right;    TRANSFORAMINAL EPIDURAL INJECTION OF STEROID Right 10/21/2022    Procedure: INJECTION, STEROID, EPIDURAL, TRANSFORAMINAL APPROACH, L5-S1 and S1, RIGHT CONTRAST;  Surgeon: Rj Hernandez MD;  Location: [facility] PAIN MGT;  Service: Pain Management;  Laterality: Right;    TUBAL LIGATION         Family History   Problem Relation Age of Onset    No Known Problems Father     Cirrhosis Mother     No Known Problems Brother     No Known Problems Maternal Aunt     No Known Problems Maternal Uncle     No Known Problems Paternal Aunt     No Known Problems Paternal Uncle     No Known Problems Maternal Grandmother     No Known Problems Maternal Grandfather     No Known Problems Paternal Grandmother     No Known Problems Paternal Grandfather     No Known Problems Daughter     Pulmonary embolism Son     Breast cancer Neg Hx     Colon cancer Neg Hx     Ovarian cancer Neg Hx     Amblyopia Neg Hx     Blindness Neg Hx     Cancer Neg Hx     Cataracts Neg Hx     Diabetes Neg Hx     Glaucoma Neg Hx     Hypertension Neg Hx     Macular degeneration Neg Hx     Retinal detachment Neg Hx     Strabismus Neg Hx     Stroke Neg Hx     Thyroid disease Neg Hx        Review of patient's allergies indicates:    Allergen Reactions    Nsaids (non-steroidal anti-inflammatory drug)      GI Bleed  Had 5 blood transfusions         Current Outpatient Medications:     acetaminophen (TYLENOL) 325 MG tablet, Take 325 mg by mouth continuous prn for Pain., Disp: , Rfl:     aspirin (ECOTRIN) 81 MG EC tablet, Take 1 tablet (81 mg total) by mouth once daily., Disp: 30 tablet, Rfl: 0    diazePAM (VALIUM) 2 MG tablet, Take 1 tablet (2 mg total) by mouth every 8 (eight) hours as needed for Insomnia (RLS)., Disp: 45 tablet, Rfl: 1    estradiol-norethindrone acet 0.5-0.1 mg per tablet, Take 1 tablet by mouth once daily., Disp: 90 tablet, Rfl: 3    gabapentin (NEURONTIN) 600 MG tablet, Take 1 tablet (600 mg total) by mouth 4 (four) times daily., Disp: 120 tablet, Rfl: 5    ofloxacin (OCUFLOX) 0.3 % ophthalmic solution, Place 1 drop into the right eye 3 (three) times daily., Disp: , Rfl:     pantoprazole (PROTONIX) 40 MG tablet, Take 1 tablet (40 mg total) by mouth once daily., Disp: 90 tablet, Rfl: 1    pramipexole (MIRAPEX) 0.5 MG tablet, Take 1 tablet (0.5 mg total) by mouth 3 (three) times daily., Disp: 90 tablet, Rfl: 2    prednisoLONE acetate (PRED FORTE) 1 % DrpS, Place 1 drop into the right eye 3 (three) times daily., Disp: , Rfl:     verapamiL (VERELAN) 120 MG C24P, Take 1 capsule (120 mg total) by mouth once daily., Disp: 90 capsule, Rfl: 3    vitamin D (VITAMIN D3) 1000 units Tab, Take 1,000 Units by mouth once daily., Disp: , Rfl:     Review of Systems:   Constitutional: no fever or chills  Eyes: no visual changes  ENT: no nasal congestion or sore throat  Respiratory: no cough or shortness of breath  Cardiovascular: no chest pain or palpitations  Gastrointestinal: no nausea or vomiting, tolerating diet  Genitourinary: no hematuria or dysuria  Integument/Breast: no rash or pruritis  Hematologic/Lymphatic: no easy bruising or lymphadenopathy  Musculoskeletal: positive for hip pain  Neurological: no seizures or  tremors  Behavioral/Psych: no auditory or visual hallucinations  Endocrine: no heat or cold intolerance    PE:  LMP  (LMP Unknown)   General: Pleasant, cooperative, NAD   Gait: antalgic  HEENT: NCAT, sclera nonicteric   Lungs: Respirations are clear, equal and unlabored.   CV: S1S2; 2+ bilateral upper and lower extremity pulses.   Skin: Intact throughout LE with no rashes, erythema, or lesions  Extremities: No LE edema, NVI lower extremities    Right Hip Exam:  90 degrees flexion  10 degrees extension   10 degrees internal rotation  25 degrees external rotation  20 degrees abduction  20 degrees adduction  There is pain with passive range of motion.     Radiographs: Radiographs reveal advanced degenerative changes including subchondral cyst formation, subchondral sclerosis, osteophyte formation, joint space narrowing.     Diagnosis: Primary osteoarthritis Right hip    Plan: Right total hip arthroplasty     Due to the serious nature of total joint infection and the high prevalence of community acquired MRSA, vancomycin will be used perioperatively.

## 2023-04-05 NOTE — PROGRESS NOTES
Shelly Vásquez is a 82 y.o. year old here today for pre surgery optimization visit  in preparation for a Right total hip arthroplasty to be performed by Dr. Gary  on 4/26/2023.  she was last seen and treated in the clinic on 4/4/2023. she will be medically optimized by the pre op center. There has been no significant change in medical status since last visit. No fever, chills, malaise, or unexplained weight change.      Allergies, Medications, past medical and surgical history reviewed.    Focused examination performed.    Patient saw surgeon in clinic today. All questions answered. Patient encouraged to call with questions. Contact information given.     Pre, rosita, and post operative procedures and expectations discussed. Goals of successful surgery reviewed and include manageable pain levels, surgical site free of infection, medication management, and ambulation with PT/OT assistance. Healthy weight management discussed with patient and caregiver who were receptive to eduction of healthy diet and activity. No other necessary lifestyle changes identified. Educated patient about signs and symptoms of infection, medication management, anticoagulation therapy, risk of tobacco and alcohol use, and self-care to promote healing. Surgical guide given for future reference. Hibiclens given to patient with instructions. All questions were answered.     Shelly Vásquez verbalized an understanding to the education and goals. Patient has displayed readiness to engage in care and is ready to proceed with surgery.  Patient reports  is able and ready to provide assistance at home after discharge.    Surgical and blood consents signed.    Shelly Vásquez will contact us if there are any questions, concerns, or changes in medical status prior to surgery.       Joint class: 3/22    Patient has discussed discharge planning with surgeon. Patient will be discharged to home following surgery.   patient will be  scheduled with Home Health during hospitalization.     30 minutes of time was spent on patient education, review of records, templating, H&P, , appointment scheduling and optimizing patient for surgery.

## 2023-04-05 NOTE — PROGRESS NOTES
[facility]    HOME HEALTH ORDERS  FACE TO FACE ENCOUNTER    Patient Name: Shelly Vásquez  YOB: 1940    PCP: Marlene Andrew MD   [facility] Address[facility] [address] [facility] [place] [address]  PCP Phone Number: [phone]  PCP Fax: [phone]    Encounter Date: 04/05/2023    Admit to Home Health    Diagnoses:  There are no hospital problems to display for this patient.      Future Appointments   Date Time Provider Department Center   4/6/2023  1:00 PM Francisco Willingham MD [facility] OPHTHA [facility]   4/10/2023  1:40 PM [facility] DEXA1 [facility]   4/21/2023 11:00 AM [facility]   4/25/2023  1:00 PM CHEMO 11 [facility]   4/28/2023 11:00 AM TELEMED NURSE, [facility] ORTHO [facility] ORTHO [facility]   5/11/2023 11:00 AM Manny Espana PA-C [facility] ORTHO [facility]   5/15/2023 11:40 AM [facility]MT [facility]   5/15/2023  1:00 PM Erlin Loepz MD [facility] HC BMT [facility]           I have seen and examined this patient face to face today. My clinical findings that support the need for the home health skilled services and home bound status are the following:  Weakness/numbness causing balance and gait disturbance due to Joint Replacement making it taxing to leave home.  Requiring assistive device to leave home due to unsteady gait caused by Joint Replacement.    Allergies:  Review of patient's allergies indicates:   Allergen Reactions    Nsaids (non-steroidal anti-inflammatory drug)      GI Bleed  Had 5 blood transfusions       Diet: regular diet    Activities: activity as tolerated    Home Health Admitting Clinician:   SN/PT to complete comprehensive assessment including routine vital signs. Instruct on disease process and s/s of complications to report to MD. Follow specific home health arthoplasty protocol. Review/verify medication list sent home with the patient at time of discharge  and instruct patient/caregiver as needed. If  coumadin ordered, coumadin clinic to manage INR with INR draws 2x per week with a goal to maintain INR between 1.8 and 2.2. Frequency may be adjusted depending on start of care date.    Notify MD if SBP > 160 or < 90; DBP > 90 or < 50; HR > 120 or < 50; Temp > 101    Home Medical Equipment:  Walker, 3-1 bedside commode, transfer tub bench    CONSULTS:    Physical Therapy may admit if patient not on coumadin, PT to perform comprehensive assessment if performing admit visit and generate therapy plan of care. Evaluate for home safety and equipment needs; Establish/upgrade home exercise program. Perform/instruct on therapeutic exercises, gait training, transfer training, and Range of Motion.      OTHER: (only select if patient needs other therapy disciplines)  Occupational Therapy to evaluate and treat. Evaluate home environment for safety and equipment needs. Perform/Instruct on transfers, ADL training, ROM, and therapeutic exercises.    WOUND CARE ORDERS:  Assess Surgical Incision/DSRG each TX  Aquacel AG drsg applied post-op leave on 14 days post op. Call MD if any drainage reaches border to border of drsg horizontally, s/s of infection, temp >101, induration, swelling or redness.  If dressing is removed per MD order, then apply island dressing, change/teach caregiver to perform daily dressing change if island dressing present.    Medications: Review discharge medications with patient and family and provide education.      Cannot display discharge medications since this is not an admission.      I certify that this patient is confined to her home and needs physical therapy and occupational therapy.

## 2023-04-05 NOTE — H&P
CC:  Right hip pain    Shelly Vásquez is a 82 y.o. female with Right hip pain.  Pain is worse with activity and weight bearing.  Patient has experienced interference of activities of daily living due to increased pain and decreased range of motion. Patient has failed non-operative treatment including NSAIDs, as well as greater than 3 months of activity modification. Shelly Vásquez ambulates independently.     Relevant medical conditions of significance in perioperative period:  HTN- on medication managed by pcp  GERD- on medication managed by pcp     Given documented medical comorbidities including those listed above and based off of CMS criteria patient would meet inpatient admission status guidelines. This case has been approved as inpatient.     Past Medical History:   Diagnosis Date    Arthritis     Atrial fibrillation     Basal cell cancer     on the face    Encounter for blood transfusion     Gastric ulcer     Herpes simplex without mention of complication     rare outbreaks    Postmenopausal HRT (hormone replacement therapy)     Supraventricular tachycardia     s/p AVNRT ablation (Dr Brady)       Past Surgical History:   Procedure Laterality Date    APPENDECTOMY  age 21    BACK SURGERY      Beast Lift  2004    BONE MARROW BIOPSY Right 12/19/2019    Procedure: Biopsy-bone marrow;  Surgeon: Aidan Spring MD;  Location: [facility] OR [address] [place] [address];  Service: Oncology;  Laterality: Right;    BREAST BIOPSY  50yrs ago    unable to see scar    COSMETIC SURGERY      DILATION AND CURETTAGE OF UTERUS  2008    PMB    ENDOMETRIAL BIOPSY      EPIDURAL STEROID INJECTION N/A 8/25/2022    Procedure: LUMBAR CELINA CONTRAST DIRECT REFERRAL;  Surgeon: Rj Hernandez MD;  [facility];  Service: Pain Management;  Laterality: N/A;    EYE SURGERY      INJECTION OF ANESTHETIC AGENT AROUND NERVE Bilateral 11/11/2022    Procedure: BLOCK, NERVE BILATERAL L2,L3,L4 AND L5 MEDIAL BRANCH ONE OF TWO;  Surgeon: Rj  MD David;  Location: [facility] PAIN MGT;  Service: Pain Management;  Laterality: Bilateral;    INJECTION OF JOINT Right 12/2/2022    Procedure: INJECTION, JOINT RIGHT INTRARTICULAR HIP CONTRAST;  Surgeon: Rj Hernandez MD;  Location: [facility] PAIN [facility]GT;  Service: Pain Management;  Laterality: Right;    ptsosis      RADIOFREQUENCY ABLATION  03/2018    SMALL INTESTINE SURGERY      TONSILLECTOMY, ADENOIDECTOMY  age 8    TOTAL REDUCTION MAMMOPLASTY Bilateral 2003    TRANSFORAMINAL EPIDURAL INJECTION OF STEROID Right 8/11/2022    Procedure: Injection,steroid,epidural Right L1/2 TF DIrect Referral Instructed to provide intervention per MRI results;  Surgeon: Rj Hernandez MD;  Location: [facility] PAIN [facility]GT;  Service: Pain Management;  Laterality: Right;    TRANSFORAMINAL EPIDURAL INJECTION OF STEROID Right 10/21/2022    Procedure: INJECTION, STEROID, EPIDURAL, TRANSFORAMINAL APPROACH, L5-S1 and S1, RIGHT CONTRAST;  Surgeon: Rj Hernandez MD;  Location: [facility] PAIN [facility]GT;  Service: Pain Management;  Laterality: Right;    TUBAL LIGATION         Family History   Problem Relation Age of Onset    No Known Problems Father     Cirrhosis Mother     No Known Problems Brother     No Known Problems Maternal Aunt     No Known Problems Maternal Uncle     No Known Problems Paternal Aunt     No Known Problems Paternal Uncle     No Known Problems Maternal Grandmother     No Known Problems Maternal Grandfather     No Known Problems Paternal Grandmother     No Known Problems Paternal Grandfather     No Known Problems Daughter     Pulmonary embolism Son     Breast cancer Neg Hx     Colon cancer Neg Hx     Ovarian cancer Neg Hx     Amblyopia Neg Hx     Blindness Neg Hx     Cancer Neg Hx     Cataracts Neg Hx     Diabetes Neg Hx     Glaucoma Neg Hx     Hypertension Neg Hx     Macular degeneration Neg Hx     Retinal detachment Neg Hx     Strabismus Neg Hx     Stroke Neg Hx     Thyroid disease Neg Hx        Review of patient's allergies indicates:    Allergen Reactions    Nsaids (non-steroidal anti-inflammatory drug)      GI Bleed  Had 5 blood transfusions         Current Outpatient Medications:     acetaminophen (TYLENOL) 325 MG tablet, Take 325 mg by mouth continuous prn for Pain., Disp: , Rfl:     aspirin (ECOTRIN) 81 MG EC tablet, Take 1 tablet (81 mg total) by mouth once daily., Disp: 30 tablet, Rfl: 0    diazePAM (VALIUM) 2 MG tablet, Take 1 tablet (2 mg total) by mouth every 8 (eight) hours as needed for Insomnia (RLS)., Disp: 45 tablet, Rfl: 1    estradiol-norethindrone acet 0.5-0.1 mg per tablet, Take 1 tablet by mouth once daily., Disp: 90 tablet, Rfl: 3    gabapentin (NEURONTIN) 600 MG tablet, Take 1 tablet (600 mg total) by mouth 4 (four) times daily., Disp: 120 tablet, Rfl: 5    ofloxacin (OCUFLOX) 0.3 % ophthalmic solution, Place 1 drop into the right eye 3 (three) times daily., Disp: , Rfl:     pantoprazole (PROTONIX) 40 MG tablet, Take 1 tablet (40 mg total) by mouth once daily., Disp: 90 tablet, Rfl: 1    pramipexole (MIRAPEX) 0.5 MG tablet, Take 1 tablet (0.5 mg total) by mouth 3 (three) times daily., Disp: 90 tablet, Rfl: 2    prednisoLONE acetate (PRED FORTE) 1 % DrpS, Place 1 drop into the right eye 3 (three) times daily., Disp: , Rfl:     verapamiL (VERELAN) 120 MG C24P, Take 1 capsule (120 mg total) by mouth once daily., Disp: 90 capsule, Rfl: 3    vitamin D (VITAMIN D3) 1000 units Tab, Take 1,000 Units by mouth once daily., Disp: , Rfl:     Review of Systems:   Constitutional: no fever or chills  Eyes: no visual changes  ENT: no nasal congestion or sore throat  Respiratory: no cough or shortness of breath  Cardiovascular: no chest pain or palpitations  Gastrointestinal: no nausea or vomiting, tolerating diet  Genitourinary: no hematuria or dysuria  Integument/Breast: no rash or pruritis  Hematologic/Lymphatic: no easy bruising or lymphadenopathy  Musculoskeletal: positive for hip pain  Neurological: no seizures or  tremors  Behavioral/Psych: no auditory or visual hallucinations  Endocrine: no heat or cold intolerance    PE:  LMP  (LMP Unknown)   General: Pleasant, cooperative, NAD   Gait: antalgic  HEENT: NCAT, sclera nonicteric   Lungs: Respirations are clear, equal and unlabored.   CV: S1S2; 2+ bilateral upper and lower extremity pulses.   Skin: Intact throughout LE with no rashes, erythema, or lesions  Extremities: No LE edema, NVI lower extremities    Right Hip Exam:  90 degrees flexion  10 degrees extension   10 degrees internal rotation  25 degrees external rotation  20 degrees abduction  20 degrees adduction  There is pain with passive range of motion.     Radiographs: Radiographs reveal advanced degenerative changes including subchondral cyst formation, subchondral sclerosis, osteophyte formation, joint space narrowing.     Diagnosis: Primary osteoarthritis Right hip    Plan: Right total hip arthroplasty     Due to the serious nature of total joint infection and the high prevalence of community acquired MRSA, vancomycin will be used perioperatively.

## 2023-04-05 NOTE — TELEPHONE ENCOUNTER
----- Message from Bob Liu sent at 4/4/2023  5:18 PM CDT -----  Regarding: FW: cataract procedures + NAT scheduling overlap  Can you call to reschedule this patient's second eye until after her hip surgery.  ----- Message -----  From: Keon Gary III, MD  Sent: 4/4/2023   4:43 PM CDT  To: Marlene Méndez MD, Radha Ponce NP, #  Subject: cataract procedures + NAT scheduling overlap     Dr Willingham,   Ms Vásquez has been scheduled for bilateral cataract surgery with you 4/6/23 and 4/24/23.   She is also scheduled for her right total hip replacement 4/26/23. She says she did not discuss with this with you or your staff.     She has complex history of anemia/MDS and hematology has arranged to do a blood transfusion pre-op on 4/24/23.     -How long would you typically want someone to wait after cataract surgery to have another major surgery which could increase their intra-ocular pressure?   -after total hip replacement my standard is that patients need to wait 3 months for another major elective invasive procedure.     My general sense is that the safest thing to do would be to delay one of our procedures.   She says her priority is to get her hip done as it is very painful and she is not as bothered by the cataracts.     Would appreciate your thoughts.     Thank you,  Issa Gary

## 2023-04-10 ENCOUNTER — PATIENT MESSAGE (OUTPATIENT)
Dept: INTERNAL MEDICINE | Facility: CLINIC | Age: 83
End: 2023-04-10
Payer: MEDICARE

## 2023-04-10 ENCOUNTER — HOSPITAL ENCOUNTER (OUTPATIENT)
Dept: RADIOLOGY | Facility: OTHER | Age: 83
Discharge: HOME | End: 2023-04-10
Attending: INTERNAL MEDICINE
Payer: MEDICARE

## 2023-04-10 DIAGNOSIS — Z78.0 ASYMPTOMATIC MENOPAUSE: ICD-10-CM

## 2023-04-10 PROCEDURE — 77080 DXA BONE DENSITY AXIAL: CPT | Mod: 26,,, | Performed by: RADIOLOGY

## 2023-04-10 PROCEDURE — 77080 DXA BONE DENSITY AXIAL SKELETON 1 OR MORE SITES: ICD-10-PCS | Mod: 26,,, | Performed by: RADIOLOGY

## 2023-04-10 PROCEDURE — 77080 DXA BONE DENSITY AXIAL: CPT | Mod: TC

## 2023-04-21 ENCOUNTER — OFFICE VISIT (OUTPATIENT)
Dept: INTERNAL MEDICINE | Facility: CLINIC | Age: 83
End: 2023-04-21
Payer: MEDICARE

## 2023-04-21 ENCOUNTER — TELEPHONE (OUTPATIENT)
Dept: ORTHOPEDICS | Facility: CLINIC | Age: 83
End: 2023-04-21
Payer: MEDICARE

## 2023-04-21 ENCOUNTER — LAB VISIT (OUTPATIENT)
Dept: LAB | Facility: HOSPITAL | Age: 83
End: 2023-04-21
Attending: INTERNAL MEDICINE
Payer: MEDICARE

## 2023-04-21 VITALS
DIASTOLIC BLOOD PRESSURE: 54 MMHG | HEIGHT: 68 IN | SYSTOLIC BLOOD PRESSURE: 106 MMHG | RESPIRATION RATE: 16 BRPM | WEIGHT: 154.5 LBS | HEART RATE: 74 BPM | OXYGEN SATURATION: 98 % | BODY MASS INDEX: 23.42 KG/M2

## 2023-04-21 DIAGNOSIS — D46.9 MDS (MYELODYSPLASTIC SYNDROME): ICD-10-CM

## 2023-04-21 DIAGNOSIS — Z87.19 HISTORY OF GI BLEED: ICD-10-CM

## 2023-04-21 DIAGNOSIS — Z98.890 S/P LUMBAR LAMINECTOMY: ICD-10-CM

## 2023-04-21 DIAGNOSIS — K21.9 GASTROESOPHAGEAL REFLUX DISEASE, UNSPECIFIED WHETHER ESOPHAGITIS PRESENT: ICD-10-CM

## 2023-04-21 DIAGNOSIS — K59.00 CONSTIPATION, UNSPECIFIED CONSTIPATION TYPE: ICD-10-CM

## 2023-04-21 DIAGNOSIS — Z01.818 PREOP EXAMINATION: Primary | ICD-10-CM

## 2023-04-21 DIAGNOSIS — G25.81 RESTLESS LEG: ICD-10-CM

## 2023-04-21 DIAGNOSIS — D64.9 ANEMIA, UNSPECIFIED TYPE: ICD-10-CM

## 2023-04-21 DIAGNOSIS — Z98.890 HISTORY OF RADIOFREQUENCY ABLATION (RFA) PROCEDURE FOR CARDIAC ARRHYTHMIA: ICD-10-CM

## 2023-04-21 DIAGNOSIS — M79.609 PAIN IN EXTREMITY, UNSPECIFIED EXTREMITY: Primary | ICD-10-CM

## 2023-04-21 DIAGNOSIS — Z79.890 POSTMENOPAUSAL HRT (HORMONE REPLACEMENT THERAPY): Chronic | ICD-10-CM

## 2023-04-21 DIAGNOSIS — R17 ELEVATED BILIRUBIN: ICD-10-CM

## 2023-04-21 DIAGNOSIS — Z86.16 HISTORY OF COVID-19: ICD-10-CM

## 2023-04-21 DIAGNOSIS — M79.609 PAIN IN EXTREMITY, UNSPECIFIED EXTREMITY: ICD-10-CM

## 2023-04-21 PROBLEM — Z95.818 STATUS POST PLACEMENT OF IMPLANTABLE LOOP RECORDER: Status: RESOLVED | Noted: 2017-10-06 | Resolved: 2023-04-21

## 2023-04-21 LAB
ABO + RH BLD: NORMAL
ALBUMIN SERPL BCP-MCNC: 4.5 G/DL (ref 3.5–5.2)
ALP SERPL-CCNC: 44 U/L (ref 55–135)
ALT SERPL W/O P-5'-P-CCNC: 17 U/L (ref 10–44)
ANION GAP SERPL CALC-SCNC: 7 MMOL/L (ref 8–16)
AST SERPL-CCNC: 20 U/L (ref 10–40)
BASOPHILS # BLD AUTO: 0.08 K/UL (ref 0–0.2)
BASOPHILS NFR BLD: 1.3 % (ref 0–1.9)
BILIRUB SERPL-MCNC: 1.1 MG/DL (ref 0.1–1)
BLD GP AB SCN CELLS X3 SERPL QL: NORMAL
BUN SERPL-MCNC: 10 MG/DL (ref 8–23)
CALCIUM SERPL-MCNC: 9.8 MG/DL (ref 8.7–10.5)
CHLORIDE SERPL-SCNC: 103 MMOL/L (ref 95–110)
CO2 SERPL-SCNC: 26 MMOL/L (ref 23–29)
CREAT SERPL-MCNC: 0.8 MG/DL (ref 0.5–1.4)
DIFFERENTIAL METHOD: ABNORMAL
EOSINOPHIL # BLD AUTO: 0.2 K/UL (ref 0–0.5)
EOSINOPHIL NFR BLD: 3.5 % (ref 0–8)
ERYTHROCYTE [DISTWIDTH] IN BLOOD BY AUTOMATED COUNT: 16.9 % (ref 11.5–14.5)
EST. GFR  (NO RACE VARIABLE): >60 ML/MIN/1.73 M^2
GLUCOSE SERPL-MCNC: 120 MG/DL (ref 70–110)
HCT VFR BLD AUTO: 26.2 % (ref 37–48.5)
HGB BLD-MCNC: 8.7 G/DL (ref 12–16)
IMM GRANULOCYTES # BLD AUTO: 0.05 K/UL (ref 0–0.04)
IMM GRANULOCYTES NFR BLD AUTO: 0.8 % (ref 0–0.5)
INR PPP: 1.1 (ref 0.8–1.2)
LYMPHOCYTES # BLD AUTO: 2.8 K/UL (ref 1–4.8)
LYMPHOCYTES NFR BLD: 45.9 % (ref 18–48)
MAGNESIUM SERPL-MCNC: 2.1 MG/DL (ref 1.6–2.6)
MCH RBC QN AUTO: 35.5 PG (ref 27–31)
MCHC RBC AUTO-ENTMCNC: 33.2 G/DL (ref 32–36)
MCV RBC AUTO: 107 FL (ref 82–98)
MONOCYTES # BLD AUTO: 0.5 K/UL (ref 0.3–1)
MONOCYTES NFR BLD: 8.9 % (ref 4–15)
NEUTROPHILS # BLD AUTO: 2.4 K/UL (ref 1.8–7.7)
NEUTROPHILS NFR BLD: 39.6 % (ref 38–73)
NRBC BLD-RTO: 0 /100 WBC
PHOSPHATE SERPL-MCNC: 3.6 MG/DL (ref 2.7–4.5)
PLATELET # BLD AUTO: 498 K/UL (ref 150–450)
PMV BLD AUTO: 9.4 FL (ref 9.2–12.9)
POTASSIUM SERPL-SCNC: 4 MMOL/L (ref 3.5–5.1)
PROT SERPL-MCNC: 6.9 G/DL (ref 6–8.4)
PROTHROMBIN TIME: 11.2 SEC (ref 9–12.5)
RBC # BLD AUTO: 2.45 M/UL (ref 4–5.4)
SODIUM SERPL-SCNC: 136 MMOL/L (ref 136–145)
SPECIMEN OUTDATE: NORMAL
WBC # BLD AUTO: 6.04 K/UL (ref 3.9–12.7)

## 2023-04-21 PROCEDURE — 85025 COMPLETE CBC W/AUTO DIFF WBC: CPT | Performed by: INTERNAL MEDICINE

## 2023-04-21 PROCEDURE — 83735 ASSAY OF MAGNESIUM: CPT | Performed by: INTERNAL MEDICINE

## 2023-04-21 PROCEDURE — 80053 COMPREHEN METABOLIC PANEL: CPT | Performed by: INTERNAL MEDICINE

## 2023-04-21 PROCEDURE — 85610 PROTHROMBIN TIME: CPT | Performed by: ANESTHESIOLOGY

## 2023-04-21 PROCEDURE — 99999 PR PBB SHADOW E&M-EST. PATIENT-LVL III: ICD-10-PCS | Mod: PBBFAC,,, | Performed by: HOSPITALIST

## 2023-04-21 PROCEDURE — 99215 PR OFFICE/OUTPT VISIT, EST, LEVL V, 40-54 MIN: ICD-10-PCS | Mod: S$PBB,,, | Performed by: HOSPITALIST

## 2023-04-21 PROCEDURE — 99213 OFFICE O/P EST LOW 20 MIN: CPT | Mod: PBBFAC | Performed by: HOSPITALIST

## 2023-04-21 PROCEDURE — 84100 ASSAY OF PHOSPHORUS: CPT | Performed by: INTERNAL MEDICINE

## 2023-04-21 PROCEDURE — 99215 OFFICE O/P EST HI 40 MIN: CPT | Mod: S$PBB,,, | Performed by: HOSPITALIST

## 2023-04-21 PROCEDURE — 86900 BLOOD TYPING SEROLOGIC ABO: CPT | Performed by: INTERNAL MEDICINE

## 2023-04-21 PROCEDURE — 99999 PR PBB SHADOW E&M-EST. PATIENT-LVL III: CPT | Mod: PBBFAC,,, | Performed by: HOSPITALIST

## 2023-04-21 RX ORDER — IBUPROFEN 200 MG
200 TABLET ORAL EVERY 6 HOURS PRN
Status: ON HOLD | COMMUNITY
End: 2023-04-26 | Stop reason: HOSPADM

## 2023-04-21 NOTE — ASSESSMENT & PLAN NOTE
She is taking gabapentin twice a day and Valium as needed about half of the days in a week and Tylenol p.m. for the half the times in a week and she tries not to use Tylenol p.m. and Valium on the same night  I have told her not to take anti-inflammatory medication for pain between now and surgery

## 2023-04-21 NOTE — OUTPATIENT SUBJECTIVE & OBJECTIVE
"Outpatient Subjective & Objective     Chief complaint-Preoperative evaluation, Perioperative Medical management, complication reduction plan     Active cardiac conditions- none    Revised cardiac risk index predictors- none    Functional capacity -Examples of physical activity, swims,   can take 1 flight of stairs----- She can undertake all the above activities without  chest pain,chest tightness, Shortness of breath ,dizziness,lightheadedness making her exercise tolerance more,   than 4 Mets.       Review of Systems   Constitutional:  Negative for chills and fever.        No unusual weight changes     HENT:          STOPBANG score  1/ 8        Age over 50        Eyes:         No new visual changes   Respiratory:          No cough , phlegm    No Hemoptysis   Cardiovascular:         As noted   Gastrointestinal:         No overt GI/ blood losses  Bowel movements- Regular  She has not had a hysterectomy and had not had oophorectomy   Endocrine:        Prednisone use > 20 mg daily for 3 weeks- none   Genitourinary:  Negative for dysuria.        No urinary hesitancy    Musculoskeletal:         As above      Skin:  Negative for rash.   Neurological:  Negative for syncope.        No unilateral weakness   Hematological:         Current use of Anticoagulants  I suggested to stay on aspirin for surgery      Psychiatric/Behavioral:          No Depression,Anxiety     No vascular stenting  No past medical history pertinent negatives.        No anesthesia, bleeding, cardiac problems , PONVwith previous surgeries/procedures.  Medications and Allergies reviewed in epic.     FH- No anesthesia,bleeding / venous thrombosis ,  in family      Physical Exam  Blood pressure (!) 106/54, pulse 74, resp. rate 16, height 5' 8" (1.727 m), weight 70.1 kg (154 lb 8 oz), SpO2 98 %.    Physical Exam  Constitutional- Vitals - Body mass index is 23.49 kg/m².,   Vitals:    04/21/23 1505   BP: (!) 106/54   Pulse: 74   Resp: 16     General " appearance-Conscious,Coherent  Eyes- No conjunctival icterus,pupils  round  and  Bilateral cataracts  ENT-Oral cavity- moist    , Hearing grossly normal   Neck- No thyromegaly ,Trachea -central, No jugular venous distension,   No Carotid Bruit   Cardiovascular -Heart Sounds- Normal  and  no murmur   , No gallop rhythm   Respiratory - Normal Respiratory Effort, Normal breath sounds,  CrepitationsRt base,  no wheeze , and  no forced expiratory wheeze    Peripheral pitting pedal edema-- none , no calf pain   Gastrointestinal -Soft abdomen, No palpable masses, Non Tender,Liver,Spleen not palpable. No-- free fluid and shifting dullness  Musculoskeletal- No finger Clubbing. Strength grossly normal   Lymphatic-No Palpable cervical, axillary,Inguinal lymphadenopathy   Psychiatric - normal effect,Orientation  Rt Dorsalis pedis pulses-palpable    Lt Dorsalis pedis pulses- palpable   Rt Posterior tibial pulses -palpable   Left posterior tibial pulses -palpable   Miscellaneous -  no renal bruit     Investigations  Lab and Imaging have been reviewed in epic.    2021      No sonographic evidence of hemodynamically significant carotid stenosis bilateral internal carotid arteries.  Review of Medicine tests    EKG- I had independently reviewed the EKG from--4/4/2023  It was reported to be showing     Normal sinus rhythm   Normal ECG   When compared with ECG of 22-JUN-2021 13:43,   No significant change was found     April 2019   Mild left atrial enlargement.  Normal left ventricular systolic function. The estimated ejection fraction is 60%  Indeterminate left ventricular diastolic function.  Normal right ventricular systolic function.  Mild-to-moderate mitral regurgitation.  Mild tricuspid regurgitation.  Normal central venous pressure (3 mm Hg).  The estimated PA systolic pressure is 23 mm Hg       Review of clinical lab tests:  Lab Results   Component Value Date    CREATININE 0.8 04/21/2023    HGB 8.7 (L) 04/21/2023     (H)  04/21/2023           Review of old records- Was done and information gathered regards to events leading to surgery and health conditions of significance in the perioperative period.    Outpatient Subjective & Objective

## 2023-04-21 NOTE — ASSESSMENT & PLAN NOTE
Hemoglobin is about 8  No overt GI or  blood losses   I noted the plan of transfusion prior to her surgery

## 2023-04-21 NOTE — ASSESSMENT & PLAN NOTE
2017  In the setting of Advil PM use for aches and pains   We suggested care with her current ibuprofen use   We noted that she is on proton pump inhibitor and we suggested to take antiplatelet agent and anti-inflammatory medication with food

## 2023-04-21 NOTE — ASSESSMENT & PLAN NOTE
Summer 2022    She did not require hospitalization, intubation or supplemental oxygen use     Recovered     COVID screening     No fever   No cough   No SOB  No sore throat   No loss of taste or smell   No muscle aches   No nausea, vomiting , diarrhea -    Breathing good

## 2023-04-21 NOTE — ASSESSMENT & PLAN NOTE
She has had an ablation done in the past usage she is currently no longer have any problems with her heart and she is taking verapamil every night

## 2023-04-21 NOTE — ASSESSMENT & PLAN NOTE
Doing good     Does not sound Cardiac   Tips to control reflux discussed     GERD-  I suggest continuation of the Proton pump inhibitor in the perioperative period . I suggest aspiration precautions

## 2023-04-21 NOTE — ASSESSMENT & PLAN NOTE
No history  of cirrhosis of liver or suggestions of Liver  decompensation   No Jaundice , dark urine , pale stool

## 2023-04-21 NOTE — HPI
History of present illness- I had the pleasure of meeting this pleasant 82 y.o. lady in the pre op clinic prior to her elective Orthopedic surgery. The patient is new to me .   I have offered to have her family member on the phone during the consultation process  I have obtained the history by speaking to the patient and by reviewing the electronic health records.    Events leading up to surgery / History of presenting illness -    Primary osteoarthritis of right hip    She has been troubled with moderate-severe  Rt hip pain for 1 year  .   Pain increases with activity , putting weight on and decreases with resting.  She finds gabapentin to be helpful  She has not had any previous right hip surgeries       She has restless legs and was evaluated for this at [facility] and to her understanding it is not related to her nurse and is related to her right hip problem    Relevant health conditions of significance for the perioperative period/ History of presenting illness -    Subjectively describes health as good     She stays active and belongs to a swim class 3 times a week   She was skiing until she was 48 and she was golfing, hiking   She has lately taken up to walking due to her hip problem    Health conditions of significance for the perioperative period      - Low Risk MDS-  - HRT-  - History of a fib -  - Lumbar laminectomy -  - Restless legs-  - Acid reflux  - Constipation  - History of GI bleeding     She lives in a 2 level house with an elevator   She lives with her    Her daughter lives in [facility]  Her house his wheelchair accessible  Her  can help her after surgery   She also hired a private nurse to help her for 5 days after surgery including her hospitalization at [facility]

## 2023-04-21 NOTE — TELEPHONE ENCOUNTER
I called the patient today regarding surgery on 4/26/2023 with Dr. Keon Gary. I informed the patient that her surgery will be at  [facility] ([address] [place] [id number]). I informed the patient they must arrive at 6:00am  and their surgery will start at 8:00am.     Per the [facility] COVID-19 Pre-Procedural Testing Policy (administered 10/26/2022), patients do not need tested for COVID-19 regardless of vaccination status.    I reminded the patient that they cannot eat or drink after midnight, the night before surgery.     I reminded the patient to be careful of their skin over the next few days to make sure they do not get any cuts, scratches or scrapes.    The patient verbalized that they have received their walker, bedside commode and shower chair from [facility].    The patient verbalized understanding and has no further questions.

## 2023-04-21 NOTE — PROGRESS NOTES
[facility]  Progress Note    Patient Name: Shelly Vásquez  MRN: [id number]  Date of Evaluation- 04/25/2023  PCP- Marlene Andrew MD    Future cases for Shelly Vásquez [[id number]]       Case ID Status Date Time Marquez Procedure Provider Location    [id number] [facility] 4/26/2023  8:00  ARTHROPLASTY, HIP, TOTAL, ANTERIOR APPROACH: RIGHT: DEPUY - C-STEM + PINNACLE Keon Gary III, MD [[id number]] [facility] OR 2ND FLR            HPI:  History of present illness- I had the pleasure of meeting this pleasant 82 y.o. lady in the pre op clinic prior to her elective Orthopedic surgery. The patient is new to me .   I have offered to have her family member on the phone during the consultation process  I have obtained the history by speaking to the patient and by reviewing the electronic health records.    Events leading up to surgery / History of presenting illness -    Primary osteoarthritis of right hip    She has been troubled with moderate-severe  Rt hip pain for 1 year  .   Pain increases with activity , putting weight on and decreases with resting.  She finds gabapentin to be helpful  She has not had any previous right hip surgeries       She has restless legs and was evaluated for this at [facility] and to her understanding it is not related to her nurse and is related to her right hip problem    Relevant health conditions of significance for the perioperative period/ History of presenting illness -    Subjectively describes health as good     She stays active and belongs to a swim class 3 times a week   She was skiing until she was 48 and she was golfing, hiking   She has lately taken up to walking due to her hip problem    Health conditions of significance for the perioperative period      - Low Risk MDS-  - HRT-  - History of a fib -  - Lumbar laminectomy -  - Restless legs-  - Acid reflux  - Constipation  - History of GI bleeding     She lives in a 2 level house with an elevator   She lives with her     Her daughter lives in [facility]  Her house his wheelchair accessible  Her  can help her after surgery   She also hired a private nurse to help her for 5 days after surgery including her hospitalization at [facility]      Subjective/ Objective:     Chief complaint-Preoperative evaluation, Perioperative Medical management, complication reduction plan     Active cardiac conditions- none    Revised cardiac risk index predictors- none    Functional capacity -Examples of physical activity, swims,   can take 1 flight of stairs----- She can undertake all the above activities without  chest pain,chest tightness, Shortness of breath ,dizziness,lightheadedness making her exercise tolerance more,   than 4 Mets.       Review of Systems   Constitutional:  Negative for chills and fever.        No unusual weight changes     HENT:          STOPBANG score  1/ 8        Age over 50        Eyes:         No new visual changes   Respiratory:          No cough , phlegm    No Hemoptysis   Cardiovascular:         As noted   Gastrointestinal:         No overt GI/ blood losses  Bowel movements- Regular  She has not had a hysterectomy and had not had oophorectomy   Endocrine:        Prednisone use > 20 mg daily for 3 weeks- none   Genitourinary:  Negative for dysuria.        No urinary hesitancy    Musculoskeletal:         As above      Skin:  Negative for rash.   Neurological:  Negative for syncope.        No unilateral weakness   Hematological:         Current use of Anticoagulants  I suggested to stay on aspirin for surgery      Psychiatric/Behavioral:          No Depression,Anxiety     No vascular stenting  No past medical history pertinent negatives.        No anesthesia, bleeding, cardiac problems , PONVwith previous surgeries/procedures.  Medications and Allergies reviewed in epic.     FH- No anesthesia,bleeding / venous thrombosis ,  in family      Physical Exam  Blood pressure (!) 106/54, pulse 74, resp. rate 16,  "height 5' 8" (1.727 m), weight 70.1 kg (154 lb 8 oz), SpO2 98 %.    Physical Exam  Constitutional- Vitals - Body mass index is 23.49 kg/m².,   Vitals:    04/21/23 1505   BP: (!) 106/54   Pulse: 74   Resp: 16     General appearance-Conscious,Coherent  Eyes- No conjunctival icterus,pupils  round  and  Bilateral cataracts  ENT-Oral cavity- moist    , Hearing grossly normal   Neck- No thyromegaly ,Trachea -central, No jugular venous distension,   No Carotid Bruit   Cardiovascular -Heart Sounds- Normal  and  no murmur   , No gallop rhythm   Respiratory - Normal Respiratory Effort, Normal breath sounds,  CrepitationsRt base,  no wheeze , and  no forced expiratory wheeze    Peripheral pitting pedal edema-- none , no calf pain   Gastrointestinal -Soft abdomen, No palpable masses, Non Tender,Liver,Spleen not palpable. No-- free fluid and shifting dullness  Musculoskeletal- No finger Clubbing. Strength grossly normal   Lymphatic-No Palpable cervical, axillary,Inguinal lymphadenopathy   Psychiatric - normal effect,Orientation  Rt Dorsalis pedis pulses-palpable    Lt Dorsalis pedis pulses- palpable   Rt Posterior tibial pulses -palpable   Left posterior tibial pulses -palpable   Miscellaneous -  no renal bruit     Investigations  Lab and Imaging have been reviewed in epic.    2021      No sonographic evidence of hemodynamically significant carotid stenosis bilateral internal carotid arteries.  Review of Medicine tests    EKG- I had independently reviewed the EKG from--4/4/2023  It was reported to be showing     Normal sinus rhythm   Normal ECG   When compared with ECG of 22-JUN-2021 13:43,   No significant change was found     April 2019   Mild left atrial enlargement.  Normal left ventricular systolic function. The estimated ejection fraction is 60%  Indeterminate left ventricular diastolic function.  Normal right ventricular systolic function.  Mild-to-moderate mitral regurgitation.  Mild tricuspid regurgitation.  Normal " central venous pressure (3 mm Hg).  The estimated PA systolic pressure is 23 mm Hg       Review of clinical lab tests:  Lab Results   Component Value Date    CREATININE 0.8 04/21/2023    HGB 8.7 (L) 04/21/2023     (H) 04/21/2023           Review of old records- Was done and information gathered regards to events leading to surgery and health conditions of significance in the perioperative period.        Preoperative cardiac risk assessment-  The patient does not have any active cardiac conditions . Revised cardiac risk index predictors- 0---.Functional capacity is more than 4 Mets. She will be undergoing a Orthopedic procedure that carries a Moderate Risk risk     Risk of a major Cardiac event ( Defined as death, myocardial infarction, or cardiac arrest at 30 days after noncardiac surgery), based on RCRI score     -3.9%         No further cardiac work up is indicated prior to proceeding with the surgery          American Society of Anesthesiologists Physical status classification ( ASA ) class: 2     Postoperative pulmonary complication risk assessment:           Alesia Respiratory failure index- percentage risk of respiratory failure: 0.5 %    Assessment/Plan:     S/P lumbar laminectomy  Low back pain  I suggest that the anesthesiologist be aware of the previous spine surgery    Restless leg  She is taking gabapentin twice a day and Valium as needed about half of the days in a week and Tylenol p.m. for the half the times in a week and she tries not to use Tylenol p.m. and Valium on the same night  I have told her not to take anti-inflammatory medication for pain between now and surgery    History of radiofrequency ablation (RFA) procedure for cardiac arrhythmia  She has had an ablation done in the past usage she is currently no longer have any problems with her heart and she is taking verapamil every night    Postmenopausal HRT (hormone replacement therapy)  On hormone replacement therapy   Risk /  benefits ofhormone replacement therpay   use in the perioperative period discussed   Risk of thrombosis discussed with hormone replacement therpay  use  Preferable to hold hormone replacement therpay 1-2 weeks pre op until  Mobility returns to base line for the duration of VTE prophylaxis  She choose he chose to hold off on the hormones in preparation for surgery     Of note her son has had pulmonary embolism after a long flight at 59 Y    As far as she knows she does not have any family history of thrombosis but given her family history of fatal pulmonary embolism although it seems provoked I will work with the surgeon for possibly giving her high-risk VT prophylaxis with apixaban versus aspirin      MDS (myelodysplastic syndrome)  Hemoglobin is about 8  No overt GI or  blood losses   I noted the plan of transfusion prior to her surgery       History of GI bleed  2017  In the setting of Advil PM use for aches and pains   We suggested care with her current ibuprofen use   We noted that she is on proton pump inhibitor and we suggested to take antiplatelet agent and anti-inflammatory medication with food    Constipation  Suggested working on bowel movements in preparation for surgery   Constipation- I suggest giving laxative regimen as opioid use,reduced ambulation  can increase the constipation     Acid reflux  Doing good     Does not sound Cardiac   Tips to control reflux discussed     GERD-  I suggest continuation of the Proton pump inhibitor in the perioperative period . I suggest aspiration precautions    Elevated bilirubin    No history  of cirrhosis of liver or suggestions of Liver  decompensation   No Jaundice , dark urine , pale stool       History of COVID-19  Summer 2022    She did not require hospitalization, intubation or supplemental oxygen use     Recovered     COVID screening     No fever   No cough   No SOB  No sore throat   No loss of taste or smell   No muscle aches   No nausea, vomiting ,  diarrhea -    Breathing good        Preventive perioperative care    Thromboembolic prophylaxis:  Her risk factors for thrombosis include surgical procedure and age.I suggest  thromboembolic prophylaxis ( mechanical/pharmacological, weighing the risk benefits of pharmacological agent use considering [facility] procedural bleeding )  during the perioperative period.I suggested being active in the post operative period.   The patient is a candidate for extended DVT prophylaxis      Postoperative pulmonary complication prophylaxis-- I suggest incentive spirometry use, early ambulation, and end tidal carbon dioxide monitoring  , oral care , head end of bed elevation      Renal complication prophylaxis- . I suggest keeping her well hydrated in the perioperative period.      Surgical site Infection Prophylaxis-I  suggest appropriate antibiotic for Prophylaxis against Surgical site infections    Skin antibacterial discussed       Delirium prophylaxis-Risk factors - Advanced Age - I suggest avoidance / minimizing the use of  Benzodiazepines ( unless the patient has been taking it on a regular basis ),Anticholinergic medication,Antihistamines ( like  Benadryl).I suggest minimizing the use of opioid medication and use of IV tylenol,if it is appropriate. I suggest using the lowest possible dose of opioids for the shortest duration possible in the perioperative period. I suggest to Keep shades/blinds open during the day, lights off and shades closed at night to encourage normal sleep/wake cycle.I encourage the presence of the family member with the patient at all times, if at all possible as mental status changes can be picked up early by the family members and they help with reorientation. I encouraged the presence of family to help with orientation in the perioperative period. Benadryl avoidance suggested      In view of regional anesthesia  the patient  is at risk of postoperative urinary retention.  I suggest avoidance /  minimizing the of  Benzodiazepines,Anticholinergic medication,antihistamines ( Benadryl) , if possible in the perioperative period. I suggest using the minimum possible use of opioids for the minimum period of time in the perioperative period. Benadryl avoidance suggested      This visit was focused on Preoperative evaluation, Perioperative Medical management, complication reduction plans. I suggest that the patient follows up with primary care or relevant sub specialists for ongoing health care.    I appreciate the opportunity to be involved in this patients care. Please feel free to contact me if there were any questions about this consultation.    Patient is optimized    Patient/ care giver/ Family member was instructed to call and update me about any changes to health,  medication, office visits ,testing out side of the [facility] operative [facility] center , hospitalizations between now and surgery      Clarissa Escobar MD  Internal Medicine  [facility]   Cell Phone- [phone]    History of COVID - Yes   COVID vaccination status -yes    I have spent --120---- minutes of time which includes, time spent to prepare to see the patient , obtaining history ,performing examination, counseling/Educating the patient , Documenting clinical information in the record      Checked for OTC medication   Noted plan for - for transfusion in our infusion suite on 4/24/23.   --  4/21/2023- 1939    Messaged vascular neurologist on-call  --  4/24/2023- 1643    Called to speak to the vascular Neurology team about the carotid artery finding on the CT about possible dissection on Sept 20 th 2021  Carotid Sonogram from the same date showed-No sonographic evidence of hemodynamically significant carotid stenosis bilateral internal carotid arteries.   --  4/25- 8 52     Called Radiology department about the CT angiogram from Sept 2021 about possible dissection    Spoke to vascular Neurologist    Noted timing of the scan Sept  2021  Can be anticoagulated     Called to follow up , spoke to her   [facility] hospitalization at [facility] was in relation to Baclofen made her incoherent   No stroke  Address any concerns with the up coming surgery or any questions on Medication instructions   Discussed VTE prophylaxis  ASA use- not known to have circulatory problems, no vascular stenting - Preventive reasons  Medication instructions discussed   Having infusion today   --  4/25/2023- 18 40     Tolerated 1 unit PRBC with no complications  Chart reviewed - pSVT, 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

## 2023-04-21 NOTE — ASSESSMENT & PLAN NOTE
Suggested working on bowel movements in preparation for surgery   Constipation- I suggest giving laxative regimen as opioid use,reduced ambulation  can increase the constipation

## 2023-04-21 NOTE — ASSESSMENT & PLAN NOTE
On hormone replacement therapy   Risk / benefits ofhormone replacement therpay   use in the perioperative period discussed   Risk of thrombosis discussed with hormone replacement therpay  use  Preferable to hold hormone replacement therpay 1-2 weeks pre op until  Mobility returns to base line for the duration of VTE prophylaxis  She choose he chose to hold off on the hormones in preparation for surgery     Of note her son has had pulmonary embolism after a long flight at 59 Y    As far as she knows she does not have any family history of thrombosis but given her family history of fatal pulmonary embolism although it seems provoked I will work with the surgeon for possibly giving her high-risk VT prophylaxis with apixaban versus aspirin

## 2023-04-22 NOTE — TELEPHONE ENCOUNTER
Thank you!  Agree   We will use Eliquis 2.5mg BID    ----- Message from Clarissa Escobar MD sent at 2023  6:08 PM CDT -----  Will    Thank you for involving me in the care of this very nice lady who I just saw this afternoon     I wanted to highlight to you that her son  who was 61 years of age unfortunately  of a fatal pulmonary embolism after a flight trip from [facility] to [place]   There is  no family history of thrombosis but with that history I thought she might benefit from apixaban versus aspirin for VTE prophylaxis      Also another piece of history that might support apixaban versus aspirin use is  that she has had a significant GI bleeding requiring transfusion a few years ago due to nonsteroidal medication use    Another case for apixaban versus aspirin for VTE prophylaxis is  that she last took her hormone last night and has not held the hormone replacement therapy as would have like to be either 1 or 2 weeks before surgery    All of these, I believe would make a case for apixaban versus aspirin  However I would defer this to you if you feel otherwise    Joon

## 2023-04-23 ENCOUNTER — PATIENT MESSAGE (OUTPATIENT)
Dept: INTERNAL MEDICINE | Facility: CLINIC | Age: 83
End: 2023-04-23
Payer: MEDICARE

## 2023-04-25 ENCOUNTER — PATIENT MESSAGE (OUTPATIENT)
Dept: INTERNAL MEDICINE | Facility: CLINIC | Age: 83
End: 2023-04-25
Payer: MEDICARE

## 2023-04-25 ENCOUNTER — PATIENT MESSAGE (OUTPATIENT)
Dept: SURGERY | Facility: HOSPITAL | Age: 83
End: 2023-04-25
Payer: MEDICARE

## 2023-04-25 ENCOUNTER — INFUSION (OUTPATIENT)
Dept: INFUSION THERAPY | Facility: HOSPITAL | Age: 83
DRG: 470 | End: 2023-04-25
Payer: MEDICARE

## 2023-04-25 VITALS
DIASTOLIC BLOOD PRESSURE: 56 MMHG | HEART RATE: 62 BPM | RESPIRATION RATE: 18 BRPM | SYSTOLIC BLOOD PRESSURE: 126 MMHG | TEMPERATURE: 98 F

## 2023-04-25 DIAGNOSIS — D64.9 ANEMIA, UNSPECIFIED TYPE: ICD-10-CM

## 2023-04-25 LAB
BLD PROD TYP BPU: NORMAL
BLOOD UNIT EXPIRATION DATE: NORMAL
BLOOD UNIT TYPE CODE: 6200
BLOOD UNIT TYPE: NORMAL
CODING SYSTEM: NORMAL
CROSSMATCH INTERPRETATION: NORMAL
DISPENSE STATUS: NORMAL
TRANS ERYTHROCYTES VOL PATIENT: NORMAL ML

## 2023-04-25 PROCEDURE — P9021 RED BLOOD CELLS UNIT: HCPCS | Performed by: PHYSICIAN ASSISTANT

## 2023-04-25 PROCEDURE — 25000003 PHARM REV CODE 250: Performed by: PHYSICIAN ASSISTANT

## 2023-04-25 PROCEDURE — 86920 COMPATIBILITY TEST SPIN: CPT | Performed by: PHYSICIAN ASSISTANT

## 2023-04-25 PROCEDURE — 36430 TRANSFUSION BLD/BLD COMPNT: CPT

## 2023-04-25 RX ORDER — ACETAMINOPHEN 325 MG/1
650 TABLET ORAL
Status: CANCELLED | OUTPATIENT
Start: 2023-04-25

## 2023-04-25 RX ORDER — ACETAMINOPHEN 325 MG/1
650 TABLET ORAL
Status: COMPLETED | OUTPATIENT
Start: 2023-04-25 | End: 2023-04-25

## 2023-04-25 RX ORDER — HYDROCODONE BITARTRATE AND ACETAMINOPHEN 500; 5 MG/1; MG/1
TABLET ORAL ONCE
Status: CANCELLED | OUTPATIENT
Start: 2023-04-25 | End: 2023-04-25

## 2023-04-25 RX ORDER — HYDROCODONE BITARTRATE AND ACETAMINOPHEN 500; 5 MG/1; MG/1
TABLET ORAL ONCE
Status: DISCONTINUED | OUTPATIENT
Start: 2023-04-25 | End: 2023-04-25 | Stop reason: HOSPADM

## 2023-04-25 RX ADMIN — ACETAMINOPHEN 650 MG: 325 TABLET ORAL at 02:04

## 2023-04-26 ENCOUNTER — HOSPITAL ENCOUNTER (INPATIENT)
Facility: HOSPITAL | Age: 83
LOS: 2 days | Discharge: HOME HEALTH | DRG: 470 | End: 2023-04-28
Attending: ORTHOPAEDIC SURGERY | Admitting: ORTHOPAEDIC SURGERY
Payer: MEDICARE

## 2023-04-26 ENCOUNTER — ANESTHESIA (OUTPATIENT)
Dept: SURGERY | Facility: HOSPITAL | Age: 83
DRG: 470 | End: 2023-04-26
Payer: MEDICARE

## 2023-04-26 DIAGNOSIS — M16.10 HIP ARTHRITIS: ICD-10-CM

## 2023-04-26 DIAGNOSIS — Z79.890 POSTMENOPAUSAL HORMONE REPLACEMENT THERAPY: Chronic | ICD-10-CM

## 2023-04-26 DIAGNOSIS — M16.11 PRIMARY OSTEOARTHRITIS OF RIGHT HIP: ICD-10-CM

## 2023-04-26 LAB
ABO + RH BLD: NORMAL
BLD GP AB SCN CELLS X3 SERPL QL: NORMAL
HGB BLD-MCNC: 9.4 G/DL (ref 12–16)
SPECIMEN OUTDATE: NORMAL

## 2023-04-26 PROCEDURE — 25000003 PHARM REV CODE 250: Performed by: NURSE PRACTITIONER

## 2023-04-26 PROCEDURE — 63600175 PHARM REV CODE 636 W HCPCS: Performed by: ORTHOPAEDIC SURGERY

## 2023-04-26 PROCEDURE — 36000711: Performed by: ORTHOPAEDIC SURGERY

## 2023-04-26 PROCEDURE — 88341 PR IHC OR ICC EACH ADD'L SINGLE ANTIBODY  STAINPR: ICD-10-PCS | Mod: 26,,, | Performed by: PATHOLOGY

## 2023-04-26 PROCEDURE — D9220A PRA ANESTHESIA: ICD-10-PCS | Mod: ANES,,, | Performed by: ANESTHESIOLOGY

## 2023-04-26 PROCEDURE — 63600175 PHARM REV CODE 636 W HCPCS: Performed by: STUDENT IN AN ORGANIZED HEALTH CARE EDUCATION/TRAINING PROGRAM

## 2023-04-26 PROCEDURE — 88365 INSITU HYBRIDIZATION (FISH): CPT | Mod: 26,,, | Performed by: PATHOLOGY

## 2023-04-26 PROCEDURE — 25000003 PHARM REV CODE 250: Performed by: STUDENT IN AN ORGANIZED HEALTH CARE EDUCATION/TRAINING PROGRAM

## 2023-04-26 PROCEDURE — 27130 TOTAL HIP ARTHROPLASTY: CPT | Mod: RT,GC,, | Performed by: ORTHOPAEDIC SURGERY

## 2023-04-26 PROCEDURE — 99900035 HC TECH TIME PER 15 MIN (STAT)

## 2023-04-26 PROCEDURE — 94761 N-INVAS EAR/PLS OXIMETRY MLT: CPT

## 2023-04-26 PROCEDURE — 88311 DECALCIFY TISSUE: CPT | Mod: 26,,, | Performed by: PATHOLOGY

## 2023-04-26 PROCEDURE — 88305 TISSUE EXAM BY PATHOLOGIST: ICD-10-PCS | Mod: 26,,, | Performed by: PATHOLOGY

## 2023-04-26 PROCEDURE — 25000003 PHARM REV CODE 250: Performed by: ORTHOPAEDIC SURGERY

## 2023-04-26 PROCEDURE — 25000003 PHARM REV CODE 250: Performed by: NURSE ANESTHETIST, CERTIFIED REGISTERED

## 2023-04-26 PROCEDURE — D9220A PRA ANESTHESIA: ICD-10-PCS | Mod: CRNA,,, | Performed by: NURSE ANESTHETIST, CERTIFIED REGISTERED

## 2023-04-26 PROCEDURE — 88365 PR  TISSUE HYBRIDIZATION: ICD-10-PCS | Mod: 26,,, | Performed by: PATHOLOGY

## 2023-04-26 PROCEDURE — 11000001 HC ACUTE MED/SURG PRIVATE ROOM

## 2023-04-26 PROCEDURE — 71000016 HC POSTOP RECOV ADDL HR: Performed by: ORTHOPAEDIC SURGERY

## 2023-04-26 PROCEDURE — 97166 OT EVAL MOD COMPLEX 45 MIN: CPT

## 2023-04-26 PROCEDURE — 88313 PR  SPECIAL STAINS,GROUP II: ICD-10-PCS | Mod: 26,,, | Performed by: PATHOLOGY

## 2023-04-26 PROCEDURE — 88364 INSITU HYBRIDIZATION (FISH): CPT | Performed by: PATHOLOGY

## 2023-04-26 PROCEDURE — 88304 PR  SURG PATH,LEVEL III: ICD-10-PCS | Mod: 26,,, | Performed by: PATHOLOGY

## 2023-04-26 PROCEDURE — 94799 UNLISTED PULMONARY SVC/PX: CPT

## 2023-04-26 PROCEDURE — 88305 TISSUE EXAM BY PATHOLOGIST: CPT | Performed by: PATHOLOGY

## 2023-04-26 PROCEDURE — 27201423 OPTIME MED/SURG SUP & DEVICES STERILE SUPPLY: Performed by: ORTHOPAEDIC SURGERY

## 2023-04-26 PROCEDURE — 97116 GAIT TRAINING THERAPY: CPT

## 2023-04-26 PROCEDURE — D9220A PRA ANESTHESIA: Mod: CRNA,,, | Performed by: NURSE ANESTHETIST, CERTIFIED REGISTERED

## 2023-04-26 PROCEDURE — 63600175 PHARM REV CODE 636 W HCPCS: Performed by: NURSE PRACTITIONER

## 2023-04-26 PROCEDURE — 36000710: Performed by: ORTHOPAEDIC SURGERY

## 2023-04-26 PROCEDURE — 71000039 HC RECOVERY, EACH ADD'L HOUR: Performed by: ORTHOPAEDIC SURGERY

## 2023-04-26 PROCEDURE — 88341 IMHCHEM/IMCYTCHM EA ADD ANTB: CPT | Mod: 26,,, | Performed by: PATHOLOGY

## 2023-04-26 PROCEDURE — 37000009 HC ANESTHESIA EA ADD 15 MINS: Performed by: ORTHOPAEDIC SURGERY

## 2023-04-26 PROCEDURE — 88305 TISSUE EXAM BY PATHOLOGIST: CPT | Mod: 26,,, | Performed by: PATHOLOGY

## 2023-04-26 PROCEDURE — 88342 IMHCHEM/IMCYTCHM 1ST ANTB: CPT | Mod: 26,59,, | Performed by: PATHOLOGY

## 2023-04-26 PROCEDURE — 88304 TISSUE EXAM BY PATHOLOGIST: CPT | Performed by: PATHOLOGY

## 2023-04-26 PROCEDURE — 88341 IMHCHEM/IMCYTCHM EA ADD ANTB: CPT | Mod: 59 | Performed by: PATHOLOGY

## 2023-04-26 PROCEDURE — 63600175 PHARM REV CODE 636 W HCPCS: Performed by: NURSE ANESTHETIST, CERTIFIED REGISTERED

## 2023-04-26 PROCEDURE — 88313 SPECIAL STAINS GROUP 2: CPT | Mod: 26,,, | Performed by: PATHOLOGY

## 2023-04-26 PROCEDURE — 88313 SPECIAL STAINS GROUP 2: CPT | Mod: 59 | Performed by: PATHOLOGY

## 2023-04-26 PROCEDURE — D9220A PRA ANESTHESIA: Mod: ANES,,, | Performed by: ANESTHESIOLOGY

## 2023-04-26 PROCEDURE — 86900 BLOOD TYPING SEROLOGIC ABO: CPT | Performed by: ANESTHESIOLOGY

## 2023-04-26 PROCEDURE — 88311 PR  DECALCIFY TISSUE: ICD-10-PCS | Mod: 26,,, | Performed by: PATHOLOGY

## 2023-04-26 PROCEDURE — 27130 PR TOTAL HIP ARTHROPLASTY: ICD-10-PCS | Mod: RT,GC,, | Performed by: ORTHOPAEDIC SURGERY

## 2023-04-26 PROCEDURE — 88311 DECALCIFY TISSUE: CPT | Performed by: PATHOLOGY

## 2023-04-26 PROCEDURE — 71000015 HC POSTOP RECOV 1ST HR: Performed by: ORTHOPAEDIC SURGERY

## 2023-04-26 PROCEDURE — C1713 ANCHOR/SCREW BN/BN,TIS/BN: HCPCS | Performed by: ORTHOPAEDIC SURGERY

## 2023-04-26 PROCEDURE — 97161 PT EVAL LOW COMPLEX 20 MIN: CPT

## 2023-04-26 PROCEDURE — 88304 TISSUE EXAM BY PATHOLOGIST: CPT | Mod: 26,,, | Performed by: PATHOLOGY

## 2023-04-26 PROCEDURE — 37000008 HC ANESTHESIA 1ST 15 MINUTES: Performed by: ORTHOPAEDIC SURGERY

## 2023-04-26 PROCEDURE — 97530 THERAPEUTIC ACTIVITIES: CPT

## 2023-04-26 PROCEDURE — 88342 IMHCHEM/IMCYTCHM 1ST ANTB: CPT | Performed by: PATHOLOGY

## 2023-04-26 PROCEDURE — 71000033 HC RECOVERY, INTIAL HOUR: Performed by: ORTHOPAEDIC SURGERY

## 2023-04-26 PROCEDURE — 88365 INSITU HYBRIDIZATION (FISH): CPT | Performed by: PATHOLOGY

## 2023-04-26 PROCEDURE — 88342 CHG IMMUNOCYTOCHEMISTRY: ICD-10-PCS | Mod: 26,59,, | Performed by: PATHOLOGY

## 2023-04-26 PROCEDURE — 85018 HEMOGLOBIN: CPT | Performed by: STUDENT IN AN ORGANIZED HEALTH CARE EDUCATION/TRAINING PROGRAM

## 2023-04-26 PROCEDURE — C1776 JOINT DEVICE (IMPLANTABLE): HCPCS | Performed by: ORTHOPAEDIC SURGERY

## 2023-04-26 PROCEDURE — 86920 COMPATIBILITY TEST SPIN: CPT | Performed by: ANESTHESIOLOGY

## 2023-04-26 DEVICE — HEAD FEM 12/14 TAPER 1.5X36: Type: IMPLANTABLE DEVICE | Site: HIP | Status: FUNCTIONAL

## 2023-04-26 DEVICE — SCREW PINNACLE 6.5 X 20: Type: IMPLANTABLE DEVICE | Site: HIP | Status: FUNCTIONAL

## 2023-04-26 DEVICE — IMPLANTABLE DEVICE: Type: IMPLANTABLE DEVICE | Site: HIP | Status: FUNCTIONAL

## 2023-04-26 DEVICE — CUP ACET PINN 10D 32MM 52MM: Type: IMPLANTABLE DEVICE | Site: HIP | Status: FUNCTIONAL

## 2023-04-26 DEVICE — SCREW PINNACLE 6.5 X 25: Type: IMPLANTABLE DEVICE | Site: HIP | Status: FUNCTIONAL

## 2023-04-26 DEVICE — CEMENT BONE SURG SMPLX P RADPQ: Type: IMPLANTABLE DEVICE | Site: HIP | Status: FUNCTIONAL

## 2023-04-26 DEVICE — LINE ACET PINN 36X52 ALTRX: Type: IMPLANTABLE DEVICE | Site: HIP | Status: FUNCTIONAL

## 2023-04-26 RX ORDER — DOCUSATE SODIUM 100 MG/1
100 CAPSULE, LIQUID FILLED ORAL 2 TIMES DAILY PRN
Qty: 60 CAPSULE | Refills: 0 | Status: SHIPPED | OUTPATIENT
Start: 2023-04-26 | End: 2023-08-15

## 2023-04-26 RX ORDER — GABAPENTIN 300 MG/1
600 CAPSULE ORAL 2 TIMES DAILY
Status: DISCONTINUED | OUTPATIENT
Start: 2023-04-26 | End: 2023-04-28 | Stop reason: HOSPADM

## 2023-04-26 RX ORDER — LIDOCAINE HYDROCHLORIDE 20 MG/ML
INJECTION INTRAVENOUS
Status: DISCONTINUED | OUTPATIENT
Start: 2023-04-26 | End: 2023-04-26

## 2023-04-26 RX ORDER — SODIUM CHLORIDE 9 MG/ML
INJECTION, SOLUTION INTRAVENOUS CONTINUOUS
Status: DISCONTINUED | OUTPATIENT
Start: 2023-04-26 | End: 2023-04-26

## 2023-04-26 RX ORDER — PANTOPRAZOLE SODIUM 40 MG/1
40 TABLET, DELAYED RELEASE ORAL NIGHTLY
Status: DISCONTINUED | OUTPATIENT
Start: 2023-04-26 | End: 2023-04-28 | Stop reason: HOSPADM

## 2023-04-26 RX ORDER — KETAMINE HCL IN 0.9 % NACL 50 MG/5 ML
SYRINGE (ML) INTRAVENOUS
Status: DISCONTINUED | OUTPATIENT
Start: 2023-04-26 | End: 2023-04-26

## 2023-04-26 RX ORDER — METHOCARBAMOL 750 MG/1
750 TABLET, FILM COATED ORAL 4 TIMES DAILY PRN
Qty: 40 TABLET | Refills: 0 | Status: SHIPPED | OUTPATIENT
Start: 2023-04-26 | End: 2023-05-10 | Stop reason: SDUPTHER

## 2023-04-26 RX ORDER — POLYETHYLENE GLYCOL 3350 17 G/17G
17 POWDER, FOR SOLUTION ORAL DAILY
Status: DISCONTINUED | OUTPATIENT
Start: 2023-04-26 | End: 2023-04-28 | Stop reason: HOSPADM

## 2023-04-26 RX ORDER — PHENYLEPHRINE HYDROCHLORIDE 10 MG/ML
INJECTION INTRAVENOUS
Status: DISCONTINUED | OUTPATIENT
Start: 2023-04-26 | End: 2023-04-26

## 2023-04-26 RX ORDER — VERAPAMIL HYDROCHLORIDE 120 MG/1
120 TABLET, FILM COATED, EXTENDED RELEASE ORAL NIGHTLY
Status: DISCONTINUED | OUTPATIENT
Start: 2023-04-26 | End: 2023-04-28 | Stop reason: HOSPADM

## 2023-04-26 RX ORDER — SODIUM CHLORIDE 9 MG/ML
INJECTION, SOLUTION INTRAVENOUS
Status: DISCONTINUED | OUTPATIENT
Start: 2023-04-26 | End: 2023-04-26

## 2023-04-26 RX ORDER — DIAZEPAM 2 MG/1
2 TABLET ORAL NIGHTLY PRN
Status: DISCONTINUED | OUTPATIENT
Start: 2023-04-26 | End: 2023-04-28 | Stop reason: HOSPADM

## 2023-04-26 RX ORDER — MORPHINE SULFATE 4 MG/ML
INJECTION, SOLUTION INTRAMUSCULAR; INTRAVENOUS
Status: DISCONTINUED | OUTPATIENT
Start: 2023-04-26 | End: 2023-04-26

## 2023-04-26 RX ORDER — FENTANYL CITRATE 50 UG/ML
INJECTION, SOLUTION INTRAMUSCULAR; INTRAVENOUS
Status: DISCONTINUED | OUTPATIENT
Start: 2023-04-26 | End: 2023-04-26

## 2023-04-26 RX ORDER — PRAMIPEXOLE DIHYDROCHLORIDE 0.12 MG/1
0.5 TABLET ORAL 3 TIMES DAILY
Status: DISCONTINUED | OUTPATIENT
Start: 2023-04-26 | End: 2023-04-28 | Stop reason: HOSPADM

## 2023-04-26 RX ORDER — NALOXONE HCL 0.4 MG/ML
0.02 VIAL (ML) INJECTION
Status: DISCONTINUED | OUTPATIENT
Start: 2023-04-26 | End: 2023-04-28 | Stop reason: HOSPADM

## 2023-04-26 RX ORDER — SODIUM CHLORIDE 0.9 % (FLUSH) 0.9 %
3 SYRINGE (ML) INJECTION
Status: DISCONTINUED | OUTPATIENT
Start: 2023-04-26 | End: 2023-04-26

## 2023-04-26 RX ORDER — DEXTROMETHORPHAN HYDROBROMIDE, GUAIFENESIN 5; 100 MG/5ML; MG/5ML
650 LIQUID ORAL EVERY 8 HOURS
Qty: 120 TABLET | Refills: 0 | Status: SHIPPED | OUTPATIENT
Start: 2023-04-26

## 2023-04-26 RX ORDER — CEFADROXIL 500 MG/1
500 CAPSULE ORAL EVERY 12 HOURS
Status: DISCONTINUED | OUTPATIENT
Start: 2023-04-27 | End: 2023-04-28 | Stop reason: HOSPADM

## 2023-04-26 RX ORDER — ASPIRIN 81 MG/1
81 TABLET ORAL 2 TIMES DAILY
Status: DISCONTINUED | OUTPATIENT
Start: 2023-04-26 | End: 2023-04-26

## 2023-04-26 RX ORDER — LIDOCAINE HYDROCHLORIDE 10 MG/ML
1 INJECTION, SOLUTION EPIDURAL; INFILTRATION; INTRACAUDAL; PERINEURAL
Status: DISCONTINUED | OUTPATIENT
Start: 2023-04-26 | End: 2023-04-26

## 2023-04-26 RX ORDER — OXYCODONE HYDROCHLORIDE 5 MG/1
5 TABLET ORAL
Status: DISCONTINUED | OUTPATIENT
Start: 2023-04-26 | End: 2023-04-27

## 2023-04-26 RX ORDER — ROPIVACAINE HYDROCHLORIDE 5 MG/ML
INJECTION, SOLUTION EPIDURAL; INFILTRATION; PERINEURAL
Status: DISCONTINUED | OUTPATIENT
Start: 2023-04-26 | End: 2023-04-26

## 2023-04-26 RX ORDER — AMOXICILLIN 250 MG
1 CAPSULE ORAL 2 TIMES DAILY
Status: DISCONTINUED | OUTPATIENT
Start: 2023-04-26 | End: 2023-04-28 | Stop reason: HOSPADM

## 2023-04-26 RX ORDER — OXYCODONE HYDROCHLORIDE 5 MG/1
TABLET ORAL
Qty: 30 TABLET | Refills: 0 | Status: SHIPPED | OUTPATIENT
Start: 2023-04-26 | End: 2023-05-30 | Stop reason: SDUPTHER

## 2023-04-26 RX ORDER — MIDAZOLAM HYDROCHLORIDE 1 MG/ML
4 INJECTION INTRAMUSCULAR; INTRAVENOUS
Status: DISCONTINUED | OUTPATIENT
Start: 2023-04-26 | End: 2023-04-26

## 2023-04-26 RX ORDER — MORPHINE SULFATE 2 MG/ML
2 INJECTION, SOLUTION INTRAMUSCULAR; INTRAVENOUS
Status: DISCONTINUED | OUTPATIENT
Start: 2023-04-26 | End: 2023-04-27

## 2023-04-26 RX ORDER — ACETAMINOPHEN 500 MG
1000 TABLET ORAL
Status: COMPLETED | OUTPATIENT
Start: 2023-04-26 | End: 2023-04-26

## 2023-04-26 RX ORDER — ASPIRIN 81 MG/1
81 TABLET ORAL DAILY
Qty: 30 TABLET | Refills: 0
Start: 2023-05-26 | End: 2023-10-12

## 2023-04-26 RX ORDER — OXYCODONE HYDROCHLORIDE 10 MG/1
10 TABLET ORAL
Status: DISCONTINUED | OUTPATIENT
Start: 2023-04-26 | End: 2023-04-27

## 2023-04-26 RX ORDER — POLYETHYLENE GLYCOL 3350 17 G/17G
17 POWDER, FOR SOLUTION ORAL DAILY PRN
Status: DISCONTINUED | OUTPATIENT
Start: 2023-04-26 | End: 2023-04-28 | Stop reason: HOSPADM

## 2023-04-26 RX ORDER — ONDANSETRON 2 MG/ML
4 INJECTION INTRAMUSCULAR; INTRAVENOUS EVERY 8 HOURS PRN
Status: DISCONTINUED | OUTPATIENT
Start: 2023-04-26 | End: 2023-04-28 | Stop reason: HOSPADM

## 2023-04-26 RX ORDER — MUPIROCIN 20 MG/G
1 OINTMENT TOPICAL 2 TIMES DAILY
Status: DISCONTINUED | OUTPATIENT
Start: 2023-04-26 | End: 2023-04-28 | Stop reason: HOSPADM

## 2023-04-26 RX ORDER — ASPIRIN 81 MG/1
81 TABLET ORAL 2 TIMES DAILY
Qty: 60 TABLET | Refills: 0 | Status: SHIPPED | OUTPATIENT
Start: 2023-04-26 | End: 2023-04-26 | Stop reason: HOSPADM

## 2023-04-26 RX ORDER — PROPOFOL 10 MG/ML
VIAL (ML) INTRAVENOUS CONTINUOUS PRN
Status: DISCONTINUED | OUTPATIENT
Start: 2023-04-26 | End: 2023-04-26

## 2023-04-26 RX ORDER — PROCHLORPERAZINE EDISYLATE 5 MG/ML
5 INJECTION INTRAMUSCULAR; INTRAVENOUS EVERY 6 HOURS PRN
Status: DISCONTINUED | OUTPATIENT
Start: 2023-04-26 | End: 2023-04-28 | Stop reason: HOSPADM

## 2023-04-26 RX ORDER — ASPIRIN 81 MG/1
81 TABLET ORAL ONCE
Status: COMPLETED | OUTPATIENT
Start: 2023-04-26 | End: 2023-04-26

## 2023-04-26 RX ORDER — ACETAMINOPHEN 500 MG
1000 TABLET ORAL EVERY 6 HOURS
Status: DISCONTINUED | OUTPATIENT
Start: 2023-04-26 | End: 2023-04-28 | Stop reason: HOSPADM

## 2023-04-26 RX ORDER — FENTANYL CITRATE 50 UG/ML
100 INJECTION, SOLUTION INTRAMUSCULAR; INTRAVENOUS
Status: DISCONTINUED | OUTPATIENT
Start: 2023-04-26 | End: 2023-04-26

## 2023-04-26 RX ORDER — MUPIROCIN 20 MG/G
1 OINTMENT TOPICAL
Status: COMPLETED | OUTPATIENT
Start: 2023-04-26 | End: 2023-04-26

## 2023-04-26 RX ORDER — FENTANYL CITRATE 50 UG/ML
25 INJECTION, SOLUTION INTRAMUSCULAR; INTRAVENOUS EVERY 5 MIN PRN
Status: COMPLETED | OUTPATIENT
Start: 2023-04-26 | End: 2023-04-26

## 2023-04-26 RX ORDER — VANCOMYCIN HYDROCHLORIDE 1 G/20ML
INJECTION, POWDER, LYOPHILIZED, FOR SOLUTION INTRAVENOUS
Status: DISCONTINUED | OUTPATIENT
Start: 2023-04-26 | End: 2023-04-26

## 2023-04-26 RX ORDER — CEFADROXIL 500 MG/1
500 CAPSULE ORAL EVERY 12 HOURS
Qty: 14 CAPSULE | Refills: 0 | Status: SHIPPED | OUTPATIENT
Start: 2023-04-26 | End: 2023-04-27 | Stop reason: SDUPTHER

## 2023-04-26 RX ORDER — METHYLPREDNISOLONE ACETATE 40 MG/ML
INJECTION, SUSPENSION INTRA-ARTICULAR; INTRALESIONAL; INTRAMUSCULAR; SOFT TISSUE
Status: DISCONTINUED | OUTPATIENT
Start: 2023-04-26 | End: 2023-04-26

## 2023-04-26 RX ORDER — METHOCARBAMOL 750 MG/1
750 TABLET, FILM COATED ORAL 3 TIMES DAILY
Status: DISCONTINUED | OUTPATIENT
Start: 2023-04-26 | End: 2023-04-26

## 2023-04-26 RX ORDER — PREGABALIN 75 MG/1
75 CAPSULE ORAL
Status: COMPLETED | OUTPATIENT
Start: 2023-04-26 | End: 2023-04-26

## 2023-04-26 RX ORDER — ESTRADIOL AND NORETHINDRONE ACETATE .5; .1 MG/1; MG/1
1 TABLET ORAL DAILY
Qty: 90 TABLET | Refills: 3
Start: 2023-05-10 | End: 2023-10-18

## 2023-04-26 RX ORDER — FENTANYL CITRATE 50 UG/ML
25 INJECTION, SOLUTION INTRAMUSCULAR; INTRAVENOUS EVERY 5 MIN PRN
Status: DISCONTINUED | OUTPATIENT
Start: 2023-04-26 | End: 2023-04-26 | Stop reason: HOSPADM

## 2023-04-26 RX ORDER — METHOCARBAMOL 750 MG/1
750 TABLET, FILM COATED ORAL 3 TIMES DAILY
Status: DISCONTINUED | OUTPATIENT
Start: 2023-04-26 | End: 2023-04-28 | Stop reason: HOSPADM

## 2023-04-26 RX ORDER — NICARDIPINE HYDROCHLORIDE 2.5 MG/ML
INJECTION INTRAVENOUS
Status: DISCONTINUED | OUTPATIENT
Start: 2023-04-26 | End: 2023-04-26

## 2023-04-26 RX ORDER — DEXAMETHASONE SODIUM PHOSPHATE 4 MG/ML
INJECTION, SOLUTION INTRA-ARTICULAR; INTRALESIONAL; INTRAMUSCULAR; INTRAVENOUS; SOFT TISSUE
Status: DISCONTINUED | OUTPATIENT
Start: 2023-04-26 | End: 2023-04-26

## 2023-04-26 RX ORDER — FAMOTIDINE 20 MG/1
20 TABLET, FILM COATED ORAL 2 TIMES DAILY
Status: DISCONTINUED | OUTPATIENT
Start: 2023-04-26 | End: 2023-04-26

## 2023-04-26 RX ADMIN — TRANEXAMIC ACID 2000 MG: 100 INJECTION, SOLUTION INTRAVENOUS at 08:04

## 2023-04-26 RX ADMIN — FENTANYL CITRATE 25 MCG: 50 INJECTION, SOLUTION INTRAMUSCULAR; INTRAVENOUS at 01:04

## 2023-04-26 RX ADMIN — MUPIROCIN 1 G: 20 OINTMENT TOPICAL at 09:04

## 2023-04-26 RX ADMIN — ACETAMINOPHEN 1000 MG: 500 TABLET ORAL at 01:04

## 2023-04-26 RX ADMIN — ACETAMINOPHEN 1000 MG: 500 TABLET ORAL at 07:04

## 2023-04-26 RX ADMIN — Medication 30 MG: at 08:04

## 2023-04-26 RX ADMIN — GABAPENTIN 600 MG: 300 CAPSULE ORAL at 09:04

## 2023-04-26 RX ADMIN — FENTANYL CITRATE 25 MCG: 50 INJECTION, SOLUTION INTRAMUSCULAR; INTRAVENOUS at 11:04

## 2023-04-26 RX ADMIN — PHENYLEPHRINE HYDROCHLORIDE 100 MCG: 10 INJECTION INTRAVENOUS at 09:04

## 2023-04-26 RX ADMIN — PHENYLEPHRINE HYDROCHLORIDE 100 MCG: 10 INJECTION INTRAVENOUS at 10:04

## 2023-04-26 RX ADMIN — METHOCARBAMOL 750 MG: 750 TABLET ORAL at 09:04

## 2023-04-26 RX ADMIN — OXYCODONE HYDROCHLORIDE 10 MG: 10 TABLET ORAL at 02:04

## 2023-04-26 RX ADMIN — VERAPAMIL HYDROCHLORIDE 120 MG: 120 TABLET, FILM COATED, EXTENDED RELEASE ORAL at 09:04

## 2023-04-26 RX ADMIN — FENTANYL CITRATE 25 MCG: 50 INJECTION, SOLUTION INTRAMUSCULAR; INTRAVENOUS at 12:04

## 2023-04-26 RX ADMIN — NICARDIPINE HYDROCHLORIDE 400 MCG: 25 INJECTION INTRAVENOUS at 08:04

## 2023-04-26 RX ADMIN — FENTANYL CITRATE 100 MCG: 50 INJECTION, SOLUTION INTRAMUSCULAR; INTRAVENOUS at 08:04

## 2023-04-26 RX ADMIN — VANCOMYCIN HYDROCHLORIDE 1000 MG: 1 INJECTION, POWDER, LYOPHILIZED, FOR SOLUTION INTRAVENOUS at 08:04

## 2023-04-26 RX ADMIN — PREGABALIN 75 MG: 75 CAPSULE ORAL at 07:04

## 2023-04-26 RX ADMIN — METHOCARBAMOL 750 MG: 750 TABLET ORAL at 12:04

## 2023-04-26 RX ADMIN — OXYCODONE HYDROCHLORIDE 10 MG: 10 TABLET ORAL at 08:04

## 2023-04-26 RX ADMIN — TRANEXAMIC ACID 1000 MG: 100 INJECTION, SOLUTION INTRAVENOUS at 09:04

## 2023-04-26 RX ADMIN — PRAMIPEXOLE DIHYDROCHLORIDE 0.5 MG: 0.12 TABLET ORAL at 03:04

## 2023-04-26 RX ADMIN — MUPIROCIN 1 G: 20 OINTMENT TOPICAL at 07:04

## 2023-04-26 RX ADMIN — POLYETHYLENE GLYCOL 3350 17 G: 17 POWDER, FOR SOLUTION ORAL at 10:04

## 2023-04-26 RX ADMIN — OXYCODONE 5 MG: 5 TABLET ORAL at 10:04

## 2023-04-26 RX ADMIN — VANCOMYCIN HYDROCHLORIDE 1000 MG: 1 INJECTION, POWDER, LYOPHILIZED, FOR SOLUTION INTRAVENOUS at 07:04

## 2023-04-26 RX ADMIN — DEXAMETHASONE SODIUM PHOSPHATE 8 MG: 4 INJECTION, SOLUTION INTRAMUSCULAR; INTRAVENOUS at 08:04

## 2023-04-26 RX ADMIN — ASPIRIN 81 MG: 81 TABLET, COATED ORAL at 09:04

## 2023-04-26 RX ADMIN — PRAMIPEXOLE DIHYDROCHLORIDE 0.5 MG: 0.12 TABLET ORAL at 09:04

## 2023-04-26 RX ADMIN — ACETAMINOPHEN 1000 MG: 500 TABLET ORAL at 05:04

## 2023-04-26 RX ADMIN — MEPIVACAINE HYDROCHLORIDE 2.5 ML: 20 INJECTION, SOLUTION EPIDURAL; INFILTRATION at 08:04

## 2023-04-26 RX ADMIN — PANTOPRAZOLE SODIUM 40 MG: 40 TABLET, DELAYED RELEASE ORAL at 09:04

## 2023-04-26 RX ADMIN — SENNOSIDES AND DOCUSATE SODIUM 1 TABLET: 50; 8.6 TABLET ORAL at 09:04

## 2023-04-26 RX ADMIN — SENNOSIDES AND DOCUSATE SODIUM 1 TABLET: 50; 8.6 TABLET ORAL at 10:04

## 2023-04-26 RX ADMIN — SODIUM CHLORIDE: 9 INJECTION, SOLUTION INTRAVENOUS at 10:04

## 2023-04-26 RX ADMIN — SODIUM CHLORIDE: 9 INJECTION, SOLUTION INTRAVENOUS at 07:04

## 2023-04-26 RX ADMIN — PROPOFOL 100 MCG/KG/MIN: 10 INJECTION, EMULSION INTRAVENOUS at 08:04

## 2023-04-26 RX ADMIN — CEFAZOLIN 2 G: 2 INJECTION, POWDER, FOR SOLUTION INTRAMUSCULAR; INTRAVENOUS at 03:04

## 2023-04-26 RX ADMIN — LIDOCAINE HYDROCHLORIDE 50 MG: 20 INJECTION INTRAVENOUS at 08:04

## 2023-04-26 RX ADMIN — CEFAZOLIN 2 G: 2 INJECTION, POWDER, FOR SOLUTION INTRAMUSCULAR; INTRAVENOUS at 08:04

## 2023-04-26 NOTE — ADDENDUM NOTE
Addendum  created 04/26/23 1205 by Nhi Meeks,     Order list changed, Pharmacy for encounter modified

## 2023-04-26 NOTE — NURSING TRANSFER
Nursing Transfer Note      4/26/2023     Reason patient is being transferred: postop    Transfer To: [address]    Transfer via bed    Transfer with n/a    Transported by escort    Medicines sent: n/a    Any special needs or follow-up needed:     Chart send with patient: Yes    Notified: spouse    Patient reassessed at: 4/26/23    Upon arrival to floor: bed in lowest position  Report given to Chanell Esquivel

## 2023-04-26 NOTE — PROGRESS NOTES
Spoke with Jude House that pt does not have to urgency to void but pt hasnt urinated since surgery. Bladder scan was done, showed 725 cc. States ok to do In and out cath.

## 2023-04-26 NOTE — PLAN OF CARE
Problem: Adult Inpatient Plan of Care  Goal: Plan of Care Review  Outcome: Ongoing, Progressing  Goal: Patient-Specific Goal (Individualized)  Outcome: Ongoing, Progressing  Goal: Absence of Hospital-Acquired Illness or Injury  Outcome: Ongoing, Progressing  Goal: Optimal Comfort and Wellbeing  Outcome: Ongoing, Progressing  Goal: Readiness for Transition of Care  Outcome: Ongoing, Progressing     Problem: Pain Acute  Goal: Acceptable Pain Control and Functional Ability  Outcome: Ongoing, Progressing     Problem: Adjustment to Surgery (Hip Arthroplasty)  Goal: Optimal Coping  Outcome: Ongoing, Progressing     Problem: Bleeding (Hip Arthroplasty)  Goal: Absence of Bleeding  Outcome: Ongoing, Progressing     Problem: Bowel Motility Impaired (Hip Arthroplasty)  Goal: Effective Bowel Elimination  Outcome: Ongoing, Progressing     Problem: Fluid and Electrolyte Imbalance (Hip Arthroplasty)  Goal: Fluid and Electrolyte Balance  Outcome: Ongoing, Progressing     Problem: Functional Ability Impaired (Hip Arthroplasty)  Goal: Optimal Functional Ability  Outcome: Ongoing, Progressing     Problem: Infection (Hip Arthroplasty)  Goal: Absence of Infection Signs and Symptoms  Outcome: Ongoing, Progressing     Problem: Neurovascular Compromise (Hip Arthroplasty)  Goal: Intact Neurovascular Status  Outcome: Ongoing, Progressing     Problem: Ongoing Anesthesia Effects (Hip Arthroplasty)  Goal: Anesthesia/Sedation Recovery  Outcome: Ongoing, Progressing     Problem: Pain (Hip Arthroplasty)  Goal: Acceptable Pain Control  Outcome: Ongoing, Progressing     Problem: Postoperative Nausea and Vomiting (Hip Arthroplasty)  Goal: Nausea and Vomiting Relief  Outcome: Ongoing, Progressing     Problem: Postoperative Urinary Retention (Hip Arthroplasty)  Goal: Effective Urinary Elimination  Outcome: Ongoing, Progressing     Problem: Respiratory Compromise (Hip Arthroplasty)  Goal: Effective Oxygenation and Ventilation  Outcome: Ongoing,  Progressing     Patient doing well with pain control/management. Patient is having trouble voiding spontaneously at this time, will monitor urine output and bladder scan before end of shift, provider will be updated. Plan of care continues.

## 2023-04-26 NOTE — TRANSFER OF CARE
"Anesthesia Transfer of Care Note    Patient: Shelly Vásquez    Procedure(s) Performed: Procedure(s) (LRB):  ARTHROPLASTY, HIP, TOTAL, ANTERIOR APPROACH: RIGHT: DEPUY - C-STEM + PINNACLE (Right)    Patient location: PACU    Anesthesia Type: MAC    Transport from OR: Transported from OR on 6-10 L/min O2 by face mask with adequate spontaneous ventilation    Post pain: adequate analgesia    Post assessment: no apparent anesthetic complications    Post vital signs: stable    Level of consciousness: awake    Nausea/Vomiting: no nausea/vomiting    Complications: none    Transfer of care protocol was followed      Last vitals:   Visit Vitals  BP (!) 126/57 (BP Location: Left arm, Patient Position: Lying)   Pulse 61   Temp 36.6 °C (97.9 °F) (Tympanic)   Resp 18   Ht 5' 8" (1.727 m)   Wt 69.9 kg (154 lb)   LMP  (LMP Unknown)   SpO2 99%   Breastfeeding No   BMI 23.42 kg/m²     "

## 2023-04-26 NOTE — INTERVAL H&P NOTE
The patient has been examined and the H&P has been reviewed:    I concur with the findings and no changes have occurred since H+P was written    Surgery risks, benefits and alternative options discussed and understood by patient/family.    1u prbc transfused yesterday, hgb up to 9.4 from 8.6  Discussed increased risk of blood transfusion post op    Has put cataracts on hold for 90 days

## 2023-04-26 NOTE — ANESTHESIA PROCEDURE NOTES
CSE    Patient location during procedure: OR  Start time: 4/26/2023 8:07 AM  Timeout: 4/26/2023 8:05 AM  End time: 4/26/2023 8:15 AM      Staffing  Authorizing Provider: Tash Resendez MD  Performing Provider: Tash Resendez MD    Preanesthetic Checklist  Completed: patient identified, IV checked, site marked, risks and benefits discussed, surgical consent, monitors and equipment checked, pre-op evaluation and timeout performed  CSE  Patient position: sitting  Prep: ChloraPrep  Patient monitoring: heart rate, continuous pulse ox and frequent blood pressure checks  Approach: midline  Spinal Needle  Needle type: Betty   Needle gauge: 25 G  Needle length: 5 in  Epidural Needle  Injection technique: SARAH air  Needle type: Tuohy   Needle gauge: 17 G  Needle length: 3.5 in  Needle insertion depth: 6 cm  Location: L3-4  Needle localization: anatomical landmarks   Catheter  Catheter size: 19 G  Catheter at skin depth: 10 cm  Additional Documentation: incremental injection, negative aspiration for CSF, negative aspiration for heme and no paresthesia on injection  Assessment  Intrathecal Medications:   administered: primary anesthetic mcg of

## 2023-04-26 NOTE — ANESTHESIA POSTPROCEDURE EVALUATION
Anesthesia Post Evaluation    Patient: Shelly Vásquez    Procedure(s) Performed: Procedure(s) (LRB):  ARTHROPLASTY, HIP, TOTAL, ANTERIOR APPROACH: RIGHT: DEPUY - C-STEM + PINNACLE (Right)    Final Anesthesia Type: CSE      Patient location during evaluation: PACU  Patient participation: Yes- Able to Participate  Level of consciousness: awake  Post-procedure vital signs: reviewed and stable  Pain management: adequate  Airway patency: patent    PONV status at discharge: No PONV  Anesthetic complications: no      Cardiovascular status: hemodynamically stable  Respiratory status: unassisted and spontaneous ventilation  Hydration status: euvolemic  Follow-up not needed.          Vitals Value Taken Time   BP 93/54 04/26/23 1047   Temp 36.3 °C (97.3 °F) 04/26/23 1024   Pulse 63 04/26/23 1047   Resp 12 04/26/23 1047   SpO2 100 % 04/26/23 1047   Vitals shown include unvalidated device data.      No case tracking events are documented in the log.      Pain/Oscar Score: Pain Rating Prior to Med Admin: 0 (4/26/2023  7:27 AM)  Oscar Score: 8 (4/26/2023 10:30 AM)

## 2023-04-26 NOTE — PLAN OF CARE
[facility] (2nd Fl)      HOME HEALTH ORDERS  FACE TO FACE ENCOUNTER    Patient Name: Shelly Vásquez  YOB: 1940    PCP: Marlene Andrew MD   PCP Address: [address] / [place] [address]  PCP Phone Number: [phone]  PCP Fax: [phone]    Encounter Date: 3/14/23    Admit to Home Health    Diagnoses:  There are no hospital problems to display for this patient.      Follow Up Appointments:  Future Appointments   Date Time Provider Department Center   4/28/2023 11:00 AM TELEMED NURSE, [facility] ORTHO [facility] ORTHO [address]   5/11/2023 11:00 AM Manny Espana PA-C [facility] ORTHO [address]   5/15/2023 11:40 AM [facility] LAB BMT [facility] LABB[facility] [address] [place]   5/15/2023  1:00 PM Erlin Lopez MD [facility] HC BMT [place] [facility]   6/8/2023  2:00 PM Keon Gary III, MD [facility] ORTHO [address]   7/20/2023  2:20 PM Keon Gary III, MD [facility] ORTHO [address]       Allergies:  Review of patient's allergies indicates:   Allergen Reactions    Baclofen      Incoherent     Nsaids (non-steroidal anti-inflammatory drug)      GI Bleed  Had 5 blood transfusions       Medications: Review discharge medications with patient and family and provide education.    Current Facility-Administered Medications   Medication Dose Route Frequency Provider Last Rate Last Admin    0.9%  NaCl infusion   Intravenous Continuous Radha Ponce NP        acetaminophen tablet 1,000 mg  1,000 mg Oral Q6H Radha Ponce NP        aspirin EC tablet 81 mg  81 mg Oral BID Baljit Mcclellan MD        ceFAZolin 2 g in dextrose 5 % in water (D5W) 5 % 50 mL IVPB (MB+)  2 g Intravenous Q8H Radha Ponce NP        diazePAM tablet 2 mg  2 mg Oral Nightly PRN Kunal Chow MD        fentaNYL 50 mcg/mL injection 25 mcg  25 mcg Intravenous Q5 Min PRN Tash Resendez MD        fentaNYL 50 mcg/mL injection 25 mcg  25 mcg Intravenous Q5 Min PRN Radha Ponce NP        gabapentin capsule 600 mg  600 mg Oral  BID Kunal Chow MD        methocarbamoL tablet 750 mg  750 mg Oral TID [place] [facility], FELIPE        morphine injection 2 mg  2 mg Intravenous Q3H PRN [facility], FELIPE        mupirocin 2 % ointment 1 g  1 g Nasal BID [facility], FELIPE        naloxone 0.4 mg/mL injection 0.02 mg  0.02 mg Intravenous PRN [facility], FELIPE        ondansetron injection 4 mg  4 mg Intravenous Q8H PRN [facility], FELIPE        oxyCODONE immediate release tablet 5 mg  5 mg Oral Q3H PRN [facility], FELIPE        oxyCODONE immediate release tablet Tab 10 mg  10 mg Oral Q3H PRN [facility], FELIPE        pantoprazole EC tablet 40 mg  40 mg Oral QHS Kunal Chow MD        polyethylene glycol packet 17 g  17 g Oral Daily PRN [facility], FELIPE        polyethylene glycol packet 17 g  17 g Oral Daily [facility], FELIPE        pramipexole tablet 0.5 mg  0.5 mg Oral TID Kunal Chow MD        pregabalin capsule 75 mg  75 mg Oral QHS [facility], FELIPE        prochlorperazine injection Soln 5 mg  5 mg Intravenous Q6H PRN [facility], FELIPE        senna-docusate 8.6-50 mg per tablet 1 tablet  1 tablet Oral BID [facility], FELIPE        sodium chloride 0.9% flush 3 mL  3 mL Intravenous PRN Tash Resendez MD        verapamiL CR tablet 120 mg  120 mg Oral Nightly Kunal Chow MD         Current Discharge Medication List        START taking these medications    Details   acetaminophen (TYLENOL) 650 MG TbSR Take 1 tablet (650 mg total) by mouth every 8 (eight) hours.  Qty: 120 tablet, Refills: 0      cefadroxil (DURICEF) 500 MG Cap Take 1 capsule (500 mg total) by mouth every 12 (twelve) hours. for 7 days  Qty: 14 capsule, Refills: 0      docusate sodium (COLACE) 100 MG capsule Take 1 capsule (100 mg total) by mouth 2 (two) times daily as needed.  Qty: 60 capsule, Refills: 0      methocarbamoL (ROBAXIN) 750 MG Tab Take 1 tablet (750 mg total) by mouth 4 (four) times daily as needed (as needed for  muscle spasms).  Qty: 40 tablet, Refills: 0      oxyCODONE (ROXICODONE) 5 MG immediate release tablet Take 1-2 tabs every 4-6 hours as needed for pain  Qty: 30 tablet, Refills: 0           CONTINUE these medications which have CHANGED    Details   !! aspirin (ECOTRIN) 81 MG EC tablet Take 1 tablet (81 mg total) by mouth 2 (two) times daily.  Qty: 60 tablet, Refills: 0      !! aspirin (ECOTRIN) 81 MG EC tablet Take 1 tablet (81 mg total) by mouth once daily.  Qty: 30 tablet, Refills: 0      estradiol-norethindrone acet 0.5-0.1 mg per tablet Take 1 tablet by mouth once daily.  Qty: 90 tablet, Refills: 3    Associated Diagnoses: Postmenopausal hormone replacement therapy. DO NOT TAKE FOR 2-4 weeks post op from hip replacement surgery.        !! - Potential duplicate medications found. Please discuss with provider.        CONTINUE these medications which have NOT CHANGED    Details   gabapentin (NEURONTIN) 600 MG tablet Take 1 tablet (600 mg total) by mouth 4 (four) times daily.  Qty: 120 tablet, Refills: 5    Comments: Pt misplaced gabapentin. Is from [employer], needs 50 tablets for her vacation. Will pay cash if needed      pramipexole (MIRAPEX) 0.5 MG tablet Take 1 tablet (0.5 mg total) by mouth 3 (three) times daily.  Qty: 90 tablet, Refills: 2    Comments: ZERO refills remain on this prescription. Your patient is requesting advance approval of refills for this medication to PREVENT ANY MISSED DOSES  Associated Diagnoses: RLS (restless legs syndrome)      diazePAM (VALIUM) 2 MG tablet Take 1 tablet (2 mg total) by mouth every 8 (eight) hours as needed for Insomnia (RLS).  Qty: 45 tablet, Refills: 1      ofloxacin (OCUFLOX) 0.3 % ophthalmic solution Place 1 drop into the right eye 3 (three) times daily.      pantoprazole (PROTONIX) 40 MG tablet Take 1 tablet (40 mg total) by mouth once daily.  Qty: 90 tablet, Refills: 1    Associated Diagnoses: Gastroesophageal reflux disease, unspecified whether esophagitis present       polyethylene glycol 3350 (MIRALAX ORAL) Take by mouth once daily.      prednisoLONE acetate (PRED FORTE) 1 % DrpS Place 1 drop into the right eye 3 (three) times daily.      verapamiL (VERELAN) 120 MG C24P Take 1 capsule (120 mg total) by mouth once daily.  Qty: 90 capsule, Refills: 3    Comments: .      vitamin D (VITAMIN D3) 1000 units Tab Take 1,000 Units by mouth once daily.           STOP taking these medications       acetaminophen (TYLENOL) 325 MG tablet Comments:   Reason for Stopping:         acetaminophen/diphenhydramine (TYLENOL PM ORAL) Comments:   Reason for Stopping:         ibuprofen (ADVIL,MOTRIN) 200 MG tablet Comments:   Reason for Stopping:                 I have seen and examined this patient within the last 30 days. My clinical findings that support the need for the home health skilled services and home bound status are the following:no   Weakness/numbness causing balance and gait disturbance due to Surgery making it taxing to leave home.     Diet:   regular diet    Labs:  none    Referrals/ Consults  Physical Therapy to evaluate and treat. Evaluate for home safety and equipment needs; Establish/upgrade home exercise program. Perform / instruct on therapeutic exercises, gait training, transfer training, and Range of Motion.  Occupational Therapy to evaluate and treat. Evaluate home environment for safety and equipment needs. Perform/Instruct on transfers, ADL training, ROM, and therapeutic exercises.    Activities:   WBAT with walker, 0-90 degree hip flexion, no extremes of motion     Nursing:   Agency to admit patient within 24 hours of hospital discharge unless specified on physician order or at patient request    SN to complete comprehensive assessment including routine vital signs. Instruct on disease process and s/s of complications to report to MD. Review/verify medication list sent home with the patient at time of discharge  and instruct patient/caregiver as needed. Frequency may be  adjusted depending on start of care date.     Skilled nurse to perform up to 3 visits PRN for symptoms related to diagnosis    Notify MD if SBP > 160 or < 90; DBP > 90 or < 50; HR > 120 or < 50; Temp > 101; O2 < 88%; Other:   none    Ok to schedule additional visits based on staff availability and patient request on consecutive days within the home health episode.    When multiple disciplines ordered:    Start of Care occurs on Sunday - Wednesday schedule remaining discipline evaluations as ordered on separate consecutive days following the start of care.    Thursday SOC -schedule subsequent evaluations Friday and Monday the following week.     Friday - Saturday SOC - schedule subsequent discipline evaluations on consecutive days starting Monday of the following week.    For all post-discharge communication and subsequent orders please contact patient's primary care physician. If unable to reach primary care physician or do not receive response within 30 minutes, please contact Keon Gary MD for clinical staff order clarification    Miscellaneous   Routine Skin for Bedridden Patients: Instruct patient/caregiver to apply moisture barrier cream to all skin folds and wet areas in perineal area daily and after baths and all bowel movements.    Home Health Aide:  none    Wound Care Orders  Do not remove surgical dressing for 2 weeks post-op. This will be done only by MD at initial post-op visit.     Please call the office if there is any fluid/liquid/drainage on the white guaze. It is ok to shower when you get home if seated in a shower chair, don't stand and fall in the shower. No soaking in a tub or pool for one month.       I certify that this patient is confined to her home and needs intermittent skilled nursing care, physical therapy, and occupational therapy.

## 2023-04-26 NOTE — PLAN OF CARE
[facility] - [facility] ([facility])      HOME HEALTH ORDERS  FACE TO FACE ENCOUNTER    Patient Name: Shelly Vásquez  YOB: 1940    PCP: Marlene Andrew MD   PCP Address: [address] / [place] [address]  PCP Phone Number: [phone]  PCP Fax: [phone]    Encounter Date: 3/14/23    Admit to Home Health    Diagnoses:  There are no hospital problems to display for this patient.      Follow Up Appointments:  Future Appointments   Date Time Provider Department Center   4/28/2023 11:00 AM TELEMED NURSE, [facility] ORTHO [facility]THO [facility]   5/11/2023 11:00 AM Manny Espana PA-C [facility] ORTHO [facility]   5/15/2023 11:40 AM [facility] LAB BMT [facility] [place]   5/15/2023  1:00 PM Erlin Lopez MD [facility] HC BMT [place]   6/8/2023  2:00 PM Keon Gary III, MD [facility] ORTHO [facility]   7/20/2023  2:20 PM Keon Gary III, MD [facility] ORTHO [facility]       Allergies:  Review of patient's allergies indicates:   Allergen Reactions    Baclofen      Incoherent     Nsaids (non-steroidal anti-inflammatory drug)      GI Bleed  Had 5 blood transfusions       Medications: Review discharge medications with patient and family and provide education.    Current Facility-Administered Medications   Medication Dose Route Frequency Provider Last Rate Last Admin    0.9%  NaCl infusion   Intravenous Continuous Radha Ponce  mL/hr at 04/26/23 1053 New Bag at 04/26/23 1053    acetaminophen tablet 1,000 mg  1,000 mg Oral Q6H Radha Ponce NP        [START ON 4/27/2023] apixaban tablet 2.5 mg  2.5 mg Oral BID Baljit Mcclellan MD        aspirin EC tablet 81 mg  81 mg Oral Once Baljit Mcclellan MD        ceFAZolin 2 g in dextrose 5 % in water (D5W) 5 % 50 mL IVPB (MB+)  2 g Intravenous Q8H Radha Ponce NP        diazePAM tablet 2 mg  2 mg Oral Nightly PRN Kunal Chow MD        fentaNYL 50 mcg/mL injection 25 mcg  25 mcg Intravenous Q5 Min PRN Tash Resendez,  MD        fentaNYL 50 mcg/mL injection 25 mcg  25 mcg Intravenous Q5 Min PRN [facility] [place], FELIPE        gabapentin capsule 600 mg  600 mg Oral BID Kunal Chow MD        [facility]L tablet 750 mg  [facility]        morphine injection 2 mg  2 mg Intravenous Q3H [facility]        mupirocin 2 % ointment 1 g  1 g Nasal BID [facility], FELIPE        naloxone 0.4 mg/mL injection 0.02 mg  0.02 mg [facility]ous [facility]        ondansetron injection 4 mg  4 mg Intravenous Q8H PRN [facility], FELIPE        oxyCODONE immediate release tablet 5 mg  5 mg Oral Q3H PRN [facility], FELIPE   5 mg at 04/26/23 1049    oxyCODONE immediate release tablet Tab 10 mg  10 mg Oral Q3H PRN [facility], FELIPE        pantoprazole EC tablet 40 mg  40 mg Oral QHS Kunal Chow MD        polyethylene glycol packet 17 g  17 g Oral Daily PRN [facility], FELIPE        polyethylene glycol packet 17 g  17 g Oral Daily [facility], NP   17 g at 04/26/23 1049    pramipexole tablet 0.5 mg  0.5 mg Oral TID Kunal Chow MD        pregabalin capsule 75 mg  75 mg Oral QHS [facility], FELIPE        prochlorperazine injection Soln 5 mg  5 mg Intravenous Q6H [facility]        senna-docusate 8.6-50 mg per tablet 1 tablet  1 tablet Oral BID [facility], FELIPE   1 tablet at 04/26/23 1049    sodium chloride 0.9% flush 3 mL  3 mL Intravenous PRN Tash Resendez MD        vancomycin (VANCOCIN) 1,000 mg in dextrose 5 % (D5W) 250 mL IVPB (Vial-Mate)  1,000 mg Intravenous Q12H Baljit Mcclellan MD        verapamiL CR tablet 120 mg  120 mg Oral Nightly Kunal Chow MD         Current Discharge Medication List        START taking these medications    Details   acetaminophen (TYLENOL) 650 MG TbSR Take 1 tablet (650 mg total) by mouth every 8 (eight) hours.  Qty: 120 tablet, Refills: 0      apixaban (ELIQUIS) 2.5 mg Tab Take 1 tablet (2.5 mg total) by  mouth 2 (two) times daily.  Qty: 60 tablet, Refills: 0      cefadroxil (DURICEF) 500 MG Cap Take 1 capsule (500 mg total) by mouth every 12 (twelve) hours. for 7 days  Qty: 14 capsule, Refills: 0      docusate sodium (COLACE) 100 MG capsule Take 1 capsule (100 mg total) by mouth 2 (two) times daily as needed.  Qty: 60 capsule, Refills: 0      methocarbamoL (ROBAXIN) 750 MG Tab Take 1 tablet (750 mg total) by mouth 4 (four) times daily as needed (as needed for muscle spasms).  Qty: 40 tablet, Refills: 0      oxyCODONE (ROXICODONE) 5 MG immediate release tablet Take 1-2 tabs every 4-6 hours as needed for pain  Qty: 30 tablet, Refills: 0           CONTINUE these medications which have CHANGED    Details   aspirin (ECOTRIN) 81 MG EC tablet Take 1 tablet (81 mg total) by mouth once daily.  Qty: 30 tablet, Refills: 0      estradiol-norethindrone acet 0.5-0.1 mg per tablet Take 1 tablet by mouth once daily.  Qty: 90 tablet, Refills: 3    Associated Diagnoses: Postmenopausal hormone replacement therapy. Do not take for 2-4 weeks post op hip replacement surgery.           CONTINUE these medications which have NOT CHANGED    Details   gabapentin (NEURONTIN) 600 MG tablet Take 1 tablet (600 mg total) by mouth 4 (four) times daily.  Qty: 120 tablet, Refills: 5    Comments: Pt misplaced gabapentin. Is from [facility], needs 50 tablets for her vacation. Will pay cash if needed      pramipexole (MIRAPEX) 0.5 MG tablet Take 1 tablet (0.5 mg total) by mouth 3 (three) times daily.  Qty: 90 tablet, Refills: 2    Comments: ZERO refills remain on this prescription. Your patient is requesting advance approval of refills for this medication to PREVENT ANY MISSED DOSES  Associated Diagnoses: RLS (restless legs syndrome)      diazePAM (VALIUM) 2 MG tablet Take 1 tablet (2 mg total) by mouth every 8 (eight) hours as needed for Insomnia (RLS).  Qty: 45 tablet, Refills: 1      ofloxacin (OCUFLOX) 0.3 % ophthalmic solution Place 1 drop into the right  eye 3 (three) times daily.      pantoprazole (PROTONIX) 40 MG tablet Take 1 tablet (40 mg total) by mouth once daily.  Qty: 90 tablet, Refills: 1    Associated Diagnoses: Gastroesophageal reflux disease, unspecified whether esophagitis present      polyethylene glycol 3350 (MIRALAX ORAL) Take by mouth once daily.      prednisoLONE acetate (PRED FORTE) 1 % DrpS Place 1 drop into the right eye 3 (three) times daily.      verapamiL (VERELAN) 120 MG C24P Take 1 capsule (120 mg total) by mouth once daily.  Qty: 90 capsule, Refills: 3    Comments: .      vitamin D (VITAMIN D3) 1000 units Tab Take 1,000 Units by mouth once daily.           STOP taking these medications       acetaminophen (TYLENOL) 325 MG tablet Comments:   Reason for Stopping:         acetaminophen/diphenhydramine (TYLENOL PM ORAL) Comments:   Reason for Stopping:         ibuprofen (ADVIL,MOTRIN) 200 MG tablet Comments:   Reason for Stopping:                 I have seen and examined this patient within the last 30 days. My clinical findings that support the need for the home health skilled services and home bound status are the following:no   Weakness/numbness causing balance and gait disturbance due to Surgery making it taxing to leave home.     Diet:   regular diet    Labs:  none    Referrals/ Consults  Physical Therapy to evaluate and treat. Evaluate for home safety and equipment needs; Establish/upgrade home exercise program. Perform / instruct on therapeutic exercises, gait training, transfer training, and Range of Motion.  Occupational Therapy to evaluate and treat. Evaluate home environment for safety and equipment needs. Perform/Instruct on transfers, ADL training, ROM, and therapeutic exercises.    Activities:   WBAT with walker, 0-90 degree hip flexion, no extremes of motion      Nursing:   Agency to admit patient within 24 hours of hospital discharge unless specified on physician order or at patient request    SN to complete comprehensive  assessment including routine vital signs. Instruct on disease process and s/s of complications to report to MD. Review/verify medication list sent home with the patient at time of discharge  and instruct patient/caregiver as needed. Frequency may be adjusted depending on start of care date.     Skilled nurse to perform up to 3 visits PRN for symptoms related to diagnosis    Notify MD if SBP > 160 or < 90; DBP > 90 or < 50; HR > 120 or < 50; Temp > 101; O2 < 88%; Other:   none    Ok to schedule additional visits based on staff availability and patient request on consecutive days within the home health episode.    When multiple disciplines ordered:    Start of Care occurs on Sunday - Wednesday schedule remaining discipline evaluations as ordered on separate consecutive days following the start of care.    Thursday SOC -schedule subsequent evaluations Friday and Monday the following week.     Friday - Saturday SOC - schedule subsequent discipline evaluations on consecutive days starting Monday of the following week.    For all post-discharge communication and subsequent orders please contact patient's primary care physician. If unable to reach primary care physician or do not receive response within 30 minutes, please contact Keon Gary MD for clinical staff order clarification    Miscellaneous   Routine Skin for Bedridden Patients: Instruct patient/caregiver to apply moisture barrier cream to all skin folds and wet areas in perineal area daily and after baths and all bowel movements.    Home Health Aide:  none    Wound Care Orders  Do not remove surgical dressing for 2 weeks post-op. This will be done only by MD at initial post-op visit.     Please call the office if there is any fluid/liquid/drainage on the white guaze. It is ok to shower when you get home if seated in a shower chair, don't stand and fall in the shower. No soaking in a tub or pool for one month.       I certify that this patient is confined to  her home and needs intermittent skilled nursing care, physical therapy, and occupational therapy.

## 2023-04-26 NOTE — PT/OT/SLP EVAL
"Occupational Therapy   Co-Evaluation and Tx    Name: Shelly Vásquez  MRN: [id number]  Admitting Diagnosis: <principal problem not specified>  Recent Surgery: Procedure(s) (LRB):  ARTHROPLASTY, HIP, TOTAL, ANTERIOR APPROACH: RIGHT: DEPUY - C-STEM + PINNACLE (Right) Day of Surgery    Recommendations:     Discharge Recommendations: home with home health  Discharge Equipment Recommendations:  none  Barriers to discharge:  Other (Comment) (increased (A) required)    Assessment:     Shelly Vásquez is a 82 y.o. female with a medical diagnosis of R NAT.  She presents with the following performance deficits affecting function: weakness, impaired endurance, impaired self care skills, impaired functional mobility, gait instability, impaired balance, decreased lower extremity function, pain, decreased ROM, impaired cardiopulmonary response to activity, orthopedic precautions.  Pt presents with HOB elevated, reporting high levels of pain in RLE, however demonstrated great participation and tolerance. PTA, pt reports being (I) with all mobility and ADLs. She currently requires increased (A) 2/2 aforementioned deficits and is a fall risk at this time. She performed all mobility with CGA and some verbal cueing for safety. Pt would benefit from continued skilled acute OT services in order to maximize (I) and with ADLs and functional mobility to ensure safe return to PLOF in the least restrictive environment. OT recommending continued skilled OT services once pt is medically appropriate for d/c.     Rehab Prognosis: Good; patient would benefit from acute skilled OT services to address these deficits and reach maximum level of function.       Plan:     Patient to be seen daily to address the above listed problems via self-care/home management, therapeutic activities, therapeutic exercises  Plan of Care Expires: 05/26/23  Plan of Care Reviewed with: patient    Subjective   "I go to swim aerobics 3x a week!"   Chief Complaint: R " hip pain   Patient/Family Comments/goals: Return to PLOF     Occupational Profile:  Living Environment: Pt lives with spouse in a 2SH, however has elevator access to bed/bath. Pt has a WIS with shower chair, hand-held shower, and grab bars.   Previous level of function: (I) with all mobility and ADLs.   Roles and Routines: Pt was (I) with mobility and ADLs PTA.  Equipment Used at Home: 3-in-1 commode, hip kit, shower chair, walker, rolling (pt not using at baseline,however purchased prior to procedure)  Assistance upon Discharge: Spouse     Pain/Comfort:  Pain Rating 1: 7/10 (7-8)  Location - Side 1: Right  Location - Orientation 1: generalized  Location 1: hip  Pain Addressed 1: Pre-medicate for activity, Distraction, Reposition, Nurse notified  Pain Rating Post-Intervention 1: other (see comments) (not rated, but verbalized that mobility helped)    Patients cultural, spiritual, Oriental orthodox conflicts given the current situation: no    Objective:     Communicated with: RN prior to session.  Patient found HOB elevated with blood pressure cuff, telemetry, pulse ox (continuous), peripheral IV, FCD upon OT entry to room.    General Precautions: Standard, fall  Orthopedic Precautions: RLE weight bearing as tolerated  Braces: N/A  Respiratory Status: Room air    Occupational Performance:    Bed Mobility:    Patient completed Rolling/Turning to Left with  contact guard assistance  Patient completed Scooting/Bridging with contact guard assistance  Patient completed Supine to Sit with contact guard assistance  Patient completed Sit to Supine with contact guard assistance    Functional Mobility/Transfers:  Patient completed Sit <> Stand Transfer with contact guard assistance  with  rolling walker    Required verbal cues for hand placement   Functional Mobility: 3 steps forward/back and 3 lateral side steps with CGA and RW. Pt required verbal and tactile cues for sequencing and RW management.     Activities of Daily  Living:  Feeding:  set-up (A)  to drink water from cup and straw at end of session.  Upper Body Dressing: maximal assistance for donning gown while sitting EOB    Cognitive/Visual Perceptual:  Cognitive/Psychosocial Skills:     -       Oriented to: Person, Place, Time, and Situation   -       Follows Commands/attention:Follows one-step commands  -       Communication: clear/fluent  -       Memory: No Deficits noted  -       Safety awareness/insight to disability: intact   Visual/Perceptual:      -Intact pt reports she wears a contact in the L eye    Physical Exam:  Balance:    -           Static sitting balance: SBA  - Dynamic sitting balance: SBA  - Static standing balance: CGA  - Dynamic standing balance:  CGA  Postural examination/scapula alignment:    -       No postural abnormalities identified  Skin integrity: Visible skin intact  Edema:  None noted  Sensation:    -       Intact  Dominant hand:    -       R  Upper Extremity Range of Motion:     -       Right Upper Extremity: WNL  -       Left Upper Extremity: WNL  Upper Extremity Strength:    -       Right Upper Extremity: WFL  -       Left Upper Extremity: WFL   Strength:    -       Right Upper Extremity: WFL  -       Left Upper Extremity: WFL  Fine Motor Coordination:    -       Intact  Left hand thumb/finger opposition skills and Right hand thumb/finger opposition skills    AMPAC 6 Click ADL:  AMPAC Total Score: 19    Treatment & Education:  Pt educated on:   Role of OT, POC, and d/c planning.    WBAT and no extreme ROM on RLE (anterior hip precautions)   Importance of OOB activities to increase endurance and tolerance for increased participation in daily ADLs.   Utilizing the call bell to request for assistance with all functional mobility to ensure safety during hospital stay.      Pt verbalized understanding and all questions were addressed within the scope of OT.       Patient left HOB elevated with all lines intact, call button in reach, and RN  notified    GOALS:   Multidisciplinary Problems       Occupational Therapy Goals          Problem: Occupational Therapy    Goal Priority Disciplines Outcome Interventions   Occupational Therapy Goal     OT, PT/OT Ongoing, Progressing    Description: Goals to be met by: 5/10/2023     Patient will increase functional independence with ADLs by performing:    UE Dressing with Modified [place].  LE Dressing with Modified [place].  Grooming while standing at sink with Modified [place].  Toileting from toilet with Modified [place] for hygiene and clothing management.   Step transfer with Modified [place]  Toilet transfer to toilet with Modified [place].                         History:     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)         Past Surgical History:   Procedure Laterality Date    APPENDECTOMY  age 21    BACK SURGERY      Beast Lift  2004    BONE MARROW BIOPSY Right 12/19/2019    Procedure: Biopsy-bone marrow;  Surgeon: Aidan Spring MD;  Location: [facility] OR [address] [place] [address];  Service: Oncology;  Laterality: Right;    BREAST BIOPSY  50yrs ago    unable to see scar    COSMETIC SURGERY      DILATION AND CURETTAGE OF UTERUS  2008    PMB    ENDOMETRIAL BIOPSY      EPIDURAL STEROID INJECTION N/A 8/25/2022    Procedure: LUMBAR CELINA CONTRAST DIRECT REFERRAL;  Surgeon: Rj Hernandez MD;  Location: [facility];  Service: Pain Management;  Laterality: N/A;    EYE SURGERY      INJECTION OF ANESTHETIC AGENT AROUND NERVE Bilateral 11/11/2022    Procedure: BLOCK, NERVE BILATERAL L2,L3,L4 AND L5 MEDIAL BRANCH ONE OF TWO;  Surgeon: Rj Hernandez  MD;  Location: [facility] PAIN MGT;  Service: Pain Management;  Laterality: Bilateral;    INJECTION OF JOINT Right 12/2/2022    Procedure: INJECTION, JOINT RIGHT INTRARTICULAR HIP CONTRAST;  Surgeon: Rj Hernandez MD;  Location: [facility] PAIN MGT;  Service: Pain Management;  Laterality: Right;    ptsosis      RADIOFREQUENCY ABLATION  03/2018    SMALL INTESTINE SURGERY      TONSILLECTOMY, ADENOIDECTOMY  age 8    TOTAL REDUCTION MAMMOPLASTY Bilateral 2003    TRANSFORAMINAL EPIDURAL INJECTION OF STEROID Right 8/11/2022    Procedure: Injection,steroid,epidural Right L1/2 TF DIrect Referral Instructed to provide intervention per MRI results;  Surgeon: Rj Hernandez MD;  Location: [facility] PAIN MGT;  Service: Pain Management;  Laterality: Right;    TRANSFORAMINAL EPIDURAL INJECTION OF STEROID Right 10/21/2022    Procedure: INJECTION, STEROID, EPIDURAL, TRANSFORAMINAL APPROACH, L5-S1 and S1, RIGHT CONTRAST;  Surgeon: Rj Hernandez MD;  Location: [facility] PAIN MGT;  Service: Pain Management;  Laterality: Right;    TUBAL LIGATION         Time Tracking:     OT Date of Treatment: 04/26/23  OT Start Time: 1328  OT Stop Time: 1351  OT Total Time (min): 23 min    Billable Minutes:Evaluation 10  Therapeutic Activity 13    4/26/2023   Co-evaluation/treatment performed due to patient's multiple deficits requiring two skilled therapists to appropriately and safely assess patient's strength, endurance, functional mobility, and ADL performance while facilitating functional tasks in addition to accommodating for patient's activity tolerance and medical acuity 2/2 pt seen in PACU s/p R NAT.

## 2023-04-26 NOTE — PLAN OF CARE
Evaluation today. PT goals appropriate.    Patient is safe to perform bed <> chair transfer with assist x 1 with nursing staff.    Please continue Progressive Mobility Protocol as appropriate.    Roma Obrien, PT, DPT  2023  Pager: [phone]      Problem: Physical Therapy  Goal: Physical Therapy Goal  Description: Goals to be met by: 5/3/2023     Patient will increase functional independence with mobility by performin. Sit to stand transfer with Modified [place]  2. Gait  x 250 feet with Supervision using RW or LRAD.   3. Lower extremity exercise program x30 reps per handout, with independence    Outcome: Ongoing, Progressing

## 2023-04-26 NOTE — PT/OT/SLP EVAL
Physical Therapy Co-Evaluation and Co-Treatment    Patient Name:  Shelly Vásquez   MRN:  [id number]  Admit Date: 4/26/2023  Admitting Diagnosis:  <principal problem not specified>   Length of Stay: 0 days  Recent Surgery: Procedure(s) (LRB):  ARTHROPLASTY, HIP, TOTAL, ANTERIOR APPROACH: RIGHT: DEPUY - C-STEM + PINNACLE (Right) Day of Surgery    Recommendations:     Discharge Recommendations:  home with home health   Discharge Equipment Recommendations: none   Barriers to discharge: None    Plan:     During this hospitalization, patient to be seen daily to address the identified rehab impairments via gait training, therapeutic activities, therapeutic exercises and progress towards the established goals.  Plan of Care Expires:  05/26/23  Plan of Care Reviewed with: patient    Assessment:     Shelly Vásquez is a 82 y.o. female admitted with a medical diagnosis of <principal problem not specified>. Pt tolerated initial evaluation well today. Patient is s/p Rt hip NAT, anterior approach on 4/26. Pt demonstrated good functional strength and tolerance to upright activity on this date, requiring no physical assist for transfers and ambulation. VS stable throughout session. Pt did have generalized unsteadiness when standing requiring CGA and use of RW throughout ambulation trial for patient safety. Pt with increased post-op pain as well as decreased tolerance to functional activity limiting mobility upon evaluation this date. Pt will continue to benefit from skilled PT services during this hospital admit to continue to improve transfer ability and efficiency as well as continue to progress pt's ambulation distance and cardiopulmonary endurance to maximize pt's functional independence and return to PLOF.       Problem List: weakness, impaired endurance, impaired functional mobility, impaired self care skills, gait instability, impaired balance, decreased lower extremity function, decreased safety awareness, pain,  "impaired cardiopulmonary response to activity, decreased ROM, orthopedic precautions.  Rehab Prognosis: Good; patient would benefit from acute skilled PT services to address these deficits and reach maximum level of function.      Goals:   Multidisciplinary Problems       Physical Therapy Goals          Problem: Physical Therapy    Goal Priority Disciplines Outcome Goal Variances Interventions   Physical Therapy Goal     PT, PT/OT Ongoing, Progressing     Description: Goals to be met by: 5/3/2023     Patient will increase functional independence with mobility by performin. Sit to stand transfer with Modified [place]  2. Gait  x 250 feet with Supervision using RW or LRAD.   3. Lower extremity exercise program x30 reps per handout, with independence                         Subjective     RN notified prior to session. No family/friends present upon PT entrance into room. Patient agreeable to PT evaluation.    Chief Complaint: No chief complaint on file.  Patient/Family Comments/goals: "It's easy to do well when you work with people you trust!"  Pain/Comfort:  Pain Rating 1: 8/10  Location - Side 1: Right  Location - Orientation 1: generalized  Location 1: hip  Pain Addressed 1: Pre-medicate for activity, Cessation of Activity, Distraction, Reposition  Pain Rating Post-Intervention 1: other (see comments) (pt reported improvement in pain in standing)    Social History:  Residence: lives with their spouse 2-story house with 0 JUANITO and no HR. Pt's bed/bathroom is on the 2nd floor with elevator acces. Primary bathroom has a walk-in shower.  Support available: family  Equipment Owned: 3-in-1 commode, hip kit, shower chair, walker, rolling (pt purchased prior to procedure, not using PTA)  Equipment Used: None  Prior level of function: independent  Hand Dominance: right.   Work: Retired.   Drive: yes.   Managing Medicines/Managing Home: yes.   Hobbies: pt enjoys staying active, goes to swimming aerobics " "3x/week    Patient reports they will have assistance from  upon discharge.    Objective:     Additional staff present: OT; OT for co-evaluation due to suspected patient need for two skilled therapists to appropriately assess patient's functional deficits as well as ensure patient safety, accommodate for limited activity tolerance, and provide appropriate, skilled assistance to maximize functional potential during evaluation.    Patient found HOB elevated with: telemetry, blood pressure cuff, pulse ox (continuous), peripheral IV, FCD     General Precautions: Standard, Cardiac fall   Orthopedic Precautions:RLE weight bearing as tolerated, RLE anterior precautions   Braces: N/A   Body mass index is 23.42 kg/m².  Oxygen Device: Room Air  Vitals: BP (!) 102/55   Pulse (!) 59   Temp 97.5 °F (36.4 °C)   Resp 16   Ht 5' 8" (1.727 m)   Wt 69.9 kg (154 lb)   LMP  (LMP Unknown)   SpO2 96%   Breastfeeding No   BMI 23.42 kg/m²     Exams:  Cognition:   Alert and Cooperative  AxOx4  Command following: Follows multistep verbal commands  Fluency: clear/fluent  Hearing: Intact  Vision:  Intact  Skin Integrity: Visible skin intact  Postural Assessment: slouched posture and rounded shoulders  Physical Exam:    Left UE Left LE Right UE Right LE   Edema absent absent absent absent   ROM AROM WFL AROM WFL AROM WFL AROM WFL   Strength adequate ROM, adequate strength 5/5 adequate ROM, adequate strength 3/5; overpressure not applied 2/2 post op pain from NAT   Sensation intact to light touch intact to light touch intact to light touch intact to light touch   Coordination normal normal normal not tested due to strength or pain deficits     Outcome Measures:  AM-PAC 6 CLICK MOBILITY  Turning over in bed (including adjusting bedclothes, sheets and blankets)?: 3  Sitting down on and standing up from a chair with arms (e.g., wheelchair, bedside commode, etc.): 3  Moving from lying on back to sitting on the side of the bed?: " 3  Moving to and from a bed to a chair (including a wheelchair)?: 3  Need to walk in hospital room?: 3  Climbing 3-5 steps with a railing?: 2  Basic Mobility Total Score: 17     Functional Mobility:    Bed Mobility:   Supine to Sit: contact guard assistance; from Lt side of bed  Scooting anteriorly to EOB to have both feet planted on floor: contact guard assistance  Sit to Supine: contact guard assistance; to Lt side of bed    Sitting Balance at Edge of Bed:  Static Sitting Balance: Good : able to maintain balance against moderate resistance  Dynamic Sitting Balance: Good- : able to sit unsupported and weight shift across midline minimally  Assistance Level Required: Stand-by Assistance  Time: 7 minutes  Postural deviations noted: slouched posture and rounded shoulders  Comments: Time EOB focused primarily on tolerance to upright positioning, cardiopulmonary response and endurance for activities, and strength of postural musculature to perform dynamic sitting to prepare for tasks in the home. Pt able to accept internal and external perturbations to balance while seated EOB with appropriate trunk response to remain upright with no LOB and no UE support. Pt with mild dizziness at start of time EOB which resolved quickly.    Transfers:   Sit <> Stand Transfer: contact guard assistance with rolling walker   Stand <> Sit Transfer: contact guard assistance with rolling walker   [id number] from EOB    Standing Balance:  Static Standing Balance: Fair : able to stand unsupported without UE support and without LOB for 1 minute  Dynamic Standing Balance: Fair : stand independently unsupported, weight shift, and reach ipsilaterally. LOB noted when crossing midline.  Assistance Level Required: Contact Guard Assistance  Patient used: rolling walker     Gait:   Patient ambulated: 3 steps fwd, 3 steps bwd, 2 side steps to the Lt   Patient required: contact guard  Patient used:  rolling walker   Gait Pattern observed: step-to, WBAT  RLE  Gait Deviation(s): decreased step length, wide base of support, decreased arm swing, flexed posture, decreased renate, and increased time in stance phase on unaffected leg  Impairments due to: pain and decreased endurance  all lines remained intact throughout ambulation trial  Comments: PT provided vc's on sequencing for step-to pattern with WBAT on RLE for 1st step. Pt with good recall and able to complete additional steps with no vc's. Able to take side steps to the Lt with PT providing education and vc's for technique and sequencing.    Education:  Time provided for education, counseling and discussion of health disposition in regards to patient's current status  All questions answered within PT scope of practice and to patient's satisfaction  PT role in POC to address current functional deficits  Pt educated on proper body mechanics, safety techniques, and energy conservation with PT facilitation and cueing throughout session  Call nursing/pct to transfer to chair/use bathroom. Pt stated understanding.  Whiteboard updated with therapist name and pt's current mobility status documented above  Safe to perform step transfer to/from chair/bedside commode CGA and RW w/ nursing/PCT present  Importance of OOB tolerance 8-10 hrs/day to improve lung ventilation and expansion as well as strengthen postural musculature    Patient left HOB elevated with all lines intact, call button in reach, and RN present.      History:     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)       Past Surgical History:   Procedure Laterality Date    APPENDECTOMY  age 21    BACK SURGERY       Beast Lift  2004    BONE MARROW BIOPSY Right 12/19/2019    Procedure: Biopsy-bone marrow;  Surgeon: Aidan Spring MD;  Location: [facility] OR [address] [place] [address];  Service: Oncology;  Laterality: Right;    BREAST BIOPSY  50yrs ago    unable to see scar    COSMETIC SURGERY      DILATION AND CURETTAGE OF UTERUS  2008    PMB    ENDOMETRIAL BIOPSY      EPIDURAL STEROID INJECTION N/A 8/25/2022    Procedure: LUMBAR CELINA CONTRAST DIRECT REFERRAL;  Surgeon: Rj Hernandez MD;  Location: [facility] PAIN MGT;  Service: Pain Management;  Laterality: N/A;    EYE SURGERY      INJECTION OF ANESTHETIC AGENT AROUND NERVE Bilateral 11/11/2022    Procedure: BLOCK, NERVE BILATERAL L2,L3,L4 AND L5 MEDIAL BRANCH ONE OF TWO;  Surgeon: Rj Hernandez MD;  Location: [facility] PAIN MGT;  Service: Pain Management;  Laterality: Bilateral;    INJECTION OF JOINT Right 12/2/2022    Procedure: INJECTION, JOINT RIGHT INTRARTICULAR HIP CONTRAST;  Surgeon: Rj Hernandez MD;  Location: [facility] PAIN MGT;  Service: Pain Management;  Laterality: Right;    ptsosis      RADIOFREQUENCY ABLATION  03/2018    SMALL INTESTINE SURGERY      TONSILLECTOMY, ADENOIDECTOMY  age 8    TOTAL REDUCTION MAMMOPLASTY Bilateral 2003    TRANSFORAMINAL EPIDURAL INJECTION OF STEROID Right 8/11/2022    Procedure: Injection,steroid,epidural Right L1/2 TF DIrect Referral Instructed to provide intervention per MRI results;  Surgeon: Rj Hernandez MD;  Location: [facility] PAIN MGT;  Service: Pain Management;  Laterality: Right;    TRANSFORAMINAL EPIDURAL INJECTION OF STEROID Right 10/21/2022    Procedure: INJECTION, STEROID, EPIDURAL, TRANSFORAMINAL APPROACH, L5-S1 and S1, RIGHT CONTRAST;  Surgeon: Rj Hernandez MD;  Location: [facility] PAIN MGT;  Service: Pain Management;  Laterality: Right;    TUBAL LIGATION         Family History   Problem Relation Age of Onset    No Known Problems Father     Cirrhosis Mother     No Known Problems Brother     No Known Problems Maternal Aunt     No Known Problems  Maternal Uncle     No Known Problems Paternal Aunt     No Known Problems Paternal Uncle     No Known Problems Maternal Grandmother     No Known Problems Maternal Grandfather     No Known Problems Paternal Grandmother     No Known Problems Paternal Grandfather     No Known Problems Daughter     Pulmonary embolism Son     Breast cancer Neg Hx     Colon cancer Neg Hx     Ovarian cancer Neg Hx     Amblyopia Neg Hx     Blindness Neg Hx     Cancer Neg Hx     Cataracts Neg Hx     Diabetes Neg Hx     Glaucoma Neg Hx     Hypertension Neg Hx     Macular degeneration Neg Hx     Retinal detachment Neg Hx     Strabismus Neg Hx     Stroke Neg Hx     Thyroid disease Neg Hx        Social History     Socioeconomic History    Marital status:    Tobacco Use    Smoking status: Former     Packs/day: 1.00     Years: 22.00     Pack years: 22.00     Types: Cigarettes     Quit date: 1980     Years since quittin.7    Smokeless tobacco: Never   Substance and Sexual Activity    Alcohol use: Yes     Alcohol/week: 2.0 standard drinks     Types: 2 Glasses of wine per week     Comment: 1-2 glasses of  some night    Drug use: No    Sexual activity: Not Currently     Partners: Male     Birth control/protection: Post-menopausal     Comment:  since        Time Tracking:     PT Received On: 23  PT Start Time: 1328     PT Stop Time: 1349  PT Total Time (min): 21 min     Billable Minutes: Evaluation 10 minutes and Gait Training 8 minutes    2023

## 2023-04-26 NOTE — PLAN OF CARE
OT evaluation completed. OT POC and goals established.     Problem: Occupational Therapy  Goal: Occupational Therapy Goal  Description: Goals to be met by: 5/10/2023     Patient will increase functional independence with ADLs by performing:    UE Dressing with Modified Jay.  LE Dressing with Modified Jay.  Grooming while standing at sink with Modified Jay.  Toileting from toilet with Modified Jay for hygiene and clothing management.   Step transfer with Modified Jay  Toilet transfer to toilet with Modified Jay.    Outcome: Ongoing, Progressing

## 2023-04-26 NOTE — OP NOTE
Abdirahman HERNANDEZ right hip  OPERATIVE NOTE      Date of Operation:   4/26/2023    Providers Performing:   Surgeon(s):  Keon Gary III, MD  Assistant: Baljit Mcclellan MD    Preoperative Diagnosis:   Right hip osteoarthritis, M16.11    Postoperative Diagnosis:   Same    Operative Procedure:   Right Total Hip Arthroplasty, Anterior Approach, CPT 99286    Anesthesia:   Spinal+catheter  CELIA cocktail: Gary Block, no toradol    Estimated Blood Loss:   350 cc - proximal femur bleeding     Specimens:   Tissue sent for permanent pathology: femoral head + proximal femur cancellous bone    Complications:   None, stable to PACU.    Implants Utilized:   Depuy  [place] Gription acetabular shell; Size 52  [place] Altrx polyethylene liner; 52 OD, 36 ID, neutral  C-stem AMT size 3 HO  Biolox Delta Ceramic 12/14 taper 36mm + 1.5  Acetabular screw 20mm, 25mm  2 bags simplex P  aquamantys    Implant Name Type Inv. Item Serial No.  Lot No. LRB No. Used Action   Quik-Use Femoral Bone Cement Preparation Kit    [employer] [id number] Right 1 Implanted   CUP ACET PINN 10D 32MM 52MM - [id number]  CUP ACET PINN 10D 32MM 52MM  [employer]PU[employer]. [id number] Right 1 Implanted   SCREW PINNACLE 6.5 X 20 - [id number]  SCREW PINNACLE 6.5 X 20  [employer]PU[employer]. [id number] Right 1 Implanted   SCREW PINNACLE 6.5 X 25 - [id number]  SCREW PINNACLE 6.5 X 25  DEPU[employer]. [id number] Right 1 Implanted   CEMENT BONE SURG SMPLX P RADPQ - [id number]  CEMENT BONE SURG SMPLX P RADPQ  [employer] DWAIN. [id number] Right 2 Implanted   LINE ACET PINN 36X52 ALTRX - [id number]  LINE ACET PINN 36X52 ALTRX  DEPU[employer]. [id number] Right 1 Implanted   CENTRALIZIER C STEM VOID 12MM - [id number]  CENTRALIZIER C STEM VOID 12MM  DEPU[employer] INC. [id number] Right 1 Implanted   C-STEM AMT Cemented Stem High Offset Size 3 12/14 Taper    DEPUY INC. [id number] Right 1 Implanted   HEAD FEM 12/14 TAPER 1.5X36 - [id number]  HEAD FEM 12/14 TAPER 1.5X36  DEPU[employer] INC. [id number] Right 1 Implanted        Indications:   The patient has longstanding right hip pain secondary to the above diagnosis.. They have failed conservative management which includes anti-inflammatory medications and activity modification. We reviewed the risks and benefits of the direct anterior hip replacement with the patient prior to surgery including risk of infection, lateral thigh numbness, blood clot, leg length inequality, dislocation, components loosening, components wearing out, need for revision surgery, continued pain, limping, and injury to nerves and vessels.  After discussing the risks and benefits of the procedure they would like to proceed with surgery.    Operative Notes:   The patient was met in the holding area. The operative site was marked. Anesthesia administered spinal anesthesia. The patient was placed supine on a [place] table and the operative site was identified. The hip was prepped and draped in the usual fashion. IV antibiotics were administered and a timeout was performed.     I performed a direct anterior approach to the hip. Skin incision was made and the fascia of the tensor fascia rikki was identified. I utilized the interval between the tensor fascia rikki and sartorious for direct dissection to the hip joint. I identified and coagulated the ascending branch of the lateral circumflex vessel. Standard retractors over the anterior hip capsule were placed and the capsulotomy was performed. The capsule was tagged for retraction. The femoral head was identified and the femoral neck osteotomy was made in accordance with preoperative templating. The femoral head was then removed with a corkscrew.    The standard anterior, posterior, and superior retractors were placed around the acetabulum. The capsular labrum and foveal contents were removed. Sequential acetabular reamers were used to prepare the acetabulum. Once good good fit was achieved, the final acetabular cup was selected to be one millimeter larger in diameter  than the last reamer used. The cup was checked for a good rim fit and a provisional impaction was performed to verify positioning on fluoroscopy. Once this was satisfactory, the impaction was completed. I checked to make sure there was no overhanging osteophytes anteriorly as well as making sure that there was not excessive acetabular cup exposed. Screws were placed in the cup to augment initial fixation. The final liner was impacted into place and I confirmed that it was well seated.    The hydraulic hook was placed around the femur. The femur was externally rotated and the standard calcar and greater trochanter retractors were placed. A provisional posterior capsulotomy was performed. Then, gross traction was released and the leg was extended and adducted. The appropriate release was performed to allow elevation of the femur for good visualization of the femoral canal.     A box osteotome was used to open the femoral canal and a canal finder was used to sound the canal. The starter broach and sequential broaches were used until stability was appropriate. A trial reduction was performed and intraoperative fluoroscopy was used to confirm appropriate leg lengths and offset.  Once the trial femoral components appeared appropriately positioned, the hip was dislocated, the femur re-exposed, and the trial femoral components were removed. The canal was irrigated and the final femoral stem was impacted to the level of the neck cut. The final ball was impacted onto a dry trunnion and the hip was reduced checking for appropriate soft tissue tension. Final intra-operative fluoroscopy was obtained to confirm implant positioning, leg length, and offset.     While checking xray, a 0.35% betadine solution was left to soak in the wound. The wound was copiously irrigated. I checked for any residual bleeders and made sure that there was appropriate hemostasis. The local anesthetic cocktail was administered. 1 gram of vancomycin  powder was placed in the wound. The wound was closed in layers using #1 vicryl at the fascia, then 2-0 vicryl, 3-0 monocryl, 4-0 monocryl and Prineo Mesh+Dermabond. A sterile dressing was placed.     There were no complications or evidence of intraoperative fracture. All counts were correct.    The first-assistant was critical to all steps of the operation, including retraction during exposure and bone preparation, as well as the deep and superficial wound closure.  Dr. Gary performed and/or supervised the key portions of this surgical procedure, including evaluation of the bone cuts, reshaping of the bony elements, and insertion of the provisional and final components.    Post Op Plan:   The patient will be weightbearing as tolerated with a walker with no extremes of motion  DVT Prophylaxis: The patient will be anticoagulated with 81mg aspirin x1 dose @ 2100 on POD#0 then Eliquis 2.5mg BID x30 days starting 0900 POD#1    24 hours of IV antibiotics.    No celebrex  CBC tomorrow AM    Due patient and or surgical factors the patient will receive an Rx for cefadroxil 500mg BID x7 days after discharge per [place] data:   Piper MM, Smith JE, Caitlin MARTINEZ, Ricki STOKES. [facility] [place] Clinical Research Award: Extended Oral Antibiotics Prevent Periprosthetic Joint Infection in High-Risk Cases: 3855 Patients With 1-Year Follow-Up. J Arthroplasty. 2021 Jul;36(7S):S18-S25. doi: 10.1016/j.arth.2021.01.051. Epub 2021 Jan 23. PMID: [id number].        Signed by: Keon Gary III, MD

## 2023-04-27 PROBLEM — Z96.641 S/P TOTAL RIGHT HIP ARTHROPLASTY: Status: ACTIVE | Noted: 2023-04-27

## 2023-04-27 PROBLEM — Z96.651 S/P TOTAL KNEE ARTHROPLASTY, RIGHT: Status: ACTIVE | Noted: 2023-04-27

## 2023-04-27 PROBLEM — R33.9 URINARY RETENTION: Status: ACTIVE | Noted: 2023-04-27

## 2023-04-27 LAB
BASOPHILS # BLD AUTO: 0.04 K/UL (ref 0–0.2)
BASOPHILS # BLD AUTO: 0.05 K/UL (ref 0–0.2)
BASOPHILS # BLD AUTO: 0.08 K/UL (ref 0–0.2)
BASOPHILS NFR BLD: 0.3 % (ref 0–1.9)
BASOPHILS NFR BLD: 0.5 % (ref 0–1.9)
BASOPHILS NFR BLD: 0.7 % (ref 0–1.9)
DIFFERENTIAL METHOD: ABNORMAL
EOSINOPHIL # BLD AUTO: 0.1 K/UL (ref 0–0.5)
EOSINOPHIL # BLD AUTO: 0.1 K/UL (ref 0–0.5)
EOSINOPHIL # BLD AUTO: 0.2 K/UL (ref 0–0.5)
EOSINOPHIL NFR BLD: 0.3 % (ref 0–8)
EOSINOPHIL NFR BLD: 1.2 % (ref 0–8)
EOSINOPHIL NFR BLD: 1.5 % (ref 0–8)
ERYTHROCYTE [DISTWIDTH] IN BLOOD BY AUTOMATED COUNT: 18.6 % (ref 11.5–14.5)
ERYTHROCYTE [DISTWIDTH] IN BLOOD BY AUTOMATED COUNT: 21.8 % (ref 11.5–14.5)
ERYTHROCYTE [DISTWIDTH] IN BLOOD BY AUTOMATED COUNT: 22.1 % (ref 11.5–14.5)
GLUCOSE SERPL-MCNC: 105 MG/DL (ref 70–110)
HCO3 UR-SCNC: 27.6 MMOL/L (ref 24–28)
HCT VFR BLD AUTO: 21.1 % (ref 37–48.5)
HCT VFR BLD AUTO: 23.6 % (ref 37–48.5)
HCT VFR BLD AUTO: 24.6 % (ref 37–48.5)
HCT VFR BLD CALC: 26 %PCV (ref 36–54)
HGB BLD-MCNC: 6.9 G/DL (ref 12–16)
HGB BLD-MCNC: 7.6 G/DL (ref 12–16)
HGB BLD-MCNC: 8.4 G/DL (ref 12–16)
IMM GRANULOCYTES # BLD AUTO: 0.04 K/UL (ref 0–0.04)
IMM GRANULOCYTES # BLD AUTO: 0.08 K/UL (ref 0–0.04)
IMM GRANULOCYTES # BLD AUTO: 0.11 K/UL (ref 0–0.04)
IMM GRANULOCYTES NFR BLD AUTO: 0.4 % (ref 0–0.5)
IMM GRANULOCYTES NFR BLD AUTO: 0.7 % (ref 0–0.5)
IMM GRANULOCYTES NFR BLD AUTO: 0.7 % (ref 0–0.5)
LYMPHOCYTES # BLD AUTO: 2.2 K/UL (ref 1–4.8)
LYMPHOCYTES # BLD AUTO: 2.5 K/UL (ref 1–4.8)
LYMPHOCYTES # BLD AUTO: 2.6 K/UL (ref 1–4.8)
LYMPHOCYTES NFR BLD: 14.6 % (ref 18–48)
LYMPHOCYTES NFR BLD: 22.5 % (ref 18–48)
LYMPHOCYTES NFR BLD: 23.5 % (ref 18–48)
MCH RBC QN AUTO: 32.3 PG (ref 27–31)
MCH RBC QN AUTO: 33.1 PG (ref 27–31)
MCH RBC QN AUTO: 35.4 PG (ref 27–31)
MCHC RBC AUTO-ENTMCNC: 32.2 G/DL (ref 32–36)
MCHC RBC AUTO-ENTMCNC: 32.7 G/DL (ref 32–36)
MCHC RBC AUTO-ENTMCNC: 34.1 G/DL (ref 32–36)
MCV RBC AUTO: 100 FL (ref 82–98)
MCV RBC AUTO: 108 FL (ref 82–98)
MCV RBC AUTO: 97 FL (ref 82–98)
MONOCYTES # BLD AUTO: 1.2 K/UL (ref 0.3–1)
MONOCYTES # BLD AUTO: 1.5 K/UL (ref 0.3–1)
MONOCYTES # BLD AUTO: 1.7 K/UL (ref 0.3–1)
MONOCYTES NFR BLD: 11.1 % (ref 4–15)
MONOCYTES NFR BLD: 11.5 % (ref 4–15)
MONOCYTES NFR BLD: 12.4 % (ref 4–15)
NEUTROPHILS # BLD AUTO: 10.8 K/UL (ref 1.8–7.7)
NEUTROPHILS # BLD AUTO: 6.7 K/UL (ref 1.8–7.7)
NEUTROPHILS # BLD AUTO: 7.3 K/UL (ref 1.8–7.7)
NEUTROPHILS NFR BLD: 62.2 % (ref 38–73)
NEUTROPHILS NFR BLD: 62.9 % (ref 38–73)
NEUTROPHILS NFR BLD: 73 % (ref 38–73)
NRBC BLD-RTO: 0 /100 WBC
NRBC BLD-RTO: 0 /100 WBC
NRBC BLD-RTO: 1 /100 WBC
PCO2 BLDA: 65.5 MMHG (ref 35–45)
PH SMN: 7.23 [PH] (ref 7.35–7.45)
PLATELET # BLD AUTO: 364 K/UL (ref 150–450)
PLATELET # BLD AUTO: 388 K/UL (ref 150–450)
PLATELET # BLD AUTO: 444 K/UL (ref 150–450)
PMV BLD AUTO: 10 FL (ref 9.2–12.9)
PMV BLD AUTO: 10.4 FL (ref 9.2–12.9)
PMV BLD AUTO: 9.8 FL (ref 9.2–12.9)
PO2 BLDA: 87 MMHG (ref 40–60)
POC BE: 0 MMOL/L
POC IONIZED CALCIUM: 1.38 MMOL/L (ref 1.06–1.42)
POC SATURATED O2: 94 % (ref 95–100)
POC TCO2: 30 MMOL/L (ref 24–29)
POTASSIUM BLD-SCNC: 4.4 MMOL/L (ref 3.5–5.1)
RBC # BLD AUTO: 1.95 M/UL (ref 4–5.4)
RBC # BLD AUTO: 2.35 M/UL (ref 4–5.4)
RBC # BLD AUTO: 2.54 M/UL (ref 4–5.4)
SAMPLE: ABNORMAL
SODIUM BLD-SCNC: 138 MMOL/L (ref 136–145)
WBC # BLD AUTO: 10.58 K/UL (ref 3.9–12.7)
WBC # BLD AUTO: 11.74 K/UL (ref 3.9–12.7)
WBC # BLD AUTO: 14.82 K/UL (ref 3.9–12.7)

## 2023-04-27 PROCEDURE — P9040 RBC LEUKOREDUCED IRRADIATED: HCPCS | Performed by: ANESTHESIOLOGY

## 2023-04-27 PROCEDURE — 99499 UNLISTED E&M SERVICE: CPT | Mod: ,,, | Performed by: ANESTHESIOLOGY

## 2023-04-27 PROCEDURE — 97535 SELF CARE MNGMENT TRAINING: CPT | Mod: CO

## 2023-04-27 PROCEDURE — 25000003 PHARM REV CODE 250: Performed by: ORTHOPAEDIC SURGERY

## 2023-04-27 PROCEDURE — 85025 COMPLETE CBC W/AUTO DIFF WBC: CPT | Performed by: ORTHOPAEDIC SURGERY

## 2023-04-27 PROCEDURE — 97530 THERAPEUTIC ACTIVITIES: CPT

## 2023-04-27 PROCEDURE — 25000003 PHARM REV CODE 250: Performed by: STUDENT IN AN ORGANIZED HEALTH CARE EDUCATION/TRAINING PROGRAM

## 2023-04-27 PROCEDURE — 85025 COMPLETE CBC W/AUTO DIFF WBC: CPT | Mod: 91

## 2023-04-27 PROCEDURE — 25000003 PHARM REV CODE 250

## 2023-04-27 PROCEDURE — 11000001 HC ACUTE MED/SURG PRIVATE ROOM

## 2023-04-27 PROCEDURE — 97110 THERAPEUTIC EXERCISES: CPT

## 2023-04-27 PROCEDURE — 36415 COLL VENOUS BLD VENIPUNCTURE: CPT

## 2023-04-27 PROCEDURE — 99499 NO LOS: ICD-10-PCS | Mod: ,,, | Performed by: ANESTHESIOLOGY

## 2023-04-27 PROCEDURE — 36415 COLL VENOUS BLD VENIPUNCTURE: CPT | Performed by: STUDENT IN AN ORGANIZED HEALTH CARE EDUCATION/TRAINING PROGRAM

## 2023-04-27 PROCEDURE — 97116 GAIT TRAINING THERAPY: CPT

## 2023-04-27 PROCEDURE — 97530 THERAPEUTIC ACTIVITIES: CPT | Mod: CO

## 2023-04-27 PROCEDURE — 36415 COLL VENOUS BLD VENIPUNCTURE: CPT | Performed by: ORTHOPAEDIC SURGERY

## 2023-04-27 PROCEDURE — 63600175 PHARM REV CODE 636 W HCPCS: Performed by: STUDENT IN AN ORGANIZED HEALTH CARE EDUCATION/TRAINING PROGRAM

## 2023-04-27 PROCEDURE — 85025 COMPLETE CBC W/AUTO DIFF WBC: CPT | Mod: 91 | Performed by: STUDENT IN AN ORGANIZED HEALTH CARE EDUCATION/TRAINING PROGRAM

## 2023-04-27 RX ORDER — HYDROCODONE BITARTRATE AND ACETAMINOPHEN 500; 5 MG/1; MG/1
TABLET ORAL
Status: DISCONTINUED | OUTPATIENT
Start: 2023-04-27 | End: 2023-04-28 | Stop reason: HOSPADM

## 2023-04-27 RX ORDER — OXYCODONE HYDROCHLORIDE 5 MG/1
5 TABLET ORAL EVERY 4 HOURS PRN
Status: DISCONTINUED | OUTPATIENT
Start: 2023-04-27 | End: 2023-04-28 | Stop reason: HOSPADM

## 2023-04-27 RX ORDER — CEFADROXIL 500 MG/1
500 CAPSULE ORAL EVERY 12 HOURS
Qty: 20 CAPSULE | Refills: 0 | Status: SHIPPED | OUTPATIENT
Start: 2023-04-27 | End: 2023-05-08

## 2023-04-27 RX ADMIN — PANTOPRAZOLE SODIUM 40 MG: 40 TABLET, DELAYED RELEASE ORAL at 09:04

## 2023-04-27 RX ADMIN — PRAMIPEXOLE DIHYDROCHLORIDE 0.5 MG: 0.12 TABLET ORAL at 09:04

## 2023-04-27 RX ADMIN — METHOCARBAMOL 750 MG: 750 TABLET ORAL at 08:04

## 2023-04-27 RX ADMIN — SODIUM CHLORIDE 500 ML: 9 INJECTION, SOLUTION INTRAVENOUS at 08:04

## 2023-04-27 RX ADMIN — PRAMIPEXOLE DIHYDROCHLORIDE 0.5 MG: 0.12 TABLET ORAL at 08:04

## 2023-04-27 RX ADMIN — METHOCARBAMOL 750 MG: 750 TABLET ORAL at 03:04

## 2023-04-27 RX ADMIN — CEFADROXIL 500 MG: 500 CAPSULE ORAL at 09:04

## 2023-04-27 RX ADMIN — GABAPENTIN 600 MG: 300 CAPSULE ORAL at 08:04

## 2023-04-27 RX ADMIN — CEFAZOLIN 2 G: 2 INJECTION, POWDER, FOR SOLUTION INTRAMUSCULAR; INTRAVENOUS at 12:04

## 2023-04-27 RX ADMIN — OXYCODONE HYDROCHLORIDE 5 MG: 5 TABLET ORAL at 01:04

## 2023-04-27 RX ADMIN — ACETAMINOPHEN 1000 MG: 500 TABLET ORAL at 01:04

## 2023-04-27 RX ADMIN — OXYCODONE HYDROCHLORIDE 5 MG: 5 TABLET ORAL at 07:04

## 2023-04-27 RX ADMIN — ACETAMINOPHEN 1000 MG: 500 TABLET ORAL at 07:04

## 2023-04-27 RX ADMIN — METHOCARBAMOL 750 MG: 750 TABLET ORAL at 09:04

## 2023-04-27 RX ADMIN — ACETAMINOPHEN 1000 MG: 500 TABLET ORAL at 06:04

## 2023-04-27 RX ADMIN — APIXABAN 2.5 MG: 2.5 TABLET, FILM COATED ORAL at 08:04

## 2023-04-27 RX ADMIN — MUPIROCIN 1 G: 20 OINTMENT TOPICAL at 09:04

## 2023-04-27 RX ADMIN — POLYETHYLENE GLYCOL 3350 17 G: 17 POWDER, FOR SOLUTION ORAL at 08:04

## 2023-04-27 RX ADMIN — APIXABAN 2.5 MG: 2.5 TABLET, FILM COATED ORAL at 09:04

## 2023-04-27 RX ADMIN — GABAPENTIN 600 MG: 300 CAPSULE ORAL at 09:04

## 2023-04-27 RX ADMIN — SENNOSIDES AND DOCUSATE SODIUM 1 TABLET: 50; 8.6 TABLET ORAL at 08:04

## 2023-04-27 RX ADMIN — ACETAMINOPHEN 1000 MG: 500 TABLET ORAL at 12:04

## 2023-04-27 RX ADMIN — SENNOSIDES AND DOCUSATE SODIUM 1 TABLET: 50; 8.6 TABLET ORAL at 09:04

## 2023-04-27 RX ADMIN — VERAPAMIL HYDROCHLORIDE 120 MG: 120 TABLET, FILM COATED, EXTENDED RELEASE ORAL at 09:04

## 2023-04-27 RX ADMIN — PRAMIPEXOLE DIHYDROCHLORIDE 0.5 MG: 0.12 TABLET ORAL at 03:04

## 2023-04-27 NOTE — ADDENDUM NOTE
Addendum  created 04/27/23 1333 by Kunal Chow MD    Charge Capture section accepted, Cosign clinical note with attestation

## 2023-04-27 NOTE — ASSESSMENT & PLAN NOTE
82 y.o. female POD1 s/p right anterior NAT    Pain control: multimodal per APS. No celebrex.  PT/OT: WBAT with walker, 0-90 degree hip flexion, no extremes of motion  DVT PPx: ASA 81 mg once at 2100 POD0 and then eliquis 2.5mg bid x 30 days beginning POD1 at 900  Abx: postop Ancef followed by cefadroxil 500mg bid x 7 days  Labs: am cbc pending  Drain: none  Badillo: placed this morning. Urology consulted for POUR    Dispo: f/u PT recs. 1-2 night stay

## 2023-04-27 NOTE — ADDENDUM NOTE
Addendum  created 04/27/23 1523 by Michael Cordero MD    Order list changed, Pharmacy for encounter modified

## 2023-04-27 NOTE — PHYSICIAN QUERY
PT Name: Shelly Vásquez  MR #: [id number]    DOCUMENTATION CLARIFICATION      CDS/: Amparo Mayfield RN BSN              Contact information:[email or web address]    This form is a permanent document in the medical record.      Query Date: April 27, 2023    By submitting this query, we are merely seeking further clarification of documentation. Please utilize your independent clinical judgment when addressing the question(s) below.    The Medical Record contains the following:   Indicators  Supporting Clinical Findings Location in Medical Record   x Anemia documented Pre-existing Anemia  4/27/23 Ortho Progress Note   x H&H 4/21 hgb 8.7  hct 26.2  4/27 hgb 6.9  hct 21.1 4/21-4/27/23 Lab    BP                    HR      GI bleeding documented      Acute bleeding (Non GI site)     x Transfusion(s) 4/27 transfuse 1 unit RBC 4/27 Transfusion Summary   x Acute/Chronic illness Primary osteoarthritis Right hip 4/5/23 H&P View Only    Treatments     x Other 4/26 OP  Right Total Hip Arthroplasty, Anterior Approach   cc - proximal femur bleeding    4/27 Ortho PN  Pre-existing Anemia   1u PRBC transfused prior to surgery  acute post op blood loss, expected   4/26/23 Op Report        4/27/23 Progress Note     Provider, please clarify acute post op blood loss, expected :  [ x  ] Acute blood loss anemia expected post-operatively    [ x  ] Other Hematological Diagnosis (please specify): ___________this info is in my progress note attestation from 4/27/23______              Please document in your progress notes daily for the duration of treatment, until resolved, and include in your discharge summary.    Form No. [id number]

## 2023-04-27 NOTE — PT/OT/SLP PROGRESS
Physical Therapy  Treatment    Shelly Vásquez   [id number]    Time Tracking:     PT Received On: 04/27/23   PT Start Time: 1022   PT Stop Time: 1101   PT Total Time (min): 39 min    Billable Minutes: Gait Training 15, Therapeutic Activity 15, and Therapeutic Exercise 9 minutes       Recommendations:     Discharge recommendations:  Other (would benefit from post-acute PT upon D/C)     Equipment recommendations: None (already has walker, commode, shower chair, hip kit)    Barriers to Discharge: None (good family/caregiver support, elevator access into home)    Patient Information:     Recent Surgery: Procedure(s) (LRB):  ARTHROPLASTY, HIP, TOTAL, ANTERIOR APPROACH: RIGHT: DEPUY - C-STEM + PINNACLE (Right) 1 Day Post-Op    Diagnosis: S/P total right hip arthroplasty    Length of Stay: 1 days    General Precautions: Standard, fall  Orthopedic Precautions: RLE WBAT, RLE anterior hip precautions (avoid R hip extremes of motion, no R hip flex > 90 deg)  Brace: None    Assessment:     Shelly Vásquez tolerated treatment well today. She was resting reclined in chair with spouse, sitter (personal) present upon my entry to room, all very pleasant and agreeable to session. Reviewed orthopedic precautions with patient and family; weight-bearing as tolerated to RLE with anterior hip precautions (avoid extremes of motion, no hip flex > 90 deg on R). Ambulates 170 ft in hallways today on room air with rolling walker and stand-by assistance; utilizing a 3-pt gait pattern (walker, right foot, left foot) to offload R hip as needed with walker support. Reports pain improved (from 6/10 to 4/10) with ambulating, able to hold conversation while walking, no significant losses of balance. Participated in seated and reclined in chair therex x 10-15 reps without difficulty. I reviewed HEP handout (also included written ortho precautions for R hip) with pt and family to perform upon D/C to home, anticipate home health PT/OT. Discussed PT  "role, POC, and goals with patient and family; verbalized understanding. Shelly Vásquez will continue to benefit from acute PT services to promote mobility during this admission and improve return to PLOF.    Problem List: weakness, decreased endurance, impaired self-care skills, impaired mobility, decreased sitting or standing balance, gait instability, orthopedic precautions, impaired cardiopulmonary response to activity, and pain    Rehab Prognosis: Good; patient would benefit from acute skilled PT services to address these deficits and reach maximum level of function.    Plan:     Patient to be seen daily to address the above listed problems via gait training, therapeutic activities, therapeutic exercises, neuromuscular re-education    Plan of Care Expires: 05/26/23  Plan of Care reviewed with: patient, spouse, private sitter    Subjective:     Communicated with RN prior to treatment, appropriate to see for treatment.    Pt found reclined in bedside chair upon PT entry to room, spouse and sitter agreeable to treatment.    Patient commenting: "It feels good to get up and move."    Does this patient have any cultural, spiritual, Caodaism conflicts given the current situation? Patient/family has no barriers to learning. Patient/family verbalizes understanding of his/her program and goals and demonstrates them correctly. No cultural, spiritual, or educational needs identified.    Objective:     Patient found with: adams catheter    Pain:  Pain Rating 1: 6/10 at generalized R hip resting in chair; improved to "4/10" while walking  Pain Rating Post-Intervention 1: 6/10 (same, see above) post-ambulation in chair    Functional Mobility:    Bed Mobility:  NT; reclined in chair before and after session today    Transfers:  Sit to Stand: stand-by assistance from bedside chair with RW x 2 trials; cueing to push L hand from chair armrest, R hand on walker    Gait:  170 feet in hallways today on room air with rolling " walker and stand-by assistance; utilizing a 3-pt gait pattern (walker, right foot, left foot) to offload R hip as needed with walker support. Reports pain improved (from 6/10 to 4/10) with ambulating, able to hold conversation while walking, no significant losses of balance    Assist level: Stand-By Assist  Device: Rolling walker    Balance:  Static Sit: Independent at edge of chair    Static Stand: Supervision with Rolling walker    Additional Therapeutic Activity/Exercises:     1. She was resting reclined in chair with spouse, sitter (personal) present upon my entry to room, all very pleasant and agreeable to session. Reviewed orthopedic precautions with patient and family; weight-bearing as tolerated to RLE with anterior hip precautions (avoid extremes of motion, no hip flex > 90 deg on R).    2. Ambulates 170 ft in hallways today on room air with rolling walker and stand-by assistance; utilizing a 3-pt gait pattern (walker, right foot, left foot) to offload R hip as needed with walker support. Reports pain improved (from 6/10 to 4/10) with ambulating, able to hold conversation while walking, no significant losses of balance.    3. Participated in seated and reclined in chair therex x 10-15 reps without difficulty.   A. R LAQ x 10 reps   B. B ankle pumps x 15 reps (reclined in chair)   C. B quad sets x 15 reps (reclined in chair)    4. I reviewed HEP handout (also included written ortho precautions for R hip) with pt and family to perform upon D/C to home, anticipate home health PT/OT.    5. Discussed PT role, POC, and goals with patient and family; verbalized understanding.    AM-PAC 6 CLICK MOBILITY  Turning over in bed (including adjusting bedclothes, sheets and blankets)?: 3  Sitting down on and standing up from a chair with arms (e.g., wheelchair, bedside commode, etc.): 4  Moving from lying on back to sitting on the side of the bed?: 3  Moving to and from a bed to a chair (including a wheelchair)?: 3  Need  to walk in hospital room?: 3  Climbing 3-5 steps with a railing?: 3  Basic Mobility Total Score: 19    Patient was left reclined in bedside chair with all lines intact, call button in reach, RN,  notified, and spouse, sitter present.    GOALS:   Multidisciplinary Problems       Physical Therapy Goals          Problem: Physical Therapy    Goal Priority Disciplines Outcome Goal Variances Interventions   Physical Therapy Goal     PT, PT/OT Ongoing, Progressing     Description: Goals to be met by: 5/3/2023     Patient will increase functional independence with mobility by performin. Sit to stand transfer with Modified [place] with RW - Not met  2. Gait  x 250 feet with Supervision using RW or LRAD - Not met  3. Lower extremity exercise program x30 reps per handout, with independence - Not met                     Derek Zuleta, PT  2023

## 2023-04-27 NOTE — PLAN OF CARE
04/27/23 1215   Post-Acute Status   Post-Acute Authorization Home Health   Home Health Status Referrals Sent     SW faxed referral to [facility] via [facility] for review. ALIYA Peralta informed preference. SW will follow up as needed.    [facility] Accepted, pending HH orders.    Ava Simon LMSW  Case Management   [facility]   Ext. [phone]

## 2023-04-27 NOTE — ANESTHESIA POST-OP PAIN MANAGEMENT
Acute Pain Service and Perioperative Surgical Home Progress Note    HPI  Shelly Vásquez is a 82 y.o., female with HTN, GERD, pSVT, RLS, and myelodysplastic syndrome.     Surgery:  Procedure(s) (LRB):  ARTHROPLASTY, HIP, TOTAL, ANTERIOR APPROACH: RIGHT: DEPUY - C-STEM + PINNACLE (Right)    Post Op Day #: 1    Problem List:    Active Hospital Problems    Diagnosis  POA    *S/P total right hip arthroplasty [Z96.641]  Not Applicable    Urinary retention [R33.9]  Yes      Resolved Hospital Problems   No resolved problems to display.       Subjective:     General appearance of alert, oriented, no complaints   Pain with rest: 1    Numbers   Pain with movement: 4    Numbers   Side Effects    1. Pruritis No    2. Nausea No    3. Motor Blockade No, 0=Ability to raise lower extremities off bed    4. Sedation No, 1=awake and alert    Schedule Medications:    acetaminophen  1,000 mg Oral Q6H    apixaban  2.5 mg Oral BID    cefadroxil  500 mg Oral Q12H    gabapentin  600 mg Oral BID    methocarbamoL  750 mg Oral TID    mupirocin  1 g Nasal BID    pantoprazole  40 mg Oral QHS    polyethylene glycol  17 g Oral Daily    pramipexole  0.5 mg Oral TID    senna-docusate 8.6-50 mg  1 tablet Oral BID    verapamiL  120 mg Oral Nightly        Continuous Infusions:       PRN Medications:  diazePAM, naloxone, ondansetron, oxyCODONE, polyethylene glycol, prochlorperazine       Antibiotics:  Antibiotics (From admission, onward)    Start     Stop Route Frequency Ordered    04/27/23 2100  cefadroxil capsule 500 mg         -- Oral Every 12 hours 04/26/23 1344    04/26/23 2100  mupirocin 2 % ointment 1 g         05/01 2059 Nasl 2 times daily 04/26/23 1010             Objective:      Vital Signs (Most Recent):  Temp: 35.7 °C (96.3 °F) (04/27/23 0719)  Pulse: 76 (04/27/23 0719)  Resp: 18 (04/27/23 0719)  BP: (!) 95/54 (04/27/23 0719)  SpO2: 98 % (04/27/23 0719) Vital Signs Range (Last 24H):  Temp:  [35.7 °C (96.3 °F)-36.5 °C (97.7  °F)]   Pulse:  [52-79]   Resp:  [8-20]   BP: ()/(50-68)   SpO2:  [94 %-100 %]        I & O (Last 24H):    Intake/Output Summary (Last 24 hours) at 4/27/2023 1009  Last data filed at 4/27/2023 0653  Gross per 24 hour   Intake 900 ml   Output 1565 ml   Net -665 ml       Physical Exam:    GA: Alert, comfortable, no acute distress.   Pulmonary: Clear to auscultation A/P/L. No wheezing, crackles, or rhonchi.  Cardiac: RRR S1 & S2 w/o rubs/murmurs/gallops.   Abdominal:Bowel sounds present. No tenderness to palpation or distension. No appreciable hepatosplenomegaly.   Skin: No jaundice, rashes, or visible lesions.       Laboratory:  CBC:   Recent Labs     04/25/23  1320 04/26/23  0714 04/26/23  0925 04/27/23  0805   WBC 6.58  --   --  14.82*   RBC 2.31*  --   --  1.95*   HGB 8.2* 9.4*  --  6.9*   HCT 24.7*  --  26* 21.1*   *  --   --  444   *  --   --  108*   MCH 35.5*  --   --  35.4*   MCHC 33.2  --   --  32.7       BMP: No results for input(s): NA, K, CO2, CL, BUN, CREATININE, GLU, MG, PHOS, CALCIUM in the last 72 hours.    No results for input(s): PT, INR, PROTIME, APTT in the last 72 hours.    Anticoagulant dose apixaban at 2.5mg BID.    Assessment:    Pain control adequate. Patient c/o occasional muscle spasms, but pain otherwise well-controlled. Urinary retention -- Badillo is in place. Urology consulted. Hb 6.9 -- will transfuse 1U PRBC. OP Hematologist notified.     Plan:  1) Pain: Multimodal pain regiment with acetaminophen, Lyrica, methocarbamol and prn oxycodone given  Will continue to monitor.   2) GERD: Continue PPI  3) pSVT: Continue verapamil  4) MDS: Hb 6.9 this morning. 1U PRBC ordered. OP Hematologist notified and would like repeat CBC this evening to see if she needs another transfusion before discharge tomorrow.   5) Urinary retention post-op: Badillo in place. Urology consulted. Voiding trial tomorrow. If fails VT, will d/c home with Badillo and follow-up with Urology next week.  6)  FEN/GI: Advance diet as tolerated.   7) Dispo: Pt working well with PT/OT. Plan for D/C likely tomorrow.      Case discussed with staff, Dr. Chow. Final recommendations per attestation above.      Thank you for your consult and allowing us to participate in the care of this patient. We will continue to follow along. Please call the Acute Pain Service at [phone] or Anesthesia at [phone] with any questions.     Michael Cordero MD (PGY2/CA1)  Dept of Anesthesiology  [facility] [id number]

## 2023-04-27 NOTE — SUBJECTIVE & OBJECTIVE
"Principal Problem:S/P total right hip arthroplasty    Principal Orthopedic Problem: same    Interval History: Pt seen and examined at bedside. /54. POUR with in and out cath in PACU with 700 cc. Produced 100 cc urine with approximately 250 cc PVR overnight. Continued to be monitored. However this morning was unable to void and decision made to place adams and urology consulted.  She reports pain is controlled. Ambulated 3 ft with therapy yesterday. Plans to work with PT again  today.       Review of patient's allergies indicates:   Allergen Reactions    Baclofen      Incoherent     Nsaids (non-steroidal anti-inflammatory drug)      GI Bleed  Had 5 blood transfusions       Current Facility-Administered Medications   Medication    acetaminophen tablet 1,000 mg    apixaban tablet 2.5 mg    cefadroxil capsule 500 mg    diazePAM tablet 2 mg    gabapentin capsule 600 mg    methocarbamoL tablet 750 mg    morphine injection 2 mg    mupirocin 2 % ointment 1 g    naloxone 0.4 mg/mL injection 0.02 mg    ondansetron injection 4 mg    oxyCODONE immediate release tablet 5 mg    oxyCODONE immediate release tablet Tab 10 mg    pantoprazole EC tablet 40 mg    polyethylene glycol packet 17 g    polyethylene glycol packet 17 g    pramipexole tablet 0.5 mg    prochlorperazine injection Soln 5 mg    senna-docusate 8.6-50 mg per tablet 1 tablet    verapamiL CR tablet 120 mg     Objective:     Vital Signs (Most Recent):  Temp: 97.4 °F (36.3 °C) (04/27/23 0514)  Pulse: 79 (04/27/23 0514)  Resp: 18 (04/27/23 0514)  BP: (!) 107/54 (04/27/23 0514)  SpO2: 97 % (04/27/23 0514)   Vital Signs (24h Range):  Temp:  [97.3 °F (36.3 °C)-97.9 °F (36.6 °C)] 97.4 °F (36.3 °C)  Pulse:  [52-79] 79  Resp:  [8-20] 18  SpO2:  [94 %-100 %] 97 %  BP: ()/(50-68) 107/54     Weight: 69.9 kg (154 lb)  Height: 5' 8" (172.7 cm)  Body mass index is 23.42 kg/m².      Intake/Output Summary (Last 24 hours) at 4/27/2023 0649  Last data filed at 4/27/2023 " 0030  Gross per 24 hour   Intake 970 ml   Output 1090 ml   Net -120 ml       Ortho/SPM Exam    AAOx4  NAD  Reg rate  No increased WOB  Dressing c/d/i right hip  Ice pack in place  SILT T/SP/DP/Mitchell/Sa  Motor intact T/SP/DP  Good quad strength, quad fires, able to perform straight leg raise and perform quad set  WWP extremities  FCDs in place and functioning      Significant Labs: All pertinent labs within the past 24 hours have been reviewed.    Significant Imaging: I have reviewed and interpreted all pertinent imaging results/findings.

## 2023-04-27 NOTE — NURSING
Patient AAOx4, calm. VS stable, afebrile. Incision clean and dry. Bed at lowest point, side rails up x2. Personal sitter at bedside. Will continue to monitor.     2100-bladder scan reading 298mL in bladder. Patient will get up to commode to attempt to void.     2145-patient voided 100mL. Repeat bladder scan measures 231mL in bladder post void. Dr. Mcclellan notified, stated to repeat bladder scan in two hours.     0030-Bladder scan measures 397mL in bladder. Patient assisted to bedside commode to void.     0605-bladder scan greater than 500cc. MD on call notified, written order via direct message for angie

## 2023-04-27 NOTE — PLAN OF CARE
Patient AAOx4.VS stable, afrebrile. Pain managed. Neurovascular intact. Free from falls. Frequent rounds made for safety, pain and comfort. Bed at lowest position, call light within reach, side rails up x2.   Personal sitter at bedside.   Problem: Adult Inpatient Plan of Care  Goal: Plan of Care Review  Outcome: Ongoing, Progressing  Goal: Patient-Specific Goal (Individualized)  Outcome: Ongoing, Progressing     Problem: Pain Acute  Goal: Acceptable Pain Control and Functional Ability  Outcome: Ongoing, Progressing

## 2023-04-27 NOTE — CONSULTS
[facility] - [facility]  Urology  Consult Note    Patient Name: Shelly Vásquez  MRN: [id number]  Admission Date: 4/26/2023  Hospital Length of Stay: 1   Code Status: Prior   Attending Provider: Keon Gary III, MD   Consulting Provider: Elieser Delgado MD  Primary Care Physician: Marlene Andrew MD  Principal Problem:S/P total right hip arthroplasty    Inpatient consult to Urology  Consult performed by: Elieser Delgado MD  Consult ordered by: Baljit Mcclellan MD          Subjective:     HPI:  Shelly Vásquez is a 82 y.o. female POD1 s/p right anterior NAT. Urology consulted for urinary retention. Currently does have a Badillo catheter. She was straight cath'ed with return of 700cc post-op, on POD1 she urinated 100cc with PVR of 250cc. Patient did have the urge to urinate but no abdominal pain. Patient has not had urinary retention before.  History of no Urologic surgery. No history of diabetes. Cr 0.8. No constipation. Minimal narcotics. No anticholinergics.       Past Medical History:   Diagnosis Date    Arthritis     Atrial fibrillation     Basal cell cancer     on the face    Encounter for blood transfusion     Gastric ulcer     GERD (gastroesophageal reflux disease)     Herpes simplex without mention of complication     rare outbreaks    Postmenopausal HRT (hormone replacement therapy)     Supraventricular tachycardia     s/p AVNRT ablation (Dr Brady)       Past Surgical History:   Procedure Laterality Date    APPENDECTOMY  age 21    ARTHROPLASTY OF HIP BY ANTERIOR APPROACH Right 4/26/2023    Procedure: ARTHROPLASTY, HIP, TOTAL, ANTERIOR APPROACH: RIGHT: DEPUY - C-STEM + PINNACLE;  Surgeon: Keon Gary III, MD;  Location: [facility] OR [address] [place] [address];  Service: Orthopedics;  Laterality: Right;    BACK SURGERY      Beast Lift  2004    BONE MARROW BIOPSY Right 12/19/2019    Procedure: Biopsy-bone marrow;  Surgeon: Aidan Spring MD;  Location: [facility] OR [address] [place] [address];  Service: Oncology;  Laterality:  Right;    BREAST BIOPSY  50yrs ago    unable to see scar    COSMETIC SURGERY      DILATION AND CURETTAGE OF UTERUS  2008    PMB    ENDOMETRIAL BIOPSY      EPIDURAL STEROID INJECTION N/A 8/25/2022    Procedure: LUMBAR CELINA CONTRAST DIRECT REFERRAL;  Surgeon: Rj Hernandez MD;  Location: [facility], [place], operated by [facility];  Service: Pain Management;  Laterality: N/A;    EYE SURGERY      INJECTION OF ANESTHETIC AGENT AROUND NERVE Bilateral 11/11/2022    Procedure: BLOCK, NERVE BILATERAL L2,L3,L4 AND L5 MEDIAL BRANCH ONE OF TWO;  Surgeon: Rj Hernandez MD;  Location: [facility], [place], operated by [facility];  Service: Pain Management;  Laterality: Bilateral;    INJECTION OF JOINT Right 12/2/2022    Procedure: INJECTION, JOINT RIGHT INTRARTICULAR HIP CONTRAST;  Surgeon: Rj Hernandez MD;  Location: [facility], [place], operated by [facility];  Service: Pain Management;  Laterality: Right;    ptsosis      RADIOFREQUENCY ABLATION  03/2018    SMALL INTESTINE SURGERY      TONSILLECTOMY, ADENOIDECTOMY  age 8    TOTAL REDUCTION MAMMOPLASTY Bilateral 2003    TRANSFORAMINAL EPIDURAL INJECTION OF STEROID Right 8/11/2022    Procedure: Injection,steroid,epidural Right L1/2 TF DIrect Referral Instructed to provide intervention per MRI results;  Surgeon: Rj Hernandez MD;  Location: [facility], [place], operated by [facility];  Service: Pain Management;  Laterality: Right;    TRANSFORAMINAL EPIDURAL INJECTION OF STEROID Right 10/21/2022    Procedure: INJECTION, STEROID, EPIDURAL, TRANSFORAMINAL APPROACH, L5-S1 and S1, RIGHT CONTRAST;  Surgeon: Rj Hernandez MD;  Location: [facility], [place], operated by [facility];  Service: Pain Management;  Laterality: Right;    TUBAL LIGATION         Review of patient's allergies indicates:   Allergen Reactions    Baclofen      Incoherent     Nsaids (non-steroidal anti-inflammatory drug)      GI Bleed  Had 5 blood transfusions       Family History       Problem Relation (Age of Onset)    Cirrhosis Mother    No Known Problems Father, Brother, Maternal Aunt, Maternal Uncle, Paternal Aunt, Paternal  Uncle, Maternal Grandmother, Maternal Grandfather, Paternal Grandmother, Paternal Grandfather, Daughter    Pulmonary embolism Son            Tobacco Use    Smoking status: Former     Packs/day: 1.00     Years: 22.00     Pack years: 22.00     Types: Cigarettes     Quit date: 1980     Years since quittin.7    Smokeless tobacco: Never   Substance and Sexual Activity    Alcohol use: Yes     Alcohol/week: 2.0 standard drinks     Types: 2 Glasses of wine per week     Comment: 1-2 glasses of  some night    Drug use: No    Sexual activity: Not Currently     Partners: Male     Birth control/protection: Post-menopausal     Comment:  since        Review of Systems   Constitutional:  Negative for chills and fever.   HENT:  Negative for sore throat and trouble swallowing.    Eyes:  Negative for pain and discharge.   Respiratory:  Negative for shortness of breath and wheezing.    Cardiovascular:  Negative for chest pain and palpitations.   Gastrointestinal:  Negative for abdominal pain, diarrhea, nausea and vomiting.   Genitourinary:         As per HPI   Musculoskeletal:  Negative for back pain and joint swelling.   Skin:  Negative for rash and wound.   Neurological:  Negative for seizures, weakness and headaches.   Psychiatric/Behavioral:  Negative for hallucinations. The patient is not nervous/anxious.      Objective:     Temp:  [96.3 °F (35.7 °C)-97.7 °F (36.5 °C)] 96.3 °F (35.7 °C)  Pulse:  [52-79] 76  Resp:  [8-20] 18  SpO2:  [94 %-100 %] 98 %  BP: ()/(50-68) 95/54     Body mass index is 23.42 kg/m².      Bladder Scan Volume (mL): 389 mL (23 2100)    Drains       Drain  Duration                  Urethral Catheter 23 0610 <1 day                    Physical Exam  Vitals and nursing note reviewed.   Constitutional:       General: She is not in acute distress.  HENT:      Head: Atraumatic.      Nose: Nose normal.   Eyes:      Extraocular Movements: Extraocular movements intact.    Cardiovascular:      Rate and Rhythm: Normal rate.   Pulmonary:      Effort: Pulmonary effort is normal.   Abdominal:      General: Abdomen is flat. There is no distension.      Tenderness: There is no abdominal tenderness. There is no right CVA tenderness or left CVA tenderness.   Genitourinary:     Comments: 16Fr with c/y/u   Musculoskeletal:         General: Normal range of motion.      Cervical back: Normal range of motion.   Skin:     Coloration: Skin is not jaundiced.   Neurological:      General: No focal deficit present.      Mental Status: She is alert and oriented to person, place, and time.   Psychiatric:         Mood and Affect: Mood normal.         Behavior: Behavior normal.       Significant Labs:    BMP:  Recent Labs   Lab 04/21/23  1432      K 4.0      CO2 26   BUN 10   CREATININE 0.8   CALCIUM 9.8       CBC:  Recent Labs   Lab 04/21/23  1432 04/25/23  1320 04/26/23  0714 04/27/23  0805   WBC 6.04 6.58  --  14.82*   HGB 8.7* 8.2* 9.4* 6.9*   HCT 26.2* 24.7*  --  21.1*   * 491*  --  444       All pertinent labs results from the past 24 hours have been reviewed.    Significant Imaging:  All pertinent imaging results/findings from the past 24 hours have been reviewed.                      Assessment and Plan:     Urinary retention  Shelly Vásquez is a 82 y.o. female POD1 s/p right anterior NAT, now with POUR.     - Consult d/w Staff Urologist  - Strict I's/O's  - Trend Cr, avoid nephrotoxic agents, and dose medications to patient's current GFR  - Plan on voiding trial prior to d/c  - If the patient fails her VT, she will need her adams replaced and to f/u with urology in the outpt setting for a VT in 3-5 days  - Encourage ambulation in halls QID  - Bowel reg  - Avoid all anti-cholinergic medications   - All remaining care per primary team        VTE Risk Mitigation (From admission, onward)         Ordered     apixaban tablet 2.5 mg  2 times daily         04/26/23 6333                 Thank you for your consult. I will sign off. Please contact us if you have any additional questions.    Elieser Delgado MD  Urology  [facility] - Surgery

## 2023-04-27 NOTE — PROGRESS NOTES
[facility] - [facility]  Orthopedics  Progress Note    Patient Name: Shelly Vásquez  MRN: [id number]  Admission Date: 4/26/2023  Hospital Length of Stay: 1 days  Attending Provider: Keon Gary III, MD  Primary Care Provider: Marlene Andrew MD  Follow-up For: Procedure(s) (LRB):  ARTHROPLASTY, HIP, TOTAL, ANTERIOR APPROACH: RIGHT: DEPUY - C-STEM + PINNACLE (Right)    Post-Operative Day: 1 Day Post-Op  Subjective:     Principal Problem:S/P total right hip arthroplasty    Principal Orthopedic Problem: same    Interval History: Pt seen and examined at bedside. /54. POUR with in and out cath in PACU with 700 cc. Produced 100 cc urine with approximately 250 cc PVR overnight. Continued to be monitored. However this morning was unable to void and decision made to place adams and urology consulted.  She reports pain is controlled. Ambulated 3 ft with therapy yesterday. Plans to work with PT again  today.       Review of patient's allergies indicates:   Allergen Reactions    Baclofen      Incoherent     Nsaids (non-steroidal anti-inflammatory drug)      GI Bleed  Had 5 blood transfusions       Current Facility-Administered Medications   Medication    acetaminophen tablet 1,000 mg    apixaban tablet 2.5 mg    cefadroxil capsule 500 mg    diazePAM tablet 2 mg    gabapentin capsule 600 mg    methocarbamoL tablet 750 mg    morphine injection 2 mg    mupirocin 2 % ointment 1 g    naloxone 0.4 mg/mL injection 0.02 mg    ondansetron injection 4 mg    oxyCODONE immediate release tablet 5 mg    oxyCODONE immediate release tablet Tab 10 mg    pantoprazole EC tablet 40 mg    polyethylene glycol packet 17 g    polyethylene glycol packet 17 g    pramipexole tablet 0.5 mg    prochlorperazine injection Soln 5 mg    senna-docusate 8.6-50 mg per tablet 1 tablet    verapamiL CR tablet 120 mg     Objective:     Vital Signs (Most Recent):  Temp: 97.4 °F (36.3 °C) (04/27/23 0514)  Pulse: 79 (04/27/23 0514)  Resp:  "18 (04/27/23 0514)  BP: (!) 107/54 (04/27/23 0514)  SpO2: 97 % (04/27/23 0514)   Vital Signs (24h Range):  Temp:  [97.3 °F (36.3 °C)-97.9 °F (36.6 °C)] 97.4 °F (36.3 °C)  Pulse:  [52-79] 79  Resp:  [8-20] 18  SpO2:  [94 %-100 %] 97 %  BP: ()/(50-68) 107/54     Weight: 69.9 kg (154 lb)  Height: 5' 8" (172.7 cm)  Body mass index is 23.42 kg/m².      Intake/Output Summary (Last 24 hours) at 4/27/2023 0649  Last data filed at 4/27/2023 0030  Gross per 24 hour   Intake 970 ml   Output 1090 ml   Net -120 ml       Ortho/SPM Exam    AAOx4  NAD  Reg rate  No increased WOB  Dressing c/d/i right hip  Ice pack in place  SILT T/SP/DP/Mitchell/Sa  Motor intact T/SP/DP  Good quad strength, quad fires, able to perform straight leg raise and perform quad set  WWP extremities  FCDs in place and functioning      Significant Labs: All pertinent labs within the past 24 hours have been reviewed.    Significant Imaging: I have reviewed and interpreted all pertinent imaging results/findings.    Assessment/Plan:     * S/P total right hip arthroplasty  82 y.o. female POD1 s/p right anterior NAT    Pain control: multimodal per APS. No celebrex.  PT/OT: WBAT with walker, 0-90 degree hip flexion, no extremes of motion  DVT PPx: ASA 81 mg once at 2100 POD0 and then eliquis 2.5mg bid x 30 days beginning POD1 at 900  Abx: postop Ancef followed by cefadroxil 500mg bid x 7 days  Labs: am cbc pending  Drain: none  Badillo: placed this morning. Urology consulted for POUR    Dispo: f/u PT recs. 1-2 night stay            Baljit Mcclellan MD  Orthopedics  [facility] - Surgery  "

## 2023-04-27 NOTE — HPI
Shelly Vásquez is a 82 y.o. female POD1 s/p right anterior NAT. Urology consulted for urinary retention. Currently does have a Badillo catheter. She was straight cath'ed with return of 700cc post-op, on POD1 she urinated 100cc with PVR of 250cc. Patient did have the urge to urinate but no abdominal pain. Patient has not had urinary retention before.  History of no Urologic surgery. No history of diabetes. Cr 0.8. No constipation. Minimal narcotics. No anticholinergics.

## 2023-04-27 NOTE — ASSESSMENT & PLAN NOTE
Shelly Vásquez is a 82 y.o. female POD1 s/p right anterior NAT, now with POUR.     - Consult d/w Staff Urologist  - Strict I's/O's  - Trend Cr, avoid nephrotoxic agents, and dose medications to patient's current GFR  - Plan on voiding trial prior to d/c  - If the patient fails her VT, she will need her adams replaced and to f/u with urology in the outpt setting for a VT in 3-5 days  - Encourage ambulation in halls QID  - Bowel reg  - Avoid all anti-cholinergic medications   - All remaining care per primary team

## 2023-04-27 NOTE — PT/OT/SLP PROGRESS
Occupational Therapy   Treatment    Name: Shelly Vásquez  MRN: [id number]  Admitting Diagnosis:  S/P total right hip arthroplasty  1 Day Post-Op  Procedure(s):  ARTHROPLASTY, HIP, TOTAL, ANTERIOR APPROACH: RIGHT: DEPUY - C-STEM + PINNACLE   Recommendations:     Discharge Recommendations: home with home health  Discharge Equipment Recommendations:  none  Barriers to discharge:   (increased skilled assistance required)    Assessment:     Shelly Vásquez is a 82 y.o. female with a medical diagnosis of S/P total right hip arthroplasty.  She presents with the following performance deficits affecting function are weakness, impaired endurance, impaired self care skills, impaired functional mobility, gait instability, impaired balance, pain, decreased safety awareness, decreased lower extremity function, decreased ROM, orthopedic precautions. Patient agreeable to OT session and tolerated well. Patient is progressing well with OT intervention. Patient would benefit from continued OT services to address deficits and progress towards goals. Continue OT POC.    Rehab Prognosis:  Good; patient would benefit from acute skilled OT services to address these deficits and reach maximum level of function.       Plan:     Patient to be seen daily to address the above listed problems via self-care/home management, therapeutic activities, therapeutic exercises  Plan of Care Expires: 05/26/23  Plan of Care Reviewed with: patient, caregiver    Subjective     Chief Complaint: c/o pain with mobility    Pain/Comfort:  Pain Rating 1: 6/10  Location - Side 1: Right  Location - Orientation 1: generalized  Location 1: hip  Pain Addressed 1: Reposition, Distraction  Pain Rating Post-Intervention 1: 6/10    Objective:     Communicated with: nurse maxwell and OTR prior to session.  Patient found HOB elevated with peripheral IV, adams catheter, FCD upon OT entry to room.  A client care conference was completed by the OTR and the JJ prior to  treatment by the JJ to discuss the patient's POC and current status.    General Precautions: Standard, fall    Orthopedic Precautions:RLE weight bearing as tolerated, RLE anterior precautions (0-90deg hip flexion)  Braces: N/A  Respiratory Status: Room air     Occupational Performance:     Bed Mobility:    Patient completed Supine to Sit with contact guard assistance and min cues for sequencing      Functional Mobility/Transfers:  Patient completed Sit <> Stand Transfer with minimum assistance  with  rolling walker   Patient completed Bed > Chair Transfer using Step Transfer technique with contact guard assistance with rolling walker  Functional Mobility: within room ~40ft using RW with CGA, min cues for sequencing, improving safety insight, and RW management, increased time for mobility    Activities of Daily Living:  Grooming: contact guard assistance for decreased endurance to complete oral, hand, and facial hygiene standing sink side  Bathing: contact guard assistance for decreased endurance and LE weakness to complete abbreviated UB/LB sponge bath seated and standing  Upper Body Dressing: stand by assistance for donning x2 clean gowns EOB      AMPA 6 Click ADL: 19    Treatment & Education:  Education on OT POC, goals, and current progress  Re-educated on anterior hip precautions  ADL re-training as indicated above  Functional mobility/transfer training as indicated above  Addressed all patient questions/concerns within JJ scope of practice.     Patient left up in chair with all lines intact, call button in reach, nurse notified, and personal sitter present, and FCDs donned    GOALS:   Multidisciplinary Problems       Occupational Therapy Goals          Problem: Occupational Therapy    Goal Priority Disciplines Outcome Interventions   Occupational Therapy Goal     OT, PT/OT Ongoing, Progressing    Description: Goals to be met by: 5/10/2023     Patient will increase functional independence with ADLs by  performing:    UE Dressing with Modified [place].  LE Dressing with Modified [place].  Grooming while standing at sink with Modified [place].  Toileting from toilet with Modified [place] for hygiene and clothing management.   Step transfer with Modified [place]  Toilet transfer to toilet with Modified [place].                         Time Tracking:     OT Date of Treatment: 04/27/23  OT Start Time: 0843  OT Stop Time: 0929  OT Total Time (min): 46 min    Billable Minutes:Self Care/Home Management 30  Therapeutic Activity 16    OT/ANGELICA: ANGELICA     Number of ANGELICA visits since last OT visit: 1    4/27/2023

## 2023-04-27 NOTE — ADDENDUM NOTE
Addendum  created 04/27/23 1131 by Kunal Chow MD    Order list changed, Pharmacy for encounter modified

## 2023-04-27 NOTE — PLAN OF CARE
[facility] - Surgery  Initial Discharge Assessment       Primary Care Provider: Marlene Andrew MD    Admission Diagnosis: Primary osteoarthritis of right hip [M16.11]  Hip arthritis [M16.10]    Admission Date: 4/26/2023  Expected Discharge Date: 4/28/2023    Discharge Barriers Identified: None    Payor: MEDICARE / Plan: MEDICARE PART A & B / Product Type: Government /     Extended Emergency Contact Information  Primary Emergency Contact: Morgan Vásquez  Address:  [id number] [place] [address]  Home Phone: [phone]  Mobile Phone: [phone]  Relation: Spouse  Secondary Emergency Contact: [place]  Mobile Phone: [phone]  Relation: Daughter  Preferred language: English   needed? No    Discharge Plan A: Home with family, Home Health  Discharge Plan B: Home Health, Home with family      [id number] [address] [facility]  [address]  [facility] [address]  Phone: [phone] Fax: [phone]    [facility] [place] - [address]  [address]  [facility] [address]  Phone: [phone] Fax: [phone]    [facility] - [place], [place] - [address]  [address]  [address]  [place] [address]  Phone: [phone] Fax: [phone]    [address] - [place], [place] - [facility] [address] [place] [address] [facility] [address]  Phone: [phone] Fax: [phone]    [id number] [place] - [address] [facility]  [address] [facility] [place] [address]  Phone: [phone] Fax: [phone]      Initial Assessment (most recent)       Adult Discharge Assessment - 04/27/23 1046          Discharge Assessment    Assessment Type Discharge Planning Assessment     Confirmed/corrected address, phone number and insurance Yes     Confirmed Demographics  Correct on Facesheet     Source of Information patient;family   spouse    Communicated BRITTANY with patient/caregiver Yes     People in Home spouse     Do you expect to return to your current living situation? Yes     Do you have help at home or someone to help you manage your care at home? Yes     Who are your caregiver(s) and their phone number(s)? spouse     Prior to hospitilization cognitive status: Alert/Oriented     Current cognitive status: Alert/Oriented     Home Layout Bathroom on 2nd floor     Equipment Currently Used at Home walker, rolling;bedside commode;shower chair     Do you currently have service(s) that help you manage your care at home? No     Do you take prescription medications? Yes     Do you have prescription coverage? Yes     Do you have any problems affording any of your prescribed medications? No     Is the patient taking medications as prescribed? yes     How do you get to doctors appointments? car, drives self;family or friend will provide     Are you on dialysis? No     Do you take coumadin? No     Discharge Plan A Home with family;Home Health     Discharge Plan B Home Health;Home with family     DME Needed Upon Discharge  none     Discharge Plan discussed with: Patient;Spouse/sig other     Discharge Barriers Identified None        Physical Activity    On average, how many days per week do you engage in moderate to strenuous exercise (like a brisk walk)? 3 days     On average, how many minutes do you engage in exercise at this level? 40 min        Financial Resource Strain    How hard is it for you to pay for the very basics like food, housing, medical care, and heating? Not hard at all        Housing Stability    In the last 12 months, was there a time when you were not able to pay the mortgage or rent on time? No     In the last 12 months, was there a time when you did not have a steady place to sleep or slept in a shelter (including now)? No        Transportation Needs    In the past 12  months, has lack of transportation kept you from medical appointments or from getting medications? No        Food Insecurity    Within the past 12 months, you worried that your food would run out before you got the money to buy more. Never true     Within the past 12 months, the food you bought just didn't last and you didn't have money to get more. Never true        Stress    Do you feel stress - tense, restless, nervous, or anxious, or unable to sleep at night because your mind is troubled all the time - these days? Not at all        Social Connections    In a typical week, how many times do you talk on the phone with family, friends, or neighbors? More than three times a week     How often do you get together with friends or relatives? More than three times a week     How often do you attend Baptist or Yazidi services? More than 4 times per year     Do you belong to any clubs or organizations such as Baptist groups, unions, fraternal or athletic groups, or school groups? Yes     How often do you attend meetings of the clubs or organizations you belong to? Never     Are you , , , , never , or living with a partner?         Alcohol Use    Q1: How often do you have a drink containing alcohol? 4 or more times a week     Q2: How many drinks containing alcohol do you have on a typical day when you are drinking? 1 or 2     Q3: How often do you have six or more drinks on one occasion? Never                   Patient lives with spouse in a  two story home with her bed/bath on second floor with elevator. Patient primary caregiver is spouse. She is amenable to therapy recommendations for  services. Patient spouse to provide transportation home.

## 2023-04-27 NOTE — SUBJECTIVE & OBJECTIVE
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)       Past Surgical History:   Procedure Laterality Date    APPENDECTOMY  age 21    ARTHROPLASTY OF HIP BY ANTERIOR APPROACH Right 4/26/2023    Procedure: ARTHROPLASTY, HIP, TOTAL, ANTERIOR APPROACH: RIGHT: DEPUY - C-STEM + PINNACLE;  Surgeon: Keon Gary III, MD;  Location: [facility] OR [address] [place] [address];  Service: Orthopedics;  Laterality: Right;    BACK SURGERY      Beast Lift  2004    BONE MARROW BIOPSY Right 12/19/2019    Procedure: Biopsy-bone marrow;  Surgeon: Aidan Spring MD;  Location: [facility] OR [address] [place] [address];  Service: Oncology;  Laterality: Right;    BREAST BIOPSY  50yrs ago    unable to see scar    COSMETIC SURGERY      DILATION AND CURETTAGE OF UTERUS  2008    PMB    ENDOMETRIAL BIOPSY      EPIDURAL STEROID INJECTION N/A 8/25/2022    Procedure: LUMBAR CELINA CONTRAST DIRECT REFERRAL;  Surgeon: Rj Hernandez MD;  Location: [facility] [place];  Service: Pain Management;  Laterality: N/A;    EYE SURGERY      INJECTION OF ANESTHETIC AGENT AROUND NERVE Bilateral 11/11/2022    Procedure: BLOCK, NERVE BILATERAL L2,L3,L4 AND L5 MEDIAL BRANCH ONE OF TWO;  Surgeon: Rj Hernandez MD;  Location: [facility] [place];  Service: Pain Management;  Laterality: Bilateral;    INJECTION OF JOINT Right 12/2/2022    Procedure: INJECTION, JOINT RIGHT INTRARTICULAR HIP CONTRAST;  Surgeon: Rj Hernandez MD;  Location: [facility] [place];  Service: Pain Management;  Laterality: Right;    ptsosis      RADIOFREQUENCY ABLATION  03/2018    SMALL INTESTINE SURGERY      TONSILLECTOMY, ADENOIDECTOMY  age 8    TOTAL REDUCTION MAMMOPLASTY Bilateral 2003     TRANSFORAMINAL EPIDURAL INJECTION OF STEROID Right 2022    Procedure: Injection,steroid,epidural Right L1/2 TF DIrect Referral Instructed to provide intervention per MRI results;  Surgeon: Rj Hernandez MD;  Location: [facility] PAIN T;  Service: Pain Management;  Laterality: Right;    TRANSFORAMINAL EPIDURAL INJECTION OF STEROID Right 10/21/2022    Procedure: INJECTION, STEROID, EPIDURAL, TRANSFORAMINAL APPROACH, L5-S1 and S1, RIGHT CONTRAST;  Surgeon: Rj Hernandez MD;  Location: [facility];  Service: Pain Management;  Laterality: Right;    TUBAL LIGATION         Review of patient's allergies indicates:   Allergen Reactions    Baclofen      Incoherent     Nsaids (non-steroidal anti-inflammatory drug)      GI Bleed  Had 5 blood transfusions       Family History       Problem Relation (Age of Onset)    Cirrhosis Mother    No Known Problems Father, Brother, Maternal Aunt, Maternal Uncle, Paternal Aunt, Paternal Uncle, Maternal Grandmother, Maternal Grandfather, Paternal Grandmother, Paternal Grandfather, Daughter    Pulmonary embolism Son            Tobacco Use    Smoking status: Former     Packs/day: 1.00     Years: 22.00     Pack years: 22.00     Types: Cigarettes     Quit date: 1980     Years since quittin.7    Smokeless tobacco: Never   Substance and Sexual Activity    Alcohol use: Yes     Alcohol/week: 2.0 standard drinks     Types: 2 Glasses of wine per week     Comment: 1-2 glasses of  some night    Drug use: No    Sexual activity: Not Currently     Partners: Male     Birth control/protection: Post-menopausal     Comment:  since        Review of Systems   Constitutional:  Negative for chills and fever.   HENT:  Negative for sore throat and trouble swallowing.    Eyes:  Negative for pain and discharge.   Respiratory:  Negative for shortness of breath and wheezing.    Cardiovascular:  Negative for chest pain and palpitations.   Gastrointestinal:  Negative for abdominal pain,  diarrhea, nausea and vomiting.   Genitourinary:         As per HPI   Musculoskeletal:  Negative for back pain and joint swelling.   Skin:  Negative for rash and wound.   Neurological:  Negative for seizures, weakness and headaches.   Psychiatric/Behavioral:  Negative for hallucinations. The patient is not nervous/anxious.      Objective:     Temp:  [96.3 °F (35.7 °C)-97.7 °F (36.5 °C)] 96.3 °F (35.7 °C)  Pulse:  [52-79] 76  Resp:  [8-20] 18  SpO2:  [94 %-100 %] 98 %  BP: ()/(50-68) 95/54     Body mass index is 23.42 kg/m².      Bladder Scan Volume (mL): 389 mL (04/26/23 2100)    Drains       Drain  Duration                  Urethral Catheter 04/27/23 0610 <1 day                    Physical Exam  Vitals and nursing note reviewed.   Constitutional:       General: She is not in acute distress.  HENT:      Head: Atraumatic.      Nose: Nose normal.   Eyes:      Extraocular Movements: Extraocular movements intact.   Cardiovascular:      Rate and Rhythm: Normal rate.   Pulmonary:      Effort: Pulmonary effort is normal.   Abdominal:      General: Abdomen is flat. There is no distension.      Tenderness: There is no abdominal tenderness. There is no right CVA tenderness or left CVA tenderness.   Genitourinary:     Comments: 16Fr with c/y/u   Musculoskeletal:         General: Normal range of motion.      Cervical back: Normal range of motion.   Skin:     Coloration: Skin is not jaundiced.   Neurological:      General: No focal deficit present.      Mental Status: She is alert and oriented to person, place, and time.   Psychiatric:         Mood and Affect: Mood normal.         Behavior: Behavior normal.       Significant Labs:    BMP:  Recent Labs   Lab 04/21/23  1432      K 4.0      CO2 26   BUN 10   CREATININE 0.8   CALCIUM 9.8       CBC:  Recent Labs   Lab 04/21/23  1432 04/25/23  1320 04/26/23  0714 04/27/23  0805   WBC 6.04 6.58  --  14.82*   HGB 8.7* 8.2* 9.4* 6.9*   HCT 26.2* 24.7*  --  21.1*   PLT  498* 491*  --  444       All pertinent labs results from the past 24 hours have been reviewed.    Significant Imaging:  All pertinent imaging results/findings from the past 24 hours have been reviewed.

## 2023-04-28 ENCOUNTER — PATIENT MESSAGE (OUTPATIENT)
Dept: ORTHOPEDICS | Facility: CLINIC | Age: 83
End: 2023-04-28
Payer: MEDICARE

## 2023-04-28 VITALS
RESPIRATION RATE: 18 BRPM | DIASTOLIC BLOOD PRESSURE: 56 MMHG | HEART RATE: 75 BPM | OXYGEN SATURATION: 97 % | BODY MASS INDEX: 23.34 KG/M2 | SYSTOLIC BLOOD PRESSURE: 109 MMHG | WEIGHT: 154 LBS | HEIGHT: 68 IN | TEMPERATURE: 97 F

## 2023-04-28 LAB
ANION GAP SERPL CALC-SCNC: 5 MMOL/L (ref 8–16)
BUN SERPL-MCNC: 12 MG/DL (ref 8–23)
CALCIUM SERPL-MCNC: 8.9 MG/DL (ref 8.7–10.5)
CHLORIDE SERPL-SCNC: 105 MMOL/L (ref 95–110)
CO2 SERPL-SCNC: 22 MMOL/L (ref 23–29)
CREAT SERPL-MCNC: 0.8 MG/DL (ref 0.5–1.4)
EST. GFR  (NO RACE VARIABLE): >60 ML/MIN/1.73 M^2
GLUCOSE SERPL-MCNC: 92 MG/DL (ref 70–110)
HGB BLD-MCNC: 8 G/DL (ref 12–16)
POTASSIUM SERPL-SCNC: 5.1 MMOL/L (ref 3.5–5.1)
SODIUM SERPL-SCNC: 132 MMOL/L (ref 136–145)

## 2023-04-28 PROCEDURE — 36415 COLL VENOUS BLD VENIPUNCTURE: CPT | Performed by: ANESTHESIOLOGY

## 2023-04-28 PROCEDURE — 80048 BASIC METABOLIC PNL TOTAL CA: CPT | Performed by: ANESTHESIOLOGY

## 2023-04-28 PROCEDURE — 36415 COLL VENOUS BLD VENIPUNCTURE: CPT | Performed by: STUDENT IN AN ORGANIZED HEALTH CARE EDUCATION/TRAINING PROGRAM

## 2023-04-28 PROCEDURE — 97116 GAIT TRAINING THERAPY: CPT | Mod: CQ

## 2023-04-28 PROCEDURE — 97535 SELF CARE MNGMENT TRAINING: CPT | Mod: CO

## 2023-04-28 PROCEDURE — 25000003 PHARM REV CODE 250: Performed by: STUDENT IN AN ORGANIZED HEALTH CARE EDUCATION/TRAINING PROGRAM

## 2023-04-28 PROCEDURE — 25000003 PHARM REV CODE 250

## 2023-04-28 PROCEDURE — 99499 NO LOS: ICD-10-PCS | Mod: ,,, | Performed by: ANESTHESIOLOGY

## 2023-04-28 PROCEDURE — 85018 HEMOGLOBIN: CPT | Performed by: STUDENT IN AN ORGANIZED HEALTH CARE EDUCATION/TRAINING PROGRAM

## 2023-04-28 PROCEDURE — 97530 THERAPEUTIC ACTIVITIES: CPT | Mod: CQ

## 2023-04-28 PROCEDURE — 99499 UNLISTED E&M SERVICE: CPT | Mod: ,,, | Performed by: ANESTHESIOLOGY

## 2023-04-28 RX ORDER — IRON,CARBONYL/ASCORBIC ACID 65MG-125MG
1 TABLET, DELAYED RELEASE (ENTERIC COATED) ORAL DAILY
COMMUNITY
Start: 2023-04-28 | End: 2023-05-28

## 2023-04-28 RX ADMIN — APIXABAN 2.5 MG: 2.5 TABLET, FILM COATED ORAL at 09:04

## 2023-04-28 RX ADMIN — PRAMIPEXOLE DIHYDROCHLORIDE 0.5 MG: 0.12 TABLET ORAL at 03:04

## 2023-04-28 RX ADMIN — OXYCODONE HYDROCHLORIDE 5 MG: 5 TABLET ORAL at 01:04

## 2023-04-28 RX ADMIN — METHOCARBAMOL 750 MG: 750 TABLET ORAL at 03:04

## 2023-04-28 RX ADMIN — ACETAMINOPHEN 1000 MG: 500 TABLET ORAL at 12:04

## 2023-04-28 RX ADMIN — CEFADROXIL 500 MG: 500 CAPSULE ORAL at 09:04

## 2023-04-28 RX ADMIN — PRAMIPEXOLE DIHYDROCHLORIDE 0.5 MG: 0.12 TABLET ORAL at 09:04

## 2023-04-28 RX ADMIN — ACETAMINOPHEN 1000 MG: 500 TABLET ORAL at 01:04

## 2023-04-28 RX ADMIN — POLYETHYLENE GLYCOL 3350 17 G: 17 POWDER, FOR SOLUTION ORAL at 09:04

## 2023-04-28 RX ADMIN — ACETAMINOPHEN 1000 MG: 500 TABLET ORAL at 05:04

## 2023-04-28 RX ADMIN — METHOCARBAMOL 750 MG: 750 TABLET ORAL at 09:04

## 2023-04-28 RX ADMIN — GABAPENTIN 600 MG: 300 CAPSULE ORAL at 09:04

## 2023-04-28 NOTE — PT/OT/SLP PROGRESS
"Physical Therapy Treatment    Patient Name:  Shelly Vásquez   MRN:  [id number]    Recommendations:     Discharge Recommendations: home health PT, home health OT  Discharge Equipment Recommendations: none  Barriers to discharge: None    Assessment:     Shelly Vásquez is a 82 y.o. female admitted with a medical diagnosis of S/P total right hip arthroplasty.  She presents with the following impairments/functional limitations: weakness, impaired endurance, impaired self care skills, impaired functional mobility, impaired balance, gait instability, decreased safety awareness, decreased lower extremity function, decreased ROM, pain, orthopedic precautions. Pt was seen sitting in the chair and required min A with STS using RW. Pt tolerated ascending/descending 6" curb using RW @ min A-CGA for 2 trials. Pt will cont to benefit from skilled acute PT until d/c to improve endurance, strength and mobility, to return to PLOF.    Rehab Prognosis: Good; patient would benefit from acute skilled PT services to address these deficits and reach maximum level of function.    Recent Surgery: Procedure(s) (LRB):  ARTHROPLASTY, HIP, TOTAL, ANTERIOR APPROACH: RIGHT: DEPUY - C-STEM + PINNACLE (Right) 2 Days Post-Op    Plan:     During this hospitalization, patient to be seen daily to address the identified rehab impairments via gait training, therapeutic activities, therapeutic exercises, neuromuscular re-education and progress toward the following goals:    Plan of Care Expires:  05/26/23    Subjective     Chief Complaint: None  Patient/Family Comments/goals: "Thank you so much"  Pain/Comfort:  Pain Rating 1: 7/10  Location - Side 1: Right  Location - Orientation 1: generalized  Location 1: hip  Pain Addressed 1: Reposition, Distraction      Objective:     Communicated with nurse prior to session.  Patient found up in chair with   upon PT entry to room.     General Precautions: Standard, fall  Orthopedic Precautions: RLE weight " "bearing as tolerated, RLE anterior precautions  Braces: N/A  Respiratory Status: Room air     Functional Mobility:  Transfers:     Sit to Stand:  minimum assistance with rolling walker  Gait: 98 ft using RW SBA decreased step length, decreased renate, 3-point gait pattern, steady, and no LOB  Balance: Good standing balance with RW @ SBA  Stairs:  Pt ascended/descended 6" curb step with Rolling Walker with no handrails with Contact Guard Assistance and Minimal Assistance.       AM-PAC 6 CLICK MOBILITY  Turning over in bed (including adjusting bedclothes, sheets and blankets)?: 3  Sitting down on and standing up from a chair with arms (e.g., wheelchair, bedside commode, etc.): 3  Moving from lying on back to sitting on the side of the bed?: 3  Moving to and from a bed to a chair (including a wheelchair)?: 3  Need to walk in hospital room?: 4  Climbing 3-5 steps with a railing?: 4  Basic Mobility Total Score: 20       Treatment & Education:  Pt was educated on correct sequence to ascend/descend 6" curb using RW for safety as per nurse  Pt was educated on progress towards PT goals.  Pt was educated on importance to perform exercises in bed and recall anterior precautions with activities with RLE.    Patient left up in chair with all lines intact, call button in reach, nurse notified, and  and caregiver present..    GOALS:   Multidisciplinary Problems       Physical Therapy Goals          Problem: Physical Therapy    Goal Priority Disciplines Outcome Goal Variances Interventions   Physical Therapy Goal     PT, PT/OT Ongoing, Progressing     Description: Goals to be met by: 5/3/2023     Patient will increase functional independence with mobility by performin. Sit to stand transfer with Modified [place] with RW - Not met  2. Gait  x 250 feet with Supervision using RW or LRAD - Not met  3. Lower extremity exercise program x30 reps per handout, with independence - Not met                       Time " Tracking:     PT Received On: 04/28/23  PT Start Time: 1030     PT Stop Time: 1053  PT Total Time (min): 23 min     Billable Minutes: Gait Training 15 and Therapeutic Activity 8    Treatment Type: Treatment  PT/PTA: PTA     Number of PTA visits since last PT visit: 1 04/28/2023

## 2023-04-28 NOTE — ASSESSMENT & PLAN NOTE
82 y.o. female POD2 s/p right anterior NAT    Pain control: multimodal per APS. No celebrex.  PT/OT: WBAT with walker, 0-90 degree hip flexion, no extremes of motion  DVT PPx:  eliquis 2.5mg bid x 30 days  Abx: postop Ancef followed by cefadroxil 500mg bid x 7 days. Also will remain on abx while adams is in  O2 sats decreased at 92-93 this am. No symptoms. Recommend continued IS.   Labs: Hgb 8  Drain: none  Adams: in place. Urology recs adams removal and voiding trial prior to discharge    Dispo: Pending stability of hgb, adams removal and voiding trial

## 2023-04-28 NOTE — ADDENDUM NOTE
Addendum  created 04/28/23 0854 by Kunal Chow MD    Order list changed, Pharmacy for encounter modified

## 2023-04-28 NOTE — SUBJECTIVE & OBJECTIVE
"Principal Problem:S/P total right hip arthroplasty    Principal Orthopedic Problem: same    Interval History: Pt seen and examined at bedside. /56. O2 Sats low at 92 and 93% on last vitals. Patient denies any SOB, light headedness. Feels well with minimal pain in hip. Urology recs adams removal and voiding trial prior to discharge. Remains on abx while adams is in. Hgb stable at 8 this morning after 1U pRBC yesterday. Ambulated 170 ft with PT yesterday.      Review of patient's allergies indicates:   Allergen Reactions    Baclofen      Incoherent     Nsaids (non-steroidal anti-inflammatory drug)      GI Bleed  Had 5 blood transfusions       Current Facility-Administered Medications   Medication    0.9%  NaCl infusion (for blood administration)    acetaminophen tablet 1,000 mg    apixaban tablet 2.5 mg    cefadroxil capsule 500 mg    diazePAM tablet 2 mg    gabapentin capsule 600 mg    methocarbamoL tablet 750 mg    mupirocin 2 % ointment 1 g    naloxone 0.4 mg/mL injection 0.02 mg    ondansetron injection 4 mg    oxyCODONE immediate release tablet 5 mg    pantoprazole EC tablet 40 mg    polyethylene glycol packet 17 g    polyethylene glycol packet 17 g    pramipexole tablet 0.5 mg    prochlorperazine injection Soln 5 mg    senna-docusate 8.6-50 mg per tablet 1 tablet    verapamiL CR tablet 120 mg     Objective:     Vital Signs (Most Recent):  Temp: 97.8 °F (36.6 °C) (04/28/23 0350)  Pulse: 71 (04/28/23 0350)  Resp: 16 (04/28/23 0350)  BP: (!) 118/56 (04/28/23 0350)  SpO2: (!) 93 % (04/28/23 0350)   Vital Signs (24h Range):  Temp:  [96.3 °F (35.7 °C)-98.8 °F (37.1 °C)] 97.8 °F (36.6 °C)  Pulse:  [71-88] 71  Resp:  [16-18] 16  SpO2:  [92 %-98 %] 93 %  BP: ()/(52-60) 118/56     Weight: 69.9 kg (154 lb)  Height: 5' 8" (172.7 cm)  Body mass index is 23.42 kg/m².      Intake/Output Summary (Last 24 hours) at 4/28/2023 0612  Last data filed at 4/27/2023 2250  Gross per 24 hour   Intake 471.25 ml   Output 1925 " ml   Net -1453.75 ml         Ortho/SPM Exam    AAOx4  NAD  Reg rate  No increased WOB  Dressing c/d/i right hip  Ice pack in place  [place] in place with leg bag  SILT T/SP/DP/Mitchell/Sa  Motor intact T/SP/DP  Good quad strength, quad fires, able to perform straight leg raise and perform quad set  WWP extremities  FCDs in place and functioning      Significant Labs: All pertinent labs within the past 24 hours have been reviewed.    Significant Imaging: I have reviewed and interpreted all pertinent imaging results/findings.

## 2023-04-28 NOTE — PROGRESS NOTES
[facility] - [facility]  Orthopedics  Progress Note    Patient Name: Shelly Vásquez  MRN: [id number]  Admission Date: 4/26/2023  Hospital Length of Stay: 2 days  Attending Provider: Keon Gary III, MD  Primary Care Provider: Marlene Andrew MD  Follow-up For: Procedure(s) (LRB):  ARTHROPLASTY, HIP, TOTAL, ANTERIOR APPROACH: RIGHT: DEPUY - C-STEM + PINNACLE (Right)    Post-Operative Day: 2 Days Post-Op  Subjective:     Principal Problem:S/P total right hip arthroplasty    Principal Orthopedic Problem: same    Interval History: Pt seen and examined at bedside. /56. O2 Sats low at 92 and 93% on last vitals. Patient denies any SOB, light headedness. Feels well with minimal pain in hip. Urology recs adams removal and voiding trial prior to discharge. Remains on abx while adams is in. Hgb stable at 8 this morning after 1U pRBC yesterday. Ambulated 170 ft with PT yesterday.      Review of patient's allergies indicates:   Allergen Reactions    Baclofen      Incoherent     Nsaids (non-steroidal anti-inflammatory drug)      GI Bleed  Had 5 blood transfusions       Current Facility-Administered Medications   Medication    0.9%  NaCl infusion (for blood administration)    acetaminophen tablet 1,000 mg    apixaban tablet 2.5 mg    cefadroxil capsule 500 mg    diazePAM tablet 2 mg    gabapentin capsule 600 mg    methocarbamoL tablet 750 mg    mupirocin 2 % ointment 1 g    naloxone 0.4 mg/mL injection 0.02 mg    ondansetron injection 4 mg    oxyCODONE immediate release tablet 5 mg    pantoprazole EC tablet 40 mg    polyethylene glycol packet 17 g    polyethylene glycol packet 17 g    pramipexole tablet 0.5 mg    prochlorperazine injection Soln 5 mg    senna-docusate 8.6-50 mg per tablet 1 tablet    verapamiL CR tablet 120 mg     Objective:     Vital Signs (Most Recent):  Temp: 97.8 °F (36.6 °C) (04/28/23 0350)  Pulse: 71 (04/28/23 0350)  Resp: 16 (04/28/23 0350)  BP: (!) 118/56 (04/28/23 0350)  SpO2:  "(!) 93 % (04/28/23 0350)   Vital Signs (24h Range):  Temp:  [96.3 °F (35.7 °C)-98.8 °F (37.1 °C)] 97.8 °F (36.6 °C)  Pulse:  [71-88] 71  Resp:  [16-18] 16  SpO2:  [92 %-98 %] 93 %  BP: ()/(52-60) 118/56     Weight: 69.9 kg (154 lb)  Height: 5' 8" (172.7 cm)  Body mass index is 23.42 kg/m².      Intake/Output Summary (Last 24 hours) at 4/28/2023 0612  Last data filed at 4/27/2023 2250  Gross per 24 hour   Intake 471.25 ml   Output 1925 ml   Net -1453.75 ml         Ortho/SPM Exam    AAOx4  NAD  Reg rate  No increased WOB  Dressing c/d/i right hip  Ice pack in place  Adams in place with leg bag  SILT T/SP/DP/Mitchell/Sa  Motor intact T/SP/DP  Good quad strength, quad fires, able to perform straight leg raise and perform quad set  WWP extremities  FCDs in place and functioning      Significant Labs: All pertinent labs within the past 24 hours have been reviewed.    Significant Imaging: I have reviewed and interpreted all pertinent imaging results/findings.    Assessment/Plan:     * S/P total right hip arthroplasty  82 y.o. female POD2 s/p right anterior NAT    Pain control: multimodal per APS. No celebrex.  PT/OT: WBAT with walker, 0-90 degree hip flexion, no extremes of motion  DVT PPx:  eliquis 2.5mg bid x 30 days  Abx: postop Ancef followed by cefadroxil 500mg bid x 7 days. Also will remain on abx while adams is in  O2 sats decreased at 92-93 this am. No symptoms. Recommend continued IS.   Labs: Hgb 8  Drain: none  Adams: in place. Urology recs adams removal and voiding trial prior to discharge    Dispo: Pending stability of hgb, adams removal and voiding trial            Baljit Mcclellan MD  Orthopedics  [facility] - Surgery  "

## 2023-04-28 NOTE — PT/OT/SLP PROGRESS
Occupational Therapy   Treatment    Name: Shelly Vásquez  MRN: [id number]  Admitting Diagnosis:  S/P total right hip arthroplasty  2 Days Post-Op  Procedure(s):  ARTHROPLASTY, HIP, TOTAL, ANTERIOR APPROACH: RIGHT: DEPUY - C-STEM + PINNACLE   Recommendations:     Discharge Recommendations: home with home health  Discharge Equipment Recommendations:  none  Barriers to discharge:   (increased skilled assistance required)    Assessment:     Shelly Vásquez is a 82 y.o. female with a medical diagnosis of S/P total right hip arthroplasty.  She presents with the following performance deficits affecting function are weakness, impaired endurance, impaired self care skills, impaired functional mobility, impaired balance, gait instability, decreased lower extremity function, decreased ROM, orthopedic precautions, pain, decreased safety awareness. Patient progressing well with OT intervention. Continue OT POC    Rehab Prognosis:  Good; patient would benefit from acute skilled OT services to address these deficits and reach maximum level of function.       Plan:     Patient to be seen daily to address the above listed problems via self-care/home management, therapeutic activities, therapeutic exercises  Plan of Care Expires: 05/26/23  Plan of Care Reviewed with: patient, spouse, caregiver    Subjective     Chief Complaint: c/o pain    Pain/Comfort:  Pain Rating 1: 6/10  Location - Side 1: Right  Location - Orientation 1: generalized  Location 1: hip  Pain Addressed 1: Reposition, Distraction  Pain Rating Post-Intervention 1: 6/10    Objective:     Communicated with: nurse maxwell prior to session.  Patient found  sitting on bedside couch  with  (no active lines) upon OT entry to room.    General Precautions: Standard, fall    Orthopedic Precautions:RLE weight bearing as tolerated, RLE anterior precautions (0-90deg hip flexion)  Braces: N/A  Respiratory Status: Room air     Occupational Performance:     Bed Mobility:    Did  not assess. Patient received OOB    Functional Mobility/Transfers:  Patient completed Sit <> Stand Transfer with contact guard assistance  with  rolling walker   Functional Mobility: within room environment ~44ft using RW with SBA intermittent CGA    Activities of Daily Living:  Grooming: stand by assistance for decreased endurance to complete oral and facial hygiene; make-up application standing sink side  Upper Body Dressing: modified independence seated to don dress  Lower Body Dressing: contact guard assistance for decreased ROM to don underwear. Patient instructed on use of AE to (A) with adherence to ortho precautions during LB dressing. Patient receptive.      [facility] 6 Click ADL: 21    Treatment & Education:  Education on OT POC, goals, and current progress  Education on AE (reacher) for LB dressing   Re-educated on anterior hip precautions  ADL re-training as indicated above  Functional mobility/transfer training as indicated above  Addressed all patient questions/concerns within JJ scope of practice.    Patient left  seated on bedside couch  with call button in reach, nurse notified, and  and personal sitter present    GOALS:   Multidisciplinary Problems       Occupational Therapy Goals          Problem: Occupational Therapy    Goal Priority Disciplines Outcome Interventions   Occupational Therapy Goal     OT, PT/OT Ongoing, Progressing    Description: Goals to be met by: 5/10/2023     Patient will increase functional independence with ADLs by performing:    UE Dressing with Modified [place].  LE Dressing with Modified [place].  Grooming while standing at sink with Modified [place].  Toileting from toilet with Modified [place] for hygiene and clothing management.   Step transfer with Modified [place]  Toilet transfer to toilet with Modified [place].                         Time Tracking:     OT Date of Treatment: 04/28/23  OT Start Time: 0945  OT Stop Time: 1016  OT  Total Time (min): 31 min    Billable Minutes:Self Care/Home Management 31    OT/ANGELICA: ANGELICA     Number of ANGELICA visits since last OT visit: 2    4/28/2023

## 2023-04-28 NOTE — ANESTHESIA POST-OP PAIN MANAGEMENT
Acute Pain Service and Perioperative Surgical Home Progress Note    HPI  Shelly Vásquez is a 82 y.o., female with HTN, GERD, pSVT, RLS, and myelodysplastic syndrome.      Surgery:  Procedure(s) (LRB):  ARTHROPLASTY, HIP, TOTAL, ANTERIOR APPROACH: RIGHT: DEPUY - C-STEM + PINNACLE (Right)     Post Op Day #: 2    Interval history  Patient doing well this AM. Hb stable after unit transfusion yesterday. VT after breakfast.     Problem List:    Active Hospital Problems    Diagnosis  POA    *S/P total right hip arthroplasty [Z96.641]  Not Applicable    Urinary retention [R33.9]  Yes      Resolved Hospital Problems   No resolved problems to display.       Subjective:       General appearance of alert, oriented, no complaints   Pain with rest: 1    Numbers   Pain with movement: 4    Numbers   Side Effects    1. Pruritis No    2. Nausea No    3. Motor Blockade No, 0=Ability to raise lower extremities off bed    4. Sedation No, 1=awake and alert    Schedule Medications:    acetaminophen  1,000 mg Oral Q6H    apixaban  2.5 mg Oral BID    cefadroxil  500 mg Oral Q12H    gabapentin  600 mg Oral BID    methocarbamoL  750 mg Oral TID    mupirocin  1 g Nasal BID    pantoprazole  40 mg Oral QHS    polyethylene glycol  17 g Oral Daily    pramipexole  0.5 mg Oral TID    senna-docusate 8.6-50 mg  1 tablet Oral BID    verapamiL  120 mg Oral Nightly        Continuous Infusions:       PRN Medications:  sodium chloride, diazePAM, naloxone, ondansetron, oxyCODONE, polyethylene glycol, prochlorperazine       Antibiotics:  Antibiotics (From admission, onward)    Start     Stop Route Frequency Ordered    04/27/23 2100  cefadroxil capsule 500 mg         -- Oral Every 12 hours 04/26/23 1344    04/26/23 2100  mupirocin 2 % ointment 1 g         05/01 2059 Nasl 2 times daily 04/26/23 1010             Objective:    Vital Signs (Most Recent):  Temp: 36.3 °C (97.4 °F) (04/28/23 0815)  Pulse: 69 (04/28/23 0815)  Resp: 16 (04/28/23  0815)  BP: (!) 101/58 (04/28/23 0815)  SpO2: 95 % (04/28/23 0815) Vital Signs Range (Last 24H):  Temp:  [36.3 °C (97.4 °F)-37.1 °C (98.8 °F)]   Pulse:  [69-88]   Resp:  [16-18]   BP: ()/(52-60)   SpO2:  [92 %-98 %]          I & O (Last 24H):    Intake/Output Summary (Last 24 hours) at 4/28/2023 0912  Last data filed at 4/28/2023 0350  Gross per 24 hour   Intake 471.25 ml   Output 3100 ml   Net -2628.75 ml       Physical Exam:    GA: Alert, comfortable, no acute distress.   Pulmonary: Clear to auscultation A/P/L. No wheezing, crackles, or rhonchi.  Cardiac: RRR S1 & S2 w/o rubs/murmurs/gallops.   Abdominal:Bowel sounds present. No tenderness to palpation or distension. No appreciable hepatosplenomegaly.   Skin: No jaundice, rashes, or visible lesions.         Laboratory:  CBC:   Recent Labs     04/27/23  1603 04/27/23  1818 04/28/23  0345   WBC 10.58 11.74  --    RBC 2.35* 2.54*  --    HGB 7.6* 8.4* 8.0*   HCT 23.6* 24.6*  --     364  --    * 97  --    MCH 32.3* 33.1*  --    MCHC 32.2 34.1  --        BMP: No results for input(s): NA, K, CO2, CL, BUN, CREATININE, GLU, MG, PHOS, CALCIUM in the last 72 hours.    No results for input(s): PT, INR, PROTIME, APTT in the last 72 hours.      Anticoagulant dose apixaban at 2.5mg BID.    Assessment:    Pain control adequate. Hb stable. Plan to d/c home today with HH. Voiding trial this morning -- if does not pass, will d/c home with [place] and Urology follow-up next week.    Plan:  1) Pain: Multimodal pain regiment with acetaminophen, Lyrica, methocarbamol and prn oxycodone given  Will continue to monitor.   2) GERD: Continue PPI  3) pSVT: Continue verapamil  4) MDS: Hb stable this morning. Follow-up scheduled with OP Hematologist 5/15/23.  5) Urinary retention post-op: Badillo in place. Voiding trial this morning. If fails VT, will d/c home with [place] and follow-up with Urology next week.  6) FEN/GI: Advance diet as tolerated.   7) Dispo: Pt working well with  PT/OT. Plan for D/C today.        Case discussed with staff, Dr. Chow. Final recommendations per attestation above.      Thank you for your consult and allowing us to participate in the care of this patient. We will continue to follow along while the patient is admitted. Please call the Acute Pain Service at [phone] or Anesthesia at [phone] with any questions.     Michael Cordero MD (PGY2/CA1)  Dept of Anesthesiology  [facility] [place] [facility] [place] [id number]

## 2023-04-28 NOTE — PROGRESS NOTES
Discharged discussed with patient and family at the bedside. Ivs removed. Patient brought down for discharge via wheelchair and transportation.

## 2023-04-28 NOTE — ADDENDUM NOTE
Addendum  created 04/28/23 1156 by Kunal Chow MD    Charge Capture section accepted, Cosign clinical note with attestation

## 2023-04-29 ENCOUNTER — TELEPHONE (OUTPATIENT)
Dept: INTERNAL MEDICINE | Facility: CLINIC | Age: 83
End: 2023-04-29
Payer: MEDICARE

## 2023-04-29 LAB
BLD PROD TYP BPU: NORMAL
BLOOD UNIT EXPIRATION DATE: NORMAL
BLOOD UNIT TYPE CODE: 6200
BLOOD UNIT TYPE: NORMAL
CODING SYSTEM: NORMAL
CROSSMATCH INTERPRETATION: NORMAL
DISPENSE STATUS: NORMAL
NUM UNITS TRANS PACKED RBC: NORMAL

## 2023-04-29 PROCEDURE — G0180 PR HOME HEALTH MD CERTIFICATION: ICD-10-PCS | Mod: ,,, | Performed by: INTERNAL MEDICINE

## 2023-04-29 PROCEDURE — G0180 MD CERTIFICATION HHA PATIENT: HCPCS | Mod: ,,, | Performed by: INTERNAL MEDICINE

## 2023-04-29 RX ORDER — NALOXEGOL OXALATE 25 MG/1
25 TABLET, FILM COATED ORAL DAILY
Qty: 30 TABLET | Refills: 0 | Status: SHIPPED | OUTPATIENT
Start: 2023-04-29 | End: 2023-08-15

## 2023-04-29 RX ORDER — GABAPENTIN 600 MG/1
600 TABLET ORAL NIGHTLY
COMMUNITY
End: 2023-10-12

## 2023-04-29 RX ORDER — PRAMIPEXOLE DIHYDROCHLORIDE 0.5 MG/1
0.5 TABLET ORAL NIGHTLY
COMMUNITY
End: 2023-08-15 | Stop reason: SDUPTHER

## 2023-04-30 ENCOUNTER — EXTERNAL CHRONIC CARE MANAGEMENT (OUTPATIENT)
Dept: PRIMARY CARE CLINIC | Facility: CLINIC | Age: 83
End: 2023-04-30
Payer: MEDICARE

## 2023-04-30 PROCEDURE — 99439 CHRNC CARE MGMT STAF EA ADDL: CPT | Mod: S$PBB,,, | Performed by: INTERNAL MEDICINE

## 2023-04-30 PROCEDURE — 99439 PR CHRONIC CARE MGMT, EA ADDTL 20 MIN: ICD-10-PCS | Mod: S$PBB,,, | Performed by: INTERNAL MEDICINE

## 2023-04-30 PROCEDURE — 99490 CHRNC CARE MGMT STAFF 1ST 20: CPT | Mod: S$PBB,,, | Performed by: INTERNAL MEDICINE

## 2023-04-30 PROCEDURE — 99490 PR CHRONIC CARE MGMT, 1ST 20 MIN: ICD-10-PCS | Mod: S$PBB,,, | Performed by: INTERNAL MEDICINE

## 2023-04-30 PROCEDURE — 99439 CHRNC CARE MGMT STAF EA ADDL: CPT | Mod: PBBFAC,27 | Performed by: INTERNAL MEDICINE

## 2023-04-30 PROCEDURE — 99490 CHRNC CARE MGMT STAFF 1ST 20: CPT | Mod: PBBFAC,25 | Performed by: INTERNAL MEDICINE

## 2023-05-01 ENCOUNTER — TELEPHONE (OUTPATIENT)
Dept: ORTHOPEDICS | Facility: CLINIC | Age: 83
End: 2023-05-01
Payer: MEDICARE

## 2023-05-01 NOTE — TELEPHONE ENCOUNTER
Virtual visit scheduled for 1300 with pt, at 1310 pt had not yet logged on.  Attempted to reach by phone. No answer; left detailed voicemail with my direct call back number [phone]

## 2023-05-02 NOTE — DISCHARGE SUMMARY
[facility] - [facility]  Orthopedics  Discharge Summary      Patient Name: Shelly Vásquez  MRN: [id number]  Admission Date: 4/26/2023  Hospital Length of Stay: 2 days  Discharge Date and Time: 4/28/2023  4:33 PM  Attending Physician: No att. providers found   Discharging Provider: Theodora Mcclellan MD  Primary Care Provider: Marlene Andrew MD    HPI: CC:  Right hip pain     Shelly Vásquez is a 82 y.o. female with Right hip pain.  Pain is worse with activity and weight bearing.  Patient has experienced interference of activities of daily living due to increased pain and decreased range of motion. Patient has failed non-operative treatment including NSAIDs, as well as greater than 3 months of activity modification. Shelly Vásquez ambulates independently.      Relevant medical conditions of significance in perioperative period:  HTN- on medication managed by pcp  GERD- on medication managed by pcp    Procedure(s) (LRB):  ARTHROPLASTY, HIP, TOTAL, ANTERIOR APPROACH: RIGHT: DEPUY - C-STEM + PINNACLE (Right)      Hospital Course: Patient presented for above procedure.  Tolerated it well and was discharged home POD2 after voiding, tolerating diet, ambulating, pain controlled.  Discharge instructions, follow-up appointment, and med rec are below.      Consults (From admission, onward)          Status Ordering Provider     Inpatient consult to Urology  Once        Provider:  (Not yet assigned)    Completed THEODORA MCCLELLAN            Significant Diagnostic Studies: Labs: All labs within the past 24 hours have been reviewed    Pending Diagnostic Studies:       Procedure Component Value Units Date/Time    Specimen to Pathology, Surgery Orthopedics [[id number]] Collected: 04/26/23 0952    Order Status: Sent Lab Status: In process Updated: 04/27/23 2155    Specimen: Tissue           Final Active Diagnoses:    Diagnosis Date Noted POA    PRINCIPAL PROBLEM:  S/P total right hip arthroplasty [Z96.641] 04/27/2023 Not  Applicable    Urinary retention [R33.9] 04/27/2023 Yes      Problems Resolved During this Admission:      Discharged Condition: good    Disposition: Home or Self Care    Follow Up:    Patient Instructions:   No discharge procedures on file.  Medications:  Reconciled Home Medications:      Medication List        START taking these medications      acetaminophen 650 MG Tbsr  Commonly known as: TYLENOL  Take 1 tablet (650 mg total) by mouth every 8 (eight) hours.  Replaces: acetaminophen 325 MG tablet     cefadroxil 500 MG Cap  Commonly known as: DURICEF  Take 1 capsule (500 mg total) by mouth every 12 (twelve) hours. for 10 days     docusate sodium 100 MG capsule  Commonly known as: COLACE  Take 1 capsule (100 mg total) by mouth 2 (two) times daily as needed.     ELIQUIS 2.5 mg Tab  Generic drug: apixaban  Take 1 tablet (2.5 mg total) by mouth 2 (two) times daily.     methocarbamoL 750 MG Tab  Commonly known as: ROBAXIN  Take 1 tablet (750 mg total) by mouth 4 (four) times daily as needed (as needed for muscle spasms).     oxyCODONE 5 MG immediate release tablet  Commonly known as: ROXICODONE  Take 1-2 tabs every 4-6 hours as needed for pain     VITRON-C 65 mg iron- 125 mg Tbec  Generic drug: iron,carbonyl-vitamin C  Take 1 tablet by mouth once daily.            CHANGE how you take these medications      aspirin 81 MG EC tablet  Commonly known as: ECOTRIN  Take 1 tablet (81 mg total) by mouth once daily.  Start taking on: May 26, 2023  What changed: These instructions start on May 26, 2023. If you are unsure what to do until then, ask your doctor or other care provider.     estradiol-norethindrone acet 0.5-0.1 mg per tablet  Take 1 tablet by mouth once daily.  Start taking on: May 10, 2023  What changed: These instructions start on May 10, 2023. If you are unsure what to do until then, ask your doctor or other care provider.     pantoprazole 40 MG tablet  Commonly known as: PROTONIX  Take 1 tablet (40 mg total) by  mouth once daily.  What changed: when to take this            CONTINUE taking these medications      diazePAM 2 MG tablet  Commonly known as: VALIUM  Take 1 tablet (2 mg total) by mouth every 8 (eight) hours as needed for Insomnia (RLS).     MIRALAX ORAL  Take by mouth once daily.     ofloxacin 0.3 % ophthalmic solution  Commonly known as: OCUFLOX  Place 1 drop into the right eye 3 (three) times daily.     prednisoLONE acetate 1 % Drps  Commonly known as: PRED FORTE  Place 1 drop into the right eye 3 (three) times daily.     verapamiL 120 MG C24p  Commonly known as: VERELAN  Take 1 capsule (120 mg total) by mouth once daily.     vitamin D 1000 units Tab  Commonly known as: VITAMIN D3  Take 1,000 Units by mouth once daily.            STOP taking these medications      acetaminophen 325 MG tablet  Commonly known as: TYLENOL  Replaced by: acetaminophen 650 MG Tbsr     ibuprofen 200 MG tablet  Commonly known as: ADVILMOTRIN     TYLENOL PM ORAL              Baljit Mcclellan MD  Orthopedics  [facility] - Surgery

## 2023-05-03 ENCOUNTER — TELEPHONE (OUTPATIENT)
Dept: INTERNAL MEDICINE | Facility: CLINIC | Age: 83
End: 2023-05-03
Payer: MEDICARE

## 2023-05-03 NOTE — TELEPHONE ENCOUNTER
José     See ariela's message:    Non urgent question  hyun Rosario , I need help scheduling.   We want to go to [place] for Mothers Day weekend.  So , I need to change 2 appointments   I want to see Dr. Yolanda SINGLETARY on the 10th and the [facility] on the 16 th. Can you make this happen ?????  I am having good recovery and want to spend some time with the great grandchildren .  Thanks, Ariela     It sounds like she will leave town on the 11th and would like the PA appt pushed up earlier in the week     She also has hem/onc and lab work that Monday BUT maybe we push that back as that is the day after mothers day .   Can you help josé?

## 2023-05-04 ENCOUNTER — PATIENT MESSAGE (OUTPATIENT)
Dept: ORTHOPEDICS | Facility: CLINIC | Age: 83
End: 2023-05-04
Payer: MEDICARE

## 2023-05-08 ENCOUNTER — PATIENT MESSAGE (OUTPATIENT)
Dept: ADMINISTRATIVE | Facility: OTHER | Age: 83
End: 2023-05-08
Payer: MEDICARE

## 2023-05-10 ENCOUNTER — OFFICE VISIT (OUTPATIENT)
Dept: ORTHOPEDICS | Facility: CLINIC | Age: 83
End: 2023-05-10
Payer: MEDICARE

## 2023-05-10 ENCOUNTER — PATIENT MESSAGE (OUTPATIENT)
Dept: ORTHOPEDICS | Facility: CLINIC | Age: 83
End: 2023-05-10

## 2023-05-10 VITALS
DIASTOLIC BLOOD PRESSURE: 64 MMHG | HEIGHT: 68 IN | BODY MASS INDEX: 23.64 KG/M2 | WEIGHT: 156 LBS | HEART RATE: 70 BPM | SYSTOLIC BLOOD PRESSURE: 114 MMHG

## 2023-05-10 DIAGNOSIS — Z96.641 STATUS POST RIGHT HIP REPLACEMENT: Primary | ICD-10-CM

## 2023-05-10 LAB
COMMENT: NORMAL
FINAL PATHOLOGIC DIAGNOSIS: NORMAL
Lab: NORMAL

## 2023-05-10 PROCEDURE — 99999 PR PBB SHADOW E&M-EST. PATIENT-LVL III: CPT | Mod: PBBFAC,,, | Performed by: PHYSICIAN ASSISTANT

## 2023-05-10 PROCEDURE — 99024 POSTOP FOLLOW-UP VISIT: CPT | Mod: POP,,, | Performed by: PHYSICIAN ASSISTANT

## 2023-05-10 PROCEDURE — 99999 PR PBB SHADOW E&M-EST. PATIENT-LVL III: ICD-10-PCS | Mod: PBBFAC,,, | Performed by: PHYSICIAN ASSISTANT

## 2023-05-10 PROCEDURE — 99024 PR POST-OP FOLLOW-UP VISIT: ICD-10-PCS | Mod: POP,,, | Performed by: PHYSICIAN ASSISTANT

## 2023-05-10 PROCEDURE — 99213 OFFICE O/P EST LOW 20 MIN: CPT | Mod: PBBFAC | Performed by: PHYSICIAN ASSISTANT

## 2023-05-10 RX ORDER — METHOCARBAMOL 750 MG/1
750 TABLET, FILM COATED ORAL 4 TIMES DAILY PRN
Qty: 40 TABLET | Refills: 0 | Status: SHIPPED | OUTPATIENT
Start: 2023-05-10 | End: 2023-05-19 | Stop reason: SDUPTHER

## 2023-05-10 NOTE — PROGRESS NOTES
"Shelly Vásquez presents for initial post-operative visit following a right total hip arthroplasty performed by Dr. Gary on 04/26/2023.  She is progressing very well ambulating with only a cane for minimal support has discontinued pain medications completely uses occasional muscle relaxers    Exam:  Blood pressure 114/64, pulse 70, height 5' 8" (1.727 m), weight 70.8 kg (155 lb 15.6 oz).   Patient is ambulating well with cane for minimal support   Incision is clean and dry without drainage or erythema.      Initial post-operative radiographs reviewed today revealing satisfactory position of the prosthesis.    A/P  2 weeks s/p right total hip replacement    - Patient advised to keep the incision clean and dry for the next 24 hours after which she  may wash the area with antibacterial soap in the shower, but will not submerge incision until one month post op.  - Antibiotic prophylaxis reviewed  - Continue ASA for 1 month post op  - Pain medication discontinued  - Follow up in 4 weeks with surgeon. Pt will call clinic immediately with problems/concerns.      "

## 2023-05-12 ENCOUNTER — PES CALL (OUTPATIENT)
Dept: ADMINISTRATIVE | Facility: CLINIC | Age: 83
End: 2023-05-12
Payer: MEDICARE

## 2023-05-15 ENCOUNTER — PES CALL (OUTPATIENT)
Dept: ADMINISTRATIVE | Facility: CLINIC | Age: 83
End: 2023-05-15
Payer: MEDICARE

## 2023-05-16 ENCOUNTER — OFFICE VISIT (OUTPATIENT)
Dept: HEMATOLOGY/ONCOLOGY | Facility: CLINIC | Age: 83
End: 2023-05-16
Payer: MEDICARE

## 2023-05-16 ENCOUNTER — LAB VISIT (OUTPATIENT)
Dept: LAB | Facility: HOSPITAL | Age: 83
End: 2023-05-16
Attending: INTERNAL MEDICINE
Payer: MEDICARE

## 2023-05-16 VITALS
OXYGEN SATURATION: 100 % | SYSTOLIC BLOOD PRESSURE: 110 MMHG | HEIGHT: 68 IN | BODY MASS INDEX: 23.17 KG/M2 | WEIGHT: 152.88 LBS | TEMPERATURE: 98 F | RESPIRATION RATE: 16 BRPM | DIASTOLIC BLOOD PRESSURE: 56 MMHG | HEART RATE: 76 BPM

## 2023-05-16 DIAGNOSIS — D46.9 MDS (MYELODYSPLASTIC SYNDROME): Primary | ICD-10-CM

## 2023-05-16 DIAGNOSIS — D46.1 REFRACTORY ANEMIA WITH RING SIDEROBLASTS: ICD-10-CM

## 2023-05-16 DIAGNOSIS — D46.9 MDS (MYELODYSPLASTIC SYNDROME): ICD-10-CM

## 2023-05-16 DIAGNOSIS — D53.9 MACROCYTIC ANEMIA: ICD-10-CM

## 2023-05-16 LAB
ABO + RH BLD: NORMAL
ALBUMIN SERPL BCP-MCNC: 3.9 G/DL (ref 3.5–5.2)
ALP SERPL-CCNC: 49 U/L (ref 55–135)
ALT SERPL W/O P-5'-P-CCNC: 13 U/L (ref 10–44)
ANION GAP SERPL CALC-SCNC: 10 MMOL/L (ref 8–16)
AST SERPL-CCNC: 15 U/L (ref 10–40)
BASOPHILS # BLD AUTO: 0.08 K/UL (ref 0–0.2)
BASOPHILS NFR BLD: 1.3 % (ref 0–1.9)
BILIRUB SERPL-MCNC: 0.9 MG/DL (ref 0.1–1)
BLD GP AB SCN CELLS X3 SERPL QL: NORMAL
BUN SERPL-MCNC: 15 MG/DL (ref 8–23)
CALCIUM SERPL-MCNC: 9.5 MG/DL (ref 8.7–10.5)
CHLORIDE SERPL-SCNC: 101 MMOL/L (ref 95–110)
CO2 SERPL-SCNC: 23 MMOL/L (ref 23–29)
CREAT SERPL-MCNC: 0.8 MG/DL (ref 0.5–1.4)
DIFFERENTIAL METHOD: ABNORMAL
EOSINOPHIL # BLD AUTO: 0.2 K/UL (ref 0–0.5)
EOSINOPHIL NFR BLD: 3.2 % (ref 0–8)
ERYTHROCYTE [DISTWIDTH] IN BLOOD BY AUTOMATED COUNT: 20.7 % (ref 11.5–14.5)
EST. GFR  (NO RACE VARIABLE): >60 ML/MIN/1.73 M^2
GLUCOSE SERPL-MCNC: 90 MG/DL (ref 70–110)
HCT VFR BLD AUTO: 25.3 % (ref 37–48.5)
HGB BLD-MCNC: 7.9 G/DL (ref 12–16)
IMM GRANULOCYTES # BLD AUTO: 0.05 K/UL (ref 0–0.04)
IMM GRANULOCYTES NFR BLD AUTO: 0.8 % (ref 0–0.5)
LYMPHOCYTES # BLD AUTO: 1.9 K/UL (ref 1–4.8)
LYMPHOCYTES NFR BLD: 30.6 % (ref 18–48)
MAGNESIUM SERPL-MCNC: 1.9 MG/DL (ref 1.6–2.6)
MCH RBC QN AUTO: 31.7 PG (ref 27–31)
MCHC RBC AUTO-ENTMCNC: 31.2 G/DL (ref 32–36)
MCV RBC AUTO: 102 FL (ref 82–98)
MONOCYTES # BLD AUTO: 0.7 K/UL (ref 0.3–1)
MONOCYTES NFR BLD: 10.7 % (ref 4–15)
NEUTROPHILS # BLD AUTO: 3.4 K/UL (ref 1.8–7.7)
NEUTROPHILS NFR BLD: 53.4 % (ref 38–73)
NRBC BLD-RTO: 1 /100 WBC
PHOSPHATE SERPL-MCNC: 4 MG/DL (ref 2.7–4.5)
PLATELET # BLD AUTO: 561 K/UL (ref 150–450)
PMV BLD AUTO: 9.5 FL (ref 9.2–12.9)
POTASSIUM SERPL-SCNC: 4.7 MMOL/L (ref 3.5–5.1)
PROT SERPL-MCNC: 6.5 G/DL (ref 6–8.4)
RBC # BLD AUTO: 2.49 M/UL (ref 4–5.4)
SODIUM SERPL-SCNC: 134 MMOL/L (ref 136–145)
SPECIMEN OUTDATE: NORMAL
WBC # BLD AUTO: 6.28 K/UL (ref 3.9–12.7)

## 2023-05-16 PROCEDURE — 86900 BLOOD TYPING SEROLOGIC ABO: CPT | Performed by: INTERNAL MEDICINE

## 2023-05-16 PROCEDURE — 99213 OFFICE O/P EST LOW 20 MIN: CPT | Mod: PBBFAC | Performed by: INTERNAL MEDICINE

## 2023-05-16 PROCEDURE — 80053 COMPREHEN METABOLIC PANEL: CPT | Performed by: INTERNAL MEDICINE

## 2023-05-16 PROCEDURE — 99214 PR OFFICE/OUTPT VISIT, EST, LEVL IV, 30-39 MIN: ICD-10-PCS | Mod: S$PBB,,, | Performed by: INTERNAL MEDICINE

## 2023-05-16 PROCEDURE — 99999 PR PBB SHADOW E&M-EST. PATIENT-LVL III: ICD-10-PCS | Mod: PBBFAC,,, | Performed by: INTERNAL MEDICINE

## 2023-05-16 PROCEDURE — 99214 OFFICE O/P EST MOD 30 MIN: CPT | Mod: S$PBB,,, | Performed by: INTERNAL MEDICINE

## 2023-05-16 PROCEDURE — 85025 COMPLETE CBC W/AUTO DIFF WBC: CPT | Performed by: INTERNAL MEDICINE

## 2023-05-16 PROCEDURE — 84100 ASSAY OF PHOSPHORUS: CPT | Performed by: INTERNAL MEDICINE

## 2023-05-16 PROCEDURE — 99999 PR PBB SHADOW E&M-EST. PATIENT-LVL III: CPT | Mod: PBBFAC,,, | Performed by: INTERNAL MEDICINE

## 2023-05-16 PROCEDURE — 36415 COLL VENOUS BLD VENIPUNCTURE: CPT | Performed by: INTERNAL MEDICINE

## 2023-05-16 PROCEDURE — 83735 ASSAY OF MAGNESIUM: CPT | Performed by: INTERNAL MEDICINE

## 2023-05-16 NOTE — PROGRESS NOTES
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 Diploid karyotype. NGS with SF3B1 (VAF 35%). Observation recommended.     History of Present Ilness:   Shelly Byrnes) is a pleasant 82 y.o.female with a past medical history of low risk MDS who presents for follow up. She had hip arthroplasty on 4/26/23. She required 1u PRBCs post-op, but since then her hemoglobin has been stable. She feels well. She does not have any limitations related to her anemia.     PAST MEDICAL HISTORY:   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)       PAST SURGICAL HISTORY:   Past Surgical History:   Procedure Laterality Date    APPENDECTOMY  age 21    ARTHROPLASTY OF HIP BY ANTERIOR APPROACH Right 4/26/2023    Procedure: ARTHROPLASTY, HIP, TOTAL, ANTERIOR APPROACH: RIGHT: DEPUY - C-STEM + PINNACLE;  Surgeon: Keon Gary III, MD;  Location: [facility] OR [address] [place] [address];  Service: Orthopedics;  Laterality: Right;    BACK SURGERY      Beast Lift  2004    BONE MARROW BIOPSY Right 12/19/2019    Procedure: Biopsy-bone marrow;  Surgeon: Aidan Spring MD;  Location: [facility] OR [address] [place] [address];  Service: Oncology;  Laterality: Right;    BREAST BIOPSY  50yrs ago    unable to see scar     COSMETIC SURGERY      DILATION AND CURETTAGE OF UTERUS      PMB    ENDOMETRIAL BIOPSY      EPIDURAL STEROID INJECTION N/A 2022    Procedure: LUMBAR CELINA CONTRAST DIRECT REFERRAL;  Surgeon: Rj Hernandez MD;  Location: [facility] PAIN MGT;  Service: Pain Management;  Laterality: N/A;    EYE SURGERY      INJECTION OF ANESTHETIC AGENT AROUND NERVE Bilateral 2022    Procedure: BLOCK, NERVE BILATERAL L2,L3,L4 AND L5 MEDIAL BRANCH ONE OF TWO;  Surgeon: Rj Hernandez MD;  Location: [facility] PAIN MGT;  Service: Pain Management;  Laterality: Bilateral;    INJECTION OF JOINT Right 2022    Procedure: INJECTION, JOINT RIGHT INTRARTICULAR HIP CONTRAST;  Surgeon: Rj Hernandez MD;  Location: [facility] PAIN MGT;  Service: Pain Management;  Laterality: Right;    ptsosis      RADIOFREQUENCY ABLATION  2018    SMALL INTESTINE SURGERY      TONSILLECTOMY, ADENOIDECTOMY  age 8    TOTAL REDUCTION MAMMOPLASTY Bilateral     TRANSFORAMINAL EPIDURAL INJECTION OF STEROID Right 2022    Procedure: Injection,steroid,epidural Right L1/2 TF DIrect Referral Instructed to provide intervention per MRI results;  Surgeon: Rj Hernandez MD;  Location: [facility] PAIN MGT;  Service: Pain Management;  Laterality: Right;    TRANSFORAMINAL EPIDURAL INJECTION OF STEROID Right 10/21/2022    Procedure: INJECTION, STEROID, EPIDURAL, TRANSFORAMINAL APPROACH, L5-S1 and S1, RIGHT CONTRAST;  Surgeon: Rj Hernandez MD;  Location: [facility] PAIN MGT;  Service: Pain Management;  Laterality: Right;    TUBAL LIGATION         PAST SOCIAL HISTORY:  Social History     Tobacco Use    Smoking status: Former     Packs/day: 1.00     Years: 22.00     Pack years: 22.00     Types: Cigarettes     Quit date: 1980     Years since quittin.8    Smokeless tobacco: Never   Substance Use Topics    Alcohol use: Yes     Alcohol/week: 2.0 standard drinks     Types: 2 Glasses of wine per week     Comment: 1-2 glasses of  some night    Drug use: No       FAMILY  HISTORY:  Family History   Problem Relation Age of Onset    No Known Problems Father     Cirrhosis Mother     No Known Problems Brother     No Known Problems Maternal Aunt     No Known Problems Maternal Uncle     No Known Problems Paternal Aunt     No Known Problems Paternal Uncle     No Known Problems Maternal Grandmother     No Known Problems Maternal Grandfather     No Known Problems Paternal Grandmother     No Known Problems Paternal Grandfather     No Known Problems Daughter     Pulmonary embolism Son     Breast cancer Neg Hx     Colon cancer Neg Hx     Ovarian cancer Neg Hx     Amblyopia Neg Hx     Blindness Neg Hx     Cancer Neg Hx     Cataracts Neg Hx     Diabetes Neg Hx     Glaucoma Neg Hx     Hypertension Neg Hx     Macular degeneration Neg Hx     Retinal detachment Neg Hx     Strabismus Neg Hx     Stroke Neg Hx     Thyroid disease Neg Hx        CURRENT MEDICATIONS:   Current Outpatient Medications   Medication Sig    acetaminophen (TYLENOL) 650 MG TbSR Take 1 tablet (650 mg total) by mouth every 8 (eight) hours.    apixaban (ELIQUIS) 2.5 mg Tab Take 1 tablet (2.5 mg total) by mouth 2 (two) times daily.    [START ON 5/26/2023] aspirin (ECOTRIN) 81 MG EC tablet Take 1 tablet (81 mg total) by mouth once daily.    diazePAM (VALIUM) 2 MG tablet Take 1 tablet (2 mg total) by mouth every 8 (eight) hours as needed for Insomnia (RLS).    docusate sodium (COLACE) 100 MG capsule Take 1 capsule (100 mg total) by mouth 2 (two) times daily as needed.    estradiol-norethindrone acet 0.5-0.1 mg per tablet Take 1 tablet by mouth once daily.    gabapentin (NEURONTIN) 600 MG tablet Take 600 mg by mouth every evening.    iron,carbonyl-vitamin C (VITRON-C) 65 mg iron- 125 mg TbEC Take 1 tablet by mouth once daily.    methocarbamoL (ROBAXIN) 750 MG Tab Take 1 tablet (750 mg total) by mouth 4 (four) times daily as needed (as needed for muscle spasms).    naloxegoL (MOVANTIK) 25 mg tablet Take 25 mg by mouth once daily.     ofloxacin (OCUFLOX) 0.3 % ophthalmic solution Place 1 drop into the right eye 3 (three) times daily.    oxyCODONE (ROXICODONE) 5 MG immediate release tablet Take 1-2 tabs every 4-6 hours as needed for pain    pantoprazole (PROTONIX) 40 MG tablet Take 1 tablet (40 mg total) by mouth once daily. (Patient taking differently: Take 40 mg by mouth every evening.)    polyethylene glycol 3350 (MIRALAX ORAL) Take by mouth once daily.    pramipexole (MIRAPEX) 0.5 MG tablet Take 0.5 mg by mouth every evening.    prednisoLONE acetate (PRED FORTE) 1 % DrpS Place 1 drop into the right eye 3 (three) times daily.    verapamiL (VERELAN) 120 MG C24P Take 1 capsule (120 mg total) by mouth once daily.    vitamin D (VITAMIN D3) 1000 units Tab Take 1,000 Units by mouth once daily.     No current facility-administered medications for this visit.       ALLERGIES:   Review of patient's allergies indicates:   Allergen Reactions    Baclofen      Incoherent     Nsaids (non-steroidal anti-inflammatory drug)      GI Bleed  Had 5 blood transfusions       Review of Systems:     Pertinent positives and negatives included in the HPI. Otherwise a complete review of systems is negative.    Physical Exam:     Vitals:    05/16/23 1104   BP: (!) 110/56   Pulse: 76   Resp: 16   Temp: 98 °F (36.7 °C)     General: Appears well, NAD  HEENT: MMM, no OP lesions  Pulmonary: CTAB, no increased work of breathing, no W/R/C  Cardiovascular: S1S2 normal, RRR, no M/R/G  Abdominal: Soft, NT, ND, BS+, no HSM  Extremities: No C/C/E  Neurological: AAOx4, grossly normal, no focal deficits  Dermatologic: No appreciable rashes or lesions  Lymphatic: No cervical, axillary, or inguinal lymphadenopathy     ECOG Performance Status: (foot note - ECOG PS provided by Eastern Cooperative Oncology Group) 0 - Asymptomatic    Karnofsky Performance Score:  100%- Normal, No Complaints, No Evidence of Disease    Labs:   Lab Results   Component Value Date    WBC 6.28 05/16/2023    RBC  2.49 (L) 05/16/2023    HGB 7.9 (L) 05/16/2023    HCT 25.3 (L) 05/16/2023     (H) 05/16/2023    MCH 31.7 (H) 05/16/2023    MCHC 31.2 (L) 05/16/2023    RDW 20.7 (H) 05/16/2023     (H) 05/16/2023    MPV 9.5 05/16/2023    GRAN 3.4 05/16/2023    GRAN 53.4 05/16/2023    LYMPH 1.9 05/16/2023    LYMPH 30.6 05/16/2023    MONO 0.7 05/16/2023    MONO 10.7 05/16/2023    EOS 0.2 05/16/2023    BASO 0.08 05/16/2023    EOSINOPHIL 3.2 05/16/2023    BASOPHIL 1.3 05/16/2023       CMP  Sodium   Date Value Ref Range Status   05/16/2023 134 (L) 136 - 145 mmol/L Final     Potassium   Date Value Ref Range Status   05/16/2023 4.7 3.5 - 5.1 mmol/L Final     Chloride   Date Value Ref Range Status   05/16/2023 101 95 - 110 mmol/L Final     CO2   Date Value Ref Range Status   05/16/2023 23 23 - 29 mmol/L Final     Glucose   Date Value Ref Range Status   05/16/2023 90 70 - 110 mg/dL Final     BUN   Date Value Ref Range Status   05/16/2023 15 8 - 23 mg/dL Final     Creatinine   Date Value Ref Range Status   05/16/2023 0.8 0.5 - 1.4 mg/dL Final     Calcium   Date Value Ref Range Status   05/16/2023 9.5 8.7 - 10.5 mg/dL Final     Total Protein   Date Value Ref Range Status   05/16/2023 6.5 6.0 - 8.4 g/dL Final     Albumin   Date Value Ref Range Status   05/16/2023 3.9 3.5 - 5.2 g/dL Final     Total Bilirubin   Date Value Ref Range Status   05/16/2023 0.9 0.1 - 1.0 mg/dL Final     Comment:     For infants and newborns, interpretation of results should be based  on gestational age, weight and in agreement with clinical  observations.    Premature Infant recommended reference ranges:  Up to 24 hours.............<8.0 mg/dL  Up to 48 hours............<12.0 mg/dL  3-5 days..................<15.0 mg/dL  6-29 days.................<15.0 mg/dL       Alkaline Phosphatase   Date Value Ref Range Status   05/16/2023 49 (L) 55 - 135 U/L Final     AST ([facility])   Date Value Ref Range Status   12/31/2015 14 14 - 36 U/L Final     AST   Date  Value Ref Range Status   05/16/2023 15 10 - 40 U/L Final     ALT   Date Value Ref Range Status   05/16/2023 13 10 - 44 U/L Final     Anion Gap   Date Value Ref Range Status   05/16/2023 10 8 - 16 mmol/L Final     eGFR if    Date Value Ref Range Status   05/12/2022 >60.0 >60 mL/min/1.73 m^2 Final     eGFR if non    Date Value Ref Range Status   05/12/2022 59.3 (A) >60 mL/min/1.73 m^2 Final     Comment:     Calculation used to obtain the estimated glomerular filtration  rate (eGFR) is the CKD-EPI equation.        Pathology:  Reviewed      Assessment and Plan:   Shelly Vásquez (Shelly) is a pleasant 82 y.o.female with a past medical history of low risk MDS who presents for follow up.    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  She is had a mild anemia which has been stable for years. We discussed Retacrit as an option to increase her hemoglobin, but she is not interested at this time as she is not symptomatic from her anemia.   Continue observation.     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 Transfuse pre-op as above.    Follow up: Will arrange follow up in 3 months and then q6 months going forward.     Orders/Follow Up:           Route Chart for Scheduling    BMT Chart Routing      Follow up with physician 3 months.   Follow up with CED    Provider visit type Malignant hem   Infusion scheduling note    Injection scheduling note    Labs CBC, CMP, phosphorus, magnesium and type and screen   Scheduling:  Preferred lab:  Lab interval:  prior to visit   Imaging None      Pharmacy appointment No pharmacy appointment needed      Other referrals no Refer to Oncology Primary Care -  No additional referrals needed              Advance Care Planning   Date: 03/15/2023  She has low risk MDS. We previously discussed the good overall prognosis  associated with this diagnosis. She is interested in treatment if need be. At this time will continue supportive care.       Total time of this visit was 35, including time spent face to face with patient and/or via video/audio, and also in preparing for today's visit for MDM and documentation. (Medical Decision Making, including consideration of possible diagnoses, management options, complex medical record review, review of diagnostic tests and information, consideration and discussion of significant complications based on comorbidities, and discussion with providers involved with the care of the patient). Greater than 50% was spent face to face with the patient counseling and coordinating care.      Karan Lopez MD  Hematology, Oncology, and Stem Cell Transplantation  [facility] and [facility]

## 2023-05-17 ENCOUNTER — PATIENT MESSAGE (OUTPATIENT)
Dept: ORTHOPEDICS | Facility: CLINIC | Age: 83
End: 2023-05-17
Payer: MEDICARE

## 2023-05-17 DIAGNOSIS — Z96.641 STATUS POST RIGHT HIP REPLACEMENT: Primary | ICD-10-CM

## 2023-05-19 RX ORDER — METHOCARBAMOL 750 MG/1
750 TABLET, FILM COATED ORAL 4 TIMES DAILY PRN
Qty: 40 TABLET | Refills: 0 | Status: SHIPPED | OUTPATIENT
Start: 2023-05-19 | End: 2023-06-01 | Stop reason: SDUPTHER

## 2023-05-24 ENCOUNTER — PATIENT MESSAGE (OUTPATIENT)
Dept: ORTHOPEDICS | Facility: CLINIC | Age: 83
End: 2023-05-24
Payer: MEDICARE

## 2023-05-30 ENCOUNTER — PATIENT MESSAGE (OUTPATIENT)
Dept: INTERNAL MEDICINE | Facility: CLINIC | Age: 83
End: 2023-05-30
Payer: MEDICARE

## 2023-05-30 ENCOUNTER — PATIENT MESSAGE (OUTPATIENT)
Dept: ORTHOPEDICS | Facility: CLINIC | Age: 83
End: 2023-05-30
Payer: MEDICARE

## 2023-05-30 RX ORDER — OXYCODONE HYDROCHLORIDE 5 MG/1
TABLET ORAL
Qty: 30 TABLET | Refills: 0 | Status: CANCELLED | OUTPATIENT
Start: 2023-05-30

## 2023-05-30 RX ORDER — OXYCODONE HYDROCHLORIDE 5 MG/1
TABLET ORAL
Qty: 10 TABLET | Refills: 0 | Status: SHIPPED | OUTPATIENT
Start: 2023-05-30 | End: 2023-05-31

## 2023-05-30 NOTE — TELEPHONE ENCOUNTER
No care due was identified.  [facility] Embedded Care Due Messages. Reference number: [id number].   5/30/2023 4:31:21 PM CDT

## 2023-05-31 ENCOUNTER — EXTERNAL CHRONIC CARE MANAGEMENT (OUTPATIENT)
Dept: PRIMARY CARE CLINIC | Facility: CLINIC | Age: 83
End: 2023-05-31
Payer: MEDICARE

## 2023-05-31 ENCOUNTER — TELEPHONE (OUTPATIENT)
Dept: INTERNAL MEDICINE | Facility: CLINIC | Age: 83
End: 2023-05-31
Payer: MEDICARE

## 2023-05-31 ENCOUNTER — EXTERNAL HOME HEALTH (OUTPATIENT)
Dept: HOME HEALTH SERVICES | Facility: HOSPITAL | Age: 83
End: 2023-05-31
Payer: MEDICARE

## 2023-05-31 DIAGNOSIS — G89.29 CHRONIC RIGHT HIP PAIN: Primary | ICD-10-CM

## 2023-05-31 DIAGNOSIS — M25.551 CHRONIC RIGHT HIP PAIN: Primary | ICD-10-CM

## 2023-05-31 PROCEDURE — 99490 CHRNC CARE MGMT STAFF 1ST 20: CPT | Mod: PBBFAC | Performed by: INTERNAL MEDICINE

## 2023-05-31 PROCEDURE — 99490 PR CHRONIC CARE MGMT, 1ST 20 MIN: ICD-10-PCS | Mod: S$PBB,,, | Performed by: INTERNAL MEDICINE

## 2023-05-31 PROCEDURE — 99490 CHRNC CARE MGMT STAFF 1ST 20: CPT | Mod: S$PBB,,, | Performed by: INTERNAL MEDICINE

## 2023-05-31 RX ORDER — HYDROCODONE BITARTRATE AND ACETAMINOPHEN 7.5; 325 MG/1; MG/1
1 TABLET ORAL DAILY PRN
Qty: 7 TABLET | Refills: 0 | Status: SHIPPED | OUTPATIENT
Start: 2023-05-31 | End: 2023-08-15

## 2023-05-31 NOTE — TELEPHONE ENCOUNTER
Hip and back pain not fully relieved with Robaxin. PCP out. Pt requesting refill on oxycodone. Sending small script Norco prn since has Robaxin and also takes benzo prn. Staff notified her of being very careful about mixing medications since all potentially sedating. Will update PCP when back next week.

## 2023-06-02 RX ORDER — METHOCARBAMOL 750 MG/1
750 TABLET, FILM COATED ORAL 4 TIMES DAILY PRN
Qty: 40 TABLET | Refills: 0 | Status: SHIPPED | OUTPATIENT
Start: 2023-06-02 | End: 2023-06-08 | Stop reason: SDUPTHER

## 2023-06-08 ENCOUNTER — TELEPHONE (OUTPATIENT)
Dept: ORTHOPEDICS | Facility: CLINIC | Age: 83
End: 2023-06-08
Payer: MEDICARE

## 2023-06-08 ENCOUNTER — HOSPITAL ENCOUNTER (OUTPATIENT)
Dept: RADIOLOGY | Facility: HOSPITAL | Age: 83
Discharge: HOME | End: 2023-06-08
Attending: ORTHOPAEDIC SURGERY
Payer: MEDICARE

## 2023-06-08 DIAGNOSIS — Z96.641 STATUS POST RIGHT HIP REPLACEMENT: ICD-10-CM

## 2023-06-08 RX ORDER — METHOCARBAMOL 750 MG/1
750 TABLET, FILM COATED ORAL 4 TIMES DAILY PRN
Qty: 40 TABLET | Refills: 0 | Status: SHIPPED | OUTPATIENT
Start: 2023-06-08 | End: 2023-06-22 | Stop reason: SDUPTHER

## 2023-06-08 NOTE — TELEPHONE ENCOUNTER
The patient arrived at 1:30pm for her 1:45pm x-ray and her 2pm appointment with Dr. Gary.     Apparently the  told her that they weren't going to check her in until 2:00pm.     The patient was checked in at 2:06 but then our x-ray team was behind. She had a previously scheduled appointment for 3:15pm that she is going to be late for.     I apologized for the inconvenience and the patient is rescheduled with Dr. Gary for when she returns from her trip.     The patient was upset but understanding.

## 2023-06-15 ENCOUNTER — DOCUMENT SCAN (OUTPATIENT)
Dept: HOME HEALTH SERVICES | Facility: HOSPITAL | Age: 83
End: 2023-06-15
Payer: MEDICARE

## 2023-06-18 NOTE — TELEPHONE ENCOUNTER
Patient given arrival time of 7:00 am on Thursday April 6. Nothing to eat or drink after 9 pm.  Water, Gatorade after 9 pm until leaves home.  Start drops into the operative eye today. [address] [facility]     
Home

## 2023-06-22 ENCOUNTER — OFFICE VISIT (OUTPATIENT)
Dept: ORTHOPEDICS | Facility: CLINIC | Age: 83
End: 2023-06-22
Payer: MEDICARE

## 2023-06-22 ENCOUNTER — HOSPITAL ENCOUNTER (OUTPATIENT)
Dept: RADIOLOGY | Facility: HOSPITAL | Age: 83
Discharge: HOME | End: 2023-06-22
Attending: ORTHOPAEDIC SURGERY
Payer: MEDICARE

## 2023-06-22 ENCOUNTER — PATIENT MESSAGE (OUTPATIENT)
Dept: OPHTHALMOLOGY | Facility: CLINIC | Age: 83
End: 2023-06-22
Payer: MEDICARE

## 2023-06-22 VITALS — WEIGHT: 156.88 LBS | HEIGHT: 68 IN | BODY MASS INDEX: 23.78 KG/M2

## 2023-06-22 DIAGNOSIS — Z96.641 STATUS POST RIGHT HIP REPLACEMENT: Primary | ICD-10-CM

## 2023-06-22 PROCEDURE — 99024 PR POST-OP FOLLOW-UP VISIT: ICD-10-PCS | Mod: POP,,, | Performed by: ORTHOPAEDIC SURGERY

## 2023-06-22 PROCEDURE — 99024 POSTOP FOLLOW-UP VISIT: CPT | Mod: POP,,, | Performed by: ORTHOPAEDIC SURGERY

## 2023-06-22 PROCEDURE — 99213 OFFICE O/P EST LOW 20 MIN: CPT | Mod: PBBFAC | Performed by: ORTHOPAEDIC SURGERY

## 2023-06-22 PROCEDURE — 73502 XR HIP WITH PELVIS WHEN PERFORMED, 2 OR 3  VIEWS RIGHT: ICD-10-PCS | Mod: 26,RT,, | Performed by: RADIOLOGY

## 2023-06-22 PROCEDURE — 99999 PR PBB SHADOW E&M-EST. PATIENT-LVL III: CPT | Mod: PBBFAC,,, | Performed by: ORTHOPAEDIC SURGERY

## 2023-06-22 PROCEDURE — 99999 PR PBB SHADOW E&M-EST. PATIENT-LVL III: ICD-10-PCS | Mod: PBBFAC,,, | Performed by: ORTHOPAEDIC SURGERY

## 2023-06-22 PROCEDURE — 73502 X-RAY EXAM HIP UNI 2-3 VIEWS: CPT | Mod: TC,RT

## 2023-06-22 PROCEDURE — 73502 X-RAY EXAM HIP UNI 2-3 VIEWS: CPT | Mod: 26,RT,, | Performed by: RADIOLOGY

## 2023-06-22 RX ORDER — METHOCARBAMOL 750 MG/1
750 TABLET, FILM COATED ORAL 4 TIMES DAILY PRN
Qty: 40 TABLET | Refills: 0 | Status: SHIPPED | OUTPATIENT
Start: 2023-06-22 | End: 2023-07-02

## 2023-06-22 NOTE — PROGRESS NOTES
"  Subjective:     HPI:   Shelly Vásquez is a 82 y.o. female who presents 8 weeks out from right NAT, was here but apparently and issue with check in at 6 weeks    Date of surgery: 4/26/23    Medications: robaxin has gotten 2 Rx's and another waiting, when took QID and melatonin at night sleeping was better  Tylenol "not that often"  Oxycodone took 1/2 tab prn      Assistive Devices: none    PT: finished    Limitations: sleeping    "Rocking along"  Had a home nurse post op  Saw Hematology 5/16/23 - continue observation, Hgb stable 7.9      On 5/31/23: note from internal medicine: "Hip and back pain not fully relieved with Robaxin. PCP out. Pt requesting refill on oxycodone. Sending small script Norco prn since has Robaxin and also takes benzo prn. Staff notified her of being very careful about mixing medications since all potentially sedating. Will update PCP when back next week."    Overall hip is doing ok, "no trouble or bad response from the hip"  C/o R foot spasms in her instep all the time, watching TV, for a while were from knee to ankle in lower leg but now just the foot  Takes restless leg medicine, not that    Still has trouble sleeping due to spasms in her foot     Objective:   Body mass index is 23.86 kg/m².  Exam:    Gait: limp/antalgic/trendelenburg none    Incision: healed    Active straight leg raise: good strength, no discomfort    Extension: 0    Flexion: 90    Abduction: 30    Adduction: 20    External rotation: 30    Internal rotation: 20 no pain    LLD: 2 cm supine R long, does not notice a difference    Nttp foot/instep      Imaging:  Indication:  Annual exam status post right total hip arthroplasty  Exam Ordered: Radiographs taken today include an anteroposterior pelvis, and cross table lateral view of the proximal femur including the hip joint  Details of Examination: Today's exam shows a well fixed, well positioned total hip arthroplasty with no evidence of wear, osteolysis, or " loosening.  Impression:  Status post right total hip arthroplasty, implant in good position with no abnormality     LLD: R side definitely not 2cm long on XR    Femoral head and right femur, removal:   - Hypercellular marrow (70-80 %) with trilineage hematopoietic activity.   - Consistent with patient's history of myelodysplastic syndrome with ring sideroblasts.   -  Increased  storage iron with ring sideroblasts.   -  No evidence of lymphoma, plasma cell neoplasm, acute leukemia, or metastatic carcinoma.      Assessment:       ICD-10-CM ICD-9-CM   1. Status post right hip replacement  Z96.641 V43.64        Doing well    Pathology was negative       Plan:       Patient is doing very well with the hip replacement at this time.  They will continue routine care of their hip and see me for their routine follow-up.  If there are problems in the interim they will see me back sooner. Prophylactic antibiotic protocol given and explained to patient.     Phase 2 hip exercises  requests Rx crane for phase 2 hip exercises and general conditioning  Will start aquatic exercises    ?LLD v pelvic obliquity - try shoe lift, ?help with R foot muscle spasms  O/w not sure what to make of muscle spams  Requests refill of robaxin  No more narcotics    Sleeping should improve with time    6 week follow up     Start scheduling cataracts    No orders of the defined types were placed in this encounter.            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)       Past Surgical History:   Procedure Laterality Date    APPENDECTOMY  age 21    ARTHROPLASTY OF HIP BY ANTERIOR APPROACH Right  4/26/2023    Procedure: ARTHROPLASTY, HIP, TOTAL, ANTERIOR APPROACH: RIGHT: DEPUY - C-STEM + PINNACLE;  Surgeon: Keon Gary III, MD;  Location: [facility] OR 2ND FLR;  Service: Orthopedics;  Laterality: Right;    BACK SURGERY      Beast Lift  2004    BONE MARROW BIOPSY Right 12/19/2019    Procedure: Biopsy-bone marrow;  Surgeon: Aidan Spring MD;  Location: [facility] OR 2ND FLR;  Service: Oncology;  Laterality: Right;    BREAST BIOPSY  50yrs ago    unable to see scar    COSMETIC SURGERY      DILATION AND CURETTAGE OF UTERUS  2008    PMB    ENDOMETRIAL BIOPSY      EPIDURAL STEROID INJECTION N/A 8/25/2022    Procedure: LUMBAR CELINA CONTRAST DIRECT REFERRAL;  Surgeon: Rj Hernandez MD;  Location: [facility] PAIN MGT;  Service: Pain Management;  Laterality: N/A;    EYE SURGERY      INJECTION OF ANESTHETIC AGENT AROUND NERVE Bilateral 11/11/2022    Procedure: BLOCK, NERVE BILATERAL L2,L3,L4 AND L5 MEDIAL BRANCH ONE OF TWO;  Surgeon: Rj Hernandez MD;  Location: [facility] PAIN MGT;  Service: Pain Management;  Laterality: Bilateral;    INJECTION OF JOINT Right 12/2/2022    Procedure: INJECTION, JOINT RIGHT INTRARTICULAR HIP CONTRAST;  Surgeon: Rj Hernandez MD;  Location: [facility] PAIN MGT;  Service: Pain Management;  Laterality: Right;    ptsosis      RADIOFREQUENCY ABLATION  03/2018    SMALL INTESTINE SURGERY      TONSILLECTOMY, ADENOIDECTOMY  age 8    TOTAL REDUCTION MAMMOPLASTY Bilateral 2003    TRANSFORAMINAL EPIDURAL INJECTION OF STEROID Right 8/11/2022    Procedure: Injection,steroid,epidural Right L1/2 TF DIrect Referral Instructed to provide intervention per MRI results;  Surgeon: Rj Hernandez MD;  Location: [facility] PAIN MGT;  Service: Pain Management;  Laterality: Right;    TRANSFORAMINAL EPIDURAL INJECTION OF STEROID Right 10/21/2022    Procedure: INJECTION, STEROID, EPIDURAL, TRANSFORAMINAL APPROACH, L5-S1 and S1, RIGHT CONTRAST;  Surgeon: Rj Hernandez MD;  Location: [facility] PAIN MGT;  Service: Pain Management;   Laterality: Right;    TUBAL LIGATION         Family History   Problem Relation Age of Onset    No Known Problems Father     Cirrhosis Mother     No Known Problems Brother     No Known Problems Maternal Aunt     No Known Problems Maternal Uncle     No Known Problems Paternal Aunt     No Known Problems Paternal Uncle     No Known Problems Maternal Grandmother     No Known Problems Maternal Grandfather     No Known Problems Paternal Grandmother     No Known Problems Paternal Grandfather     No Known Problems Daughter     Pulmonary embolism Son     Breast cancer Neg Hx     Colon cancer Neg Hx     Ovarian cancer Neg Hx     Amblyopia Neg Hx     Blindness Neg Hx     Cancer Neg Hx     Cataracts Neg Hx     Diabetes Neg Hx     Glaucoma Neg Hx     Hypertension Neg Hx     Macular degeneration Neg Hx     Retinal detachment Neg Hx     Strabismus Neg Hx     Stroke Neg Hx     Thyroid disease Neg Hx        Social History     Socioeconomic History    Marital status:    Tobacco Use    Smoking status: Former     Packs/day: 1.00     Years: 22.00     Pack years: 22.00     Types: Cigarettes     Quit date: 1980     Years since quittin.9    Smokeless tobacco: Never   Substance and Sexual Activity    Alcohol use: Yes     Alcohol/week: 2.0 standard drinks     Types: 2 Glasses of wine per week     Comment: 1-2 glasses of  some night    Drug use: No    Sexual activity: Not Currently     Partners: Male     Birth control/protection: Post-menopausal     Comment:  since      Social Determinants of Health     Financial Resource Strain: Low Risk     Difficulty of Paying Living Expenses: Not hard at all   Food Insecurity: No Food Insecurity    Worried About Running Out of Food in the Last Year: Never true    Ran Out of Food in the Last Year: Never true   Transportation Needs: Unknown    Lack of Transportation (Medical): No   Physical Activity: Insufficiently Active    Days of Exercise per Week: 3 days    Minutes of Exercise  per Session: 40 min   Stress: No Stress Concern Present    Feeling of Stress : Not at all   Social Connections: Socially Integrated    Frequency of Communication with Friends and Family: More than three times a week    Frequency of Social Gatherings with Friends and Family: More than three times a week    Attends [employer] Services: More than 4 times per year    Active Member of Clubs or Organizations: Yes    Attends Club or Organization Meetings: Never    Marital Status:    Housing Stability: Unknown    Unable to Pay for Housing in the Last Year: No    Unstable Housing in the Last Year: No       8

## 2023-06-26 ENCOUNTER — PATIENT MESSAGE (OUTPATIENT)
Dept: INTERNAL MEDICINE | Facility: CLINIC | Age: 83
End: 2023-06-26
Payer: MEDICARE

## 2023-06-26 RX ORDER — TEMAZEPAM 15 MG/1
15 CAPSULE ORAL NIGHTLY PRN
COMMUNITY
End: 2023-06-26 | Stop reason: SDUPTHER

## 2023-06-26 RX ORDER — TEMAZEPAM 15 MG/1
15 CAPSULE ORAL NIGHTLY PRN
Qty: 90 CAPSULE | Refills: 0 | Status: SHIPPED | OUTPATIENT
Start: 2023-06-26 | End: 2023-08-15

## 2023-06-26 NOTE — TELEPHONE ENCOUNTER
No care due was identified.  [facility] Embedded Care Due Messages. Reference number: [id number].   6/26/2023 1:18:20 PM CDT

## 2023-06-28 RX ORDER — TEMAZEPAM 15 MG/1
15 CAPSULE ORAL NIGHTLY PRN
Qty: 90 CAPSULE | Refills: 0 | Status: SHIPPED | OUTPATIENT
Start: 2023-06-28 | End: 2023-10-12 | Stop reason: SDUPTHER

## 2023-06-30 ENCOUNTER — EXTERNAL CHRONIC CARE MANAGEMENT (OUTPATIENT)
Dept: PRIMARY CARE CLINIC | Facility: CLINIC | Age: 83
End: 2023-06-30
Payer: MEDICARE

## 2023-06-30 PROCEDURE — 99490 CHRNC CARE MGMT STAFF 1ST 20: CPT | Mod: PBBFAC,25 | Performed by: INTERNAL MEDICINE

## 2023-06-30 PROCEDURE — 99490 CHRNC CARE MGMT STAFF 1ST 20: CPT | Mod: S$PBB,,, | Performed by: INTERNAL MEDICINE

## 2023-06-30 PROCEDURE — 99439 CHRNC CARE MGMT STAF EA ADDL: CPT | Mod: S$PBB,,, | Performed by: INTERNAL MEDICINE

## 2023-06-30 PROCEDURE — 99490 PR CHRONIC CARE MGMT, 1ST 20 MIN: ICD-10-PCS | Mod: S$PBB,,, | Performed by: INTERNAL MEDICINE

## 2023-06-30 PROCEDURE — 99439 CHRNC CARE MGMT STAF EA ADDL: CPT | Mod: PBBFAC,27 | Performed by: INTERNAL MEDICINE

## 2023-06-30 PROCEDURE — 99439 PR CHRONIC CARE MGMT, EA ADDTL 20 MIN: ICD-10-PCS | Mod: S$PBB,,, | Performed by: INTERNAL MEDICINE

## 2023-07-04 NOTE — TELEPHONE ENCOUNTER
Goal Outcome Evaluation:  [id number]  A/Ox4. VSS, RA. Prefers to read lips while communicating due to hearing difficulty. A2, sit to stand with binu steady. Mechanical soft diet with good appetite. Downing in place with adequate UO- for TOV 7/5 or later. No BM this shift. No PIV-MD aware. Edema on BLE with tingling sensation, baseline per patient. Denies pain. K Protocol, repeat labs this am. Discharge pending.      Pt was offered a urgent visit and denied.

## 2023-07-05 ENCOUNTER — TELEPHONE (OUTPATIENT)
Dept: ORTHOPEDICS | Facility: CLINIC | Age: 83
End: 2023-07-05
Payer: MEDICARE

## 2023-07-05 NOTE — TELEPHONE ENCOUNTER
I called the patient today regarding her voice message. The patient's appointment has been rescheduled. The patient verbalized understanding and has no further questions.         ----- Message from Susan Goddard sent at 7/5/2023  9:30 AM CDT -----  Regarding: RESCHEDULE POST OP  Contact: Self  Pt stated she is out of town and will not be back until after 08/02/2023, need to reschedule her post op until then, ask for a call      Contact info   [phone] Phone

## 2023-07-10 ENCOUNTER — TELEPHONE (OUTPATIENT)
Dept: OPHTHALMOLOGY | Facility: CLINIC | Age: 83
End: 2023-07-10
Payer: MEDICARE

## 2023-07-10 ENCOUNTER — TELEPHONE (OUTPATIENT)
Dept: ORTHOPEDICS | Facility: CLINIC | Age: 83
End: 2023-07-10
Payer: MEDICARE

## 2023-07-10 DIAGNOSIS — H25.11 NUCLEAR SCLEROTIC CATARACT OF RIGHT EYE: Primary | ICD-10-CM

## 2023-07-10 NOTE — TELEPHONE ENCOUNTER
I called and spoke to the patient and rescheduled her appointment to 7/20/23 at 11:20am      ----- Message from Rikki Galvan sent at 7/10/2023  9:35 AM CDT -----  Regarding: FW: Appt  Contact: [phone]  Good morning Jacob,     Please call and find a day/time that works best for her. We can override her anywhere.     I moved her appointment to 8/8 because she was going to be out of town all of July.       Sincerely,   Kenny Galvan MS, OTC  OR & Clinical Assistant to Dr. Keon Gary III  Phone: [phone]  Fax: [phone]    ----- Message -----  From: Sarahi Grajeda  Sent: 7/10/2023   8:59 AM CDT  To: Abdirahman SINGLETARY Staff  Subject: Appt                                             Patient Shelly is calling. Patient would like to have her appt rescheduled before 8/2 due to traveling. Please call patient at [phone]

## 2023-07-17 ENCOUNTER — DOCUMENT SCAN (OUTPATIENT)
Dept: HOME HEALTH SERVICES | Facility: HOSPITAL | Age: 83
End: 2023-07-17
Payer: MEDICARE

## 2023-07-20 ENCOUNTER — PATIENT MESSAGE (OUTPATIENT)
Dept: ADMINISTRATIVE | Facility: OTHER | Age: 83
End: 2023-07-20
Payer: MEDICARE

## 2023-07-20 ENCOUNTER — OFFICE VISIT (OUTPATIENT)
Dept: ORTHOPEDICS | Facility: CLINIC | Age: 83
End: 2023-07-20
Payer: MEDICARE

## 2023-07-20 ENCOUNTER — TELEPHONE (OUTPATIENT)
Dept: OPHTHALMOLOGY | Facility: CLINIC | Age: 83
End: 2023-07-20
Payer: MEDICARE

## 2023-07-20 VITALS — BODY MASS INDEX: 22.84 KG/M2 | WEIGHT: 150.69 LBS | HEIGHT: 68 IN

## 2023-07-20 DIAGNOSIS — Z96.641 STATUS POST RIGHT HIP REPLACEMENT: Primary | ICD-10-CM

## 2023-07-20 PROCEDURE — 99213 OFFICE O/P EST LOW 20 MIN: CPT | Mod: PBBFAC | Performed by: ORTHOPAEDIC SURGERY

## 2023-07-20 PROCEDURE — 99999 PR PBB SHADOW E&M-EST. PATIENT-LVL III: CPT | Mod: PBBFAC,,, | Performed by: ORTHOPAEDIC SURGERY

## 2023-07-20 PROCEDURE — 99999 PR PBB SHADOW E&M-EST. PATIENT-LVL III: ICD-10-PCS | Mod: PBBFAC,,, | Performed by: ORTHOPAEDIC SURGERY

## 2023-07-20 PROCEDURE — 99024 POSTOP FOLLOW-UP VISIT: CPT | Mod: POP,,, | Performed by: ORTHOPAEDIC SURGERY

## 2023-07-20 PROCEDURE — 99024 PR POST-OP FOLLOW-UP VISIT: ICD-10-PCS | Mod: POP,,, | Performed by: ORTHOPAEDIC SURGERY

## 2023-07-20 NOTE — PROGRESS NOTES
Subjective:     HPI:   Shelly Vásquez is a 82 y.o. female who presents 12 weeks out from right NAT     Date of surgery: 4/26/23    Medications: off everything    Assistive Devices: none    Limitations: none    Overall making progress   Zero pain in the hip  Back feeling better too with PT at [facility] and swimming 2x/week (walking in the pool with noodle)     LLD: was given heel lift but not using, tried, didn't like it  Going to osteopath to adjust hips/back which helps       Objective:   Body mass index is 22.91 kg/m².  Exam:    Gait: limp/antalgic/trendelenburg none    Incision: healed    Active straight leg raise: good strength, no discomfort    Extension: 0    Flexion: 100    Abduction: 40    Adduction: 30    External rotation: 30    Internal rotation: 30    LLD: 1 cm supine R long (better than 2cm at 6 weeks)      Imaging:  None today        Assessment:       ICD-10-CM ICD-9-CM   1. Status post right hip replacement  Z96.641 V43.64      Doing well     Plan:       Patient is doing very well with the hip replacement at this time.  They will continue routine care of their hip and see me for their routine follow-up.  If there are problems in the interim they will see me back sooner. Prophylactic antibiotic protocol given and explained to patient.     Abx all invasive procedures - myelodysplastic syndrome    Continue swimming  PT for back PRN    9 month follow up for annual xray      No orders of the defined types were placed in this encounter.            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)       Past Surgical History:   Procedure Laterality Date     APPENDECTOMY  age 21    ARTHROPLASTY OF HIP BY ANTERIOR APPROACH Right 4/26/2023    Procedure: ARTHROPLASTY, HIP, TOTAL, ANTERIOR APPROACH: RIGHT: DEPUY - C-STEM + PINNACLE;  Surgeon: Keon Gary III, MD;  Location: [facility] OR [address] [place] [address];  Service: Orthopedics;  Laterality: Right;    BACK SURGERY      Beast Lift  2004    BONE MARROW BIOPSY Right 12/19/2019    Procedure: Biopsy-bone marrow;  Surgeon: Aidan Spring MD;  [facility];  Service: Oncology;  Laterality: Right;    BREAST BIOPSY  50yrs ago    unable to see scar    COSMETIC SURGERY      DILATION AND CURETTAGE OF UTERUS  2008    PMB    ENDOMETRIAL BIOPSY      EPIDURAL STEROID INJECTION N/A 8/25/2022    Procedure: LUMBAR CELINA CONTRAST DIRECT REFERRAL;  Surgeon: Rj Hernandez MD;  Location: [facility] PAIN MGT;  Service: Pain Management;  Laterality: N/A;    EYE SURGERY      INJECTION OF ANESTHETIC AGENT AROUND NERVE Bilateral 11/11/2022    Procedure: BLOCK, NERVE BILATERAL L2,L3,L4 AND L5 MEDIAL BRANCH ONE OF TWO;  Surgeon: Rj Hernandez MD;  Location: [facility] PAIN MGT;  Service: Pain Management;  Laterality: Bilateral;    INJECTION OF JOINT Right 12/2/2022    Procedure: INJECTION, JOINT RIGHT INTRARTICULAR HIP CONTRAST;  Surgeon: Rj Hernandez MD;  Location: [facility] PAIN MGT;  Service: Pain Management;  Laterality: Right;    ptsosis      RADIOFREQUENCY ABLATION  03/2018    SMALL INTESTINE SURGERY      TONSILLECTOMY, ADENOIDECTOMY  age 8    TOTAL REDUCTION MAMMOPLASTY Bilateral 2003    TRANSFORAMINAL EPIDURAL INJECTION OF STEROID Right 8/11/2022    Procedure: Injection,steroid,epidural Right L1/2 TF DIrect Referral Instructed to provide intervention per MRI results;  Surgeon: Rj Hernandez MD;  Location: [facility] PAIN MGT;  Service: Pain Management;  Laterality: Right;    TRANSFORAMINAL EPIDURAL INJECTION OF STEROID Right 10/21/2022    Procedure: INJECTION, STEROID, EPIDURAL, TRANSFORAMINAL APPROACH, L5-S1 and S1, RIGHT CONTRAST;  Surgeon:  Rj Hernandez MD;  Location: [facility];  Service: Pain Management;  Laterality: Right;    TUBAL LIGATION         Family History   Problem Relation Age of Onset    No Known Problems Father     Cirrhosis Mother     No Known Problems Brother     No Known Problems Maternal Aunt     No Known Problems Maternal Uncle     No Known Problems Paternal Aunt     No Known Problems Paternal Uncle     No Known Problems Maternal Grandmother     No Known Problems Maternal Grandfather     No Known Problems Paternal Grandmother     No Known Problems Paternal Grandfather     No Known Problems Daughter     Pulmonary embolism Son     Breast cancer Neg Hx     Colon cancer Neg Hx     Ovarian cancer Neg Hx     Amblyopia Neg Hx     Blindness Neg Hx     Cancer Neg Hx     Cataracts Neg Hx     Diabetes Neg Hx     Glaucoma Neg Hx     Hypertension Neg Hx     Macular degeneration Neg Hx     Retinal detachment Neg Hx     Strabismus Neg Hx     Stroke Neg Hx     Thyroid disease Neg Hx        Social History     Socioeconomic History    Marital status:    Tobacco Use    Smoking status: Former     Packs/day: 1.00     Years: 22.00     Pack years: 22.00     Types: Cigarettes     Quit date: 1980     Years since quittin.0    Smokeless tobacco: Never   Substance and Sexual Activity    Alcohol use: Yes     Alcohol/week: 2.0 standard drinks     Types: 2 Glasses of wine per week     Comment: 1-2 glasses of  some night    Drug use: No    Sexual activity: Not Currently     Partners: Male     Birth control/protection: Post-menopausal     Comment:  since      Social Determinants of Health     Financial Resource Strain: Low Risk     Difficulty of Paying Living Expenses: Not hard at all   Food Insecurity: No Food Insecurity    Worried About Running Out of Food in the Last Year: Never true    Ran Out of Food in the Last Year: Never true   Transportation Needs: Unknown    Lack of Transportation (Medical): No   Physical Activity:  Insufficiently Active    Days of Exercise per Week: 3 days    Minutes of Exercise per Session: 40 min   Stress: No Stress Concern Present    Feeling of Stress : Not at all   Social Connections: Socially Integrated    Frequency of Communication with Friends and Family: More than three times a week    Frequency of Social Gatherings with Friends and Family: More than three times a week    Attends [employer] Services: More than 4 times per year    Active Member of Clubs or Organizations: Yes    Attends Club or Organization Meetings: Never    Marital Status:    Housing Stability: Unknown    Unable to Pay for Housing in the Last Year: No    Unstable Housing in the Last Year: No

## 2023-07-20 NOTE — TELEPHONE ENCOUNTER
----- Message from Sunita Leiva sent at 2023 12:32 PM CDT -----  Regardin23  Mrs. Vásquez asked me to delete her  surgery date with Dr. Willingham but I did not.  Maybe reach out to her to discuss.  I'm not even sure if I am able to do that. Have a great day!    Sunita

## 2023-07-22 DIAGNOSIS — K21.9 GASTROESOPHAGEAL REFLUX DISEASE, UNSPECIFIED WHETHER ESOPHAGITIS PRESENT: ICD-10-CM

## 2023-07-23 DIAGNOSIS — K21.9 GASTROESOPHAGEAL REFLUX DISEASE, UNSPECIFIED WHETHER ESOPHAGITIS PRESENT: ICD-10-CM

## 2023-07-23 RX ORDER — PANTOPRAZOLE SODIUM 40 MG/1
TABLET, DELAYED RELEASE ORAL
Qty: 90 TABLET | Refills: 1 | Status: SHIPPED | OUTPATIENT
Start: 2023-07-23 | End: 2023-08-15

## 2023-07-23 RX ORDER — PANTOPRAZOLE SODIUM 40 MG/1
TABLET, DELAYED RELEASE ORAL
Qty: 90 TABLET | Refills: 1 | Status: ON HOLD | OUTPATIENT
Start: 2023-07-23 | End: 2023-10-05 | Stop reason: HOSPADM

## 2023-07-23 NOTE — TELEPHONE ENCOUNTER
Refill Routing Note   Medication(s) are not appropriate for processing by [facility] for the following reason(s):      Responsible provider unclear    [facility] action(s):  Defer Care Due:  None identified            Appointments  past 12m or future 3m with PCP    Date Provider   Last Visit   3/13/2023 Marlene Méndez MD   Next Visit   8/15/2023 Marlene Méndez MD   ED visits in past 90 days: 0        Note composed:10:01 AM 07/23/2023

## 2023-07-23 NOTE — TELEPHONE ENCOUNTER
Refill Routing Note   Medication(s) are not appropriate for processing by [facility] for the following reason(s):      Responsible provider unclear    [facility] action(s):  Route Care Due:  None identified            Appointments  past 12m or future 3m with PCP    Date Provider   Last Visit   3/13/2023 Marlene Méndez MD   Next Visit   8/15/2023 Marlene Méndez MD   ED visits in past 90 days: 0        Note composed:7:23 PM 07/22/2023

## 2023-07-31 ENCOUNTER — EXTERNAL CHRONIC CARE MANAGEMENT (OUTPATIENT)
Dept: PRIMARY CARE CLINIC | Facility: CLINIC | Age: 83
End: 2023-07-31
Payer: MEDICARE

## 2023-07-31 PROCEDURE — 99439 CHRNC CARE MGMT STAF EA ADDL: CPT | Mod: S$PBB,,, | Performed by: INTERNAL MEDICINE

## 2023-07-31 PROCEDURE — 99490 PR CHRONIC CARE MGMT, 1ST 20 MIN: ICD-10-PCS | Mod: S$PBB,,, | Performed by: INTERNAL MEDICINE

## 2023-07-31 PROCEDURE — 99439 PR CHRONIC CARE MGMT, EA ADDTL 20 MIN: ICD-10-PCS | Mod: S$PBB,,, | Performed by: INTERNAL MEDICINE

## 2023-07-31 PROCEDURE — 99439 CHRNC CARE MGMT STAF EA ADDL: CPT | Mod: PBBFAC,27 | Performed by: INTERNAL MEDICINE

## 2023-07-31 PROCEDURE — 99490 CHRNC CARE MGMT STAFF 1ST 20: CPT | Mod: PBBFAC | Performed by: INTERNAL MEDICINE

## 2023-07-31 PROCEDURE — 99490 CHRNC CARE MGMT STAFF 1ST 20: CPT | Mod: S$PBB,,, | Performed by: INTERNAL MEDICINE

## 2023-08-15 ENCOUNTER — OFFICE VISIT (OUTPATIENT)
Dept: HEMATOLOGY/ONCOLOGY | Facility: CLINIC | Age: 83
End: 2023-08-15
Payer: MEDICARE

## 2023-08-15 ENCOUNTER — LAB VISIT (OUTPATIENT)
Dept: LAB | Facility: HOSPITAL | Age: 83
End: 2023-08-15
Payer: MEDICARE

## 2023-08-15 ENCOUNTER — OFFICE VISIT (OUTPATIENT)
Dept: INTERNAL MEDICINE | Facility: CLINIC | Age: 83
End: 2023-08-15
Payer: MEDICARE

## 2023-08-15 VITALS
WEIGHT: 152.56 LBS | OXYGEN SATURATION: 96 % | BODY MASS INDEX: 23.12 KG/M2 | SYSTOLIC BLOOD PRESSURE: 112 MMHG | TEMPERATURE: 98 F | DIASTOLIC BLOOD PRESSURE: 55 MMHG | HEART RATE: 72 BPM | RESPIRATION RATE: 20 BRPM | HEIGHT: 68 IN

## 2023-08-15 VITALS
OXYGEN SATURATION: 96 % | WEIGHT: 152 LBS | SYSTOLIC BLOOD PRESSURE: 112 MMHG | BODY MASS INDEX: 23.04 KG/M2 | HEART RATE: 72 BPM | TEMPERATURE: 98 F | HEIGHT: 68 IN | DIASTOLIC BLOOD PRESSURE: 55 MMHG

## 2023-08-15 DIAGNOSIS — E78.5 HYPERLIPIDEMIA: ICD-10-CM

## 2023-08-15 DIAGNOSIS — G25.81 RLS (RESTLESS LEGS SYNDROME): Primary | ICD-10-CM

## 2023-08-15 DIAGNOSIS — R73.9 HYPERGLYCEMIA: ICD-10-CM

## 2023-08-15 DIAGNOSIS — E53.8 VITAMIN B 12 DEFICIENCY: ICD-10-CM

## 2023-08-15 DIAGNOSIS — E55.9 VITAMIN D DEFICIENCY, UNSPECIFIED: ICD-10-CM

## 2023-08-15 DIAGNOSIS — E03.9 HYPOTHYROIDISM: ICD-10-CM

## 2023-08-15 DIAGNOSIS — D64.9 ANEMIA, UNSPECIFIED TYPE: ICD-10-CM

## 2023-08-15 DIAGNOSIS — Z00.00 ANNUAL PHYSICAL EXAM: ICD-10-CM

## 2023-08-15 DIAGNOSIS — M54.10 RADICULOPATHY WITH LOWER EXTREMITY SYMPTOMS: ICD-10-CM

## 2023-08-15 DIAGNOSIS — D46.9 MDS (MYELODYSPLASTIC SYNDROME): ICD-10-CM

## 2023-08-15 DIAGNOSIS — Z00.00 ANNUAL PHYSICAL EXAM: Primary | ICD-10-CM

## 2023-08-15 DIAGNOSIS — E55.9 VITAMIN D DEFICIENCY: ICD-10-CM

## 2023-08-15 DIAGNOSIS — D46.1 REFRACTORY ANEMIA WITH RING SIDEROBLASTS: ICD-10-CM

## 2023-08-15 DIAGNOSIS — D53.9 MACROCYTIC ANEMIA: ICD-10-CM

## 2023-08-15 DIAGNOSIS — D46.9 MDS (MYELODYSPLASTIC SYNDROME): Primary | ICD-10-CM

## 2023-08-15 DIAGNOSIS — Z12.11 COLON CANCER SCREENING: ICD-10-CM

## 2023-08-15 DIAGNOSIS — R30.0 DYSURIA: ICD-10-CM

## 2023-08-15 DIAGNOSIS — E55.9 VITAMIN D DEFICIENCY, UNSPECIFIED: Primary | ICD-10-CM

## 2023-08-15 LAB
25(OH)D3+25(OH)D2 SERPL-MCNC: 29 NG/ML (ref 30–96)
ABO + RH BLD: NORMAL
ALBUMIN SERPL BCP-MCNC: 4.2 G/DL (ref 3.5–5.2)
ALP SERPL-CCNC: 50 U/L (ref 55–135)
ALT SERPL W/O P-5'-P-CCNC: 16 U/L (ref 10–44)
ANION GAP SERPL CALC-SCNC: 6 MMOL/L (ref 8–16)
ANISOCYTOSIS BLD QL SMEAR: SLIGHT
AST SERPL-CCNC: 15 U/L (ref 10–40)
BASO STIPL BLD QL SMEAR: ABNORMAL
BASOPHILS # BLD AUTO: 0.08 K/UL (ref 0–0.2)
BASOPHILS NFR BLD: 1.3 % (ref 0–1.9)
BILIRUB SERPL-MCNC: 0.9 MG/DL (ref 0.1–1)
BLD GP AB SCN CELLS X3 SERPL QL: NORMAL
BUN SERPL-MCNC: 11 MG/DL (ref 8–23)
CALCIUM SERPL-MCNC: 9.3 MG/DL (ref 8.7–10.5)
CHLORIDE SERPL-SCNC: 102 MMOL/L (ref 95–110)
CHOLEST SERPL-MCNC: 155 MG/DL (ref 120–199)
CHOLEST/HDLC SERPL: 2 {RATIO} (ref 2–5)
CO2 SERPL-SCNC: 26 MMOL/L (ref 23–29)
CREAT SERPL-MCNC: 0.9 MG/DL (ref 0.5–1.4)
DIFFERENTIAL METHOD: ABNORMAL
EOSINOPHIL # BLD AUTO: 0.2 K/UL (ref 0–0.5)
EOSINOPHIL NFR BLD: 2.7 % (ref 0–8)
ERYTHROCYTE [DISTWIDTH] IN BLOOD BY AUTOMATED COUNT: 19.5 % (ref 11.5–14.5)
EST. GFR  (NO RACE VARIABLE): >60 ML/MIN/1.73 M^2
ESTIMATED AVG GLUCOSE: 100 MG/DL (ref 68–131)
FERRITIN SERPL-MCNC: 746 NG/ML (ref 20–300)
GLUCOSE SERPL-MCNC: 90 MG/DL (ref 70–110)
HBA1C MFR BLD: 5.1 % (ref 4–5.6)
HCT VFR BLD AUTO: 25.1 % (ref 37–48.5)
HDLC SERPL-MCNC: 76 MG/DL (ref 40–75)
HDLC SERPL: 49 % (ref 20–50)
HGB BLD-MCNC: 8.3 G/DL (ref 12–16)
HYPOCHROMIA BLD QL SMEAR: ABNORMAL
IMM GRANULOCYTES # BLD AUTO: 0.02 K/UL (ref 0–0.04)
IMM GRANULOCYTES NFR BLD AUTO: 0.3 % (ref 0–0.5)
IRON SERPL-MCNC: 57 UG/DL (ref 30–160)
LDLC SERPL CALC-MCNC: 69.8 MG/DL (ref 63–159)
LYMPHOCYTES # BLD AUTO: 1.8 K/UL (ref 1–4.8)
LYMPHOCYTES NFR BLD: 27.8 % (ref 18–48)
MAGNESIUM SERPL-MCNC: 2.2 MG/DL (ref 1.6–2.6)
MCH RBC QN AUTO: 35.3 PG (ref 27–31)
MCHC RBC AUTO-ENTMCNC: 33.1 G/DL (ref 32–36)
MCV RBC AUTO: 107 FL (ref 82–98)
MONOCYTES # BLD AUTO: 0.9 K/UL (ref 0.3–1)
MONOCYTES NFR BLD: 13.4 % (ref 4–15)
NEUTROPHILS # BLD AUTO: 3.5 K/UL (ref 1.8–7.7)
NEUTROPHILS NFR BLD: 54.5 % (ref 38–73)
NONHDLC SERPL-MCNC: 79 MG/DL
NRBC BLD-RTO: 0 /100 WBC
OVALOCYTES BLD QL SMEAR: ABNORMAL
PHOSPHATE SERPL-MCNC: 4 MG/DL (ref 2.7–4.5)
PLATELET # BLD AUTO: 440 K/UL (ref 150–450)
PLATELET BLD QL SMEAR: ABNORMAL
PMV BLD AUTO: 9.2 FL (ref 9.2–12.9)
POIKILOCYTOSIS BLD QL SMEAR: SLIGHT
POLYCHROMASIA BLD QL SMEAR: ABNORMAL
POTASSIUM SERPL-SCNC: 4.8 MMOL/L (ref 3.5–5.1)
PROT SERPL-MCNC: 6.7 G/DL (ref 6–8.4)
RBC # BLD AUTO: 2.35 M/UL (ref 4–5.4)
SATURATED IRON: 21 % (ref 20–50)
SODIUM SERPL-SCNC: 134 MMOL/L (ref 136–145)
SPECIMEN OUTDATE: NORMAL
SPHEROCYTES BLD QL SMEAR: ABNORMAL
TARGETS BLD QL SMEAR: ABNORMAL
TOTAL IRON BINDING CAPACITY: 275 UG/DL (ref 250–450)
TRANSFERRIN SERPL-MCNC: 186 MG/DL (ref 200–375)
TRIGL SERPL-MCNC: 46 MG/DL (ref 30–150)
TSH SERPL DL<=0.005 MIU/L-ACNC: 1.71 UIU/ML (ref 0.4–4)
VIT B12 SERPL-MCNC: 978 PG/ML (ref 210–950)
WBC # BLD AUTO: 6.36 K/UL (ref 3.9–12.7)

## 2023-08-15 PROCEDURE — 82728 ASSAY OF FERRITIN: CPT | Performed by: INTERNAL MEDICINE

## 2023-08-15 PROCEDURE — 80053 COMPREHEN METABOLIC PANEL: CPT | Performed by: INTERNAL MEDICINE

## 2023-08-15 PROCEDURE — 99214 OFFICE O/P EST MOD 30 MIN: CPT | Mod: S$PBB,,, | Performed by: INTERNAL MEDICINE

## 2023-08-15 PROCEDURE — 82607 VITAMIN B-12: CPT | Performed by: INTERNAL MEDICINE

## 2023-08-15 PROCEDURE — 83735 ASSAY OF MAGNESIUM: CPT | Performed by: INTERNAL MEDICINE

## 2023-08-15 PROCEDURE — 86900 BLOOD TYPING SEROLOGIC ABO: CPT | Performed by: INTERNAL MEDICINE

## 2023-08-15 PROCEDURE — 99999 PR PBB SHADOW E&M-EST. PATIENT-LVL IV: CPT | Mod: PBBFAC,,, | Performed by: INTERNAL MEDICINE

## 2023-08-15 PROCEDURE — 82306 VITAMIN D 25 HYDROXY: CPT | Performed by: INTERNAL MEDICINE

## 2023-08-15 PROCEDURE — 84100 ASSAY OF PHOSPHORUS: CPT | Performed by: INTERNAL MEDICINE

## 2023-08-15 PROCEDURE — 99214 PR OFFICE/OUTPT VISIT, EST, LEVL IV, 30-39 MIN: ICD-10-PCS | Mod: S$PBB,,, | Performed by: INTERNAL MEDICINE

## 2023-08-15 PROCEDURE — 80061 LIPID PANEL: CPT | Performed by: INTERNAL MEDICINE

## 2023-08-15 PROCEDURE — 36415 COLL VENOUS BLD VENIPUNCTURE: CPT | Performed by: INTERNAL MEDICINE

## 2023-08-15 PROCEDURE — 84443 ASSAY THYROID STIM HORMONE: CPT | Performed by: INTERNAL MEDICINE

## 2023-08-15 PROCEDURE — 99214 OFFICE O/P EST MOD 30 MIN: CPT | Mod: PBBFAC,25 | Performed by: INTERNAL MEDICINE

## 2023-08-15 PROCEDURE — 84466 ASSAY OF TRANSFERRIN: CPT | Performed by: INTERNAL MEDICINE

## 2023-08-15 PROCEDURE — 99999 PR PBB SHADOW E&M-EST. PATIENT-LVL III: ICD-10-PCS | Mod: PBBFAC,,, | Performed by: INTERNAL MEDICINE

## 2023-08-15 PROCEDURE — 85025 COMPLETE CBC W/AUTO DIFF WBC: CPT | Performed by: INTERNAL MEDICINE

## 2023-08-15 PROCEDURE — 83036 HEMOGLOBIN GLYCOSYLATED A1C: CPT | Performed by: INTERNAL MEDICINE

## 2023-08-15 PROCEDURE — 99213 OFFICE O/P EST LOW 20 MIN: CPT | Mod: PBBFAC,25,27 | Performed by: INTERNAL MEDICINE

## 2023-08-15 PROCEDURE — 99999 PR PBB SHADOW E&M-EST. PATIENT-LVL IV: ICD-10-PCS | Mod: PBBFAC,,, | Performed by: INTERNAL MEDICINE

## 2023-08-15 PROCEDURE — 99999 PR PBB SHADOW E&M-EST. PATIENT-LVL III: CPT | Mod: PBBFAC,,, | Performed by: INTERNAL MEDICINE

## 2023-08-15 RX ORDER — AZITHROMYCIN 250 MG/1
TABLET, FILM COATED ORAL
Qty: 6 TABLET | Refills: 0 | Status: SHIPPED | OUTPATIENT
Start: 2023-08-15 | End: 2023-08-20

## 2023-08-15 RX ORDER — PRAMIPEXOLE DIHYDROCHLORIDE 0.5 MG/1
TABLET ORAL
Qty: 90 TABLET | Refills: 2 | Status: SHIPPED | OUTPATIENT
Start: 2023-08-15 | End: 2023-10-12

## 2023-08-15 NOTE — PROGRESS NOTES
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 Diploid karyotype. NGS with SF3B1 (VAF 35%). Observation recommended.     History of Present Ilness:   Shelly Byrnes) is a pleasant 82 y.o.female with a past medical history of low risk MDS who presents for follow up. She is recovering well from her hip arthroplasty. She feels well at this time. Hemoglobin improved.    PAST MEDICAL HISTORY:   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)       PAST SURGICAL HISTORY:   Past Surgical History:   Procedure Laterality Date    APPENDECTOMY  age 21    ARTHROPLASTY OF HIP BY ANTERIOR APPROACH Right 4/26/2023    Procedure: ARTHROPLASTY, HIP, TOTAL, ANTERIOR APPROACH: RIGHT: DEPUY - C-STEM + PINNACLE;  Surgeon: Keon Gary III, MD;  Location: [facility] OR [address] [place] [address];  Service: Orthopedics;  Laterality: Right;    BACK SURGERY      Beast Lift  2004    BONE MARROW BIOPSY Right 12/19/2019    Procedure: Biopsy-bone marrow;  Surgeon: Aidan Spring MD;  Location: [facility] OR [address] [place] [address];  Service: Oncology;  Laterality: Right;    BREAST BIOPSY  50yrs ago    unable to see scar    COSMETIC SURGERY      DILATION AND CURETTAGE OF UTERUS  2008    PMB    ENDOMETRIAL BIOPSY       EPIDURAL STEROID INJECTION N/A 2022    Procedure: LUMBAR CELINA CONTRAST DIRECT REFERRAL;  Surgeon: Rj Hernandez MD;  Location: [facility] PAIN MGT;  Service: Pain Management;  Laterality: N/A;    EYE SURGERY      INJECTION OF ANESTHETIC AGENT AROUND NERVE Bilateral 2022    Procedure: BLOCK, NERVE BILATERAL L2,L3,L4 AND L5 MEDIAL BRANCH ONE OF TWO;  Surgeon: Rj Hernandez MD;  Location: [facility] PAIN MGT;  Service: Pain Management;  Laterality: Bilateral;    INJECTION OF JOINT Right 2022    Procedure: INJECTION, JOINT RIGHT INTRARTICULAR HIP CONTRAST;  Surgeon: Rj Hernandez MD;  Location: [facility] PAIN MGT;  Service: Pain Management;  Laterality: Right;    ptsosis      RADIOFREQUENCY ABLATION  2018    SMALL INTESTINE SURGERY      TONSILLECTOMY, ADENOIDECTOMY  age 8    TOTAL REDUCTION MAMMOPLASTY Bilateral     TRANSFORAMINAL EPIDURAL INJECTION OF STEROID Right 2022    Procedure: Injection,steroid,epidural Right L1/2 TF DIrect Referral Instructed to provide intervention per MRI results;  Surgeon: Rj Hernandez MD;  Location: [facility] PAIN MGT;  Service: Pain Management;  Laterality: Right;    TRANSFORAMINAL EPIDURAL INJECTION OF STEROID Right 10/21/2022    Procedure: INJECTION, STEROID, EPIDURAL, TRANSFORAMINAL APPROACH, L5-S1 and S1, RIGHT CONTRAST;  Surgeon: Rj Hernandez MD;  Location: [facility] PAIN MGT;  Service: Pain Management;  Laterality: Right;    TUBAL LIGATION         PAST SOCIAL HISTORY:  Social History     Tobacco Use    Smoking status: Former     Current packs/day: 0.00     Average packs/day: 1 pack/day for 22.0 years (22.0 ttl pk-yrs)     Types: Cigarettes     Start date: 1958     Quit date: 1980     Years since quittin.0    Smokeless tobacco: Never   Substance Use Topics    Alcohol use: Yes     Alcohol/week: 2.0 standard drinks of alcohol     Types: 2 Glasses of wine per week     Comment: 1-2 glasses of  some night    Drug use: No       FAMILY HISTORY:  Family  History   Problem Relation Age of Onset    No Known Problems Father     Cirrhosis Mother     No Known Problems Brother     No Known Problems Maternal Aunt     No Known Problems Maternal Uncle     No Known Problems Paternal Aunt     No Known Problems Paternal Uncle     No Known Problems Maternal Grandmother     No Known Problems Maternal Grandfather     No Known Problems Paternal Grandmother     No Known Problems Paternal Grandfather     No Known Problems Daughter     Pulmonary embolism Son     Breast cancer Neg Hx     Colon cancer Neg Hx     Ovarian cancer Neg Hx     Amblyopia Neg Hx     Blindness Neg Hx     Cancer Neg Hx     Cataracts Neg Hx     Diabetes Neg Hx     Glaucoma Neg Hx     Hypertension Neg Hx     Macular degeneration Neg Hx     Retinal detachment Neg Hx     Strabismus Neg Hx     Stroke Neg Hx     Thyroid disease Neg Hx        CURRENT MEDICATIONS:   Current Outpatient Medications   Medication Sig    acetaminophen (TYLENOL) 650 MG TbSR Take 1 tablet (650 mg total) by mouth every 8 (eight) hours.    aspirin (ECOTRIN) 81 MG EC tablet Take 1 tablet (81 mg total) by mouth once daily.    diazePAM (VALIUM) 2 MG tablet Take 1 tablet (2 mg total) by mouth every 8 (eight) hours as needed for Insomnia (RLS).    docusate sodium (COLACE) 100 MG capsule Take 1 capsule (100 mg total) by mouth 2 (two) times daily as needed.    estradiol-norethindrone acet 0.5-0.1 mg per tablet Take 1 tablet by mouth once daily.    gabapentin (NEURONTIN) 600 MG tablet Take 600 mg by mouth every evening.    HYDROcodone-acetaminophen (NORCO) 7.5-325 mg per tablet Take 1 tablet by mouth daily as needed for Pain.    naloxegoL (MOVANTIK) 25 mg tablet Take 25 mg by mouth once daily.    ofloxacin (OCUFLOX) 0.3 % ophthalmic solution Place 1 drop into the right eye 3 (three) times daily.    pantoprazole (PROTONIX) 40 MG tablet TAKE 1 TABLET(40 MG) BY MOUTH EVERY DAY    pantoprazole (PROTONIX) 40 MG tablet TAKE 1 TABLET(40 MG) BY MOUTH EVERY DAY     polyethylene glycol 3350 (MIRALAX ORAL) Take by mouth once daily.    pramipexole (MIRAPEX) 0.5 MG tablet Take 0.5 mg by mouth every evening.    prednisoLONE acetate (PRED FORTE) 1 % DrpS Place 1 drop into the right eye 3 (three) times daily.    temazepam (RESTORIL) 15 mg Cap Take 1 capsule (15 mg total) by mouth nightly as needed.    temazepam (RESTORIL) 15 mg Cap Take 1 capsule (15 mg total) by mouth nightly as needed.    verapamiL (VERELAN) 120 MG C24P Take 1 capsule (120 mg total) by mouth once daily.    vitamin D (VITAMIN D3) 1000 units Tab Take 1,000 Units by mouth once daily.     No current facility-administered medications for this visit.       ALLERGIES:   Review of patient's allergies indicates:   Allergen Reactions    Baclofen      Incoherent     Nsaids (non-steroidal anti-inflammatory drug)      GI Bleed  Had 5 blood transfusions       Review of Systems:     Pertinent positives and negatives included in the HPI. Otherwise a complete review of systems is negative.    Physical Exam:     Vitals:    08/15/23 0950   BP: (!) 112/55   Pulse: 72   Resp: 20   Temp: 97.6 °F (36.4 °C)     General: Appears well, NAD  Pulmonary: CTAB, no increased work of breathing, no W/R/C  Cardiovascular: S1S2 normal, RRR, no M/R/G  Abdominal: Soft, NT, ND, BS+, no HSM  Extremities: No C/C/E  Neurological: AAOx4, grossly normal, no focal deficits  Dermatologic: No appreciable rashes or lesions  Lymphatic: No cervical, axillary, or inguinal lymphadenopathy     ECOG Performance Status: (foot note - ECOG PS provided by Eastern Cooperative Oncology Group) 0 - Asymptomatic    Karnofsky Performance Score:  100%- Normal, No Complaints, No Evidence of Disease    Labs:   Lab Results   Component Value Date    WBC 6.36 08/15/2023    RBC 2.35 (L) 08/15/2023    HGB 8.3 (L) 08/15/2023    HCT 25.1 (L) 08/15/2023     (H) 08/15/2023    MCH 35.3 (H) 08/15/2023    MCHC 33.1 08/15/2023    RDW 19.5 (H) 08/15/2023     08/15/2023    MPV  9.2 08/15/2023    GRAN 3.5 08/15/2023    GRAN 54.5 08/15/2023    LYMPH 1.8 08/15/2023    LYMPH 27.8 08/15/2023    MONO 0.9 08/15/2023    MONO 13.4 08/15/2023    EOS 0.2 08/15/2023    BASO 0.08 08/15/2023    EOSINOPHIL 2.7 08/15/2023    BASOPHIL 1.3 08/15/2023       CMP  Sodium   Date Value Ref Range Status   08/15/2023 134 (L) 136 - 145 mmol/L Final     Potassium   Date Value Ref Range Status   08/15/2023 4.8 3.5 - 5.1 mmol/L Final     Chloride   Date Value Ref Range Status   08/15/2023 102 95 - 110 mmol/L Final     CO2   Date Value Ref Range Status   08/15/2023 26 23 - 29 mmol/L Final     Glucose   Date Value Ref Range Status   08/15/2023 90 70 - 110 mg/dL Final     BUN   Date Value Ref Range Status   08/15/2023 11 8 - 23 mg/dL Final     Creatinine   Date Value Ref Range Status   08/15/2023 0.9 0.5 - 1.4 mg/dL Final     Calcium   Date Value Ref Range Status   08/15/2023 9.3 8.7 - 10.5 mg/dL Final     Total Protein   Date Value Ref Range Status   08/15/2023 6.7 6.0 - 8.4 g/dL Final     Albumin   Date Value Ref Range Status   08/15/2023 4.2 3.5 - 5.2 g/dL Final     Total Bilirubin   Date Value Ref Range Status   08/15/2023 0.9 0.1 - 1.0 mg/dL Final     Comment:     For infants and newborns, interpretation of results should be based  on gestational age, weight and in agreement with clinical  observations.    Premature Infant recommended reference ranges:  Up to 24 hours.............<8.0 mg/dL  Up to 48 hours............<12.0 mg/dL  3-5 days..................<15.0 mg/dL  6-29 days.................<15.0 mg/dL       Alkaline Phosphatase   Date Value Ref Range Status   08/15/2023 50 (L) 55 - 135 U/L Final     AST ([place])   Date Value Ref Range Status   12/31/2015 14 14 - 36 U/L Final     AST   Date Value Ref Range Status   08/15/2023 15 10 - 40 U/L Final     ALT   Date Value Ref Range Status   08/15/2023 16 10 - 44 U/L Final     Anion Gap   Date Value Ref Range Status   08/15/2023 6 (L) 8 - 16 mmol/L Final      eGFR if    Date Value Ref Range Status   05/12/2022 >60.0 >60 mL/min/1.73 m^2 Final     eGFR if non    Date Value Ref Range Status   05/12/2022 59.3 (A) >60 mL/min/1.73 m^2 Final     Comment:     Calculation used to obtain the estimated glomerular filtration  rate (eGFR) is the CKD-EPI equation.        Pathology:  Reviewed      Assessment and Plan:   Shelly Vásquez (Shelly) is a pleasant 82 y.o.female with a past medical history of low risk MDS who presents for follow up.    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  She is had a mild anemia which has been stable for years. We discussed Retacrit as an option to increase her hemoglobin, but she is not interested at this time as she is not symptomatic from her anemia.   Continue observation.     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 Transfuse pre-op as above.    Follow up: Will arrange follow up q6 months.     Orders/Follow Up:           Route Chart for Scheduling    BMT Chart Routing      Follow up with physician 6 months.   Follow up with CED    Provider visit type Malignant hem   Infusion scheduling note    Injection scheduling note    Labs CBC, CMP, phosphorus, magnesium and type and screen   Scheduling:  Preferred lab:  Lab interval:  prior to visit   Imaging    Pharmacy appointment    Other referrals                 Advance Care Planning   Date: 03/15/2023  She has low risk MDS. We previously discussed the good overall prognosis associated with this diagnosis. She is interested in treatment if need be. At this time will continue supportive care.       Total time of this visit was 30, including time spent face to face with patient and/or via video/audio, and also in preparing for today's visit for MDM and documentation. (Medical Decision Making, including consideration of  possible diagnoses, management options, complex medical record review, review of diagnostic tests and information, consideration and discussion of significant complications based on comorbidities, and discussion with providers involved with the care of the patient). Greater than 50% was spent face to face with the patient counseling and coordinating care.      Karan Lopez MD  Hematology, Oncology, and Stem Cell Transplantation  [facility]

## 2023-08-18 ENCOUNTER — PATIENT MESSAGE (OUTPATIENT)
Dept: INTERNAL MEDICINE | Facility: CLINIC | Age: 83
End: 2023-08-18
Payer: MEDICARE

## 2023-08-18 ENCOUNTER — CLINICAL SUPPORT (OUTPATIENT)
Dept: REHABILITATION | Facility: OTHER | Age: 83
End: 2023-08-18
Payer: MEDICARE

## 2023-08-18 ENCOUNTER — PATIENT MESSAGE (OUTPATIENT)
Dept: HEMATOLOGY/ONCOLOGY | Facility: CLINIC | Age: 83
End: 2023-08-18
Payer: MEDICARE

## 2023-08-18 DIAGNOSIS — M54.10 RADICULOPATHY WITH LOWER EXTREMITY SYMPTOMS: ICD-10-CM

## 2023-08-18 PROCEDURE — 97110 THERAPEUTIC EXERCISES: CPT | Mod: PN

## 2023-08-18 PROCEDURE — 97161 PT EVAL LOW COMPLEX 20 MIN: CPT | Mod: PN

## 2023-08-18 NOTE — PROGRESS NOTES
[facility]  Physical Therapy Initial Evaluation    Name: Shelly Vásquez  Clinic Number: [id number]    Therapy Diagnosis:   Encounter Diagnosis   Name Primary?    Radiculopathy with lower extremity symptoms      Physician: Marlene Méndez MD    Physician Orders: Physical Therapy Evaluate and Treat  Medical Diagnosis from Referral: radiculopathy with lower extremity symptoms   Evaluation Date: 8/18/23  Authorization Period Expiration: 8/14/24  Plan of Care Expiration: 8/18/2023 to 11/18/23  Visit # / Visits authorized: 1/1 (pending additional authorization following initial evaluation)   FOTO: 0/3  on 8/18/23      Time In: 900a  Time Out: 1010a  Total Billable Time: 70 minutes    Precautions: standard    Subjective     History of current condition - Shelly reports: difficulty walking after right NAT (4/29/23) ~ 4 months ago.     Patient has noticed difficulty staying awake since her NAT. She is having difficulty with endurance zachery with stair navigation.    Difficulty getting up from a low chair. She notices she has to look down for balance    Patient currently doing home exercise program including SLR and swimming 45 minutes.    Patient currently able to walk 15-20 minutes before needing a seated rest break.     Medical History:   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)       Surgical History:   Shelly Vásquez  has a past surgical history that includes Dilation and curettage of uterus (2008); Endometrial biopsy; Beast Lift (2004); Tubal ligation; TONSILLECTOMY, ADENOIDECTOMY (age 8); Appendectomy (age 21); Eye surgery; ptsosis;  Back surgery; Cosmetic surgery; Small intestine surgery; Radiofrequency ablation (03/2018); Total Reduction Mammoplasty (Bilateral, 2003); Breast biopsy (50yrs ago); Bone marrow biopsy (Right, 12/19/2019); Transforaminal epidural injection of steroid (Right, 8/11/2022); Epidural steroid injection (N/A, 8/25/2022); Transforaminal epidural injection of steroid (Right, 10/21/2022); Injection of anesthetic agent around nerve (Bilateral, 11/11/2022); Injection of joint (Right, 12/2/2022); and Arthroplasty of hip by anterior approach (Right, 4/26/2023).    Medications:   Shelly Wilkinson has a current medication list which includes the following prescription(s): acetaminophen, aspirin, azithromycin, diazepam, estradiol-norethindrone acet, gabapentin, ofloxacin, pantoprazole, polyethylene glycol 3350, pramipexole, prednisolone acetate, temazepam, verapamil, and vitamin d.    Allergies:   Review of patient's allergies indicates:   Allergen Reactions    Baclofen      Incoherent     Nsaids (non-steroidal anti-inflammatory drug)      GI Bleed  Had 5 blood transfusions        Imaging: Postoperative changes of right hip arthroplasty identified.  The position and alignment is satisfactory.  Mild DJD involved in the left hip.  No fracture or bone destruction identified    Prior Therapy: yes  Social History: Lives in 2 story home with SO - has elevator for ascending stairs  Occupation: Retired  Prior Level of Function: Frequent falls, difficulty walking  Current Level of Function: difficulty with stairs, no falls since surgery    Pain:  Current 0/10, worst 2/10, best 0/10   Location: right hip  Description: Aching  Aggravating Factors: exercise  Easing Factors: rest    Pts goals: Pt would like to return to walking with no increase in left hip pain/weakness.    Objective     WNL=within normal limits  WFL=within functional limits  NT=not tested  *=pain    Posture: right knee flexion, weight shift to left   Palpation: no TTP  Sensation:  "intact  Deep tendon reflexes: NT    Lower Extremity Strength:    Right LE   Left LE      Iliopsoas:  L2 3+/5 Iliopsoas: L2 3+/5    Quadriceps:  L3 - femoral nerve 3+/5 Quadriceps: L3 - femoral nerve 3+/5    Hip adduction:  L3 - obterator 4/5 Hip adduction: L3 - obterator 4/5    Hamstrings:  S2 4/5 Hamstrings: S2 4/5    Ankle DF/EV:  L4 4/5 Ankle DF/EV: L4 4/5    GT Ext:  L5 NT GT Ext L5 NT    Hip Abduction:  L5-S1 - Superior gluteal nerve 3/5 Hip Abduction: L5-S1 - Superior gluteal nerve 4/5    Hip extension:  L5-S2 - Inferior gluteal nerve 3/5 Hip extension: L5-S2 - Inferior gluteal nerve 3+/5    Ankle PF:   S1 - tibial nerve NT Ankle PF S1 - tibial nerve NT         Functional Tests:   SL Balance   R: 3 sec L: 5 sec    Balance with NBOS: 30 sec      Balance with NBOS on foam: 10 sec      30" sit to stand: 5 reps    TUG:  Trial 1: 21.1  Trial 2: 18.2  Trial 3: 19.1        Hip Passive Range of Motion:   Right  Left    Flexion 90 105   Abduction 40 45   Extension 5 10   Ext. Rotation NT NT   Int. Rotation NT NT           CMS Impairment/Limitation/Restriction for FOTO Survey    Therapist reviewed FOTO scores for Shelly Vásquez on 8/18/2023.   FOTO documents entered into [employer] - see Media section.    Limitation Score: TBD%  Predicted Goal: TBD%    Category: Mobility     TREATMENT     Treatment Time In: 945a  Treatment Time Out: 1000a  Total Treatment time separate from Evaluation: 15 minutes    Therapeutic Exercises were provided for 15 minutes to improve strength and AROM including:    Sit to stand x10  Marching x 10  Standing hip abduction x10  Tandem walk x 10'  Standing ankle PF/DF x10    Home Exercises and Patient Education Provided:    Education provided:   - Findings; prognosis and plan of care (POC)  - Home exercise program (HEP)  - Modality options  - Therapist contact information    Written Home Exercises Provided: Yes  Exercises were reviewed and Shelly was able to demonstrate them prior to the end of the " session.  Shelly demonstrated good understanding of the education provided.       Assessment     Shelly Wilkinson is a 82 y.o. female referred to outpatient Physical Therapy with a medical diagnosis of right lower extremity radiculopathy. Pt presents to PT with pain, decreased right hip ROM, decreased strength and flexibility, poor posture, and functional deficits with standing/walking and balance. These deficits are negatively impacting this patient's ability to complete their work duties and activities of daily living.     Pt prognosis is Good.   Pt will benefit from skilled outpatient Physical Therapy to address the deficits stated above and in the chart below, provide pt/family education, and to maximize pt's level of independence.     Plan of care discussed with patient: Yes  Pt's spiritual, cultural and educational needs considered and pt agreeable to plan of care and goals as stated below:     Anticipated Barriers for therapy: None      Medical Necessity is demonstrated by the following  History  Co-morbidities and personal factors that may impact the plan of care Co-morbidities:   advanced age    Personal Factors:   age     low   Examination  Body Structures and Functions, activity limitations and participation restrictions that may impact the plan of care Body Regions:   lower extremities    Body Systems:    ROM  strength  balance  gait  transfers  motor control    Participation Restrictions:   Walking    Activity limitations:   Learning and applying knowledge  no deficits    General Tasks and Commands  No Deficits    Communication  No Deficits    Mobility  walking    Self care  no deficits    Domestic Life  No Deficits    Interactions/Relationships  No Deficits    Life Areas  No Deficits    Community and Social Life  No Deficits         low   Clinical Presentation stable and uncomplicated low   Decision Making/ Complexity Score: low     GOALS:  Short Term Goals (4 Weeks):  1. Patient will be compliant with home  exercise program to supplement therapy in restoring pain free function. (progressing, not met)    2. Patient will improve impaired lower extremity manual muscle tests to >/= 4/5 to improve dynamic hip/knee support for functional tasks. (progressing, not met)    3. Pt will tolerate walking for 30 minutes with no increase in right hip pain/weakness to return to PLOF. (progressing, not met)    4. Pt will improve right hip pain to 0/10 at worst for improved QOL. (progressing, not met)        Long Term Goals (8 Weeks):  1. Patient will improve FOTO score by 10% limited to decrease perceived limitation with mobility. (progressing, not met)    2. Patient will improve impaired lower extremity manual muscle tests to >/= 4+/5 to improve dynamic hip/knee support for functional tasks. (progressing, not met)    3. Patient will tolerate walking for 45 minutes with no increase in right hip pain/weakness to return to PLOF. (progressing, not met)            Plan     Plan of care Certification: 8/18/2023 to 11/18/23    Outpatient Physical Therapy 2 times weekly for 12 weeks to include the following interventions: Therapeutic Exercises, Manual Therapeutic Technique, Neuromuscular Re Education, Therapeutic Activities. Modalities, Kinesiotape prn, and Functional Dry Needling as needed.    Leroy Alston, PT,  DPT, OCS

## 2023-08-20 NOTE — PROGRESS NOTES
Subjective:       Patient ID: Shelly Vásquez is a 82 y.o. female who presents today for:    Chief Complaint:   Chief Complaint   Patient presents with    Annual Exam    [facility]       HPI:  Very pleasant 82-year-old nonsmoking female here for an annual physical exam with [facility].  She is a past medical history significant last anemia, mild hypertension, restless legs syndrome, lumbar spondylosis and in April she had a total hip replacement on the right.  She does have now leg length discrepancy with the right leg being somewhat longer than her left leg.  She still has which she feels is restless leg syndrome.  She is on from a phrase all to 0.5 mg tablets at night along with gabapentin 600 mg and temazepam 15 mg.  She takes all her medicines at night.  She started about 4 or 5:00 p.m..  She is undergoing physical therapy [facility] but this should end soon and she would like to start physical therapy for balance and strengthening.  She is still exercising a few times a week and meditating every morning.  She is complaining about daytime fatigue as well as a wet cough and congestion for the past week at least.    Review of Systems   Constitutional:  Negative for chills, fever and weight loss.   HENT:  Negative for sore throat.    Eyes:  Negative for blurred vision and double vision.   Respiratory:  Negative for cough and shortness of breath.    Cardiovascular:  Negative for chest pain and palpitations.   Gastrointestinal:  Negative for constipation, diarrhea, nausea and vomiting.   Genitourinary:  Negative for dysuria and hematuria.   Musculoskeletal:  Negative for joint pain and myalgias.   Skin:  Negative for itching and rash.   Neurological:  Negative for sensory change, focal weakness and headaches.   Endo/Heme/Allergies:  Does not bruise/bleed easily.   Psychiatric/Behavioral:  Negative for depression and suicidal ideas.         Medications:  Outpatient Encounter Medications  as of 8/15/2023   Medication Sig Note Dispense Refill    acetaminophen (TYLENOL) 650 MG TbSR Take 1 tablet (650 mg total) by mouth every 8 (eight) hours.  120 tablet 0    aspirin (ECOTRIN) 81 MG EC tablet Take 1 tablet (81 mg total) by mouth once daily.  30 tablet 0    diazePAM (VALIUM) 2 MG tablet Take 1 tablet (2 mg total) by mouth every 8 (eight) hours as needed for Insomnia (RLS). 4/4/2023: Take as needed PM 45 tablet 1    estradiol-norethindrone acet 0.5-0.1 mg per tablet Take 1 tablet by mouth once daily.  90 tablet 3    gabapentin (NEURONTIN) 600 MG tablet Take 600 mg by mouth every evening.       ofloxacin (OCUFLOX) 0.3 % ophthalmic solution Place 1 drop into the right eye 3 (three) times daily. 4/4/2023: Use as sched      pantoprazole (PROTONIX) 40 MG tablet TAKE 1 TABLET(40 MG) BY MOUTH EVERY DAY  90 tablet 1    polyethylene glycol 3350 (MIRALAX ORAL) Take by mouth once daily.       prednisoLONE acetate (PRED FORTE) 1 % DrpS Place 1 drop into the right eye 3 (three) times daily. 4/4/2023: Use as sched      temazepam (RESTORIL) 15 mg Cap Take 1 capsule (15 mg total) by mouth nightly as needed.  90 capsule 0    verapamiL (VERELAN) 120 MG C24P Take 1 capsule (120 mg total) by mouth once daily. 4/4/2023: Take as sched PM 90 capsule 3    vitamin D (VITAMIN D3) 1000 units Tab Take 1,000 Units by mouth once daily. 4/4/2023: Hold 7 days prior to surgery      [DISCONTINUED] docusate sodium (COLACE) 100 MG capsule Take 1 capsule (100 mg total) by mouth 2 (two) times daily as needed.  60 capsule 0    [DISCONTINUED] HYDROcodone-acetaminophen (NORCO) 7.5-325 mg per tablet Take 1 tablet by mouth daily as needed for Pain.  7 tablet 0    [DISCONTINUED] naloxegoL (MOVANTIK) 25 mg tablet Take 25 mg by mouth once daily.  30 tablet 0    [DISCONTINUED] pantoprazole (PROTONIX) 40 MG tablet TAKE 1 TABLET(40 MG) BY MOUTH EVERY DAY  90 tablet 1    [DISCONTINUED] pramipexole (MIRAPEX) 0.5 MG tablet Take 0.5 mg by mouth every  "evening.       [DISCONTINUED] temazepam (RESTORIL) 15 mg Cap Take 1 capsule (15 mg total) by mouth nightly as needed.  90 capsule 0    azithromycin (Z-ALOK) 250 MG tablet Take 2 tablets by mouth on day 1; Take 1 tablet by mouth on days 2-5  6 tablet 0    pramipexole (MIRAPEX) 0.5 MG tablet 1 in am and 2 at night  90 tablet 2    [DISCONTINUED] pantoprazole (PROTONIX) 40 MG tablet Take 1 tablet (40 mg total) by mouth once daily. (Patient taking differently: Take 40 mg by mouth every evening.) 4/25/2023: Take as scheduled 90 tablet 1     No facility-administered encounter medications on file as of 8/15/2023.       Allergies:  Review of patient's allergies indicates:   Allergen Reactions    Baclofen      Incoherent     Nsaids (non-steroidal anti-inflammatory drug)      GI Bleed  Had 5 blood transfusions       Health Maintenance:  Immunization History   Administered Date(s) Administered    COVID-19, MRNA, LN-S, PF (Pfizer) (Gray Cap) 07/28/2022    COVID-19, MRNA, LN-S, PF (Pfizer) (Purple Cap) 01/09/2021, 01/30/2021, 09/08/2021    Influenza (FLUAD) - Quadrivalent - Adjuvanted - PF *Preferred* (65+) 10/02/2021    Influenza (FLUAD) - Trivalent - Adjuvanted - PF (65+) 12/14/2017, 12/10/2018    Influenza - High Dose - PF (65 years and older) 10/15/2014, 10/10/2016, 10/21/2019    Influenza - Trivalent - PF (ADULT) 08/31/2020    Pneumococcal Conjugate - 13 Valent 04/16/2018    Pneumococcal Polysaccharide - 23 Valent 08/26/2019    Tdap 03/31/2014      Health Maintenance   Topic Date Due    Shingles Vaccine (1 of 2) Never done    Colonoscopy  04/29/2019    TETANUS VACCINE  03/31/2024    DEXA Scan  04/10/2026    Lipid Panel  08/15/2028          Objective:      Vital Signs  Temp: 97.6 °F (36.4 °C)  Pulse: 72  SpO2: 96 %  BP: (!) 112/55  Height and Weight  Height: 5' 8" (172.7 cm)  Weight: 68.9 kg (152 lb)  BSA (Calculated - sq m): 1.82 sq meters  BMI (Calculated): 23.1  Weight in (lb) to have BMI = 25: 164.1]    Physical " Exam  HENT:      Head: Normocephalic and atraumatic.   Eyes:      General: No scleral icterus.  Cardiovascular:      Rate and Rhythm: Normal rate and regular rhythm.      Heart sounds: No murmur heard.  Pulmonary:      Effort: Pulmonary effort is normal.      Breath sounds: Normal breath sounds. No rales.   Abdominal:      General: Bowel sounds are normal.      Palpations: Abdomen is soft.      Tenderness: There is no abdominal tenderness.   Musculoskeletal:         General: No tenderness.      Cervical back: Neck supple.   Neurological:      Mental Status: She is alert and oriented to person, place, and time.   Psychiatric:         Mood and Affect: Mood normal.         Behavior: Behavior normal.         Thought Content: Thought content normal.         Judgment: Judgment normal.        Lab Results   Component Value Date    WBC 6.36 08/15/2023    HGB 8.3 (L) 08/15/2023    HCT 25.1 (L) 08/15/2023     08/15/2023    CHOL 155 08/15/2023    TRIG 46 08/15/2023    HDL 76 (H) 08/15/2023    ALT 16 08/15/2023    AST 15 08/15/2023     (L) 08/15/2023    K 4.8 08/15/2023     08/15/2023    CREATININE 0.9 08/15/2023    BUN 11 08/15/2023    CO2 26 08/15/2023    TSH 1.709 08/15/2023    INR 1.1 04/21/2023    HGBA1C 5.1 08/15/2023   She was    Assessment/plan:     Shelly Vásquez is a 82 y.o.female here for an Annual physical exam    RLS (restless legs syndrome)  Comments:  Will added does of pramipexole in the morning and 0.5 and continue 1 mg at night.  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    Comments:  Etiology for her anemia.  patient has appoint with Hematology.    Vitamin D deficiency  Comments:  Restart vitamin-D 1000 IU daily    Radiculopathy with lower extremity symptoms  Comments:  Will attempt to wean the gabapentin in look out for radicular symptoms.  Orders:  -      Ambulatory referral/consult to Physical/Occupational Therapy; Future; Expected date: 08/22/2023    Colon cancer screening  -     Fecal Immunochemical Test (iFOBT); Future; Expected date: 08/15/2023    Acute bronchitis -  -     azithromycin (Z-ALOK) 250 MG tablet; Take 2 tablets by mouth on day 1; Take 1 tablet by mouth on days 2-5  Dispense: 6 tablet; Refill: 0        Future Appointments   Date Time Provider Department Center   8/21/2023  9:00 AM Michael Sims, PTA [facility] [place]   8/23/2023  9:30 AM [facility]   8/23/2023  2:00 PM Jennifer Flores, PT [facility] [place]   8/28/2023  2:00 PM Jennifer Flores, PT [facility] [place]   8/30/2023  2:00 PM Michael Sims, PTA [facility] [place]   9/6/2023  2:00 PM Michael Sims, PTA [facility] [place]   9/8/2023  2:00 PM Leroy Alston, PT [facility] [place]   9/11/2023  2:00 PM Audrey Delgado, PT [facility] [place]   9/13/2023  2:00 PM Leroy Alston, PT [facility] [place]   9/15/2023  4:00 PM Erlin Lopez MD [facility] [place] [facility]   9/18/2023  2:00 PM Leroy Alston, PT [facility] [place]   9/20/2023  2:00 PM Leroy Alston, PT [facility] [place]   9/25/2023  2:00 PM Leroy Alston, PT [facility] [place]   9/27/2023  2:00 PM Leroy Alston, PT [facility] [place]       Marlene Andrew MD  [facility]

## 2023-08-21 ENCOUNTER — DOCUMENTATION ONLY (OUTPATIENT)
Dept: REHABILITATION | Facility: OTHER | Age: 83
End: 2023-08-21

## 2023-08-21 ENCOUNTER — CLINICAL SUPPORT (OUTPATIENT)
Dept: REHABILITATION | Facility: OTHER | Age: 83
End: 2023-08-21
Payer: MEDICARE

## 2023-08-21 DIAGNOSIS — M54.10 RADICULOPATHY WITH LOWER EXTREMITY SYMPTOMS: Primary | ICD-10-CM

## 2023-08-21 PROCEDURE — 97110 THERAPEUTIC EXERCISES: CPT | Mod: PN,CQ

## 2023-08-21 PROCEDURE — 97112 NEUROMUSCULAR REEDUCATION: CPT | Mod: PN,CQ

## 2023-08-21 NOTE — PROGRESS NOTES
[facility]   Physical Therapy Treatment Note     Name: Shelly Vásquez  Clinic Number: [id number]    Therapy Diagnosis:   Encounter Diagnosis   Name Primary?    Radiculopathy with lower extremity symptoms Yes     Physician: Marlene Méndez MD    Visit Date: 8/21/2023    Physician Orders: Physical Therapy Evaluate and Treat  Medical Diagnosis from Referral: radiculopathy with lower extremity symptoms   Evaluation Date: 8/18/23  Authorization Period Expiration: 8/14/24  Plan of Care Expiration: 8/18/2023 to 11/18/23  Visit # / Visits authorized: 2/20  FOTO: 0/3  on 8/18/23    Precautions: standard    Time In: 0900  Time Out: 1000  Total Billable Time: 60 minutes    SUBJECTIVE     Pt reports: She has been trying to perform her HEP as directed however the sit to stand are very difficult for her.   She was compliant with home exercise program.  Response to previous treatment: tolerated well  Functional change: n/a    Prior Level of Function: Frequent falls, difficulty walking  Current Level of Function: difficulty with stairs, no falls since surgery     Pain:  Current 0/10, worst 2/10, best 0/10   Location: right hip  Description: Aching  Aggravating Factors: exercise  Easing Factors: rest     Pts goals: Pt would like to return to walking with no increase in left hip pain/weakness.    OBJECTIVE     Objective Measures updated at progress report unless specified.     Treatment     Shelly received the treatments listed below:      Therapeutic Exercises were provided for 37 minutes to improve strength and AROM including:     +Bridging, 3x10  +SLR 1# 3x10 R/L  +Supine clams, RTB, 3x10  +LAQ 2# 30x R/L  Standing hip abduction 2x10  Standing ankle PF/DF x30    manual therapy techniques: Joint mobilizations, Myofacial release, and Soft tissue Mobilization were applied to the: 00 for 00 minutes, including:  NP    neuromuscular re-education activities to improve: Balance, Coordination, Kinesthetic,  "Sense, and Proprioception for 23 minutes. The following activities were included:    Sit to stand 2x10 ( elevated high/low )  +Step ups 6" 20x R/L  Standing marches, 2x10  +Mini squats, 2x10  Tandem walk x 10'      therapeutic activities to improve functional performance for 00  minutes, including:        Patient Education and Home Exercises     Home Exercises Provided and Patient Education Provided     Education provided:   - Exercise form and rationale     Written Home Exercises Provided: Patient instructed to cont prior HEP. Exercises were reviewed and Shelly was able to demonstrate them prior to the end of the session.  Shelly demonstrated good  understanding of the education provided. See EMR under Patient Instructions for exercises provided during therapy sessions    ASSESSMENT     Pt tolerated exercise well. Muscle weakness in quads and glutes were notable with functional sit to stand as poor eccentric control was noted however she was able to improve with cuing for technique.  Will continue to focus on improving strength and endurance as well as ADL performance.       Shelly Is progressing well towards her goals.   Pt prognosis is Good.     Pt will continue to benefit from skilled outpatient physical therapy to address the deficits listed in the problem list box on initial evaluation, provide pt/family education and to maximize pt's level of independence in the home and community environment.     Pt's spiritual, cultural and educational needs considered and pt agreeable to plan of care and goals.     Anticipated Barriers for therapy: None    GOALS:  Short Term Goals (4 Weeks):  1. Patient will be compliant with home exercise program to supplement therapy in restoring pain free function. (progressing, not met)    2. Patient will improve impaired lower extremity manual muscle tests to >/= 4/5 to improve dynamic hip/knee support for functional tasks. (progressing, not met)    3. Pt will tolerate walking for 30 " minutes with no increase in right hip pain/weakness to return to PLOF. (progressing, not met)    4. Pt will improve right hip pain to 0/10 at worst for improved QOL. (progressing, not met)          Long Term Goals (8 Weeks):  1. Patient will improve FOTO score by 10% limited to decrease perceived limitation with mobility. (progressing, not met)    2. Patient will improve impaired lower extremity manual muscle tests to >/= 4+/5 to improve dynamic hip/knee support for functional tasks. (progressing, not met)    3. Patient will tolerate walking for 45 minutes with no increase in right hip pain/weakness to return to PLOF. (progressing, not met)      PLAN     Plan of care Certification: 8/18/2023 to 11/18/23    Focus on hip/LE strength with emphasis on ADL/functional performance.      Outpatient Physical Therapy 2 times weekly for 12 weeks to include the following interventions: Therapeutic Exercises, Manual Therapeutic Technique, Neuromuscular Re Education, Therapeutic Activities. Modalities, Kinesiotape prn, and Functional Dry Needling as needed    Michael Sims, PTA

## 2023-08-21 NOTE — PROGRESS NOTES
Physical Therapist and Physical Therapist Assistant met face to face to discuss patient's treatment plan and progress towards established goals. Pt will be seen by a physical therapist minimally every 6th visit or every 30 days.    Michael Sims, PTA  08/21/2023

## 2023-08-23 ENCOUNTER — OFFICE VISIT (OUTPATIENT)
Dept: OPHTHALMOLOGY | Facility: CLINIC | Age: 83
End: 2023-08-23
Attending: OPHTHALMOLOGY
Payer: MEDICARE

## 2023-08-23 DIAGNOSIS — H25.13 NUCLEAR SCLEROSIS, BILATERAL: Primary | ICD-10-CM

## 2023-08-23 PROCEDURE — 99213 OFFICE O/P EST LOW 20 MIN: CPT | Mod: PBBFAC | Performed by: OPHTHALMOLOGY

## 2023-08-23 PROCEDURE — 99999 PR PBB SHADOW E&M-EST. PATIENT-LVL III: CPT | Mod: PBBFAC,,, | Performed by: OPHTHALMOLOGY

## 2023-08-23 PROCEDURE — 99214 OFFICE O/P EST MOD 30 MIN: CPT | Mod: S$PBB,,, | Performed by: OPHTHALMOLOGY

## 2023-08-23 PROCEDURE — 99214 PR OFFICE/OUTPT VISIT, EST, LEVL IV, 30-39 MIN: ICD-10-PCS | Mod: S$PBB,,, | Performed by: OPHTHALMOLOGY

## 2023-08-23 PROCEDURE — 99999 PR PBB SHADOW E&M-EST. PATIENT-LVL III: ICD-10-PCS | Mod: PBBFAC,,, | Performed by: OPHTHALMOLOGY

## 2023-08-23 RX ORDER — PREDNISOLONE ACETATE-GATIFLOXACIN-BROMFENAC .75; 5; 1 MG/ML; MG/ML; MG/ML
1 SUSPENSION/ DROPS OPHTHALMIC 3 TIMES DAILY
Qty: 5 ML | Refills: 3 | Status: SHIPPED | OUTPATIENT
Start: 2023-08-23 | End: 2023-10-12

## 2023-08-23 RX ORDER — PHENYLEPHRINE HYDROCHLORIDE 100 MG/ML
1 SOLUTION/ DROPS OPHTHALMIC
Status: CANCELLED | OUTPATIENT
Start: 2023-08-23

## 2023-08-23 RX ORDER — CYCLOP/TROP/PROPA/PHEN/KET/WAT 1-1-0.1%
1 DROPS (EA) OPHTHALMIC (EYE)
Status: CANCELLED | OUTPATIENT
Start: 2023-08-23

## 2023-08-23 RX ORDER — MOXIFLOXACIN 5 MG/ML
1 SOLUTION/ DROPS OPHTHALMIC
Status: CANCELLED | OUTPATIENT
Start: 2023-08-23

## 2023-08-23 NOTE — PROGRESS NOTES
HPI    Patient presents today for a Cataract Evaluation. Pt was evaluated back in   January with Dr Willingham for cataracts. Patient notes vision as blurry.   Patient notes difficulty driving at night and has issues with glare.   Patient notes no eye pain.   Last edited by Bob Liu on 8/23/2023  9:54 AM.            Assessment /Plan     For exam results, see Encounter Report.    Nuclear sclerosis, bilateral        Visually Significant Cataract: Patient reports decreased vision consistent with the clinical amount of lenticular opacity, which reaches the level of visual significance and affects activities of daily living.     Specifically, this patient describes difficulty with:  - driving safely at night  - reading road signs  - reading small print  - deciphering medicine bottles  - reading the newspaper  - using the phone  - reading texts     Risks, benefits, and alternatives to cataract surgery were discussed and the consent reviewed. IOL options were discussed, including ATIOLs and the associated side effects and additional patient cost associated with them.   IOL Selections:   Right eye  IOL:  CNWTT3 18.5      Left eye  IOL: CNWTT0 18.5      Pt wishes to have right eye done first. Hx monovision, but preferes binocularity.    The patient expresses a desire to reduce spectacle dependence. I reviewed various IOL and LASER refractive surgical options and we will attempt to minimize spectacle dependence by managing astigmatism and optimizing IOL selection. Femtosecond LASER assisted cataract surgery (FLACS) technology was explained to the patient with educational videos and discussion.  The patient voices understanding and wishes to implement this technology during the cataract procedure.  I explained the increased precision of the LASER versus manual techniques, especially as it relates to astigmatism reduction with arcuate incisions.  I emphasized that although our goal is to reduce the need for refractive  correction after surgery, there may still be a need for spectacle correction to achieve optimal visual acuity, and that a reasonable range of functional vision should be the expectation.  No guarantees are made about post operative refraction or visual acuity, as the eye may heal in unpredictable ways, and the standard risks, benefits, and alternatives to cataract surgery were explained.  The patient understands that the refractive portions of this cataract procedure are not covered by insurance, and that there is an out of pocket expense of $2250 per eye. I also explained that even though our pre-operative plan is to utilize advanced refractive technologies during surgery, that I may decide to eliminate part or all of this plan if surgical challenges or complications arise, or I feel that it is not in the patient's best interest. Consent forms and an ABN form were given to the patient to review.    CALLISTO ONLY

## 2023-08-24 ENCOUNTER — CLINICAL SUPPORT (OUTPATIENT)
Dept: REHABILITATION | Facility: OTHER | Age: 83
End: 2023-08-24
Payer: MEDICARE

## 2023-08-24 DIAGNOSIS — M54.50 CHRONIC BILATERAL LOW BACK PAIN WITHOUT SCIATICA: ICD-10-CM

## 2023-08-24 DIAGNOSIS — G89.29 CHRONIC BILATERAL LOW BACK PAIN WITHOUT SCIATICA: ICD-10-CM

## 2023-08-24 DIAGNOSIS — M79.604 PAIN IN BOTH LOWER EXTREMITIES: ICD-10-CM

## 2023-08-24 DIAGNOSIS — M79.605 PAIN IN BOTH LOWER EXTREMITIES: ICD-10-CM

## 2023-08-24 PROCEDURE — 97112 NEUROMUSCULAR REEDUCATION: CPT | Mod: PN

## 2023-08-24 NOTE — PROGRESS NOTES
[facility]   Physical Therapy Treatment Note     Name: Shelly Dyerman  Clinic Number: [id number]    Therapy Diagnosis:   Encounter Diagnoses   Name Primary?    Chronic bilateral low back pain without sciatica     Pain in both lower extremities        Physician: Marlene Méndez MD    Visit Date: 8/24/2023    Physician Orders: Physical Therapy Evaluate and Treat  Medical Diagnosis from Referral: radiculopathy with lower extremity symptoms   Evaluation Date: 8/18/23  Authorization Period Expiration: 8/14/24  Plan of Care Expiration: 8/18/2023 to 11/18/23  Visit # / Visits authorized: 3/20  FOTO: 0/3  on 8/18/23    Precautions: standard    Time In: 1000am  Time Out: 1100am  Total Billable Time: 30 minutes    SUBJECTIVE     Pt reports: that she is doing well today. Pt states that she would like to have more consistent appointment times if possible. Pt does her HEP daily and is very motivated to participate in PT.    She was compliant with home exercise program.  Response to previous treatment: tolerated well  Functional change: n/a    Prior Level of Function: Frequent falls, difficulty walking  Current Level of Function: difficulty with stairs, no falls since surgery     Pain:  Current 0/10, worst 2/10, best 0/10   Location: right hip  Description: Aching  Aggravating Factors: exercise  Easing Factors: rest     Pts goals: Pt would like to return to walking with no increase in left hip pain/weakness.    OBJECTIVE     Objective Measures updated at progress report unless specified.     Treatment     Shelly received the treatments listed below:      Therapeutic Exercises were provided for 30 minutes to improve strength and AROM including:     Bridging, 3x10  SLR 1# 3x10 R/L  Supine clams, GTB, 3x10  LAQ 2# 30x R/L  Standing hip abduction 2x10  Standing ankle PF/DF x30    manual therapy techniques: Joint mobilizations, Myofacial release, and Soft tissue Mobilization were applied to the: 00  "for 00 minutes, including:  NP    neuromuscular re-education activities to improve: Balance, Coordination, Kinesthetic, Sense, and Proprioception for 30 minutes. The following activities were included:    Sit to stand 2x10 ( elevated high/low )  Step ups 6" 20x R/L  Standing marches, 2x10  Mini squats, 2x10  Tandem walk x 10'      therapeutic activities to improve functional performance for 00  minutes, including:        Patient Education and Home Exercises     Home Exercises Provided and Patient Education Provided     Education provided:   - Exercise form and rationale     Written Home Exercises Provided: Patient instructed to cont prior HEP. Exercises were reviewed and Shelly was able to demonstrate them prior to the end of the session.  Shelly demonstrated good  understanding of the education provided. See EMR under Patient Instructions for exercises provided during therapy sessions    ASSESSMENT     Pt tolerated treatment session well today and demonstrates improve hip abduction strength compared to her previous session. Continue PT POC.      Shelly Is progressing well towards her goals.   Pt prognosis is Good.     Pt will continue to benefit from skilled outpatient physical therapy to address the deficits listed in the problem list box on initial evaluation, provide pt/family education and to maximize pt's level of independence in the home and community environment.     Pt's spiritual, cultural and educational needs considered and pt agreeable to plan of care and goals.     Anticipated Barriers for therapy: None    GOALS:  Short Term Goals (4 Weeks):  1. Patient will be compliant with home exercise program to supplement therapy in restoring pain free function. (progressing, not met)    2. Patient will improve impaired lower extremity manual muscle tests to >/= 4/5 to improve dynamic hip/knee support for functional tasks. (progressing, not met)    3. Pt will tolerate walking for 30 minutes with no increase in right " hip pain/weakness to return to PLOF. (progressing, not met)    4. Pt will improve right hip pain to 0/10 at worst for improved QOL. (progressing, not met)          Long Term Goals (8 Weeks):  1. Patient will improve FOTO score by 10% limited to decrease perceived limitation with mobility. (progressing, not met)    2. Patient will improve impaired lower extremity manual muscle tests to >/= 4+/5 to improve dynamic hip/knee support for functional tasks. (progressing, not met)    3. Patient will tolerate walking for 45 minutes with no increase in right hip pain/weakness to return to PLOF. (progressing, not met)      PLAN     Plan of care Certification: 8/18/2023 to 11/18/23    Focus on hip/LE strength with emphasis on ADL/functional performance.      Outpatient Physical Therapy 2 times weekly for 12 weeks to include the following interventions: Therapeutic Exercises, Manual Therapeutic Technique, Neuromuscular Re Education, Therapeutic Activities. Modalities, Kinesiotape prn, and Functional Dry Needling as needed    Jennifer Flores, PT

## 2023-08-28 ENCOUNTER — CLINICAL SUPPORT (OUTPATIENT)
Dept: REHABILITATION | Facility: OTHER | Age: 83
End: 2023-08-28
Payer: MEDICARE

## 2023-08-28 DIAGNOSIS — G89.29 CHRONIC BILATERAL LOW BACK PAIN WITHOUT SCIATICA: Primary | ICD-10-CM

## 2023-08-28 DIAGNOSIS — M79.604 PAIN IN BOTH LOWER EXTREMITIES: ICD-10-CM

## 2023-08-28 DIAGNOSIS — M54.50 CHRONIC BILATERAL LOW BACK PAIN WITHOUT SCIATICA: Primary | ICD-10-CM

## 2023-08-28 DIAGNOSIS — M79.605 PAIN IN BOTH LOWER EXTREMITIES: ICD-10-CM

## 2023-08-28 PROCEDURE — 97112 NEUROMUSCULAR REEDUCATION: CPT | Mod: PN

## 2023-08-28 NOTE — PROGRESS NOTES
[facility]   Physical Therapy Treatment Note     Name: Shelly Dyerman  Clinic Number: [id number]    Therapy Diagnosis:   Encounter Diagnoses   Name Primary?    Chronic bilateral low back pain without sciatica Yes    Pain in both lower extremities        Physician: Marlene Méndez MD    Visit Date: 8/28/2023    Physician Orders: Physical Therapy Evaluate and Treat  Medical Diagnosis from Referral: radiculopathy with lower extremity symptoms   Evaluation Date: 8/18/23  Authorization Period Expiration: 8/14/24  Plan of Care Expiration: 8/18/2023 to 11/18/23  Visit # / Visits authorized: 3/20  FOTO: 0/3  on 8/18/23    Precautions: standard    Time In: 1200pm  Time Out: 0100pm  Total Billable Time: 30 minutes    SUBJECTIVE     Pt reports: that she is doing well today. Pt states that she is much better today and continues to get stronger.   She was compliant with home exercise program.  Response to previous treatment: tolerated well  Functional change: n/a    Prior Level of Function: Frequent falls, difficulty walking  Current Level of Function: difficulty with stairs, no falls since surgery     Pain:  Current 0/10, worst 2/10, best 0/10   Location: right hip  Description: Aching  Aggravating Factors: exercise  Easing Factors: rest     Pts goals: Pt would like to return to walking with no increase in left hip pain/weakness.    OBJECTIVE     Objective Measures updated at progress report unless specified.     Treatment     Shelly received the treatments listed below:      Therapeutic Exercises were provided for 30 minutes to improve strength and AROM including:   +Nu Step Level 1.0 x6 minutes  Bridging, 3x10  SLR 1# 3x10 R/L  Supine clams, GTB, 3x10  LAQ 2# 30x R/L  Standing hip abduction 2x10  Standing ankle PF/DF x30    manual therapy techniques: Joint mobilizations, Myofacial release, and Soft tissue Mobilization were applied to the: 00 for 00 minutes, including:  NP    neuromuscular  "re-education activities to improve: Balance, Coordination, Kinesthetic, Sense, and Proprioception for 30 minutes. The following activities were included:    Sit to stand 2x10 ( elevated high/low )  Step ups 6" 20x R/L  Standing marches, 2x10  Mini squats, 2x10  Tandem walk x 10'      therapeutic activities to improve functional performance for 00  minutes, including:        Patient Education and Home Exercises     Home Exercises Provided and Patient Education Provided     Education provided:   - Exercise form and rationale     Written Home Exercises Provided: Patient instructed to cont prior HEP. Exercises were reviewed and Shelly was able to demonstrate them prior to the end of the session.  Shelly demonstrated good  understanding of the education provided. See EMR under Patient Instructions for exercises provided during therapy sessions    ASSESSMENT     Pt tolerated treatment session well today and demonstrates improve hip abduction strength compared to her previous session. Continue PT POC.      Shelly Is progressing well towards her goals.   Pt prognosis is Good.     Pt will continue to benefit from skilled outpatient physical therapy to address the deficits listed in the problem list box on initial evaluation, provide pt/family education and to maximize pt's level of independence in the home and community environment.     Pt's spiritual, cultural and educational needs considered and pt agreeable to plan of care and goals.     Anticipated Barriers for therapy: None    GOALS:  Short Term Goals (4 Weeks):  1. Patient will be compliant with home exercise program to supplement therapy in restoring pain free function. (progressing, not met)    2. Patient will improve impaired lower extremity manual muscle tests to >/= 4/5 to improve dynamic hip/knee support for functional tasks. (progressing, not met)    3. Pt will tolerate walking for 30 minutes with no increase in right hip pain/weakness to return to PLOF. " (progressing, not met)    4. Pt will improve right hip pain to 0/10 at worst for improved QOL. (progressing, not met)          Long Term Goals (8 Weeks):  1. Patient will improve FOTO score by 10% limited to decrease perceived limitation with mobility. (progressing, not met)    2. Patient will improve impaired lower extremity manual muscle tests to >/= 4+/5 to improve dynamic hip/knee support for functional tasks. (progressing, not met)    3. Patient will tolerate walking for 45 minutes with no increase in right hip pain/weakness to return to PLOF. (progressing, not met)      PLAN     Plan of care Certification: 8/18/2023 to 11/18/23    Focus on hip/LE strength with emphasis on ADL/functional performance.      Outpatient Physical Therapy 2 times weekly for 12 weeks to include the following interventions: Therapeutic Exercises, Manual Therapeutic Technique, Neuromuscular Re Education, Therapeutic Activities. Modalities, Kinesiotape prn, and Functional Dry Needling as needed    Jennifer Flores, PT

## 2023-08-29 ENCOUNTER — TELEPHONE (OUTPATIENT)
Dept: OPHTHALMOLOGY | Facility: CLINIC | Age: 83
End: 2023-08-29
Payer: MEDICARE

## 2023-08-29 DIAGNOSIS — H25.11 NUCLEAR SCLEROTIC CATARACT OF RIGHT EYE: Primary | ICD-10-CM

## 2023-08-30 ENCOUNTER — CLINICAL SUPPORT (OUTPATIENT)
Dept: REHABILITATION | Facility: OTHER | Age: 83
End: 2023-08-30
Payer: MEDICARE

## 2023-08-30 DIAGNOSIS — G89.29 CHRONIC BILATERAL LOW BACK PAIN WITHOUT SCIATICA: Primary | ICD-10-CM

## 2023-08-30 DIAGNOSIS — M54.50 CHRONIC BILATERAL LOW BACK PAIN WITHOUT SCIATICA: Primary | ICD-10-CM

## 2023-08-30 DIAGNOSIS — M79.604 PAIN IN BOTH LOWER EXTREMITIES: ICD-10-CM

## 2023-08-30 DIAGNOSIS — M79.605 PAIN IN BOTH LOWER EXTREMITIES: ICD-10-CM

## 2023-08-30 PROCEDURE — 97110 THERAPEUTIC EXERCISES: CPT | Mod: PN,CQ

## 2023-08-30 PROCEDURE — 97112 NEUROMUSCULAR REEDUCATION: CPT | Mod: PN,CQ

## 2023-08-30 NOTE — PROGRESS NOTES
[facility]   Physical Therapy Treatment Note     Name: Shelly Vásquez  Clinic Number: [id number]    Therapy Diagnosis:   Encounter Diagnoses   Name Primary?    Chronic bilateral low back pain without sciatica Yes    Pain in both lower extremities          Physician: Marlene Méndez MD    Visit Date: 8/30/2023    Physician Orders: Physical Therapy Evaluate and Treat  Medical Diagnosis from Referral: radiculopathy with lower extremity symptoms   Evaluation Date: 8/18/23  Authorization Period Expiration: 8/14/24  Plan of Care Expiration: 8/18/2023 to 11/18/23  Visit # / Visits authorized: 5/20  FOTO: 0/3  on 8/18/23    Precautions: standard    Time In: 0900  Time Out: 1000  Total Billable Time: 30 minutes    SUBJECTIVE     Pt reports: that she is doing well today. Feels like her endurance is improving.   She was compliant with home exercise program.  Response to previous treatment: tolerated well  Functional change: endurance improving     Prior Level of Function: Frequent falls, difficulty walking  Current Level of Function: difficulty with stairs, no falls since surgery     Pain:  Current 0/10, worst 2/10, best 0/10   Location: right hip  Description: Aching  Aggravating Factors: exercise  Easing Factors: rest     Pts goals: Pt would like to return to walking with no increase in left hip pain/weakness.    OBJECTIVE     Objective Measures updated at progress report unless specified.     Treatment     Shelly received the treatments listed below:      Therapeutic Exercises were provided for 15 minutes to improve strength and AROM including:  Nu Step Level 2.0 x8 minutes  Bridging, 3x10  SLR 1# 3x10 R/L  Supine clams, GTB, 3x10  LAQ 2# 30x R/L  Standing hip abduction 2x10  Standing ankle PF/DF x30    manual therapy techniques: Joint mobilizations, Myofacial release, and Soft tissue Mobilization were applied to the: 00 for 00 minutes, including:  NP    neuromuscular re-education  "activities to improve: Balance, Coordination, Kinesthetic, Sense, and Proprioception for 15 minutes. The following activities were included:    Sit to stand 3x10 ( elevated high/low )  Step ups 6" 20x R/L  Standing marches, 2x10  Mini squats, 3x10  Tandem walk x 10'      therapeutic activities to improve functional performance for 00  minutes, including:        Patient Education and Home Exercises     Home Exercises Provided and Patient Education Provided     Education provided:   - Exercise form and rationale     Written Home Exercises Provided: Patient instructed to cont prior HEP. Exercises were reviewed and Shelly was able to demonstrate them prior to the end of the session.  Shelly demonstrated good  understanding of the education provided. See EMR under Patient Instructions for exercises provided during therapy sessions    ASSESSMENT     Pt tolerated treatment session well today. She demonstrated improved muscle endurance and eccentric control with glute and quads during functional step ups/down.       Shelly Is progressing well towards her goals.   Pt prognosis is Good.     Pt will continue to benefit from skilled outpatient physical therapy to address the deficits listed in the problem list box on initial evaluation, provide pt/family education and to maximize pt's level of independence in the home and community environment.     Pt's spiritual, cultural and educational needs considered and pt agreeable to plan of care and goals.     Anticipated Barriers for therapy: None    GOALS:  Short Term Goals (4 Weeks):  1. Patient will be compliant with home exercise program to supplement therapy in restoring pain free function. (progressing, not met)    2. Patient will improve impaired lower extremity manual muscle tests to >/= 4/5 to improve dynamic hip/knee support for functional tasks. (progressing, not met)    3. Pt will tolerate walking for 30 minutes with no increase in right hip pain/weakness to return to PLOF. " (progressing, not met)    4. Pt will improve right hip pain to 0/10 at worst for improved QOL. (progressing, not met)          Long Term Goals (8 Weeks):  1. Patient will improve FOTO score by 10% limited to decrease perceived limitation with mobility. (progressing, not met)    2. Patient will improve impaired lower extremity manual muscle tests to >/= 4+/5 to improve dynamic hip/knee support for functional tasks. (progressing, not met)    3. Patient will tolerate walking for 45 minutes with no increase in right hip pain/weakness to return to PLOF. (progressing, not met)      PLAN     Plan of care Certification: 8/18/2023 to 11/18/23    Focus on hip/LE strength with emphasis on ADL/functional performance.      Outpatient Physical Therapy 2 times weekly for 12 weeks to include the following interventions: Therapeutic Exercises, Manual Therapeutic Technique, Neuromuscular Re Education, Therapeutic Activities. Modalities, Kinesiotape prn, and Functional Dry Needling as needed    Michael Sims, PTA

## 2023-08-31 ENCOUNTER — EXTERNAL CHRONIC CARE MANAGEMENT (OUTPATIENT)
Dept: PRIMARY CARE CLINIC | Facility: CLINIC | Age: 83
End: 2023-08-31
Payer: MEDICARE

## 2023-08-31 PROCEDURE — 99490 CHRNC CARE MGMT STAFF 1ST 20: CPT | Mod: S$PBB,,, | Performed by: INTERNAL MEDICINE

## 2023-08-31 PROCEDURE — 99490 CHRNC CARE MGMT STAFF 1ST 20: CPT | Mod: PBBFAC | Performed by: INTERNAL MEDICINE

## 2023-08-31 PROCEDURE — 99490 PR CHRONIC CARE MGMT, 1ST 20 MIN: ICD-10-PCS | Mod: S$PBB,,, | Performed by: INTERNAL MEDICINE

## 2023-09-05 ENCOUNTER — TELEPHONE (OUTPATIENT)
Dept: OPHTHALMOLOGY | Facility: CLINIC | Age: 83
End: 2023-09-05
Payer: MEDICARE

## 2023-09-05 NOTE — TELEPHONE ENCOUNTER
Patient given arrival time of 9:30 am on Thursday September 7. Nothing to eat or drink after 11:59 pm.  Water, Gatorade after 9 pm until leaves home.  Start drops into the operative eye today. [address] [facility]

## 2023-09-06 ENCOUNTER — PATIENT MESSAGE (OUTPATIENT)
Dept: PREADMISSION TESTING | Facility: HOSPITAL | Age: 83
End: 2023-09-06
Payer: MEDICARE

## 2023-09-06 ENCOUNTER — CLINICAL SUPPORT (OUTPATIENT)
Dept: REHABILITATION | Facility: OTHER | Age: 83
End: 2023-09-06
Payer: MEDICARE

## 2023-09-06 ENCOUNTER — ANESTHESIA EVENT (OUTPATIENT)
Dept: SURGERY | Facility: HOSPITAL | Age: 83
End: 2023-09-06
Payer: MEDICARE

## 2023-09-06 DIAGNOSIS — M54.50 CHRONIC BILATERAL LOW BACK PAIN WITHOUT SCIATICA: Primary | ICD-10-CM

## 2023-09-06 DIAGNOSIS — M79.605 PAIN IN BOTH LOWER EXTREMITIES: ICD-10-CM

## 2023-09-06 DIAGNOSIS — M79.604 PAIN IN BOTH LOWER EXTREMITIES: ICD-10-CM

## 2023-09-06 DIAGNOSIS — G89.29 CHRONIC BILATERAL LOW BACK PAIN WITHOUT SCIATICA: Primary | ICD-10-CM

## 2023-09-06 PROCEDURE — 97112 NEUROMUSCULAR REEDUCATION: CPT | Mod: PN

## 2023-09-06 PROCEDURE — 97110 THERAPEUTIC EXERCISES: CPT | Mod: PN

## 2023-09-06 NOTE — PROGRESS NOTES
[facility]   Physical Therapy Treatment Note     Name: Shelly Vásquez  Clinic Number: [id number]    Therapy Diagnosis:   Encounter Diagnoses   Name Primary?    Chronic bilateral low back pain without sciatica Yes    Pain in both lower extremities          Physician: Marlene Méndez MD    Visit Date: 9/6/2023    Physician Orders: Physical Therapy Evaluate and Treat  Medical Diagnosis from Referral: radiculopathy with lower extremity symptoms   Evaluation Date: 8/18/23  Authorization Period Expiration: 8/14/24  Plan of Care Expiration: 8/18/2023 to 11/18/23  Visit # / Visits authorized: 5/20  FOTO: 0/3  on 8/18/23    Precautions: standard    Time In: 0900  Time Out: 1000  Total Billable Time: 30 minutes    SUBJECTIVE     Pt reports: that she is doing well today. Pt states that her balance is improving and her legs are stronger.   She was compliant with home exercise program.  Response to previous treatment: tolerated well  Functional change: endurance improving     Prior Level of Function: Frequent falls, difficulty walking  Current Level of Function: difficulty with stairs, no falls since surgery     Pain:  Current 0/10, worst 2/10, best 0/10   Location: right hip  Description: Aching  Aggravating Factors: exercise  Easing Factors: rest     Pts goals: Pt would like to return to walking with no increase in left hip pain/weakness.    OBJECTIVE     Objective Measures updated at progress report unless specified.     Treatment     Shelly received the treatments listed below:      Therapeutic Exercises were provided for 15 minutes to improve strength and AROM including:  Nu Step Level 2.0 x8 minutes  Bridging, 3x10  SLR 1# 3x10 R/L  Supine clams, GTB, 3x10  LAQ 2# 30x R/L  Standing hip abduction 2x10  Standing ankle PF/DF x30    manual therapy techniques: Joint mobilizations, Myofacial release, and Soft tissue Mobilization were applied to the: 00 for 00 minutes,  "including:  NP    neuromuscular re-education activities to improve: Balance, Coordination, Kinesthetic, Sense, and Proprioception for 15 minutes. The following activities were included:    Sit to stand 3x10 ( elevated high/low )  Step ups 6" 20x R/L  Standing marches, 2x10  Mini squats, 3x10  Tandem walk x 10'      therapeutic activities to improve functional performance for 00  minutes, including:        Patient Education and Home Exercises     Home Exercises Provided and Patient Education Provided     Education provided:   - Exercise form and rationale     Written Home Exercises Provided: Patient instructed to cont prior HEP. Exercises were reviewed and Shelly was able to demonstrate them prior to the end of the session.  Shelly demonstrated good  understanding of the education provided. See EMR under Patient Instructions for exercises provided during therapy sessions    ASSESSMENT   Pt tolerated treatment session well today and continues to demonstrate improved strength and balance compared to her previous session. Continue to challenge pt as she is at risk of falling at this time.       Shelly Is progressing well towards her goals.   Pt prognosis is Good.     Pt will continue to benefit from skilled outpatient physical therapy to address the deficits listed in the problem list box on initial evaluation, provide pt/family education and to maximize pt's level of independence in the home and community environment.     Pt's spiritual, cultural and educational needs considered and pt agreeable to plan of care and goals.     Anticipated Barriers for therapy: None    GOALS:  Short Term Goals (4 Weeks):  1. Patient will be compliant with home exercise program to supplement therapy in restoring pain free function. (progressing, not met)    2. Patient will improve impaired lower extremity manual muscle tests to >/= 4/5 to improve dynamic hip/knee support for functional tasks. (progressing, not met)    3. Pt will tolerate " walking for 30 minutes with no increase in right hip pain/weakness to return to PLOF. (progressing, not met)    4. Pt will improve right hip pain to 0/10 at worst for improved QOL. (progressing, not met)          Long Term Goals (8 Weeks):  1. Patient will improve FOTO score by 10% limited to decrease perceived limitation with mobility. (progressing, not met)    2. Patient will improve impaired lower extremity manual muscle tests to >/= 4+/5 to improve dynamic hip/knee support for functional tasks. (progressing, not met)    3. Patient will tolerate walking for 45 minutes with no increase in right hip pain/weakness to return to PLOF. (progressing, not met)      PLAN     Plan of care Certification: 8/18/2023 to 11/18/23    Focus on hip/LE strength with emphasis on ADL/functional performance.      Outpatient Physical Therapy 2 times weekly for 12 weeks to include the following interventions: Therapeutic Exercises, Manual Therapeutic Technique, Neuromuscular Re Education, Therapeutic Activities. Modalities, Kinesiotape prn, and Functional Dry Needling as needed    Jennifer Flores, PT

## 2023-09-06 NOTE — ANESTHESIA PREPROCEDURE EVALUATION
09/06/2023  Shelly Vásquez is a 82 y.o., female.      Pre-op Assessment    I have reviewed the Patient Summary Reports.    I have reviewed the NPO Status.   I have reviewed the Medications.     Review of Systems  Anesthesia Hx:  Denies Family Hx of Anesthesia complications.   Denies Personal Hx of Anesthesia complications.   Hematology/Oncology:         -- Anemia: --  Cancer in past history:  Oncology Comments: skin     Cardiovascular:   Exercise tolerance: good Dysrhythmias NYHA Classification II    Hepatic/GI:   PUD, GERD    Musculoskeletal:   Arthritis     Neurological:  Neurology Normal    Endocrine:  Endocrine Normal    Psych:  Psychiatric Normal         Basal cell cancer Postmenopausal HRT (hormone replacement therapy)   Arthritis Gastric ulcer   Herpes simplex without mention of complication Encounter for blood transfusion   Atrial fibrillation Supraventricular tachycardia   GERD (gastroesophageal reflux disease)      Surgical History    DILATION AND CURETTAGE OF UTERUS ENDOMETRIAL BIOPSY   Beast Lift TUBAL LIGATION   TONSILLECTOMY, ADENOIDECTOMY APPENDECTOMY   EYE SURGERY ptsosis   BACK SURGERY COSMETIC SURGERY   SMALL INTESTINE SURGERY RADIOFREQUENCY ABLATION   TOTAL REDUCTION MAMMOPLASTY BREAST BIOPSY   BONE MARROW BIOPSY TRANSFORAMINAL EPIDURAL INJECTION OF STEROID   EPIDURAL STEROID INJECTION TRANSFORAMINAL EPIDURAL INJECTION OF STEROID   INJECTION OF ANESTHETIC AGENT AROUND NERVE INJECTION OF JOINT   ARTHROPLASTY OF HIP BY ANTERIOR APPROACH          Physical Exam  General: Well nourished, Cooperative, Alert and Oriented    Airway:  Mouth Opening: Normal  TM Distance: Normal  Tongue: Normal        Anesthesia Plan  Type of Anesthesia, risks & benefits discussed:    Anesthesia Type: MAC  Intra-op Monitoring Plan: Standard ASA Monitors  Post Op Pain Control Plan: multimodal analgesia and IV/PO  Opioids PRN  Induction:  IV  Informed Consent: Informed consent signed with the Patient and all parties understand the risks and agree with anesthesia plan.  All questions answered.   ASA Score: 3  Day of Surgery Review of History & Physical: H&P Update referred to the surgeon/provider.I have interviewed and examined the patient. I have reviewed the patient's H&P dated: H&P completed by Anesthesiologist.    Ready For Surgery From Anesthesia Perspective.     .    Basal cell cancer Postmenopausal HRT (hormone replacement therapy)   Arthritis Gastric ulcer   Herpes simplex without mention of complication Encounter for blood transfusion   Atrial fibrillation Supraventricular tachycardia     Surgical History    DILATION AND CURETTAGE OF UTERUS ENDOMETRIAL BIOPSY   Beast Lift TUBAL LIGATION   TONSILLECTOMY, ADENOIDECTOMY APPENDECTOMY   EYE SURGERY ptsosis   BACK SURGERY COSMETIC SURGERY   SMALL INTESTINE SURGERY RADIOFREQUENCY ABLATION   TOTAL REDUCTION MAMMOPLASTY BREAST BIOPSY   BONE MARROW BIOPSY TRANSFORAMINAL EPIDURAL INJECTION OF STEROID   EPIDURAL STEROID INJECTION TRANSFORAMINAL EPIDURAL INJECTION OF STEROID   INJECTION OF ANESTHETIC AGENT AROUND NERVE INJECTION OF JOINT

## 2023-09-06 NOTE — H&P
Pre-operative History and Physical  Ophthalmology Service    CC: Right eye cataract    HPI:   Patient is a 82 y.o. female presents with right eye cataract requiring surgery as noted in the most recent ophthalmology progress note.    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)       Past Surgical History:   Procedure Laterality Date    APPENDECTOMY  age 21    ARTHROPLASTY OF HIP BY ANTERIOR APPROACH Right 4/26/2023    Procedure: ARTHROPLASTY, HIP, TOTAL, ANTERIOR APPROACH: RIGHT: DEPUY - C-STEM + PINNACLE;  Surgeon: Keon Gary III, MD;  Location: [facility] OR [address] [place] [address];  Service: Orthopedics;  Laterality: Right;    BACK SURGERY      Beast Lift  2004    BONE MARROW BIOPSY Right 12/19/2019    Procedure: Biopsy-bone marrow;  Surgeon: Aidan Spring MD;  Location: [facility] OR [address] [place] [address];  Service: Oncology;  Laterality: Right;    BREAST BIOPSY  50yrs ago    unable to see scar    COSMETIC SURGERY      DILATION AND CURETTAGE OF UTERUS  2008    PMB    ENDOMETRIAL BIOPSY      EPIDURAL STEROID INJECTION N/A 8/25/2022    Procedure: LUMBAR CELINA CONTRAST DIRECT REFERRAL;  Surgeon: Rj Hernandez MD;  Location: [facility], [place], [place] by [facility];  Service: Pain Management;  Laterality: N/A;    EYE SURGERY      INJECTION OF ANESTHETIC AGENT AROUND NERVE Bilateral 11/11/2022    Procedure: BLOCK, NERVE BILATERAL L2,L3,L4 AND L5 MEDIAL BRANCH ONE OF TWO;  Surgeon: Rj Hernandez MD;  [place]: [facility], [place] [facility];  Service: Pain Management;  Laterality: Bilateral;    INJECTION OF JOINT Right 12/2/2022    Procedure: INJECTION, JOINT RIGHT INTRARTICULAR HIP CONTRAST;  Surgeon: Rj Hernandez MD;  Location: [facility], [place]  [facility]IN [facility]GT;  Service: Pain Management;  Laterality: Right;    ptsosis      RADIOFREQUENCY ABLATION  03/2018    SMALL INTESTINE SURGERY      TONSILLECTOMY, ADENOIDECTOMY  age 8    TOTAL REDUCTION MAMMOPLASTY Bilateral 2003    TRANSFORAMINAL EPIDURAL INJECTION OF STEROID Right 8/11/2022    Procedure: Injection,steroid,epidural Right L1/2 TF DIrect Referral Instructed to provide intervention per MRI results;  Surgeon: Rj Hernandez MD;  Location: [facility] PAIN MGT;  Service: Pain Management;  Laterality: Right;    TRANSFORAMINAL EPIDURAL INJECTION OF STEROID Right 10/21/2022    Procedure: INJECTION, STEROID, EPIDURAL, TRANSFORAMINAL APPROACH, L5-S1 and S1, RIGHT CONTRAST;  Surgeon: Rj Hernandez MD;  Location: [facility] PAIN [facility]GT;  Service: Pain Management;  Laterality: Right;    TUBAL LIGATION         Family History   Problem Relation Age of Onset    No Known Problems Father     Cirrhosis Mother     No Known Problems Brother     No Known Problems Maternal Aunt     No Known Problems Maternal Uncle     No Known Problems Paternal Aunt     No Known Problems Paternal Uncle     No Known Problems Maternal Grandmother     No Known Problems Maternal Grandfather     No Known Problems Paternal Grandmother     No Known Problems Paternal Grandfather     No Known Problems Daughter     Pulmonary embolism Son     Breast cancer Neg Hx     Colon cancer Neg Hx     Ovarian cancer Neg Hx     Amblyopia Neg Hx     Blindness Neg Hx     Cancer Neg Hx     Cataracts Neg Hx     Diabetes Neg Hx     Glaucoma Neg Hx     Hypertension Neg Hx     Macular degeneration Neg Hx     Retinal detachment Neg Hx     Strabismus Neg Hx     Stroke Neg Hx     Thyroid disease Neg Hx        Social History     Socioeconomic History    Marital status:    Tobacco Use    Smoking status: Former     Current packs/day: 0.00     Average packs/day: 1 pack/day for 22.0 years (22.0 ttl pk-yrs)     Types: Cigarettes     Start date: 7/27/1958     Quit date: 7/27/1980     Years  since quittin.1    Smokeless tobacco: Never   Substance and Sexual Activity    Alcohol use: Yes     Alcohol/week: 2.0 standard drinks of alcohol     Types: 2 Glasses of wine per week     Comment: 1-2 glasses of  some night    Drug use: No    Sexual activity: Not Currently     Partners: Male     Birth control/protection: Post-menopausal     Comment:  since      Social Determinants of Health     Financial Resource Strain: Low Risk  (2023)    Overall Financial Resource Strain (CARDIA)     Difficulty of Paying Living Expenses: Not hard at all   Food Insecurity: No Food Insecurity (2023)    Hunger Vital Sign     Worried About Running Out of Food in the Last Year: Never true     Ran Out of Food in the Last Year: Never true   Transportation Needs: Unknown (2023)    PRAPARE - Transportation     Lack of Transportation (Medical): No   Physical Activity: Insufficiently Active (2023)    Exercise Vital Sign     Days of Exercise per Week: 3 days     Minutes of Exercise per Session: 40 min   Stress: No Stress Concern Present (2023)    [employer] [place] [employer] - Occupational Stress Questionnaire     Feeling of Stress : Not at all   Social Connections: Socially Integrated (2023)    Social Connection and Isolation Panel [NHANES]     Frequency of Communication with Friends and Family: More than three times a week     Frequency of Social Gatherings with Friends and Family: More than three times a week     Attends Oriental orthodox Services: More than 4 times per year     Active Member of Clubs or Organizations: Yes     Attends Club or Organization Meetings: Never     Marital Status:    Housing Stability: Unknown (2023)    Housing Stability Vital Sign     Unable to Pay for Housing in the Last Year: No     Unstable Housing in the Last Year: No       No current facility-administered medications for this encounter.     Current Outpatient Medications   Medication Sig Dispense  Refill    acetaminophen (TYLENOL) 650 MG TbSR Take 1 tablet (650 mg total) by mouth every 8 (eight) hours. 120 tablet 0    aspirin (ECOTRIN) 81 MG EC tablet Take 1 tablet (81 mg total) by mouth once daily. 30 tablet 0    diazePAM (VALIUM) 2 MG tablet Take 1 tablet (2 mg total) by mouth every 8 (eight) hours as needed for Insomnia (RLS). 45 tablet 1    estradiol-norethindrone acet 0.5-0.1 mg per tablet Take 1 tablet by mouth once daily. 90 tablet 3    gabapentin (NEURONTIN) 600 MG tablet Take 600 mg by mouth every evening.      ofloxacin (OCUFLOX) 0.3 % ophthalmic solution Place 1 drop into the right eye 3 (three) times daily.      pantoprazole (PROTONIX) 40 MG tablet TAKE 1 TABLET(40 MG) BY MOUTH EVERY DAY 90 tablet 1    polyethylene glycol 3350 (MIRALAX ORAL) Take by mouth once daily.      pramipexole (MIRAPEX) 0.5 MG tablet 1 in am and 2 at night 90 tablet 2    prednisolon/gatiflox/bromfenac (PREDNISOL ACE-GATIFLOX-BROMFEN) 1-0.5-0.075 % DrpS Apply 1 drop to eye 3 (three) times daily. in operative eye for 1 month after surgery 5 mL 3    prednisoLONE acetate (PRED FORTE) 1 % DrpS Place 1 drop into the right eye 3 (three) times daily.      temazepam (RESTORIL) 15 mg Cap Take 1 capsule (15 mg total) by mouth nightly as needed. 90 capsule 0    verapamiL (VERELAN) 120 MG C24P Take 1 capsule (120 mg total) by mouth once daily. 90 capsule 3    vitamin D (VITAMIN D3) 1000 units Tab Take 1,000 Units by mouth once daily.         Review of patient's allergies indicates:   Allergen Reactions    Baclofen      Incoherent     Nsaids (non-steroidal anti-inflammatory drug)      GI Bleed  Had 5 blood transfusions         Review of systems:  Gen: denies fevers, chills, nausea, vomitting, weight changes  HEENT: Denies discharge or coughing/sneezing. +blurry vision Right eye   Resp: Denies SOB  CV: Denies CP or palpitations  Abd: Denies tenderness, diarrhea, constipation, nausea  Ext:  Denies pain or edema    Physical Exam  BP: Vital  signs stable  General: No apparent distress  HEENT: Normocephalic, atraumatic, see past ophtho note for eye exam OD  Lungs: Adequate respirations, no respiratory distress  Heart: Pulses intact  Abdomen: Soft, no distention  Rectal/pelvic: Deferred    Assessment  Patient is a 82 y.o. female with eye problem right eye cataract   - Risks/benefits/alternatives of the procedure including, but not limited to scarring, bleeding, infection, loss or decreased vision, and/or need for possible repeat surgery discussed with the patient and/or family, and Informed consent obtained prior to surgery and the patient/family voiced good understanding, witnessed, and placed in chart.  - Will proceed with CEIOL OD   - Plan for local/MAC   - okay to continue any blood thinner

## 2023-09-07 ENCOUNTER — ANESTHESIA (OUTPATIENT)
Dept: SURGERY | Facility: HOSPITAL | Age: 83
End: 2023-09-07
Payer: MEDICARE

## 2023-09-07 ENCOUNTER — HOSPITAL ENCOUNTER (OUTPATIENT)
Facility: HOSPITAL | Age: 83
Discharge: HOME | End: 2023-09-07
Attending: OPHTHALMOLOGY | Admitting: OPHTHALMOLOGY
Payer: MEDICARE

## 2023-09-07 VITALS
HEART RATE: 60 BPM | HEIGHT: 68 IN | OXYGEN SATURATION: 98 % | TEMPERATURE: 98 F | BODY MASS INDEX: 22.43 KG/M2 | SYSTOLIC BLOOD PRESSURE: 119 MMHG | DIASTOLIC BLOOD PRESSURE: 65 MMHG | WEIGHT: 148 LBS | RESPIRATION RATE: 16 BRPM

## 2023-09-07 DIAGNOSIS — H25.13 NUCLEAR SCLEROSIS, BILATERAL: ICD-10-CM

## 2023-09-07 DIAGNOSIS — H25.11 NUCLEAR SCLEROTIC CATARACT OF RIGHT EYE: Primary | ICD-10-CM

## 2023-09-07 PROCEDURE — D9220A PRA ANESTHESIA: ICD-10-PCS | Mod: ,,, | Performed by: NURSE ANESTHETIST, CERTIFIED REGISTERED

## 2023-09-07 PROCEDURE — 66984 PR REMOVAL, CATARACT, W/INSRT INTRAOC LENS, W/O ENDO CYCLO: ICD-10-PCS | Mod: RT,,, | Performed by: OPHTHALMOLOGY

## 2023-09-07 PROCEDURE — D9220A PRA ANESTHESIA: Mod: ,,, | Performed by: NURSE ANESTHETIST, CERTIFIED REGISTERED

## 2023-09-07 PROCEDURE — 63600175 PHARM REV CODE 636 W HCPCS: Performed by: NURSE ANESTHETIST, CERTIFIED REGISTERED

## 2023-09-07 PROCEDURE — 66999 UNLISTED PX ANT SEGMENT EYE: CPT | Mod: CSM,RT,, | Performed by: OPHTHALMOLOGY

## 2023-09-07 PROCEDURE — 37000009 HC ANESTHESIA EA ADD 15 MINS: Performed by: OPHTHALMOLOGY

## 2023-09-07 PROCEDURE — 66999 PR FEMTO MFIOL: ICD-10-PCS | Mod: CSM,RT,, | Performed by: OPHTHALMOLOGY

## 2023-09-07 PROCEDURE — V2788 PRESBYOPIA-CORRECT FUNCTION: HCPCS | Performed by: OPHTHALMOLOGY

## 2023-09-07 PROCEDURE — 37000008 HC ANESTHESIA 1ST 15 MINUTES: Performed by: OPHTHALMOLOGY

## 2023-09-07 PROCEDURE — 36000706: Performed by: OPHTHALMOLOGY

## 2023-09-07 PROCEDURE — 36000707: Performed by: OPHTHALMOLOGY

## 2023-09-07 PROCEDURE — 71000015 HC POSTOP RECOV 1ST HR: Performed by: OPHTHALMOLOGY

## 2023-09-07 PROCEDURE — 66984 XCAPSL CTRC RMVL W/O ECP: CPT | Mod: RT,,, | Performed by: OPHTHALMOLOGY

## 2023-09-07 PROCEDURE — 25000003 PHARM REV CODE 250: Performed by: OPHTHALMOLOGY

## 2023-09-07 DEVICE — IMPLANTABLE DEVICE: Type: IMPLANTABLE DEVICE | Site: EYE | Status: FUNCTIONAL

## 2023-09-07 RX ORDER — LIDOCAINE HYDROCHLORIDE 40 MG/ML
INJECTION, SOLUTION RETROBULBAR
Status: DISCONTINUED | OUTPATIENT
Start: 2023-09-07 | End: 2023-09-07 | Stop reason: HOSPADM

## 2023-09-07 RX ORDER — ACETAMINOPHEN 325 MG/1
650 TABLET ORAL EVERY 4 HOURS PRN
Status: DISCONTINUED | OUTPATIENT
Start: 2023-09-07 | End: 2023-09-07 | Stop reason: HOSPADM

## 2023-09-07 RX ORDER — MOXIFLOXACIN 5 MG/ML
1 SOLUTION/ DROPS OPHTHALMIC
Status: DISCONTINUED | OUTPATIENT
Start: 2023-09-07 | End: 2023-09-07 | Stop reason: HOSPADM

## 2023-09-07 RX ORDER — MOXIFLOXACIN 5 MG/ML
1 SOLUTION/ DROPS OPHTHALMIC
Status: COMPLETED | OUTPATIENT
Start: 2023-09-07 | End: 2023-09-07

## 2023-09-07 RX ORDER — PROPARACAINE HYDROCHLORIDE 5 MG/ML
1 SOLUTION/ DROPS OPHTHALMIC
Status: DISCONTINUED | OUTPATIENT
Start: 2023-09-07 | End: 2023-09-07 | Stop reason: HOSPADM

## 2023-09-07 RX ORDER — TETRACAINE HYDROCHLORIDE 5 MG/ML
SOLUTION OPHTHALMIC
Status: DISCONTINUED | OUTPATIENT
Start: 2023-09-07 | End: 2023-09-07 | Stop reason: HOSPADM

## 2023-09-07 RX ORDER — MIDAZOLAM HYDROCHLORIDE 1 MG/ML
INJECTION, SOLUTION INTRAMUSCULAR; INTRAVENOUS
Status: DISCONTINUED | OUTPATIENT
Start: 2023-09-07 | End: 2023-09-07

## 2023-09-07 RX ORDER — CYCLOP/TROP/PROPA/PHEN/KET/WAT 1-1-0.1%
1 DROPS (EA) OPHTHALMIC (EYE)
Status: COMPLETED | OUTPATIENT
Start: 2023-09-07 | End: 2023-09-07

## 2023-09-07 RX ORDER — MOXIFLOXACIN 5 MG/ML
SOLUTION/ DROPS OPHTHALMIC
Status: DISCONTINUED | OUTPATIENT
Start: 2023-09-07 | End: 2023-09-07 | Stop reason: HOSPADM

## 2023-09-07 RX ORDER — PHENYLEPHRINE HYDROCHLORIDE 100 MG/ML
1 SOLUTION/ DROPS OPHTHALMIC
Status: DISCONTINUED | OUTPATIENT
Start: 2023-09-07 | End: 2023-09-07 | Stop reason: HOSPADM

## 2023-09-07 RX ADMIN — Medication 1 DROP: at 09:09

## 2023-09-07 RX ADMIN — MOXIFLOXACIN 1 DROP: 5 SOLUTION/ DROPS OPHTHALMIC at 11:09

## 2023-09-07 RX ADMIN — MOXIFLOXACIN OPHTHALMIC 1 DROP: 5 SOLUTION/ DROPS OPHTHALMIC at 09:09

## 2023-09-07 RX ADMIN — MIDAZOLAM HYDROCHLORIDE 1 MG: 1 INJECTION, SOLUTION INTRAMUSCULAR; INTRAVENOUS at 11:09

## 2023-09-07 RX ADMIN — MIDAZOLAM HYDROCHLORIDE 1 MG: 1 INJECTION, SOLUTION INTRAMUSCULAR; INTRAVENOUS at 10:09

## 2023-09-07 NOTE — DISCHARGE INSTRUCTIONS
CATARACT SURGERY    POST-OPERATIVE INSTRUCTIONS    · Apply drops THREE times a day into operative eye for 30 days.    · DO NOT rub your eye    · Wear protective sunglasses during the day    · Resume moderate activity    · Bathe/shower/wash face normally    · DO NOT apply makeup around the operative eye for 1 week.         You should expect    - Blurry vision and halos for 24-48 hours    - Dilated pupil for 24-48 hours    - Scratchy feeling in the eye for 1-2 days    - Curved shadow in your peripheral vision for 2-3 weeks    - Occasional flickering of lights for up to 1 week    -If you experience severe pain or nausea, please call Dr Willingham or the on-call doctor at [phone]    - Plan to see Dr Willingham tomorrow .      [facility]    [address] [facility] [address]    ** Most patients can drive the next day, but if you do not feel comfortable driving, please arrange for transportation.

## 2023-09-07 NOTE — TRANSFER OF CARE
"Anesthesia Transfer of Care Note    Patient: Shelly Vásquez    Procedure(s) Performed: Procedure(s) (LRB):  EXTRACTION, CATARACT, WITH IOL INSERTION (Right)    Patient location: PACU    Anesthesia Type: MAC    Transport from OR: Transported from OR on room air with adequate spontaneous ventilation    Post pain: adequate analgesia    Post assessment: no apparent anesthetic complications    Post vital signs: stable    Level of consciousness: awake, alert and oriented    Nausea/Vomiting: no nausea/vomiting    Complications: none    Transfer of care protocol was followed      Last vitals:   Visit Vitals  /60 (BP Location: Right arm, Patient Position: Lying)   Pulse 72   Temp 36.6 °C (97.9 °F) (Oral)   Resp 16   Ht 5' 8" (1.727 m)   Wt 67.1 kg (148 lb)   LMP  (LMP Unknown)   SpO2 96%   Breastfeeding No   BMI 22.50 kg/m²     "

## 2023-09-07 NOTE — ANESTHESIA POSTPROCEDURE EVALUATION
Anesthesia Post Evaluation    Patient: Shelly Vásquez    Procedure(s) Performed: Procedure(s) (LRB):  EXTRACTION, CATARACT, WITH IOL INSERTION (Right)    Final Anesthesia Type: MAC      Patient location during evaluation: PACU  Patient participation: Yes- Able to Participate  Level of consciousness: awake and alert  Post-procedure vital signs: reviewed and stable  Pain management: adequate  Airway patency: patent    PONV status at discharge: No PONV  Anesthetic complications: no      Cardiovascular status: blood pressure returned to baseline  Respiratory status: unassisted, spontaneous ventilation and room air  Hydration status: euvolemic  Follow-up not needed.          Vitals Value Taken Time   /58 09/07/23 1131   Temp 36.6 °C (97.8 °F) 09/07/23 1120   Pulse 66 09/07/23 1135   Resp 16 09/07/23 1120   SpO2 100 % 09/07/23 1135   Vitals shown include unvalidated device data.      No case tracking events are documented in the log.      Pain/Oscar Score: Oscar Score: 10 (9/7/2023 11:20 AM)

## 2023-09-07 NOTE — DISCHARGE SUMMARY
Outcome: Successful outpatient ophthalmic surgical procedure  Preprinted Instructions given to patient.  Regular diet.  Activity: No restrictions  Meds: see Med Rec  Condition: stable  Follow up: 1 day with Dr Willingham  Disposition: Home  Diagnosis: s/p eye surgery  Date of discharge: 09/07/2023

## 2023-09-07 NOTE — OP NOTE
SURGEON:  Francisco Willingham M.D.    PREOPERATIVE DIAGNOSIS:    Nuclear Sclerotic Cataract Right Eye    POSTOPERATIVE DIAGNOSIS:    Nuclear Sclerotic Cataract Right Eye    PROCEDURES:    Phacoemulsification with  intraocular lens, Right eye (00435)  With ORA  LASER assist    DATE OF SURGERY: 09/07/2023    IMPLANT: [id number] 19.0     ANESTHESIA:  MAC with topical Lidocaine    COMPLICATIONS:  None    ESTIMATED BLOOD LOSS: None    SPECIMENS: None    INDICATIONS:    The patient has a history of painless progressive visual loss and difficulty with activities of daily living, which specifically include difficult driving at night due to glare and difficulty reading small print, secondary to cataract formation.  After a thorough discussion of the risks, benefits, and alternatives to cataract surgery, including, but not limited to, the rare risks of infection, retinal detachment, hemorrhage, need for additional surgery, loss of vision, and even loss of the eye, the patient voices understanding and desires to proceed.    DESCRIPTION OF PROCEDURE:      The patients IOL calculations were reviewed, and the lens selection confirmed.   After verification and marking of the proper eye in the preop holding area, the patient was brought to the operating room in supine position where the eye was prepped and draped in standard sterile fashion with 5% Betadine and a lid speculum placed in the eye.   Topical 4% Lidocaine was used in addition to the preoperative anesthesia and the procedure was begun by the creation of a paracentesis incision through which viscoelastic was used to fill the anterior chamber.  Next, a keratome blade was used to create a triplanar temporal clear corneal incision and a cystotome and Utrata forceps used to fashion a continuous curvilinear capsulorrhexis.  Hydrodissection was carried out using the Beauchamp hydrodissection cannula and the nucleus was found to be mobile.  Phacoemulsification of the nucleus was carried  out using a quick chop technique, and all remaining epinuclear and cortical material was removed.  The eye was then reformed with Viscoelastic and ORA was used to verify the proper IOL power. The intraocular lens was then implanted into the capsular bag.  All remaining viscoelastics were removed from the eye and at the end of the case the pupil was round, the lens was well-centered within the capsular bag and all wounds were found to be water tight.  Drops of Vigamox and Pred Forte were instilled and a shield was placed over the eye. The patient will follow up with Dr. Willingham in the morning.

## 2023-09-08 ENCOUNTER — OFFICE VISIT (OUTPATIENT)
Dept: OPHTHALMOLOGY | Facility: CLINIC | Age: 83
End: 2023-09-08
Payer: MEDICARE

## 2023-09-08 ENCOUNTER — TELEPHONE (OUTPATIENT)
Dept: OPHTHALMOLOGY | Facility: CLINIC | Age: 83
End: 2023-09-08
Payer: MEDICARE

## 2023-09-08 DIAGNOSIS — Z98.890 POST-OPERATIVE STATE: Primary | ICD-10-CM

## 2023-09-08 DIAGNOSIS — H25.11 NUCLEAR SCLEROSIS OF RIGHT EYE: ICD-10-CM

## 2023-09-08 PROCEDURE — 99024 PR POST-OP FOLLOW-UP VISIT: ICD-10-PCS | Mod: POP,,, | Performed by: OPHTHALMOLOGY

## 2023-09-08 PROCEDURE — 99999 PR PBB SHADOW E&M-EST. PATIENT-LVL III: ICD-10-PCS | Mod: PBBFAC,,, | Performed by: OPHTHALMOLOGY

## 2023-09-08 PROCEDURE — 99024 POSTOP FOLLOW-UP VISIT: CPT | Mod: POP,,, | Performed by: OPHTHALMOLOGY

## 2023-09-08 PROCEDURE — 99999 PR PBB SHADOW E&M-EST. PATIENT-LVL III: CPT | Mod: PBBFAC,,, | Performed by: OPHTHALMOLOGY

## 2023-09-08 PROCEDURE — 99213 OFFICE O/P EST LOW 20 MIN: CPT | Mod: PBBFAC | Performed by: OPHTHALMOLOGY

## 2023-09-08 NOTE — PROGRESS NOTES
HPI     Post-op Evaluation            Comments: 1 day phaco OD          Comments    Patient here for 1 day phaco OD    Patient states OD doing well since surgery. No pain/discomfort.    PGB TID OD     09/07/2023 IMPLANT: [email or web address] 19.0  OD          Last edited by Layla Mc MA on 9/8/2023 11:34 AM.            Assessment /Plan     For exam results, see Encounter Report.    Post-operative state    Nuclear sclerosis of right eye      Slit lamp exam:  L/L: nl  K: clear, wound sealed  AC: 1+ cell  Lens: IOL centered and stable    POD1 s/p Phaco/IOL  Appropriate precautions and post op medications reviewed.  Patient instructed to call or come in if symptoms of redness, decreased vision, or pain are experienced.

## 2023-09-08 NOTE — TELEPHONE ENCOUNTER
----- Message from Rochelle Menezes sent at 9/8/2023  9:52 AM CDT -----  Regarding: Reschedule Post Op  Contact: Pt  Pt is requesting a callback  regarding post op for today. Pt would like to reschedule the next available due to car troubles. Please adv pt           Confirmed contact below:   Contact Name:Shelly Vásquez  Phone Number: [phone]

## 2023-09-11 ENCOUNTER — CLINICAL SUPPORT (OUTPATIENT)
Dept: REHABILITATION | Facility: OTHER | Age: 83
End: 2023-09-11
Payer: MEDICARE

## 2023-09-11 ENCOUNTER — PATIENT MESSAGE (OUTPATIENT)
Dept: ORTHOPEDICS | Facility: CLINIC | Age: 83
End: 2023-09-11
Payer: MEDICARE

## 2023-09-11 DIAGNOSIS — M79.605 PAIN IN BOTH LOWER EXTREMITIES: ICD-10-CM

## 2023-09-11 DIAGNOSIS — M79.604 PAIN IN BOTH LOWER EXTREMITIES: ICD-10-CM

## 2023-09-11 DIAGNOSIS — G89.29 CHRONIC BILATERAL LOW BACK PAIN WITHOUT SCIATICA: Primary | ICD-10-CM

## 2023-09-11 DIAGNOSIS — M54.50 CHRONIC BILATERAL LOW BACK PAIN WITHOUT SCIATICA: Primary | ICD-10-CM

## 2023-09-11 PROCEDURE — 97112 NEUROMUSCULAR REEDUCATION: CPT | Mod: PN,CQ

## 2023-09-11 PROCEDURE — 97140 MANUAL THERAPY 1/> REGIONS: CPT | Mod: PN,CQ

## 2023-09-11 PROCEDURE — 97110 THERAPEUTIC EXERCISES: CPT | Mod: PN,CQ

## 2023-09-11 NOTE — PROGRESS NOTES
"[facility]   Physical Therapy Treatment Note     Name: Shelly Dyerman  Clinic Number: [id number]    Therapy Diagnosis:   Encounter Diagnoses   Name Primary?    Chronic bilateral low back pain without sciatica Yes    Pain in both lower extremities        Physician: Marlene Méndez MD    Visit Date: 9/11/2023    Physician Orders: Physical Therapy Evaluate and Treat  Medical Diagnosis from Referral: radiculopathy with lower extremity symptoms   Evaluation Date: 8/18/23  Authorization Period Expiration: 8/14/24  Plan of Care Expiration: 8/18/2023 to 11/18/23  Visit # / Visits authorized: 6/20  FOTO: 0/3  on 8/18/23    Precautions: standard    Time In: 1200  Time Out: 1255  Total Billable Time: 53 minutes    SUBJECTIVE     Pt reports: that she is going to follow up with her MD as she feels like her pain has not fully gone away. States she has been performing her HEP daily and her pain has not seen much improvement in the past several days.  She was compliant with home exercise program.  Response to previous treatment: felt a little sore  Functional change: none today    Prior Level of Function: Frequent falls, difficulty walking  Current Level of Function: difficulty with stairs, no falls since surgery     Pain:  Current 2/10, worst 2/10, best 0/10   Location: right hip  Description: Aching  Aggravating Factors: exercise  Easing Factors: rest     Pts goals: Pt would like to return to walking with no increase in left hip pain/weakness.    OBJECTIVE     Objective Measures updated at progress report unless specified.     Treatment     Shelly received the treatments listed below:        Therapeutic Exercises were provided for 15 minutes to improve strength and AROM including:  Nu Step Level 2.0 x8 minutes  Bridging, 3x10  SLR 1# 3x10 R/L  Supine clams, GTB, 3x10  LAQ 1# 30x R/L  +R Piriformis stretching, 2x30"   Standing hip abduction 2x10  Standing ankle PF/DF x30    manual therapy " "techniques: Joint mobilizations, Myofacial release, and Soft tissue Mobilization were applied to the: R hip for 15 minutes, including:  +LAD to R hip  +Rolling pin to R ITB  +SL  R hip flexor stretch, 4x30" - performed by clinician    neuromuscular re-education activities to improve: Balance, Coordination, Kinesthetic, Sense, and Proprioception for 23 minutes. The following activities were included:    Sit to stand 3x10 ( elevated high/low )  Step ups 6" 20x R/L  Standing marches, 2x10  Mini squats, 3x10  Tandem walk x 10'      therapeutic activities to improve functional performance for 00  minutes, including:        Patient Education and Home Exercises     Home Exercises Provided and Patient Education Provided     Education provided:   - Exercise form and rationale     Written Home Exercises Provided: Patient instructed to cont prior HEP. Exercises were reviewed and Shelly was able to demonstrate them prior to the end of the session.  Shelly demonstrated good  understanding of the education provided. See EMR under Patient Instructions for exercises provided during therapy sessions    ASSESSMENT   Pt tolerated exercise fair today she continues to present with antalgic gait and muscle weakness secondary to pain despite improvements. She informed this clinician she would like to follow up with her MD as she is still having continued pain despite participating in her PT and being compliant with her HEP. Tightness in hip flexors/piriformis musculature was noted, pt received stretching to help address this. Will continue to progress strengthening and stretching as well as functional exercises as tolerated.       Shelly Is progressing well towards her goals.   Pt prognosis is Good.     Pt will continue to benefit from skilled outpatient physical therapy to address the deficits listed in the problem list box on initial evaluation, provide pt/family education and to maximize pt's level of independence in the home and " community environment.     Pt's spiritual, cultural and educational needs considered and pt agreeable to plan of care and goals.     Anticipated Barriers for therapy: None    GOALS:  Short Term Goals (4 Weeks):  1. Patient will be compliant with home exercise program to supplement therapy in restoring pain free function. (progressing, not met)    2. Patient will improve impaired lower extremity manual muscle tests to >/= 4/5 to improve dynamic hip/knee support for functional tasks. (progressing, not met)    3. Pt will tolerate walking for 30 minutes with no increase in right hip pain/weakness to return to PLOF. (progressing, not met)    4. Pt will improve right hip pain to 0/10 at worst for improved QOL. (progressing, not met)          Long Term Goals (8 Weeks):  1. Patient will improve FOTO score by 10% limited to decrease perceived limitation with mobility. (progressing, not met)    2. Patient will improve impaired lower extremity manual muscle tests to >/= 4+/5 to improve dynamic hip/knee support for functional tasks. (progressing, not met)    3. Patient will tolerate walking for 45 minutes with no increase in right hip pain/weakness to return to PLOF. (progressing, not met)      PLAN     Plan of care Certification: 8/18/2023 to 11/18/23    Focus on hip/LE strength with emphasis on ADL/functional performance.      Outpatient Physical Therapy 2 times weekly for 12 weeks to include the following interventions: Therapeutic Exercises, Manual Therapeutic Technique, Neuromuscular Re Education, Therapeutic Activities. Modalities, Kinesiotape prn, and Functional Dry Needling as needed    Michael Sims, PTA

## 2023-09-13 ENCOUNTER — CLINICAL SUPPORT (OUTPATIENT)
Dept: REHABILITATION | Facility: OTHER | Age: 83
End: 2023-09-13
Payer: MEDICARE

## 2023-09-13 DIAGNOSIS — M79.604 PAIN IN BOTH LOWER EXTREMITIES: ICD-10-CM

## 2023-09-13 DIAGNOSIS — M54.50 CHRONIC BILATERAL LOW BACK PAIN WITHOUT SCIATICA: Primary | ICD-10-CM

## 2023-09-13 DIAGNOSIS — M79.605 PAIN IN BOTH LOWER EXTREMITIES: ICD-10-CM

## 2023-09-13 DIAGNOSIS — G89.29 CHRONIC BILATERAL LOW BACK PAIN WITHOUT SCIATICA: Primary | ICD-10-CM

## 2023-09-13 PROCEDURE — 97164 PT RE-EVAL EST PLAN CARE: CPT | Mod: PN

## 2023-09-13 PROCEDURE — 97140 MANUAL THERAPY 1/> REGIONS: CPT | Mod: PN

## 2023-09-13 NOTE — PROGRESS NOTES
[facility]   Physical Therapy Updated Plan of Care     Name: Shelly Vásquez  Clinic Number: [id number]    Therapy Diagnosis:   Encounter Diagnoses   Name Primary?    Chronic bilateral low back pain without sciatica Yes    Pain in both lower extremities          Physician: Marlene Méndez MD    Visit Date: 9/13/2023    Physician Orders: Physical Therapy Evaluate and Treat  Medical Diagnosis from Referral: radiculopathy with lower extremity symptoms   Evaluation Date: 8/18/23  Authorization Period Expiration: 8/14/24  Plan of Care Expiration: 8/18/2023 to 11/18/23  Visit # / Visits authorized: 7/20  FOTO: 0/3  on 8/18/23    Precautions: standard    Time In: 930a  Time Out: 955a  Total Billable Time: 25 minutes    SUBJECTIVE     Pt reports: increased right foot pain since last treatment. She stated that there is burning and sharp pain at the plantar surface of the right foot, and the pain is worse with 1st step but seems to get better after walking.    She was compliant with home exercise program.  Response to previous treatment: felt a little sore  Functional change: none today    Prior Level of Function: Frequent falls, difficulty walking  Current Level of Function: difficulty with stairs, no falls since surgery     Pain:  Current 2/10, worst 2/10, best 0/10   Location: right hip  Description: Aching  Aggravating Factors: exercise  Easing Factors: rest     Pts goals: Pt would like to return to walking with no increase in left hip pain/weakness.    OBJECTIVE     9/13/23      WNL=within normal limits  WFL=within functional limits  NT=not tested  *=pain     Posture: right knee flexion, weight shift to left   Palpation: tenderness to palpation at right medial aspect of calcaneus and throughout right plantar fascia  Sensation: intact  Deep tendon reflexes: NT     Lower Extremity Strength:     Right LE     Left LE        Iliopsoas:  L2 3+/5 Iliopsoas: L2 3+/5    Quadriceps:  L3 - femoral  "nerve 4/5 Quadriceps: L3 - femoral nerve 4/5    Hip adduction:  L3 - obterator 4/5 Hip adduction: L3 - obterator 4/5    Hamstrings:  S2 4/5 Hamstrings: S2 4/5    Ankle DF/EV:  L4 4/5 Ankle DF/EV: L4 4/5    GT Ext:  L5 NT GT Ext L5 NT    Hip Abduction:  L5-S1 - Superior gluteal nerve 4/5 Hip Abduction: L5-S1 - Superior gluteal nerve 4/5    Hip extension:  L5-S2 - Inferior gluteal nerve 3/5 Hip extension: L5-S2 - Inferior gluteal nerve 3+/5    Ankle PF:   S1 - tibial nerve NT Ankle PF S1 - tibial nerve NT      SLR: + on right for lateral knee/thigh/posterior hip pain     + windlass test on R     Functional Tests:   SL Balance   R: 3 sec          L: 5 sec     Balance with NBOS: 30 sec        Balance with NBOS on foam: 10 sec        30" sit to stand: 5 reps     TUG:  Trial 1: 21.1    Trial 2: 18.2    Trial 3: 19.1           Hip Passive Range of Motion:    Right  Left    Flexion 90 105   Abduction 40 45   Extension 5 10   Ext. Rotation NT NT   Int. Rotation NT NT               CMS Impairment/Limitation/Restriction for FOTO Survey     Therapist reviewed FOTO scores for Shelly Vásquez on 8/18/2023.   FOTO documents entered into [employer] - see Media section.     Limitation Score: TBD%  Predicted Goal: TBD%     Category: Mobility       Treatment     Shelly received the treatments listed below:        Therapeutic Exercises were provided for 0 minutes to improve strength and AROM including:  Nu Step Level 2.0 x8 minutes  Bridging, 3x10  SLR 1# 3x10 R/L  Supine clams, GTB, 3x10  LAQ 1# 30x R/L  +R Piriformis stretching, 2x30"   Standing hip abduction 2x10  Standing ankle PF/DF x30    manual therapy techniques: Joint mobilizations, Myofacial release, and Soft tissue Mobilization were applied to the: R hip for 15 minutes, including:  +LAD to R hip  +Rolling pin to R ITB  +SL  R hip flexor stretch, 4x30" - performed by clinician  Navicular sling placed on right foot    neuromuscular re-education activities to improve: Balance, " "Coordination, Kinesthetic, Sense, and Proprioception for 00 minutes. The following activities were included:    Sit to stand 3x10 ( elevated high/low )  Step ups 6" 20x R/L  Standing marches, 2x10  Mini squats, 3x10  Tandem walk x 10'      therapeutic activities to improve functional performance for 00  minutes, including:        Patient Education and Home Exercises     Home Exercises Provided and Patient Education Provided     Education provided:   - Exercise form and rationale     Written Home Exercises Provided: Patient instructed to cont prior HEP. Exercises were reviewed and Shelly was able to demonstrate them prior to the end of the session.  Shelly demonstrated good  understanding of the education provided. See EMR under Patient Instructions for exercises provided during therapy sessions    ASSESSMENT     Ms. Vásquez has made mild improvement in physical therapy over the past 3 weeks. She presents today with complaint of increased right foot pain. She had a positive sign for right side neural tension, and she also has + sign for right side plantar fasciitis. Patient had to leave early 2nd to prior commitment, but she reported decreased pain with navicular sling taping. Patient will continue to benefit from skilled physical therapy to address persisting deficits.        Shelly Is progressing well towards her goals.   Pt prognosis is Good.     Pt will continue to benefit from skilled outpatient physical therapy to address the deficits listed in the problem list box on initial evaluation, provide pt/family education and to maximize pt's level of independence in the home and community environment.     Pt's spiritual, cultural and educational needs considered and pt agreeable to plan of care and goals.     Anticipated Barriers for therapy: None    GOALS:  Short Term Goals (4 Weeks):  1. Patient will be compliant with home exercise program to supplement therapy in restoring pain free function. (met)    2. Patient will " improve impaired lower extremity manual muscle tests to >/= 4/5 to improve dynamic hip/knee support for functional tasks. (progressing, not met)    3. Pt will tolerate walking for 30 minutes with no increase in right hip pain/weakness to return to PLOF. (progressing, not met)    4. Pt will improve right hip pain to 0/10 at worst for improved QOL. (progressing, not met)          Long Term Goals (8 Weeks):  1. Patient will improve FOTO score by 10% limited to decrease perceived limitation with mobility. (progressing, not met)    2. Patient will improve impaired lower extremity manual muscle tests to >/= 4+/5 to improve dynamic hip/knee support for functional tasks. (progressing, not met)    3. Patient will tolerate walking for 45 minutes with no increase in right hip pain/weakness to return to PLOF. (progressing, not met)      PLAN     Plan of care Certification: 9/13/23 to 12/13/23    Focus on hip/LE strength with emphasis on ADL/functional performance.      Outpatient Physical Therapy 2 times weekly for 12 weeks to include the following interventions: Therapeutic Exercises, Manual Therapeutic Technique, Neuromuscular Re Education, Therapeutic Activities. Modalities, Kinesiotape prn, and Functional Dry Needling as needed    Leroy Alston, PT

## 2023-09-15 ENCOUNTER — PATIENT MESSAGE (OUTPATIENT)
Dept: ORTHOPEDICS | Facility: CLINIC | Age: 83
End: 2023-09-15

## 2023-09-15 ENCOUNTER — OFFICE VISIT (OUTPATIENT)
Dept: ORTHOPEDICS | Facility: CLINIC | Age: 83
End: 2023-09-15
Payer: MEDICARE

## 2023-09-15 VITALS — BODY MASS INDEX: 23.56 KG/M2 | HEIGHT: 68 IN | WEIGHT: 155.44 LBS

## 2023-09-15 DIAGNOSIS — M70.61 GREATER TROCHANTERIC BURSITIS OF RIGHT HIP: ICD-10-CM

## 2023-09-15 DIAGNOSIS — Z96.641 STATUS POST RIGHT HIP REPLACEMENT: Primary | ICD-10-CM

## 2023-09-15 DIAGNOSIS — M79.671 RIGHT FOOT PAIN: ICD-10-CM

## 2023-09-15 PROCEDURE — 99213 PR OFFICE/OUTPT VISIT, EST, LEVL III, 20-29 MIN: ICD-10-PCS | Mod: S$PBB,,, | Performed by: ORTHOPAEDIC SURGERY

## 2023-09-15 PROCEDURE — 99213 OFFICE O/P EST LOW 20 MIN: CPT | Mod: S$PBB,,, | Performed by: ORTHOPAEDIC SURGERY

## 2023-09-15 PROCEDURE — 99213 OFFICE O/P EST LOW 20 MIN: CPT | Mod: PBBFAC | Performed by: ORTHOPAEDIC SURGERY

## 2023-09-15 PROCEDURE — 99999 PR PBB SHADOW E&M-EST. PATIENT-LVL III: CPT | Mod: PBBFAC,,, | Performed by: ORTHOPAEDIC SURGERY

## 2023-09-15 PROCEDURE — 99999 PR PBB SHADOW E&M-EST. PATIENT-LVL III: ICD-10-PCS | Mod: PBBFAC,,, | Performed by: ORTHOPAEDIC SURGERY

## 2023-09-15 RX ORDER — METHYLPREDNISOLONE 4 MG/1
TABLET ORAL
Qty: 1 EACH | Refills: 0 | Status: SHIPPED | OUTPATIENT
Start: 2023-09-15 | End: 2023-10-02

## 2023-09-15 RX ORDER — CELECOXIB 200 MG/1
200 CAPSULE ORAL DAILY
Qty: 30 CAPSULE | Refills: 0 | Status: ON HOLD | OUTPATIENT
Start: 2023-09-15 | End: 2023-10-05 | Stop reason: HOSPADM

## 2023-09-15 NOTE — PROGRESS NOTES
"Subjective:     HPI:   Shelly Vásquez is a 82 y.o. female who presents for eval R hip    Date of surgery: R ant hybrid NAT 4/26/23, 1u PRBC pre and post op, POUR  path negative    Medications: tylenol at night, took last 2 celebrex the other night  Nothing during the day    Assistive Devices: none    Seen 7/20/23 for 12 week post op visit, was doing well  PT ongoing for back pain PRN  9/7/23 cataract procedure    Feels like PT is hurting her  Doing and exercise with a strap on her leg and "swing it all around"  Increasing pain   "Was going along pretty good and something aggravated it"  "Only thing I changed was the exercises"    No pain low back/buttock  Ant hip pain after prolonged sitting not walking  Pain mostly at lateral hip  Also pain at medial foot instep   Doesn't seem to be radicular just those two spots    this week, difficult walking on Wednesday  Now hard to get up     The foot hurts more than the hip  PT put Ktape on her foot on wednesday         Objective:   Body mass index is 23.63 kg/m².  Exam:    Gait: limp/antalgic/trendelenburg none but slow gait, harder getting up out of a chair    Single leg kae able to do it but with lateral pain    Incision: healed    Active straight leg raise: good strength, no discomfort    Extension: 0    Flexion: 100    Abduction: 30    Adduction: 20    External rotation: 40    Internal rotation: 20  - no pain with hip prom    LLD: 2 cm supine R long - significant pelvic obliquity    Ttp at R GT and SER  and foot arch/instep  5/5 abd strength but pain with res abd side lying  Arch is where she was putting strap for PT exercises      Imaging:  None today  Looked good in July        Assessment:       ICD-10-CM ICD-9-CM   1. Status post right hip replacement  Z96.641 V43.64   2. Greater trochanteric bursitis of right hip  M70.61 726.5   3. Right foot pain  M79.671 729.5        -NAT mech ok  -has irritated abductors and SER  -foot irritation from PT " strap    myelodysplastic syndrome     Plan:       Patient is doing very well with the hip replacement at this time.  They will continue routine care of their hip and see me for their routine follow-up.  If there are problems in the interim they will see me back sooner. Prophylactic antibiotic protocol given and explained to patient.     Stop PT  Rest, gentle ROM  Use walker/cane PRN    Rx medrol dose pack  Tylenol arthritis 2-3x/day  Rx celebrex #30    F/u 1 month f/u with standing long alignment XR to assess leg lengths    No orders of the defined types were placed in this encounter.            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)       Past Surgical History:   Procedure Laterality Date    APPENDECTOMY  age 21    ARTHROPLASTY OF HIP BY ANTERIOR APPROACH Right 4/26/2023    Procedure: ARTHROPLASTY, HIP, TOTAL, ANTERIOR APPROACH: RIGHT: DEPUY - C-STEM + PINNACLE;  Surgeon: Keon Gary III, MD;  Location: [address];  Service: Orthopedics;  Laterality: Right;    BACK SURGERY      Beast Lift  2004    BONE MARROW BIOPSY Right 12/19/2019    Procedure: Biopsy-bone marrow;  Surgeon: Aidan Spring MD;  Location: [address];  Service: Oncology;  Laterality: Right;    BREAST BIOPSY  50yrs ago    unable to see scar    CATARACT EXTRACTION W/  INTRAOCULAR LENS IMPLANT Right 9/7/2023    Procedure: EXTRACTION, CATARACT, WITH IOL INSERTION;  Surgeon: Francisco Willingham MD;  Location: [facility];  Service: Ophthalmology;  Laterality: Right;  [facility] ONLY    COSMETIC SURGERY      DILATION AND CURETTAGE OF UTERUS  2008    PMB    ENDOMETRIAL BIOPSY      EPIDURAL  STEROID INJECTION N/A 8/25/2022    Procedure: LUMBAR CELINA CONTRAST DIRECT REFERRAL;  Surgeon: Rj Hernandez MD;  Location: [facility];  Service: Pain Management;  Laterality: N/A;    EYE SURGERY      INJECTION OF ANESTHETIC AGENT AROUND NERVE Bilateral 11/11/2022    Procedure: BLOCK, NERVE BILATERAL L2,L3,L4 AND L5 MEDIAL BRANCH ONE OF TWO;  Surgeon: Rj Hernandez MD;  Location: [facility];  Service: Pain Management;  Laterality: Bilateral;    INJECTION OF JOINT Right 12/2/2022    Procedure: INJECTION, JOINT RIGHT INTRARTICULAR HIP CONTRAST;  Surgeon: Rj Hernandez MD;  Location: [facility];  Service: Pain Management;  Laterality: Right;    ptsosis      RADIOFREQUENCY ABLATION  03/2018    SMALL INTESTINE SURGERY      TONSILLECTOMY, ADENOIDECTOMY  age 8    TOTAL REDUCTION MAMMOPLASTY Bilateral 2003    TRANSFORAMINAL EPIDURAL INJECTION OF STEROID Right 8/11/2022    Procedure: Injection,steroid,epidural Right L1/2 TF DIrect Referral Instructed to provide intervention per MRI results;  Surgeon: Rj Hernandez MD;  Location: [facility];  Service: Pain Management;  Laterality: Right;    TRANSFORAMINAL EPIDURAL INJECTION OF STEROID Right 10/21/2022    Procedure: INJECTION, STEROID, EPIDURAL, TRANSFORAMINAL APPROACH, L5-S1 and S1, RIGHT CONTRAST;  Surgeon: Rj Hernandez MD;  Location: [facility];  Service: Pain Management;  Laterality: Right;    TUBAL LIGATION         Family History   Problem Relation Age of Onset    No Known Problems Father     Cirrhosis Mother     No Known Problems Brother     No Known Problems Maternal Aunt     No Known Problems Maternal Uncle     No Known Problems Paternal Aunt     No Known Problems Paternal Uncle     No Known Problems Maternal Grandmother     No Known Problems Maternal Grandfather     No Known Problems Paternal Grandmother     No Known Problems Paternal Grandfather     No Known Problems Daughter     Pulmonary embolism Son     Breast cancer Neg Hx     Colon  cancer Neg Hx     Ovarian cancer Neg Hx     Amblyopia Neg Hx     Blindness Neg Hx     Cancer Neg Hx     Cataracts Neg Hx     Diabetes Neg Hx     Glaucoma Neg Hx     Hypertension Neg Hx     Macular degeneration Neg Hx     Retinal detachment Neg Hx     Strabismus Neg Hx     Stroke Neg Hx     Thyroid disease Neg Hx        Social History     Socioeconomic History    Marital status:    Tobacco Use    Smoking status: Former     Current packs/day: 0.00     Average packs/day: 1 pack/day for 22.0 years (22.0 ttl pk-yrs)     Types: Cigarettes     Start date: 1958     Quit date: 1980     Years since quittin.1    Smokeless tobacco: Never   Substance and Sexual Activity    Alcohol use: Yes     Alcohol/week: 2.0 standard drinks of alcohol     Types: 2 Glasses of wine per week     Comment: 1-2 glasses of  some night    Drug use: No    Sexual activity: Not Currently     Partners: Male     Birth control/protection: Post-menopausal     Comment:  since      Social Determinants of Health     Financial Resource Strain: Low Risk  (2023)    Overall Financial Resource Strain (CARDIA)     Difficulty of Paying Living Expenses: Not hard at all   Food Insecurity: No Food Insecurity (2023)    Hunger Vital Sign     Worried About Running Out of Food in the Last Year: Never true     Ran Out of Food in the Last Year: Never true   Transportation Needs: Unknown (2023)    PRAPARE - Transportation     Lack of Transportation (Medical): No   Physical Activity: Insufficiently Active (2023)    Exercise Vital Sign     Days of Exercise per Week: 3 days     Minutes of Exercise per Session: 40 min   Stress: No Stress Concern Present (2023)    [employer] - Occupational Stress Questionnaire     Feeling of Stress : Not at all   Social Connections: Socially Integrated (2023)    Social Connection and Isolation Panel [NHANES]     Frequency of Communication with Friends and  Family: More than three times a week     Frequency of Social Gatherings with Friends and Family: More than three times a week     Attends Protestant Services: More than 4 times per year     Active Member of Clubs or Organizations: Yes     Attends Club or Organization Meetings: Never     Marital Status:    Housing Stability: Unknown (4/27/2023)    Housing Stability Vital Sign     Unable to Pay for Housing in the Last Year: No     Unstable Housing in the Last Year: No

## 2023-09-18 ENCOUNTER — OFFICE VISIT (OUTPATIENT)
Dept: OPHTHALMOLOGY | Facility: CLINIC | Age: 83
End: 2023-09-18
Payer: MEDICARE

## 2023-09-18 DIAGNOSIS — Z98.890 POST-OPERATIVE STATE: Primary | ICD-10-CM

## 2023-09-18 DIAGNOSIS — H25.13 NUCLEAR SCLEROSIS OF BOTH EYES: ICD-10-CM

## 2023-09-18 PROCEDURE — 99024 POSTOP FOLLOW-UP VISIT: CPT | Mod: POP,,, | Performed by: OPHTHALMOLOGY

## 2023-09-18 PROCEDURE — 99024 PR POST-OP FOLLOW-UP VISIT: ICD-10-PCS | Mod: POP,,, | Performed by: OPHTHALMOLOGY

## 2023-09-18 PROCEDURE — 99999 PR PBB SHADOW E&M-EST. PATIENT-LVL III: CPT | Mod: PBBFAC,,, | Performed by: OPHTHALMOLOGY

## 2023-09-18 PROCEDURE — 99213 OFFICE O/P EST LOW 20 MIN: CPT | Mod: PBBFAC | Performed by: OPHTHALMOLOGY

## 2023-09-18 PROCEDURE — 99999 PR PBB SHADOW E&M-EST. PATIENT-LVL III: ICD-10-PCS | Mod: PBBFAC,,, | Performed by: OPHTHALMOLOGY

## 2023-09-20 ENCOUNTER — TELEPHONE (OUTPATIENT)
Dept: OPHTHALMOLOGY | Facility: CLINIC | Age: 83
End: 2023-09-20
Payer: MEDICARE

## 2023-09-20 DIAGNOSIS — H25.12 NUCLEAR SCLEROTIC CATARACT OF LEFT EYE: Primary | ICD-10-CM

## 2023-09-21 ENCOUNTER — PATIENT MESSAGE (OUTPATIENT)
Dept: SURGERY | Facility: HOSPITAL | Age: 83
End: 2023-09-21
Payer: MEDICARE

## 2023-09-22 RX ORDER — PHENYLEPHRINE HYDROCHLORIDE 100 MG/ML
1 SOLUTION/ DROPS OPHTHALMIC
Status: CANCELLED | OUTPATIENT
Start: 2023-09-22

## 2023-09-22 RX ORDER — CYCLOP/TROP/PROPA/PHEN/KET/WAT 1-1-0.1%
1 DROPS (EA) OPHTHALMIC (EYE)
Status: CANCELLED | OUTPATIENT
Start: 2023-09-22

## 2023-09-22 RX ORDER — MOXIFLOXACIN 5 MG/ML
1 SOLUTION/ DROPS OPHTHALMIC
Status: CANCELLED | OUTPATIENT
Start: 2023-09-22

## 2023-09-22 NOTE — PROGRESS NOTES
HPI    Patient here for 1 week po phaco OD    Patient states vision is good OD. No pain/discomfort. She has been able to   read the newspaper without glasses.    PGB TID OD     09/07/2023 IMPLANT: [email or web address] 19.0  OD  Last edited by Bryanna Gutierrez on 9/18/2023  2:13 PM.            Assessment /Plan     For exam results, see Encounter Report.    There are no diagnoses linked to this encounter.  Slit lamp exam:  L/L: nl  K: clear, wound sealed  AC: trace cell  Iris/Lens: IOL centered and stable    POW1 s/p phaco: Surgery healing well with no signs of infection or abnormal inflammation.    Patient wishes to proceed with surgery in the second eye. Risks, benefits, alternatives reviewed. IOL selection reviewed.     Right eye  IOL:  CNWTT3 18.5 ,09/07/2023 IMPLANT: CNWTT3 19.0  OD, 20/20     Left eye  IOL: CNWTT0 18.5 vs 19.0      The patient expresses a desire to reduce spectacle dependence. I reviewed various IOL and LASER refractive surgical options and we will attempt to minimize spectacle dependence by managing astigmatism and optimizing IOL selection. Femtosecond LASER assisted cataract surgery (FLACS) technology was explained to the patient with educational videos and discussion.  The patient voices understanding and wishes to implement this technology during the cataract procedure.  I explained the increased precision of the LASER versus manual techniques, especially as it relates to astigmatism reduction with arcuate incisions.  I emphasized that although our goal is to reduce the need for refractive correction after surgery, there may still be a need for spectacle correction to achieve optimal visual acuity, and that a reasonable range of functional vision should be the expectation.  No guarantees are made about post operative refraction or visual acuity, as the eye may heal in unpredictable ways, and the standard risks, benefits, and alternatives to cataract surgery were explained.  The patient  understands that the refractive portions of this cataract procedure are not covered by insurance, and that there is an out of pocket expense of $2250 per eye. I also explained that even though our pre-operative plan is to utilize advanced refractive technologies during surgery, that I may decide to eliminate part or all of this plan if surgical challenges or complications arise, or I feel that it is not in the patient's best interest. Consent forms and an ABN form were given to the patient to review.    [facility] ONLY

## 2023-09-28 ENCOUNTER — PATIENT MESSAGE (OUTPATIENT)
Dept: PREADMISSION TESTING | Facility: HOSPITAL | Age: 83
End: 2023-09-28
Payer: MEDICARE

## 2023-09-28 ENCOUNTER — TELEPHONE (OUTPATIENT)
Dept: OPHTHALMOLOGY | Facility: CLINIC | Age: 83
End: 2023-09-28
Payer: MEDICARE

## 2023-09-28 NOTE — TELEPHONE ENCOUNTER
Patient given arrival time of 10:15 am on Monday October 2. Nothing to eat or drink after 11:59 pm.  Start drops into the operative eye today. [id number] [facility]

## 2023-09-30 ENCOUNTER — EXTERNAL CHRONIC CARE MANAGEMENT (OUTPATIENT)
Dept: PRIMARY CARE CLINIC | Facility: CLINIC | Age: 83
End: 2023-09-30
Payer: MEDICARE

## 2023-09-30 PROCEDURE — 99490 CHRNC CARE MGMT STAFF 1ST 20: CPT | Mod: PBBFAC | Performed by: INTERNAL MEDICINE

## 2023-09-30 PROCEDURE — 99490 CHRNC CARE MGMT STAFF 1ST 20: CPT | Mod: S$PBB,,, | Performed by: INTERNAL MEDICINE

## 2023-09-30 PROCEDURE — 99490 PR CHRONIC CARE MGMT, 1ST 20 MIN: ICD-10-PCS | Mod: S$PBB,,, | Performed by: INTERNAL MEDICINE

## 2023-10-02 ENCOUNTER — ANESTHESIA (OUTPATIENT)
Dept: SURGERY | Facility: HOSPITAL | Age: 83
DRG: 378 | End: 2023-10-02
Payer: MEDICARE

## 2023-10-02 ENCOUNTER — TELEPHONE (OUTPATIENT)
Dept: INTERNAL MEDICINE | Facility: CLINIC | Age: 83
End: 2023-10-02
Payer: MEDICARE

## 2023-10-02 ENCOUNTER — ANESTHESIA EVENT (OUTPATIENT)
Dept: SURGERY | Facility: HOSPITAL | Age: 83
DRG: 378 | End: 2023-10-02
Payer: MEDICARE

## 2023-10-02 ENCOUNTER — PATIENT MESSAGE (OUTPATIENT)
Dept: INTERNAL MEDICINE | Facility: CLINIC | Age: 83
End: 2023-10-02
Payer: MEDICARE

## 2023-10-02 DIAGNOSIS — K92.1 MELENA: Primary | ICD-10-CM

## 2023-10-02 DIAGNOSIS — Z96.641 STATUS POST RIGHT HIP REPLACEMENT: Primary | ICD-10-CM

## 2023-10-02 DIAGNOSIS — K21.9 GASTROESOPHAGEAL REFLUX DISEASE, UNSPECIFIED WHETHER ESOPHAGITIS PRESENT: ICD-10-CM

## 2023-10-02 DIAGNOSIS — R35.0 URINE FREQUENCY: ICD-10-CM

## 2023-10-02 PROBLEM — H25.12 NUCLEAR SCLEROTIC CATARACT OF LEFT EYE: Status: ACTIVE | Noted: 2023-09-07

## 2023-10-02 PROCEDURE — 63600175 PHARM REV CODE 636 W HCPCS: Performed by: NURSE ANESTHETIST, CERTIFIED REGISTERED

## 2023-10-02 PROCEDURE — D9220A PRA ANESTHESIA: Mod: CRNA,,, | Performed by: NURSE ANESTHETIST, CERTIFIED REGISTERED

## 2023-10-02 PROCEDURE — D9220A PRA ANESTHESIA: ICD-10-PCS | Mod: CRNA,,, | Performed by: NURSE ANESTHETIST, CERTIFIED REGISTERED

## 2023-10-02 PROCEDURE — D9220A PRA ANESTHESIA: ICD-10-PCS | Mod: ANES,,, | Performed by: UROLOGY

## 2023-10-02 PROCEDURE — D9220A PRA ANESTHESIA: Mod: ANES,,, | Performed by: UROLOGY

## 2023-10-02 RX ORDER — METHOCARBAMOL 750 MG/1
750 TABLET, FILM COATED ORAL 4 TIMES DAILY PRN
Qty: 40 TABLET | Refills: 1 | Status: SHIPPED | OUTPATIENT
Start: 2023-10-02 | End: 2023-10-12

## 2023-10-02 RX ORDER — MIDAZOLAM HYDROCHLORIDE 1 MG/ML
INJECTION, SOLUTION INTRAMUSCULAR; INTRAVENOUS
Status: DISCONTINUED | OUTPATIENT
Start: 2023-10-02 | End: 2023-10-02

## 2023-10-02 RX ADMIN — MIDAZOLAM 1 MG: 1 INJECTION INTRAMUSCULAR; INTRAVENOUS at 09:10

## 2023-10-02 NOTE — TRANSFER OF CARE
"Anesthesia Transfer of Care Note    Patient: Shelly Vásquez    Procedure(s) Performed: Procedure(s) (LRB):  EXTRACTION, CATARACT, WITH IOL INSERTION (Left)    Patient location: PACU    Anesthesia Type: MAC    Transport from OR: Transported from OR on room air with adequate spontaneous ventilation    Post pain: adequate analgesia    Post assessment: no apparent anesthetic complications    Post vital signs: stable    Level of consciousness: awake    Nausea/Vomiting: no nausea/vomiting    Complications: none          Last vitals:   Visit Vitals  BP (!) 119/59 (BP Location: Right arm, Patient Position: Lying)   Pulse 85   Temp 36.6 °C (97.9 °F) (Oral)   Resp 18   Ht 5' 8" (1.727 m)   Wt 70.3 kg (155 lb)   LMP  (LMP Unknown)   SpO2 99%   Breastfeeding No   BMI 23.57 kg/m²     "

## 2023-10-02 NOTE — ANESTHESIA POSTPROCEDURE EVALUATION
Anesthesia Post Evaluation    Patient: Shelly Vásquez    Procedure(s) Performed: Procedure(s) (LRB):  EXTRACTION, CATARACT, WITH IOL INSERTION (Left)    Final Anesthesia Type: MAC      Patient location during evaluation: PACU  Patient participation: Yes- Able to Participate  Level of consciousness: awake and alert and oriented  Post-procedure vital signs: reviewed and stable  Pain management: adequate  Airway patency: patent    PONV status at discharge: No PONV  Anesthetic complications: no      Cardiovascular status: hemodynamically stable  Respiratory status: unassisted  Hydration status: euvolemic  Follow-up not needed.          Vitals Value Taken Time   /54 10/02/23 1013   Temp 36.4 °C (97.5 °F) 10/02/23 1000   Pulse 74 10/02/23 1015   Resp 16 10/02/23 1013   SpO2 100 % 10/02/23 1015   Vitals shown include unvalidated device data.      No case tracking events are documented in the log.      Pain/Oscar Score: Oscar Score: 10 (10/2/2023 10:13 AM)

## 2023-10-02 NOTE — TELEPHONE ENCOUNTER
Pt called late last night and has melena , weaknes,   She is already anemic   She will come for nurses visit and lab - in epic (do not worry about urine if she cannot make it   We need orthostatics too .     I have a 9 am available - I will likely try to see my executive two health patients first and then her

## 2023-10-02 NOTE — ANESTHESIA PREPROCEDURE EVALUATION
10/02/2023  Shelly Vásquez is a 82 y.o., female for left IOL    Hx of anesthetics in past without complications  NPO>8 hours  No food or drug allergies  Amenable to proceed with scheduled procedure       Patient Active Problem List   Diagnosis    Insomnia    Osteoarthritis    Spinal stenosis, lumbar    Spondylolisthesis at L4-L5 level    History of GI bleed    S/P lumbar laminectomy    PSVT (paroxysmal supraventricular tachycardia)    History of atrial fibrillation    Arthropathy of lumbar facet joint    History of radiofrequency ablation (RFA) procedure for cardiac arrhythmia    MDS (myelodysplastic syndrome)    Restless leg    Constipation    Refractory anemia with ringed sideroblasts    Impaired functional mobility, balance, gait, and endurance    Postmenopausal HRT (hormone replacement therapy)    Carotid artery dissection    Acid reflux    Elevated bilirubin    History of COVID-19    S/P total right hip arthroplasty    Urinary retention    Chronic bilateral low back pain without sciatica    Pain in both lower extremities    Nuclear sclerotic cataract of left eye       Past Medical History:   Diagnosis Date    Arthritis     Atrial fibrillation     Basal cell cancer     on the face    Encounter for blood transfusion     Gastric ulcer     GERD (gastroesophageal reflux disease)     Herpes simplex without mention of complication     rare outbreaks    Postmenopausal HRT (hormone replacement therapy)     Supraventricular tachycardia     s/p AVNRT ablation (Dr Brady)       ECHO: Results for orders placed during the hospital encounter of 04/24/19    Transthoracic echo (TTE) complete    Interpretation Summary  · Mild left atrial enlargement.  · Normal left ventricular systolic function. The estimated ejection fraction is 60%  · Indeterminate left ventricular diastolic  function.  · Normal right ventricular systolic function.  · Mild-to-moderate mitral regurgitation.  · Mild tricuspid regurgitation.  · Normal central venous pressure (3 mm Hg).  · The estimated PA systolic pressure is 23 mm Hg      Body mass index is 23.57 kg/m².    Tobacco Use: Medium Risk (10/2/2023)    Patient History     Smoking Tobacco Use: Former     Smokeless Tobacco Use: Never     Passive Exposure: Not on file       Social History     Substance and Sexual Activity   Drug Use No        Alcohol Use: Not At Risk (4/27/2023)    AUDIT-C     Frequency of Alcohol Consumption: 4 or more times a week     Average Number of Drinks: 1 or 2     Frequency of Binge Drinking: Never       Review of patient's allergies indicates:   Allergen Reactions    Baclofen      Incoherent     Nsaids (non-steroidal anti-inflammatory drug)      GI Bleed  Had 5 blood transfusions         Airway:  No value filed.         Pre-op Assessment    I have reviewed the Patient Summary Reports.     I have reviewed the Nursing Notes. I have reviewed the NPO Status.   I have reviewed the Medications.     Review of Systems  Anesthesia Hx:  No problems with previous Anesthesia  History of prior surgery of interest to airway management or planning: Denies Family Hx of Anesthesia complications.   Denies Personal Hx of Anesthesia complications.   Cardiovascular:   hyperlipidemia  Coronary Artery Disease:  Hypertension    Hepatic/GI:   GERD    Musculoskeletal:   Arthritis         Physical Exam  General: Well nourished, Cooperative, Alert and Oriented    Airway:  Mallampati: II   Mouth Opening: Small, but > 3cm  TM Distance: Normal  Tongue: Normal  Neck ROM: Normal ROM    Dental:  Intact, Periodontal disease        Anesthesia Plan  Type of Anesthesia, risks & benefits discussed:    Anesthesia Type: MAC  Intra-op Monitoring Plan: Standard ASA Monitors  Post Op Pain Control Plan: multimodal analgesia and IV/PO Opioids PRN  Induction:  IV  Informed  Consent: Informed consent signed with the Patient and all parties understand the risks and agree with anesthesia plan.  All questions answered. Patient consented to blood products? No  ASA Score: 3    Ready For Surgery From Anesthesia Perspective.     .

## 2023-10-03 ENCOUNTER — HOSPITAL ENCOUNTER (INPATIENT)
Facility: HOSPITAL | Age: 83
LOS: 1 days | Discharge: HOME | DRG: 378 | End: 2023-10-05
Attending: EMERGENCY MEDICINE | Admitting: INTERNAL MEDICINE
Payer: MEDICARE

## 2023-10-03 ENCOUNTER — ANESTHESIA EVENT (OUTPATIENT)
Dept: ENDOSCOPY | Facility: HOSPITAL | Age: 83
DRG: 378 | End: 2023-10-03
Payer: MEDICARE

## 2023-10-03 DIAGNOSIS — R07.9 CHEST PAIN: ICD-10-CM

## 2023-10-03 DIAGNOSIS — D62 ACUTE BLOOD LOSS ANEMIA: ICD-10-CM

## 2023-10-03 DIAGNOSIS — R53.83 FATIGUE: ICD-10-CM

## 2023-10-03 DIAGNOSIS — K92.1 MELENA: Primary | ICD-10-CM

## 2023-10-03 LAB
ABO + RH BLD: NORMAL
ALBUMIN SERPL BCP-MCNC: 3.6 G/DL (ref 3.5–5.2)
ALP SERPL-CCNC: 51 U/L (ref 55–135)
ALT SERPL W/O P-5'-P-CCNC: 11 U/L (ref 10–44)
ANION GAP SERPL CALC-SCNC: 8 MMOL/L (ref 8–16)
ANISOCYTOSIS BLD QL SMEAR: SLIGHT
AST SERPL-CCNC: 14 U/L (ref 10–40)
BACTERIA #/AREA URNS AUTO: NORMAL /HPF
BASOPHILS # BLD AUTO: 0.06 K/UL (ref 0–0.2)
BASOPHILS # BLD AUTO: 0.06 K/UL (ref 0–0.2)
BASOPHILS NFR BLD: 0.5 % (ref 0–1.9)
BASOPHILS NFR BLD: 0.8 % (ref 0–1.9)
BILIRUB SERPL-MCNC: 0.5 MG/DL (ref 0.1–1)
BILIRUB UR QL STRIP: NEGATIVE
BLD GP AB SCN CELLS X3 SERPL QL: NORMAL
BLD PROD TYP BPU: NORMAL
BLOOD UNIT EXPIRATION DATE: NORMAL
BLOOD UNIT TYPE CODE: 6200
BLOOD UNIT TYPE: NORMAL
BUN SERPL-MCNC: 45 MG/DL (ref 8–23)
CALCIUM SERPL-MCNC: 9.1 MG/DL (ref 8.7–10.5)
CHLORIDE SERPL-SCNC: 110 MMOL/L (ref 95–110)
CLARITY UR REFRACT.AUTO: CLEAR
CO2 SERPL-SCNC: 18 MMOL/L (ref 23–29)
CODING SYSTEM: NORMAL
COLOR UR AUTO: YELLOW
CREAT SERPL-MCNC: 0.8 MG/DL (ref 0.5–1.4)
CROSSMATCH INTERPRETATION: NORMAL
DACRYOCYTES BLD QL SMEAR: ABNORMAL
DIFFERENTIAL METHOD: ABNORMAL
DIFFERENTIAL METHOD: ABNORMAL
DISPENSE STATUS: NORMAL
EOSINOPHIL # BLD AUTO: 0 K/UL (ref 0–0.5)
EOSINOPHIL # BLD AUTO: 0.1 K/UL (ref 0–0.5)
EOSINOPHIL NFR BLD: 0.3 % (ref 0–8)
EOSINOPHIL NFR BLD: 0.9 % (ref 0–8)
ERYTHROCYTE [DISTWIDTH] IN BLOOD BY AUTOMATED COUNT: 19.8 % (ref 11.5–14.5)
ERYTHROCYTE [DISTWIDTH] IN BLOOD BY AUTOMATED COUNT: 21.6 % (ref 11.5–14.5)
EST. GFR  (NO RACE VARIABLE): >60 ML/MIN/1.73 M^2
GLUCOSE SERPL-MCNC: 100 MG/DL (ref 70–110)
GLUCOSE UR QL STRIP: NEGATIVE
HCT VFR BLD AUTO: 15.6 % (ref 37–48.5)
HCT VFR BLD AUTO: 17.4 % (ref 37–48.5)
HCT VFR BLD AUTO: 17.5 % (ref 37–48.5)
HGB BLD-MCNC: 5 G/DL (ref 12–16)
HGB BLD-MCNC: 5.8 G/DL (ref 12–16)
HGB BLD-MCNC: 5.9 G/DL (ref 12–16)
HGB UR QL STRIP: ABNORMAL
HYPOCHROMIA BLD QL SMEAR: ABNORMAL
IMM GRANULOCYTES # BLD AUTO: 0.04 K/UL (ref 0–0.04)
IMM GRANULOCYTES # BLD AUTO: 0.08 K/UL (ref 0–0.04)
IMM GRANULOCYTES NFR BLD AUTO: 0.5 % (ref 0–0.5)
IMM GRANULOCYTES NFR BLD AUTO: 0.7 % (ref 0–0.5)
KETONES UR QL STRIP: NEGATIVE
LACTATE SERPL-SCNC: 0.8 MMOL/L (ref 0.5–2.2)
LEUKOCYTE ESTERASE UR QL STRIP: NEGATIVE
LIPASE SERPL-CCNC: 16 U/L (ref 4–60)
LYMPHOCYTES # BLD AUTO: 1.1 K/UL (ref 1–4.8)
LYMPHOCYTES # BLD AUTO: 1.9 K/UL (ref 1–4.8)
LYMPHOCYTES NFR BLD: 23.9 % (ref 18–48)
LYMPHOCYTES NFR BLD: 9.4 % (ref 18–48)
MCH RBC QN AUTO: 31.9 PG (ref 27–31)
MCH RBC QN AUTO: 34.2 PG (ref 27–31)
MCHC RBC AUTO-ENTMCNC: 32.1 G/DL (ref 32–36)
MCHC RBC AUTO-ENTMCNC: 33.1 G/DL (ref 32–36)
MCV RBC AUTO: 107 FL (ref 82–98)
MCV RBC AUTO: 96 FL (ref 82–98)
MICROSCOPIC COMMENT: NORMAL
MONOCYTES # BLD AUTO: 0.6 K/UL (ref 0.3–1)
MONOCYTES # BLD AUTO: 0.6 K/UL (ref 0.3–1)
MONOCYTES NFR BLD: 5.1 % (ref 4–15)
MONOCYTES NFR BLD: 7.5 % (ref 4–15)
NEUTROPHILS # BLD AUTO: 5.1 K/UL (ref 1.8–7.7)
NEUTROPHILS # BLD AUTO: 9.7 K/UL (ref 1.8–7.7)
NEUTROPHILS NFR BLD: 66.4 % (ref 38–73)
NEUTROPHILS NFR BLD: 84 % (ref 38–73)
NITRITE UR QL STRIP: NEGATIVE
NRBC BLD-RTO: 0 /100 WBC
NRBC BLD-RTO: 0 /100 WBC
NUM UNITS TRANS PACKED RBC: NORMAL
OVALOCYTES BLD QL SMEAR: ABNORMAL
PH UR STRIP: 5 [PH] (ref 5–8)
PLATELET # BLD AUTO: 311 K/UL (ref 150–450)
PLATELET # BLD AUTO: 469 K/UL (ref 150–450)
PLATELET BLD QL SMEAR: ABNORMAL
PMV BLD AUTO: 10 FL (ref 9.2–12.9)
PMV BLD AUTO: 9.5 FL (ref 9.2–12.9)
POIKILOCYTOSIS BLD QL SMEAR: SLIGHT
POTASSIUM SERPL-SCNC: 4.4 MMOL/L (ref 3.5–5.1)
PROT SERPL-MCNC: 5.9 G/DL (ref 6–8.4)
PROT UR QL STRIP: NEGATIVE
RBC # BLD AUTO: 1.46 M/UL (ref 4–5.4)
RBC # BLD AUTO: 1.82 M/UL (ref 4–5.4)
RBC #/AREA URNS AUTO: 0 /HPF (ref 0–4)
SCHISTOCYTES BLD QL SMEAR: PRESENT
SODIUM SERPL-SCNC: 136 MMOL/L (ref 136–145)
SP GR UR STRIP: 1.01 (ref 1–1.03)
SPECIMEN OUTDATE: NORMAL
SQUAMOUS #/AREA URNS AUTO: 2 /HPF
URN SPEC COLLECT METH UR: ABNORMAL
WBC # BLD AUTO: 11.55 K/UL (ref 3.9–12.7)
WBC # BLD AUTO: 7.74 K/UL (ref 3.9–12.7)
WBC #/AREA URNS AUTO: 1 /HPF (ref 0–5)

## 2023-10-03 PROCEDURE — 85014 HEMATOCRIT: CPT | Performed by: HOSPITALIST

## 2023-10-03 PROCEDURE — C9113 INJ PANTOPRAZOLE SODIUM, VIA: HCPCS | Performed by: HOSPITALIST

## 2023-10-03 PROCEDURE — 36430 TRANSFUSION BLD/BLD COMPNT: CPT

## 2023-10-03 PROCEDURE — 25000003 PHARM REV CODE 250: Performed by: INTERNAL MEDICINE

## 2023-10-03 PROCEDURE — C9113 INJ PANTOPRAZOLE SODIUM, VIA: HCPCS

## 2023-10-03 PROCEDURE — 25000003 PHARM REV CODE 250

## 2023-10-03 PROCEDURE — 86920 COMPATIBILITY TEST SPIN: CPT | Performed by: EMERGENCY MEDICINE

## 2023-10-03 PROCEDURE — 99222 PR INITIAL HOSPITAL CARE,LEVL II: ICD-10-PCS | Mod: ,,, | Performed by: INTERNAL MEDICINE

## 2023-10-03 PROCEDURE — 36415 COLL VENOUS BLD VENIPUNCTURE: CPT | Performed by: HOSPITALIST

## 2023-10-03 PROCEDURE — 93010 EKG 12-LEAD: ICD-10-PCS | Mod: ,,, | Performed by: INTERNAL MEDICINE

## 2023-10-03 PROCEDURE — P9040 RBC LEUKOREDUCED IRRADIATED: HCPCS

## 2023-10-03 PROCEDURE — 25000003 PHARM REV CODE 250: Performed by: STUDENT IN AN ORGANIZED HEALTH CARE EDUCATION/TRAINING PROGRAM

## 2023-10-03 PROCEDURE — P9040 RBC LEUKOREDUCED IRRADIATED: HCPCS | Performed by: HOSPITALIST

## 2023-10-03 PROCEDURE — 85025 COMPLETE CBC W/AUTO DIFF WBC: CPT | Mod: 91

## 2023-10-03 PROCEDURE — P9040 RBC LEUKOREDUCED IRRADIATED: HCPCS | Performed by: EMERGENCY MEDICINE

## 2023-10-03 PROCEDURE — 99223 1ST HOSP IP/OBS HIGH 75: CPT | Mod: ,,,

## 2023-10-03 PROCEDURE — 86920 COMPATIBILITY TEST SPIN: CPT

## 2023-10-03 PROCEDURE — 85025 COMPLETE CBC W/AUTO DIFF WBC: CPT | Performed by: EMERGENCY MEDICINE

## 2023-10-03 PROCEDURE — 83605 ASSAY OF LACTIC ACID: CPT | Performed by: HOSPITALIST

## 2023-10-03 PROCEDURE — 81001 URINALYSIS AUTO W/SCOPE: CPT | Performed by: EMERGENCY MEDICINE

## 2023-10-03 PROCEDURE — 63600175 PHARM REV CODE 636 W HCPCS

## 2023-10-03 PROCEDURE — 63600175 PHARM REV CODE 636 W HCPCS: Performed by: HOSPITALIST

## 2023-10-03 PROCEDURE — 86920 COMPATIBILITY TEST SPIN: CPT | Performed by: HOSPITALIST

## 2023-10-03 PROCEDURE — 83690 ASSAY OF LIPASE: CPT | Performed by: EMERGENCY MEDICINE

## 2023-10-03 PROCEDURE — 80053 COMPREHEN METABOLIC PANEL: CPT | Performed by: EMERGENCY MEDICINE

## 2023-10-03 PROCEDURE — G0378 HOSPITAL OBSERVATION PER HR: HCPCS

## 2023-10-03 PROCEDURE — 99222 1ST HOSP IP/OBS MODERATE 55: CPT | Mod: ,,, | Performed by: INTERNAL MEDICINE

## 2023-10-03 PROCEDURE — 86900 BLOOD TYPING SEROLOGIC ABO: CPT | Performed by: EMERGENCY MEDICINE

## 2023-10-03 PROCEDURE — 99285 EMERGENCY DEPT VISIT HI MDM: CPT | Mod: 25

## 2023-10-03 PROCEDURE — 99223 PR INITIAL HOSPITAL CARE,LEVL III: ICD-10-PCS | Mod: ,,,

## 2023-10-03 PROCEDURE — 93010 ELECTROCARDIOGRAM REPORT: CPT | Mod: ,,, | Performed by: INTERNAL MEDICINE

## 2023-10-03 PROCEDURE — 85018 HEMOGLOBIN: CPT | Performed by: HOSPITALIST

## 2023-10-03 PROCEDURE — 86920 COMPATIBILITY TEST SPIN: CPT | Performed by: INTERNAL MEDICINE

## 2023-10-03 PROCEDURE — 96360 HYDRATION IV INFUSION INIT: CPT

## 2023-10-03 PROCEDURE — 93005 ELECTROCARDIOGRAM TRACING: CPT

## 2023-10-03 RX ORDER — GABAPENTIN 300 MG/1
300 CAPSULE ORAL NIGHTLY
Status: DISCONTINUED | OUTPATIENT
Start: 2023-10-03 | End: 2023-10-04

## 2023-10-03 RX ORDER — IBUPROFEN 200 MG
24 TABLET ORAL
Status: DISCONTINUED | OUTPATIENT
Start: 2023-10-03 | End: 2023-10-05 | Stop reason: HOSPADM

## 2023-10-03 RX ORDER — ONDANSETRON 8 MG/1
8 TABLET, ORALLY DISINTEGRATING ORAL EVERY 8 HOURS PRN
Status: DISCONTINUED | OUTPATIENT
Start: 2023-10-03 | End: 2023-10-05 | Stop reason: HOSPADM

## 2023-10-03 RX ORDER — CHOLECALCIFEROL (VITAMIN D3) 25 MCG
1000 TABLET ORAL DAILY
Status: DISCONTINUED | OUTPATIENT
Start: 2023-10-03 | End: 2023-10-05 | Stop reason: HOSPADM

## 2023-10-03 RX ORDER — PANTOPRAZOLE SODIUM 40 MG/10ML
40 INJECTION, POWDER, LYOPHILIZED, FOR SOLUTION INTRAVENOUS ONCE
Status: COMPLETED | OUTPATIENT
Start: 2023-10-03 | End: 2023-10-03

## 2023-10-03 RX ORDER — HYDROCODONE BITARTRATE AND ACETAMINOPHEN 500; 5 MG/1; MG/1
TABLET ORAL
Status: DISCONTINUED | OUTPATIENT
Start: 2023-10-03 | End: 2023-10-05 | Stop reason: HOSPADM

## 2023-10-03 RX ORDER — BISACODYL 10 MG
10 SUPPOSITORY, RECTAL RECTAL DAILY PRN
Status: DISCONTINUED | OUTPATIENT
Start: 2023-10-03 | End: 2023-10-05 | Stop reason: HOSPADM

## 2023-10-03 RX ORDER — SODIUM CHLORIDE 9 MG/ML
INJECTION, SOLUTION INTRAVENOUS CONTINUOUS
Status: ACTIVE | OUTPATIENT
Start: 2023-10-03 | End: 2023-10-04

## 2023-10-03 RX ORDER — POLYETHYLENE GLYCOL 3350 17 G/17G
17 POWDER, FOR SOLUTION ORAL DAILY PRN
Status: DISCONTINUED | OUTPATIENT
Start: 2023-10-03 | End: 2023-10-05 | Stop reason: HOSPADM

## 2023-10-03 RX ORDER — PROMETHAZINE HYDROCHLORIDE 12.5 MG/1
25 TABLET ORAL EVERY 6 HOURS PRN
Status: DISCONTINUED | OUTPATIENT
Start: 2023-10-03 | End: 2023-10-05 | Stop reason: HOSPADM

## 2023-10-03 RX ORDER — MOXIFLOXACIN 5 MG/ML
1 SOLUTION/ DROPS OPHTHALMIC 3 TIMES DAILY
Status: DISCONTINUED | OUTPATIENT
Start: 2023-10-03 | End: 2023-10-04

## 2023-10-03 RX ORDER — GLUCAGON 1 MG
1 KIT INJECTION
Status: DISCONTINUED | OUTPATIENT
Start: 2023-10-03 | End: 2023-10-05 | Stop reason: HOSPADM

## 2023-10-03 RX ORDER — METHOCARBAMOL 750 MG/1
750 TABLET, FILM COATED ORAL 4 TIMES DAILY PRN
Status: DISCONTINUED | OUTPATIENT
Start: 2023-10-03 | End: 2023-10-05 | Stop reason: HOSPADM

## 2023-10-03 RX ORDER — TALC
6 POWDER (GRAM) TOPICAL NIGHTLY PRN
Status: DISCONTINUED | OUTPATIENT
Start: 2023-10-03 | End: 2023-10-05 | Stop reason: HOSPADM

## 2023-10-03 RX ORDER — SODIUM CHLORIDE 0.9 % (FLUSH) 0.9 %
10 SYRINGE (ML) INJECTION
Status: DISCONTINUED | OUTPATIENT
Start: 2023-10-03 | End: 2023-10-05 | Stop reason: HOSPADM

## 2023-10-03 RX ORDER — VERAPAMIL HYDROCHLORIDE 120 MG/1
120 TABLET, FILM COATED, EXTENDED RELEASE ORAL DAILY
Status: DISCONTINUED | OUTPATIENT
Start: 2023-10-04 | End: 2023-10-04

## 2023-10-03 RX ORDER — SODIUM CHLORIDE 9 MG/ML
INJECTION, SOLUTION INTRAVENOUS CONTINUOUS
Status: DISCONTINUED | OUTPATIENT
Start: 2023-10-03 | End: 2023-10-03

## 2023-10-03 RX ORDER — PREDNISOLONE ACETATE 10 MG/ML
1 SUSPENSION/ DROPS OPHTHALMIC 4 TIMES DAILY
Status: DISCONTINUED | OUTPATIENT
Start: 2023-10-03 | End: 2023-10-04

## 2023-10-03 RX ORDER — IBUPROFEN 200 MG
16 TABLET ORAL
Status: DISCONTINUED | OUTPATIENT
Start: 2023-10-03 | End: 2023-10-05 | Stop reason: HOSPADM

## 2023-10-03 RX ORDER — ACETAMINOPHEN 325 MG/1
650 TABLET ORAL EVERY 4 HOURS PRN
Status: DISCONTINUED | OUTPATIENT
Start: 2023-10-03 | End: 2023-10-05 | Stop reason: HOSPADM

## 2023-10-03 RX ORDER — PANTOPRAZOLE SODIUM 40 MG/10ML
40 INJECTION, POWDER, LYOPHILIZED, FOR SOLUTION INTRAVENOUS 2 TIMES DAILY
Status: DISCONTINUED | OUTPATIENT
Start: 2023-10-03 | End: 2023-10-04

## 2023-10-03 RX ADMIN — METHOCARBAMOL 750 MG: 750 TABLET ORAL at 09:10

## 2023-10-03 RX ADMIN — PANTOPRAZOLE SODIUM 40 MG: 40 INJECTION, POWDER, FOR SOLUTION INTRAVENOUS at 09:10

## 2023-10-03 RX ADMIN — MOXIFLOXACIN OPHTHALMIC 1 DROP: 5 SOLUTION/ DROPS OPHTHALMIC at 10:10

## 2023-10-03 RX ADMIN — PREDNISOLONE ACETATE 1 DROP: 10 SUSPENSION/ DROPS OPHTHALMIC at 10:10

## 2023-10-03 RX ADMIN — SODIUM CHLORIDE 1000 ML: 9 INJECTION, SOLUTION INTRAVENOUS at 09:10

## 2023-10-03 RX ADMIN — PANTOPRAZOLE SODIUM 40 MG: 40 INJECTION, POWDER, FOR SOLUTION INTRAVENOUS at 12:10

## 2023-10-03 RX ADMIN — Medication 6 MG: at 09:10

## 2023-10-03 RX ADMIN — SODIUM CHLORIDE: 9 INJECTION, SOLUTION INTRAVENOUS at 06:10

## 2023-10-03 RX ADMIN — MOXIFLOXACIN OPHTHALMIC 1 DROP: 5 SOLUTION/ DROPS OPHTHALMIC at 03:10

## 2023-10-03 RX ADMIN — GABAPENTIN 300 MG: 300 CAPSULE ORAL at 09:10

## 2023-10-03 RX ADMIN — CHOLECALCIFEROL TAB 25 MCG (1000 UNIT) 1000 UNITS: 25 TAB at 03:10

## 2023-10-03 NOTE — HPI
"Shelly Vásquez is a 82 y.o. female with a hx of catarct surgery 10/2/23, s/p hip replacement 4/2023, hx of afib s/p ablation, and anemia presents to the ED for melena. Patient states she was in her usual state of health after her cataract surgery when she got up to use the restroom and noticed a "dark purple, black" appearance to her stool. She had a bowel movement earlier in the night where she soiled the rug but did not notice a change in color of her stool. Denies hematochezia. Patient and spouse believe they only saw dark stool twice last night. She has not had a bowel movement since being admitted to the hospital today. Endorses increased fatigue and weakness. Patient endorses taking mirilax every other day or everyday due to hemorrhoids. Denies any recent NSAID use (has celebrex on med list) and states she takes tylenol for pain. Unsure when her last colonoscopy was but states they have all been clear. Denies fever, chest pain, SOB, abdominal pain, and n/v. Denies any recent change in her diet. Denies hematuria or hemoptysis. Endorses flatulence. Per chart review, pt had a previous GI bleed due to NSAID use requiring transfusions.    ED: AF, BP 91/55. Tachycardic. Hgb 5.0. Hct 15.6. CMP largely unremarkable. UA not infectious. Given 1L IVFs in ED. Transfused 2 units pRBCs in ED.     "

## 2023-10-03 NOTE — ASSESSMENT & PLAN NOTE
- Hgb 5.0 (baseline ~7-9)  - type and screen, consent obtained  - transfused 2 units in ED  - will repeat CBC following  - Transfuse Hgb <7

## 2023-10-03 NOTE — ANESTHESIA PREPROCEDURE EVALUATION
[facility]  Anesthesia Pre-Operative Evaluation         Patient Name: Shelly Vásquez  YOB: 1940  MRN: [id number]    SUBJECTIVE:     Pre-operative Evaluation for Procedure(s) (LRB):  EGD (ESOPHAGOGASTRODUODENOSCOPY) (N/A)     10/04/2023    Shelly Vásquez is a 82 y.o. female with a PMHx significant for afib s/p ablation, GERD, and anemia presents to the ED for melena.     The patient now presents for the above procedure(s).    Previous Airway: None documented.    LDA:        Peripheral IV - Single Lumen 10/03/23 1148 18 G Left Antecubital (Active)   Site Assessment Clean;Dry;Intact 10/03/23 1148   Line Status Blood return noted;Flushed 10/03/23 1148   Dressing Status Clean;Dry;Intact 10/03/23 1148   Number of days: 0            Peripheral IV - Single Lumen 10/03/23 1149 20 G Anterior;Left Hand (Active)   Site Assessment Clean;Dry;Intact;No redness;No swelling 10/03/23 1149   Line Status Blood return noted;Flushed 10/03/23 1149   Dressing Status Clean;Dry;Intact 10/03/23 1149   Number of days: 0       Drips:    sodium chloride 0.9% 100 mL/hr at 10/04/23 1109       Patient Active Problem List   Diagnosis    Insomnia    Osteoarthritis    Spinal stenosis, lumbar    Spondylolisthesis at L4-L5 level    History of GI bleed    S/P lumbar laminectomy    PSVT (paroxysmal supraventricular tachycardia)    History of atrial fibrillation    Thrombocytosis    Arthropathy of lumbar facet joint    History of radiofrequency ablation (RFA) procedure for cardiac arrhythmia    MDS (myelodysplastic syndrome)    Restless leg    Constipation    Refractory anemia with ringed sideroblasts    Impaired functional mobility, balance, gait, and endurance    Postmenopausal HRT (hormone replacement therapy)    Carotid artery dissection    Acid reflux    Elevated bilirubin    History of COVID-19    S/P total right hip arthroplasty    Urinary retention    Chronic bilateral low back pain  without sciatica    Pain in both lower extremities    Nuclear sclerotic cataract of left eye    Melena    Acute blood loss anemia       Past Medical History:   Diagnosis Date    Arthritis     Atrial fibrillation     Basal cell cancer     on the face    Encounter for blood transfusion     Gastric ulcer     GERD (gastroesophageal reflux disease)     Herpes simplex without mention of complication     rare outbreaks    Postmenopausal HRT (hormone replacement therapy)     Supraventricular tachycardia     s/p AVNRT ablation (Dr Brady)       Review of patient's allergies indicates:   Allergen Reactions    Baclofen      Incoherent     Nsaids (non-steroidal anti-inflammatory drug)      GI Bleed  Had 5 blood transfusions       Current Inpatient Medications:   gabapentin  600 mg Oral QHS    pantoprazole  40 mg Intravenous BID    pramipexole  0.5 mg Oral Daily    And    pramipexole  1 mg Oral QHS    prednisoLONE-moxiflox-bromfen  1 drop Left Eye TID    vitamin D  1,000 Units Oral Daily       Current Outpatient Medications   Medication Instructions    acetaminophen (TYLENOL) 650 mg, Oral, Every 8 hours    aspirin (ECOTRIN) 81 mg, Oral, Daily    celecoxib (CELEBREX) 200 mg, Oral, Daily    estradiol-norethindrone acet 0.5-0.1 mg per tablet 1 tablet, Oral, Daily    gabapentin (NEURONTIN) 600 mg, Oral, Nightly    methocarbamoL (ROBAXIN) 750 mg, Oral, 4 times daily PRN    pantoprazole (PROTONIX) 40 MG tablet TAKE 1 TABLET(40 MG) BY MOUTH EVERY DAY    polyethylene glycol 3350 (MIRALAX ORAL) 1 Capful, Oral, Daily PRN    pramipexole (MIRAPEX) 0.5 MG tablet 1 in am and 2 at night    prednisolon/gatiflox/bromfenac (PREDNISOL ACE-GATIFLOX-BROMFEN) 1-0.5-0.075 % DrpS 1 drop, Ophthalmic, 3 times daily, in operative eye for 1 month after surgery    temazepam (RESTORIL) 15 mg, Oral, Nightly PRN    verapamiL (VERELAN) 120 mg, Oral, Daily    vitamin D (VITAMIN D3) 1,000 Units, Oral, Daily       Past Surgical  History:   Procedure Laterality Date    APPENDECTOMY  age 21    ARTHROPLASTY OF HIP BY ANTERIOR APPROACH Right 4/26/2023    Procedure: ARTHROPLASTY, HIP, TOTAL, ANTERIOR APPROACH: RIGHT: DEPUY - C-STEM + PINNACLE;  Surgeon: Keon Gary III, MD;  Location: [facility] OR [address] [place] [address];  Service: Orthopedics;  Laterality: Right;    BACK SURGERY      Beast Lift  2004    BONE MARROW BIOPSY Right 12/19/2019    Procedure: Biopsy-bone marrow;  Surgeon: Aidan Spring MD;  Location: [facility] OR [address] [place] [address];  Service: Oncology;  Laterality: Right;    BREAST BIOPSY  50yrs ago    unable to see scar    CATARACT EXTRACTION W/  INTRAOCULAR LENS IMPLANT Right 9/7/2023    Procedure: EXTRACTION, CATARACT, WITH IOL INSERTION;  Surgeon: Francisco Willingham MD;  Location: [facility] OR;  Service: Ophthalmology;  Laterality: Right;  [facility]IST[facility] ONLY    CATARACT EXTRACTION W/  INTRAOCULAR LENS IMPLANT Left 10/2/2023    Procedure: EXTRACTION, CATARACT, WITH IOL INSERTION;  Surgeon: Francisco Willingham MD;  Location: [facility] OR;  Service: Ophthalmology;  Laterality: Left;  [facility] ONLY    COSMETIC SURGERY      DILATION AND CURETTAGE OF UTERUS  2008    PMB    ENDOMETRIAL BIOPSY      EPIDURAL STEROID INJECTION N/A 8/25/2022    Procedure: LUMBAR CELINA CONTRAST DIRECT REFERRAL;  Surgeon: Rj Hernandez MD;  Location: [facility] PAIN MGT;  Service: Pain Management;  Laterality: N/A;    EYE SURGERY      INJECTION OF ANESTHETIC AGENT AROUND NERVE Bilateral 11/11/2022    Procedure: BLOCK, NERVE BILATERAL L2,L3,L4 AND L5 MEDIAL BRANCH ONE OF TWO;  Surgeon: Rj Hernandez MD;  Location: [facility] PAIN MGT;  Service: Pain Management;  Laterality: Bilateral;    INJECTION OF JOINT Right 12/2/2022    Procedure: INJECTION, JOINT RIGHT INTRARTICULAR HIP CONTRAST;  Surgeon: Rj Hernandez MD;  Location: [facility] PAIN MGT;  Service: Pain Management;  Laterality: Right;    ptsosis      RADIOFREQUENCY ABLATION  03/2018    SMALL INTESTINE SURGERY      TONSILLECTOMY, ADENOIDECTOMY   age 8    TOTAL REDUCTION MAMMOPLASTY Bilateral 2003    TRANSFORAMINAL EPIDURAL INJECTION OF STEROID Right 8/11/2022    Procedure: Injection,steroid,epidural Right L1/2 TF DIrect Referral Instructed to provide intervention per MRI results;  Surgeon: Rj Hernandez MD;  Location: [facility];  Service: Pain Management;  Laterality: Right;    TRANSFORAMINAL EPIDURAL INJECTION OF STEROID Right 10/21/2022    Procedure: INJECTION, STEROID, EPIDURAL, TRANSFORAMINAL APPROACH, L5-S1 and S1, RIGHT CONTRAST;  Surgeon: Rj Hernandez MD;  Location: [facility];  Service: Pain Management;  Laterality: Right;    TUBAL LIGATION         Social History     Substance and Sexual Activity   Drug Use No     Tobacco Use: Medium Risk (10/3/2023)    Patient History     Smoking Tobacco Use: Former     Smokeless Tobacco Use: Never     Passive Exposure: Not on file     Alcohol Use: Not At Risk (4/27/2023)    AUDIT-C     Frequency of Alcohol Consumption: 4 or more times a week     Average Number of Drinks: 1 or 2     Frequency of Binge Drinking: Never         OBJECTIVE:     Vital Signs Range (Last 24H):  Temp:  [36.3 °C (97.4 °F)-37.1 °C (98.8 °F)]   Pulse:  []   Resp:  [16-27]   BP: (103-124)/(49-59)   SpO2:  [96 %-100 %]       Significant Labs    Heme Profile  Lab Results   Component Value Date    WBC 5.96 10/04/2023    HGB 7.6 (L) 10/04/2023    HCT 22.4 (L) 10/04/2023     10/04/2023       Coagulation Studies  Lab Results   Component Value Date    LABPROT 11.2 04/21/2023    INR 1.1 04/21/2023    APTT 24.2 07/20/2021       BMP  Lab Results   Component Value Date     10/04/2023    K 3.9 10/04/2023     (H) 10/04/2023    CO2 19 (L) 10/04/2023    BUN 26 (H) 10/04/2023    CREATININE 0.6 10/04/2023    MG 1.8 10/04/2023    PHOS 2.7 10/04/2023       Liver Function Tests  Lab Results   Component Value Date    AST 9 (L) 10/04/2023    ALT 7 (L) 10/04/2023    ALKPHOS 42 (L) 10/04/2023    BILITOT 1.0 10/04/2023     PROT 4.6 (L) 10/04/2023    ALBUMIN 3.0 (L) 10/04/2023       Lipid Profile  Lab Results   Component Value Date    CHOL 155 08/15/2023    HDL 76 (H) 08/15/2023    TRIG 46 08/15/2023       Endocrine Profile  Lab Results   Component Value Date    HGBA1C 5.1 08/15/2023    TSH 1.709 08/15/2023       Cardiac Studies    EKG:   Results for orders placed or performed during the hospital encounter of 10/03/23   EKG 12-lead    Collection Time: 10/03/23 12:11 PM    Narrative    Test Reason : R53.83,    Vent. Rate : 089 BPM     Atrial Rate : 089 BPM     P-R Int : 118 ms          QRS Dur : 082 ms      QT Int : 336 ms       P-R-T Axes : 068 080 -73 degrees     QTc Int : 408 ms    Normal sinus rhythm with sinus arrhythmia  ST and T wave abnormality, consider anterolateral ischemia  Abnormal ECG  When compared with ECG of 04-APR-2023 15:16,  ST now depressed in Anterior leads  T wave inversion now evident in Inferior leads  T wave inversion now evident in Anterior-lateral leads  Confirmed by Josefina Dickens MD ([id number]) on 10/3/2023 1:04:05 PM    Referred By: [email or web address]   SELF           Confirmed By:Josefina Dickens MD       CHICHI  No results found for this or any previous visit.      TTE  Results for orders placed during the hospital encounter of 04/24/19    Transthoracic echo (TTE) complete    Interpretation Summary  · Mild left atrial enlargement.  · Normal left ventricular systolic function. The estimated ejection fraction is 60%  · Indeterminate left ventricular diastolic function.  · Normal right ventricular systolic function.  · Mild-to-moderate mitral regurgitation.  · Mild tricuspid regurgitation.  · Normal central venous pressure (3 mm Hg).  · The estimated PA systolic pressure is 23 mm Hg      ASSESSMENT/PLAN:         Pre-op Assessment    I have reviewed the Patient Summary Reports.     I have reviewed the Nursing Notes. I have reviewed the NPO Status.   I have reviewed the Medications.     Review of Systems  Anesthesia  Hx:  No problems with previous Anesthesia  History of prior surgery of interest to airway management or planning: Denies Family Hx of Anesthesia complications.   Denies Personal Hx of Anesthesia complications.   Cardiovascular:   hyperlipidemia  Coronary Artery Disease:  Hypertension    Hepatic/GI:   GERD    Musculoskeletal:   Arthritis         Physical Exam  General: Well nourished, Cooperative, Alert and Oriented    Airway:  Mallampati: II   Mouth Opening: Small, but > 3cm  TM Distance: Normal  Tongue: Normal  Neck ROM: Normal ROM    Dental:  Intact, Periodontal disease        Anesthesia Plan  Type of Anesthesia, risks & benefits discussed:    Anesthesia Type: Gen ETT, Gen Natural Airway, MAC  Intra-op Monitoring Plan: Standard ASA Monitors  Post Op Pain Control Plan: multimodal analgesia and IV/PO Opioids PRN  Induction:  IV  Airway Plan: Direct, Post-Induction  Informed Consent: Informed consent signed with the Patient and all parties understand the risks and agree with anesthesia plan.  All questions answered.   ASA Score: 3  Day of Surgery Review of History & Physical: H&P Update referred to the surgeon/provider.    Ready For Surgery From Anesthesia Perspective.     .

## 2023-10-03 NOTE — ED NOTES
Patient provided with two warm blankets for patient comfort. Supportive spouse remains at bedside. Denies any further needs or complaints at this time. Care ongoing .

## 2023-10-03 NOTE — ASSESSMENT & PLAN NOTE
- s/p cataract surgery 10/2  - spouse brought home eye drops to hospital  - may continue while inpatient

## 2023-10-03 NOTE — ASSESSMENT & PLAN NOTE
Patient presents to the ED for two episodes of melena since yesterday.  - Afebrile, VSS  - GIB Pathway initiated  - Hgb 5.0 on admit, baseline around 7-9  - S/p Protonix 40mg IV x 1 in the ED.  Protonix 40mg IV BID  - GI consulted, appreciate assistance  - NPO at midnight for possible EGD  - Type and screen, blood consent obtained  - CBCq 8h.  Transfuse for Hemoglobin <7  - Coagulopathy goals:  Plt >50, INR <1.5

## 2023-10-03 NOTE — H&P
"[facility] - Emergency Dept  [facility]  History & Physical    Patient Name: Shelly Vásquez  MRN: [id number]  Patient Class: OP- Observation  Admission Date: 10/3/2023  Attending Physician: Artem Hyde MD   Primary Care Provider: Marlene Méndez MD         Patient information was obtained from patient and ER records.     Subjective:     Principal Problem:Melena    Chief Complaint:   Chief Complaint   Patient presents with    Melena     Patient reports 4 episodes of black stool yesterday. Endorses fatigue.         HPI: Shelly Vásquez is a 82 y.o. female with a hx of catarct surgery 10/2/23, s/p hip replacement 4/2023, hx of afib s/p ablation, and anemia presents to the ED for melena. Patient states she was in her usual state of health after her cataract surgery when she got up to use the restroom and noticed a "dark purple, black" appearance to her stool. She had a bowel movement earlier in the night where she soiled the rug but did not notice a change in color of her stool. Denies hematochezia. Patient and spouse believe they only saw dark stool twice last night. She has not had a bowel movement since being admitted to the hospital today. Endorses increased fatigue and weakness. Patient endorses taking mirilax every other day or everyday due to hemorrhoids. Denies any recent NSAID use (has celebrex on med list) and states she takes tylenol for pain. Unsure when her last colonoscopy was but states they have all been clear. Denies fever, chest pain, SOB, abdominal pain, and n/v. Denies any recent change in her diet. Denies hematuria or hemoptysis. Endorses flatulence. Per chart review, pt had a previous GI bleed due to NSAID use requiring transfusions.    ED: AF, BP 91/55. Tachycardic. Hgb 5.0. Hct 15.6. CMP largely unremarkable. UA not infectious. Given 1L IVFs in ED. Transfused 2 units pRBCs in ED.         Past Medical History:   Diagnosis Date    Arthritis     Atrial fibrillation     Basal cell cancer  "    on the face    Encounter for blood transfusion     Gastric ulcer     GERD (gastroesophageal reflux disease)     Herpes simplex without mention of complication     rare outbreaks    Postmenopausal HRT (hormone replacement therapy)     Supraventricular tachycardia     s/p AVNRT ablation (Dr Brady)       Past Surgical History:   Procedure Laterality Date    APPENDECTOMY  age 21    ARTHROPLASTY OF HIP BY ANTERIOR APPROACH Right 4/26/2023    Procedure: ARTHROPLASTY, HIP, TOTAL, ANTERIOR APPROACH: RIGHT: DEPUY - C-STEM + PINNACLE;  Surgeon: Keon Gary III, MD;  Location: [facility] OR [address] [place] [address];  Service: Orthopedics;  Laterality: Right;    BACK SURGERY      Beast Lift  2004    BONE MARROW BIOPSY Right 12/19/2019    Procedure: Biopsy-bone marrow;  Surgeon: Aidan Spring MD;  Location: [facility] OR [address] [place] [address];  Service: Oncology;  Laterality: Right;    BREAST BIOPSY  50yrs ago    unable to see scar    CATARACT EXTRACTION W/  INTRAOCULAR LENS IMPLANT Right 9/7/2023    Procedure: EXTRACTION, CATARACT, WITH IOL INSERTION;  Surgeon: Francisco Willingham MD;  Location: [facility] OR;  Service: Ophthalmology;  Laterality: Right;  [facility] ONLY    CATARACT EXTRACTION W/  INTRAOCULAR LENS IMPLANT Left 10/2/2023    Procedure: EXTRACTION, CATARACT, WITH IOL INSERTION;  Surgeon: Francisco Willingham MD;  Location: [facility] OR;  Service: Ophthalmology;  Laterality: Left;  [facility] ONLY    COSMETIC SURGERY      DILATION AND CURETTAGE OF UTERUS  2008    PMB    ENDOMETRIAL BIOPSY      EPIDURAL STEROID INJECTION N/A 8/25/2022    Procedure: LUMBAR CELINA CONTRAST DIRECT REFERRAL;  Surgeon: Rj Hernandez MD;  Location: [facility] PAIN MGT;  Service: Pain Management;  Laterality: N/A;    EYE SURGERY      INJECTION OF ANESTHETIC AGENT AROUND NERVE Bilateral 11/11/2022    Procedure: BLOCK, NERVE BILATERAL L2,L3,L4 AND L5 MEDIAL BRANCH ONE OF TWO;  Surgeon: Rj Hernandez MD;  Location: [facility] PAIN MGT;  Service: Pain Management;  Laterality:  Bilateral;    INJECTION OF JOINT Right 12/2/2022    Procedure: INJECTION, JOINT RIGHT INTRARTICULAR HIP CONTRAST;  Surgeon: Rj Hernandez MD;  Location: [facility] PAIN MGT;  Service: Pain Management;  Laterality: Right;    ptsosis      RADIOFREQUENCY ABLATION  03/2018    SMALL INTESTINE SURGERY      TONSILLECTOMY, ADENOIDECTOMY  age 8    TOTAL REDUCTION MAMMOPLASTY Bilateral 2003    TRANSFORAMINAL EPIDURAL INJECTION OF STEROID Right 8/11/2022    Procedure: Injection,steroid,epidural Right L1/2 TF DIrect Referral Instructed to provide intervention per MRI results;  Surgeon: Rj Hernandez MD;  Location: [facility] PAIN MGT;  Service: Pain Management;  Laterality: Right;    TRANSFORAMINAL EPIDURAL INJECTION OF STEROID Right 10/21/2022    Procedure: INJECTION, STEROID, EPIDURAL, TRANSFORAMINAL APPROACH, L5-S1 and S1, RIGHT CONTRAST;  Surgeon: Rj Hernandez MD;  Location: [facility] PAIN MGT;  Service: Pain Management;  Laterality: Right;    TUBAL LIGATION         Review of patient's allergies indicates:   Allergen Reactions    Baclofen      Incoherent     Nsaids (non-steroidal anti-inflammatory drug)      GI Bleed  Had 5 blood transfusions       No current facility-administered medications on file prior to encounter.     Current Outpatient Medications on File Prior to Encounter   Medication Sig    acetaminophen (TYLENOL) 650 MG TbSR Take 1 tablet (650 mg total) by mouth every 8 (eight) hours.    aspirin (ECOTRIN) 81 MG EC tablet Take 1 tablet (81 mg total) by mouth once daily.    celecoxib (CELEBREX) 200 MG capsule Take 1 capsule (200 mg total) by mouth once daily.    estradiol-norethindrone acet 0.5-0.1 mg per tablet Take 1 tablet by mouth once daily.    gabapentin (NEURONTIN) 600 MG tablet Take 600 mg by mouth every evening.    methocarbamoL (ROBAXIN) 750 MG Tab Take 1 tablet (750 mg total) by mouth 4 (four) times daily as needed (muscle spasms).    pantoprazole (PROTONIX) 40 MG tablet TAKE 1 TABLET(40 MG)  BY MOUTH EVERY DAY    polyethylene glycol 3350 (MIRALAX ORAL) Take by mouth once daily.    pramipexole (MIRAPEX) 0.5 MG tablet 1 in am and 2 at night    prednisolon/gatiflox/bromfenac (PREDNISOL ACE-GATIFLOX-BROMFEN) 1-0.5-0.075 % DrpS Apply 1 drop to eye 3 (three) times daily. in operative eye for 1 month after surgery    temazepam (RESTORIL) 15 mg Cap Take 1 capsule (15 mg total) by mouth nightly as needed.    verapamiL (VERELAN) 120 MG C24P Take 1 capsule (120 mg total) by mouth once daily.    vitamin D (VITAMIN D3) 1000 units Tab Take 1,000 Units by mouth once daily.     Family History       Problem Relation (Age of Onset)    Cirrhosis Mother    No Known Problems Father, Brother, Maternal Aunt, Maternal Uncle, Paternal Aunt, Paternal Uncle, Maternal Grandmother, Maternal Grandfather, Paternal Grandmother, Paternal Grandfather, Daughter    Pulmonary embolism Son          Tobacco Use    Smoking status: Former     Current packs/day: 0.00     Average packs/day: 1 pack/day for 22.0 years (22.0 ttl pk-yrs)     Types: Cigarettes     Start date: 1958     Quit date: 1980     Years since quittin.2    Smokeless tobacco: Never   Substance and Sexual Activity    Alcohol use: Yes     Alcohol/week: 2.0 standard drinks of alcohol     Types: 2 Glasses of wine per week     Comment: 1-2 glasses of  some night    Drug use: No    Sexual activity: Not Currently     Partners: Male     Birth control/protection: Post-menopausal     Comment:  since      Review of Systems   Constitutional:  Positive for chills and fatigue. Negative for fever.   Respiratory:  Negative for chest tightness and shortness of breath.    Cardiovascular:  Negative for chest pain and leg swelling.   Gastrointestinal:  Positive for blood in stool. Negative for abdominal pain, constipation, nausea and vomiting.   Genitourinary:  Negative for dysuria and frequency.   Musculoskeletal:  Negative for arthralgias and myalgias.    Neurological:  Negative for dizziness, syncope, weakness, light-headedness and numbness.     Objective:     Vital Signs (Most Recent):  Temp: 98.4 °F (36.9 °C) (10/03/23 1323)  Pulse: 93 (10/03/23 1323)  Resp: (!) 25 (10/03/23 1323)  BP: (!) 120/49 (10/03/23 1323)  SpO2: 98 % (10/03/23 1323) Vital Signs (24h Range):  Temp:  [97.4 °F (36.3 °C)-98.4 °F (36.9 °C)] 98.4 °F (36.9 °C)  Pulse:  [] 93  Resp:  [13-25] 25  SpO2:  [98 %-100 %] 98 %  BP: ()/(49-59) 120/49     Weight: 70.3 kg (155 lb)  Body mass index is 23.57 kg/m².     Physical Exam  Vitals and nursing note reviewed.   Constitutional:       General: She is not in acute distress.     Appearance: She is well-developed.   HENT:      Head: Normocephalic and atraumatic.      Mouth/Throat:      Mouth: Mucous membranes are dry.      Pharynx: No oropharyngeal exudate.   Eyes:      Pupils: Pupils are equal, round, and reactive to light.   Cardiovascular:      Rate and Rhythm: Normal rate and regular rhythm.      Heart sounds: Normal heart sounds.   Pulmonary:      Effort: Pulmonary effort is normal. No respiratory distress.      Breath sounds: Normal breath sounds. No wheezing.   Abdominal:      General: Bowel sounds are normal. There is no distension.      Palpations: Abdomen is soft.      Tenderness: There is no abdominal tenderness.   Musculoskeletal:         General: No tenderness. Normal range of motion.   Skin:     General: Skin is warm and dry.      Capillary Refill: Capillary refill takes less than 2 seconds.      Coloration: Skin is pale.      Findings: No rash.   Neurological:      Mental Status: She is alert and oriented to person, place, and time.              CRANIAL NERVES     CN III, IV, VI   Pupils are equal, round, and reactive to light.       Significant Labs: All pertinent labs within the past 24 hours have been reviewed.  BMP:   Recent Labs   Lab 10/03/23  0946         K 4.4      CO2 18*   BUN 45*   CREATININE 0.8    CALCIUM 9.1     CBC:   Recent Labs   Lab 10/03/23  0946   WBC 11.55   HGB 5.0*   HCT 15.6*   *       Significant Imaging: I have reviewed all pertinent imaging results/findings within the past 24 hours.          Assessment/Plan:     * Melena  Patient presents to the ED for two episodes of melena since yesterday.  - Afebrile, VSS  - GIB Pathway initiated  - Hgb 5.0 on admit, baseline around 7-9  - S/p Protonix 40mg IV x 1 in the ED.  Protonix 40mg IV BID  - GI consulted, appreciate assistance  - NPO at midnight for possible EGD  - Type and screen, blood consent obtained  - CBCq 8h.  Transfuse for Hemoglobin <7  - Coagulopathy goals:  Plt >50, INR <1.5    Acute blood loss anemia  - Hgb 5.0 (baseline ~7-9)  - type and screen, consent obtained  - transfused 2 units in ED  - will repeat CBC following  - Transfuse Hgb <7    Nuclear sclerotic cataract of left eye  - s/p cataract surgery 10/2  - spouse brought home eye drops to hospital  - may continue while inpatient    Restless leg  - continue home gabapentin and robaxin    MDS (myelodysplastic syndrome)  - hx noted  - per chart review, pt deemed low risk MDS  - continue to monitor    History of radiofrequency ablation (RFA) procedure for cardiac arrhythmia  - hx noted  - EKG showed NSR  - continue home verapamil     Thrombocytosis  - Plts 469 (near baseline)  - continue to monitor daily CBC      VTE Risk Mitigation (From admission, onward)         Ordered     Reason for No Pharmacological VTE Prophylaxis  Once        Question:  Reasons:  Answer:  Active Bleeding    10/03/23 1206     IP VTE HIGH RISK PATIENT  Once         10/03/23 1206     Place sequential compression device  Until discontinued         10/03/23 1206                     On 10/03/2023, patient should be placed in hospital observation services under my care in collaboration with Dr. Artem Hyde.      Alexandra Gutierrez PA-C  Department of Hospital Medicine  [facility] - Emergency Dept

## 2023-10-03 NOTE — ED PROVIDER NOTES
Encounter Date: 10/3/2023       History     Chief Complaint   Patient presents with    Melena     Patient reports 4 episodes of black stool yesterday. Endorses fatigue.      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 She went to the bathroom last night and noticed black/dark purple diarrhea when she accidentally soiled her rug going to the bathroom. That is the only episode she remembers. Her only recent travel was a trip to [place] 1 week ago and denies recent sick contact, fever, cough, sore throat. She denies taking Pepto Bismol, iron tablets, but did eat a lot of blueberries yesterday. She denies any recent seafood or abdominal pain. She is not on any anti-coagulation. She endorses dizziness. Denies, AMS, CP, nausea, abdominal pain.         Review of patient's allergies indicates:   Allergen Reactions    Baclofen      Incoherent     Nsaids (non-steroidal anti-inflammatory drug)      GI Bleed  Had 5 blood transfusions     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)     Past Surgical History:   Procedure Laterality Date    APPENDECTOMY  age 21    ARTHROPLASTY OF HIP BY ANTERIOR APPROACH Right 4/26/2023    Procedure: ARTHROPLASTY, HIP, TOTAL, ANTERIOR APPROACH: RIGHT: DEPUY - C-STEM + PINNACLE;  Surgeon: Keon Gary III, MD;  Location: [facility] OR [address] [place] [address];  Service: Orthopedics;  Laterality: Right;    BACK SURGERY      Beast Lift  2004    BONE MARROW BIOPSY Right 12/19/2019    Procedure: Biopsy-bone marrow;  Surgeon:  Aidan Spring MD;  [facility];  Service: Oncology;  Laterality: Right;    BREAST BIOPSY  50yrs ago    unable to see scar    CATARACT EXTRACTION W/  INTRAOCULAR LENS IMPLANT Right 9/7/2023    Procedure: EXTRACTION, CATARACT, WITH IOL INSERTION;  Surgeon: Francisco Willingham MD;  Location: [facility] OR;  Service: Ophthalmology;  Laterality: Right;  [facility] ONLY    COSMETIC SURGERY      DILATION AND CURETTAGE OF UTERUS  2008    PMB    ENDOMETRIAL BIOPSY      EPIDURAL STEROID INJECTION N/A 8/25/2022    Procedure: LUMBAR CELINA CONTRAST DIRECT REFERRAL;  Surgeon: Rj Hernandez MD;  Location: [facility] PAIN MGT;  Service: Pain Management;  Laterality: N/A;    EYE SURGERY      INJECTION OF ANESTHETIC AGENT AROUND NERVE Bilateral 11/11/2022    Procedure: BLOCK, NERVE BILATERAL L2,L3,L4 AND L5 MEDIAL BRANCH ONE OF TWO;  Surgeon: Rj Hernandez MD;  Location: [facility] PAIN MGT;  Service: Pain Management;  Laterality: Bilateral;    INJECTION OF JOINT Right 12/2/2022    Procedure: INJECTION, JOINT RIGHT INTRARTICULAR HIP CONTRAST;  Surgeon: Rj Hernandez MD;  Location: [facility] PAIN MGT;  Service: Pain Management;  Laterality: Right;    ptsosis      RADIOFREQUENCY ABLATION  03/2018    SMALL INTESTINE SURGERY      TONSILLECTOMY, ADENOIDECTOMY  age 8    TOTAL REDUCTION MAMMOPLASTY Bilateral 2003    TRANSFORAMINAL EPIDURAL INJECTION OF STEROID Right 8/11/2022    Procedure: Injection,steroid,epidural Right L1/2 TF DIrect Referral Instructed to provide intervention per MRI results;  Surgeon: Rj Hernandez MD;  Location: [facility] PAIN MGT;  Service: Pain Management;  Laterality: Right;    TRANSFORAMINAL EPIDURAL INJECTION OF STEROID Right 10/21/2022    Procedure: INJECTION, STEROID, EPIDURAL, TRANSFORAMINAL APPROACH, L5-S1 and S1, RIGHT CONTRAST;  Surgeon: Rj Hernandez MD;  Location: [facility] PAIN MGT;  Service: Pain Management;  Laterality: Right;    TUBAL LIGATION       Family History   Problem Relation Age of Onset    No  Known Problems Father     Cirrhosis Mother     No Known Problems Brother     No Known Problems Maternal Aunt     No Known Problems Maternal Uncle     No Known Problems Paternal Aunt     No Known Problems Paternal Uncle     No Known Problems Maternal Grandmother     No Known Problems Maternal Grandfather     No Known Problems Paternal Grandmother     No Known Problems Paternal Grandfather     No Known Problems Daughter     Pulmonary embolism Son     Breast cancer Neg Hx     Colon cancer Neg Hx     Ovarian cancer Neg Hx     Amblyopia Neg Hx     Blindness Neg Hx     Cancer Neg Hx     Cataracts Neg Hx     Diabetes Neg Hx     Glaucoma Neg Hx     Hypertension Neg Hx     Macular degeneration Neg Hx     Retinal detachment Neg Hx     Strabismus Neg Hx     Stroke Neg Hx     Thyroid disease Neg Hx      Social History     Tobacco Use    Smoking status: Former     Current packs/day: 0.00     Average packs/day: 1 pack/day for 22.0 years (22.0 ttl pk-yrs)     Types: Cigarettes     Start date: 1958     Quit date: 1980     Years since quittin.2    Smokeless tobacco: Never   Substance Use Topics    Alcohol use: Yes     Alcohol/week: 2.0 standard drinks of alcohol     Types: 2 Glasses of wine per week     Comment: 1-2 glasses of  some night    Drug use: No     Review of Systems    Physical Exam     Initial Vitals [10/03/23 0838]   BP Pulse Resp Temp SpO2   (!) 91/55 105 19 97.8 °F (36.6 °C) 100 %      MAP       --         Physical Exam    Constitutional: No distress.   HENT:   Head: Normocephalic and atraumatic.   Eyes: EOM are normal. Pupils are equal, round, and reactive to light.   Cardiovascular:  Normal rate and regular rhythm.           Pulmonary/Chest: Breath sounds normal. No respiratory distress. She has no wheezes. She has no rhonchi. She has no rales.   Abdominal: Abdomen is soft. She exhibits no distension. There is no abdominal tenderness. There is no rebound and no guarding.   Musculoskeletal:          General: No tenderness or edema.      Comments: No erythema, swelling, tenderness at R hip     Neurological: She is alert and oriented to person, place, and time.   Skin: Skin is dry.   Psychiatric: She has a normal mood and affect.         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 There is always a concern for a GI bleed because of her age, the episode of dark purple/black stool, and low blood pressure; however, her abdominal exam was benign and she does not take NSAIDs/anticoagulants. No sara blood reported. Hemoccult test and CBC ordered. She did eat a bunch of blueberries yesterday which could attribute to her dark purple/black diarrhea. Her poor po intake and diarrhea could be contributing to her hypotension. There was no tenderness or erythema at the R hip arthoplasty site making me concerned for a post-op complication. She does have a macrocytic anemia at baseline (Hg ~8) that is secondary to MDS.  No fever, abdominal pain, nausea, recent seafood intake making me less concerned for EHEC or vibrio. No hx of HF, gave 1L NS bolus. She had a critical Hg of 5 and 2 units of RBC given. Admitted to [facility] for symptomatic anemia.         Amount and/or Complexity of Data Reviewed  Labs: ordered.    Risk  Prescription drug management.              Attending Attestation:   Physician Attestation Statement for Resident:  As the supervising MD   Physician Attestation Statement: I have personally seen and examined this patient.   I agree with the above  history.  -:   As the supervising MD I agree with the above PE.     As the supervising MD I agree with the above treatment, course, plan, and disposition.                                    Clinical Impression:   Final diagnoses:  [R53.83] Fatigue               Taya Flores, DO  Resident  10/03/23 1300       Nalini Heard MD  10/03/23 1419       Taya Flores,   Resident  10/03/23 1521       Nalini Heard MD  10/06/23 5821

## 2023-10-03 NOTE — CONSULTS
[facility]  Gastroenterology  Consult Note    Patient Name: Shelly Vásquez  MRN: [id number]  Admission Date: 10/3/2023  Hospital Length of Stay: 0 days  Code Status: Full Code   Attending Provider: Artem Hyde MD   Consulting Provider: Colt Baptiste MD  Primary Care Physician: Marlene Méndez MD  Principal Problem:Melena    Inpatient consult to Gastroenterology  Consult performed by: Colt Baptiste MD  Consult ordered by: Alexandra Gutierrez PA-C        Subjective:     HPI: Shelly Vásquez is a 82 y.o. 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 She has had multiple bowel movements over the last week, but she was not routinely checking the color of her stool and does not recall any abnormalities when wiping. She denies sara blood. She further denies taking NSAIDs (including advil, BC, goodies, naproxen, etc.), iron, or pepto-bismol. She is not on antic-coagulation. She reports this has happened 1 time previously, in 2016, when EGD confirmed multiple non bleeding ulcers and non bleeding erosive gastropathy. Her last colonoscopy was in 2014 (age 73).     In the ED, patient's Hgb noted to be 5, and transfusion started. She has remained hemodynamically stable.       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)       Past Surgical History:   Procedure Laterality Date    APPENDECTOMY  age 21    ARTHROPLASTY OF HIP BY ANTERIOR APPROACH Right 4/26/2023     Procedure: ARTHROPLASTY, HIP, TOTAL, ANTERIOR APPROACH: RIGHT: DEPUY - C-STEM + PINNACLE;  Surgeon: Keon Gary III, MD;  Location: [facility] OR [address] [place] [address];  Service: Orthopedics;  Laterality: Right;    BACK SURGERY      Beast Lift  2004    BONE MARROW BIOPSY Right 12/19/2019    Procedure: Biopsy-bone marrow;  Surgeon: Aidan Spring MD;  Location: [facility] OR 2ND FLR;  Service: Oncology;  Laterality: Right;    BREAST BIOPSY  50yrs ago    unable to see scar    CATARACT EXTRACTION W/  INTRAOCULAR LENS IMPLANT Right 9/7/2023    Procedure: EXTRACTION, CATARACT, WITH IOL INSERTION;  Surgeon: Francisco Willingham MD;  Location: [facility] OR;  Service: Ophthalmology;  Laterality: Right;  [facility] ONLY    CATARACT EXTRACTION W/  INTRAOCULAR LENS IMPLANT Left 10/2/2023    Procedure: EXTRACTION, CATARACT, WITH IOL INSERTION;  Surgeon: Francisco Willingham MD;  Location: [facility] OR;  Service: Ophthalmology;  Laterality: Left;  [facility] ONLY    COSMETIC SURGERY      DILATION AND CURETTAGE OF UTERUS  2008    PMB    ENDOMETRIAL BIOPSY      EPIDURAL STEROID INJECTION N/A 8/25/2022    Procedure: LUMBAR CELINA CONTRAST DIRECT REFERRAL;  Surgeon: Rj Hernandez MD;  Location: [facility] PAIN MGT;  Service: Pain Management;  Laterality: N/A;    EYE SURGERY      INJECTION OF ANESTHETIC AGENT AROUND NERVE Bilateral 11/11/2022    Procedure: BLOCK, NERVE BILATERAL L2,L3,L4 AND L5 MEDIAL BRANCH ONE OF TWO;  Surgeon: Rj Hernandez MD;  Location: [facility] PAIN MGT;  Service: Pain Management;  Laterality: Bilateral;    INJECTION OF JOINT Right 12/2/2022    Procedure: INJECTION, JOINT RIGHT INTRARTICULAR HIP CONTRAST;  Surgeon: Rj Hernandez MD;  Location: [facility] PAIN MGT;  Service: Pain Management;  Laterality: Right;    ptsosis      RADIOFREQUENCY ABLATION  03/2018    SMALL INTESTINE SURGERY      TONSILLECTOMY, ADENOIDECTOMY  age 8    TOTAL REDUCTION MAMMOPLASTY Bilateral 2003    TRANSFORAMINAL EPIDURAL INJECTION OF STEROID Right 8/11/2022    Procedure:  Injection,steroid,epidural Right L1/2 TF DIrect Referral Instructed to provide intervention per MRI results;  Surgeon: Rj Hernandez MD;  Location: [facility] PAIN MGT;  Service: Pain Management;  Laterality: Right;    TRANSFORAMINAL EPIDURAL INJECTION OF STEROID Right 10/21/2022    Procedure: INJECTION, STEROID, EPIDURAL, TRANSFORAMINAL APPROACH, L5-S1 and S1, RIGHT CONTRAST;  Surgeon: Rj Hernandez MD;  Location: [facility] PAIN MGT;  Service: Pain Management;  Laterality: Right;    TUBAL LIGATION         Family History   Problem Relation Age of Onset    No Known Problems Father     Cirrhosis Mother     No Known Problems Brother     No Known Problems Maternal Aunt     No Known Problems Maternal Uncle     No Known Problems Paternal Aunt     No Known Problems Paternal Uncle     No Known Problems Maternal Grandmother     No Known Problems Maternal Grandfather     No Known Problems Paternal Grandmother     No Known Problems Paternal Grandfather     No Known Problems Daughter     Pulmonary embolism Son     Breast cancer Neg Hx     Colon cancer Neg Hx     Ovarian cancer Neg Hx     Amblyopia Neg Hx     Blindness Neg Hx     Cancer Neg Hx     Cataracts Neg Hx     Diabetes Neg Hx     Glaucoma Neg Hx     Hypertension Neg Hx     Macular degeneration Neg Hx     Retinal detachment Neg Hx     Strabismus Neg Hx     Stroke Neg Hx     Thyroid disease Neg Hx        Social History     Socioeconomic History    Marital status:    Tobacco Use    Smoking status: Former     Current packs/day: 0.00     Average packs/day: 1 pack/day for 22.0 years (22.0 ttl pk-yrs)     Types: Cigarettes     Start date: 1958     Quit date: 1980     Years since quittin.2    Smokeless tobacco: Never   Substance and Sexual Activity    Alcohol use: Yes     Alcohol/week: 2.0 standard drinks of alcohol     Types: 2 Glasses of wine per week     Comment: 1-2 glasses of  some night    Drug use: No    Sexual activity: Not Currently     Partners: Male      Birth control/protection: Post-menopausal     Comment:  since 2010     Social Determinants of Health     Financial Resource Strain: Low Risk  (4/27/2023)    Overall Financial Resource Strain (CARDIA)     Difficulty of Paying Living Expenses: Not hard at all   Food Insecurity: No Food Insecurity (4/27/2023)    Hunger Vital Sign     Worried About Running Out of Food in the Last Year: Never true     Ran Out of Food in the Last Year: Never true   Transportation Needs: Unknown (4/27/2023)    PRAPARE - Transportation     Lack of Transportation (Medical): No   Physical Activity: Insufficiently Active (4/27/2023)    Exercise Vital Sign     Days of Exercise per Week: 3 days     Minutes of Exercise per Session: 40 min   Stress: No Stress Concern Present (4/27/2023)    [place] [employer] - Occupational Stress Questionnaire     Feeling of Stress : Not at all   Social Connections: Socially Integrated (4/27/2023)    Social Connection and Isolation Panel [NHANES]     Frequency of Communication with Friends and Family: More than three times a week     Frequency of Social Gatherings with Friends and Family: More than three times a week     Attends Mandaeism Services: More than 4 times per year     Active Member of Clubs or Organizations: Yes     Attends Club or Organization Meetings: Never     Marital Status:    Housing Stability: Unknown (4/27/2023)    Housing Stability Vital Sign     Unable to Pay for Housing in the Last Year: No     Unstable Housing in the Last Year: No       No current facility-administered medications on file prior to encounter.     Current Outpatient Medications on File Prior to Encounter   Medication Sig Dispense Refill    acetaminophen (TYLENOL) 650 MG TbSR Take 1 tablet (650 mg total) by mouth every 8 (eight) hours. 120 tablet 0    aspirin (ECOTRIN) 81 MG EC tablet Take 1 tablet (81 mg total) by mouth once daily. 30 tablet 0    celecoxib (CELEBREX) 200 MG capsule Take  1 capsule (200 mg total) by mouth once daily. 30 capsule 0    estradiol-norethindrone acet 0.5-0.1 mg per tablet Take 1 tablet by mouth once daily. 90 tablet 3    gabapentin (NEURONTIN) 600 MG tablet Take 600 mg by mouth every evening.      methocarbamoL (ROBAXIN) 750 MG Tab Take 1 tablet (750 mg total) by mouth 4 (four) times daily as needed (muscle spasms). 40 tablet 1    pantoprazole (PROTONIX) 40 MG tablet TAKE 1 TABLET(40 MG) BY MOUTH EVERY DAY 90 tablet 1    polyethylene glycol 3350 (MIRALAX ORAL) Take by mouth once daily.      pramipexole (MIRAPEX) 0.5 MG tablet 1 in am and 2 at night 90 tablet 2    prednisolon/gatiflox/bromfenac (PREDNISOL ACE-GATIFLOX-BROMFEN) 1-0.5-0.075 % DrpS Apply 1 drop to eye 3 (three) times daily. in operative eye for 1 month after surgery 5 mL 3    temazepam (RESTORIL) 15 mg Cap Take 1 capsule (15 mg total) by mouth nightly as needed. 90 capsule 0    verapamiL (VERELAN) 120 MG C24P Take 1 capsule (120 mg total) by mouth once daily. 90 capsule 3    vitamin D (VITAMIN D3) 1000 units Tab Take 1,000 Units by mouth once daily.         Review of patient's allergies indicates:   Allergen Reactions    Baclofen      Incoherent     Nsaids (non-steroidal anti-inflammatory drug)      GI Bleed  Had 5 blood transfusions       Review of Systems   Constitutional:  Positive for malaise/fatigue. Negative for chills, fever and weight loss.   Respiratory:  Negative for cough, sputum production and shortness of breath.    Cardiovascular:  Negative for chest pain, palpitations and leg swelling.   Gastrointestinal:  Positive for melena. Negative for abdominal pain, constipation, diarrhea, nausea and vomiting.   Genitourinary:  Negative for flank pain and hematuria.   Musculoskeletal:  Negative for myalgias and neck pain.   Neurological:  Negative for dizziness, loss of consciousness, weakness and headaches.        Objective:     Vitals:    10/03/23 1323   BP: (!) 120/49   Pulse: 93   Resp: (!) 25   Temp:  98.4 °F (36.9 °C)         Constitutional:  not in acute distress and well developed  HENT: Head: Normal, normocephalic, atraumatic.  Eyes: conjunctiva clear and sclera nonicteric  Cardiovascular: regular rate and rhythm and no murmur  Respiratory: normal chest expansion & respiratory effort   and no accessory muscle use  GI: soft, non-tender, without masses or organomegaly  Musculoskeletal: no muscular tenderness noted  Skin: normal color  Neurological: alert, oriented x3  Psychiatric: mood and affect are within normal limits, pt is a good historian; no memory problems were noted  Rectal: brown stool on JULIANO    Significant Labs:  Recent Labs   Lab 10/03/23  0946   HGB 5.0*       Lab Results   Component Value Date    WBC 11.55 10/03/2023    HGB 5.0 (LL) 10/03/2023    HCT 15.6 (LL) 10/03/2023     (H) 10/03/2023     (H) 10/03/2023       Lab Results   Component Value Date     10/03/2023    K 4.4 10/03/2023     10/03/2023    CO2 18 (L) 10/03/2023    BUN 45 (H) 10/03/2023    CREATININE 0.8 10/03/2023    CALCIUM 9.1 10/03/2023    ANIONGAP 8 10/03/2023    ESTGFRAFRICA >60.0 05/12/2022    EGFRNONAA 59.3 (A) 05/12/2022       Lab Results   Component Value Date    ALT 11 10/03/2023    AST 14 10/03/2023    ALKPHOS 51 (L) 10/03/2023    BILITOT 0.5 10/03/2023       Lab Results   Component Value Date    INR 1.1 04/21/2023    INR 1.0 07/20/2021    INR 1.1 03/20/2018       Significant Imaging:  Reviewed pertinent radiology findings.       Assessment/Plan:     Shelly Vásquez is a 82 y.o. female with history of recent cataract surgery yesterday, s/p R hip replacement (4/2023), hx of Afib s/p ablation, macrocytic anemia 2/2 to MDS presents for severe anemia in setting of melanotic stool. Hgb 5 on admission, and transfusion started in ED. Given history of multiple nonbleeding ulcers and nonbleeding erosive gastropathy, source of bleeding likely in upper GI tract. Physical exam without evidence of acute  hemorrhage or sara blood in rectum. No relevant history of NSAID use or AC. Suspected differentials include gastric ulcer, vascular lesion such as AVM/[place]ulafoy, or malignancy.     Problem List:  Melena, likely 2/2 UGIB  Acute Blood Loss Anemia    Recommendations:  - Plan for EGD tomorrow  - Trend Hgb q8h. Transfuse for Hgb <7, unless otherwise indicated  - Maintain IV access with 2 large bore Ivs  - Intravascular resuscitation/support with IVFs   - CLD for rest of today; NPO at midnight  - Hold all NSAIDs and anticoagulants, unless contraindicated  - Bolus IV pantoprazole 80mg followed by 40mg BID  - Please correct any coagulopathy with platelets and FFP for goal of platelets >50K and INR <2.0  - Please notify GI team if there is significant change in patient's clinical status    Thank you for involving us in the care of Shelly Vásquez. Please call with any additional questions, concerns or changes in the patient's clinical status. We will continue to follow.     Colt Baptiste MD PGY-1  Gastroenterology Service  [facility]

## 2023-10-03 NOTE — PHARMACY MED REC
"Admission Medication History     The home medication history was taken by Teofilo Palma.    You may go to "Admission" then "Reconcile Home Medications" tabs to review and/or act upon these items.     The home medication list has been updated by the Pharmacy department.   Please read ALL comments highlighted in yellow.   Please address this information as you see fit.    Feel free to contact us if you have any questions or require assistance.        Current Outpatient Medications on File Prior to Encounter   Medication Sig    acetaminophen (TYLENOL) 650 MG TbSR   Take 1 tablet (650 mg total) by mouth every 8 (eight) hours.    aspirin (ECOTR]IN) 81 MG EC tablet   Take 1 tablet (81 mg total) by mouth once daily.    celecoxib (CELEBREX) 200 MG capsule   Take 1 capsule (200 mg total) by mouth once daily.    estradiol-norethindrone acet 0.5-0.1 mg per tablet   Take 1 tablet by mouth once daily.    gabapentin (NEURONTIN) 600 MG tablet   Take 300 mg by mouth every evening.    methocarbamoL (ROBAXIN) 750 MG Tab   Take 1 tablet (750 mg total) by mouth 4 (four) times daily as needed (muscle spasms).    pantoprazole (PROTONIX) 40 MG tablet   TAKE 1 TABLET(40 MG) BY MOUTH EVERY DAY    polyethylene glycol 3350 (MIRALAX ORAL)   Take 1 Capful by mouth daily as needed (constipation).    pramipexole (MIRAPEX) 0.5 MG tablet   1 in am and 2 at night    prednisolon/gatiflox/bromfenac (PREDNISOL ACE-GATIFLOX-BROMFEN) 1-0.5-0.075 % DrpS   Apply 1 drop to eye 3 (three) times daily. in operative eye for 1 month after surgery    temazepam (RESTORIL) 15 mg Cap   Take 1 capsule (15 mg total) by mouth nightly as needed.    verapamiL (VERELAN) 120 MG C24P   Take 1 capsule (120 mg total) by mouth once daily.    vitamin D (VITAMIN D3) 1000 units Tab   Take 1,000 Units by mouth once daily.         Potential issues to be addressed PRIOR TO DISCHARGE  Patient requires education regarding drug therapies     Teofilo Palma  [id number]   "               .

## 2023-10-03 NOTE — ED TRIAGE NOTES
Patient comes into the emergency department by POV with complaints of black diarrhea. Patient states that she noticed her stool was dark in color yesterday. Pt also complains of gas and cramping. Patient denies nausea, vomiting, and fever.

## 2023-10-03 NOTE — NURSING
1850:  Patient has just arrived from the ER.  Admitted for GI bleed.  Patient alert and oriented x 4.  She ambulates with stand by assist. Vital signs stable.  Patient was able to transfer from stretcher to bed and she is resting comfortably  in bed with call light in reach.

## 2023-10-03 NOTE — SUBJECTIVE & OBJECTIVE
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)       Past Surgical History:   Procedure Laterality Date    APPENDECTOMY  age 21    ARTHROPLASTY OF HIP BY ANTERIOR APPROACH Right 4/26/2023    Procedure: ARTHROPLASTY, HIP, TOTAL, ANTERIOR APPROACH: RIGHT: DEPUY - C-STEM + PINNACLE;  Surgeon: Keon Gary III, MD;  Location: [facility] OR [address] [place] [address];  Service: Orthopedics;  Laterality: Right;    BACK SURGERY      Beast Lift  2004    BONE MARROW BIOPSY Right 12/19/2019    Procedure: Biopsy-bone marrow;  Surgeon: Aidan Spring MD;  Location: [facility] OR [address] [place] [address];  Service: Oncology;  Laterality: Right;    BREAST BIOPSY  50yrs ago    unable to see scar    CATARACT EXTRACTION W/  INTRAOCULAR LENS IMPLANT Right 9/7/2023    Procedure: EXTRACTION, CATARACT, WITH IOL INSERTION;  Surgeon: Francisco Willingham MD;  Location: [facility] OR;  Service: Ophthalmology;  Laterality: Right;  [facility] ONLY    CATARACT EXTRACTION W/  INTRAOCULAR LENS IMPLANT Left 10/2/2023    Procedure: EXTRACTION, CATARACT, WITH IOL INSERTION;  Surgeon: Francisco Willingham MD;  Location: [facility] OR;  Service: Ophthalmology;  Laterality: Left;  [facility] ONLY    COSMETIC SURGERY      DILATION AND CURETTAGE OF UTERUS  2008    PMB    ENDOMETRIAL BIOPSY      EPIDURAL STEROID INJECTION N/A 8/25/2022    Procedure: LUMBAR CELINA CONTRAST DIRECT REFERRAL;  Surgeon: Rj Hernandez MD;  Location: [facility] PAIN MGT;  Service: Pain Management;  Laterality: N/A;    EYE SURGERY      INJECTION OF ANESTHETIC AGENT AROUND NERVE Bilateral 11/11/2022    Procedure: BLOCK, NERVE BILATERAL L2,L3,L4 AND L5 MEDIAL BRANCH ONE OF TWO;  Surgeon: Rj  MD David;  Location: [facility] PAIN MGT;  Service: Pain Management;  Laterality: Bilateral;    INJECTION OF JOINT Right 12/2/2022    Procedure: INJECTION, JOINT RIGHT INTRARTICULAR HIP CONTRAST;  Surgeon: Rj Hernandez MD;  Location: [facility] PAIN MGT;  Service: Pain Management;  Laterality: Right;    ptsosis      RADIOFREQUENCY ABLATION  03/2018    SMALL INTESTINE SURGERY      TONSILLECTOMY, ADENOIDECTOMY  age 8    TOTAL REDUCTION MAMMOPLASTY Bilateral 2003    TRANSFORAMINAL EPIDURAL INJECTION OF STEROID Right 8/11/2022    Procedure: Injection,steroid,epidural Right L1/2 TF DIrect Referral Instructed to provide intervention per MRI results;  Surgeon: Rj Hernandez MD;  Location: [facility] PAIN MGT;  Service: Pain Management;  Laterality: Right;    TRANSFORAMINAL EPIDURAL INJECTION OF STEROID Right 10/21/2022    Procedure: INJECTION, STEROID, EPIDURAL, TRANSFORAMINAL APPROACH, L5-S1 and S1, RIGHT CONTRAST;  Surgeon: Rj Hernandez MD;  Location: [facility] PAIN MGT;  Service: Pain Management;  Laterality: Right;    TUBAL LIGATION         Review of patient's allergies indicates:   Allergen Reactions    Baclofen      Incoherent     Nsaids (non-steroidal anti-inflammatory drug)      GI Bleed  Had 5 blood transfusions       No current facility-administered medications on file prior to encounter.     Current Outpatient Medications on File Prior to Encounter   Medication Sig    acetaminophen (TYLENOL) 650 MG TbSR Take 1 tablet (650 mg total) by mouth every 8 (eight) hours.    aspirin (ECOTRIN) 81 MG EC tablet Take 1 tablet (81 mg total) by mouth once daily.    celecoxib (CELEBREX) 200 MG capsule Take 1 capsule (200 mg total) by mouth once daily.    estradiol-norethindrone acet 0.5-0.1 mg per tablet Take 1 tablet by mouth once daily.    gabapentin (NEURONTIN) 600 MG tablet Take 600 mg by mouth every evening.    methocarbamoL (ROBAXIN) 750 MG Tab Take 1 tablet (750 mg total) by mouth 4 (four) times daily as needed (muscle spasms).     pantoprazole (PROTONIX) 40 MG tablet TAKE 1 TABLET(40 MG) BY MOUTH EVERY DAY    polyethylene glycol 3350 (MIRALAX ORAL) Take by mouth once daily.    pramipexole (MIRAPEX) 0.5 MG tablet 1 in am and 2 at night    prednisolon/gatiflox/bromfenac (PREDNISOL ACE-GATIFLOX-BROMFEN) 1-0.5-0.075 % DrpS Apply 1 drop to eye 3 (three) times daily. in operative eye for 1 month after surgery    temazepam (RESTORIL) 15 mg Cap Take 1 capsule (15 mg total) by mouth nightly as needed.    verapamiL (VERELAN) 120 MG C24P Take 1 capsule (120 mg total) by mouth once daily.    vitamin D (VITAMIN D3) 1000 units Tab Take 1,000 Units by mouth once daily.     Family History       Problem Relation (Age of Onset)    Cirrhosis Mother    No Known Problems Father, Brother, Maternal Aunt, Maternal Uncle, Paternal Aunt, Paternal Uncle, Maternal Grandmother, Maternal Grandfather, Paternal Grandmother, Paternal Grandfather, Daughter    Pulmonary embolism Son          Tobacco Use    Smoking status: Former     Current packs/day: 0.00     Average packs/day: 1 pack/day for 22.0 years (22.0 ttl pk-yrs)     Types: Cigarettes     Start date: 1958     Quit date: 1980     Years since quittin.2    Smokeless tobacco: Never   Substance and Sexual Activity    Alcohol use: Yes     Alcohol/week: 2.0 standard drinks of alcohol     Types: 2 Glasses of wine per week     Comment: 1-2 glasses of  some night    Drug use: No    Sexual activity: Not Currently     Partners: Male     Birth control/protection: Post-menopausal     Comment:  since      Review of Systems   Constitutional:  Positive for chills and fatigue. Negative for fever.   Respiratory:  Negative for chest tightness and shortness of breath.    Cardiovascular:  Negative for chest pain and leg swelling.   Gastrointestinal:  Positive for blood in stool. Negative for abdominal pain, constipation, nausea and vomiting.   Genitourinary:  Negative for dysuria and frequency.    Musculoskeletal:  Negative for arthralgias and myalgias.   Neurological:  Negative for dizziness, syncope, weakness, light-headedness and numbness.     Objective:     Vital Signs (Most Recent):  Temp: 98.4 °F (36.9 °C) (10/03/23 1323)  Pulse: 93 (10/03/23 1323)  Resp: (!) 25 (10/03/23 1323)  BP: (!) 120/49 (10/03/23 1323)  SpO2: 98 % (10/03/23 1323) Vital Signs (24h Range):  Temp:  [97.4 °F (36.3 °C)-98.4 °F (36.9 °C)] 98.4 °F (36.9 °C)  Pulse:  [] 93  Resp:  [13-25] 25  SpO2:  [98 %-100 %] 98 %  BP: ()/(49-59) 120/49     Weight: 70.3 kg (155 lb)  Body mass index is 23.57 kg/m².     Physical Exam  Vitals and nursing note reviewed.   Constitutional:       General: She is not in acute distress.     Appearance: She is well-developed.   HENT:      Head: Normocephalic and atraumatic.      Mouth/Throat:      Mouth: Mucous membranes are dry.      Pharynx: No oropharyngeal exudate.   Eyes:      Pupils: Pupils are equal, round, and reactive to light.   Cardiovascular:      Rate and Rhythm: Normal rate and regular rhythm.      Heart sounds: Normal heart sounds.   Pulmonary:      Effort: Pulmonary effort is normal. No respiratory distress.      Breath sounds: Normal breath sounds. No wheezing.   Abdominal:      General: Bowel sounds are normal. There is no distension.      Palpations: Abdomen is soft.      Tenderness: There is no abdominal tenderness.   Musculoskeletal:         General: No tenderness. Normal range of motion.   Skin:     General: Skin is warm and dry.      Capillary Refill: Capillary refill takes less than 2 seconds.      Coloration: Skin is pale.      Findings: No rash.   Neurological:      Mental Status: She is alert and oriented to person, place, and time.              CRANIAL NERVES     CN III, IV, VI   Pupils are equal, round, and reactive to light.       Significant Labs: All pertinent labs within the past 24 hours have been reviewed.  BMP:   Recent Labs   Lab 10/03/23  0946      NA  136   K 4.4      CO2 18*   BUN 45*   CREATININE 0.8   CALCIUM 9.1     CBC:   Recent Labs   Lab 10/03/23  0946   WBC 11.55   HGB 5.0*   HCT 15.6*   *       Significant Imaging: I have reviewed all pertinent imaging results/findings within the past 24 hours.

## 2023-10-04 ENCOUNTER — ANESTHESIA (OUTPATIENT)
Dept: ENDOSCOPY | Facility: HOSPITAL | Age: 83
DRG: 378 | End: 2023-10-04
Payer: MEDICARE

## 2023-10-04 ENCOUNTER — TELEPHONE (OUTPATIENT)
Dept: INTERNAL MEDICINE | Facility: CLINIC | Age: 83
End: 2023-10-04
Payer: MEDICARE

## 2023-10-04 LAB
ALBUMIN SERPL BCP-MCNC: 3 G/DL (ref 3.5–5.2)
ALP SERPL-CCNC: 42 U/L (ref 55–135)
ALT SERPL W/O P-5'-P-CCNC: 7 U/L (ref 10–44)
ANION GAP SERPL CALC-SCNC: 4 MMOL/L (ref 8–16)
AST SERPL-CCNC: 9 U/L (ref 10–40)
BASOPHILS # BLD AUTO: 0.05 K/UL (ref 0–0.2)
BASOPHILS NFR BLD: 0.8 % (ref 0–1.9)
BILIRUB SERPL-MCNC: 1 MG/DL (ref 0.1–1)
BLD PROD TYP BPU: NORMAL
BLOOD UNIT EXPIRATION DATE: NORMAL
BLOOD UNIT TYPE CODE: 6200
BLOOD UNIT TYPE: NORMAL
BUN SERPL-MCNC: 26 MG/DL (ref 8–23)
CALCIUM SERPL-MCNC: 7.8 MG/DL (ref 8.7–10.5)
CHLORIDE SERPL-SCNC: 115 MMOL/L (ref 95–110)
CO2 SERPL-SCNC: 19 MMOL/L (ref 23–29)
CODING SYSTEM: NORMAL
CREAT SERPL-MCNC: 0.6 MG/DL (ref 0.5–1.4)
CROSSMATCH INTERPRETATION: NORMAL
DIFFERENTIAL METHOD: ABNORMAL
DISPENSE STATUS: NORMAL
EOSINOPHIL # BLD AUTO: 0.1 K/UL (ref 0–0.5)
EOSINOPHIL NFR BLD: 2 % (ref 0–8)
ERYTHROCYTE [DISTWIDTH] IN BLOOD BY AUTOMATED COUNT: 21.1 % (ref 11.5–14.5)
ERYTHROCYTE [DISTWIDTH] IN BLOOD BY AUTOMATED COUNT: 21.1 % (ref 11.5–14.5)
ERYTHROCYTE [DISTWIDTH] IN BLOOD BY AUTOMATED COUNT: 21.2 % (ref 11.5–14.5)
EST. GFR  (NO RACE VARIABLE): >60 ML/MIN/1.73 M^2
GLUCOSE SERPL-MCNC: 101 MG/DL (ref 70–110)
HCT VFR BLD AUTO: 18.1 % (ref 37–48.5)
HCT VFR BLD AUTO: 18.1 % (ref 37–48.5)
HCT VFR BLD AUTO: 22.4 % (ref 37–48.5)
HGB BLD-MCNC: 6.1 G/DL (ref 12–16)
HGB BLD-MCNC: 6.1 G/DL (ref 12–16)
HGB BLD-MCNC: 7.6 G/DL (ref 12–16)
IMM GRANULOCYTES # BLD AUTO: 0.04 K/UL (ref 0–0.04)
IMM GRANULOCYTES NFR BLD AUTO: 0.7 % (ref 0–0.5)
LYMPHOCYTES # BLD AUTO: 1.8 K/UL (ref 1–4.8)
LYMPHOCYTES NFR BLD: 30.7 % (ref 18–48)
MAGNESIUM SERPL-MCNC: 1.8 MG/DL (ref 1.6–2.6)
MCH RBC QN AUTO: 30.5 PG (ref 27–31)
MCH RBC QN AUTO: 30.8 PG (ref 27–31)
MCH RBC QN AUTO: 30.8 PG (ref 27–31)
MCHC RBC AUTO-ENTMCNC: 33.7 G/DL (ref 32–36)
MCHC RBC AUTO-ENTMCNC: 33.7 G/DL (ref 32–36)
MCHC RBC AUTO-ENTMCNC: 33.9 G/DL (ref 32–36)
MCV RBC AUTO: 90 FL (ref 82–98)
MCV RBC AUTO: 91 FL (ref 82–98)
MCV RBC AUTO: 91 FL (ref 82–98)
MONOCYTES # BLD AUTO: 0.6 K/UL (ref 0.3–1)
MONOCYTES NFR BLD: 9.2 % (ref 4–15)
NEUTROPHILS # BLD AUTO: 3.4 K/UL (ref 1.8–7.7)
NEUTROPHILS NFR BLD: 56.6 % (ref 38–73)
NRBC BLD-RTO: 1 /100 WBC
NUM UNITS TRANS PACKED RBC: NORMAL
PHOSPHATE SERPL-MCNC: 2.7 MG/DL (ref 2.7–4.5)
PLATELET # BLD AUTO: 287 K/UL (ref 150–450)
PLATELET # BLD AUTO: 287 K/UL (ref 150–450)
PLATELET # BLD AUTO: 296 K/UL (ref 150–450)
PMV BLD AUTO: 9.6 FL (ref 9.2–12.9)
PMV BLD AUTO: 9.8 FL (ref 9.2–12.9)
PMV BLD AUTO: 9.8 FL (ref 9.2–12.9)
POTASSIUM SERPL-SCNC: 3.9 MMOL/L (ref 3.5–5.1)
PROT SERPL-MCNC: 4.6 G/DL (ref 6–8.4)
RBC # BLD AUTO: 1.98 M/UL (ref 4–5.4)
RBC # BLD AUTO: 1.98 M/UL (ref 4–5.4)
RBC # BLD AUTO: 2.49 M/UL (ref 4–5.4)
SODIUM SERPL-SCNC: 138 MMOL/L (ref 136–145)
WBC # BLD AUTO: 5.96 K/UL (ref 3.9–12.7)
WBC # BLD AUTO: 5.99 K/UL (ref 3.9–12.7)
WBC # BLD AUTO: 5.99 K/UL (ref 3.9–12.7)

## 2023-10-04 PROCEDURE — 99232 PR SUBSEQUENT HOSPITAL CARE,LEVL II: ICD-10-PCS | Mod: ,,, | Performed by: INTERNAL MEDICINE

## 2023-10-04 PROCEDURE — 80053 COMPREHEN METABOLIC PANEL: CPT

## 2023-10-04 PROCEDURE — 43239 EGD BIOPSY SINGLE/MULTIPLE: CPT | Mod: GC,,, | Performed by: INTERNAL MEDICINE

## 2023-10-04 PROCEDURE — 88305 TISSUE EXAM BY PATHOLOGIST: ICD-10-PCS | Mod: 26,,, | Performed by: STUDENT IN AN ORGANIZED HEALTH CARE EDUCATION/TRAINING PROGRAM

## 2023-10-04 PROCEDURE — 63600175 PHARM REV CODE 636 W HCPCS

## 2023-10-04 PROCEDURE — 25000003 PHARM REV CODE 250: Performed by: HOSPITALIST

## 2023-10-04 PROCEDURE — 88342 IMHCHEM/IMCYTCHM 1ST ANTB: CPT | Mod: 26,,, | Performed by: STUDENT IN AN ORGANIZED HEALTH CARE EDUCATION/TRAINING PROGRAM

## 2023-10-04 PROCEDURE — 20000000 HC ICU ROOM

## 2023-10-04 PROCEDURE — 25000003 PHARM REV CODE 250: Performed by: INTERNAL MEDICINE

## 2023-10-04 PROCEDURE — 88341 IMHCHEM/IMCYTCHM EA ADD ANTB: CPT | Mod: 26,,, | Performed by: STUDENT IN AN ORGANIZED HEALTH CARE EDUCATION/TRAINING PROGRAM

## 2023-10-04 PROCEDURE — 85027 COMPLETE CBC AUTOMATED: CPT | Performed by: HOSPITALIST

## 2023-10-04 PROCEDURE — 99222 PR INITIAL HOSPITAL CARE,LEVL II: ICD-10-PCS | Mod: 25,GC,, | Performed by: INTERNAL MEDICINE

## 2023-10-04 PROCEDURE — 88341 PR IHC OR ICC EACH ADD'L SINGLE ANTIBODY  STAINPR: ICD-10-PCS | Mod: 26,,, | Performed by: STUDENT IN AN ORGANIZED HEALTH CARE EDUCATION/TRAINING PROGRAM

## 2023-10-04 PROCEDURE — 84100 ASSAY OF PHOSPHORUS: CPT

## 2023-10-04 PROCEDURE — 43239 PR EGD, FLEX, W/BIOPSY, SGL/MULTI: ICD-10-PCS | Mod: GC,,, | Performed by: INTERNAL MEDICINE

## 2023-10-04 PROCEDURE — 63600175 PHARM REV CODE 636 W HCPCS: Performed by: NURSE ANESTHETIST, CERTIFIED REGISTERED

## 2023-10-04 PROCEDURE — 36415 COLL VENOUS BLD VENIPUNCTURE: CPT | Performed by: HOSPITALIST

## 2023-10-04 PROCEDURE — 88305 TISSUE EXAM BY PATHOLOGIST: CPT | Mod: 26,,, | Performed by: STUDENT IN AN ORGANIZED HEALTH CARE EDUCATION/TRAINING PROGRAM

## 2023-10-04 PROCEDURE — 36415 COLL VENOUS BLD VENIPUNCTURE: CPT

## 2023-10-04 PROCEDURE — 83735 ASSAY OF MAGNESIUM: CPT

## 2023-10-04 PROCEDURE — D9220A PRA ANESTHESIA: ICD-10-PCS | Mod: CRNA,,, | Performed by: NURSE ANESTHETIST, CERTIFIED REGISTERED

## 2023-10-04 PROCEDURE — 88341 IMHCHEM/IMCYTCHM EA ADD ANTB: CPT | Performed by: STUDENT IN AN ORGANIZED HEALTH CARE EDUCATION/TRAINING PROGRAM

## 2023-10-04 PROCEDURE — 25000003 PHARM REV CODE 250: Performed by: NURSE ANESTHETIST, CERTIFIED REGISTERED

## 2023-10-04 PROCEDURE — 43239 EGD BIOPSY SINGLE/MULTIPLE: CPT | Performed by: INTERNAL MEDICINE

## 2023-10-04 PROCEDURE — 27201012 HC FORCEPS, HOT/COLD, DISP: Performed by: INTERNAL MEDICINE

## 2023-10-04 PROCEDURE — D9220A PRA ANESTHESIA: Mod: CRNA,,, | Performed by: NURSE ANESTHETIST, CERTIFIED REGISTERED

## 2023-10-04 PROCEDURE — 88342 IMHCHEM/IMCYTCHM 1ST ANTB: CPT | Performed by: STUDENT IN AN ORGANIZED HEALTH CARE EDUCATION/TRAINING PROGRAM

## 2023-10-04 PROCEDURE — P9040 RBC LEUKOREDUCED IRRADIATED: HCPCS | Performed by: INTERNAL MEDICINE

## 2023-10-04 PROCEDURE — C9113 INJ PANTOPRAZOLE SODIUM, VIA: HCPCS

## 2023-10-04 PROCEDURE — 88305 TISSUE EXAM BY PATHOLOGIST: CPT | Performed by: STUDENT IN AN ORGANIZED HEALTH CARE EDUCATION/TRAINING PROGRAM

## 2023-10-04 PROCEDURE — 85025 COMPLETE CBC W/AUTO DIFF WBC: CPT

## 2023-10-04 PROCEDURE — 25000003 PHARM REV CODE 250

## 2023-10-04 PROCEDURE — D9220A PRA ANESTHESIA: Mod: ANES,,, | Performed by: ANESTHESIOLOGY

## 2023-10-04 PROCEDURE — 88342 CHG IMMUNOCYTOCHEMISTRY: ICD-10-PCS | Mod: 26,,, | Performed by: STUDENT IN AN ORGANIZED HEALTH CARE EDUCATION/TRAINING PROGRAM

## 2023-10-04 PROCEDURE — 99222 1ST HOSP IP/OBS MODERATE 55: CPT | Mod: 25,GC,, | Performed by: INTERNAL MEDICINE

## 2023-10-04 PROCEDURE — D9220A PRA ANESTHESIA: ICD-10-PCS | Mod: ANES,,, | Performed by: ANESTHESIOLOGY

## 2023-10-04 PROCEDURE — 99232 SBSQ HOSP IP/OBS MODERATE 35: CPT | Mod: ,,, | Performed by: INTERNAL MEDICINE

## 2023-10-04 PROCEDURE — 37000008 HC ANESTHESIA 1ST 15 MINUTES: Performed by: INTERNAL MEDICINE

## 2023-10-04 PROCEDURE — 37000009 HC ANESTHESIA EA ADD 15 MINS: Performed by: INTERNAL MEDICINE

## 2023-10-04 RX ORDER — PRAMIPEXOLE DIHYDROCHLORIDE 1 MG/1
1 TABLET ORAL NIGHTLY
Status: DISCONTINUED | OUTPATIENT
Start: 2023-10-04 | End: 2023-10-05 | Stop reason: HOSPADM

## 2023-10-04 RX ORDER — SODIUM CHLORIDE 9 MG/ML
INJECTION, SOLUTION INTRAVENOUS CONTINUOUS
Status: ACTIVE | OUTPATIENT
Start: 2023-10-04 | End: 2023-10-04

## 2023-10-04 RX ORDER — PRAMIPEXOLE DIHYDROCHLORIDE 0.12 MG/1
0.5 TABLET ORAL DAILY
Status: DISCONTINUED | OUTPATIENT
Start: 2023-10-04 | End: 2023-10-05 | Stop reason: HOSPADM

## 2023-10-04 RX ORDER — PREDNISOLONE/MOXIFLOX/BROMFEN 1 %-0.5 %
1 SUSPENSION, DROPS(FINAL DOSAGE FORM)(ML) OPHTHALMIC (EYE) 3 TIMES DAILY
Status: DISCONTINUED | OUTPATIENT
Start: 2023-10-04 | End: 2023-10-05 | Stop reason: HOSPADM

## 2023-10-04 RX ORDER — LIDOCAINE HYDROCHLORIDE 20 MG/ML
INJECTION INTRAVENOUS
Status: DISCONTINUED | OUTPATIENT
Start: 2023-10-04 | End: 2023-10-04

## 2023-10-04 RX ORDER — PROPOFOL 10 MG/ML
VIAL (ML) INTRAVENOUS
Status: DISCONTINUED | OUTPATIENT
Start: 2023-10-04 | End: 2023-10-04

## 2023-10-04 RX ORDER — PANTOPRAZOLE SODIUM 40 MG/1
40 TABLET, DELAYED RELEASE ORAL
Status: DISCONTINUED | OUTPATIENT
Start: 2023-10-04 | End: 2023-10-05 | Stop reason: HOSPADM

## 2023-10-04 RX ORDER — PRAMIPEXOLE DIHYDROCHLORIDE 1 MG/1
1 TABLET ORAL NIGHTLY
Status: DISCONTINUED | OUTPATIENT
Start: 2023-10-04 | End: 2023-10-04

## 2023-10-04 RX ORDER — PRAMIPEXOLE DIHYDROCHLORIDE 0.12 MG/1
0.5 TABLET ORAL DAILY
Status: DISCONTINUED | OUTPATIENT
Start: 2023-10-04 | End: 2023-10-04

## 2023-10-04 RX ORDER — GABAPENTIN 300 MG/1
600 CAPSULE ORAL NIGHTLY
Status: DISCONTINUED | OUTPATIENT
Start: 2023-10-04 | End: 2023-10-05 | Stop reason: HOSPADM

## 2023-10-04 RX ORDER — HYDROCODONE BITARTRATE AND ACETAMINOPHEN 500; 5 MG/1; MG/1
TABLET ORAL
Status: DISCONTINUED | OUTPATIENT
Start: 2023-10-04 | End: 2023-10-05 | Stop reason: HOSPADM

## 2023-10-04 RX ADMIN — MOXIFLOXACIN OPHTHALMIC 1 DROP: 5 SOLUTION/ DROPS OPHTHALMIC at 08:10

## 2023-10-04 RX ADMIN — PANTOPRAZOLE SODIUM 40 MG: 40 TABLET, DELAYED RELEASE ORAL at 08:10

## 2023-10-04 RX ADMIN — VERAPAMIL HYDROCHLORIDE 120 MG: 120 TABLET, FILM COATED, EXTENDED RELEASE ORAL at 08:10

## 2023-10-04 RX ADMIN — PRAMIPEXOLE DIHYDROCHLORIDE 1 MG: 1 TABLET ORAL at 08:10

## 2023-10-04 RX ADMIN — PANTOPRAZOLE SODIUM 40 MG: 40 INJECTION, POWDER, FOR SOLUTION INTRAVENOUS at 08:10

## 2023-10-04 RX ADMIN — SODIUM CHLORIDE: 0.9 INJECTION, SOLUTION INTRAVENOUS at 02:10

## 2023-10-04 RX ADMIN — Medication 1 DROP: at 08:10

## 2023-10-04 RX ADMIN — PREDNISOLONE ACETATE 1 DROP: 10 SUSPENSION/ DROPS OPHTHALMIC at 08:10

## 2023-10-04 RX ADMIN — PRAMIPEXOLE DIHYDROCHLORIDE 1 MG: 1 TABLET ORAL at 01:10

## 2023-10-04 RX ADMIN — SODIUM CHLORIDE: 9 INJECTION, SOLUTION INTRAVENOUS at 11:10

## 2023-10-04 RX ADMIN — Medication 1 DROP: at 03:10

## 2023-10-04 RX ADMIN — CHOLECALCIFEROL TAB 25 MCG (1000 UNIT) 1000 UNITS: 25 TAB at 08:10

## 2023-10-04 RX ADMIN — SODIUM CHLORIDE: 9 INJECTION, SOLUTION INTRAVENOUS at 10:10

## 2023-10-04 RX ADMIN — LIDOCAINE HYDROCHLORIDE 60 MG: 20 INJECTION INTRAVENOUS at 02:10

## 2023-10-04 RX ADMIN — PROPOFOL 50 MG: 10 INJECTION, EMULSION INTRAVENOUS at 02:10

## 2023-10-04 RX ADMIN — GABAPENTIN 600 MG: 300 CAPSULE ORAL at 08:10

## 2023-10-04 RX ADMIN — PRAMIPEXOLE DIHYDROCHLORIDE 0.5 MG: 0.12 TABLET ORAL at 03:10

## 2023-10-04 NOTE — TRANSFER OF CARE
"Anesthesia Transfer of Care Note    Patient: Shelly Vásquez    Procedure(s) Performed: Procedure(s) (LRB):  EGD (ESOPHAGOGASTRODUODENOSCOPY) (N/A)    Patient location: PACU    Anesthesia Type: general    Transport from OR: Transported from OR on 2-3 L/min O2 by NC with adequate spontaneous ventilation    Post pain: adequate analgesia    Post assessment: no apparent anesthetic complications    Post vital signs: stable    Level of consciousness: awake and alert    Nausea/Vomiting: no nausea/vomiting    Complications: none    Transfer of care protocol was followed      Last vitals:   Visit Vitals  /60 (Patient Position: Lying)   Pulse 80   Temp 36.1 °C (97 °F) (Temporal)   Resp 16   Ht 5' 8" (1.727 m)   Wt 71.1 kg (156 lb 12 oz)   LMP  (LMP Unknown)   SpO2 100%   Breastfeeding No   BMI 23.83 kg/m²     "

## 2023-10-04 NOTE — ASSESSMENT & PLAN NOTE
Patient presents to the ED for two episodes of melena since yesterday.  - Afebrile, VSS  - GIB Pathway initiated  - Hgb 5.0 on admit, baseline around 7-9  - S/p Protonix 40mg IV x 1 in the ED.  Protonix 40mg IV BID  - GI consulted, appreciate assistance  - NPO at midnight for possible EGD  - Type and screen, blood consent obtained  - CBCq 8h.  Transfuse for Hemoglobin <7  - Coagulopathy goals:  Plt >50, INR <1.5    10/4/23  Patient is scheduled for scoping today.  Continue with conservative IV fluids.  Transfuse another unit of packed red cell.  Follow up with GI recommendations.  Continue to monitor hemoglobin.  Continue with Protonix

## 2023-10-04 NOTE — NURSING TRANSFER
Nursing Transfer Note      10/4/2023   3:43 PM    Nurse giving handoff:Dixie ALONZO  Nurse receiving handoff:Martha Win    Reason patient is being transferred: MD order back to room    Transfer To: [id number]    Transfer via stretcher    Transfer with cardiac monitoring    Transported by PCT    Order for Tele Monitor? Yes          Patient belongings transferred with patient: No    Chart send with patient: Yes    Notified: spouse    Patient reassessed at: 151

## 2023-10-04 NOTE — PROGRESS NOTES
10/03/23 2049 10/03/23 2052 10/03/23 2055   Vital Signs   Pulse 78 89 96   BP (!) 115/57 (!) 116/57 (!) 112/57   MAP (mmHg) 82 82 82

## 2023-10-04 NOTE — PLAN OF CARE
Problem: Bleeding (Gastrointestinal Bleeding)  Goal: Hemostasis  Outcome: Ongoing, Progressing   Pt is A&Ox4. Pt requires assistance when ambulating and reported to RN that she fell 10 days ago while walking in her home in the dark. RN instructed pt to call for RN before ambulating to bathroom. Pt verbalized understanding. Last H&H 5.9 & 17.4. Results reported to on call physician. Unit of blood ordered and currently transfusing. Telemetry placed on pt. No other complaints at this time.

## 2023-10-04 NOTE — CARE UPDATE
"Patient's  to bring her home eye drops of "prednisolon/gatiflox/bromfenac (PREDNISOL ACE-GATIFLOX-BROMFEN) 1-0.5-0.075 % DrpS"  "

## 2023-10-04 NOTE — SIGNIFICANT EVENT
Messaged by nursing - criticla hgb this evening of 5.9     On chart review admission for suspected upper GI bleed and s/p 2 units in ED earlier today    CBC was drawn approx 1 hour post-transfusion.       Plan  -repeat H/H confirms low hgb   -no active bleeding noted   -lactic acid normal  -patient could not tolerate standing for orthostatics  -additional 1 unit PRBC ordered now.         Khoa Jenkins M.D.  Attending Physician  [facility] Medicine Dept.  Pager: [phone]  Spectralink -[phone]

## 2023-10-04 NOTE — PROGRESS NOTES
"[facility] - [facility] ([address] Medicine  Progress Note    Patient Name: Shelly Vásquez  MRN: [id number]  Patient Class: IP- Inpatient   Admission Date: 10/3/2023  Length of Stay: 0 days  Attending Physician: Artem Hyde MD  Primary Care Provider: Marlene Méndez MD        Subjective:     Principal Problem:Melena        HPI:  Shelly Vásquez is a 82 y.o. female with a hx of catarct surgery 10/2/23, s/p hip replacement 4/2023, hx of afib s/p ablation, and anemia presents to the ED for melena. Patient states she was in her usual state of health after her cataract surgery when she got up to use the restroom and noticed a "dark purple, black" appearance to her stool. She had a bowel movement earlier in the night where she soiled the rug but did not notice a change in color of her stool. Denies hematochezia. Patient and spouse believe they only saw dark stool twice last night. She has not had a bowel movement since being admitted to the hospital today. Endorses increased fatigue and weakness. Patient endorses taking mirilax every other day or everyday due to hemorrhoids. Denies any recent NSAID use (has celebrex on med list) and states she takes tylenol for pain. Unsure when her last colonoscopy was but states they have all been clear. Denies fever, chest pain, SOB, abdominal pain, and n/v. Denies any recent change in her diet. Denies hematuria or hemoptysis. Endorses flatulence. Per chart review, pt had a previous GI bleed due to NSAID use requiring transfusions.    ED: AF, BP 91/55. Tachycardic. Hgb 5.0. Hct 15.6. CMP largely unremarkable. UA not infectious. Given 1L IVFs in ED. Transfused 2 units pRBCs in ED.       Overview/Hospital Course:  Patient admitted and managed for GI bleed(presumed upper GI bleed) with symptomatic anemia requiring blood transfusion, received a total of 4 units PRBC.  Seen by GI and is scheduled for scoping.  She is on IV Protonix and serial CBC        Interval History: "  Hemoglobin remains at 6.1 this morning despite receiving 3 units of packed red cells.  Still experiences some weakness.  Reports last night she had black stool.  Scheduled for scoping at 11:00 a.m. today.  She was ordered for another unit of packed red cells    Review of Systems   Gastrointestinal:  Positive for blood in stool. Negative for abdominal pain.   Neurological:  Positive for dizziness, weakness and light-headedness.     Objective:     Vital Signs (Most Recent):  Temp: 98.2 °F (36.8 °C) (10/04/23 0805)  Pulse: 77 (10/04/23 0805)  Resp: 18 (10/04/23 0752)  BP: (!) 105/55 (10/04/23 0805)  SpO2: 97 % (10/04/23 0805) Vital Signs (24h Range):  Temp:  [97.4 °F (36.3 °C)-98.8 °F (37.1 °C)] 98.2 °F (36.8 °C)  Pulse:  [] 77  Resp:  [13-27] 18  SpO2:  [96 %-100 %] 97 %  BP: ()/(49-59) 105/55     Weight: 71.1 kg (156 lb 12 oz)  Body mass index is 23.83 kg/m².    Intake/Output Summary (Last 24 hours) at 10/4/2023 0828  Last data filed at 10/4/2023 0218  Gross per 24 hour   Intake 1282.75 ml   Output --   Net 1282.75 ml      Physical Exam  Constitutional:       General: She is not in acute distress.     Comments: Elderly female   HENT:      Head: Normocephalic.      Right Ear: External ear normal.      Left Ear: External ear normal.      Nose: Nose normal.   Eyes:      General: No scleral icterus.  Cardiovascular:      Rate and Rhythm: Normal rate.   Pulmonary:      Effort: Pulmonary effort is normal.   Abdominal:      Palpations: Abdomen is soft.      Tenderness: There is no abdominal tenderness.   Musculoskeletal:      Right lower leg: No edema.      Left lower leg: No edema.   Skin:     Coloration: Skin is pale.   Neurological:      Mental Status: She is alert and oriented to person, place, and time.         MELD 3.0: 9 at 4/21/2023  2:32 PM  MELD-Na: 8 at 4/21/2023  2:32 PM  Calculated from:  Serum Creatinine: 0.8 mg/dL (Using min of 1 mg/dL) at 4/21/2023  2:32 PM  Serum Sodium: 136 mmol/L at 4/21/2023  " 2:32 PM  Total Bilirubin: 1.1 mg/dL at 4/21/2023  2:32 PM  Serum Albumin: 4.5 g/dL (Using max of 3.5 g/dL) at 4/21/2023  2:32 PM  INR(ratio): 1.1 at 4/21/2023  2:32 PM  Age at listing (hypothetical): 82 years  Sex: Female at 4/21/2023  2:32 PM      Significant Labs:  CBC:  Recent Labs   Lab 10/03/23  0946 10/03/23  1824 10/03/23  2008 10/04/23  0505   WBC 11.55 7.74  --  5.99  5.99   HGB 5.0* 5.8* 5.9* 6.1*  6.1*   HCT 15.6* 17.5* 17.4* 18.1*  18.1*   * 311  --  287  287     CMP:  Recent Labs   Lab 10/03/23  0946 10/04/23  0505    138   K 4.4 3.9    115*   CO2 18* 19*    101   BUN 45* 26*   CREATININE 0.8 0.6   CALCIUM 9.1 7.8*   PROT 5.9* 4.6*   ALBUMIN 3.6 3.0*   BILITOT 0.5 1.0   ALKPHOS 51* 42*   AST 14 9*   ALT 11 7*   ANIONGAP 8 4*     PTINR:  No results for input(s): "INR" in the last 48 hours.    Significant Procedures:   Dobutamine Stress Test with Color Flow: No results found. However, due to the size of the patient record, not all encounters were searched. Please check Results Review for a complete set of results.      Assessment/Plan:      * Melena  Patient presents to the ED for two episodes of melena since yesterday.  - Afebrile, VSS  - GIB Pathway initiated  - Hgb 5.0 on admit, baseline around 7-9  - S/p Protonix 40mg IV x 1 in the ED.  Protonix 40mg IV BID  - GI consulted, appreciate assistance  - NPO at midnight for possible EGD  - Type and screen, blood consent obtained  - CBCq 8h.  Transfuse for Hemoglobin <7  - Coagulopathy goals:  Plt >50, INR <1.5    10/4/23  Patient is scheduled for scoping today.  Continue with conservative IV fluids.  Transfuse another unit of packed red cell.  Follow up with GI recommendations.  Continue to monitor hemoglobin.  Continue with Protonix    Acute blood loss anemia   (baseline ~7-9)  Hemoglobin of 6.1 this morning despite receiving 3 units of packed red cells.  She is ordered for a 4th.      Nuclear sclerotic cataract of left " eye  - s/p cataract surgery 10/2  - spouse brought home eye drops to hospital  - may continue while inpatient    Restless leg  - continue home gabapentin and pramipexole.    MDS (myelodysplastic syndrome)  - hx noted  - per chart review, pt deemed low risk MDS  - continue to monitor    History of radiofrequency ablation (RFA) procedure for cardiac arrhythmia  Holding verapamil given acute blood loss    Thrombocytosis  - Plts 469 (near baseline)  - continue to monitor daily CBC      VTE Risk Mitigation (From admission, onward)           Ordered     Reason for No Pharmacological VTE Prophylaxis  Once        Question:  Reasons:  Answer:  Active Bleeding    10/03/23 1206     IP VTE HIGH RISK PATIENT  Once         10/03/23 1206     Place sequential compression device  Until discontinued         10/03/23 1206                    Discharge Planning   BRITTANY:      Code Status: Full Code   Is the patient medically ready for discharge?:     Reason for patient still in hospital (select all that apply): Patient trending condition                     Artem Hyde MD  Department of Hospital Medicine   [facility] - Intensive Care ([place])

## 2023-10-04 NOTE — ASSESSMENT & PLAN NOTE
(baseline ~7-9)  Hemoglobin of 6.1 this morning despite receiving 3 units of packed red cells.  She is ordered for a 4th.

## 2023-10-04 NOTE — TREATMENT PLAN
GI Post-Procedure Treatment Plan    EGD complete    Impression:            - Normal esophagus.                          - Z-line regular.                          - Gastric ulcers with a clean ulcer base (Juve                          Class III). Biopsied.                          - Erosive gastropathy with no bleeding and no                          stigmata of recent bleeding.                          - A single spot with no bleeding in the duodenum.     Recommendation:        - Return patient to hospital [facility] for ongoing care.                          - Resume regular diet.                          - Use a proton pump inhibitor PO BID for 8 weeks.                          - Repeat upper endoscopy in 8 days to check                          healing.                          - Await pathology results.     GI will sign off    Kory Hendrickson  Gastroenterology Fellow, PGY-V

## 2023-10-04 NOTE — PLAN OF CARE
[facility] - [facility] ([facility]  Initial Discharge Assessment       Primary Care Provider: Marlene Méndez MD    Admission Diagnosis: Melena [K92.1]  Acute blood loss anemia [D62]  Fatigue [R53.83]  Chest pain [R07.9]    Admission Date: 10/3/2023  Expected Discharge Date: 10/6/2023    Transition of Care Barriers: None    Payor: MEDICARE / Plan: MEDICARE PART A & B / Product Type: Government /     Extended Emergency Contact Information  Primary Emergency Contact: Morgan Vásquez  Address: [id number] [place] [address]  Home Phone: [phone]  Mobile Phone: [phone]  Relation: Spouse  Secondary Emergency Contact: [place]  Mobile Phone: [phone]  Relation: Daughter  Preferred language: English   needed? No    Discharge Plan A: Home with family  Discharge Plan B: Home      [id number] - [facility] [address] [facility]  [address] [facility] [address]  Phone: [phone] Fax: [phone]    [facility] [place], [place] - [address]  [address]  [facility] [address]  Phone: [phone] Fax: [phone]    [facility] - [place], [place] - [address]  [address]  [address]  [place] [address]  Phone: [phone] Fax: [phone]    [facility] [address] - [place], [place] - [facility] [address] AT [place] [address]  [facility] [address]  Phone: [phone] Fax: [phone]    [id number] [place] - [place], [place] - [address] [facility] [address] [facility]  [address]  [facility] [address]  Phone: [phone] Fax: [phone]      Initial Assessment (most recent)       Adult Discharge Assessment - 10/04/23 0915          Discharge Assessment    Assessment Type Discharge Planning Assessment     Confirmed/corrected address, phone number and insurance Yes     Confirmed Demographics  Correct on Facesheet     Source of Information patient;family     When was your last doctors appointment? 09/27/23     Reason For Admission MEDICARE - MEDICARE PART A & B     People in Home spouse   Morgan Vásquez (Spouse)   [phone] (Mobile    Do you expect to return to your current living situation? Yes     Do you have help at home or someone to help you manage your care at home? No     Prior to hospitilization cognitive status: Alert/Oriented     Current cognitive status: Alert/Oriented     Equipment Currently Used at Home none     Readmission within 30 days? No     Patient currently being followed by outpatient case management? No     Do you currently have service(s) that help you manage your care at home? No     Do you take prescription medications? Yes     Do you have prescription coverage? Yes     Coverage MEDICARE - MEDICARE PART A & B     Do you have any problems affording any of your prescribed medications? No     Is the patient taking medications as prescribed? yes     Who is going to help you get home at discharge? Morgan Vásquez (Spouse)   [phone] (Mobile)     How do you get to doctors appointments? car, drives self;family or friend will provide     Are you on dialysis? No     Do you take coumadin? No     DME Needed Upon Discharge  other (see comments)   TBD    Discharge Plan discussed with: Patient;Spouse/sig other     Name(s) and Number(s) Morgan Vásquez (Spouse)   [phone] (Mobile     Transition of Care Barriers None     Discharge Plan A Home with family     Discharge Plan B Home        Physical Activity    On average, how many days per week do you engage in moderate to strenuous exercise (like a brisk walk)? 7 days     On average, how many minutes do you engage in exercise at this level? 20 min        Financial Resource Strain    How hard is it for you to pay for the very basics like food, housing, medical care, and heating? Not hard at all        Housing Stability    In the last 12  months, was there a time when you were not able to pay the mortgage or rent on time? No     In the last 12 months, was there a time when you did not have a steady place to sleep or slept in a shelter (including now)? No        Transportation Needs    In the past 12 months, has lack of transportation kept you from medical appointments or from getting medications? No     In the past 12 months, has lack of transportation kept you from meetings, work, or from getting things needed for daily living? No        Food Insecurity    Within the past 12 months, you worried that your food would run out before you got the money to buy more. Never true     Within the past 12 months, the food you bought just didn't last and you didn't have money to get more. Never true        Stress    Do you feel stress - tense, restless, nervous, or anxious, or unable to sleep at night because your mind is troubled all the time - these days? Only a little        Social Connections    In a typical week, how many times do you talk on the phone with family, friends, or neighbors? More than three times a week     How often do you get together with friends or relatives? More than three times a week     How often do you attend Faith or Moravian services? 1 to 4 times per year     Do you belong to any clubs or organizations such as Faith groups, unions, fraternal or athletic groups, or school groups? No     Are you , , , , never , or living with a partner?    Morgan Vásquez (Spouse)   [phone] (Mobile       Alcohol Use    Q1: How often do you have a drink containing alcohol? Never     Q2: How many drinks containing alcohol do you have on a typical day when you are drinking? Patient does not drink     Q3: How often do you have six or more drinks on one occasion? Never        OTHER    Name(s) of People in Home Morgan Vásquez (Spouse)   [phone] (Mobile                      CM met with patient  and  spouse at bedside to complete discharge planning assessment.  Patient alert and oriented xs 4.  Patient verified all demographic information on facesheet is correct.  Patient verified PCP is Dr Marlene Méndez  Patient verified primary health insurance is MEDICARE - MEDICARE PART A & B and  [employer] insurance.  Patient/spouse  is agreeable with bedside medication delivery.   Patient is not active with  home health and has listed DME.  Patient with NO POA or Living Will.  Patient not on dialysis or medication coumadin.  Patient with no 30 day admission.  Patient with no financial issues at this time.  Patient family will provide transportation upon discharge from facility. Patient reports still driving.   Patient will have assistance from her spouse.  upon discharge Patient independent with ADLs. Patient  lives her spouse. All questions answered regarding Case Management Discharge Planning, patient verbalized understanding.  Discharge booklet with CM's contact information given to patient.    Danielle Keller RN  Case Management  [facility]  [phone]

## 2023-10-04 NOTE — H&P
Endoscopy History and Physical    PCP - Marlene Méndez MD    Procedure - EGD  ASA - per anesthesia  Mallampati - per anesthesia  Plan of anesthesia - MAC    HPI:  This is a 82 y.o. female here for evaluation of :  melena  History of PUD    ROS:  Constitutional: No fevers, chills  CV: No chest pain  Pulm: No cough  Ophtho: No vision changes  GI: see HPI  Derm: No rash    Medical History:  has a past medical history of Arthritis, Atrial fibrillation, Basal cell cancer, Encounter for blood transfusion, Gastric ulcer, GERD (gastroesophageal reflux disease), Herpes simplex without mention of complication, Postmenopausal HRT (hormone replacement therapy), and Supraventricular tachycardia.    Surgical History:  has a past surgical history that includes Dilation and curettage of uterus (2008); Endometrial biopsy; Beast Lift (2004); Tubal ligation; TONSILLECTOMY, ADENOIDECTOMY (age 8); Appendectomy (age 21); Eye surgery; ptsosis; Back surgery; Cosmetic surgery; Small intestine surgery; Radiofrequency ablation (03/2018); Total Reduction Mammoplasty (Bilateral, 2003); Breast biopsy (50yrs ago); Bone marrow biopsy (Right, 12/19/2019); Transforaminal epidural injection of steroid (Right, 8/11/2022); Epidural steroid injection (N/A, 8/25/2022); Transforaminal epidural injection of steroid (Right, 10/21/2022); Injection of anesthetic agent around nerve (Bilateral, 11/11/2022); Injection of joint (Right, 12/2/2022); Arthroplasty of hip by anterior approach (Right, 4/26/2023); Cataract extraction w/  intraocular lens implant (Right, 9/7/2023); and Cataract extraction w/  intraocular lens implant (Left, 10/2/2023).    Family History: family history includes Cirrhosis in her mother; No Known Problems in her brother, daughter, father, maternal aunt, maternal grandfather, maternal grandmother, maternal uncle, paternal aunt, paternal grandfather, paternal grandmother, and paternal uncle; Pulmonary embolism in her son.. Otherwise no  colon cancer, inflammatory bowel disease, or GI malignancies.    Social History:  reports that she quit smoking about 43 years ago. Her smoking use included cigarettes. She started smoking about 65 years ago. She has a 22.0 pack-year smoking history. She has never used smokeless tobacco. She reports current alcohol use of about 2.0 standard drinks of alcohol per week. She reports that she does not use drugs.    Review of patient's allergies indicates:   Allergen Reactions    Baclofen      Incoherent     Nsaids (non-steroidal anti-inflammatory drug)      GI Bleed  Had 5 blood transfusions       Medications:   Medications Prior to Admission   Medication Sig Dispense Refill Last Dose    acetaminophen (TYLENOL) 650 MG TbSR Take 1 tablet (650 mg total) by mouth every 8 (eight) hours. 120 tablet 0     aspirin (ECOTRIN) 81 MG EC tablet Take 1 tablet (81 mg total) by mouth once daily. 30 tablet 0     celecoxib (CELEBREX) 200 MG capsule Take 1 capsule (200 mg total) by mouth once daily. 30 capsule 0     estradiol-norethindrone acet 0.5-0.1 mg per tablet Take 1 tablet by mouth once daily. 90 tablet 3     gabapentin (NEURONTIN) 600 MG tablet Take 600 mg by mouth every evening.       methocarbamoL (ROBAXIN) 750 MG Tab Take 1 tablet (750 mg total) by mouth 4 (four) times daily as needed (muscle spasms). 40 tablet 1     pantoprazole (PROTONIX) 40 MG tablet TAKE 1 TABLET(40 MG) BY MOUTH EVERY DAY 90 tablet 1     polyethylene glycol 3350 (MIRALAX ORAL) Take 1 Capful by mouth daily as needed (constipation).       pramipexole (MIRAPEX) 0.5 MG tablet 1 in am and 2 at night 90 tablet 2     prednisolon/gatiflox/bromfenac (PREDNISOL ACE-GATIFLOX-BROMFEN) 1-0.5-0.075 % DrpS Apply 1 drop to eye 3 (three) times daily. in operative eye for 1 month after surgery (Patient taking differently: Apply 1 drop to eye 3 (three) times daily. in left eye for 1 month after surgery. EOT: 11/2/23) 5 mL 3     temazepam (RESTORIL) 15 mg Cap Take 1 capsule  (15 mg total) by mouth nightly as needed. 90 capsule 0     verapamiL (VERELAN) 120 MG C24P Take 1 capsule (120 mg total) by mouth once daily. 90 capsule 3     vitamin D (VITAMIN D3) 1000 units Tab Take 1,000 Units by mouth once daily.            Vital Signs:   Vitals:    10/04/23 1428   BP: 128/60   Pulse: 80   Resp: 16   Temp: 97 °F (36.1 °C)       General Appearance: Well appearing in no acute distress  Eyes:    No scleral icterus  ENT: atraumatic  Abdomen: Soft, nondistended  Extremities: no tenderness  Skin: normal color    Labs:  Lab Results   Component Value Date    WBC 5.96 10/04/2023    HGB 7.6 (L) 10/04/2023    HCT 22.4 (L) 10/04/2023     10/04/2023    CHOL 155 08/15/2023    TRIG 46 08/15/2023    HDL 76 (H) 08/15/2023    ALT 7 (L) 10/04/2023    AST 9 (L) 10/04/2023     10/04/2023    K 3.9 10/04/2023     (H) 10/04/2023    CREATININE 0.6 10/04/2023    BUN 26 (H) 10/04/2023    CO2 19 (L) 10/04/2023    TSH 1.709 08/15/2023    INR 1.1 04/21/2023    HGBA1C 5.1 08/15/2023       I have explained the risks and benefits of endoscopy procedures to the patient/their POA including but not limited to bleeding, perforation, infection, and death.  The patient/POA was asked if they understand and allowed to ask any further questions to their satisfaction.    Proceed with EGD    Kory Hendrickson MD

## 2023-10-04 NOTE — HOSPITAL COURSE
Patient admitted and managed for GI bleed(presumed upper GI bleed) with symptomatic anemia requiring blood transfusion, received a total of 4 units PRBC. She was started on IV PPI BID and GI consulted and EGD performed 10/4 showing gastric ulcers with clean base (biopsy pending), erosive gastropathy with no bleeding and no stigmata of recent bleeding. Hemoglobin remained stable for 24 hours and patient without black/tarry BM's. GI recommending 8 weeks PPI BID and repeat EGD in 1 week after discharge.

## 2023-10-04 NOTE — PROVATION PATIENT INSTRUCTIONS
Discharge Summary/Instructions after an Endoscopic Procedure  Patient Name: Shelly Vásquez  Patient MRN: [id number]  Patient YOB: 1940 Wednesday, October 4, 2023  Hugo Menchaca MD  Dear patient,  As a result of recent federal legislation (The Federal Cures Act), you may   receive lab or pathology results from your procedure in your [facility]   account before your physician is able to contact you. Your physician or   their representative will relay the results to you with their   recommendations at their soonest availability.  Thank you,  RESTRICTIONS:  During your procedure today, you received medications for sedation.  These   medications may affect your judgment, balance and coordination.  Therefore,   for 24 hours, you have the following restrictions:   - DO NOT drive a car, operate machinery, make legal/financial decisions,   sign important papers or drink alcohol.    ACTIVITY:  Today: no heavy lifting, straining or running due to procedural   sedation/anesthesia.  The following day: return to full activity including work.  DIET:  Eat and drink normally unless instructed otherwise.     TREATMENT FOR COMMON SIDE EFFECTS:  - Mild abdominal pain, nausea, belching, bloating or excessive gas:  rest,   eat lightly and use a heating pad.  - Sore Throat: treat with throat lozenges and/or gargle with warm salt   water.  - Because air was used during the procedure, expelling large amounts of air   from your rectum or belching is normal.  - If a bowel prep was taken, you may not have a bowel movement for 1-3 days.    This is normal.  SYMPTOMS TO WATCH FOR AND REPORT TO YOUR PHYSICIAN:  1. Abdominal pain or bloating, other than gas cramps.  2. Chest pain.  3. Back pain.  4. Signs of infection such as: chills or fever occurring within 24 hours   after the procedure.  5. Rectal bleeding, which would show as bright red, maroon, or black stools.   (A tablespoon of blood from the rectum is not serious, especially  if   hemorrhoids are present.)  6. Vomiting.  7. Weakness or dizziness.  GO DIRECTLY TO THE NEAREST EMERGENCY ROOM IF YOU HAVE ANY OF THE FOLLOWING:      Difficulty breathing              Chills and/or fever over 101 F   Persistent vomiting and/or vomiting blood   Severe abdominal pain   Severe chest pain   Black, tarry stools   Bleeding- more than one tablespoon   Any other symptom or condition that you feel may need urgent attention  Your doctor recommends these additional instructions:  If any biopsies were taken, your doctors clinic will contact you in 1 to 2   weeks with any results.  - Return patient to [facility] for [facility] care.   - Resume regular diet.   - Use a proton pump inhibitor PO BID for 8 weeks.   - Repeat upper endoscopy in 8 days to check healing.   - Await pathology results.  For questions, problems or results please call your physician - Hugo Menchaca MD at Work:  [phone].  [facility], EMERGENCY ROOM PHONE NUMBER: [phone]  IF A COMPLICATION OR EMERGENCY SITUATION ARISES AND YOU ARE UNABLE TO REACH   YOUR PHYSICIAN - GO DIRECTLY TO THE EMERGENCY ROOM.  Hugo Menchaca MD  10/4/2023 3:06:31 PM  This report has been verified and signed electronically.  Dear patient,  As a result of recent federal legislation (The Federal Cures Act), you may   receive lab or pathology results from your procedure in your [facility]   account before your physician is able to contact you. Your physician or   their representative will relay the results to you with their   recommendations at their soonest availability.  Thank you,  PROVATION

## 2023-10-04 NOTE — SUBJECTIVE & OBJECTIVE
Interval History:  Hemoglobin remains at 6.1 this morning despite receiving 3 units of packed red cells.  Still experiences some weakness.  Reports last night she had black stool.  Scheduled for scoping at 11:00 a.m. today.  She was ordered for another unit of packed red cells    Review of Systems   Gastrointestinal:  Positive for blood in stool. Negative for abdominal pain.   Neurological:  Positive for dizziness, weakness and light-headedness.     Objective:     Vital Signs (Most Recent):  Temp: 98.2 °F (36.8 °C) (10/04/23 0805)  Pulse: 77 (10/04/23 0805)  Resp: 18 (10/04/23 0752)  BP: (!) 105/55 (10/04/23 0805)  SpO2: 97 % (10/04/23 0805) Vital Signs (24h Range):  Temp:  [97.4 °F (36.3 °C)-98.8 °F (37.1 °C)] 98.2 °F (36.8 °C)  Pulse:  [] 77  Resp:  [13-27] 18  SpO2:  [96 %-100 %] 97 %  BP: ()/(49-59) 105/55     Weight: 71.1 kg (156 lb 12 oz)  Body mass index is 23.83 kg/m².    Intake/Output Summary (Last 24 hours) at 10/4/2023 0828  Last data filed at 10/4/2023 0218  Gross per 24 hour   Intake 1282.75 ml   Output --   Net 1282.75 ml      Physical Exam  Constitutional:       General: She is not in acute distress.     Comments: Elderly female   HENT:      Head: Normocephalic.      Right Ear: External ear normal.      Left Ear: External ear normal.      Nose: Nose normal.   Eyes:      General: No scleral icterus.  Cardiovascular:      Rate and Rhythm: Normal rate.   Pulmonary:      Effort: Pulmonary effort is normal.   Abdominal:      Palpations: Abdomen is soft.      Tenderness: There is no abdominal tenderness.   Musculoskeletal:      Right lower leg: No edema.      Left lower leg: No edema.   Skin:     Coloration: Skin is pale.   Neurological:      Mental Status: She is alert and oriented to person, place, and time.         MELD 3.0: 9 at 4/21/2023  2:32 PM  MELD-Na: 8 at 4/21/2023  2:32 PM  Calculated from:  Serum Creatinine: 0.8 mg/dL (Using min of 1 mg/dL) at 4/21/2023  2:32 PM  Serum Sodium: 136  "mmol/L at 4/21/2023  2:32 PM  Total Bilirubin: 1.1 mg/dL at 4/21/2023  2:32 PM  Serum Albumin: 4.5 g/dL (Using max of 3.5 g/dL) at 4/21/2023  2:32 PM  INR(ratio): 1.1 at 4/21/2023  2:32 PM  Age at listing (hypothetical): 82 years  Sex: Female at 4/21/2023  2:32 PM      Significant Labs:  CBC:  Recent Labs   Lab 10/03/23  0946 10/03/23  1824 10/03/23  2008 10/04/23  0505   WBC 11.55 7.74  --  5.99  5.99   HGB 5.0* 5.8* 5.9* 6.1*  6.1*   HCT 15.6* 17.5* 17.4* 18.1*  18.1*   * 311  --  287  287     CMP:  Recent Labs   Lab 10/03/23  0946 10/04/23  0505    138   K 4.4 3.9    115*   CO2 18* 19*    101   BUN 45* 26*   CREATININE 0.8 0.6   CALCIUM 9.1 7.8*   PROT 5.9* 4.6*   ALBUMIN 3.6 3.0*   BILITOT 0.5 1.0   ALKPHOS 51* 42*   AST 14 9*   ALT 11 7*   ANIONGAP 8 4*     PTINR:  No results for input(s): "INR" in the last 48 hours.    Significant Procedures:   Dobutamine Stress Test with Color Flow: No results found. However, due to the size of the patient record, not all encounters were searched. Please check Results Review for a complete set of results.      "

## 2023-10-04 NOTE — NURSING
Nurses Note -- 4 Eyes      10/3/2023   2125 PM      Skin assessed during: Admit      [x] No Altered Skin Integrity Present    [x]Prevention Measures Documented      [] Yes- Altered Skin Integrity Present or Discovered   [] LDA Added if Not in Epic (Describe Wound)   [] New Altered Skin Integrity was Present on Admit and Documented in LDA   [] Wound Image Taken    Wound Care Consulted? No    Attending Nurse:  CHERI Lauren    Second RN/Staff Member:    Jacinda Wei RN

## 2023-10-05 ENCOUNTER — TELEPHONE (OUTPATIENT)
Dept: ENDOSCOPY | Facility: HOSPITAL | Age: 83
End: 2023-10-05
Payer: MEDICARE

## 2023-10-05 VITALS
BODY MASS INDEX: 23.76 KG/M2 | OXYGEN SATURATION: 98 % | RESPIRATION RATE: 18 BRPM | HEIGHT: 68 IN | TEMPERATURE: 98 F | DIASTOLIC BLOOD PRESSURE: 56 MMHG | HEART RATE: 70 BPM | WEIGHT: 156.75 LBS | SYSTOLIC BLOOD PRESSURE: 120 MMHG

## 2023-10-05 DIAGNOSIS — Z96.641 STATUS POST RIGHT HIP REPLACEMENT: Primary | ICD-10-CM

## 2023-10-05 LAB
ALBUMIN SERPL BCP-MCNC: 3 G/DL (ref 3.5–5.2)
ALP SERPL-CCNC: 49 U/L (ref 55–135)
ALT SERPL W/O P-5'-P-CCNC: 9 U/L (ref 10–44)
ANION GAP SERPL CALC-SCNC: 6 MMOL/L (ref 8–16)
AST SERPL-CCNC: 13 U/L (ref 10–40)
BASOPHILS # BLD AUTO: 0.06 K/UL (ref 0–0.2)
BASOPHILS NFR BLD: 0.8 % (ref 0–1.9)
BILIRUB SERPL-MCNC: 1 MG/DL (ref 0.1–1)
BUN SERPL-MCNC: 12 MG/DL (ref 8–23)
CALCIUM SERPL-MCNC: 8 MG/DL (ref 8.7–10.5)
CHLORIDE SERPL-SCNC: 114 MMOL/L (ref 95–110)
CO2 SERPL-SCNC: 20 MMOL/L (ref 23–29)
CREAT SERPL-MCNC: 0.6 MG/DL (ref 0.5–1.4)
DIFFERENTIAL METHOD: ABNORMAL
EOSINOPHIL # BLD AUTO: 0.2 K/UL (ref 0–0.5)
EOSINOPHIL NFR BLD: 3 % (ref 0–8)
ERYTHROCYTE [DISTWIDTH] IN BLOOD BY AUTOMATED COUNT: 21 % (ref 11.5–14.5)
ERYTHROCYTE [DISTWIDTH] IN BLOOD BY AUTOMATED COUNT: 21.3 % (ref 11.5–14.5)
ERYTHROCYTE [DISTWIDTH] IN BLOOD BY AUTOMATED COUNT: 21.3 % (ref 11.5–14.5)
EST. GFR  (NO RACE VARIABLE): >60 ML/MIN/1.73 M^2
GLUCOSE SERPL-MCNC: 92 MG/DL (ref 70–110)
HCT VFR BLD AUTO: 21.2 % (ref 37–48.5)
HCT VFR BLD AUTO: 21.3 % (ref 37–48.5)
HCT VFR BLD AUTO: 21.4 % (ref 37–48.5)
HGB BLD-MCNC: 7 G/DL (ref 12–16)
HGB BLD-MCNC: 7 G/DL (ref 12–16)
HGB BLD-MCNC: 7.1 G/DL (ref 12–16)
IMM GRANULOCYTES # BLD AUTO: 0.02 K/UL (ref 0–0.04)
IMM GRANULOCYTES NFR BLD AUTO: 0.3 % (ref 0–0.5)
LYMPHOCYTES # BLD AUTO: 2 K/UL (ref 1–4.8)
LYMPHOCYTES NFR BLD: 25.5 % (ref 18–48)
MAGNESIUM SERPL-MCNC: 1.8 MG/DL (ref 1.6–2.6)
MCH RBC QN AUTO: 30 PG (ref 27–31)
MCH RBC QN AUTO: 30.2 PG (ref 27–31)
MCH RBC QN AUTO: 30.3 PG (ref 27–31)
MCHC RBC AUTO-ENTMCNC: 32.7 G/DL (ref 32–36)
MCHC RBC AUTO-ENTMCNC: 33 G/DL (ref 32–36)
MCHC RBC AUTO-ENTMCNC: 33.3 G/DL (ref 32–36)
MCV RBC AUTO: 91 FL (ref 82–98)
MCV RBC AUTO: 91 FL (ref 82–98)
MCV RBC AUTO: 92 FL (ref 82–98)
MONOCYTES # BLD AUTO: 0.7 K/UL (ref 0.3–1)
MONOCYTES NFR BLD: 9.1 % (ref 4–15)
NEUTROPHILS # BLD AUTO: 4.7 K/UL (ref 1.8–7.7)
NEUTROPHILS NFR BLD: 61.3 % (ref 38–73)
NRBC BLD-RTO: 0 /100 WBC
PHOSPHATE SERPL-MCNC: 3 MG/DL (ref 2.7–4.5)
PLATELET # BLD AUTO: 286 K/UL (ref 150–450)
PLATELET # BLD AUTO: 296 K/UL (ref 150–450)
PLATELET # BLD AUTO: 301 K/UL (ref 150–450)
PMV BLD AUTO: 9.5 FL (ref 9.2–12.9)
PMV BLD AUTO: 9.6 FL (ref 9.2–12.9)
PMV BLD AUTO: 9.7 FL (ref 9.2–12.9)
POTASSIUM SERPL-SCNC: 3.9 MMOL/L (ref 3.5–5.1)
PROT SERPL-MCNC: 4.6 G/DL (ref 6–8.4)
RBC # BLD AUTO: 2.32 M/UL (ref 4–5.4)
RBC # BLD AUTO: 2.33 M/UL (ref 4–5.4)
RBC # BLD AUTO: 2.34 M/UL (ref 4–5.4)
SODIUM SERPL-SCNC: 140 MMOL/L (ref 136–145)
WBC # BLD AUTO: 5.75 K/UL (ref 3.9–12.7)
WBC # BLD AUTO: 7.7 K/UL (ref 3.9–12.7)
WBC # BLD AUTO: 7.86 K/UL (ref 3.9–12.7)

## 2023-10-05 PROCEDURE — 85025 COMPLETE CBC W/AUTO DIFF WBC: CPT

## 2023-10-05 PROCEDURE — 80053 COMPREHEN METABOLIC PANEL: CPT

## 2023-10-05 PROCEDURE — 85027 COMPLETE CBC AUTOMATED: CPT | Performed by: HOSPITALIST

## 2023-10-05 PROCEDURE — 25000003 PHARM REV CODE 250: Performed by: INTERNAL MEDICINE

## 2023-10-05 PROCEDURE — 84100 ASSAY OF PHOSPHORUS: CPT

## 2023-10-05 PROCEDURE — 36415 COLL VENOUS BLD VENIPUNCTURE: CPT | Performed by: HOSPITALIST

## 2023-10-05 PROCEDURE — 99239 HOSP IP/OBS DSCHRG MGMT >30: CPT | Mod: ,,, | Performed by: INTERNAL MEDICINE

## 2023-10-05 PROCEDURE — 99239 PR HOSPITAL DISCHARGE DAY,>30 MIN: ICD-10-PCS | Mod: ,,, | Performed by: INTERNAL MEDICINE

## 2023-10-05 PROCEDURE — 25000003 PHARM REV CODE 250: Performed by: HOSPITALIST

## 2023-10-05 PROCEDURE — 25000003 PHARM REV CODE 250

## 2023-10-05 PROCEDURE — 83735 ASSAY OF MAGNESIUM: CPT

## 2023-10-05 RX ORDER — PANTOPRAZOLE SODIUM 40 MG/1
40 TABLET, DELAYED RELEASE ORAL
Qty: 120 TABLET | Refills: 0 | Status: SHIPPED | OUTPATIENT
Start: 2023-10-05 | End: 2023-11-09

## 2023-10-05 RX ADMIN — CHOLECALCIFEROL TAB 25 MCG (1000 UNIT) 1000 UNITS: 25 TAB at 08:10

## 2023-10-05 RX ADMIN — PRAMIPEXOLE DIHYDROCHLORIDE 0.5 MG: 0.12 TABLET ORAL at 08:10

## 2023-10-05 RX ADMIN — PANTOPRAZOLE SODIUM 40 MG: 40 TABLET, DELAYED RELEASE ORAL at 06:10

## 2023-10-05 RX ADMIN — Medication 1 DROP: at 08:10

## 2023-10-05 NOTE — PLAN OF CARE
Problem: Adjustment to Illness (Gastrointestinal Bleeding)  Goal: Optimal Coping with Acute Illness  Outcome: Ongoing, Progressing     Problem: Bleeding (Gastrointestinal Bleeding)  Goal: Hemostasis  Outcome: Ongoing, Progressing     Problem: Infection  Goal: Absence of Infection Signs and Symptoms  Outcome: Ongoing, Progressing     Problem: Adult Inpatient Plan of Care  Goal: Plan of Care Review  Outcome: Ongoing, Progressing  Goal: Patient-Specific Goal (Individualized)  Outcome: Ongoing, Progressing  Goal: Absence of Hospital-Acquired Illness or Injury  Outcome: Ongoing, Progressing  Goal: Optimal Comfort and Wellbeing  Outcome: Ongoing, Progressing  Goal: Readiness for Transition of Care  Outcome: Ongoing, Progressing     Problem: Fall Injury Risk  Goal: Absence of Fall and Fall-Related Injury  Outcome: Ongoing, Progressing

## 2023-10-05 NOTE — TELEPHONE ENCOUNTER
"----- Message -----   From: Kory Hendrickson MD   Sent: 10/4/2023   3:05 PM CDT   To: [facility]   Subject: EGD                                               Procedure: EGD     Diagnosis: Peptic ulcer disease     Procedure Timin-12 weeks (8 weeks from 10/4)     *If within 4 weeks selected, please rahel as high priority*     *If greater than 12 weeks, please select "4-12 weeks" and delay sending until 2 months prior to requested date*     Provider: Any GI provider     Location: No Preference     Additional Scheduling Information: No scheduling concerns     Prep Specifications:N/A     Have you attached a patient to this message: Yes      "

## 2023-10-05 NOTE — ANESTHESIA RELEASE NOTE
"Anesthesia Release from PACU Note    Patient: Shelly Vásquez    Procedure(s) Performed: Procedure(s) (LRB):  EGD (ESOPHAGOGASTRODUODENOSCOPY) (N/A)    Anesthesia type: general    Post pain: Adequate analgesia    Post assessment: no apparent anesthetic complications and tolerated procedure well    Last Vitals:   Visit Vitals  BP (!) 120/56 (BP Location: Left arm, Patient Position: Lying)   Pulse 70   Temp 36.6 °C (97.9 °F) (Oral)   Resp 18   Ht 5' 8" (1.727 m)   Wt 71.1 kg (156 lb 12 oz)   LMP  (LMP Unknown)   SpO2 98%   Breastfeeding No   BMI 23.83 kg/m²       Post vital signs: stable    Level of consciousness: awake and alert     Nausea/Vomiting: no nausea/no vomiting    Complications: none    Airway Patency: patent    Respiratory: unassisted, spontaneous ventilation, room air    Cardiovascular: stable and blood pressure at baseline    Hydration: euvolemic  "

## 2023-10-05 NOTE — PLAN OF CARE
Problem: Adjustment to Illness (Gastrointestinal Bleeding)  Goal: Optimal Coping with Acute Illness  Outcome: Met     Problem: Bleeding (Gastrointestinal Bleeding)  Goal: Hemostasis  Outcome: Met     Problem: Infection  Goal: Absence of Infection Signs and Symptoms  Outcome: Met     Problem: Adult Inpatient Plan of Care  Goal: Plan of Care Review  Outcome: Met  Goal: Patient-Specific Goal (Individualized)  Outcome: Met  Goal: Absence of Hospital-Acquired Illness or Injury  Outcome: Met  Goal: Optimal Comfort and Wellbeing  Outcome: Met  Goal: Readiness for Transition of Care  Outcome: Met     Problem: Fall Injury Risk  Goal: Absence of Fall and Fall-Related Injury  Outcome: Met       Patient discharged to home. Discharge papers reviewed with patient and family. Both family and patient verbalized understanding of discharge papers. Iv removed with tip intact. Tele removed. Patient left floor with spouse.

## 2023-10-05 NOTE — DISCHARGE SUMMARY
"[facility] - Int[facility] Care ([place] [id number])  [facility] Medicine  Discharge Summary      Patient Name: Shelly Vásquez  MRN: [id number]  MARIA C: [id number]  Patient Class: IP- Inpatient  Admission Date: 10/3/2023  Hospital Length of Stay: 1 days  Discharge Date and Time:  10/05/2023 8:19 AM  Attending Physician: Marc Cole MD   Discharging Provider: Marc Cole MD  Primary Care Provider: Marlene Méndez MD  [facility] Medicine Team: [facility] HOSP MED A Marc Cole MD  Primary Care Team: [facility] MED A    HPI:   Shelly Vásquez is a 82 y.o. female with a hx of catarct surgery 10/2/23, s/p hip replacement 4/2023, hx of afib s/p ablation, and anemia presents to the ED for melena. Patient states she was in her usual state of health after her cataract surgery when she got up to use the restroom and noticed a "dark purple, black" appearance to her stool. She had a bowel movement earlier in the night where she soiled the rug but did not notice a change in color of her stool. Denies hematochezia. Patient and spouse believe they only saw dark stool twice last night. She has not had a bowel movement since being admitted to the hospital today. Endorses increased fatigue and weakness. Patient endorses taking mirilax every other day or everyday due to hemorrhoids. Denies any recent NSAID use (has celebrex on med list) and states she takes tylenol for pain. Unsure when her last colonoscopy was but states they have all been clear. Denies fever, chest pain, SOB, abdominal pain, and n/v. Denies any recent change in her diet. Denies hematuria or hemoptysis. Endorses flatulence. Per chart review, pt had a previous GI bleed due to NSAID use requiring transfusions.    ED: AF, BP 91/55. Tachycardic. Hgb 5.0. Hct 15.6. CMP largely unremarkable. UA not infectious. Given 1L IVFs in ED. Transfused 2 units pRBCs in ED.         Procedure(s) (LRB):  EGD (ESOPHAGOGASTRODUODENOSCOPY) (N/A)      Hospital Course:   Patient admitted and " managed for GI bleed(presumed upper GI bleed) with symptomatic anemia requiring blood transfusion, received a total of 4 units PRBC. She was started on IV PPI BID and GI consulted and EGD performed 10/4 showing gastric ulcers with clean base (biopsy pending), erosive gastropathy with no bleeding and no stigmata of recent bleeding. Hemoglobin remained stable for 24 hours and patient without black/tarry BM's. GI recommending 8 weeks PPI BID and repeat EGD in 1 week after discharge.       Goals of Care Treatment Preferences:  Code Status: Full Code      Consults:   Consults (From admission, onward)        Status Ordering Provider     Inpatient consult to Gastroenterology  Once        Provider:  (Not yet assigned)    LE Kam          Neuro  Restless leg  - continue home gabapentin and pramipexole.      Final Active Diagnoses:    Diagnosis Date Noted POA    PRINCIPAL PROBLEM:  Melena [K92.1] 10/03/2023 Yes    Acute blood loss anemia [D62] 10/03/2023 Yes    Nuclear sclerotic cataract of left eye [H25.12] 09/07/2023 Yes    Restless leg [G25.81] 02/19/2021 Yes    MDS (myelodysplastic syndrome) [D46.9] 12/19/2019 Yes    History of radiofrequency ablation (RFA) procedure for cardiac arrhythmia [Z98.890] 04/24/2019 Not Applicable    Thrombocytosis [D75.839] 04/06/2018 Yes      Problems Resolved During this Admission:       Discharged Condition: good    Disposition: Home or Self Care    Follow Up:    Patient Instructions:      Ambulatory referral/consult to Gastroenterology   Standing Status: Future   Referral Priority: Routine Referral Type: Consultation   Referral Reason: Specialty Services Required   Requested Specialty: Gastroenterology   Number of Visits Requested: 1       Significant Diagnostic Studies: EGD as above    Pending Diagnostic Studies:     Procedure Component Value Units Date/Time    Specimen to Pathology, Surgery Gastrointestinal tract [[id number]] Collected: 10/04/23 1500    Order  Status: Sent Lab Status: In process Updated: 10/04/23 1175    Specimen: Tissue          Medications:  Reconciled Home Medications:      Medication List      CHANGE how you take these medications    pantoprazole 40 MG tablet  Commonly known as: PROTONIX  Take 1 tablet (40 mg total) by mouth 2 (two) times daily before meals.  What changed: when to take this     prednisol ace-gatiflox-bromfen 1-0.5-0.075 % Drps  Apply 1 drop to eye 3 (three) times daily. in operative eye for 1 month after surgery  What changed: additional instructions        CONTINUE taking these medications    acetaminophen 650 MG Tbsr  Commonly known as: TYLENOL  Take 1 tablet (650 mg total) by mouth every 8 (eight) hours.     aspirin 81 MG EC tablet  Commonly known as: ECOTRIN  Take 1 tablet (81 mg total) by mouth once daily.     estradiol-norethindrone acet 0.5-0.1 mg per tablet  Take 1 tablet by mouth once daily.     gabapentin 600 MG tablet  Commonly known as: NEURONTIN  Take 600 mg by mouth every evening.     methocarbamoL 750 MG Tab  Commonly known as: ROBAXIN  Take 1 tablet (750 mg total) by mouth 4 (four) times daily as needed (muscle spasms).     MIRALAX ORAL  Take 1 Capful by mouth daily as needed (constipation).     pramipexole 0.5 MG tablet  Commonly known as: MIRAPEX  1 in am and 2 at night     temazepam 15 mg Cap  Commonly known as: RESTORIL  Take 1 capsule (15 mg total) by mouth nightly as needed.     verapamiL 120 MG C24p  Commonly known as: VERELAN  Take 1 capsule (120 mg total) by mouth once daily.     vitamin D 1000 units Tab  Commonly known as: VITAMIN D3  Take 1,000 Units by mouth once daily.        STOP taking these medications    celecoxib 200 MG capsule  Commonly known as: CeleBREX            Indwelling Lines/Drains at time of discharge:   Lines/Drains/Airways     None                 Time spent on the discharge of patient: 35 minutes         Marc Cole MD  Department of Hospital Medicine  [facility] - Intensive Care [facility]  [place]-14)

## 2023-10-05 NOTE — PLAN OF CARE
[facility] ([facility])  Discharge Final Note    Primary Care Provider: Marlene Méndez MD    Expected Discharge Date: 10/5/2023    Final Discharge Note (most recent)       Final Note - 10/04/23 0915          Final Note    Assessment Type Discharge Planning Assessment        Post-Acute Status    Coverage MEDICARE - MEDICARE PART A & B                     Future Appointments   Date Time Provider Department Center   10/12/2023 10:00 AM Marlene Méndez MD [facility] CH INTM [facility]   10/12/2023  3:00 PM Sunita Koch, FELIPE [facility] GASTRO [facility]   10/20/2023  8:15 AM [facility] OI EOS [facility]   10/20/2023  9:00 AM Keon Gary III, MD [facility] ORTHO [facility]     CM met with patient/family and discussed discharge plans, patient to [place] home with no needs. Patient  medications delivered to bedside. No  HME/DME delivered and follow up appointment[s] scheduled.   Transportation provided by family via private vehicle.    Danielle Keller RN  Case Management  [facility]  [phone]

## 2023-10-05 NOTE — NURSING
Pt received a unit of blood today due to hgb being 6.1 and hct 18.1. After receiving blood repeat cbc was 7.6 and 22.4. Pt went to get a EGD today. An inactive ulcer was found and biopsy was taken. PPI and regular diet recommended by MD. KING. No complaints of pain. Personal CNA at bedside.

## 2023-10-06 ENCOUNTER — PATIENT OUTREACH (OUTPATIENT)
Dept: ADMINISTRATIVE | Facility: CLINIC | Age: 83
End: 2023-10-06
Payer: MEDICARE

## 2023-10-06 ENCOUNTER — TELEPHONE (OUTPATIENT)
Dept: INTERNAL MEDICINE | Facility: CLINIC | Age: 83
End: 2023-10-06
Payer: MEDICARE

## 2023-10-06 DIAGNOSIS — K21.9 GASTROESOPHAGEAL REFLUX DISEASE, UNSPECIFIED WHETHER ESOPHAGITIS PRESENT: Primary | ICD-10-CM

## 2023-10-06 DIAGNOSIS — K92.1 MELENA: ICD-10-CM

## 2023-10-06 NOTE — PROGRESS NOTES
C3 nurse attempted to contact Shelly Vásquez for a TCC post hospital discharge follow up call. No answer. Left voicemail with callback information. The patient has a scheduled HOSFU appointment with Marlene Méndez MD on 10/12/23 @ 1000.

## 2023-10-06 NOTE — PROGRESS NOTES
2nd attempt to make TCC Call. Left voicemail. Please call [phone] leave your first and last name and date of birth for Riccardo. I will return your call.

## 2023-10-09 NOTE — PROGRESS NOTES
3rd attempt for TCC Call; Left voicemail on pt's 's phone to please call [phone] and leave first and last name and  for Riccardo. We will return your call.

## 2023-10-10 ENCOUNTER — OFFICE VISIT (OUTPATIENT)
Dept: OPHTHALMOLOGY | Facility: CLINIC | Age: 83
End: 2023-10-10
Payer: MEDICARE

## 2023-10-10 ENCOUNTER — PATIENT MESSAGE (OUTPATIENT)
Dept: GASTROENTEROLOGY | Facility: CLINIC | Age: 83
End: 2023-10-10
Payer: MEDICARE

## 2023-10-10 DIAGNOSIS — Z98.890 POST-OPERATIVE STATE: Primary | ICD-10-CM

## 2023-10-10 DIAGNOSIS — H25.12 NUCLEAR SCLEROSIS OF LEFT EYE: ICD-10-CM

## 2023-10-10 LAB
FINAL PATHOLOGIC DIAGNOSIS: NORMAL
GROSS: NORMAL
Lab: NORMAL
MICROSCOPIC EXAM: NORMAL

## 2023-10-10 PROCEDURE — 99999 PR PBB SHADOW E&M-EST. PATIENT-LVL III: ICD-10-PCS | Mod: PBBFAC,,, | Performed by: OPHTHALMOLOGY

## 2023-10-10 PROCEDURE — 99213 OFFICE O/P EST LOW 20 MIN: CPT | Mod: PBBFAC | Performed by: OPHTHALMOLOGY

## 2023-10-10 PROCEDURE — 99024 PR POST-OP FOLLOW-UP VISIT: ICD-10-PCS | Mod: POP,,, | Performed by: OPHTHALMOLOGY

## 2023-10-10 PROCEDURE — 99999 PR PBB SHADOW E&M-EST. PATIENT-LVL III: CPT | Mod: PBBFAC,,, | Performed by: OPHTHALMOLOGY

## 2023-10-10 PROCEDURE — 99024 POSTOP FOLLOW-UP VISIT: CPT | Mod: POP,,, | Performed by: OPHTHALMOLOGY

## 2023-10-10 RX ORDER — NEOMYCIN SULFATE, POLYMYXIN B SULFATE AND DEXAMETHASONE 3.5; 10000; 1 MG/ML; [USP'U]/ML; MG/ML
1 SUSPENSION/ DROPS OPHTHALMIC 4 TIMES DAILY
Qty: 5 ML | Refills: 3 | Status: SHIPPED | OUTPATIENT
Start: 2023-10-10 | End: 2023-10-12

## 2023-10-10 NOTE — PROGRESS NOTES
HPI    Patient here for 1 week po phaco OS    Patient states vision is good OS. No pain/discomfort. She has been able to   read the newspaper without glasses and is glad to be more confident   driving.    PGB TID OS    With ORA LASER assist  10/02/2023: [id number] 18.5      Last edited by Bob Liu on 10/10/2023  9:31 AM.            Assessment /Plan     For exam results, see Encounter Report.    Post-operative state    Nuclear sclerosis of left eye      Patient has completed the standard post operative course of medications and follow up, and has had a successful result from surgery. Patient is instructed to follow up with primary eye care provider per routine, and return urgently if symptoms of redness, decreased vision, or pain are experienced.    Excellent result

## 2023-10-12 ENCOUNTER — OFFICE VISIT (OUTPATIENT)
Dept: INTERNAL MEDICINE | Facility: CLINIC | Age: 83
End: 2023-10-12
Payer: MEDICARE

## 2023-10-12 ENCOUNTER — TELEPHONE (OUTPATIENT)
Dept: INTERNAL MEDICINE | Facility: CLINIC | Age: 83
End: 2023-10-12

## 2023-10-12 VITALS
OXYGEN SATURATION: 100 % | DIASTOLIC BLOOD PRESSURE: 68 MMHG | HEART RATE: 58 BPM | SYSTOLIC BLOOD PRESSURE: 125 MMHG | WEIGHT: 147.81 LBS | BODY MASS INDEX: 22.48 KG/M2

## 2023-10-12 DIAGNOSIS — K92.2 UGIB (UPPER GASTROINTESTINAL BLEED): ICD-10-CM

## 2023-10-12 DIAGNOSIS — M21.70 ACQUIRED UNEQUAL LEG LENGTH: Primary | ICD-10-CM

## 2023-10-12 DIAGNOSIS — R26.9 GAIT ABNORMALITY: ICD-10-CM

## 2023-10-12 DIAGNOSIS — R26.89 BALANCE DISORDER: ICD-10-CM

## 2023-10-12 PROCEDURE — 99999 PR PBB SHADOW E&M-EST. PATIENT-LVL III: ICD-10-PCS | Mod: PBBFAC,,, | Performed by: INTERNAL MEDICINE

## 2023-10-12 PROCEDURE — 99999 PR PBB SHADOW E&M-EST. PATIENT-LVL III: CPT | Mod: PBBFAC,,, | Performed by: INTERNAL MEDICINE

## 2023-10-12 PROCEDURE — 99214 OFFICE O/P EST MOD 30 MIN: CPT | Mod: S$PBB,,, | Performed by: INTERNAL MEDICINE

## 2023-10-12 PROCEDURE — 99999PBSHW FLU VACCINE - QUADRIVALENT - ADJUVANTED: Mod: PBBFAC,,,

## 2023-10-12 PROCEDURE — G0008 ADMIN INFLUENZA VIRUS VAC: HCPCS | Mod: PBBFAC

## 2023-10-12 PROCEDURE — 99214 PR OFFICE/OUTPT VISIT, EST, LEVL IV, 30-39 MIN: ICD-10-PCS | Mod: S$PBB,,, | Performed by: INTERNAL MEDICINE

## 2023-10-12 PROCEDURE — 99213 OFFICE O/P EST LOW 20 MIN: CPT | Mod: PBBFAC,25 | Performed by: INTERNAL MEDICINE

## 2023-10-12 PROCEDURE — 99999PBSHW FLU VACCINE - QUADRIVALENT - ADJUVANTED: ICD-10-PCS | Mod: PBBFAC,,,

## 2023-10-12 RX ORDER — PRAMIPEXOLE DIHYDROCHLORIDE 1 MG/1
1 TABLET ORAL 2 TIMES DAILY
Qty: 180 TABLET | Refills: 1 | Status: SHIPPED | OUTPATIENT
Start: 2023-10-12

## 2023-10-12 RX ORDER — GABAPENTIN 300 MG/1
300 CAPSULE ORAL NIGHTLY
Qty: 90 CAPSULE | Refills: 1 | Status: SHIPPED | OUTPATIENT
Start: 2023-10-12 | End: 2024-01-02 | Stop reason: SDUPTHER

## 2023-10-12 RX ORDER — TEMAZEPAM 15 MG/1
15 CAPSULE ORAL NIGHTLY PRN
Qty: 90 CAPSULE | Refills: 0 | Status: SHIPPED | OUTPATIENT
Start: 2023-10-12 | End: 2024-01-02 | Stop reason: SDUPTHER

## 2023-10-13 NOTE — TELEPHONE ENCOUNTER
Can we call patient and remind her to come back sometime next week and get her blood work.  She left the office after her flu shot and did not get her blood work and she also did not go to the gastroenterology follow-up the let us start with getting the blood work

## 2023-10-13 NOTE — PROGRESS NOTES
Subjective:       Patient ID: Shelly Vásqeuz is a 82 y.o. female who presents today for:    Chief Complaint:   Chief Complaint   Patient presents with    Hospital Follow Up       HPI:  82-year-old very pleasant nonsmoking female doing a follow-up for an observational stay for an upper GI bleed with melena and symptoms of fatigue and shortness of breath with exertion.  She also has a history of sideroblastic anemia with an average hemoglobin of 8.5/9 prior to admission.  Admit hemoglobin was 5.9.  After 4 units of packed red blood cells it went up to 7.0 upon discharge.  Upper endoscopy showed 2 crater gastric ulcers with clean bases as well as a few erosions in the antrum of the stomach.  The duodenal and the cardia and fundus of the stomach were normal.   The patient had a total hip replacement in April of 2023 and was having exacerbation of symptoms when she saw orthopedic surgeon in mid September.  She was given a Medrol Dosepak and a prescription for Celebrex 200 mg daily and was also on an 81 mg aspirin daily as well for a history of SVT.  Additionally she had both of her cataract surgeries done 1 in early September and the other on October 2nd.   She had a remote history of NSAID induced ulcers as well but had tolerated Celebrex in the past.  Consequently etiology for the bleeding ulcers is not clear cut.  Biopsies are pending.  Today the patient has no hip pain.  She does have a leg length discrepancy however and will see her orthopedic surgeon in a few weeks.  She is on Protonix 40 mg twice a day and the aspirin is being held.  Her bowel movements are no longer black she is having regular bowel movements eyes any abdominal discomfort or cramping.        Review of Systems   Constitutional:  Negative for chills, fever and weight loss.   HENT:  Negative for sore throat.    Eyes:  Negative for blurred vision and double vision.   Respiratory:  Negative for cough and shortness of breath.    Cardiovascular:   Negative for chest pain and palpitations.   Gastrointestinal:  Negative for constipation, diarrhea, nausea and vomiting.   Genitourinary:  Negative for dysuria and hematuria.   Musculoskeletal:  Negative for joint pain and myalgias.   Skin:  Negative for itching and rash.   Neurological:  Negative for sensory change, focal weakness and headaches.   Endo/Heme/Allergies:  Does not bruise/bleed easily.   Psychiatric/Behavioral:  Negative for depression and suicidal ideas.         Medications:  Outpatient Encounter Medications as of 10/12/2023   Medication Sig Note Dispense Refill    acetaminophen (TYLENOL) 650 MG TbSR Take 1 tablet (650 mg total) by mouth every 8 (eight) hours.  120 tablet 0    estradiol-norethindrone acet 0.5-0.1 mg per tablet Take 1 tablet by mouth once daily.  90 tablet 3    gabapentin (NEURONTIN) 300 MG capsule Take 1 capsule (300 mg total) by mouth every evening.  90 capsule 1    pantoprazole (PROTONIX) 40 MG tablet Take 1 tablet (40 mg total) by mouth 2 (two) times daily before meals.  120 tablet 0    polyethylene glycol 3350 (MIRALAX ORAL) Take 1 Capful by mouth daily as needed (constipation).       pramipexole (MIRAPEX) 1 MG tablet Take 1 tablet (1 mg total) by mouth 2 (two) times a day.  180 tablet 1    temazepam (RESTORIL) 15 mg Cap Take 1 capsule (15 mg total) by mouth nightly as needed.  90 capsule 0    verapamiL (VERELAN) 120 MG C24P Take 1 capsule (120 mg total) by mouth once daily.  90 capsule 3    vitamin D (VITAMIN D3) 1000 units Tab Take 1,000 Units by mouth once daily. 4/4/2023: Hold 7 days prior to surgery      [DISCONTINUED] aspirin (ECOTRIN) 81 MG EC tablet Take 1 tablet (81 mg total) by mouth once daily.  30 tablet 0    [DISCONTINUED] gabapentin (NEURONTIN) 600 MG tablet Take 600 mg by mouth every evening.       [DISCONTINUED] methocarbamoL (ROBAXIN) 750 MG Tab Take 1 tablet (750 mg total) by mouth 4 (four) times daily as needed (muscle spasms).  40 tablet 1    [DISCONTINUED]  neomycin-polymyxin-dexamethasone (MAXITROL) 3.5mg/mL-10,000 unit/mL-0.1 % DrpS Place 1 drop into the left eye 4 (four) times daily.  5 mL 3    [DISCONTINUED] pramipexole (MIRAPEX) 0.5 MG tablet 1 in am and 2 at night  90 tablet 2    [DISCONTINUED] prednisolon/gatiflox/bromfenac (PREDNISOL ACE-GATIFLOX-BROMFEN) 1-0.5-0.075 % DrpS Apply 1 drop to eye 3 (three) times daily. in operative eye for 1 month after surgery  5 mL 3    [DISCONTINUED] temazepam (RESTORIL) 15 mg Cap Take 1 capsule (15 mg total) by mouth nightly as needed. 10/3/2023: 06/26/2023 15 mg Cap (disp 90, 90d supply)  90 capsule 0     No facility-administered encounter medications on file as of 10/12/2023.       Allergies:  Review of patient's allergies indicates:   Allergen Reactions    Baclofen      Incoherent     Nsaids (non-steroidal anti-inflammatory drug)      GI Bleed  Had 5 blood transfusions       Health Maintenance:  Immunization History   Administered Date(s) Administered    COVID-19, MRNA, LN-S, PF (Pfizer) (Gray Cap) 07/28/2022    COVID-19, MRNA, LN-S, PF (Pfizer) (Purple Cap) 01/09/2021, 01/30/2021, 09/08/2021    Influenza (FLUAD) - Quadrivalent - Adjuvanted - PF *Preferred* (65+) 10/02/2021, 10/12/2023    Influenza (FLUAD) - Trivalent - Adjuvanted - PF (65+) 12/14/2017, 12/10/2018    Influenza - High Dose - PF (65 years and older) 10/15/2014, 10/10/2016, 10/21/2019    Influenza - Trivalent - PF (ADULT) 08/31/2020    Pneumococcal Conjugate - 13 Valent 04/16/2018    Pneumococcal Polysaccharide - 23 Valent 08/26/2019    Tdap 03/31/2014      Health Maintenance   Topic Date Due    Shingles Vaccine (1 of 2) Never done    Colonoscopy  04/29/2019    TETANUS VACCINE  03/31/2024    DEXA Scan  04/10/2026    Lipid Panel  08/15/2028          Objective:      Vital Signs  Pulse: (!) 58  SpO2: 100 %  BP: 125/68  Pain Score: 0-No pain  Height and Weight  Weight: 67 kg (147 lb 13.1 oz)]    Physical Exam  HENT:      Head: Normocephalic and atraumatic.   Eyes:       General: No scleral icterus.  Cardiovascular:      Rate and Rhythm: Normal rate and regular rhythm.      Heart sounds: No murmur heard.  Pulmonary:      Effort: Pulmonary effort is normal.      Breath sounds: Normal breath sounds. No rales.   Abdominal:      General: Bowel sounds are normal.      Palpations: Abdomen is soft.      Tenderness: There is no abdominal tenderness.   Musculoskeletal:         General: No tenderness.      Cervical back: Neck supple.   Neurological:      Mental Status: She is alert and oriented to person, place, and time.   Psychiatric:         Mood and Affect: Mood normal.         Behavior: Behavior normal.         Thought Content: Thought content normal.         Judgment: Judgment normal.        Lab Results   Component Value Date    WBC 5.75 10/05/2023    HGB 7.0 (L) 10/05/2023    HCT 21.4 (L) 10/05/2023     10/05/2023    CHOL 155 08/15/2023    TRIG 46 08/15/2023    HDL 76 (H) 08/15/2023    ALT 9 (L) 10/05/2023    AST 13 10/05/2023     10/05/2023    K 3.9 10/05/2023     (H) 10/05/2023    CREATININE 0.6 10/05/2023    BUN 12 10/05/2023    CO2 20 (L) 10/05/2023    TSH 1.709 08/15/2023    INR 1.1 04/21/2023    HGBA1C 5.1 08/15/2023       Assessment/plan:     Shelly Vásquez is a 82 y.o.female with:    Hospital follow-up for symptomatic anemia secondary to  UGIB (upper gastrointestinal bleed)  -     CBC Auto Differential; Future; Expected date: 10/12/2023   Continue with pantoprazole 40 mg twice a day for now.   Hold aspirin for now but with being on HRT at 82 years of age would like to implement it back assuming this upper GI bleed was secondary to corticosteroids and Salmon 2 inhibitor not the chronic 81 mg aspirin she has been taking since 2017 when she was ablated for atrial fibrillation    History of atrial fibrillation-  as above ablated in 2017 and in normal sinus rhythm since.  Continue with verapamil and as above would like to restart 81 mg aspirin  enteric-coated possibly next month if GI permits.  Will wait until follow-up upper endoscopy is performed.    Gait abnormality/  Balance disorder/  Acquired unequal leg length  -     Ambulatory referral/consult to Physical/Occupational Therapy; Future; Expected date: 10/19/2023   -     Ambulatory referral/consult to Podiatry; Future; Expected date: 10/19/2023    Patient is on HRT since menopause   As noted above    Restless leg syndrome and insomnia disorder.  -     pramipexole (MIRAPEX) 1 MG tablet; Take 1 tablet (1 mg total) by mouth 2 (two) times a day.  Dispense: 180 tablet; Refill: 1  -     gabapentin (NEURONTIN) 300 MG capsule; Take 1 capsule (300 mg total) by mouth every evening.  Dispense: 90 capsule; Refill: 1  -     temazepam (RESTORIL) 15 mg Cap; Take 1 capsule (15 mg total) by mouth nightly as needed.  Dispense: 90 capsule; Refill: 0    Others  -     Influenza - Quadrivalent (Adjuvanted)        Future Appointments   Date Time Provider Department Center   10/20/2023  8:15 AM [facility]   10/20/2023  9:00 AM Keon Gary III, MD [facility] [address]   10/24/2023  9:30 AM Jasson Evangelista, [facility]       Marlene Andrew MD  [facility]

## 2023-10-16 ENCOUNTER — CLINICAL SUPPORT (OUTPATIENT)
Dept: INTERNAL MEDICINE | Facility: CLINIC | Age: 83
End: 2023-10-16
Payer: MEDICARE

## 2023-10-16 DIAGNOSIS — K92.1 MELENA: ICD-10-CM

## 2023-10-16 DIAGNOSIS — K21.9 GASTROESOPHAGEAL REFLUX DISEASE, UNSPECIFIED WHETHER ESOPHAGITIS PRESENT: ICD-10-CM

## 2023-10-16 DIAGNOSIS — K92.2 UGIB (UPPER GASTROINTESTINAL BLEED): ICD-10-CM

## 2023-10-16 LAB
ALBUMIN SERPL BCP-MCNC: 4.1 G/DL (ref 3.5–5.2)
ALP SERPL-CCNC: 63 U/L (ref 55–135)
ALT SERPL W/O P-5'-P-CCNC: 12 U/L (ref 10–44)
ANION GAP SERPL CALC-SCNC: 9 MMOL/L (ref 8–16)
AST SERPL-CCNC: 13 U/L (ref 10–40)
BASOPHILS # BLD AUTO: 0.06 K/UL (ref 0–0.2)
BASOPHILS NFR BLD: 1 % (ref 0–1.9)
BILIRUB SERPL-MCNC: 0.8 MG/DL (ref 0.1–1)
BUN SERPL-MCNC: 12 MG/DL (ref 8–23)
CALCIUM SERPL-MCNC: 9.4 MG/DL (ref 8.7–10.5)
CHLORIDE SERPL-SCNC: 105 MMOL/L (ref 95–110)
CO2 SERPL-SCNC: 21 MMOL/L (ref 23–29)
CREAT SERPL-MCNC: 0.8 MG/DL (ref 0.5–1.4)
DIFFERENTIAL METHOD: ABNORMAL
EOSINOPHIL # BLD AUTO: 0.2 K/UL (ref 0–0.5)
EOSINOPHIL NFR BLD: 2.4 % (ref 0–8)
ERYTHROCYTE [DISTWIDTH] IN BLOOD BY AUTOMATED COUNT: 20.6 % (ref 11.5–14.5)
EST. GFR  (NO RACE VARIABLE): >60 ML/MIN/1.73 M^2
GLUCOSE SERPL-MCNC: 87 MG/DL (ref 70–110)
HCT VFR BLD AUTO: 28.9 % (ref 37–48.5)
HGB BLD-MCNC: 8.9 G/DL (ref 12–16)
IMM GRANULOCYTES # BLD AUTO: 0.02 K/UL (ref 0–0.04)
IMM GRANULOCYTES NFR BLD AUTO: 0.3 % (ref 0–0.5)
LYMPHOCYTES # BLD AUTO: 1.8 K/UL (ref 1–4.8)
LYMPHOCYTES NFR BLD: 29.5 % (ref 18–48)
MCH RBC QN AUTO: 30.6 PG (ref 27–31)
MCHC RBC AUTO-ENTMCNC: 30.8 G/DL (ref 32–36)
MCV RBC AUTO: 99 FL (ref 82–98)
MONOCYTES # BLD AUTO: 0.7 K/UL (ref 0.3–1)
MONOCYTES NFR BLD: 11 % (ref 4–15)
NEUTROPHILS # BLD AUTO: 3.5 K/UL (ref 1.8–7.7)
NEUTROPHILS NFR BLD: 55.8 % (ref 38–73)
NRBC BLD-RTO: 0 /100 WBC
PLATELET # BLD AUTO: 571 K/UL (ref 150–450)
PMV BLD AUTO: 9.6 FL (ref 9.2–12.9)
POTASSIUM SERPL-SCNC: 4.9 MMOL/L (ref 3.5–5.1)
PROT SERPL-MCNC: 6.5 G/DL (ref 6–8.4)
RBC # BLD AUTO: 2.91 M/UL (ref 4–5.4)
SODIUM SERPL-SCNC: 135 MMOL/L (ref 136–145)
WBC # BLD AUTO: 6.21 K/UL (ref 3.9–12.7)

## 2023-10-16 PROCEDURE — 85025 COMPLETE CBC W/AUTO DIFF WBC: CPT | Performed by: INTERNAL MEDICINE

## 2023-10-16 PROCEDURE — 80053 COMPREHEN METABOLIC PANEL: CPT | Performed by: INTERNAL MEDICINE

## 2023-10-18 ENCOUNTER — TELEPHONE (OUTPATIENT)
Dept: ENDOSCOPY | Facility: HOSPITAL | Age: 83
End: 2023-10-18
Payer: MEDICARE

## 2023-10-18 ENCOUNTER — OFFICE VISIT (OUTPATIENT)
Dept: GASTROENTEROLOGY | Facility: CLINIC | Age: 83
End: 2023-10-18
Payer: MEDICARE

## 2023-10-18 VITALS
BODY MASS INDEX: 22.29 KG/M2 | DIASTOLIC BLOOD PRESSURE: 65 MMHG | WEIGHT: 147 LBS | HEIGHT: 68 IN | WEIGHT: 147.06 LBS | HEART RATE: 82 BPM | SYSTOLIC BLOOD PRESSURE: 118 MMHG | HEIGHT: 68 IN | BODY MASS INDEX: 22.28 KG/M2

## 2023-10-18 DIAGNOSIS — K27.9 PUD (PEPTIC ULCER DISEASE): Primary | ICD-10-CM

## 2023-10-18 PROCEDURE — 99999 PR PBB SHADOW E&M-EST. PATIENT-LVL III: ICD-10-PCS | Mod: PBBFAC,,, | Performed by: INTERNAL MEDICINE

## 2023-10-18 PROCEDURE — 99999 PR PBB SHADOW E&M-EST. PATIENT-LVL III: CPT | Mod: PBBFAC,,, | Performed by: INTERNAL MEDICINE

## 2023-10-18 PROCEDURE — 99214 PR OFFICE/OUTPT VISIT, EST, LEVL IV, 30-39 MIN: ICD-10-PCS | Mod: S$PBB,,, | Performed by: INTERNAL MEDICINE

## 2023-10-18 PROCEDURE — 99213 OFFICE O/P EST LOW 20 MIN: CPT | Mod: PBBFAC | Performed by: INTERNAL MEDICINE

## 2023-10-18 PROCEDURE — 99214 OFFICE O/P EST MOD 30 MIN: CPT | Mod: S$PBB,,, | Performed by: INTERNAL MEDICINE

## 2023-10-18 NOTE — TELEPHONE ENCOUNTER
Patient was in face to schedule EGD. Patient declined first available with Dr. Menchaca at  patient request only [facility].

## 2023-10-18 NOTE — PROGRESS NOTES
[facility] Established Patient Visit    Reason for Visit:  The encounter diagnosis was PUD (peptic ulcer disease).    PCP: Marlene Méndez    HPI:  This is a 82 y.o. female here for PUD followup from the hospital    Shelly Vásquez was last seen in the hospital for PUD  Taking Protonix bid  Less abdominal pain  She is off the celebrex and baby asa now (we double checked in conversation today)      ROS:  Constitutional: No fevers, chills, weight loss  ENT: No allergies  CV: No chest pain  Pulm: No cough, shortness of breath  Ophtho: No vision changes  GI: see HPI  Derm: No rash  Heme: No lymphadenopathy  MSK: No arthritis  : No dysuria, no hematuria  Endo: No hot or cold intolerance  Neuro: No syncope, no seizure  Psych: No anxiety, no depression    PMHX:  has a past medical history of Arthritis, Atrial fibrillation, Basal cell cancer, Encounter for blood transfusion, Gastric ulcer, GERD (gastroesophageal reflux disease), Herpes simplex without mention of complication, Postmenopausal HRT (hormone replacement therapy), and Supraventricular tachycardia.    PSHX:  has a past surgical history that includes Dilation and curettage of uterus (2008); Endometrial biopsy; Beast Lift (2004); Tubal ligation; TONSILLECTOMY, ADENOIDECTOMY (age 8); Appendectomy (age 21); Eye surgery; ptsosis; Back surgery; Cosmetic surgery; Small intestine surgery; Radiofrequency ablation (03/2018); Total Reduction Mammoplasty (Bilateral, 2003); Breast biopsy (50yrs ago); Bone marrow biopsy (Right, 12/19/2019); Transforaminal epidural injection of steroid (Right, 8/11/2022); Epidural steroid injection (N/A, 8/25/2022); Transforaminal epidural injection of steroid (Right, 10/21/2022); Injection of anesthetic agent around nerve (Bilateral, 11/11/2022); Injection of joint (Right, 12/2/2022); Arthroplasty of hip by anterior approach (Right, 4/26/2023); Cataract extraction w/  intraocular lens implant (Right, 9/7/2023); Cataract  "extraction w/  intraocular lens implant (Left, 10/2/2023); and Esophagogastroduodenoscopy (N/A, 10/4/2023).    The patient's social and family histories were reviewed by me and updated in the appropriate section of the electronic medical record.    Review of patient's allergies indicates:   Allergen Reactions    Baclofen      Incoherent     Nsaids (non-steroidal anti-inflammatory drug)      GI Bleed  Had 5 blood transfusions       Current Outpatient Medications   Medication Sig Dispense Refill    acetaminophen (TYLENOL) 650 MG TbSR Take 1 tablet (650 mg total) by mouth every 8 (eight) hours. 120 tablet 0    estradiol-norethindrone acet 0.5-0.1 mg per tablet Take 1 tablet by mouth once daily. 90 tablet 3    gabapentin (NEURONTIN) 300 MG capsule Take 1 capsule (300 mg total) by mouth every evening. 90 capsule 1    pantoprazole (PROTONIX) 40 MG tablet Take 1 tablet (40 mg total) by mouth 2 (two) times daily before meals. 120 tablet 0    polyethylene glycol 3350 (MIRALAX ORAL) Take 1 Capful by mouth daily as needed (constipation).      pramipexole (MIRAPEX) 1 MG tablet Take 1 tablet (1 mg total) by mouth 2 (two) times a day. 180 tablet 1    temazepam (RESTORIL) 15 mg Cap Take 1 capsule (15 mg total) by mouth nightly as needed. 90 capsule 0    verapamiL (VERELAN) 120 MG C24P Take 1 capsule (120 mg total) by mouth once daily. 90 capsule 3    vitamin D (VITAMIN D3) 1000 units Tab Take 1,000 Units by mouth once daily.       No current facility-administered medications for this visit.         Objective Findings:    Vital Signs:  /65   Pulse 82   Ht 5' 8" (1.727 m)   Wt 66.7 kg (147 lb 0.8 oz)   LMP  (LMP Unknown)   BMI 22.36 kg/m²  Body mass index is 22.36 kg/m².    Physical Exam:  General Appearance: Well appearing in no acute distress  Head:   Normocephalic, without obvious abnormality  Eyes:    No scleral icterus, EOMI  Throat: Lips, mucosa, and tongue normal; teeth and gums normal  Lungs: CTA bilaterally in " anterior and posterior fields, no wheezes, no crackles.  Heart:  Regular rate and rhythm, S1, S2 normal, no murmurs heard  Abdomen: Soft, non tender, non distended with positive bowel sounds in all four quadrants. No hepatosplenomegaly, ascites, or mass  Extremities:    2+ pulses, no clubbing, cyanosis or edema  Skin: No rash  Neurologic: CN II-XII intact, no asterixis      Labs:  Lab Results   Component Value Date    WBC 6.21 10/16/2023    HGB 8.9 (L) 10/16/2023    HCT 28.9 (L) 10/16/2023     (H) 10/16/2023    CHOL 155 08/15/2023    TRIG 46 08/15/2023    HDL 76 (H) 08/15/2023    ALT 12 10/16/2023    AST 13 10/16/2023     (L) 10/16/2023    K 4.9 10/16/2023     10/16/2023    CREATININE 0.8 10/16/2023    BUN 12 10/16/2023    CO2 21 (L) 10/16/2023    TSH 1.709 08/15/2023    INR 1.1 04/21/2023    HGBA1C 5.1 08/15/2023       Endoscopy:   EGD 10/23 - [facility]    Imaging:      Assessment:    1. PUD (peptic ulcer disease)          Recommendations:  Repeat EGD in 3 months  2.Continue PPI for now  3. Hold NSAIDs as much as possible  4. Dietitian appt to review diet, possibly review low fodmap tips if bloating persists    No follow-ups on file.    Order summary:  No orders of the defined types were placed in this encounter.      Thank you for allowing me to participate in the care of Shelly Menchaca MD

## 2023-10-18 NOTE — PROGRESS NOTES
"GENERAL GI PATIENT INTAKE:    COVID symptoms in the last 7 days (runny nose, sore throat, congestion, cough, fever): No  PCP: Marlene Méndez  If not PCP-  number given to establish [phone]: N/A    ALLERGIES REVIEWED:  Yes    CHIEF COMPLAINT:    Chief Complaint   Patient presents with    GI Problem     Rectal bleeding       VITAL SIGNS:  /65   Pulse 82   Ht 5' 8" (1.727 m)   Wt 66.7 kg (147 lb 0.8 oz)   LMP  (LMP Unknown)   BMI 22.36 kg/m²      Change in medical, surgical, family or social history: No      REVIEWED MEDICATION LIST RECONCILED INCLUDING ABOVE MEDS:  Yes     "

## 2023-10-18 NOTE — TELEPHONE ENCOUNTER
"----- Message from Hugo Menchaca MD sent at 10/18/2023  8:32 AM CDT -----  Procedure: EGD    Diagnosis: Peptic ulcer disease    Procedure Timin weeks    #If within 4 weeks selected, please rahel as high priority#    #If greater than 12 weeks, please select "4-12 weeks" and delay sending until 2 months prior to requested date#     Provider: Myself    Location: No Preference    Additional Scheduling Information: No scheduling concerns    Prep Specifications:N/A    Have you attached a patient to this message: yes      "

## 2023-10-19 ENCOUNTER — TELEPHONE (OUTPATIENT)
Dept: ORTHOPEDICS | Facility: CLINIC | Age: 83
End: 2023-10-19
Payer: MEDICARE

## 2023-10-19 NOTE — TELEPHONE ENCOUNTER
I called and confirmed the patients xray at  before seeing Dr. Gary.    The patient verbalized understanding and has no further questions.

## 2023-10-20 ENCOUNTER — OFFICE VISIT (OUTPATIENT)
Dept: ORTHOPEDICS | Facility: CLINIC | Age: 83
End: 2023-10-20
Payer: MEDICARE

## 2023-10-20 ENCOUNTER — HOSPITAL ENCOUNTER (OUTPATIENT)
Dept: RADIOLOGY | Facility: HOSPITAL | Age: 83
Discharge: HOME | End: 2023-10-20
Attending: ORTHOPAEDIC SURGERY
Payer: MEDICARE

## 2023-10-20 VITALS — BODY MASS INDEX: 23.05 KG/M2 | WEIGHT: 152.13 LBS | HEIGHT: 68 IN

## 2023-10-20 DIAGNOSIS — Z96.641 STATUS POST RIGHT HIP REPLACEMENT: Primary | ICD-10-CM

## 2023-10-20 DIAGNOSIS — M21.70 LEG LENGTH DISCREPANCY: ICD-10-CM

## 2023-10-20 DIAGNOSIS — Z96.641 STATUS POST RIGHT HIP REPLACEMENT: ICD-10-CM

## 2023-10-20 PROCEDURE — 99213 OFFICE O/P EST LOW 20 MIN: CPT | Mod: S$PBB,,, | Performed by: ORTHOPAEDIC SURGERY

## 2023-10-20 PROCEDURE — 99999 PR PBB SHADOW E&M-EST. PATIENT-LVL III: ICD-10-PCS | Mod: PBBFAC,,, | Performed by: ORTHOPAEDIC SURGERY

## 2023-10-20 PROCEDURE — 99213 PR OFFICE/OUTPT VISIT, EST, LEVL III, 20-29 MIN: ICD-10-PCS | Mod: S$PBB,,, | Performed by: ORTHOPAEDIC SURGERY

## 2023-10-20 PROCEDURE — 77073 XR HIP TO ANKLE: ICD-10-PCS | Mod: 26,,, | Performed by: RADIOLOGY

## 2023-10-20 PROCEDURE — 99213 OFFICE O/P EST LOW 20 MIN: CPT | Mod: PBBFAC | Performed by: ORTHOPAEDIC SURGERY

## 2023-10-20 PROCEDURE — 77073 BONE LENGTH STUDIES: CPT | Mod: 26,,, | Performed by: RADIOLOGY

## 2023-10-20 PROCEDURE — 99999 PR PBB SHADOW E&M-EST. PATIENT-LVL III: CPT | Mod: PBBFAC,,, | Performed by: ORTHOPAEDIC SURGERY

## 2023-10-20 PROCEDURE — 77073 BONE LENGTH STUDIES: CPT | Mod: TC

## 2023-10-20 NOTE — PROGRESS NOTES
Subjective:     HPI:   Shelly Vásquez is a 82 y.o. female who presents for 6 month eval R ant NAT 4/26/23    Last seen 9/15/23 - was still doing PT and having more pain, thought aggravated with aggressive PT:   -NAT mech ok  -has irritated abductors and SER  -foot irritation from PT strap  Plan:   Stop PT  Rest, gentle ROM  Use walker/cane PRN  Rx medrol dose pack  Tylenol arthritis 2-3x/day  Rx celebrex #30  F/u 1 month f/u with standing long alignment XR to assess leg lengths    Interim:   Then [facility] to [facility] [place] 10/3-10/5: ED visit for melena, got 4units PRBC, EGD performed 10/4 showing gastric ulcers with clean base (biopsy pending), erosive gastropathy with no bleeding and no stigmata of recent bleeding  Hgb back up to 8.9 at last check    Today:   Hip doing well, no pain problems concerns  Did have a trip and fall onto her bench at night about a month ago - feeling better now    Some calf cramps at night  Has restarted PT and going to see someone for her foot    Still feels like R side is long  Using heel lift       Objective:   Body mass index is 23.13 kg/m².  Exam:  Walking well, no limp/non-antalgic  No pain aSLR  0-110, 40-30, 30-30  2cm R side long      Imaging:  Long alignment today: R hip no change              R hip 5mm long  B femur equal  B tibia equal  [facility] LLD = 5mm R long    Assessment:       ICD-10-CM ICD-9-CM   1. Status post right hip replacement  Z96.641 V43.64   2. Leg length discrepancy  M21.70 736.81      R NAT doing well  LLD: 5mm from NAT, rest from pre-existing R side long pre-op LLD was 1cm long pre-op     Plan:       Rx for orthotics: L custom heel lift v built up shoe    6 month f/u = 1 year R hip    No orders of the defined types were placed in this encounter.            Past Medical History:   Diagnosis Date    Arthritis     Atrial fibrillation     Basal cell cancer     on the face    Encounter for blood transfusion     Gastric ulcer     GERD (gastroesophageal reflux  disease)     Herpes simplex without mention of complication     rare outbreaks    Postmenopausal HRT (hormone replacement therapy)     Supraventricular tachycardia     s/p AVNRT ablation (Dr Brady)       Past Surgical History:   Procedure Laterality Date    APPENDECTOMY  age 21    ARTHROPLASTY OF HIP BY ANTERIOR APPROACH Right 4/26/2023    Procedure: ARTHROPLASTY, HIP, TOTAL, ANTERIOR APPROACH: RIGHT: DEPUY - C-STEM + PINNACLE;  Surgeon: Keon Gary III, MD;  [facility];  Service: Orthopedics;  Laterality: Right;    BACK SURGERY      Beast Lift  2004    BONE MARROW BIOPSY Right 12/19/2019    Procedure: Biopsy-bone marrow;  Surgeon: Aidan Spring MD;  [facility];  Service: Oncology;  Laterality: Right;    BREAST BIOPSY  50yrs ago    unable to see scar    CATARACT EXTRACTION W/  INTRAOCULAR LENS IMPLANT Right 9/7/2023    Procedure: EXTRACTION, CATARACT, WITH IOL INSERTION;  Surgeon: Francisco Willingham MD;  Location: [facility] OR;  Service: Ophthalmology;  Laterality: Right;  [facility] ONLY    CATARACT EXTRACTION W/  INTRAOCULAR LENS IMPLANT Left 10/2/2023    Procedure: EXTRACTION, CATARACT, WITH IOL INSERTION;  Surgeon: Francisco Willingham MD;  Location: [facility] OR;  Service: Ophthalmology;  Laterality: Left;  [facility] ONLY    COSMETIC SURGERY      DILATION AND CURETTAGE OF UTERUS  2008    PMB    ENDOMETRIAL BIOPSY      EPIDURAL STEROID INJECTION N/A 8/25/2022    Procedure: LUMBAR CELINA CONTRAST DIRECT REFERRAL;  Surgeon: Rj Hernandez MD;  Location: [facility];  Service: Pain Management;  Laterality: N/A;    ESOPHAGOGASTRODUODENOSCOPY N/A 10/4/2023    Procedure: EGD (ESOPHAGOGASTRODUODENOSCOPY);  Surgeon: Hugo Menchaca MD;  Location: [facility] ENDO (2ND FLR);  Service: Endoscopy;  Laterality: N/A;    EYE SURGERY      INJECTION OF ANESTHETIC AGENT AROUND NERVE Bilateral 11/11/2022    Procedure: BLOCK, NERVE BILATERAL L2,L3,L4 AND L5 MEDIAL BRANCH ONE OF TWO;  Surgeon: Rj Hernandez MD;   Location: [facility] PAIN MGT;  Service: Pain Management;  Laterality: Bilateral;    INJECTION OF JOINT Right 12/2/2022    Procedure: INJECTION, JOINT RIGHT INTRARTICULAR HIP CONTRAST;  Surgeon: Rj Hernandez MD;  Location: [facility] PAIN MGT;  Service: Pain Management;  Laterality: Right;    ptsosis      RADIOFREQUENCY ABLATION  03/2018    SMALL INTESTINE SURGERY      TONSILLECTOMY, ADENOIDECTOMY  age 8    TOTAL REDUCTION MAMMOPLASTY Bilateral 2003    TRANSFORAMINAL EPIDURAL INJECTION OF STEROID Right 8/11/2022    Procedure: Injection,steroid,epidural Right L1/2 TF DIrect Referral Instructed to provide intervention per MRI results;  Surgeon: Rj Hernandez MD;  Location: [facility] PAIN MGT;  Service: Pain Management;  Laterality: Right;    TRANSFORAMINAL EPIDURAL INJECTION OF STEROID Right 10/21/2022    Procedure: INJECTION, STEROID, EPIDURAL, TRANSFORAMINAL APPROACH, L5-S1 and S1, RIGHT CONTRAST;  Surgeon: Rj Hernandez MD;  Location: [facility] PAIN MGT;  Service: Pain Management;  Laterality: Right;    TUBAL LIGATION         Family History   Problem Relation Age of Onset    No Known Problems Father     Cirrhosis Mother     No Known Problems Brother     No Known Problems Maternal Aunt     No Known Problems Maternal Uncle     No Known Problems Paternal Aunt     No Known Problems Paternal Uncle     No Known Problems Maternal Grandmother     No Known Problems Maternal Grandfather     No Known Problems Paternal Grandmother     No Known Problems Paternal Grandfather     No Known Problems Daughter     Pulmonary embolism Son     Breast cancer Neg Hx     Colon cancer Neg Hx     Ovarian cancer Neg Hx     Amblyopia Neg Hx     Blindness Neg Hx     Cancer Neg Hx     Cataracts Neg Hx     Diabetes Neg Hx     Glaucoma Neg Hx     Hypertension Neg Hx     Macular degeneration Neg Hx     Retinal detachment Neg Hx     Strabismus Neg Hx     Stroke Neg Hx     Thyroid disease Neg Hx        Social History     Socioeconomic History    Marital status:     Tobacco Use    Smoking status: Former     Current packs/day: 0.00     Average packs/day: 1 pack/day for 22.0 years (22.0 ttl pk-yrs)     Types: Cigarettes     Start date: 1958     Quit date: 1980     Years since quittin.2    Smokeless tobacco: Never   Substance and Sexual Activity    Alcohol use: Yes     Alcohol/week: 2.0 standard drinks of alcohol     Types: 2 Glasses of wine per week     Comment: 1-2 glasses of  some night    Drug use: No    Sexual activity: Not Currently     Partners: Male     Birth control/protection: Post-menopausal     Comment:  since      Social Determinants of Health     Financial Resource Strain: Low Risk  (10/4/2023)    Overall Financial Resource Strain (CARDIA)     Difficulty of Paying Living Expenses: Not hard at all   Food Insecurity: No Food Insecurity (10/4/2023)    Hunger Vital Sign     Worried About Running Out of Food in the Last Year: Never true     Ran Out of Food in the Last Year: Never true   Transportation Needs: No Transportation Needs (10/4/2023)    PRAPARE - Transportation     Lack of Transportation (Medical): No     Lack of Transportation (Non-Medical): No   Physical Activity: Insufficiently Active (10/4/2023)    Exercise Vital Sign     Days of Exercise per Week: 7 days     Minutes of Exercise per Session: 20 min   Stress: No Stress Concern Present (10/4/2023)    [employer] - Occupational Stress Questionnaire     Feeling of Stress : Only a little   Social Connections: Moderately Integrated (10/4/2023)    Social Connection and Isolation Panel [NHANES]     Frequency of Communication with Friends and Family: More than three times a week     Frequency of Social Gatherings with Friends and Family: More than three times a week     Attends Holiness Services: 1 to 4 times per year     Active Member of Clubs or Organizations: No     Attends Club or Organization Meetings: Never     Marital Status:    Housing  Stability: Unknown (10/4/2023)    Housing Stability Vital Sign     Unable to Pay for Housing in the Last Year: No     Unstable Housing in the Last Year: No

## 2023-10-24 ENCOUNTER — OFFICE VISIT (OUTPATIENT)
Dept: PODIATRY | Facility: CLINIC | Age: 83
End: 2023-10-24
Payer: MEDICARE

## 2023-10-24 VITALS
SYSTOLIC BLOOD PRESSURE: 110 MMHG | WEIGHT: 151.81 LBS | BODY MASS INDEX: 23.01 KG/M2 | HEART RATE: 76 BPM | DIASTOLIC BLOOD PRESSURE: 51 MMHG | HEIGHT: 68 IN

## 2023-10-24 DIAGNOSIS — L60.0 INGROWN NAIL: Primary | ICD-10-CM

## 2023-10-24 DIAGNOSIS — M79.675 TOE PAIN, BILATERAL: ICD-10-CM

## 2023-10-24 DIAGNOSIS — M21.70 ACQUIRED UNEQUAL LEG LENGTH: ICD-10-CM

## 2023-10-24 DIAGNOSIS — M79.674 TOE PAIN, BILATERAL: ICD-10-CM

## 2023-10-24 DIAGNOSIS — L03.039 PARONYCHIA, TOE, UNSPECIFIED LATERALITY: ICD-10-CM

## 2023-10-24 PROCEDURE — 99204 OFFICE O/P NEW MOD 45 MIN: CPT | Mod: S$PBB,,, | Performed by: PODIATRIST

## 2023-10-24 PROCEDURE — 99999 PR PBB SHADOW E&M-EST. PATIENT-LVL III: CPT | Mod: PBBFAC,,, | Performed by: PODIATRIST

## 2023-10-24 PROCEDURE — 99204 PR OFFICE/OUTPT VISIT, NEW, LEVL IV, 45-59 MIN: ICD-10-PCS | Mod: S$PBB,,, | Performed by: PODIATRIST

## 2023-10-24 PROCEDURE — 99999 PR PBB SHADOW E&M-EST. PATIENT-LVL III: ICD-10-PCS | Mod: PBBFAC,,, | Performed by: PODIATRIST

## 2023-10-24 PROCEDURE — 99213 OFFICE O/P EST LOW 20 MIN: CPT | Mod: PBBFAC,PN | Performed by: PODIATRIST

## 2023-10-24 RX ORDER — TOBRAMYCIN 3 MG/ML
SOLUTION/ DROPS OPHTHALMIC
Qty: 5 ML | Refills: 3 | Status: SHIPPED | OUTPATIENT
Start: 2023-10-24 | End: 2024-02-16

## 2023-10-24 NOTE — PROGRESS NOTES
Subjective:      Patient ID: Shelly Vásquez is a 82 y.o. female.    Chief Complaint: Toe Pain (Bilateral great toe pain)    Sharp cover throbbing pain both  big toes /nails.   It has been a chronic condition present for years which she usually has a dressed with pedicures.  However this time this did not improving to improve after the pedicure.  This exacerbation has been Gradual onset, worsening over past several weeks, aggravated by increased weight bearing, shoe gear, pressure.  No previous medical treatment.  OTC pain med not helping.  Denies trauma and surgery both feet    Review of Systems   Constitutional: Negative for chills, diaphoresis, fever, malaise/fatigue and night sweats.   Cardiovascular:  Negative for claudication, cyanosis, leg swelling and syncope.   Skin:  Positive for nail changes. Negative for color change, dry skin, rash, suspicious lesions and unusual hair distribution.   Musculoskeletal:  Negative for falls, joint pain, joint swelling, muscle cramps, muscle weakness and stiffness.   Gastrointestinal:  Negative for constipation, diarrhea, nausea and vomiting.   Neurological:  Negative for brief paralysis, disturbances in coordination, focal weakness, numbness, paresthesias, sensory change and tremors.         Objective:      Physical Exam  Constitutional:       General: She is not in acute distress.     Appearance: She is well-developed. She is not diaphoretic.   Cardiovascular:      Pulses:           Popliteal pulses are 2+ on the right side and 2+ on the left side.        Dorsalis pedis pulses are 2+ on the right side and 2+ on the left side.        Posterior tibial pulses are 2+ on the right side and 2+ on the left side.      Comments: Capillary refill 3 seconds all toes/distal feet, all toes/both feet warm to touch.      Negative lymphadenopathy bilateral popliteal fossa and tarsal tunnel.      Negavie lower extremity edema bilateral.    Musculoskeletal:      Right ankle: No  swelling, deformity, ecchymosis or lacerations. Normal range of motion. Normal pulse.      Right Achilles Tendon: Normal. No defects. Wei's test negative.      Comments: Normal angle, base, station of gait. All ten toes without clubbing, cyanosis, or signs of ischemia.  No pain to palpation bilateral lower extremities.  Range of motion, stability, muscle strength, and muscle tone normal bilateral feet and legs.    Lymphadenopathy:      Lower Body: No right inguinal adenopathy. No left inguinal adenopathy.      Comments: Negative lymphadenopathy bilateral popliteal fossa and tarsal tunnel.    Negative lymphangitic streaking bilateral feet/ankles/legs.   Skin:     General: Skin is warm and dry.      Capillary Refill: Capillary refill takes 2 to 3 seconds.      Coloration: Skin is not pale.      Findings: No abrasion, bruising, burn, ecchymosis, erythema, laceration, lesion or rash.      Nails: There is no clubbing.      Comments: Visible and palpable ingrowth of toenail lateral and medial border left and right hallux with pain to palpation, and focal localized erythema and edema,  without ulceration, drainage, pus, tracking, fluctuance, malodor, or cardinal signs infection.         Neurological:      Mental Status: She is alert and oriented to person, place, and time.      Sensory: No sensory deficit.      Motor: No tremor, atrophy or abnormal muscle tone.      Gait: Gait normal.      Deep Tendon Reflexes:      Reflex Scores:       Patellar reflexes are 2+ on the right side and 2+ on the left side.       Achilles reflexes are 2+ on the right side and 2+ on the left side.     Comments: Negative tinel sign to percussion sural, superficial peroneal, deep peroneal, saphenous, and posterior tibial nerves right and left ankles and feet.     Psychiatric:         Behavior: Behavior is cooperative.           Assessment:       Encounter Diagnoses   Name Primary?    Acquired unequal leg length     Ingrown nail Yes    Toe  pain, bilateral     Paronychia, toe, unspecified laterality          Plan:       Shelly Wilkinson was seen today for toe pain.    Diagnoses and all orders for this visit:    Ingrown nail    Acquired unequal leg length  -     Ambulatory referral/consult to Podiatry    Toe pain, bilateral    Paronychia, toe, unspecified laterality    Other orders  -     tobramycin sulfate 0.3% (TOBREX) 0.3 % ophthalmic solution; 1-2 drops topically twice daily to affected toe(s).      I counseled the patient on her conditions, their implications and medical management.      Topical tobramycin drops twice daily both hallux.      Cover both hallux all times with Band-Aid or similar changing daily until completely healed.      Discussed conservative treatment with shoes of adequate dimensions, material, and style to alleviate symptoms and delay or prevent surgical intervention.     Utilizing sterile toenail clippers I aggressively debrided the offending nail border approximately 3 mm from its edge and carried the nail plate incision down at an angle in order to wedge out the offending cryptotic portion of the nail plate. The offending border was then removed in toto. The area was cleansed with alcohol. Patient tolerated the procedure well and related significant relief.          Follow up if symptoms worsen or fail to improve.

## 2023-10-26 ENCOUNTER — TELEPHONE (OUTPATIENT)
Dept: ORTHOPEDICS | Facility: CLINIC | Age: 83
End: 2023-10-26
Payer: MEDICARE

## 2023-10-26 NOTE — TELEPHONE ENCOUNTER
I called and spoke to the patient regarding the message below. I informed the patent that I will reach out to DME and they will call with a time/day that she can come in.    The patient verbalized understanding and has no further questions.     ----- Message from Rikki Galvan sent at 10/26/2023  7:38 AM CDT -----  Regarding: FW: Consult/Advisory  Contact: [phone]  Good morning,     Please call the patient ASAP since they called yesterday afternoon.     Sincerely,   Kenny Galvan MS, OTC  OR & Clinical Assistant to Dr. Keon Gary III  Phone: [phone]  Fax: [phone]  ----- Message -----  From: Cherise Herrera  Sent: 10/25/2023   3:37 PM CDT  To: [facility] Staff  Subject: Consult/Advisory                                 CONSULT/ADVISORY    Name of Caller:  NYLA NEIL KELLY [[id number]]    Contact Preference:  [phone]    Nature of Call:  Pt is requesting a call back to get fitted for her Orthotics.  Order #[id number].  Please call.

## 2023-10-31 ENCOUNTER — EXTERNAL CHRONIC CARE MANAGEMENT (OUTPATIENT)
Dept: PRIMARY CARE CLINIC | Facility: CLINIC | Age: 83
End: 2023-10-31
Payer: MEDICARE

## 2023-10-31 PROCEDURE — 99490 PR CHRONIC CARE MGMT, 1ST 20 MIN: ICD-10-PCS | Mod: S$PBB,,, | Performed by: INTERNAL MEDICINE

## 2023-10-31 PROCEDURE — 99490 CHRNC CARE MGMT STAFF 1ST 20: CPT | Mod: S$PBB,,, | Performed by: INTERNAL MEDICINE

## 2023-10-31 PROCEDURE — 99490 CHRNC CARE MGMT STAFF 1ST 20: CPT | Mod: PBBFAC | Performed by: INTERNAL MEDICINE

## 2023-11-05 RX ORDER — VERAPAMIL HYDROCHLORIDE 120 MG/1
120 CAPSULE, EXTENDED RELEASE ORAL
Qty: 90 CAPSULE | Refills: 3 | Status: SHIPPED | OUTPATIENT
Start: 2023-11-05

## 2023-11-05 NOTE — TELEPHONE ENCOUNTER
Refill Decision Note   Shelly Vásquez  is requesting a refill authorization.  Brief Assessment and Rationale for Refill:  Approve     Medication Therapy Plan:         Comments:     Note composed:9:09 AM 11/05/2023

## 2023-11-05 NOTE — TELEPHONE ENCOUNTER
No care due was identified.  [facility] Embedded Care Due Messages. Reference number: [id number].   11/04/2023 8:32:14 PM CDT

## 2023-11-09 RX ORDER — PANTOPRAZOLE SODIUM 40 MG/1
40 TABLET, DELAYED RELEASE ORAL 2 TIMES DAILY
Qty: 180 TABLET | Refills: 1 | Status: SHIPPED | OUTPATIENT
Start: 2023-11-09 | End: 2024-01-02 | Stop reason: SDUPTHER

## 2023-11-09 NOTE — TELEPHONE ENCOUNTER
Refill Routing Note     Refill Routing Note   Medication(s) are not appropriate for processing by [facility] for the following reason(s):      Medication outside of protocol  New or recently adjusted medication  No active prescription written by provider    [facility] action(s):  Route Care Due:  None identified            Appointments  past 12m or future 3m with PCP    Date Provider   Last Visit   10/12/2023 Marlene Méndez MD   Next Visit   Visit date not found Marlene Méndez MD   ED visits in past 90 days: 0        Note composed:7:43 AM 11/09/2023

## 2023-11-09 NOTE — TELEPHONE ENCOUNTER
No care due was identified.  [facility] Embedded Care Due Messages. Reference number: [id number].   11/09/2023 3:24:13 AM CST

## 2023-11-13 RX ORDER — PRAMIPEXOLE DIHYDROCHLORIDE 0.5 MG/1
1 TABLET ORAL 2 TIMES DAILY
Qty: 180 TABLET | Refills: 1 | Status: SHIPPED | OUTPATIENT
Start: 2023-11-13 | End: 2024-02-16

## 2023-11-30 ENCOUNTER — EXTERNAL CHRONIC CARE MANAGEMENT (OUTPATIENT)
Dept: PRIMARY CARE CLINIC | Facility: CLINIC | Age: 83
End: 2023-11-30
Payer: MEDICARE

## 2023-11-30 PROCEDURE — 99490 PR CHRONIC CARE MGMT, 1ST 20 MIN: ICD-10-PCS | Mod: S$PBB,,, | Performed by: INTERNAL MEDICINE

## 2023-11-30 PROCEDURE — 99490 CHRNC CARE MGMT STAFF 1ST 20: CPT | Mod: PBBFAC | Performed by: INTERNAL MEDICINE

## 2023-11-30 PROCEDURE — 99490 CHRNC CARE MGMT STAFF 1ST 20: CPT | Mod: S$PBB,,, | Performed by: INTERNAL MEDICINE

## 2023-12-04 RX ORDER — DIAZEPAM 2 MG/1
TABLET ORAL
Qty: 45 TABLET | OUTPATIENT
Start: 2023-12-04

## 2023-12-04 NOTE — TELEPHONE ENCOUNTER
No care due was identified.  [facility] Embedded Care Due Messages. Reference number: [id number].   12/04/2023 7:01:09 AM CST

## 2023-12-05 RX ORDER — DIAZEPAM 2 MG/1
2 TABLET ORAL EVERY 6 HOURS PRN
COMMUNITY
End: 2023-12-05 | Stop reason: SDUPTHER

## 2023-12-05 RX ORDER — DIAZEPAM 2 MG/1
2 TABLET ORAL DAILY PRN
Qty: 30 TABLET | Refills: 0 | Status: SHIPPED | OUTPATIENT
Start: 2023-12-05 | End: 2024-02-06 | Stop reason: SDUPTHER

## 2023-12-31 ENCOUNTER — EXTERNAL CHRONIC CARE MANAGEMENT (OUTPATIENT)
Dept: PRIMARY CARE CLINIC | Facility: CLINIC | Age: 83
End: 2023-12-31
Payer: MEDICARE

## 2023-12-31 PROCEDURE — 99490 CHRNC CARE MGMT STAFF 1ST 20: CPT | Mod: S$PBB,,, | Performed by: INTERNAL MEDICINE

## 2023-12-31 PROCEDURE — 99490 CHRNC CARE MGMT STAFF 1ST 20: CPT | Mod: PBBFAC | Performed by: INTERNAL MEDICINE

## 2024-01-02 RX ORDER — TEMAZEPAM 15 MG/1
15 CAPSULE ORAL NIGHTLY PRN
Qty: 90 CAPSULE | Refills: 0 | Status: SHIPPED | OUTPATIENT
Start: 2024-01-02 | End: 2024-01-16 | Stop reason: SDUPTHER

## 2024-01-02 RX ORDER — PANTOPRAZOLE SODIUM 40 MG/1
40 TABLET, DELAYED RELEASE ORAL 2 TIMES DAILY
Qty: 180 TABLET | Refills: 1 | Status: SHIPPED | OUTPATIENT
Start: 2024-01-02 | End: 2024-02-16

## 2024-01-02 RX ORDER — GABAPENTIN 300 MG/1
300 CAPSULE ORAL NIGHTLY
Qty: 90 CAPSULE | Refills: 1 | Status: SHIPPED | OUTPATIENT
Start: 2024-01-02 | End: 2024-02-16

## 2024-01-02 NOTE — TELEPHONE ENCOUNTER
No care due was identified.  [facility] Embedded Care Due Messages. Reference number: [id number].   1/02/2024 8:41:58 AM CST

## 2024-01-09 ENCOUNTER — PATIENT MESSAGE (OUTPATIENT)
Dept: INTERNAL MEDICINE | Facility: CLINIC | Age: 84
End: 2024-01-09
Payer: MEDICARE

## 2024-01-10 RX ORDER — AZITHROMYCIN 250 MG/1
TABLET, FILM COATED ORAL
Qty: 6 TABLET | Refills: 0 | Status: SHIPPED | OUTPATIENT
Start: 2024-01-10 | End: 2024-01-15

## 2024-01-10 RX ORDER — DEXAMETHASONE 4 MG/1
4 TABLET ORAL DAILY
Qty: 5 TABLET | Refills: 0 | Status: SHIPPED | OUTPATIENT
Start: 2024-01-10 | End: 2024-01-15

## 2024-01-16 RX ORDER — TEMAZEPAM 15 MG/1
15 CAPSULE ORAL NIGHTLY PRN
Qty: 30 CAPSULE | Refills: 0 | Status: SHIPPED | OUTPATIENT
Start: 2024-01-16 | End: 2024-04-07 | Stop reason: SDUPTHER

## 2024-01-20 ENCOUNTER — PATIENT MESSAGE (OUTPATIENT)
Dept: ADMINISTRATIVE | Facility: OTHER | Age: 84
End: 2024-01-20
Payer: MEDICARE

## 2024-01-30 ENCOUNTER — PATIENT MESSAGE (OUTPATIENT)
Dept: INTERNAL MEDICINE | Facility: CLINIC | Age: 84
End: 2024-01-30
Payer: MEDICARE

## 2024-01-31 ENCOUNTER — EXTERNAL CHRONIC CARE MANAGEMENT (OUTPATIENT)
Dept: PRIMARY CARE CLINIC | Facility: CLINIC | Age: 84
End: 2024-01-31
Payer: MEDICARE

## 2024-01-31 PROCEDURE — 99490 CHRNC CARE MGMT STAFF 1ST 20: CPT | Mod: PBBFAC | Performed by: INTERNAL MEDICINE

## 2024-01-31 PROCEDURE — 99490 CHRNC CARE MGMT STAFF 1ST 20: CPT | Mod: S$PBB,,, | Performed by: INTERNAL MEDICINE

## 2024-02-06 RX ORDER — DIAZEPAM 2 MG/1
2 TABLET ORAL DAILY PRN
Qty: 30 TABLET | Refills: 0 | Status: SHIPPED | OUTPATIENT
Start: 2024-02-06 | End: 2024-04-21 | Stop reason: SDUPTHER

## 2024-02-06 RX ORDER — DIAZEPAM 2 MG/1
2 TABLET ORAL DAILY PRN
Qty: 30 TABLET | Refills: 0 | Status: SHIPPED | OUTPATIENT
Start: 2024-02-06 | End: 2024-03-09 | Stop reason: SDUPTHER

## 2024-02-16 ENCOUNTER — OFFICE VISIT (OUTPATIENT)
Dept: INTERNAL MEDICINE | Facility: CLINIC | Age: 84
End: 2024-02-16
Payer: MEDICARE

## 2024-02-16 VITALS — BODY MASS INDEX: 22.73 KG/M2 | HEIGHT: 68 IN | WEIGHT: 150 LBS | HEART RATE: 72 BPM

## 2024-02-16 DIAGNOSIS — I10 ESSENTIAL HYPERTENSION: ICD-10-CM

## 2024-02-16 DIAGNOSIS — D46.9 MDS (MYELODYSPLASTIC SYNDROME): ICD-10-CM

## 2024-02-16 DIAGNOSIS — G25.81 RLS (RESTLESS LEGS SYNDROME): Primary | ICD-10-CM

## 2024-02-16 DIAGNOSIS — E53.8 VITAMIN B 12 DEFICIENCY: ICD-10-CM

## 2024-02-16 PROCEDURE — 99999 PR PBB SHADOW E&M-EST. PATIENT-LVL II: CPT | Mod: PBBFAC,,, | Performed by: INTERNAL MEDICINE

## 2024-02-16 PROCEDURE — 99214 OFFICE O/P EST MOD 30 MIN: CPT | Mod: S$PBB,,, | Performed by: INTERNAL MEDICINE

## 2024-02-16 PROCEDURE — 99212 OFFICE O/P EST SF 10 MIN: CPT | Mod: PBBFAC | Performed by: INTERNAL MEDICINE

## 2024-02-16 RX ORDER — NAPROXEN SODIUM 220 MG/1
81 TABLET, FILM COATED ORAL
COMMUNITY

## 2024-02-16 RX ORDER — GABAPENTIN 300 MG/1
300 CAPSULE ORAL 3 TIMES DAILY PRN
COMMUNITY
End: 2024-03-14 | Stop reason: SDUPTHER

## 2024-02-16 RX ORDER — CYANOCOBALAMIN 1000 UG/ML
1000 INJECTION, SOLUTION INTRAMUSCULAR; SUBCUTANEOUS
Qty: 10 ML | Refills: 1 | Status: SHIPPED | OUTPATIENT
Start: 2024-02-16

## 2024-02-18 NOTE — PROGRESS NOTES
Subjective:       Patient ID: Shelly Vásquez is a 83 y.o. female who presents today for:    Chief Complaint:   Chief Complaint   Patient presents with    Follow-up     Go over meds, spasms.       HPI:  Very pleasant 83-year-old nonsmoking female who is follow-up on muscle spasms partially related to restless leg syndrome as well as tremulousness set follows mostly starting in the later afternoon.  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  Prove her symptoms but not completely.  She goes to bed fairly early around 9:00 a.m. at night and has been on temazepam 15 mg for quite some time.  A few months ago we tapered back on the pramipexole to 1 mg twice a day and the gabapentin only to an evening dose of 300 mg.  The patient also has myelodysplasia and is chronically anemic which makes her extremity symptoms worse.  Most nights she is also take diazepam 2 mg and is wondering when would be the best time to take the 2nd benzodiazepine.  She under cm that both temazepam and diazepam or benzodiazepines should be taken separately.  She does get up most nights either to urinate for because of the pain in her legs which can also cause tremors in her upper extremities.    Review of Systems   Constitutional:  Negative for chills, fever and weight loss.   HENT:  Negative for sore throat.    Eyes:  Negative for blurred vision and double vision.   Respiratory:  Negative for cough and shortness of breath.    Cardiovascular:  Negative for chest pain and palpitations.   Gastrointestinal:  Negative for constipation, diarrhea, nausea and vomiting.   Genitourinary:  Negative for dysuria and hematuria.   Musculoskeletal:  Positive for myalgias. Negative for joint pain.        Muscle spasms and tremors   Skin:  Negative for itching and rash.   Neurological:  Positive for tremors. Negative for sensory change, focal weakness and headaches.   Endo/Heme/Allergies:  Does not bruise/bleed easily.    Psychiatric/Behavioral:  Negative for depression and suicidal ideas.         Medications:  Outpatient Encounter Medications as of 2/16/2024   Medication Sig Note Dispense Refill    acetaminophen (TYLENOL) 650 MG TbSR Take 1 tablet (650 mg total) by mouth every 8 (eight) hours.  120 tablet 0    aspirin 81 MG Chew Take 81 mg by mouth every Mon, Wed, Fri.       cyanocobalamin 1,000 mcg/mL injection Inject 1 mL (1,000 mcg total) into the muscle every 14 (fourteen) days.  10 mL 1    diazePAM (VALIUM) 2 MG tablet Take 1 tablet (2 mg total) by mouth daily as needed for Anxiety.  30 tablet 0    diazePAM (VALIUM) 2 MG tablet Take 1 tablet (2 mg total) by mouth daily as needed for Anxiety.  30 tablet 0    estradiol-norethindrone acet 0.5-0.1 mg per tablet TAKE 1 TABLET BY MOUTH EVERY DAY  84 tablet 3    gabapentin (NEURONTIN) 300 MG capsule Take 300 mg by mouth 3 (three) times daily as needed.       polyethylene glycol 3350 (MIRALAX ORAL) Take 1 Capful by mouth daily as needed (constipation).       pramipexole (MIRAPEX) 1 MG tablet Take 1 tablet (1 mg total) by mouth 2 (two) times a day.  180 tablet 1    temazepam (RESTORIL) 15 mg Cap Take 1 capsule (15 mg total) by mouth nightly as needed.  30 capsule 0    verapamiL (VERELAN) 120 MG C24P TAKE 1 CAPSULE(120 MG) BY MOUTH EVERY DAY  90 capsule 3    vitamin D (VITAMIN D3) 1000 units Tab Take 1,000 Units by mouth once daily. 4/4/2023: Hold 7 days prior to surgery      [DISCONTINUED] gabapentin (NEURONTIN) 300 MG capsule Take 1 capsule (300 mg total) by mouth every evening.  90 capsule 1    [DISCONTINUED] pantoprazole (PROTONIX) 40 MG tablet Take 1 tablet (40 mg total) by mouth 2 (two) times daily.  180 tablet 1    [DISCONTINUED] pramipexole (MIRAPEX) 0.5 MG tablet Take 2 tablets (1 mg total) by mouth 2 (two) times a day. TAKE 1 TABLET BY MOUTH EVERY MORNING AND 2 TABLETS BY MOUTH AT NIGHT  180 tablet 1    [DISCONTINUED] tobramycin sulfate 0.3% (TOBREX) 0.3 % ophthalmic solution  "1-2 drops topically twice daily to affected toe(s).  5 mL 3     No facility-administered encounter medications on file as of 2/16/2024.       Allergies:  Review of patient's allergies indicates:   Allergen Reactions    Baclofen      Incoherent     Nsaids (non-steroidal anti-inflammatory drug)      GI Bleed  Had 5 blood transfusions       Health Maintenance:  Immunization History   Administered Date(s) Administered    COVID-19, MRNA, LN-S, PF (Pfizer) (Gray Cap) 07/28/2022    COVID-19, MRNA, LN-S, PF (Pfizer) (Purple Cap) 01/09/2021, 01/30/2021, 09/08/2021    Influenza (FLUAD) - Quadrivalent - Adjuvanted - PF *Preferred* (65+) 10/02/2021, 10/12/2023    Influenza (FLUAD) - Trivalent - Adjuvanted - PF (65+) 12/14/2017, 12/10/2018    Influenza - High Dose - PF (65 years and older) 10/15/2014, 10/10/2016, 10/21/2019    Influenza - Trivalent - PF (ADULT) 08/31/2020    Pneumococcal Conjugate - 13 Valent 04/16/2018    Pneumococcal Polysaccharide - 23 Valent 08/26/2019    Tdap 03/31/2014      Health Maintenance   Topic Date Due    Shingles Vaccine (1 of 2) Never done    Colonoscopy  04/29/2019    TETANUS VACCINE  03/31/2024    DEXA Scan  04/10/2026    Lipid Panel  08/15/2028          Objective:      Vital Signs  Pulse: 72  Pain Score: 0-No pain  Height and Weight  Height: 5' 8" (172.7 cm)  Weight: 68 kg (150 lb)  BSA (Calculated - sq m): 1.81 sq meters  BMI (Calculated): 22.8  Weight in (lb) to have BMI = 25: 164.1]    Physical Exam  Constitutional:       Appearance: Normal appearance.   HENT:      Head: Normocephalic and atraumatic.   Cardiovascular:      Rate and Rhythm: Normal rate and regular rhythm.      Heart sounds: Normal heart sounds.   Pulmonary:      Effort: Pulmonary effort is normal.      Breath sounds: Normal breath sounds.   Musculoskeletal:      Cervical back: Normal range of motion.   Neurological:      General: No focal deficit present.      Mental Status: She is alert.   Psychiatric:         Mood and " Affect: Mood normal.         Behavior: Behavior normal.         Thought Content: Thought content normal.         Judgment: Judgment normal.        Lab Results   Component Value Date    WBC 6.21 10/16/2023    HGB 8.9 (L) 10/16/2023    HCT 28.9 (L) 10/16/2023     (H) 10/16/2023    CHOL 155 08/15/2023    TRIG 46 08/15/2023    HDL 76 (H) 08/15/2023    ALT 12 10/16/2023    AST 13 10/16/2023     (L) 10/16/2023    K 4.9 10/16/2023     10/16/2023    CREATININE 0.8 10/16/2023    BUN 12 10/16/2023    CO2 21 (L) 10/16/2023    TSH 1.709 08/15/2023    INR 1.1 04/21/2023    HGBA1C 5.1 08/15/2023       Assessment/plan:     Shelly Vásquez is a 83 y.o.female with:    RLS (restless legs syndrome)  -     CBC Auto Differential; Future; Expected date: 02/16/2024  -     Comprehensive Metabolic Panel; Future; Expected date: 02/16/2024    MDS (myelodysplastic syndrome)  -     CBC Auto Differential; Future; Expected date: 02/16/2024  -     Comprehensive Metabolic Panel; Future; Expected date: 02/16/2024    Vitamin B 12 deficiency  -     cyanocobalamin 1,000 mcg/mL injection; Inject 1 mL (1,000 mcg total) into the muscle every 14 (fourteen) days.  Dispense: 10 mL; Refill: 1    Essential hypertension  -     Comprehensive Metabolic Panel; Future; Expected date: 02/16/2024  -     TSH; Future; Expected date: 02/16/2024    Medication changes made today are to continue with the pramipexole 1 mg in the morning and in the afternoon but to increase the gabapentin if needed to 300 mg nightly to 300 mg as needed to 3 times a day in the late afternoon to evening.  He can additionally take another pramipexole ideally best to repeat the gabapentin.  She can reserve the diazepam 2 mg for few hours before bedtime if symptoms are exacerbated or when she wakes up with an exacerbation of discomfort in the legs.    No future appointments.    Marlene Andrew MD  [facility]

## 2024-02-27 ENCOUNTER — PATIENT MESSAGE (OUTPATIENT)
Dept: INTERNAL MEDICINE | Facility: CLINIC | Age: 84
End: 2024-02-27
Payer: MEDICARE

## 2024-02-29 ENCOUNTER — EXTERNAL CHRONIC CARE MANAGEMENT (OUTPATIENT)
Dept: PRIMARY CARE CLINIC | Facility: CLINIC | Age: 84
End: 2024-02-29
Payer: MEDICARE

## 2024-02-29 PROCEDURE — 99490 CHRNC CARE MGMT STAFF 1ST 20: CPT | Mod: PBBFAC | Performed by: INTERNAL MEDICINE

## 2024-02-29 PROCEDURE — 99490 CHRNC CARE MGMT STAFF 1ST 20: CPT | Mod: S$PBB,,, | Performed by: INTERNAL MEDICINE

## 2024-03-06 ENCOUNTER — TELEPHONE (OUTPATIENT)
Dept: INTERNAL MEDICINE | Facility: CLINIC | Age: 84
End: 2024-03-06
Payer: MEDICARE

## 2024-03-06 DIAGNOSIS — G25.81 RLS (RESTLESS LEGS SYNDROME): ICD-10-CM

## 2024-03-06 DIAGNOSIS — R25.1 TREMOR: ICD-10-CM

## 2024-03-06 DIAGNOSIS — D46.9 MDS (MYELODYSPLASTIC SYNDROME): Primary | ICD-10-CM

## 2024-03-06 NOTE — TELEPHONE ENCOUNTER
CH pt and benefactor would like to see a neurologist for tremor     Can we arrange ?  [place] can you assist

## 2024-03-07 RX ORDER — PRIMIDONE 50 MG/1
50 TABLET ORAL NIGHTLY
Qty: 30 TABLET | Refills: 2 | Status: SHIPPED | OUTPATIENT
Start: 2024-03-07 | End: 2024-05-03

## 2024-03-10 RX ORDER — DIAZEPAM 2 MG/1
2 TABLET ORAL EVERY 12 HOURS PRN
Qty: 45 TABLET | Refills: 2 | Status: SHIPPED | OUTPATIENT
Start: 2024-03-10 | End: 2024-04-09 | Stop reason: SDUPTHER

## 2024-03-11 ENCOUNTER — OFFICE VISIT (OUTPATIENT)
Dept: NEUROLOGY | Facility: CLINIC | Age: 84
End: 2024-03-11
Payer: MEDICARE

## 2024-03-11 VITALS
SYSTOLIC BLOOD PRESSURE: 102 MMHG | BODY MASS INDEX: 22.72 KG/M2 | WEIGHT: 149.94 LBS | DIASTOLIC BLOOD PRESSURE: 66 MMHG | HEIGHT: 68 IN | HEART RATE: 72 BPM

## 2024-03-11 DIAGNOSIS — G25.81 RLS (RESTLESS LEGS SYNDROME): ICD-10-CM

## 2024-03-11 DIAGNOSIS — R25.1 TREMOR: ICD-10-CM

## 2024-03-11 DIAGNOSIS — G20.A2 PARKINSON'S DISEASE WITHOUT DYSKINESIA, WITH FLUCTUATING MANIFESTATIONS: Primary | ICD-10-CM

## 2024-03-11 DIAGNOSIS — D46.9 MDS (MYELODYSPLASTIC SYNDROME): ICD-10-CM

## 2024-03-11 PROCEDURE — 99999 PR PBB SHADOW E&M-EST. PATIENT-LVL III: CPT | Mod: PBBFAC,,, | Performed by: PSYCHIATRY & NEUROLOGY

## 2024-03-11 PROCEDURE — 99213 OFFICE O/P EST LOW 20 MIN: CPT | Mod: PBBFAC | Performed by: PSYCHIATRY & NEUROLOGY

## 2024-03-11 PROCEDURE — 99214 OFFICE O/P EST MOD 30 MIN: CPT | Mod: S$PBB,,, | Performed by: PSYCHIATRY & NEUROLOGY

## 2024-03-11 RX ORDER — CARBIDOPA AND LEVODOPA 25; 100 MG/1; MG/1
1 TABLET ORAL 3 TIMES DAILY
Qty: 90 TABLET | Refills: 11 | Status: SHIPPED | OUTPATIENT
Start: 2024-03-11 | End: 2024-05-22 | Stop reason: SDUPTHER

## 2024-03-11 NOTE — PROGRESS NOTES
"Name: Shelly Vásquez  MRN: [id number]   CSN: [id number]      Date: 03/11/2024    Chief Complaint: RLS    Interval History:  - went to [facility]   -  had EMG (see below, I reviewed also), no clear neuropathy vs. Radiculopathy by their read (though I onder if small fiber sesnory is there)  - had R hip replacement and successful, now with hip lift  - still having spasms and RLS, and worse if anything since hip surgery  - spasms are "still so strong" always R>>>L   - is having more ineard turning R leg now, works with therapy to "turn out"  - has been with [facility]  - nothing has changed it much  - off mirapex now, on valium now (had seen Joana Doty in the past, now with Dr. Méndez)  - for sleep, she starts with a little more gabapentin and another before bed      PD Review of Symptoms:  Anosmia: normal  Dysarthria/Hypophonia: she notes mild hypophonia   Dysphagia/Sialorrhea: none   Depression: some whenever she is cramping   Cognitive slowing: good, sometimes she forgets short term   Urinary changes: yes, frequency   Constipation: takes miralax, started taking recently   Orthostasis: none   Falls: as above   Freezing: none   Micrographia: some smaller writing 2024  Sleep issues:   -YAJAIRA: none   -RBD: maybe some new sleep talking 2024    From Aug 2022  - requip 0.25 mg QHS, added gabapentin after last visit   - new to me, has seen [facility]   - having more pain into her R groin, mostly anterior and medial  - reviewed MRI with her  - does show L1-2 disc  - balance is "ok"  -  agrees her meory and mood are stable        From May 2022  - currently taking requip 0.25 mg QHS for RLS   - accompanied by spouse today   - dose works well if she takes it early, sometimes an hour or two before she gets in bed   - if she wakes up at night or going out for the evening, if she takes 0.125 -- this will get her through a social event   - if she gets up in the middle of the night she will take " another 0.125  - some involuntary jerking movements, lying down reading on the sofa and had these involuntary movements  - no loss of awareness, no loss of bowel or bladder function  - saw an osteopath who told her they thought her femoral nerve was inflamed  - some lower back pain, has not had lumbar MRI   - was told she needs a hip replacement  - still doing pilates and swimming/strength training  - one small fall during mardi gras, no major injuries     - supplements with vitamin B         From April 2021  - mirapex 0.125 mg QHS helping legs significantly   - only had cramps one night since I saw her last   - takes baclofen 10 mg QHS   - now taking 1/4 dose of miralax and that has helped significantly with constipation   - denies dizziness or lightheadedness  [facility]   - restarted oral iron       From 2/2021: Shelly Vásquez is R handed a81 y.o. 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 Uses restoril to sleep. Muscle cramps in the legs x 2 years, now legs jerking. Toes are cramping, curling. Fingers are also curling/cramping. Curling in hands started several years ago, had a shot in her hands and it helped. Legs involuntarily moving. Denies any odd sensation in her legs such as ants crawling or snakes to where she has to voluntarily move her legs to get the sensation out. Postural tremors in right hand when putting on makeup. Not sure if tremor is at rest. Balance issues started a few years ago. Fell once coming out of bathtub, held onto towel fay and it came out of the wall and fell backwards. No vision changes. Very careful when she walks. Sometimes slower to get out of the car just to make sure she doesn't fall out. Has fallen 3 times in the past four years. Has slowed down some, makes sure to plant  her feet correctly. Wearing out shoes, possibly some shuffling -- had to have her first paid of shoes resoled. Two nights ago had a terrible night of jerking and cramping, couldn't sleep at night at all. Only cramps at night time, not necessarily during the day. Denies neck pain or cramping. Denies head tremor. Not sure if she has noticed feet inversion or not.     Exercises 2-3 times a week. Walks in the park when the weather is better.     Currently not taking iron supplementation due to constipation but plans to restart now that she has started taking miralax.     Medication history:  - none     Neuroleptic exposure:  - none     Family History: son with cramping     Past medical, family, and social history reviewed as documented in chart with pertinent positive medical, family, and social history detailed in HPI.      Review of Systems:   Review of Systems   Constitutional:  Negative for chills, fever and malaise/fatigue.   HENT:  Negative for hearing loss.    Eyes:  Negative for blurred vision and double vision.   Respiratory:  Negative for cough, shortness of breath and stridor.    Cardiovascular:  Negative for chest pain and leg swelling.   Gastrointestinal:  Negative for constipation, diarrhea and nausea.   Genitourinary:  Negative for frequency and urgency.   Musculoskeletal:  Negative for falls.   Skin:  Negative for itching and rash.   Neurological:  Positive for sensory change. Negative for dizziness, tremors, loss of consciousness and weakness.   Psychiatric/Behavioral:  Negative for hallucinations and memory loss.        Past Medical History: The patient  has a past medical history of Arthritis, Atrial fibrillation, Basal cell cancer, Encounter for blood transfusion, Gastric ulcer, GERD (gastroesophageal reflux disease), Herpes simplex without mention of complication, Postmenopausal HRT (hormone replacement therapy), and Supraventricular tachycardia.    Social History: The patient  reports that she quit  smoking about 43 years ago. Her smoking use included cigarettes. She started smoking about 65 years ago. She has a 22.0 pack-year smoking history. She has never used smokeless tobacco. She reports current alcohol use of about 2.0 standard drinks of alcohol per week. She reports that she does not use drugs.    Family History: Their family history includes Cirrhosis in her mother; No Known Problems in her brother, daughter, father, maternal aunt, maternal grandfather, maternal grandmother, maternal uncle, paternal aunt, paternal grandfather, paternal grandmother, and paternal uncle; Pulmonary embolism in her son.    Allergies: Baclofen and Nsaids (non-steroidal anti-inflammatory drug)     Meds:   Needs meds updated      Exam:  LMP  (LMP Unknown)     LMP  (LMP Unknown)       Constitutional  Well-developed, well-nourished, appears stated age   Ophthalmoscopic  No papilledema with no hemorrhages or exudates bilaterally   Cardiovascular  Radial pulses 2+ and symmetric, no LE edema bilaterally   Neurological    * Mental status  MOCA deferred     - Orientation  Oriented to person, place, time, and situation     - Memory   Intact recent and remote     - Attention/concentration  Attentive, vigilant during exam     - Language  Naming & repetition intact, +2-step commands     - Fund of knowledge  Aware of current events     - Executive  Well-organized thoughts     - Other     * Cranial nerves       - CN II  PERRL, visual fields full to confrontation     - CN III, IV, VI  Extraocular movements full, normal pursuits and saccades     - CN V  Sensation V1 - V3 intact     - CN VII  Face strong and symmetric bilaterally     - CN VIII  Hearing intact bilaterally     - CN IX, X  Palate raises midline and symmetric     - CN XI  SCM and trapezius 5/5 bilaterally     - CN XII  Tongue midline   * Motor  Muscle bulk normal, strength 5/5 throughout   * Sensory   Intact to temperature and vibration throughout   * Coordination  No dysmetria  with finger-to-nose or heel-to-shin   * Gait  See below.   * Deep tendon reflexes  2+ and symmetric throughout, 1+ at ankles, no deficits   Babinski downgoing bilaterally   * Specialized movement exam  MIld hypophonic speech.    MIld facial masking.   No cogwheel rigidity.     mild bradykinesia, likely age appropriate    No tremor with rest, posture, kinesis, or intention.    No other dystonia, chorea, athetosis, myoclonus, or tics.   No motor impersistence.   Normal-based gait, cautious gait    Unable to tandem    No shortened stride length.   No abnormal arm swing.     No postural instability.      Laboratory/Radiological:  - Results:  No visits with results within 3 Month(s) from this visit.   Latest known visit with results is:   Clinical Support on 10/16/2023   Component Date Value Ref Range Status    WBC 10/16/2023 6.21  3.90 - 12.70 K/uL Final    RBC 10/16/2023 2.91 (L)  4.00 - 5.40 M/uL Final    Hemoglobin 10/16/2023 8.9 (L)  12.0 - 16.0 g/dL Final    Hematocrit 10/16/2023 28.9 (L)  37.0 - 48.5 % Final    MCV 10/16/2023 99 (H)  82 - 98 fL Final    MCH 10/16/2023 30.6  27.0 - 31.0 pg Final    MCHC 10/16/2023 30.8 (L)  32.0 - 36.0 g/dL Final    RDW 10/16/2023 20.6 (H)  11.5 - 14.5 % Final    Platelets 10/16/2023 571 (H)  150 - 450 K/uL Final    MPV 10/16/2023 9.6  9.2 - 12.9 fL Final    Immature Granulocytes 10/16/2023 0.3  0.0 - 0.5 % Final    Gran # (ANC) 10/16/2023 3.5  1.8 - 7.7 K/uL Final    Immature Grans (Abs) 10/16/2023 0.02  0.00 - 0.04 K/uL Final    Lymph # 10/16/2023 1.8  1.0 - 4.8 K/uL Final    Mono # 10/16/2023 0.7  0.3 - 1.0 K/uL Final    Eos # 10/16/2023 0.2  0.0 - 0.5 K/uL Final    Baso # 10/16/2023 0.06  0.00 - 0.20 K/uL Final    nRBC 10/16/2023 0  0 /100 WBC Final    Gran % 10/16/2023 55.8  38.0 - 73.0 % Final    Lymph % 10/16/2023 29.5  18.0 - 48.0 % Final    Mono % 10/16/2023 11.0  4.0 - 15.0 % Final    Eosinophil % 10/16/2023 2.4  0.0 - 8.0 % Final    Basophil % 10/16/2023 1.0  0.0 - 1.9 %  Final    Differential Method 10/16/2023 Automated   Final    Sodium 10/16/2023 135 (L)  136 - 145 mmol/L Final    Potassium 10/16/2023 4.9  3.5 - 5.1 mmol/L Final    Chloride 10/16/2023 105  95 - 110 mmol/L Final    CO2 10/16/2023 21 (L)  23 - 29 mmol/L Final    Glucose 10/16/2023 87  70 - 110 mg/dL Final    BUN 10/16/2023 12  8 - 23 mg/dL Final    Creatinine 10/16/2023 0.8  0.5 - 1.4 mg/dL Final    Calcium 10/16/2023 9.4  8.7 - 10.5 mg/dL Final    Total Protein 10/16/2023 6.5  6.0 - 8.4 g/dL Final    Albumin 10/16/2023 4.1  3.5 - 5.2 g/dL Final    Total Bilirubin 10/16/2023 0.8  0.1 - 1.0 mg/dL Final    Alkaline Phosphatase 10/16/2023 63  55 - 135 U/L Final    AST 10/16/2023 13  10 - 40 U/L Final    ALT 10/16/2023 12  10 - 44 U/L Final    eGFR 10/16/2023 >60.0  >60 mL/min/1.73 m^2 Final    Anion Gap 10/16/2023 9  8 - 16 mmol/L Final       - Independent review of images: MRI          - Independent review of consultant's notes: Dev     ASSESSMENT:  1) RLS, tremor, and ? 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

## 2024-03-12 ENCOUNTER — PATIENT MESSAGE (OUTPATIENT)
Dept: INTERNAL MEDICINE | Facility: CLINIC | Age: 84
End: 2024-03-12
Payer: MEDICARE

## 2024-03-13 PROBLEM — G20.A2 PARKINSON'S DISEASE WITHOUT DYSKINESIA, WITH FLUCTUATING MANIFESTATIONS: Status: ACTIVE | Noted: 2024-03-13

## 2024-03-14 RX ORDER — GABAPENTIN 300 MG/1
300 CAPSULE ORAL 3 TIMES DAILY PRN
Qty: 90 CAPSULE | Refills: 2 | Status: SHIPPED | OUTPATIENT
Start: 2024-03-14 | End: 2024-05-22 | Stop reason: SDUPTHER

## 2024-03-17 ENCOUNTER — PATIENT MESSAGE (OUTPATIENT)
Dept: NEUROLOGY | Facility: CLINIC | Age: 84
End: 2024-03-17
Payer: MEDICARE

## 2024-03-25 ENCOUNTER — PATIENT MESSAGE (OUTPATIENT)
Dept: INTERNAL MEDICINE | Facility: CLINIC | Age: 84
End: 2024-03-25
Payer: MEDICARE

## 2024-03-25 ENCOUNTER — PATIENT MESSAGE (OUTPATIENT)
Dept: ORTHOPEDICS | Facility: CLINIC | Age: 84
End: 2024-03-25
Payer: MEDICARE

## 2024-03-31 ENCOUNTER — EXTERNAL CHRONIC CARE MANAGEMENT (OUTPATIENT)
Dept: PRIMARY CARE CLINIC | Facility: CLINIC | Age: 84
End: 2024-03-31
Payer: MEDICARE

## 2024-03-31 PROCEDURE — 99490 CHRNC CARE MGMT STAFF 1ST 20: CPT | Mod: PBBFAC | Performed by: INTERNAL MEDICINE

## 2024-03-31 PROCEDURE — 99490 CHRNC CARE MGMT STAFF 1ST 20: CPT | Mod: S$PBB,,, | Performed by: INTERNAL MEDICINE

## 2024-04-07 RX ORDER — TEMAZEPAM 15 MG/1
15 CAPSULE ORAL NIGHTLY PRN
Qty: 30 CAPSULE | Refills: 3 | Status: SHIPPED | OUTPATIENT
Start: 2024-04-07 | End: 2024-04-21 | Stop reason: SDUPTHER

## 2024-04-07 NOTE — TELEPHONE ENCOUNTER
No care due was identified.  [facility] Embedded Care Due Messages. Reference number: [id number].   4/07/2024 11:36:15 AM CDT

## 2024-04-09 ENCOUNTER — LAB VISIT (OUTPATIENT)
Dept: LAB | Facility: HOSPITAL | Age: 84
End: 2024-04-09
Payer: MEDICARE

## 2024-04-09 ENCOUNTER — OFFICE VISIT (OUTPATIENT)
Dept: HEMATOLOGY/ONCOLOGY | Facility: CLINIC | Age: 84
End: 2024-04-09
Payer: MEDICARE

## 2024-04-09 VITALS
DIASTOLIC BLOOD PRESSURE: 62 MMHG | OXYGEN SATURATION: 100 % | BODY MASS INDEX: 23.1 KG/M2 | WEIGHT: 152.44 LBS | SYSTOLIC BLOOD PRESSURE: 124 MMHG | RESPIRATION RATE: 18 BRPM | HEART RATE: 74 BPM | TEMPERATURE: 99 F | HEIGHT: 68 IN

## 2024-04-09 DIAGNOSIS — D46.9 MDS (MYELODYSPLASTIC SYNDROME): ICD-10-CM

## 2024-04-09 DIAGNOSIS — D46.9 MDS (MYELODYSPLASTIC SYNDROME): Primary | ICD-10-CM

## 2024-04-09 DIAGNOSIS — D46.1 REFRACTORY ANEMIA WITH RING SIDEROBLASTS: ICD-10-CM

## 2024-04-09 LAB
ABO + RH BLD: NORMAL
ALBUMIN SERPL BCP-MCNC: 4.4 G/DL (ref 3.5–5.2)
ALP SERPL-CCNC: 42 U/L (ref 55–135)
ALT SERPL W/O P-5'-P-CCNC: 17 U/L (ref 10–44)
ANION GAP SERPL CALC-SCNC: 7 MMOL/L (ref 8–16)
AST SERPL-CCNC: 28 U/L (ref 10–40)
BASOPHILS # BLD AUTO: 0.07 K/UL (ref 0–0.2)
BASOPHILS NFR BLD: 1.2 % (ref 0–1.9)
BILIRUB SERPL-MCNC: 0.9 MG/DL (ref 0.1–1)
BLD GP AB SCN CELLS X3 SERPL QL: NORMAL
BUN SERPL-MCNC: 18 MG/DL (ref 8–23)
CALCIUM SERPL-MCNC: 9.7 MG/DL (ref 8.7–10.5)
CHLORIDE SERPL-SCNC: 106 MMOL/L (ref 95–110)
CO2 SERPL-SCNC: 23 MMOL/L (ref 23–29)
CREAT SERPL-MCNC: 0.9 MG/DL (ref 0.5–1.4)
DIFFERENTIAL METHOD BLD: ABNORMAL
EOSINOPHIL # BLD AUTO: 0.2 K/UL (ref 0–0.5)
EOSINOPHIL NFR BLD: 2.7 % (ref 0–8)
ERYTHROCYTE [DISTWIDTH] IN BLOOD BY AUTOMATED COUNT: 18.2 % (ref 11.5–14.5)
EST. GFR  (NO RACE VARIABLE): >60 ML/MIN/1.73 M^2
GLUCOSE SERPL-MCNC: 92 MG/DL (ref 70–110)
HCT VFR BLD AUTO: 25.8 % (ref 37–48.5)
HGB BLD-MCNC: 8.6 G/DL (ref 12–16)
IMM GRANULOCYTES # BLD AUTO: 0.01 K/UL (ref 0–0.04)
IMM GRANULOCYTES NFR BLD AUTO: 0.2 % (ref 0–0.5)
LYMPHOCYTES # BLD AUTO: 2.2 K/UL (ref 1–4.8)
LYMPHOCYTES NFR BLD: 37.2 % (ref 18–48)
MAGNESIUM SERPL-MCNC: 2.3 MG/DL (ref 1.6–2.6)
MCH RBC QN AUTO: 36.6 PG (ref 27–31)
MCHC RBC AUTO-ENTMCNC: 33.3 G/DL (ref 32–36)
MCV RBC AUTO: 110 FL (ref 82–98)
MONOCYTES # BLD AUTO: 0.6 K/UL (ref 0.3–1)
MONOCYTES NFR BLD: 9.6 % (ref 4–15)
NEUTROPHILS # BLD AUTO: 3 K/UL (ref 1.8–7.7)
NEUTROPHILS NFR BLD: 49.1 % (ref 38–73)
NRBC BLD-RTO: 0 /100 WBC
PHOSPHATE SERPL-MCNC: 3.4 MG/DL (ref 2.7–4.5)
PLATELET # BLD AUTO: 508 K/UL (ref 150–450)
PMV BLD AUTO: 10.3 FL (ref 9.2–12.9)
POTASSIUM SERPL-SCNC: 5.2 MMOL/L (ref 3.5–5.1)
PROT SERPL-MCNC: 7 G/DL (ref 6–8.4)
RBC # BLD AUTO: 2.35 M/UL (ref 4–5.4)
SODIUM SERPL-SCNC: 136 MMOL/L (ref 136–145)
SPECIMEN OUTDATE: NORMAL
WBC # BLD AUTO: 6.02 K/UL (ref 3.9–12.7)

## 2024-04-09 PROCEDURE — 99999 PR PBB SHADOW E&M-EST. PATIENT-LVL IV: CPT | Mod: PBBFAC,,, | Performed by: INTERNAL MEDICINE

## 2024-04-09 PROCEDURE — 86850 RBC ANTIBODY SCREEN: CPT | Performed by: INTERNAL MEDICINE

## 2024-04-09 PROCEDURE — 99214 OFFICE O/P EST MOD 30 MIN: CPT | Mod: PBBFAC,25 | Performed by: INTERNAL MEDICINE

## 2024-04-09 PROCEDURE — 99214 OFFICE O/P EST MOD 30 MIN: CPT | Mod: S$PBB,,, | Performed by: INTERNAL MEDICINE

## 2024-04-09 PROCEDURE — 84100 ASSAY OF PHOSPHORUS: CPT | Performed by: INTERNAL MEDICINE

## 2024-04-09 PROCEDURE — 80053 COMPREHEN METABOLIC PANEL: CPT | Performed by: INTERNAL MEDICINE

## 2024-04-09 PROCEDURE — 83735 ASSAY OF MAGNESIUM: CPT | Performed by: INTERNAL MEDICINE

## 2024-04-09 PROCEDURE — 85025 COMPLETE CBC W/AUTO DIFF WBC: CPT | Performed by: INTERNAL MEDICINE

## 2024-04-09 RX ORDER — PANTOPRAZOLE SODIUM 40 MG/1
40 TABLET, DELAYED RELEASE ORAL 2 TIMES DAILY
COMMUNITY
Start: 2024-03-29

## 2024-04-09 NOTE — PROGRESS NOTES
Section of Hematology and Stem Cell Transplantation  Follow Up Visit     Date of visit: 4/9/24  Visit diagnosis: MDS (myelodysplastic syndrome) [D46.9]    Oncologic History:     Primary Oncologic Diagnosis: Myelodysplastic syndrome with ring sideroblasts, SF3B1, IPSS-M very low risk    12/19/19: Bone marrow biopsy revealed a hypercellular marrow (60%) with trilineage hematopoiesis, mild megakaryocytic hyperplasia, and markedly increased ring siderblasts (50%). Diploid karyotype. NGS with SF3B1 (VAF 35%). Observation recommended.     History of Present Ilness:   Shelly Byrnes) is a pleasant 83 y.o.female with a past medical history of low risk MDS who presents for follow up. She feels well at this time. No new symptoms. She is exercising daily.     PAST MEDICAL HISTORY:   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)       PAST SURGICAL HISTORY:   Past Surgical History:   Procedure Laterality Date    APPENDECTOMY  age 21    ARTHROPLASTY OF HIP BY ANTERIOR APPROACH Right 4/26/2023    Procedure: ARTHROPLASTY, HIP, TOTAL, ANTERIOR APPROACH: RIGHT: DEPUY - C-STEM + PINNACLE;  Surgeon: Keon Gary III, MD;  Location: [facility] OR [address] [place] [address];  Service: Orthopedics;  Laterality: Right;    BACK SURGERY      Beast Lift  2004    BONE MARROW BIOPSY Right 12/19/2019    Procedure: Biopsy-bone marrow;  Surgeon: Aidan Spring MD;  Location: [facility] OR [address] [place] [address];  Service: Oncology;  Laterality: Right;    BREAST BIOPSY  50yrs ago    unable to see scar    CATARACT EXTRACTION W/  INTRAOCULAR LENS IMPLANT Right 9/7/2023    Procedure: EXTRACTION, CATARACT, WITH IOL INSERTION;   Surgeon: Francisco Willingham MD;  Location: [facility] OR;  Service: Ophthalmology;  Laterality: Right;  [facility] ONLY    CATARACT EXTRACTION W/  INTRAOCULAR LENS IMPLANT Left 10/2/2023    Procedure: EXTRACTION, CATARACT, WITH IOL INSERTION;  Surgeon: Francisco Willingham MD;  Location: [facility] OR;  Service: Ophthalmology;  Laterality: Left;  [facility] ONLY    COSMETIC SURGERY      DILATION AND CURETTAGE OF UTERUS  2008    PMB    ENDOMETRIAL BIOPSY      EPIDURAL STEROID INJECTION N/A 8/25/2022    Procedure: LUMBAR CELINA CONTRAST DIRECT REFERRAL;  Surgeon: Rj Hernandez MD;  Location: [facility] PAIN MGT;  Service: Pain Management;  Laterality: N/A;    ESOPHAGOGASTRODUODENOSCOPY N/A 10/4/2023    Procedure: EGD (ESOPHAGOGASTRODUODENOSCOPY);  Surgeon: Hugo Menchaca MD;  Location: [facility] ([address] [place] [address]);  Service: Endoscopy;  Laterality: N/A;    EYE SURGERY      INJECTION OF ANESTHETIC AGENT AROUND NERVE Bilateral 11/11/2022    Procedure: BLOCK, NERVE BILATERAL L2,L3,L4 AND L5 MEDIAL BRANCH ONE OF TWO;  Surgeon: Rj Hernandez MD;  Location: [facility] PAIN MGT;  Service: Pain Management;  Laterality: Bilateral;    INJECTION OF JOINT Right 12/2/2022    Procedure: INJECTION, JOINT RIGHT INTRARTICULAR HIP CONTRAST;  Surgeon: Rj Hernandez MD;  Location: [facility] PAIN MGT;  Service: Pain Management;  Laterality: Right;    ptsosis      RADIOFREQUENCY ABLATION  03/2018    SMALL INTESTINE SURGERY      TONSILLECTOMY, ADENOIDECTOMY  age 8    TOTAL REDUCTION MAMMOPLASTY Bilateral 2003    TRANSFORAMINAL EPIDURAL INJECTION OF STEROID Right 8/11/2022    Procedure: Injection,steroid,epidural Right L1/2 TF DIrect Referral Instructed to provide intervention per MRI results;  Surgeon: Rj Hernandez MD;  Location: [facility] PAIN MGT;  Service: Pain Management;  Laterality: Right;    TRANSFORAMINAL EPIDURAL INJECTION OF STEROID Right 10/21/2022    Procedure: INJECTION, STEROID, EPIDURAL, TRANSFORAMINAL APPROACH, L5-S1 and S1, RIGHT CONTRAST;  Surgeon: Rj Hernandez MD;   Location: [facility] MGT;  Service: Pain Management;  Laterality: Right;    TUBAL LIGATION         PAST SOCIAL HISTORY:  Social History     Tobacco Use    Smoking status: Former     Current packs/day: 0.00     Average packs/day: 1 pack/day for 22.0 years (22.0 ttl pk-yrs)     Types: Cigarettes     Start date: 1958     Quit date: 1980     Years since quittin.7    Smokeless tobacco: Never   Substance Use Topics    Alcohol use: Yes     Alcohol/week: 2.0 standard drinks of alcohol     Types: 2 Glasses of wine per week     Comment: 1-2 glasses of  some night    Drug use: No       FAMILY HISTORY:  Family History   Problem Relation Age of Onset    No Known Problems Father     Cirrhosis Mother     No Known Problems Brother     No Known Problems Maternal Aunt     No Known Problems Maternal Uncle     No Known Problems Paternal Aunt     No Known Problems Paternal Uncle     No Known Problems Maternal Grandmother     No Known Problems Maternal Grandfather     No Known Problems Paternal Grandmother     No Known Problems Paternal Grandfather     No Known Problems Daughter     Pulmonary embolism Son     Breast cancer Neg Hx     Colon cancer Neg Hx     Ovarian cancer Neg Hx     Amblyopia Neg Hx     Blindness Neg Hx     Cancer Neg Hx     Cataracts Neg Hx     Diabetes Neg Hx     Glaucoma Neg Hx     Hypertension Neg Hx     Macular degeneration Neg Hx     Retinal detachment Neg Hx     Strabismus Neg Hx     Stroke Neg Hx     Thyroid disease Neg Hx        CURRENT MEDICATIONS:   Current Outpatient Medications   Medication Sig    acetaminophen (TYLENOL) 650 MG TbSR Take 1 tablet (650 mg total) by mouth every 8 (eight) hours.    carbidopa-levodopa  mg (SINEMET)  mg per tablet Take 1 tablet by mouth 3 (three) times daily. For first week, take 1/2 tab 3 times per day.    cyanocobalamin 1,000 mcg/mL injection Inject 1 mL (1,000 mcg total) into the muscle every 14 (fourteen) days.    diazePAM (VALIUM) 2 MG tablet Take  1 tablet (2 mg total) by mouth daily as needed for Anxiety.    estradiol-norethindrone acet 0.5-0.1 mg per tablet TAKE 1 TABLET BY MOUTH EVERY DAY    gabapentin (NEURONTIN) 300 MG capsule Take 1 capsule (300 mg total) by mouth 3 (three) times daily as needed.    pantoprazole (PROTONIX) 40 MG tablet Take 40 mg by mouth 2 (two) times daily.    polyethylene glycol 3350 (MIRALAX ORAL) Take 1 Capful by mouth daily as needed (constipation).    pramipexole (MIRAPEX) 1 MG tablet Take 1 tablet (1 mg total) by mouth 2 (two) times a day.    temazepam (RESTORIL) 15 mg Cap Take 1 capsule (15 mg total) by mouth nightly as needed.    vitamin D (VITAMIN D3) 1000 units Tab Take 1,000 Units by mouth once daily.    aspirin 81 MG Chew Take 81 mg by mouth every Mon, Wed, Fri. (Patient not taking: Reported on 4/9/2024)    diazePAM (VALIUM) 2 MG tablet Take 1 tablet (2 mg total) by mouth every 12 (twelve) hours as needed for Anxiety. (Patient not taking: Reported on 4/9/2024)    primidone (MYSOLINE) 50 MG Tab Take 1 tablet (50 mg total) by mouth every evening. (Patient not taking: Reported on 4/9/2024)    verapamiL (VERELAN) 120 MG C24P TAKE 1 CAPSULE(120 MG) BY MOUTH EVERY DAY (Patient not taking: Reported on 4/9/2024)     No current facility-administered medications for this visit.       ALLERGIES:   Review of patient's allergies indicates:   Allergen Reactions    Baclofen      Incoherent     Nsaids (non-steroidal anti-inflammatory drug)      GI Bleed  Had 5 blood transfusions       Review of Systems:     Pertinent positives and negatives included in the HPI. Otherwise a complete review of systems is negative.    Physical Exam:     Vitals:    04/09/24 1647   BP: 124/62   Pulse: 74   Resp: 18   Temp: 98.6 °F (37 °C)     General: Appears well, NAD  Pulmonary: CTAB, no increased work of breathing, no W/R/C  Cardiovascular: S1S2 normal, RRR, no M/R/G  Abdominal: Soft, NT, ND, BS+, no HSM  Extremities: No C/C/E  Neurological: AAOx4, grossly  normal, no focal deficits  Dermatologic: No appreciable rashes or lesions  Lymphatic: No cervical, axillary, or inguinal lymphadenopathy     ECOG Performance Status: (foot note - ECOG PS provided by Eastern Cooperative Oncology Group) 0 - Asymptomatic    Karnofsky Performance Score:  100%- Normal, No Complaints, No Evidence of Disease    Labs:   Lab Results   Component Value Date    WBC 6.02 04/09/2024    RBC 2.35 (L) 04/09/2024    HGB 8.6 (L) 04/09/2024    HCT 25.8 (L) 04/09/2024     (H) 04/09/2024    MCH 36.6 (H) 04/09/2024    MCHC 33.3 04/09/2024    RDW 18.2 (H) 04/09/2024     (H) 04/09/2024    MPV 10.3 04/09/2024    GRAN 3.0 04/09/2024    GRAN 49.1 04/09/2024    LYMPH 2.2 04/09/2024    LYMPH 37.2 04/09/2024    MONO 0.6 04/09/2024    MONO 9.6 04/09/2024    EOS 0.2 04/09/2024    BASO 0.07 04/09/2024    EOSINOPHIL 2.7 04/09/2024    BASOPHIL 1.2 04/09/2024       CMP  Sodium   Date Value Ref Range Status   04/09/2024 136 136 - 145 mmol/L Final     Potassium   Date Value Ref Range Status   04/09/2024 5.2 (H) 3.5 - 5.1 mmol/L Final     Chloride   Date Value Ref Range Status   04/09/2024 106 95 - 110 mmol/L Final     CO2   Date Value Ref Range Status   04/09/2024 23 23 - 29 mmol/L Final     Glucose   Date Value Ref Range Status   04/09/2024 92 70 - 110 mg/dL Final     BUN   Date Value Ref Range Status   04/09/2024 18 8 - 23 mg/dL Final     Creatinine   Date Value Ref Range Status   04/09/2024 0.9 0.5 - 1.4 mg/dL Final     Calcium   Date Value Ref Range Status   04/09/2024 9.7 8.7 - 10.5 mg/dL Final     Total Protein   Date Value Ref Range Status   04/09/2024 7.0 6.0 - 8.4 g/dL Final     Albumin   Date Value Ref Range Status   04/09/2024 4.4 3.5 - 5.2 g/dL Final     Total Bilirubin   Date Value Ref Range Status   04/09/2024 0.9 0.1 - 1.0 mg/dL Final     Comment:     For infants and newborns, interpretation of results should be based  on gestational age, weight and in agreement with  clinical  observations.    Premature Infant recommended reference ranges:  Up to 24 hours.............<8.0 mg/dL  Up to 48 hours............<12.0 mg/dL  3-5 days..................<15.0 mg/dL  6-29 days.................<15.0 mg/dL       Alkaline Phosphatase   Date Value Ref Range Status   04/09/2024 42 (L) 55 - 135 U/L Final     AST ([place])   Date Value Ref Range Status   12/31/2015 14 14 - 36 U/L Final     AST   Date Value Ref Range Status   04/09/2024 28 10 - 40 U/L Final     Comment:     *Result may be interfered by visible hemolysis     ALT   Date Value Ref Range Status   04/09/2024 17 10 - 44 U/L Final     Anion Gap   Date Value Ref Range Status   04/09/2024 7 (L) 8 - 16 mmol/L Final     eGFR if    Date Value Ref Range Status   05/12/2022 >60.0 >60 mL/min/1.73 m^2 Final     eGFR if non    Date Value Ref Range Status   05/12/2022 59.3 (A) >60 mL/min/1.73 m^2 Final     Comment:     Calculation used to obtain the estimated glomerular filtration  rate (eGFR) is the CKD-EPI equation.        Pathology:  Reviewed      Assessment and Plan:   Shelly Vásquez (Shelly) is a pleasant 83 y.o.female with a past medical history of low risk MDS who presents for follow up.    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  She is had a mild anemia which has been stable for years. We discussed luspatercept as an option to increase her hemoglobin, but she is not interested at this time as she is not symptomatic from her anemia.   Continue observation.     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 Transfuse pre-op as above.    Follow up: Will arrange follow up q6 months.     Orders/Follow Up:           Route Chart for Scheduling    BMT Chart Routing      Follow up with physician 6 months.   Follow up with [facility]    Provider visit type    Infusion  scheduling note    Injection scheduling note    Labs CBC, CMP, phosphorus and magnesium   Scheduling:  Preferred lab:  Lab interval:  prior to visit   Imaging    Pharmacy appointment    Other referrals                     Advance Care Planning   Date: 03/15/2023  She has low risk MDS. We previously discussed the good overall prognosis associated with this diagnosis. She is interested in treatment if need be. At this time will continue supportive care.       Total time of this visit was 30, including time spent face to face with patient and/or via video/audio, and also in preparing for today's visit for MDM and documentation. (Medical Decision Making, including consideration of possible diagnoses, management options, complex medical record review, review of diagnostic tests and information, consideration and discussion of significant complications based on comorbidities, and discussion with providers involved with the care of the patient). Greater than 50% was spent face to face with the patient counseling and coordinating care.      Karan Lopez MD  Hematology, Oncology, and Stem Cell Transplantation  [facility] and [facility]

## 2024-04-12 RX ORDER — PRAMIPEXOLE DIHYDROCHLORIDE 1 MG/1
1 TABLET ORAL 2 TIMES DAILY
Qty: 180 TABLET | Refills: 1 | Status: SHIPPED | OUTPATIENT
Start: 2024-04-12

## 2024-04-12 NOTE — TELEPHONE ENCOUNTER
No care due was identified.  [facility] Embedded Care Due Messages. Reference number: [id number].   4/12/2024 11:57:39 AM CDT

## 2024-04-15 DIAGNOSIS — D46.9 MDS (MYELODYSPLASTIC SYNDROME): Primary | ICD-10-CM

## 2024-04-19 ENCOUNTER — PATIENT MESSAGE (OUTPATIENT)
Dept: INTERNAL MEDICINE | Facility: CLINIC | Age: 84
End: 2024-04-19
Payer: MEDICARE

## 2024-04-19 ENCOUNTER — TELEPHONE (OUTPATIENT)
Dept: NEUROLOGY | Facility: CLINIC | Age: 84
End: 2024-04-19

## 2024-04-19 ENCOUNTER — OFFICE VISIT (OUTPATIENT)
Dept: PODIATRY | Facility: CLINIC | Age: 84
End: 2024-04-19
Payer: MEDICARE

## 2024-04-19 ENCOUNTER — TELEPHONE (OUTPATIENT)
Dept: INTERNAL MEDICINE | Facility: CLINIC | Age: 84
End: 2024-04-19
Payer: MEDICARE

## 2024-04-19 VITALS
BODY MASS INDEX: 23.12 KG/M2 | WEIGHT: 152.56 LBS | HEIGHT: 68 IN | DIASTOLIC BLOOD PRESSURE: 58 MMHG | SYSTOLIC BLOOD PRESSURE: 100 MMHG | HEART RATE: 79 BPM

## 2024-04-19 DIAGNOSIS — L60.0 INGROWN NAIL: Primary | ICD-10-CM

## 2024-04-19 DIAGNOSIS — L03.039 PARONYCHIA, TOE, UNSPECIFIED LATERALITY: ICD-10-CM

## 2024-04-19 DIAGNOSIS — M79.675 TOE PAIN, BILATERAL: ICD-10-CM

## 2024-04-19 DIAGNOSIS — M79.674 TOE PAIN, BILATERAL: ICD-10-CM

## 2024-04-19 PROCEDURE — 99999 PR PBB SHADOW E&M-EST. PATIENT-LVL III: CPT | Mod: PBBFAC,,, | Performed by: PODIATRIST

## 2024-04-19 PROCEDURE — 99213 OFFICE O/P EST LOW 20 MIN: CPT | Mod: PBBFAC,PN | Performed by: PODIATRIST

## 2024-04-19 PROCEDURE — 99214 OFFICE O/P EST MOD 30 MIN: CPT | Mod: S$PBB,,, | Performed by: PODIATRIST

## 2024-04-19 RX ORDER — TOBRAMYCIN 3 MG/ML
SOLUTION/ DROPS OPHTHALMIC
Qty: 5 ML | Refills: 3 | Status: SHIPPED | OUTPATIENT
Start: 2024-04-19

## 2024-04-19 NOTE — TELEPHONE ENCOUNTER
Spoke with the patient to discuss reason for new pt appt with Dr. Leavitt. Pt's  saw Dr. Leavitt, for vascular neuro needs, and pt felt Dr Leavitt was kind and attentive. Pt felt as if the visit

## 2024-04-19 NOTE — PROGRESS NOTES
Subjective:      Patient ID: Shelly Vásquez is a 83 y.o. female.    Chief Complaint: Toe Pain (Right toe pain)    Sharp cover throbbing pain both  big toes /nails.   It has been a chronic condition present for years which she usually has a dressed with pedicures.  However this time this did not improving to improve after the pedicure.  This exacerbation has been Gradual onset, worsening over past several weeks, aggravated by increased weight bearing, shoe gear, pressure.  Trimming and topical antibiotics have helped well in the past..  Denies trauma and surgery both feet    Review of Systems   Constitutional: Negative for chills, diaphoresis, fever, malaise/fatigue and night sweats.   Cardiovascular:  Negative for claudication, cyanosis, leg swelling and syncope.   Skin:  Positive for nail changes. Negative for color change, dry skin, rash, suspicious lesions and unusual hair distribution.   Musculoskeletal:  Negative for falls, joint pain, joint swelling, muscle cramps, muscle weakness and stiffness.   Gastrointestinal:  Negative for constipation, diarrhea, nausea and vomiting.   Neurological:  Negative for brief paralysis, disturbances in coordination, focal weakness, numbness, paresthesias, sensory change and tremors.           Objective:      Physical Exam  Constitutional:       General: She is not in acute distress.     Appearance: She is well-developed. She is not diaphoretic.   Cardiovascular:      Pulses:           Popliteal pulses are 2+ on the right side and 2+ on the left side.        Dorsalis pedis pulses are 2+ on the right side and 2+ on the left side.        Posterior tibial pulses are 2+ on the right side and 2+ on the left side.      Comments: Capillary refill 3 seconds all toes/distal feet, all toes/both feet warm to touch.      Negative lymphadenopathy bilateral popliteal fossa and tarsal tunnel.      Negavie lower extremity edema bilateral.    Musculoskeletal:      Right ankle: No swelling,  deformity, ecchymosis or lacerations. Normal range of motion. Normal pulse.      Right Achilles Tendon: Normal. No defects. Wei's test negative.      Comments: Normal angle, base, station of gait. All ten toes without clubbing, cyanosis, or signs of ischemia.  No pain to palpation bilateral lower extremities.  Range of motion, stability, muscle strength, and muscle tone normal bilateral feet and legs.    Lymphadenopathy:      Lower Body: No right inguinal adenopathy. No left inguinal adenopathy.      Comments: Negative lymphadenopathy bilateral popliteal fossa and tarsal tunnel.    Negative lymphangitic streaking bilateral feet/ankles/legs.   Skin:     General: Skin is warm and dry.      Capillary Refill: Capillary refill takes 2 to 3 seconds.      Coloration: Skin is not pale.      Findings: No abrasion, bruising, burn, ecchymosis, erythema, laceration, lesion or rash.      Nails: There is no clubbing.      Comments: Visible and palpable ingrowth of toenail lateral and medial border left and right hallux with pain to palpation, and focal localized erythema and edema,  without ulceration, drainage, pus, tracking, fluctuance, malodor, or cardinal signs infection.         Neurological:      Mental Status: She is alert and oriented to person, place, and time.      Sensory: No sensory deficit.      Motor: No tremor, atrophy or abnormal muscle tone.      Gait: Gait normal.      Deep Tendon Reflexes:      Reflex Scores:       Patellar reflexes are 2+ on the right side and 2+ on the left side.       Achilles reflexes are 2+ on the right side and 2+ on the left side.     Comments: Negative tinel sign to percussion sural, superficial peroneal, deep peroneal, saphenous, and posterior tibial nerves right and left ankles and feet.     Psychiatric:         Behavior: Behavior is cooperative.             Assessment:       Encounter Diagnoses   Name Primary?    Ingrown nail Yes    Toe pain, bilateral     Paronychia, toe,  "unspecified laterality          Plan:       Shelly Dalton" was seen today for toe pain.    Diagnoses and all orders for this visit:    Ingrown nail    Toe pain, bilateral    Paronychia, toe, unspecified laterality    Other orders  -     tobramycin sulfate 0.3% (TOBREX) 0.3 % ophthalmic solution; 1-2 drops topically twice daily to affected toe(s).      I counseled the patient on her conditions, their implications and medical management.      Topical tobramycin drops twice daily both hallux.      Cover both hallux all times with Band-Aid or similar changing daily until completely healed.      Discussed conservative treatment with shoes of adequate dimensions, material, and style to alleviate symptoms and delay or prevent surgical intervention.     Utilizing sterile toenail clippers I aggressively debrided the offending nail border approximately 3 mm from its edge and carried the nail plate incision down at an angle in order to wedge out the offending cryptotic portion of the nail plate. The offending border was then removed in toto. The area was cleansed with alcohol. Patient tolerated the procedure well and related significant relief.          No follow-ups on file.          "

## 2024-04-22 RX ORDER — TEMAZEPAM 15 MG/1
15 CAPSULE ORAL NIGHTLY PRN
Qty: 30 CAPSULE | Refills: 3 | Status: SHIPPED | OUTPATIENT
Start: 2024-04-22 | End: 2024-04-25 | Stop reason: SDUPTHER

## 2024-04-22 RX ORDER — DIAZEPAM 2 MG/1
2 TABLET ORAL DAILY PRN
Qty: 30 TABLET | Refills: 0 | Status: SHIPPED | OUTPATIENT
Start: 2024-04-22 | End: 2024-04-25 | Stop reason: SDUPTHER

## 2024-04-22 NOTE — TELEPHONE ENCOUNTER
No care due was identified.  [facility] Embedded Care Due Messages. Reference number: [id number].   4/21/2024 8:56:27 PM CDT

## 2024-04-25 RX ORDER — TEMAZEPAM 15 MG/1
15 CAPSULE ORAL NIGHTLY PRN
Qty: 90 CAPSULE | Refills: 1 | Status: SHIPPED | OUTPATIENT
Start: 2024-04-25 | End: 2024-05-22 | Stop reason: SDUPTHER

## 2024-04-25 RX ORDER — DIAZEPAM 2 MG/1
2 TABLET ORAL EVERY 12 HOURS PRN
Qty: 45 TABLET | Refills: 2 | Status: SHIPPED | OUTPATIENT
Start: 2024-04-25 | End: 2024-05-22 | Stop reason: SDUPTHER

## 2024-04-30 ENCOUNTER — EXTERNAL CHRONIC CARE MANAGEMENT (OUTPATIENT)
Dept: PRIMARY CARE CLINIC | Facility: CLINIC | Age: 84
End: 2024-04-30
Payer: MEDICARE

## 2024-04-30 ENCOUNTER — TELEPHONE (OUTPATIENT)
Dept: DERMATOLOGY | Facility: CLINIC | Age: 84
End: 2024-04-30
Payer: MEDICARE

## 2024-04-30 PROCEDURE — 99439 CHRNC CARE MGMT STAF EA ADDL: CPT | Mod: PBBFAC | Performed by: INTERNAL MEDICINE

## 2024-04-30 PROCEDURE — 99490 CHRNC CARE MGMT STAFF 1ST 20: CPT | Mod: S$PBB,,, | Performed by: INTERNAL MEDICINE

## 2024-04-30 PROCEDURE — 99439 CHRNC CARE MGMT STAF EA ADDL: CPT | Mod: S$PBB,,, | Performed by: INTERNAL MEDICINE

## 2024-04-30 PROCEDURE — 99490 CHRNC CARE MGMT STAFF 1ST 20: CPT | Mod: PBBFAC | Performed by: INTERNAL MEDICINE

## 2024-04-30 NOTE — TELEPHONE ENCOUNTER
Scheduled pt for Mohs for BCC L lateral neck on 6/17 at 11:30. Pt confirmed date, time, and location. Reminder packet sent in mail.

## 2024-05-01 ENCOUNTER — OFFICE VISIT (OUTPATIENT)
Dept: OBSTETRICS AND GYNECOLOGY | Facility: CLINIC | Age: 84
End: 2024-05-01
Attending: OBSTETRICS & GYNECOLOGY
Payer: MEDICARE

## 2024-05-01 VITALS
WEIGHT: 153 LBS | DIASTOLIC BLOOD PRESSURE: 69 MMHG | BODY MASS INDEX: 23.19 KG/M2 | HEIGHT: 68 IN | SYSTOLIC BLOOD PRESSURE: 113 MMHG

## 2024-05-01 DIAGNOSIS — Z79.890 POSTMENOPAUSAL HORMONE REPLACEMENT THERAPY: Chronic | ICD-10-CM

## 2024-05-01 DIAGNOSIS — Z01.419 ENCOUNTER FOR GYNECOLOGICAL EXAMINATION WITHOUT ABNORMAL FINDING: Primary | ICD-10-CM

## 2024-05-01 DIAGNOSIS — Z86.19 HISTORY OF HERPES GENITALIS: ICD-10-CM

## 2024-05-01 PROCEDURE — 99213 OFFICE O/P EST LOW 20 MIN: CPT | Mod: PBBFAC | Performed by: OBSTETRICS & GYNECOLOGY

## 2024-05-01 PROCEDURE — 99999 PR PBB SHADOW E&M-EST. PATIENT-LVL III: CPT | Mod: PBBFAC,,, | Performed by: OBSTETRICS & GYNECOLOGY

## 2024-05-01 PROCEDURE — G0101 CA SCREEN;PELVIC/BREAST EXAM: HCPCS | Mod: S$PBB,GZ,, | Performed by: OBSTETRICS & GYNECOLOGY

## 2024-05-01 PROCEDURE — G0101 CA SCREEN;PELVIC/BREAST EXAM: HCPCS | Mod: PBBFAC | Performed by: OBSTETRICS & GYNECOLOGY

## 2024-05-01 RX ORDER — ESTRADIOL AND NORETHINDRONE ACETATE .5; .1 MG/1; MG/1
1 TABLET ORAL DAILY
Qty: 84 TABLET | Refills: 3 | Status: SHIPPED | OUTPATIENT
Start: 2024-05-01

## 2024-05-01 RX ORDER — VALACYCLOVIR HYDROCHLORIDE 500 MG/1
500 TABLET, FILM COATED ORAL 2 TIMES DAILY
Qty: 10 TABLET | Refills: 11 | Status: SHIPPED | OUTPATIENT
Start: 2024-05-01 | End: 2024-05-22 | Stop reason: SDUPTHER

## 2024-05-01 NOTE — PROGRESS NOTES
Subjective:       Patient ID: Shelly Vásquez is a 83 y.o. female.    Chief Complaint:  Annual Exam and Gynecologic Exam      History of Present Illness  Gynecologic Exam  Pertinent negatives include no abdominal pain, back pain or headaches.       Shelly Vásquez is a 83 y.o. female  here for her annual GYN exam.    She is menopausal since age 43 and has been on HRT for many years since menopause and wants to continue, feels well on them  . She became symptomatic again when she tried to stop a few years ago. She stopped getting mammograms a few years ago. Denies any concerns and declines to get any more.   HRT:  Activella Lo  She reports recent outbreak of a tender lesion on her Right labia near the lower part of the vaginal introitus, states has not had an outbreak in years previously.  denies break through bleeding.   denies vaginal itching or irritation.  Denies vaginal discharge.  She is not sexually active. She has had1 partner for 14 years .     History of abnormal pap: No  Last Pap: was normal and patient does not recall when last pap was  Last MMG: normal-2021: BI-RADS Category: Overall: 2 - Benign-routine follow-up in 12 months  Last Dexa: 4-: Normal bone mineral density lumbar spine, right left hip.   Last Colonoscopy:  colonoscopy 5 years ago without abnormalities.  denies domestic violence. She does feel safe at home.     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)     Past Surgical History:   Procedure Laterality Date    APPENDECTOMY  age 21    ARTHROPLASTY OF HIP BY ANTERIOR APPROACH Right 2023    Procedure:  ARTHROPLASTY, HIP, TOTAL, ANTERIOR APPROACH: RIGHT: DEPUY - C-STEM + PINNACLE;  Surgeon: Keon Gary III, MD;  Location: [facility] OR 2ND FLR;  Service: Orthopedics;  Laterality: Right;    BACK SURGERY      Beast Lift  2004    BONE MARROW BIOPSY Right 12/19/2019    Procedure: Biopsy-bone marrow;  Surgeon: Aidan Spring MD;  Location: [facility] OR 2ND FLR;  Service: Oncology;  Laterality: Right;    BREAST BIOPSY  50yrs ago    unable to see scar    CATARACT EXTRACTION W/  INTRAOCULAR LENS IMPLANT Right 9/7/2023    Procedure: EXTRACTION, CATARACT, WITH IOL INSERTION;  Surgeon: Francisco Willingham MD;  Location: [facility] OR;  Service: Ophthalmology;  Laterality: Right;  [facility] ONLY    CATARACT EXTRACTION W/  INTRAOCULAR LENS IMPLANT Left 10/2/2023    Procedure: EXTRACTION, CATARACT, WITH IOL INSERTION;  Surgeon: Francisco Willingham MD;  Location: [facility] OR;  Service: Ophthalmology;  Laterality: Left;  [facility] ONLY    COSMETIC SURGERY      DILATION AND CURETTAGE OF UTERUS  2008    PMB    ENDOMETRIAL BIOPSY      EPIDURAL STEROID INJECTION N/A 8/25/2022    Procedure: LUMBAR CELINA CONTRAST DIRECT REFERRAL;  Surgeon: jR Hernandez MD;  Location: [facility] PAIN MGT;  Service: Pain Management;  Laterality: N/A;    ESOPHAGOGASTRODUODENOSCOPY N/A 10/4/2023    Procedure: EGD (ESOPHAGOGASTRODUODENOSCOPY);  Surgeon: Hugo Menchaca MD;  Location: [facility] ([address] [place] [address]);  Service: Endoscopy;  Laterality: N/A;    EYE SURGERY      INJECTION OF ANESTHETIC AGENT AROUND NERVE Bilateral 11/11/2022    Procedure: BLOCK, NERVE BILATERAL L2,L3,L4 AND L5 MEDIAL BRANCH ONE OF TWO;  Surgeon: Rj Hernandez MD;  Location: [facility] PAIN MGT;  Service: Pain Management;  Laterality: Bilateral;    INJECTION OF JOINT Right 12/2/2022    Procedure: INJECTION, JOINT RIGHT INTRARTICULAR HIP CONTRAST;  Surgeon: Rj Hernandez MD;  Location: [facility] PAIN MGT;  Service: Pain Management;  Laterality: Right;    ptsosis      RADIOFREQUENCY ABLATION  03/2018    SMALL INTESTINE SURGERY       TONSILLECTOMY, ADENOIDECTOMY  age 8    TOTAL REDUCTION MAMMOPLASTY Bilateral     TRANSFORAMINAL EPIDURAL INJECTION OF STEROID Right 2022    Procedure: Injection,steroid,epidural Right L1/2 TF DIrect Referral Instructed to provide intervention per MRI results;  Surgeon: Rj Hernandez MD;  Location: [facility] [place]GT;  Service: Pain Management;  Laterality: Right;    TRANSFORAMINAL EPIDURAL INJECTION OF STEROID Right 10/21/2022    Procedure: INJECTION, STEROID, EPIDURAL, TRANSFORAMINAL APPROACH, L5-S1 and S1, RIGHT CONTRAST;  Surgeon: Rj Hernandez MD;  Location: [facility] [place]GT;  Service: Pain Management;  Laterality: Right;    TUBAL LIGATION       Social History     Socioeconomic History    Marital status:    Tobacco Use    Smoking status: Former     Current packs/day: 0.00     Average packs/day: 1 pack/day for 22.0 years (22.0 ttl pk-yrs)     Types: Cigarettes     Start date: 1958     Quit date: 1980     Years since quittin.7    Smokeless tobacco: Never   Substance and Sexual Activity    Alcohol use: Yes     Alcohol/week: 2.0 standard drinks of alcohol     Types: 2 Glasses of wine per week     Comment: 1-2 glasses of  some night    Drug use: No    Sexual activity: Not Currently     Partners: Male     Birth control/protection: Post-menopausal     Comment:  since      Social Determinants of Health     Financial Resource Strain: Low Risk  (10/4/2023)    Overall Financial Resource Strain (CARDIA)     Difficulty of Paying Living Expenses: Not hard at all   Food Insecurity: No Food Insecurity (10/4/2023)    Hunger Vital Sign     Worried About Running Out of Food in the Last Year: Never true     Ran Out of Food in the Last Year: Never true   Transportation Needs: No Transportation Needs (10/4/2023)    PRAPARE - Transportation     Lack of Transportation (Medical): No     Lack of Transportation (Non-Medical): No   Physical Activity: Insufficiently Active (10/4/2023)     "Exercise Vital Sign     Days of Exercise per Week: 7 days     Minutes of Exercise per Session: 20 min   Stress: No Stress Concern Present (10/4/2023)    [employer] [place] [employer] - Occupational Stress Questionnaire     Feeling of Stress : Only a little   Housing Stability: Unknown (10/4/2023)    Housing Stability Vital Sign     Unable to Pay for Housing in the Last Year: No     Unstable Housing in the Last Year: No     Family History   Problem Relation Name Age of Onset    No Known Problems Father      Cirrhosis Mother      No Known Problems Brother      No Known Problems Maternal Aunt      No Known Problems Maternal Uncle      No Known Problems Paternal Aunt      No Known Problems Paternal Uncle      No Known Problems Maternal Grandmother      No Known Problems Maternal Grandfather      No Known Problems Paternal Grandmother      No Known Problems Paternal Grandfather      No Known Problems Daughter x1     Pulmonary embolism Son x1 age 60     Breast cancer Neg Hx      Colon cancer Neg Hx      Ovarian cancer Neg Hx      Amblyopia Neg Hx      Blindness Neg Hx      Cancer Neg Hx      Cataracts Neg Hx      Diabetes Neg Hx      Glaucoma Neg Hx      Hypertension Neg Hx      Macular degeneration Neg Hx      Retinal detachment Neg Hx      Strabismus Neg Hx      Stroke Neg Hx      Thyroid disease Neg Hx       OB History          2    Para   2    Term   2            AB        Living   2         SAB        IAB        Ectopic        Multiple        Live Births   2                 /69   Ht 5' 8" (1.727 m)   Wt 69.4 kg (153 lb)   LMP  (LMP Unknown)   BMI 23.26 kg/m²         GYN & OB History  No LMP recorded (lmp unknown). Patient is postmenopausal.   Date of Last Pap: No result found    OB History    Para Term  AB Living   2 2 2     2   SAB IAB Ectopic Multiple Live Births           2      # Outcome Date GA Lbr Marquez/2nd Weight Sex Type Anes PTL Lv   2 Term      Vag-Spont   DANIS "   1 Term      Vag-Spont   DANIS       Review of Systems  Review of Systems   Constitutional:  Negative for activity change, appetite change, fatigue and unexpected weight change.   HENT: Negative.     Eyes:  Negative for visual disturbance.   Respiratory:  Negative for shortness of breath and wheezing.    Cardiovascular:  Negative for chest pain, palpitations and leg swelling.   Gastrointestinal:  Negative for abdominal pain, bloating and blood in stool.   Endocrine: Negative for diabetes and hair loss.   Genitourinary:  Positive for genital sores and urinary incontinence. Negative for vaginal dryness.   Musculoskeletal:  Negative for back pain and joint swelling.   Integumentary:  Negative for acne, hair changes and nipple discharge.   Neurological:  Negative for headaches.   Hematological:  Does not bruise/bleed easily.   Psychiatric/Behavioral:  Negative for depression and sleep disturbance. The patient is not nervous/anxious.    Breast: Negative for mastodynia and nipple discharge        Objective:      Physical Exam:   Constitutional: She is oriented to person, place, and time. She appears well-developed and well-nourished.    HENT:   Head: Normocephalic and atraumatic.    Eyes: Pupils are equal, round, and reactive to light. EOM are normal.     Cardiovascular:  Normal rate and regular rhythm.             Pulmonary/Chest: Effort normal and breath sounds normal.   BREASTS:  no mass, no tenderness, no deformity and no retraction. Right breast exhibits no inverted nipple, no mass, no nipple discharge, no skin change, no tenderness, no bleeding and no swelling. Left breast exhibits no inverted nipple, no mass, no nipple discharge, no skin change, no tenderness, no bleeding and no swelling. Breasts are symmetrical.    Bilateral reduction scars          Abdominal: Soft. Bowel sounds are normal.     Genitourinary:          Pelvic exam was performed with patient supine.      Genitourinary Comments: PELVIC: Normal  external genitalia with 2 small ulcerated lesions near Right fourchette.  Normal hair distribution.  Adequate perineal body, normal urethral meatus.  Vagina  Dry and poorly rugated, atrophic, without lesions or discharge.  Cervix pink, without lesions, discharge or tenderness.  No significant cystocele or rectocele.  Bimanual exam shows uterus to be normal size, regular, mobile and nontender.  Adnexa without masses or tenderness.    RECTAL:Deferred                 Musculoskeletal: Normal range of motion and moves all extremeties.       Neurological: She is alert and oriented to person, place, and time.    Skin: Skin is warm and dry.    Psychiatric: She has a normal mood and affect.              Assessment:        1. Encounter for gynecological examination without abnormal finding    2. Postmenopausal hormone replacement therapy                Plan:        Problem List Items Addressed This Visit    None  Visit Diagnoses       Encounter for gynecological examination without abnormal finding    -  Primary    Postmenopausal hormone replacement therapy  (Chronic)       continue low dose combined therapy with aspirin 81 mg daily  good bone mineral density in 2018; we maybe can check another in 2022    Relevant Medications    estradiol-norethindrone acet 0.5-0.1 mg per tablet             No follow-ups on file.

## 2024-05-02 ENCOUNTER — TELEPHONE (OUTPATIENT)
Dept: NEUROLOGY | Facility: CLINIC | Age: 84
End: 2024-05-02
Payer: MEDICARE

## 2024-05-02 NOTE — TELEPHONE ENCOUNTER
----- Message from Marlene Méndez MD sent at 4/30/2024  6:09 AM CDT -----  Regarding: RE: appt for TRINO/CH pt with dr duarte Domínguez,   Shelly just messaged me this am , her and her  are thinking about returning to [facility] for neurology   They are benefactors there too ;(  ----- Message -----  From: Catrachita Beaver  Sent: 4/29/2024   3:57 PM CDT  To: Marlene Méndez MD; Dev Rosario Staff  Subject: RE: appt for TRINO/CH pt with dr leavitt              We are still waiting to talk to Dr. Espinoza about Shelly's care.  ----- Message -----  From: Sharon Desouza, RN  Sent: 4/22/2024   3:32 PM CDT  To: Marlene Méndez MD; Catrachita Beaver; #  Subject: RE: appt for TRINO/CH pt with dr leavitt              I talked with the pt Friday and need to talk with Dr. Espinoza.  I sent him a message but I am out with a bug today.   I did send Dr. Leavitt a message and while she would treat Mrs Vásquez for something in the vascular wheelhouse, she felt it was best to stay with Olga for movement.    I do want to ask VERNON if he wants to do a skin biopsy since he alluded to small fiber neuropathy in his last note (or if he'd refer to Dr blankenship.)    One thing I thought pt may benefit from is pharmacist consult. She is on a host of meds and knowing side effects, especially overlapping ones, and whether she has any subtle contraindications may help her.    Do you have any connections in pharmacy? I know we can't do this for all our patients but I do think it would be great for seniors.     Will let you know when I talk to Dr Espinoza.  ----- Message -----  From: Catrachita Beaver  Sent: 4/22/2024  11:32 AM CDT  To: Marlene Méndez MD; Sharon Desouza, RN; #  Subject: RE: appt for TRINO/CH pt with dr duarte Herrera- hope you had a wonderful weekend. Has Dr. Leavitt mentioned anything re Shelly? Thank you.  ----- Message -----  From: Sharon Desouza RN  Sent: 4/19/2024   1:19 PM CDT  To: Marlene Méndez MD;  Catrachita Beaver; #  Subject: RE: appt for TRINO/CH pt with dr leavitt              Good afternoon,    I sent a message to Dr. Leavitt to review her chart.     Thanks,    Sharon  ----- Message -----  From: Marlene Méndez MD  Sent: 4/19/2024   1:13 PM CDT  To: Sharon Desouza RN; Catrachita Beaver; #  Subject: appt for TRINO/CH pt with dr duarte Domínguez,   Can we help set up Ariela with [facility] Leavitt neurology? I know she is vascular but both ariela and nelson were very impressed on their visit with her this week.    Thank you!!

## 2024-05-03 ENCOUNTER — OFFICE VISIT (OUTPATIENT)
Dept: INTERNAL MEDICINE | Facility: CLINIC | Age: 84
End: 2024-05-03
Payer: MEDICARE

## 2024-05-03 VITALS
HEIGHT: 68 IN | OXYGEN SATURATION: 99 % | DIASTOLIC BLOOD PRESSURE: 64 MMHG | WEIGHT: 148 LBS | HEART RATE: 66 BPM | BODY MASS INDEX: 22.43 KG/M2 | SYSTOLIC BLOOD PRESSURE: 128 MMHG

## 2024-05-03 DIAGNOSIS — E53.8 VITAMIN B 12 DEFICIENCY: ICD-10-CM

## 2024-05-03 DIAGNOSIS — H90.12 CONDUCTIVE HEARING LOSS OF LEFT EAR WITH UNRESTRICTED HEARING OF RIGHT EAR: ICD-10-CM

## 2024-05-03 DIAGNOSIS — D46.9 MDS (MYELODYSPLASTIC SYNDROME): ICD-10-CM

## 2024-05-03 DIAGNOSIS — I35.1 AORTIC EJECTION MURMUR: ICD-10-CM

## 2024-05-03 DIAGNOSIS — G25.81 RLS (RESTLESS LEGS SYNDROME): Primary | ICD-10-CM

## 2024-05-03 DIAGNOSIS — G20.A2 PARKINSON'S DISEASE WITHOUT DYSKINESIA, WITH FLUCTUATING MANIFESTATIONS: ICD-10-CM

## 2024-05-03 PROBLEM — I47.10 PSVT (PAROXYSMAL SUPRAVENTRICULAR TACHYCARDIA): Status: RESOLVED | Noted: 2018-01-26 | Resolved: 2024-05-03

## 2024-05-03 PROBLEM — I48.0 PAF (PAROXYSMAL ATRIAL FIBRILLATION): Status: ACTIVE | Noted: 2024-05-03

## 2024-05-03 PROCEDURE — 99213 OFFICE O/P EST LOW 20 MIN: CPT | Mod: PBBFAC | Performed by: INTERNAL MEDICINE

## 2024-05-03 PROCEDURE — 99999 PR PBB SHADOW E&M-EST. PATIENT-LVL III: CPT | Mod: PBBFAC,,, | Performed by: INTERNAL MEDICINE

## 2024-05-03 PROCEDURE — 99214 OFFICE O/P EST MOD 30 MIN: CPT | Mod: S$PBB,,, | Performed by: INTERNAL MEDICINE

## 2024-05-05 NOTE — PROGRESS NOTES
Subjective:       Patient ID: Shelly Vásquez is a 83 y.o. female who presents today for:    Chief Complaint:   Chief Complaint   Patient presents with    Follow-up     Has several issues to discuss       HPI:  Very pleasant 83-year-old nonsmoking female who is here for follow-up to discuss medications.  She has a longstanding history of what we diagnosed her with as restless leg syndrome but subsequently without getting any relief from both Mirapex and gabapentin she did have a right total hip replacement.  Symptoms present as a leg spasm or tremor usually in the right extending into the left lower extremity and increasing nocturnally.  She did see Neurology about a month and a half ago and was started on Sinemet 25/100 3 times a day and she has noticed some improvement in the frequency of episodes in the evening.  Additionally taking 2 gabapentin in the late afternoon and evening a 300 mg has helped even more so.  He does have a slight leg length discrepancy and her left leg turns in and she is attending physical therapy for balance and gait.  She also has mild dysplastic syndrome and significant microcytic anemia.  She has not yet started the B12 injections but will take once a day.  On last office visit we got rid of her antihypertensive verapamil and her blood pressure has been stable.  Last fall she had a gastric ulcer and her baby aspirin was discontinued.  The ulcer was probably prompted by stress after hip surgery as well as Medrol Dosepak and Celebrex daily.  We discussed restarting the baby aspirin at least 3 times a week as she continues on hormone replacement therapy and does travel quite a bit by airline.    Review of Systems   Constitutional:  Negative for chills, fever and weight loss.   HENT:  Negative for sore throat.    Eyes:  Negative for blurred vision and double vision.   Respiratory:  Negative for cough and shortness of breath.    Cardiovascular:  Negative for chest pain and palpitations.    Gastrointestinal:  Negative for constipation, diarrhea, nausea and vomiting.   Genitourinary:  Negative for dysuria and hematuria.   Musculoskeletal:  Positive for myalgias. Negative for joint pain.        Right lower extremity greater than left with presenting symptoms in the afternoon as per HPI.   Skin:  Negative for itching and rash.   Neurological:  Negative for sensory change, focal weakness and headaches.   Endo/Heme/Allergies:  Does not bruise/bleed easily.   Psychiatric/Behavioral:  Negative for depression and suicidal ideas.         Medications:  Outpatient Encounter Medications as of 5/3/2024   Medication Sig Note Dispense Refill    acetaminophen (TYLENOL) 650 MG TbSR Take 1 tablet (650 mg total) by mouth every 8 (eight) hours.  120 tablet 0    aspirin 81 MG Chew Take 81 mg by mouth every Mon, Wed, Fri.       carbidopa-levodopa  mg (SINEMET)  mg per tablet Take 1 tablet by mouth 3 (three) times daily. For first week, take 1/2 tab 3 times per day.  90 tablet 11    cyanocobalamin 1,000 mcg/mL injection Inject 1 mL (1,000 mcg total) into the muscle every 14 (fourteen) days.  10 mL 1    diazePAM (VALIUM) 2 MG tablet Take 1 tablet (2 mg total) by mouth every 12 (twelve) hours as needed for Anxiety.  45 tablet 2    estradiol-norethindrone acet 0.5-0.1 mg per tablet Take 1 tablet by mouth Daily.  84 tablet 3    gabapentin (NEURONTIN) 300 MG capsule Take 1 capsule (300 mg total) by mouth 3 (three) times daily as needed.  90 capsule 2    pantoprazole (PROTONIX) 40 MG tablet Take 40 mg by mouth 2 (two) times daily.       polyethylene glycol 3350 (MIRALAX ORAL) Take 1 Capful by mouth daily as needed (constipation).       pramipexole (MIRAPEX) 1 MG tablet TAKE 1 TABLET BY MOUTH TWICE DAILY  180 tablet 1    temazepam (RESTORIL) 15 mg Cap Take 1 capsule (15 mg total) by mouth nightly as needed.  90 capsule 1    tobramycin sulfate 0.3% (TOBREX) 0.3 % ophthalmic solution 1-2 drops topically twice daily to  "affected toe(s).  5 mL 3    valACYclovir (VALTREX) 500 MG tablet Take 1 tablet (500 mg total) by mouth 2 (two) times daily.  10 tablet 11    vitamin D (VITAMIN D3) 1000 units Tab Take 1,000 Units by mouth once daily. 4/4/2023: Hold 7 days prior to surgery      [DISCONTINUED] estradiol-norethindrone acet 0.5-0.1 mg per tablet TAKE 1 TABLET BY MOUTH EVERY DAY  84 tablet 3    [DISCONTINUED] primidone (MYSOLINE) 50 MG Tab Take 1 tablet (50 mg total) by mouth every evening.  30 tablet 2    [DISCONTINUED] verapamiL (VERELAN) 120 MG C24P TAKE 1 CAPSULE(120 MG) BY MOUTH EVERY DAY  90 capsule 3     No facility-administered encounter medications on file as of 5/3/2024.       Allergies:  Review of patient's allergies indicates:   Allergen Reactions    Baclofen      Incoherent     Nsaids (non-steroidal anti-inflammatory drug)      GI Bleed  Had 5 blood transfusions       Health Maintenance:  Immunization History   Administered Date(s) Administered    COVID-19, MRNA, LN-S, PF (Pfizer) (Gray Cap) 07/28/2022    COVID-19, MRNA, LN-S, PF (Pfizer) (Purple Cap) 01/09/2021, 01/30/2021, 09/08/2021    Influenza (FLUAD) - Quadrivalent - Adjuvanted - PF *Preferred* (65+) 10/02/2021, 10/12/2023    Influenza (FLUAD) - Trivalent - Adjuvanted - PF (65+) 12/14/2017, 12/10/2018    Influenza - High Dose - PF (65 years and older) 10/15/2014, 10/10/2016, 10/21/2019    Influenza - Trivalent - PF (ADULT) 08/31/2020    Pneumococcal Conjugate - 13 Valent 04/16/2018    Pneumococcal Polysaccharide - 23 Valent 08/26/2019    Tdap 03/31/2014    Zoster Recombinant 04/20/2024      Health Maintenance   Topic Date Due    Colonoscopy  04/29/2019    TETANUS VACCINE  03/31/2024    Shingles Vaccine (2 of 2) 06/15/2024    DEXA Scan  04/10/2026    Lipid Panel  08/15/2028          Objective:      Vital Signs  Pulse: 66  SpO2: 99 %  BP: 128/64  Pain Score: 0-No pain  Height and Weight  Height: 5' 8" (172.7 cm)  Weight: 67.1 kg (148 lb)  BSA (Calculated - sq m): 1.79 sq " meters  BMI (Calculated): 22.5  Weight in (lb) to have BMI = 25: 164.1]    Physical Exam  HENT:      Head: Normocephalic and atraumatic.   Eyes:      General: No scleral icterus.  Cardiovascular:      Rate and Rhythm: Normal rate and regular rhythm.      Heart sounds: Murmur heard.      Systolic murmur is present with a grade of 2/6.   Pulmonary:      Effort: Pulmonary effort is normal.      Breath sounds: Normal breath sounds. No rales.   Abdominal:      General: Bowel sounds are normal.      Palpations: Abdomen is soft.      Tenderness: There is no abdominal tenderness.   Musculoskeletal:         General: No tenderness.      Cervical back: Neck supple.   Neurological:      Mental Status: She is alert and oriented to person, place, and time.   Psychiatric:         Mood and Affect: Mood normal.         Behavior: Behavior normal.         Thought Content: Thought content normal.         Judgment: Judgment normal.        Lab Results   Component Value Date    WBC 6.02 04/09/2024    HGB 8.6 (L) 04/09/2024    HCT 25.8 (L) 04/09/2024     (H) 04/09/2024    CHOL 155 08/15/2023    TRIG 46 08/15/2023    HDL 76 (H) 08/15/2023    ALT 17 04/09/2024    AST 28 04/09/2024     04/09/2024    K 5.2 (H) 04/09/2024     04/09/2024    CREATININE 0.9 04/09/2024    BUN 18 04/09/2024    CO2 23 04/09/2024    TSH 1.709 08/15/2023    INR 1.1 04/21/2023    HGBA1C 5.1 08/15/2023     Assessment/plan:     Shelly Vásquez is a 83 y.o.female with:    RLS (restless legs syndrome)   Continue with Mirapex b.i.d. at present dose gabapentin twice a day as well.    Parkinson's disease without dyskinesia, ?  Comments:  Sinemet appears to be helping continue low-dose 3 times a day    Aortic ejection murmur- new  -     Echo; Future    MDS (myelodysplastic syndrome)-with macrocytic anemia   Will start vitamin B12 intramuscular injections every 2 weeks; follows with Hematology with last CBC at the beginning of the month showing  thrombocytosis.  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      Future Appointments   Date Time Provider Department Center   5/27/2024  2:00 PM Shanell Thomas AU.D [facility] AUDIO [facility]   5/27/2024  2:45 PM Frederick Hart MD [facility] ENT [facility]   5/27/2024  3:15 PM ECHO, [facility] ECHOSTR [facility]   6/17/2024 11:30 AM Margaux Steven MD [facility] DERMSUR [facility]   10/22/2024  2:50 PM [facility] LAB [facility] LABBMT Shaan Figueroa   10/22/2024  4:00 PM Erlin Lopez MD [facility] HC BMT Shaan Andrew MD  [facility]

## 2024-05-07 ENCOUNTER — CLINICAL SUPPORT (OUTPATIENT)
Dept: INTERNAL MEDICINE | Facility: CLINIC | Age: 84
End: 2024-05-07
Payer: MEDICARE

## 2024-05-07 DIAGNOSIS — E53.8 VITAMIN B 12 DEFICIENCY: Primary | ICD-10-CM

## 2024-05-07 NOTE — PROGRESS NOTES
Cyancobalmin 1000mcg/ml injection giver to left deltoid per MD orders, tolerated well without incident  NDC  7079-3316-95  Lot  [id number]  Exp  11/2024

## 2024-05-17 ENCOUNTER — PATIENT MESSAGE (OUTPATIENT)
Dept: NEUROLOGY | Facility: CLINIC | Age: 84
End: 2024-05-17
Payer: MEDICARE

## 2024-05-22 ENCOUNTER — PATIENT MESSAGE (OUTPATIENT)
Dept: NEUROLOGY | Facility: CLINIC | Age: 84
End: 2024-05-22
Payer: MEDICARE

## 2024-05-22 ENCOUNTER — CLINICAL SUPPORT (OUTPATIENT)
Dept: INTERNAL MEDICINE | Facility: CLINIC | Age: 84
End: 2024-05-22
Payer: MEDICARE

## 2024-05-22 DIAGNOSIS — E53.8 VITAMIN B 12 DEFICIENCY: Primary | ICD-10-CM

## 2024-05-22 DIAGNOSIS — Z86.19 HISTORY OF HERPES GENITALIS: ICD-10-CM

## 2024-05-22 RX ORDER — VALACYCLOVIR HYDROCHLORIDE 500 MG/1
500 TABLET, FILM COATED ORAL 2 TIMES DAILY
Qty: 10 TABLET | Refills: 11 | Status: SHIPPED | OUTPATIENT
Start: 2024-05-22

## 2024-05-22 RX ORDER — GABAPENTIN 300 MG/1
300 CAPSULE ORAL 3 TIMES DAILY PRN
Qty: 90 CAPSULE | Refills: 2 | Status: SHIPPED | OUTPATIENT
Start: 2024-05-22 | End: 2024-06-11 | Stop reason: SDUPTHER

## 2024-05-22 RX ORDER — DIAZEPAM 2 MG/1
2 TABLET ORAL EVERY 12 HOURS PRN
Qty: 45 TABLET | Refills: 2 | Status: SHIPPED | OUTPATIENT
Start: 2024-05-22

## 2024-05-22 RX ORDER — TEMAZEPAM 15 MG/1
15 CAPSULE ORAL NIGHTLY PRN
Qty: 90 CAPSULE | Refills: 1 | Status: SHIPPED | OUTPATIENT
Start: 2024-05-22

## 2024-05-22 NOTE — TELEPHONE ENCOUNTER
No care due was identified.  [facility] Embedded Care Due Messages. Reference number: [id number].   5/22/2024 7:13:41 AM CDT

## 2024-05-22 NOTE — PROGRESS NOTES
Patient supplied Cyanocobalamin 1,000 mcg/ml injection given to left deltoid, tolerated well without incident.  LOT  [id number]  EXP  11/2024  NDC  5426-2750-01

## 2024-05-24 ENCOUNTER — PATIENT MESSAGE (OUTPATIENT)
Dept: NEUROLOGY | Facility: CLINIC | Age: 84
End: 2024-05-24
Payer: MEDICARE

## 2024-05-24 RX ORDER — CARBIDOPA AND LEVODOPA 25; 100 MG/1; MG/1
1 TABLET ORAL 3 TIMES DAILY
Qty: 90 TABLET | Refills: 11 | Status: SHIPPED | OUTPATIENT
Start: 2024-05-24 | End: 2024-06-11 | Stop reason: SDUPTHER

## 2024-05-27 ENCOUNTER — OFFICE VISIT (OUTPATIENT)
Dept: OTOLARYNGOLOGY | Facility: CLINIC | Age: 84
End: 2024-05-27
Payer: MEDICARE

## 2024-05-27 DIAGNOSIS — H61.22 IMPACTED CERUMEN OF LEFT EAR: Primary | ICD-10-CM

## 2024-05-27 PROCEDURE — 99999 PR PBB SHADOW E&M-EST. PATIENT-LVL II: CPT | Mod: PBBFAC,,, | Performed by: OTOLARYNGOLOGY

## 2024-05-27 PROCEDURE — 99212 OFFICE O/P EST SF 10 MIN: CPT | Mod: PBBFAC | Performed by: OTOLARYNGOLOGY

## 2024-05-27 PROCEDURE — 99212 OFFICE O/P EST SF 10 MIN: CPT | Mod: 25,S$PBB,, | Performed by: OTOLARYNGOLOGY

## 2024-05-27 PROCEDURE — 69210 REMOVE IMPACTED EAR WAX UNI: CPT | Mod: PBBFAC | Performed by: OTOLARYNGOLOGY

## 2024-05-27 PROCEDURE — 69210 REMOVE IMPACTED EAR WAX UNI: CPT | Mod: S$PBB,,, | Performed by: OTOLARYNGOLOGY

## 2024-05-27 NOTE — PROGRESS NOTES
Otolaryngology - Head & Neck Surgery  Otology Clinic Note    Subjective     Chief Complaint:  Shelly Vásquez is a 83 y.o. female who presents to clinic with a sensation of both ears feeling blocked. She feels that her ears are closed off and causes some decreased hearing. Otherwise denies any otorrhea, otalgia, prior ear surgeries.    Review of Systems   Constitutional:  Negative for chills and fever.   HENT:  Negative for sore throat and trouble swallowing.    Eyes:  Negative for discharge and redness.   Respiratory:  Negative for cough and stridor.    Cardiovascular:  Negative for chest pain and palpitations.   Gastrointestinal:  Negative for nausea and vomiting.   Endocrine: Negative for polydipsia and polyphagia.   Genitourinary:  Negative for dysuria and hematuria.   Musculoskeletal:  Negative for arthralgias and myalgias.   Neurological:  Negative for seizures and headaches.   Hematological:  Negative for adenopathy. Does not bruise/bleed easily.   Psychiatric/Behavioral:  Negative for agitation and confusion.        Past Medical History: Patient has a past medical history of Arthritis, Atrial fibrillation, Basal cell cancer, Encounter for blood transfusion, Gastric ulcer, GERD (gastroesophageal reflux disease), Herpes simplex without mention of complication, Postmenopausal HRT (hormone replacement therapy), and Supraventricular tachycardia.    Past Surgical History: Patient has a past surgical history that includes Dilation and curettage of uterus (2008); Endometrial biopsy; Beast Lift (2004); Tubal ligation; TONSILLECTOMY, ADENOIDECTOMY (age 8); Appendectomy (age 21); Eye surgery; ptsosis; Back surgery; Cosmetic surgery; Small intestine surgery; Radiofrequency ablation (03/2018); Total Reduction Mammoplasty (Bilateral, 2003); Breast biopsy (50yrs ago); Bone marrow biopsy (Right, 12/19/2019); Transforaminal epidural injection of steroid (Right, 8/11/2022); Epidural steroid injection (N/A, 8/25/2022);  Transforaminal epidural injection of steroid (Right, 10/21/2022); Injection of anesthetic agent around nerve (Bilateral, 11/11/2022); Injection of joint (Right, 12/2/2022); Arthroplasty of hip by anterior approach (Right, 4/26/2023); Cataract extraction w/  intraocular lens implant (Right, 9/7/2023); Cataract extraction w/  intraocular lens implant (Left, 10/2/2023); and Esophagogastroduodenoscopy (N/A, 10/4/2023).    Social History: Patient reports that she quit smoking about 43 years ago. Her smoking use included cigarettes. She started smoking about 65 years ago. She has a 22 pack-year smoking history. She has never used smokeless tobacco. She reports current alcohol use of about 2.0 standard drinks of alcohol per week. She reports that she does not use drugs.    Family History: family history includes Cirrhosis in her mother; No Known Problems in her brother, daughter, father, maternal aunt, maternal grandfather, maternal grandmother, maternal uncle, paternal aunt, paternal grandfather, paternal grandmother, and paternal uncle; Pulmonary embolism in her son.    Medications:   Current Outpatient Medications   Medication Sig    acetaminophen (TYLENOL) 650 MG TbSR Take 1 tablet (650 mg total) by mouth every 8 (eight) hours.    aspirin 81 MG Chew Take 81 mg by mouth every Mon, Wed, Fri.    carbidopa-levodopa  mg (SINEMET)  mg per tablet Take 1 tablet by mouth 3 (three) times daily. For first week, take 1/2 tab 3 times per day.    cyanocobalamin 1,000 mcg/mL injection Inject 1 mL (1,000 mcg total) into the muscle every 14 (fourteen) days.    diazePAM (VALIUM) 2 MG tablet Take 1 tablet (2 mg total) by mouth every 12 (twelve) hours as needed for Anxiety.    estradiol-norethindrone acet 0.5-0.1 mg per tablet Take 1 tablet by mouth Daily.    gabapentin (NEURONTIN) 300 MG capsule Take 1 capsule (300 mg total) by mouth 3 (three) times daily as needed.    pantoprazole (PROTONIX) 40 MG tablet Take 40 mg by mouth  2 (two) times daily.    polyethylene glycol 3350 (MIRALAX ORAL) Take 1 Capful by mouth daily as needed (constipation).    pramipexole (MIRAPEX) 1 MG tablet TAKE 1 TABLET BY MOUTH TWICE DAILY    temazepam (RESTORIL) 15 mg Cap Take 1 capsule (15 mg total) by mouth nightly as needed.    tobramycin sulfate 0.3% (TOBREX) 0.3 % ophthalmic solution 1-2 drops topically twice daily to affected toe(s).    valACYclovir (VALTREX) 500 MG tablet Take 1 tablet (500 mg total) by mouth 2 (two) times daily.    vitamin D (VITAMIN D3) 1000 units Tab Take 1,000 Units by mouth once daily.     No current facility-administered medications for this visit.       Allergies: Patient is allergic to baclofen and nsaids (non-steroidal anti-inflammatory drug).         Objective     Physical Exam  Constitutional:       General: She is not in acute distress.  HENT:      Head: Normocephalic and atraumatic.   Cardiovascular:      Rate and Rhythm: Normal rate and regular rhythm.   Pulmonary:      Effort: No respiratory distress.      Breath sounds: No stridor.   Abdominal:      General: Abdomen is flat. There is no distension.   Musculoskeletal:         General: No swelling or deformity.   Neurological:      General: No focal deficit present.      Mental Status: She is alert and oriented to person, place, and time.   Psychiatric:         Mood and Affect: Mood normal.         Behavior: Behavior normal.         Both ears examined under binocular microscopy:  Right ear - EAC clear, TM intact without effusion or perforation  Left ear - EAC with cerumen that was cleaned with alligator forceps, TM intact without effusion or perforation       Assessment and Plan     1. Impacted cerumen of left ear        Patient left without having audiogram completed.

## 2024-05-28 ENCOUNTER — TELEPHONE (OUTPATIENT)
Dept: DERMATOLOGY | Facility: CLINIC | Age: 84
End: 2024-05-28
Payer: MEDICARE

## 2024-05-28 ENCOUNTER — PATIENT MESSAGE (OUTPATIENT)
Dept: INTERNAL MEDICINE | Facility: CLINIC | Age: 84
End: 2024-05-28
Payer: MEDICARE

## 2024-05-28 NOTE — TELEPHONE ENCOUNTER
Pt sent  a message stating that she cx her appt that was scheduled for Mohs sx on 6/17/24 at 11:30 for BCC left lateral neck. Called pt and pt verbally confirmed cancellation of sx. Pt appreciated the callback.

## 2024-05-28 NOTE — TELEPHONE ENCOUNTER
Spoke to Justin from Dr. Kearns office and informed her that the pt decline having Mohs sx for BCC left lateral neck that was scheduled for 6/17/24. Justin stated that she will inform Dr. Kearns with the information.

## 2024-05-31 ENCOUNTER — EXTERNAL CHRONIC CARE MANAGEMENT (OUTPATIENT)
Dept: PRIMARY CARE CLINIC | Facility: CLINIC | Age: 84
End: 2024-05-31
Payer: MEDICARE

## 2024-05-31 PROCEDURE — 99490 CHRNC CARE MGMT STAFF 1ST 20: CPT | Mod: PBBFAC | Performed by: INTERNAL MEDICINE

## 2024-05-31 PROCEDURE — 99490 CHRNC CARE MGMT STAFF 1ST 20: CPT | Mod: S$PBB,,, | Performed by: INTERNAL MEDICINE

## 2024-06-04 ENCOUNTER — HOSPITAL ENCOUNTER (OUTPATIENT)
Dept: CARDIOLOGY | Facility: HOSPITAL | Age: 84
Discharge: HOME | End: 2024-06-04
Attending: INTERNAL MEDICINE
Payer: MEDICARE

## 2024-06-04 VITALS
DIASTOLIC BLOOD PRESSURE: 72 MMHG | SYSTOLIC BLOOD PRESSURE: 108 MMHG | BODY MASS INDEX: 22.42 KG/M2 | WEIGHT: 147.94 LBS | HEART RATE: 63 BPM | HEIGHT: 68 IN

## 2024-06-04 DIAGNOSIS — I35.1 AORTIC EJECTION MURMUR: ICD-10-CM

## 2024-06-04 LAB
ASCENDING AORTA: 2.93 CM
AV INDEX (PROSTH): 0.53
AV MEAN GRADIENT: 11 MMHG
AV PEAK GRADIENT: 25 MMHG
AV VALVE AREA BY VELOCITY RATIO: 1.83 CM²
AV VALVE AREA: 1.92 CM²
AV VELOCITY RATIO: 0.5
BSA FOR ECHO PROCEDURE: 1.79 M2
CV ECHO LV RWT: 0.31 CM
DOP CALC AO PEAK VEL: 2.5 M/S
DOP CALC AO VTI: 51.07 CM
DOP CALC LVOT AREA: 3.6 CM2
DOP CALC LVOT DIAMETER: 2.15 CM
DOP CALC LVOT PEAK VEL: 1.26 M/S
DOP CALC LVOT STROKE VOLUME: 98.26 CM3
DOP CALC MV VTI: 16.1 CM
DOP CALCLVOT PEAK VEL VTI: 27.08 CM
E WAVE DECELERATION TIME: 380.73 MSEC
E/A RATIO: 0.98
E/E' RATIO: 7.18 M/S
ECHO LV POSTERIOR WALL: 0.81 CM (ref 0.6–1.1)
FRACTIONAL SHORTENING: 32 % (ref 28–44)
INTERVENTRICULAR SEPTUM: 0.51 CM (ref 0.6–1.1)
IVRT: 68.51 MSEC
LA MAJOR: 6 CM
LA MINOR: 5.9 CM
LA WIDTH: 4.3 CM
LEFT ATRIUM SIZE: 4.3 CM
LEFT ATRIUM VOLUME INDEX MOD: 40.7 ML/M2
LEFT ATRIUM VOLUME INDEX: 51.9 ML/M2
LEFT ATRIUM VOLUME MOD: 73.2 CM3
LEFT ATRIUM VOLUME: 93.51 CM3
LEFT INTERNAL DIMENSION IN SYSTOLE: 3.49 CM (ref 2.1–4)
LEFT VENTRICLE DIASTOLIC VOLUME INDEX: 70.44 ML/M2
LEFT VENTRICLE DIASTOLIC VOLUME: 126.8 ML
LEFT VENTRICLE MASS INDEX: 62 G/M2
LEFT VENTRICLE SYSTOLIC VOLUME INDEX: 28.1 ML/M2
LEFT VENTRICLE SYSTOLIC VOLUME: 50.57 ML
LEFT VENTRICULAR INTERNAL DIMENSION IN DIASTOLE: 5.15 CM (ref 3.5–6)
LEFT VENTRICULAR MASS: 112.3 G
LV LATERAL E/E' RATIO: 7.18 M/S
LV SEPTAL E/E' RATIO: 7.18 M/S
MV A" WAVE DURATION": 7.42 MSEC
MV MEAN GRADIENT: 0 MMHG
MV PEAK A VEL: 0.81 M/S
MV PEAK E VEL: 0.79 M/S
MV PEAK GRADIENT: 1 MMHG
MV STENOSIS PRESSURE HALF TIME: 110.41 MS
MV VALVE AREA BY CONTINUITY EQUATION: 6.1 CM2
MV VALVE AREA P 1/2 METHOD: 1.99 CM2
OHS CV RV/LV RATIO: 0.67 CM
PISA MRMAX VEL: 0.05 M/S
PISA TR MAX VEL: 2.66 M/S
PULM VEIN S/D RATIO: 0.94
PV PEAK D VEL: 0.83 M/S
PV PEAK S VEL: 0.78 M/S
RA MAJOR: 5.42 CM
RA PRESSURE ESTIMATED: 3 MMHG
RA WIDTH: 4.21 CM
RIGHT VENTRICULAR END-DIASTOLIC DIMENSION: 3.45 CM
RV TB RVSP: 6 MMHG
SINUS: 2.65 CM
STJ: 2.32 CM
TDI LATERAL: 0.11 M/S
TDI SEPTAL: 0.11 M/S
TDI: 0.11 M/S
TR MAX PG: 28 MMHG
TRICUSPID ANNULAR PLANE SYSTOLIC EXCURSION: 2.88 CM
TV REST PULMONARY ARTERY PRESSURE: 31 MMHG
Z-SCORE OF LEFT VENTRICULAR DIMENSION IN END DIASTOLE: 0.34
Z-SCORE OF LEFT VENTRICULAR DIMENSION IN END SYSTOLE: 1

## 2024-06-04 PROCEDURE — 93306 TTE W/DOPPLER COMPLETE: CPT

## 2024-06-04 PROCEDURE — 93306 TTE W/DOPPLER COMPLETE: CPT | Mod: 26,,, | Performed by: INTERNAL MEDICINE

## 2024-06-06 NOTE — PATIENT INSTRUCTIONS
"DRY EYES:  Use Over The Counter artificial tears as needed for dry eye symptoms.  Some common brands include:  Systane, Optive, and Refresh.  These drops can be used as frequently as desired, but may be most helpful use during long periods of concentrated work.  For example, reading / working at the computer.  Avoid drops that "get redness out", as these contain medication that may further irritate the eyes.    ALLERGY EYES / SYMPTOMS:    Over the counter medications include--Zaditor and Alaway  Use as directed 1-2 drops daily for symptoms of itching / watering eyes.  These drops will not help for dry eye or exposure symptoms.      " No

## 2024-06-10 ENCOUNTER — PATIENT MESSAGE (OUTPATIENT)
Dept: INTERNAL MEDICINE | Facility: CLINIC | Age: 84
End: 2024-06-10
Payer: MEDICARE

## 2024-06-11 RX ORDER — CARBIDOPA AND LEVODOPA 25; 100 MG/1; MG/1
1 TABLET ORAL 3 TIMES DAILY
Qty: 90 TABLET | Refills: 11 | Status: SHIPPED | OUTPATIENT
Start: 2024-06-11 | End: 2025-06-11

## 2024-06-11 RX ORDER — GABAPENTIN 300 MG/1
300 CAPSULE ORAL 3 TIMES DAILY PRN
Qty: 90 CAPSULE | Refills: 2 | Status: SHIPPED | OUTPATIENT
Start: 2024-06-11

## 2024-06-11 NOTE — TELEPHONE ENCOUNTER
No care due was identified.  [facility] Embedded Care Due Messages. Reference number: [id number].   6/11/2024 6:44:54 AM CDT

## 2024-06-30 ENCOUNTER — EXTERNAL CHRONIC CARE MANAGEMENT (OUTPATIENT)
Dept: PRIMARY CARE CLINIC | Facility: CLINIC | Age: 84
End: 2024-06-30
Payer: MEDICARE

## 2024-06-30 PROCEDURE — 99490 CHRNC CARE MGMT STAFF 1ST 20: CPT | Mod: S$PBB,,, | Performed by: INTERNAL MEDICINE

## 2024-06-30 PROCEDURE — 99490 CHRNC CARE MGMT STAFF 1ST 20: CPT | Mod: PBBFAC | Performed by: INTERNAL MEDICINE

## 2024-07-05 RX ORDER — DIAZEPAM 2 MG/1
2 TABLET ORAL EVERY 12 HOURS PRN
Qty: 45 TABLET | Refills: 2 | Status: SHIPPED | OUTPATIENT
Start: 2024-07-05

## 2024-07-05 NOTE — TELEPHONE ENCOUNTER
No care due was identified.  [facility] Embedded Care Due Messages. Reference number: [id number].   7/05/2024 4:29:00 PM CDT

## 2024-07-08 ENCOUNTER — TELEPHONE (OUTPATIENT)
Dept: INTERNAL MEDICINE | Facility: CLINIC | Age: 84
End: 2024-07-08
Payer: MEDICARE

## 2024-07-08 RX ORDER — GABAPENTIN 400 MG/1
400 CAPSULE ORAL 3 TIMES DAILY
Qty: 90 CAPSULE | Refills: 2 | Status: SHIPPED | OUTPATIENT
Start: 2024-07-08 | End: 2025-07-08

## 2024-07-22 RX ORDER — METAXALONE 800 MG/1
800 TABLET ORAL 3 TIMES DAILY PRN
Qty: 30 TABLET | Refills: 0 | Status: SHIPPED | OUTPATIENT
Start: 2024-07-22 | End: 2024-07-22 | Stop reason: SDUPTHER

## 2024-07-22 RX ORDER — METAXALONE 800 MG/1
800 TABLET ORAL 3 TIMES DAILY PRN
Qty: 30 TABLET | Refills: 0 | Status: SHIPPED | OUTPATIENT
Start: 2024-07-22 | End: 2024-08-01

## 2024-07-23 ENCOUNTER — TELEPHONE (OUTPATIENT)
Dept: INTERNAL MEDICINE | Facility: CLINIC | Age: 84
End: 2024-07-23
Payer: MEDICARE

## 2024-07-23 NOTE — PROGRESS NOTES
Contacted in evening by pt. Leg cramping worsened today. Pt of my partner. Long-standing hx of leg pains, RLS symptoms. On multiple sedating type medications. Sending Skelaxin since at least lower potential for heavy sedation as compared to alternative anti-spasmodics. Using gabapentin 3 to 4x daily baseline as well as benzo. States she already took a couple Valium (2 mg) which hasn't helped.

## 2024-07-27 ENCOUNTER — PATIENT MESSAGE (OUTPATIENT)
Dept: INTERNAL MEDICINE | Facility: CLINIC | Age: 84
End: 2024-07-27
Payer: MEDICARE

## 2024-07-31 ENCOUNTER — EXTERNAL CHRONIC CARE MANAGEMENT (OUTPATIENT)
Dept: PRIMARY CARE CLINIC | Facility: CLINIC | Age: 84
End: 2024-07-31
Payer: MEDICARE

## 2024-07-31 PROCEDURE — 99439 CHRNC CARE MGMT STAF EA ADDL: CPT | Mod: S$PBB,,, | Performed by: INTERNAL MEDICINE

## 2024-07-31 PROCEDURE — 99439 CHRNC CARE MGMT STAF EA ADDL: CPT | Mod: PBBFAC | Performed by: INTERNAL MEDICINE

## 2024-07-31 PROCEDURE — 99490 CHRNC CARE MGMT STAFF 1ST 20: CPT | Mod: PBBFAC | Performed by: INTERNAL MEDICINE

## 2024-07-31 PROCEDURE — 99490 CHRNC CARE MGMT STAFF 1ST 20: CPT | Mod: S$PBB,,, | Performed by: INTERNAL MEDICINE

## 2024-08-02 ENCOUNTER — TELEPHONE (OUTPATIENT)
Dept: SPORTS MEDICINE | Facility: CLINIC | Age: 84
End: 2024-08-02
Payer: MEDICARE

## 2024-08-02 ENCOUNTER — OFFICE VISIT (OUTPATIENT)
Dept: INTERNAL MEDICINE | Facility: CLINIC | Age: 84
End: 2024-08-02
Payer: MEDICARE

## 2024-08-02 ENCOUNTER — CLINICAL SUPPORT (OUTPATIENT)
Dept: INTERNAL MEDICINE | Facility: CLINIC | Age: 84
End: 2024-08-02
Payer: MEDICARE

## 2024-08-02 VITALS
HEIGHT: 68 IN | HEART RATE: 77 BPM | WEIGHT: 158 LBS | SYSTOLIC BLOOD PRESSURE: 120 MMHG | OXYGEN SATURATION: 99 % | BODY MASS INDEX: 23.95 KG/M2 | DIASTOLIC BLOOD PRESSURE: 76 MMHG

## 2024-08-02 DIAGNOSIS — R53.83 FATIGUE, UNSPECIFIED TYPE: ICD-10-CM

## 2024-08-02 DIAGNOSIS — R60.0 EDEMA OF RIGHT LOWER LEG: ICD-10-CM

## 2024-08-02 DIAGNOSIS — D46.9 MDS (MYELODYSPLASTIC SYNDROME): ICD-10-CM

## 2024-08-02 DIAGNOSIS — M70.52 POPLITEAL BURSITIS OF LEFT KNEE: ICD-10-CM

## 2024-08-02 DIAGNOSIS — G20.A2 PARKINSON'S DISEASE WITHOUT DYSKINESIA, WITH FLUCTUATING MANIFESTATIONS: ICD-10-CM

## 2024-08-02 DIAGNOSIS — M79.661 RIGHT CALF PAIN: Primary | ICD-10-CM

## 2024-08-02 DIAGNOSIS — M17.11 PRIMARY OSTEOARTHRITIS OF RIGHT KNEE: ICD-10-CM

## 2024-08-02 LAB
ALBUMIN SERPL BCP-MCNC: 4.3 G/DL (ref 3.5–5.2)
ALP SERPL-CCNC: 43 U/L (ref 55–135)
ALT SERPL W/O P-5'-P-CCNC: 9 U/L (ref 10–44)
ANION GAP SERPL CALC-SCNC: 10 MMOL/L (ref 8–16)
AST SERPL-CCNC: 16 U/L (ref 10–40)
BASOPHILS # BLD AUTO: 0.08 K/UL (ref 0–0.2)
BASOPHILS NFR BLD: 1 % (ref 0–1.9)
BILIRUB SERPL-MCNC: 0.7 MG/DL (ref 0.1–1)
BUN SERPL-MCNC: 17 MG/DL (ref 8–23)
CALCIUM SERPL-MCNC: 9.5 MG/DL (ref 8.7–10.5)
CHLORIDE SERPL-SCNC: 103 MMOL/L (ref 95–110)
CO2 SERPL-SCNC: 22 MMOL/L (ref 23–29)
CREAT SERPL-MCNC: 0.9 MG/DL (ref 0.5–1.4)
DIFFERENTIAL METHOD BLD: ABNORMAL
EOSINOPHIL # BLD AUTO: 0.2 K/UL (ref 0–0.5)
EOSINOPHIL NFR BLD: 2.5 % (ref 0–8)
ERYTHROCYTE [DISTWIDTH] IN BLOOD BY AUTOMATED COUNT: 19.6 % (ref 11.5–14.5)
EST. GFR  (NO RACE VARIABLE): >60 ML/MIN/1.73 M^2
GLUCOSE SERPL-MCNC: 95 MG/DL (ref 70–110)
HCT VFR BLD AUTO: 24 % (ref 37–48.5)
HGB BLD-MCNC: 7.9 G/DL (ref 12–16)
IMM GRANULOCYTES # BLD AUTO: 0.04 K/UL (ref 0–0.04)
IMM GRANULOCYTES NFR BLD AUTO: 0.5 % (ref 0–0.5)
LYMPHOCYTES # BLD AUTO: 2.3 K/UL (ref 1–4.8)
LYMPHOCYTES NFR BLD: 28.4 % (ref 18–48)
MAGNESIUM SERPL-MCNC: 2.4 MG/DL (ref 1.6–2.6)
MCH RBC QN AUTO: 35.3 PG (ref 27–31)
MCHC RBC AUTO-ENTMCNC: 32.9 G/DL (ref 32–36)
MCV RBC AUTO: 107 FL (ref 82–98)
MONOCYTES # BLD AUTO: 0.7 K/UL (ref 0.3–1)
MONOCYTES NFR BLD: 8.6 % (ref 4–15)
NEUTROPHILS # BLD AUTO: 4.7 K/UL (ref 1.8–7.7)
NEUTROPHILS NFR BLD: 59 % (ref 38–73)
NRBC BLD-RTO: 0 /100 WBC
PHOSPHATE SERPL-MCNC: 3.7 MG/DL (ref 2.7–4.5)
PLATELET # BLD AUTO: 519 K/UL (ref 150–450)
PMV BLD AUTO: 10.1 FL (ref 9.2–12.9)
POTASSIUM SERPL-SCNC: 4.1 MMOL/L (ref 3.5–5.1)
PROT SERPL-MCNC: 6.8 G/DL (ref 6–8.4)
RBC # BLD AUTO: 2.24 M/UL (ref 4–5.4)
SODIUM SERPL-SCNC: 135 MMOL/L (ref 136–145)
TSH SERPL DL<=0.005 MIU/L-ACNC: 2.01 UIU/ML (ref 0.4–4)
WBC # BLD AUTO: 7.93 K/UL (ref 3.9–12.7)

## 2024-08-02 PROCEDURE — 96372 THER/PROPH/DIAG INJ SC/IM: CPT | Mod: PBBFAC

## 2024-08-02 PROCEDURE — 83735 ASSAY OF MAGNESIUM: CPT | Performed by: INTERNAL MEDICINE

## 2024-08-02 PROCEDURE — 99999 PR PBB SHADOW E&M-EST. PATIENT-LVL III: CPT | Mod: PBBFAC,,, | Performed by: INTERNAL MEDICINE

## 2024-08-02 PROCEDURE — 84100 ASSAY OF PHOSPHORUS: CPT | Performed by: INTERNAL MEDICINE

## 2024-08-02 PROCEDURE — 99999PBSHW PR PBB SHADOW TECHNICAL ONLY FILED TO HB: Mod: PBBFAC,,,

## 2024-08-02 PROCEDURE — 99213 OFFICE O/P EST LOW 20 MIN: CPT | Mod: PBBFAC | Performed by: INTERNAL MEDICINE

## 2024-08-02 PROCEDURE — 85025 COMPLETE CBC W/AUTO DIFF WBC: CPT | Performed by: INTERNAL MEDICINE

## 2024-08-02 PROCEDURE — 80053 COMPREHEN METABOLIC PANEL: CPT | Performed by: INTERNAL MEDICINE

## 2024-08-02 PROCEDURE — 84443 ASSAY THYROID STIM HORMONE: CPT | Performed by: INTERNAL MEDICINE

## 2024-08-02 PROCEDURE — 99214 OFFICE O/P EST MOD 30 MIN: CPT | Mod: S$PBB,,, | Performed by: INTERNAL MEDICINE

## 2024-08-02 RX ORDER — CELECOXIB 100 MG/1
100 CAPSULE ORAL 2 TIMES DAILY PRN
Qty: 60 CAPSULE | Refills: 0 | Status: SHIPPED | OUTPATIENT
Start: 2024-08-02

## 2024-08-02 RX ORDER — FUROSEMIDE 20 MG/1
20 TABLET ORAL DAILY PRN
Qty: 15 TABLET | Refills: 0 | Status: SHIPPED | OUTPATIENT
Start: 2024-08-02 | End: 2025-08-02

## 2024-08-02 RX ORDER — KETOROLAC TROMETHAMINE 30 MG/ML
15 INJECTION, SOLUTION INTRAMUSCULAR; INTRAVENOUS
Status: SHIPPED | OUTPATIENT
Start: 2024-08-02 | End: 2024-08-05

## 2024-08-02 RX ORDER — KETOROLAC TROMETHAMINE 30 MG/ML
15 INJECTION, SOLUTION INTRAMUSCULAR; INTRAVENOUS ONCE
Qty: 2 ML | Refills: 0 | Status: SHIPPED | OUTPATIENT
Start: 2024-08-02 | End: 2024-08-02

## 2024-08-02 RX ORDER — TRAMADOL HYDROCHLORIDE 50 MG/1
50 TABLET ORAL EVERY 8 HOURS PRN
Qty: 45 TABLET | Refills: 0 | Status: SHIPPED | OUTPATIENT
Start: 2024-08-02

## 2024-08-02 RX ORDER — METHYLPREDNISOLONE SOD SUCC 125 MG
60 VIAL (EA) INJECTION
Status: COMPLETED | OUTPATIENT
Start: 2024-08-02 | End: 2024-08-02

## 2024-08-02 RX ORDER — POTASSIUM CHLORIDE 750 MG/1
10 TABLET, EXTENDED RELEASE ORAL DAILY PRN
Qty: 15 TABLET | Refills: 0 | Status: SHIPPED | OUTPATIENT
Start: 2024-08-02

## 2024-08-02 RX ADMIN — METHYLPREDNISOLONE SODIUM SUCCINATE 60 MG: 125 INJECTION, POWDER, FOR SOLUTION INTRAMUSCULAR; INTRAVENOUS at 03:08

## 2024-08-03 NOTE — PROGRESS NOTES
Subjective:       Patient ID: Shelly Vásquez is a 83 y.o. 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  The patient is here for increased pain and swelling with resultant increase of leg cramps and restlessness over the past 3-4 weeks.  Her right leg is quite swollen and she has even gained 8 lb which she feels is mostly water weight in the past couple of weeks.  The swelling on the right knee is anteriorly and posteriorly.  There is no ecchymoses and she reports no recent trauma or falls.  She has never been diagnosed with a popliteal bursitis before and has not had recent imaging of her right knee.  A year ago before her hip was replaced she had a bleeding ulcer from NSAID presumably as she was on Celebrex 100 mg twice a day and received a Medrol Dosepak.  She was re scoped with no stigmata of bleeding in October and no mentioned was ever made of H pylori.  Since then she has discontinued NSAIDs and has been on pantoprazole 40 mg twice daily.  She has reintroduced NSAIDs back into her regimen at 1 Advil twice a day for the past week with some relief.  Her stools have been normal.  She is taking her gabapentin regularly which is 400 mg 3 times a day and her Mirapex 1 mg twice a day as well as temazepam nightly and occasionally needs the Valium 2 mg to help with the severity of the leg spasms.  She also is on Sinemet for about 6 months now for presumed Parkinson's.  Shelly is also on hormone replacement therapy for many years and has never had any  thromboembolism.  She was taking an 81 mg aspirin twice a day week for prevention of this especially when traveling in an airplane.    Review of Systems   Constitutional:  Positive for malaise/fatigue. Negative for chills, fever and weight loss.   HENT:  Negative for sore throat.    Eyes:  Negative for blurred vision and double vision.   Respiratory:  Negative for cough and shortness of breath.    Cardiovascular:  Negative for chest pain and palpitations.   Gastrointestinal:  Negative for constipation, diarrhea, nausea and vomiting.   Genitourinary:  Negative for dysuria and hematuria.   Musculoskeletal:  Positive for joint pain. Negative for falls and myalgias.   Skin:  Negative for itching and rash.   Neurological:  Negative for sensory change, focal weakness and headaches.   Endo/Heme/Allergies:  Does not bruise/bleed easily.   Psychiatric/Behavioral:  Negative for depression and suicidal ideas.         Medications:  Outpatient Encounter Medications as of 8/2/2024   Medication Sig Note Dispense Refill    acetaminophen (TYLENOL) 650 MG TbSR Take 1 tablet (650 mg total) by mouth every 8 (eight) hours.  120 tablet 0    aspirin 81 MG Chew Take 81 mg by mouth every Mon, Wed, Fri.       carbidopa-levodopa  mg (SINEMET)  mg per tablet Take 1 tablet by mouth 3 (three) times daily. For first week, take 1/2 tab 3 times per day.  90 tablet 11    celecoxib (CELEBREX) 100 MG capsule Take 1 capsule (100 mg total) by mouth 2 (two) times daily as needed for Pain.  60 capsule 0    cyanocobalamin 1,000 mcg/mL injection Inject 1 mL (1,000 mcg total) into the muscle every 14 (fourteen) days.  10 mL 1    diazePAM (VALIUM) 2 MG tablet Take 1 tablet (2 mg total) by mouth every 12 (twelve) hours as needed for Anxiety.  45 tablet 2    estradiol-norethindrone acet 0.5-0.1 mg per tablet Take 1 tablet by mouth Daily.  84 tablet 3    furosemide (LASIX) 20 MG tablet Take 1 tablet (20 mg total) by mouth daily as needed.  15 tablet  0    gabapentin (NEURONTIN) 300 MG capsule Take 1 capsule (300 mg total) by mouth 3 (three) times daily as needed.  90 capsule 2    gabapentin (NEURONTIN) 400 MG capsule Take 1 capsule (400 mg total) by mouth 3 (three) times daily.  90 capsule 2    pantoprazole (PROTONIX) 40 MG tablet Take 40 mg by mouth 2 (two) times daily.       polyethylene glycol 3350 (MIRALAX ORAL) Take 1 Capful by mouth daily as needed (constipation).       potassium chloride SA (K-DUR,KLOR-CON M) 10 MEQ tablet Take 1 tablet (10 mEq total) by mouth daily as needed (edema).  15 tablet 0    pramipexole (MIRAPEX) 1 MG tablet TAKE 1 TABLET BY MOUTH TWICE DAILY  180 tablet 1    temazepam (RESTORIL) 15 mg Cap Take 1 capsule (15 mg total) by mouth nightly as needed.  90 capsule 1    tobramycin sulfate 0.3% (TOBREX) 0.3 % ophthalmic solution 1-2 drops topically twice daily to affected toe(s).  5 mL 3    traMADoL (ULTRAM) 50 mg tablet Take 1 tablet (50 mg total) by mouth every 8 (eight) hours as needed for Pain (if tylenol and celebrex do not relief pain).  45 tablet 0    valACYclovir (VALTREX) 500 MG tablet Take 1 tablet (500 mg total) by mouth 2 (two) times daily.  10 tablet 11    vitamin D (VITAMIN D3) 1000 units Tab Take 1,000 Units by mouth once daily. 4/4/2023: Hold 7 days prior to surgery      [DISCONTINUED] ketorolac (TORADOL) 60 mg/2 mL Soln Inject 0.5 mLs (15 mg total) into the muscle once. for 1 dose  2 mL 0    [DISCONTINUED] metaxalone (SKELAXIN) 800 MG tablet Take 1 tablet (800 mg total) by mouth 3 (three) times daily as needed for Pain.  30 tablet 0     Facility-Administered Encounter Medications as of 8/2/2024   Medication Dose Route Frequency Provider Last Rate Last Admin    ketorolac injection 15 mg  15 mg Intramuscular 1 time in Clinic/HOD         [COMPLETED] methylPREDNISolone sodium succinate injection 60 mg  60 mg Intramuscular 1 time in Clinic/HOD    60 mg at 08/02/24 5147       Allergies:  Review of patient's allergies indicates:  "  Allergen Reactions    Baclofen      Incoherent     Nsaids (non-steroidal anti-inflammatory drug)      GI Bleed  Had 5 blood transfusions       Health Maintenance:  Immunization History   Administered Date(s) Administered    COVID-19, MRNA, LN-S, PF (Pfizer) (Gray Cap) 2022    COVID-19, MRNA, LN-S, PF (Pfizer) (Purple Cap) 2021, 2021, 2021    Influenza (FLUAD) - Quadrivalent - Adjuvanted - PF *Preferred* (65+) 10/02/2021, 10/12/2023    Influenza (FLUAD) - Trivalent - Adjuvanted - PF (65+) 2017, 12/10/2018    Influenza - High Dose - PF (65 years and older) 10/15/2014, 10/10/2016, 10/21/2019    Influenza - Trivalent - PF (ADULT) 2020    Pneumococcal Conjugate - 13 Valent 2018    Pneumococcal Polysaccharide - 23 Valent 2019    Tdap 2014    Zoster Recombinant 2024      Health Maintenance   Topic Date Due    Colonoscopy  2019    TETANUS VACCINE  2024    DEXA Scan  04/10/2026    Lipid Panel  08/15/2028    Shingles Vaccine  Completed          Objective:      Vital Signs  Pulse: 77  SpO2: 99 %  BP: 120/76  Height and Weight  Height: 5' 8" (172.7 cm)  Weight: 71.7 kg (158 lb)  BSA (Calculated - sq m): 1.85 sq meters  BMI (Calculated): 24  Weight in (lb) to have BMI = 25: 164.1]    Physical Exam  HENT:      Head: Normocephalic and atraumatic.   Eyes:      General: No scleral icterus.  Cardiovascular:      Rate and Rhythm: Normal rate and regular rhythm.      Heart sounds: No murmur heard.  Pulmonary:      Effort: Pulmonary effort is normal.      Breath sounds: Normal breath sounds. No rales.   Abdominal:      General: Bowel sounds are normal.      Palpations: Abdomen is soft.      Tenderness: There is no abdominal tenderness.   Musculoskeletal:         General: No tenderness.      Cervical back: Neck supple.      Right knee: Swelling and effusion present. Decreased range of motion.      Right lower le+ Edema present.      Left lower leg: No edema. "   Neurological:      Mental Status: She is alert and oriented to person, place, and time.   Psychiatric:         Mood and Affect: Mood normal.         Behavior: Behavior normal.         Thought Content: Thought content normal.         Judgment: Judgment normal.          Assessment/plan:     Shelly Vásquez is a 83 y.o.female with:    Right calf pain  -     US Lower Extremity Veins Right; Future; Expected date: 08/02/2024  -     X-Ray Knee 3 View Right; Future; Expected date: 08/02/2024  -     X-Ray Knee AP Standing Bilateral; Future; Expected date: 08/02/2024    Popliteal bursitis of left knee  -     celecoxib (CELEBREX) 100 MG capsule; Take 1 capsule (100 mg total) by mouth 2 (two) times daily as needed for Pain.  Dispense: 60 capsule; Refill: 0  -     US Lower Extremity Veins Right; Future; Expected date: 08/02/2024  -     X-Ray Knee 3 View Right; Future; Expected date: 08/02/2024  -     X-Ray Knee AP Standing Bilateral; Future; Expected date: 08/02/2024  -     Ambulatory referral/consult to Sports Medicine; Future; Expected date: 08/09/2024  -     Discontinue: ketorolac (TORADOL) 60 mg/2 mL Soln; Inject 0.5 mLs (15 mg total) into the muscle once. for 1 dose  Dispense: 2 mL; Refill: 0  -     methylPREDNISolone sodium succinate injection 60 mg  -     ketorolac injection 15 mg    Edema of right lower leg  -     furosemide (LASIX) 20 MG tablet; Take 1 tablet (20 mg total) by mouth daily as needed.  Dispense: 15 tablet; Refill: 0  -     potassium chloride SA (K-DUR,KLOR-CON M) 10 MEQ tablet; Take 1 tablet (10 mEq total) by mouth daily as needed (edema).  Dispense: 15 tablet; Refill: 0  -     US Lower Extremity Veins Right; Future; Expected date: 08/02/2024  -     X-Ray Knee 3 View Right; Future; Expected date: 08/02/2024  -     X-Ray Knee AP Standing Bilateral; Future; Expected date: 08/02/2024    Primary osteoarthritis of right knee  -     celecoxib (CELEBREX) 100 MG capsule; Take 1 capsule (100 mg total) by  mouth 2 (two) times daily as needed for Pain.  Dispense: 60 capsule; Refill: 0  -     Ambulatory referral/consult to Sports Medicine; Future; Expected date: 08/09/2024    MDS (myelodysplastic syndrome)-with macrocytic anemia  -     CBC Auto Differential; Future; Expected date: 08/02/2024  -     Comprehensive Metabolic Panel; Future; Expected date: 08/02/2024    Parkinson's disease without dyskinesia, with fluctuating manifestations?   Continue with sinemet tid     Fatigue, unspecified type  -     Magnesium; Future; Expected date: 08/02/2024  -     PHOSPHORUS; Future; Expected date: 08/02/2024  -     TSH; Future; Expected date: 08/02/2024    Other orders  -     traMADoL (ULTRAM) 50 mg tablet; Take 1 tablet (50 mg total) by mouth every 8 (eight) hours as needed for Pain (if tylenol and celebrex do not relief pain).  Dispense: 45 tablet; Refill: 0        Future Appointments   Date Time Provider Department Center   8/5/2024  5:45 PM [facility] OIC-US1 MASTER [facility] ULTR [facility]   9/16/2024  9:30 AM Leonora Calloway AU.D [facility] AUDIO [facility]   9/16/2024 10:20 AM Frederick Vera MD [facility] ENT [facility]   9/25/2024 10:20 AM Artem Espinoza MD [facility] NEURO8 [facility]   10/22/2024  2:50 PM [facility] LAB BMT [facility] LABBMT Sahan Figueroa   10/22/2024  4:00 PM Erlin Lopez MD [facility] HC BMT Shaan Andrew MD  [facility]

## 2024-08-05 ENCOUNTER — TELEPHONE (OUTPATIENT)
Dept: INTERNAL MEDICINE | Facility: CLINIC | Age: 84
End: 2024-08-05
Payer: MEDICARE

## 2024-08-05 ENCOUNTER — OFFICE VISIT (OUTPATIENT)
Dept: SPORTS MEDICINE | Facility: CLINIC | Age: 84
End: 2024-08-05
Payer: MEDICARE

## 2024-08-05 ENCOUNTER — HOSPITAL ENCOUNTER (OUTPATIENT)
Dept: RADIOLOGY | Facility: HOSPITAL | Age: 84
Discharge: HOME | End: 2024-08-05
Attending: INTERNAL MEDICINE
Payer: MEDICARE

## 2024-08-05 ENCOUNTER — HOSPITAL ENCOUNTER (OUTPATIENT)
Dept: RADIOLOGY | Facility: HOSPITAL | Age: 84
Discharge: HOME | End: 2024-08-05
Attending: ORTHOPAEDIC SURGERY
Payer: MEDICARE

## 2024-08-05 VITALS
DIASTOLIC BLOOD PRESSURE: 69 MMHG | BODY MASS INDEX: 24.15 KG/M2 | HEIGHT: 68 IN | WEIGHT: 159.38 LBS | SYSTOLIC BLOOD PRESSURE: 110 MMHG

## 2024-08-05 DIAGNOSIS — M70.52 POPLITEAL BURSITIS OF LEFT KNEE: ICD-10-CM

## 2024-08-05 DIAGNOSIS — M25.561 ACUTE PAIN OF BOTH KNEES: ICD-10-CM

## 2024-08-05 DIAGNOSIS — R60.0 EDEMA OF RIGHT LOWER LEG: ICD-10-CM

## 2024-08-05 DIAGNOSIS — M79.661 RIGHT CALF PAIN: ICD-10-CM

## 2024-08-05 DIAGNOSIS — M79.604 RIGHT LEG PAIN: ICD-10-CM

## 2024-08-05 DIAGNOSIS — M17.11 PRIMARY OSTEOARTHRITIS OF RIGHT KNEE: Primary | ICD-10-CM

## 2024-08-05 DIAGNOSIS — M25.562 ACUTE PAIN OF BOTH KNEES: ICD-10-CM

## 2024-08-05 DIAGNOSIS — M25.561 CHRONIC PAIN OF RIGHT KNEE: ICD-10-CM

## 2024-08-05 DIAGNOSIS — G89.29 CHRONIC PAIN OF RIGHT KNEE: ICD-10-CM

## 2024-08-05 PROCEDURE — 99999 PR PBB SHADOW E&M-EST. PATIENT-LVL II: CPT | Mod: PBBFAC,,, | Performed by: ORTHOPAEDIC SURGERY

## 2024-08-05 PROCEDURE — 73564 X-RAY EXAM KNEE 4 OR MORE: CPT | Mod: TC,50

## 2024-08-05 PROCEDURE — 99214 OFFICE O/P EST MOD 30 MIN: CPT | Mod: 25,S$PBB,, | Performed by: ORTHOPAEDIC SURGERY

## 2024-08-05 PROCEDURE — 93971 EXTREMITY STUDY: CPT | Mod: 26,RT,, | Performed by: RADIOLOGY

## 2024-08-05 PROCEDURE — 73564 X-RAY EXAM KNEE 4 OR MORE: CPT | Mod: 26,50,, | Performed by: RADIOLOGY

## 2024-08-05 PROCEDURE — 99212 OFFICE O/P EST SF 10 MIN: CPT | Mod: PBBFAC,25 | Performed by: ORTHOPAEDIC SURGERY

## 2024-08-05 PROCEDURE — 20610 DRAIN/INJ JOINT/BURSA W/O US: CPT | Mod: PBBFAC | Performed by: ORTHOPAEDIC SURGERY

## 2024-08-05 PROCEDURE — 93971 EXTREMITY STUDY: CPT | Mod: TC,RT

## 2024-08-05 PROCEDURE — 99999PBSHW PR PBB SHADOW TECHNICAL ONLY FILED TO HB: Mod: PBBFAC,,,

## 2024-08-05 RX ORDER — TRIAMCINOLONE ACETONIDE 40 MG/ML
40 INJECTION, SUSPENSION INTRA-ARTICULAR; INTRAMUSCULAR
Status: DISCONTINUED | OUTPATIENT
Start: 2024-08-05 | End: 2024-08-05 | Stop reason: HOSPADM

## 2024-08-05 RX ADMIN — TRIAMCINOLONE ACETONIDE 40 MG: 40 INJECTION, SUSPENSION INTRA-ARTICULAR; INTRAMUSCULAR at 01:08

## 2024-08-06 RX ORDER — DIAZEPAM 5 MG/1
5 TABLET ORAL DAILY PRN
Qty: 30 TABLET | Refills: 3 | Status: SHIPPED | OUTPATIENT
Start: 2024-08-06 | End: 2024-09-05

## 2024-08-08 ENCOUNTER — HOSPITAL ENCOUNTER (OUTPATIENT)
Dept: RADIOLOGY | Facility: HOSPITAL | Age: 84
Discharge: HOME | End: 2024-08-08
Attending: ORTHOPAEDIC SURGERY
Payer: MEDICARE

## 2024-08-08 ENCOUNTER — OFFICE VISIT (OUTPATIENT)
Dept: SPORTS MEDICINE | Facility: CLINIC | Age: 84
End: 2024-08-08
Payer: MEDICARE

## 2024-08-08 VITALS
WEIGHT: 159.38 LBS | SYSTOLIC BLOOD PRESSURE: 114 MMHG | HEIGHT: 68 IN | HEART RATE: 73 BPM | DIASTOLIC BLOOD PRESSURE: 66 MMHG | BODY MASS INDEX: 24.15 KG/M2

## 2024-08-08 DIAGNOSIS — M72.2 PLANTAR FASCIITIS OF RIGHT FOOT: Primary | ICD-10-CM

## 2024-08-08 DIAGNOSIS — G89.29 CHRONIC PAIN OF RIGHT KNEE: ICD-10-CM

## 2024-08-08 DIAGNOSIS — M79.671 RIGHT FOOT PAIN: ICD-10-CM

## 2024-08-08 DIAGNOSIS — M25.561 CHRONIC PAIN OF RIGHT KNEE: ICD-10-CM

## 2024-08-08 DIAGNOSIS — M17.11 PRIMARY OSTEOARTHRITIS OF RIGHT KNEE: ICD-10-CM

## 2024-08-08 DIAGNOSIS — G20.A1 PARKINSON'S DISEASE, UNSPECIFIED WHETHER DYSKINESIA PRESENT, UNSPECIFIED WHETHER MANIFESTATIONS FLUCTUATE: ICD-10-CM

## 2024-08-08 DIAGNOSIS — R25.2 LEG CRAMPING: ICD-10-CM

## 2024-08-08 PROCEDURE — 99215 OFFICE O/P EST HI 40 MIN: CPT | Mod: S$PBB,,, | Performed by: ORTHOPAEDIC SURGERY

## 2024-08-08 PROCEDURE — 99213 OFFICE O/P EST LOW 20 MIN: CPT | Mod: PBBFAC,25 | Performed by: ORTHOPAEDIC SURGERY

## 2024-08-08 PROCEDURE — 99999 PR PBB SHADOW E&M-EST. PATIENT-LVL III: CPT | Mod: PBBFAC,,, | Performed by: ORTHOPAEDIC SURGERY

## 2024-08-08 PROCEDURE — 73630 X-RAY EXAM OF FOOT: CPT | Mod: TC,RT

## 2024-08-08 PROCEDURE — 73630 X-RAY EXAM OF FOOT: CPT | Mod: 26,RT,, | Performed by: RADIOLOGY

## 2024-08-08 RX ORDER — METHYLPREDNISOLONE 4 MG/1
TABLET ORAL
Qty: 1 EACH | Refills: 0 | Status: SHIPPED | OUTPATIENT
Start: 2024-08-08

## 2024-08-09 RX ORDER — CARBIDOPA AND LEVODOPA 25; 100 MG/1; MG/1
1 TABLET ORAL 3 TIMES DAILY
Qty: 90 TABLET | Refills: 11 | Status: SHIPPED | OUTPATIENT
Start: 2024-08-09 | End: 2025-08-09

## 2024-08-09 RX ORDER — GABAPENTIN 400 MG/1
400 CAPSULE ORAL 3 TIMES DAILY
Qty: 90 CAPSULE | Refills: 2 | Status: SHIPPED | OUTPATIENT
Start: 2024-08-09 | End: 2025-08-09

## 2024-08-19 ENCOUNTER — CLINICAL SUPPORT (OUTPATIENT)
Dept: INTERNAL MEDICINE | Facility: CLINIC | Age: 84
End: 2024-08-19
Payer: MEDICARE

## 2024-08-19 ENCOUNTER — OFFICE VISIT (OUTPATIENT)
Dept: INTERNAL MEDICINE | Facility: CLINIC | Age: 84
End: 2024-08-19
Payer: MEDICARE

## 2024-08-19 VITALS
HEIGHT: 68 IN | WEIGHT: 154 LBS | HEART RATE: 70 BPM | BODY MASS INDEX: 23.34 KG/M2 | DIASTOLIC BLOOD PRESSURE: 52 MMHG | OXYGEN SATURATION: 96 % | SYSTOLIC BLOOD PRESSURE: 96 MMHG

## 2024-08-19 DIAGNOSIS — D46.9 MDS (MYELODYSPLASTIC SYNDROME): ICD-10-CM

## 2024-08-19 DIAGNOSIS — M17.11 PRIMARY OSTEOARTHRITIS OF RIGHT KNEE: Primary | ICD-10-CM

## 2024-08-19 DIAGNOSIS — K92.2 UGIB (UPPER GASTROINTESTINAL BLEED): ICD-10-CM

## 2024-08-19 DIAGNOSIS — M48.061 SPINAL STENOSIS OF LUMBAR REGION WITHOUT NEUROGENIC CLAUDICATION: ICD-10-CM

## 2024-08-19 DIAGNOSIS — R60.0 EDEMA OF RIGHT LOWER LEG: ICD-10-CM

## 2024-08-19 DIAGNOSIS — M70.52 POPLITEAL BURSITIS OF LEFT KNEE: ICD-10-CM

## 2024-08-19 DIAGNOSIS — G25.81 RLS (RESTLESS LEGS SYNDROME): ICD-10-CM

## 2024-08-19 DIAGNOSIS — I35.1 AORTIC EJECTION MURMUR: ICD-10-CM

## 2024-08-19 DIAGNOSIS — G20.A2 PARKINSON'S DISEASE WITHOUT DYSKINESIA, WITH FLUCTUATING MANIFESTATIONS: ICD-10-CM

## 2024-08-19 DIAGNOSIS — Z98.890 S/P LUMBAR LAMINECTOMY: ICD-10-CM

## 2024-08-19 DIAGNOSIS — D46.9 MDS (MYELODYSPLASTIC SYNDROME): Primary | ICD-10-CM

## 2024-08-19 LAB
ABO + RH BLD: NORMAL
ANISOCYTOSIS BLD QL SMEAR: SLIGHT
BASO STIPL BLD QL SMEAR: ABNORMAL
BASOPHILS # BLD AUTO: 0.06 K/UL (ref 0–0.2)
BASOPHILS NFR BLD: 0.9 % (ref 0–1.9)
BLD GP AB SCN CELLS X3 SERPL QL: NORMAL
BNP SERPL-MCNC: 173 PG/ML (ref 0–99)
CK SERPL-CCNC: 83 U/L (ref 20–180)
D DIMER PPP IA.FEU-MCNC: 0.28 MG/L FEU
DACRYOCYTES BLD QL SMEAR: ABNORMAL
DIFFERENTIAL METHOD BLD: ABNORMAL
EOSINOPHIL # BLD AUTO: 0.2 K/UL (ref 0–0.5)
EOSINOPHIL NFR BLD: 2.8 % (ref 0–8)
ERYTHROCYTE [DISTWIDTH] IN BLOOD BY AUTOMATED COUNT: 20.5 % (ref 11.5–14.5)
FERRITIN SERPL-MCNC: 632 NG/ML (ref 20–300)
HCT VFR BLD AUTO: 27.5 % (ref 37–48.5)
HGB BLD-MCNC: 9.1 G/DL (ref 12–16)
HYPOCHROMIA BLD QL SMEAR: ABNORMAL
IMM GRANULOCYTES # BLD AUTO: 0.05 K/UL (ref 0–0.04)
IMM GRANULOCYTES NFR BLD AUTO: 0.7 % (ref 0–0.5)
IRON SERPL-MCNC: 106 UG/DL (ref 30–160)
LDH SERPL L TO P-CCNC: 236 U/L (ref 110–260)
LYMPHOCYTES # BLD AUTO: 1.8 K/UL (ref 1–4.8)
LYMPHOCYTES NFR BLD: 25 % (ref 18–48)
MCH RBC QN AUTO: 36.3 PG (ref 27–31)
MCHC RBC AUTO-ENTMCNC: 33.1 G/DL (ref 32–36)
MCV RBC AUTO: 110 FL (ref 82–98)
MONOCYTES # BLD AUTO: 0.6 K/UL (ref 0.3–1)
MONOCYTES NFR BLD: 8.8 % (ref 4–15)
NEUTROPHILS # BLD AUTO: 4.3 K/UL (ref 1.8–7.7)
NEUTROPHILS NFR BLD: 61.8 % (ref 38–73)
NRBC BLD-RTO: 0 /100 WBC
OVALOCYTES BLD QL SMEAR: ABNORMAL
PLATELET # BLD AUTO: 514 K/UL (ref 150–450)
PLATELET BLD QL SMEAR: ABNORMAL
PMV BLD AUTO: 9.9 FL (ref 9.2–12.9)
POIKILOCYTOSIS BLD QL SMEAR: SLIGHT
POLYCHROMASIA BLD QL SMEAR: ABNORMAL
RBC # BLD AUTO: 2.51 M/UL (ref 4–5.4)
RETICS/RBC NFR AUTO: 4.6 % (ref 0.5–2.5)
SATURATED IRON: 37 % (ref 20–50)
SCHISTOCYTES BLD QL SMEAR: ABNORMAL
SCHISTOCYTES BLD QL SMEAR: PRESENT
SPECIMEN OUTDATE: NORMAL
TOTAL IRON BINDING CAPACITY: 286 UG/DL (ref 250–450)
TRANSFERRIN SERPL-MCNC: 193 MG/DL (ref 200–375)
WBC # BLD AUTO: 7.03 K/UL (ref 3.9–12.7)

## 2024-08-19 PROCEDURE — 99214 OFFICE O/P EST MOD 30 MIN: CPT | Mod: S$PBB,,, | Performed by: INTERNAL MEDICINE

## 2024-08-19 PROCEDURE — 99212 OFFICE O/P EST SF 10 MIN: CPT | Mod: PBBFAC,25 | Performed by: INTERNAL MEDICINE

## 2024-08-19 PROCEDURE — 85379 FIBRIN DEGRADATION QUANT: CPT | Performed by: INTERNAL MEDICINE

## 2024-08-19 PROCEDURE — 83615 LACTATE (LD) (LDH) ENZYME: CPT | Performed by: INTERNAL MEDICINE

## 2024-08-19 PROCEDURE — 82550 ASSAY OF CK (CPK): CPT | Performed by: INTERNAL MEDICINE

## 2024-08-19 PROCEDURE — 85025 COMPLETE CBC W/AUTO DIFF WBC: CPT | Performed by: INTERNAL MEDICINE

## 2024-08-19 PROCEDURE — 99999 PR PBB SHADOW E&M-EST. PATIENT-LVL II: CPT | Mod: PBBFAC,,, | Performed by: INTERNAL MEDICINE

## 2024-08-19 PROCEDURE — 86850 RBC ANTIBODY SCREEN: CPT | Performed by: INTERNAL MEDICINE

## 2024-08-19 PROCEDURE — 83880 ASSAY OF NATRIURETIC PEPTIDE: CPT | Performed by: INTERNAL MEDICINE

## 2024-08-19 PROCEDURE — 83540 ASSAY OF IRON: CPT | Performed by: INTERNAL MEDICINE

## 2024-08-19 PROCEDURE — 82728 ASSAY OF FERRITIN: CPT | Performed by: INTERNAL MEDICINE

## 2024-08-19 PROCEDURE — 86900 BLOOD TYPING SEROLOGIC ABO: CPT | Performed by: INTERNAL MEDICINE

## 2024-08-19 PROCEDURE — 85045 AUTOMATED RETICULOCYTE COUNT: CPT | Performed by: INTERNAL MEDICINE

## 2024-08-19 PROCEDURE — 86901 BLOOD TYPING SEROLOGIC RH(D): CPT | Performed by: INTERNAL MEDICINE

## 2024-08-19 NOTE — PROGRESS NOTES
I would like labs before I see ariela as listed in epic - team can you arrange     Dr Lopez  I saw mutual patient over a week ago and she was having severe pain in her legs.  He does have a burst situs in the popliteal area that was causing most of it but the leg pains and cramps which were calling restless legs have gotten debilitating.  I know this has exacerbated by her MDS anemia and her last hemoglobin when I checked it a week and a ago was 7 9.  I am seeing her today and checking a type and screen, some iron studies, LDH etc..  If she is still under 8 and having least severe pains can we transfuse her 1 unit?  Can you help arrange?

## 2024-08-20 PROBLEM — I77.71 CAROTID ARTERY DISSECTION: Status: RESOLVED | Noted: 2022-07-31 | Resolved: 2024-08-20

## 2024-08-20 PROBLEM — I48.0 PAF (PAROXYSMAL ATRIAL FIBRILLATION): Status: RESOLVED | Noted: 2024-05-03 | Resolved: 2024-08-20

## 2024-08-20 NOTE — PROGRESS NOTES
Subjective:       Patient ID: Shelly Vásquez is a 83 y.o. female who presents today for:    Chief Complaint:   Chief Complaint   Patient presents with    Follow-up     Follow up right leg and foot pain       HPI:  Very pleasant nonsmoking 83-year-old female doing a follow-up for severe right leg pain beginning in the knee with swelling at the knee and below-the-knee going on for over a couple of weeks.  I initially saw her at the beginning of August and she had an effusion on the right knee as well as an enlarged popliteal cyst.  Shortly after that she has some x-rays and saw the knee orthopedic surgeon who withdrew 20 cc of fluid and injected a corticosteroid.  She also was started on Celebrex 100 mg twice a day and given a low-dose Toradol shot with a Solu-Medrol shot systemically on August 2nd.  On the 5th x-rays documented severe osteoarthritis of both knees in on the 8th documentation was made of the popliteal cyst.  No blood clot/DVT was seen on the right lower extremity by Doppler.  She did not get much relief from the knee injection and withdrawal of the fluid and so revisited the orthopedic knee surgeon and he felt that most of her symptoms were from plantar fasciitis.  He gave her a Medrol Dosepak, a boot to wear with continuation of the Celebrex.  The patient had a short trip out of town to [place] which was not conducive for ambulation and so she came home shortly after that and today she is here in the office with continued complaints of right lower extremity swelling focused at the knee area and radiating down the lateral leg.  She does admit to some discomfort beginning in the back especially when she stands up from a sitting position.  She does not have any hip pain especially at night.  She can not put pressure on the right leg at all.  Over year ago she had total hip replacement on the right and today denies pain in the right hip itself.   Shelly has issues with myelodysplasia syndrome and  concomitant anemia as well as restless legs and was recently diagnosed with Parkinson's and thus has severe muscle spasms of the right lower extremity beginning in the early evening and has to take not only Valium but gabapentin and Mirapex.    Review of Systems   Constitutional:  Negative for chills, fever and weight loss.   HENT:  Negative for sore throat.    Eyes:  Negative for blurred vision and double vision.   Respiratory:  Negative for cough and shortness of breath.    Cardiovascular:  Negative for chest pain and palpitations.   Gastrointestinal:  Negative for constipation, diarrhea, nausea and vomiting.   Genitourinary:  Negative for dysuria and hematuria.   Musculoskeletal:  Positive for back pain and joint pain. Negative for falls, myalgias and neck pain.   Skin:  Negative for itching and rash.   Neurological:  Negative for sensory change, focal weakness and headaches.   Endo/Heme/Allergies:  Does not bruise/bleed easily.   Psychiatric/Behavioral:  Negative for depression and suicidal ideas.         Medications:  Outpatient Encounter Medications as of 8/19/2024   Medication Sig Note Dispense Refill    acetaminophen (TYLENOL) 650 MG TbSR Take 1 tablet (650 mg total) by mouth every 8 (eight) hours.  120 tablet 0    aspirin 81 MG Chew Take 81 mg by mouth every Mon, Wed, Fri.       carbidopa-levodopa  mg (SINEMET)  mg per tablet Take 1 tablet by mouth 3 (three) times daily.  90 tablet 11    celecoxib (CELEBREX) 100 MG capsule Take 1 capsule (100 mg total) by mouth 2 (two) times daily as needed for Pain.  60 capsule 0    cyanocobalamin 1,000 mcg/mL injection Inject 1 mL (1,000 mcg total) into the muscle every 14 (fourteen) days.  10 mL 1    diazePAM (VALIUM) 5 MG tablet Take 1 tablet (5 mg total) by mouth daily as needed (leg spasms).  30 tablet 3    estradiol-norethindrone acet 0.5-0.1 mg per tablet Take 1 tablet by mouth Daily.  84 tablet 3    furosemide (LASIX) 20 MG tablet Take 1 tablet (20 mg  total) by mouth daily as needed.  15 tablet 0    gabapentin (NEURONTIN) 300 MG capsule Take 1 capsule (300 mg total) by mouth 3 (three) times daily as needed.  90 capsule 2    gabapentin (NEURONTIN) 400 MG capsule Take 1 capsule (400 mg total) by mouth 3 (three) times daily.  90 capsule 2    methylPREDNISolone (MEDROL DOSEPACK) 4 mg tablet use as directed  1 each 0    pantoprazole (PROTONIX) 40 MG tablet Take 40 mg by mouth 2 (two) times daily.       polyethylene glycol 3350 (MIRALAX ORAL) Take 1 Capful by mouth daily as needed (constipation).       potassium chloride SA (K-DUR,KLOR-CON M) 10 MEQ tablet Take 1 tablet (10 mEq total) by mouth daily as needed (edema).  15 tablet 0    pramipexole (MIRAPEX) 1 MG tablet TAKE 1 TABLET BY MOUTH TWICE DAILY  180 tablet 1    tobramycin sulfate 0.3% (TOBREX) 0.3 % ophthalmic solution 1-2 drops topically twice daily to affected toe(s).  5 mL 3    traMADoL (ULTRAM) 50 mg tablet Take 1 tablet (50 mg total) by mouth every 8 (eight) hours as needed for Pain (if tylenol and celebrex do not relief pain).  45 tablet 0    valACYclovir (VALTREX) 500 MG tablet Take 1 tablet (500 mg total) by mouth 2 (two) times daily.  10 tablet 11    vitamin D (VITAMIN D3) 1000 units Tab Take 1,000 Units by mouth once daily. 4/4/2023: Hold 7 days prior to surgery      [DISCONTINUED] temazepam (RESTORIL) 15 mg Cap Take 1 capsule (15 mg total) by mouth nightly as needed.  90 capsule 1     No facility-administered encounter medications on file as of 8/19/2024.       Allergies:  Review of patient's allergies indicates:   Allergen Reactions    Baclofen      Incoherent     Nsaids (non-steroidal anti-inflammatory drug)      GI Bleed  Had 5 blood transfusions       Health Maintenance:  Immunization History   Administered Date(s) Administered    COVID-19, MRNA, LN-S, PF (Pfizer) (Gray Cap) 07/28/2022    COVID-19, MRNA, LN-S, PF (Pfizer) (Purple Cap) 01/09/2021, 01/30/2021, 09/08/2021    Influenza (FLUAD) -  "Quadrivalent - Adjuvanted - PF *Preferred* (65+) 10/02/2021, 10/12/2023    Influenza (FLUAD) - Trivalent - Adjuvanted - PF (65+) 2017, 12/10/2018    Influenza - High Dose - PF (65 years and older) 10/15/2014, 10/10/2016, 10/21/2019    Influenza - Trivalent - PF (ADULT) 2020    Pneumococcal Conjugate - 13 Valent 2018    Pneumococcal Polysaccharide - 23 Valent 2019    Tdap 2014    Zoster Recombinant 2024      Health Maintenance   Topic Date Due    Colonoscopy  2019    TETANUS VACCINE  2024    DEXA Scan  04/10/2026    Lipid Panel  08/15/2028    Shingles Vaccine  Completed          Objective:      Vital Signs  Pulse: 70  SpO2: 96 %  BP: (!) 96/52  Pain Score:   6  Height and Weight  Height: 5' 8" (172.7 cm)  Weight: 69.9 kg (154 lb)  BSA (Calculated - sq m): 1.83 sq meters  BMI (Calculated): 23.4  Weight in (lb) to have BMI = 25: 164.1]    Physical Exam  Constitutional:       Appearance: Normal appearance.   Eyes:      General: No scleral icterus.  Cardiovascular:      Rate and Rhythm: Normal rate and regular rhythm.   Pulmonary:      Effort: Pulmonary effort is normal.   Musculoskeletal:      Cervical back: Normal range of motion.      Right knee: Effusion and crepitus present. No erythema, ecchymosis or bony tenderness. Decreased range of motion. Tenderness present.      Right lower le+ Edema present.      Left lower leg: No edema.   Neurological:      General: No focal deficit present.      Mental Status: She is alert.      Gait: Gait abnormal.   Psychiatric:         Mood and Affect: Mood normal.         Behavior: Behavior normal.         Thought Content: Thought content normal.         Judgment: Judgment normal.        Lab Results   Component Value Date    WBC 7.03 2024    HGB 9.1 (L) 2024    HCT 27.5 (L) 2024     (H) 2024    CHOL 155 08/15/2023    TRIG 46 08/15/2023    HDL 76 (H) 08/15/2023    ALT 9 (L) 2024    AST 16 " 08/02/2024     (L) 08/02/2024    K 4.1 08/02/2024     08/02/2024    CREATININE 0.9 08/02/2024    BUN 17 08/02/2024    CO2 22 (L) 08/02/2024    TSH 2.014 08/02/2024    INR 1.1 04/21/2023    HGBA1C 5.1 08/15/2023       Assessment/plan:     Shelly Vásquez is a 83 y.o.female with:    Primary osteoarthritis of right knee  Popliteal bursitis of left knee   Likely primary problem.  Continue with Celebrex 100 mg twice a day as well as pantoprazole twice daily in view of prior bleeding ulcer.   Recommendations of Orthopedic knee surgeons given   Patient has tramadol for breakthrough pain.    MDS (myelodysplastic syndrome)   Anemia improved today must have been hemodiluted when checked a few weeks ago as patient was volume overloaded due to Baker's cyst and fluid extrusion down the leg    RLS (restless legs syndrome)   Continue with Mirapex and gabapentin    Spinal stenosis of lumbar region without neurogenic claudication   MRI in 2022 shows mild-to-moderate spinal stenosis at L3/L4 and moderate spinal stenosis at L4/L5.  Moderate severe foraminal stenosis at both of those levels as well    to evaluate the pain is coming from the back versus knee    S/P lumbar laminectomy-L4/L5 laminectomies in the remote past    Parkinson's disease without dyskinesia, with fluctuating manifestations   Continue with Sinemet 3 times daily.      Future Appointments   Date Time Provider Department Center   9/10/2024  1:00 PM Paty Ugalde AU.D [facility] AUDIO [facility]   9/10/2024  2:00 PM Jaja Jordan NP [facility]   9/19/2024  2:30 PM Oliva Chavez PA-C [facility] ORTHO [facility]   10/9/2024  3:20 PM Artem Espinoza MD [facility] NEURO8 [facility]   10/22/2024  2:50 PM [facility] LAB BMT [facility] LABBMT Shaan Figueroa   10/22/2024  4:00 PM Erlin Lopez MD [facility] HC BMT Shaan Andrew MD  [facility]

## 2024-08-24 ENCOUNTER — PATIENT MESSAGE (OUTPATIENT)
Dept: INTERNAL MEDICINE | Facility: CLINIC | Age: 84
End: 2024-08-24
Payer: MEDICARE

## 2024-08-28 ENCOUNTER — PATIENT MESSAGE (OUTPATIENT)
Dept: ORTHOPEDICS | Facility: CLINIC | Age: 84
End: 2024-08-28
Payer: MEDICARE

## 2024-08-31 ENCOUNTER — EXTERNAL CHRONIC CARE MANAGEMENT (OUTPATIENT)
Dept: PRIMARY CARE CLINIC | Facility: CLINIC | Age: 84
End: 2024-08-31
Payer: MEDICARE

## 2024-08-31 PROCEDURE — 99490 CHRNC CARE MGMT STAFF 1ST 20: CPT | Mod: S$PBB,,, | Performed by: INTERNAL MEDICINE

## 2024-08-31 PROCEDURE — 99490 CHRNC CARE MGMT STAFF 1ST 20: CPT | Mod: PBBFAC | Performed by: INTERNAL MEDICINE

## 2024-09-05 ENCOUNTER — TELEPHONE (OUTPATIENT)
Dept: INTERNAL MEDICINE | Facility: CLINIC | Age: 84
End: 2024-09-05
Payer: MEDICARE

## 2024-09-05 RX ORDER — TRAMADOL HYDROCHLORIDE 100 MG/1
100 TABLET, EXTENDED RELEASE ORAL DAILY
Qty: 30 TABLET | Refills: 0 | Status: SHIPPED | OUTPATIENT
Start: 2024-09-05

## 2024-09-06 RX ORDER — DIAZEPAM 2 MG/1
2 TABLET ORAL EVERY 8 HOURS PRN
Qty: 60 TABLET | Refills: 1 | Status: SHIPPED | OUTPATIENT
Start: 2024-09-06 | End: 2024-10-06

## 2024-09-09 ENCOUNTER — OFFICE VISIT (OUTPATIENT)
Dept: INTERNAL MEDICINE | Facility: CLINIC | Age: 84
End: 2024-09-09
Payer: MEDICARE

## 2024-09-09 VITALS
SYSTOLIC BLOOD PRESSURE: 98 MMHG | WEIGHT: 157.19 LBS | HEART RATE: 78 BPM | BODY MASS INDEX: 23.82 KG/M2 | HEIGHT: 68 IN | DIASTOLIC BLOOD PRESSURE: 60 MMHG | OXYGEN SATURATION: 98 %

## 2024-09-09 DIAGNOSIS — M25.561 CHRONIC PAIN OF RIGHT KNEE: ICD-10-CM

## 2024-09-09 DIAGNOSIS — M70.52 POPLITEAL BURSITIS OF LEFT KNEE: ICD-10-CM

## 2024-09-09 DIAGNOSIS — D46.9 MDS (MYELODYSPLASTIC SYNDROME): ICD-10-CM

## 2024-09-09 DIAGNOSIS — G89.29 CHRONIC PAIN OF RIGHT KNEE: ICD-10-CM

## 2024-09-09 DIAGNOSIS — M54.17 RADICULOPATHY OF LUMBOSACRAL REGION: Primary | ICD-10-CM

## 2024-09-09 PROCEDURE — 99999 PR PBB SHADOW E&M-EST. PATIENT-LVL II: CPT | Mod: PBBFAC,,, | Performed by: INTERNAL MEDICINE

## 2024-09-09 PROCEDURE — 99213 OFFICE O/P EST LOW 20 MIN: CPT | Mod: S$PBB,,, | Performed by: INTERNAL MEDICINE

## 2024-09-09 PROCEDURE — 99212 OFFICE O/P EST SF 10 MIN: CPT | Mod: PBBFAC | Performed by: INTERNAL MEDICINE

## 2024-09-09 RX ORDER — TRAMADOL HYDROCHLORIDE 50 MG/1
50 TABLET ORAL EVERY 12 HOURS PRN
Qty: 45 TABLET | Refills: 0 | Status: SHIPPED | OUTPATIENT
Start: 2024-09-09

## 2024-09-09 NOTE — PROGRESS NOTES
Subjective:       Patient ID: Shelly Vásquez is a 83 y.o. female who presents today for:    Chief Complaint:   Chief Complaint   Patient presents with    Follow-up       HPI:  Very pleasant 83-year-old nonsmoking female doing a follow-up for severe pain on the right lower extremity radiating from her lumbar sacral area all the way down into the balls of her feet.  Recent MRI confirmed a bulging disc on the right at L5/S1 block in the foramen.  She also has osteoarthritis of the right knee with the Baker's cyst that is symptomatic.  She has a past medical history for a fairly new diagnosis of Parkinson's on Sinemet, longstanding restless legs and most importantly refractory anemia with sideroblasts.  Her baseline hemoglobin is 8.5-9.0.  She is seeing  who will do lumbar spine surgery once her right knee is doctors as much as possible.  Tomorrow she will see Dr. Bob to evaluate for her knee.  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    Review of Systems   Constitutional:  Negative for chills, fever and weight loss.   HENT:  Negative for sore throat.    Eyes:  Negative for blurred vision and double vision.   Respiratory:  Negative for cough and shortness of breath.    Cardiovascular:  Negative for chest pain and palpitations.   Gastrointestinal:  Negative for constipation, diarrhea, nausea and vomiting.   Genitourinary:  Negative for dysuria and hematuria.   Musculoskeletal:  Positive for back pain, joint pain and myalgias. Negative for falls.   Skin:  Negative for itching and rash.   Neurological:  Negative for sensory change, focal weakness and headaches.   Endo/Heme/Allergies:  Does not bruise/bleed easily.   Psychiatric/Behavioral:  Negative for depression and suicidal ideas.         Medications:  Outpatient Encounter Medications as of 9/9/2024   Medication Sig  Note Dispense Refill    acetaminophen (TYLENOL) 650 MG TbSR Take 1 tablet (650 mg total) by mouth every 8 (eight) hours.  120 tablet 0    aspirin 81 MG Chew Take 81 mg by mouth every Mon, Wed, Fri.       carbidopa-levodopa  mg (SINEMET)  mg per tablet Take 1 tablet by mouth 3 (three) times daily.  90 tablet 11    celecoxib (CELEBREX) 100 MG capsule Take 1 capsule (100 mg total) by mouth 2 (two) times daily as needed for Pain.  60 capsule 0    cyanocobalamin 1,000 mcg/mL injection Inject 1 mL (1,000 mcg total) into the muscle every 14 (fourteen) days.  10 mL 1    diazePAM (VALIUM) 2 MG tablet Take 1 tablet (2 mg total) by mouth every 8 (eight) hours as needed for Anxiety.  60 tablet 1    diazePAM (VALIUM) 5 MG tablet Take 1 tablet (5 mg total) by mouth daily as needed (leg spasms).  30 tablet 3    estradiol-norethindrone acet 0.5-0.1 mg per tablet Take 1 tablet by mouth Daily.  84 tablet 3    furosemide (LASIX) 20 MG tablet Take 1 tablet (20 mg total) by mouth daily as needed.  15 tablet 0    gabapentin (NEURONTIN) 300 MG capsule Take 1 capsule (300 mg total) by mouth 3 (three) times daily as needed.  90 capsule 2    gabapentin (NEURONTIN) 400 MG capsule Take 1 capsule (400 mg total) by mouth 3 (three) times daily.  90 capsule 2    pantoprazole (PROTONIX) 40 MG tablet Take 40 mg by mouth 2 (two) times daily.       polyethylene glycol 3350 (MIRALAX ORAL) Take 1 Capful by mouth daily as needed (constipation).       potassium chloride SA (K-DUR,KLOR-CON M) 10 MEQ tablet Take 1 tablet (10 mEq total) by mouth daily as needed (edema).  15 tablet 0    pramipexole (MIRAPEX) 1 MG tablet TAKE 1 TABLET BY MOUTH TWICE DAILY  180 tablet 1    traMADoL (ULTRAM) 50 mg tablet Take 1 tablet (50 mg total) by mouth every 12 (twelve) hours as needed for Pain (if tylenol and celebrex do not relief pain).  45 tablet 0    traMADoL (ULTRAM-ER) 100 MG Tb24 Take 1 tablet (100 mg total) by mouth once daily.  30 tablet 0     valACYclovir (VALTREX) 500 MG tablet Take 1 tablet (500 mg total) by mouth 2 (two) times daily.  10 tablet 11    vitamin D (VITAMIN D3) 1000 units Tab Take 1,000 Units by mouth once daily. 4/4/2023: Hold 7 days prior to surgery      [DISCONTINUED] methylPREDNISolone (MEDROL DOSEPACK) 4 mg tablet use as directed  1 each 0    [DISCONTINUED] tobramycin sulfate 0.3% (TOBREX) 0.3 % ophthalmic solution 1-2 drops topically twice daily to affected toe(s).  5 mL 3    [DISCONTINUED] traMADoL (ULTRAM) 50 mg tablet Take 1 tablet (50 mg total) by mouth every 8 (eight) hours as needed for Pain (if tylenol and celebrex do not relief pain).  45 tablet 0     No facility-administered encounter medications on file as of 9/9/2024.       Allergies:  Review of patient's allergies indicates:   Allergen Reactions    Baclofen      Incoherent     Nsaids (non-steroidal anti-inflammatory drug) Other (See Comments)     GI Bleed    Had 5 blood transfusions    Re: GI bleed       Health Maintenance:  Immunization History   Administered Date(s) Administered    COVID-19, MRNA, LN-S, PF (Pfizer) (Gray Cap) 07/28/2022    COVID-19, MRNA, LN-S, PF (Pfizer) (Purple Cap) 01/09/2021, 01/30/2021, 09/08/2021    Influenza (FLUAD) - Quadrivalent - Adjuvanted - PF *Preferred* (65+) 10/02/2021, 10/12/2023    Influenza (FLUAD) - Trivalent - Adjuvanted - PF (65+) 12/14/2017, 12/10/2018    Influenza - High Dose - PF (65 years and older) 10/15/2014, 10/10/2016, 10/21/2019    Influenza - Trivalent - PF (ADULT) 08/31/2020    Pneumococcal Conjugate - 13 Valent 04/16/2018    Pneumococcal Polysaccharide - 23 Valent 08/26/2019    Tdap 03/31/2014    Zoster Recombinant 04/20/2024      Health Maintenance   Topic Date Due    Colonoscopy  04/29/2019    TETANUS VACCINE  03/31/2024    DEXA Scan  04/10/2026    Lipid Panel  08/15/2028    Shingles Vaccine  Completed          Objective:      Vital Signs  Pulse: 78  SpO2: 98 %  BP: 98/60  Pain Score:   7  Height and Weight  Height:  "5' 8" (172.7 cm)  Weight: 71.3 kg (157 lb 3 oz)  BSA (Calculated - sq m): 1.85 sq meters  BMI (Calculated): 23.9  Weight in (lb) to have BMI = 25: 164.1]    Physical Exam  Constitutional:       Appearance: Normal appearance.   Cardiovascular:      Rate and Rhythm: Normal rate and regular rhythm.      Pulses: Normal pulses.      Heart sounds: Normal heart sounds.   Musculoskeletal:      Cervical back: Normal range of motion.      Right knee: Swelling and effusion present. Tenderness present.      Comments: Swelling appears larger behind the knee in the popliteal bursa than prior but decrease effusion anteriorly.  Minimal leg/calf edema today.    Skin:     General: Skin is warm.   Neurological:      Mental Status: She is alert.      Gait: Gait abnormal.   Psychiatric:         Mood and Affect: Mood normal.         Behavior: Behavior normal.         Thought Content: Thought content normal.         Judgment: Judgment normal.          Assessment/plan:     Shelly Vásquez is a 83 y.o.female with:    Radiculopathy of lumbosacral region- right L5/S1 severe foraminal stenosis with central disc protrusion    For lumbar spine surgery once the knee bursae fluid resolved- seeing dr garza tomorrow     Popliteal bursitis of left knee  Chronic pain of right knee   S/p steroid injection and effusion withdrawal on 8/5 - continue with Celebrex 100 mg twice daily.    MDS (myelodysplastic syndrome)   Noted and perioperatively may need to be transfused 1 unit of blood    Other orders   Tramadol 100 mg extended release daily on back order; when this comes in a recommend taking this in the morning and then Shelly he was still able to take 1 tramadol in the early evening as needed.  -     traMADoL (ULTRAM) 50 mg tablet; Take 1 tablet (50 mg total) by mouth every 12 (twelve) hours as needed for Pain (if tylenol and celebrex do not relief pain).  Dispense: 45 tablet; Refill: 0    Severe nocturnal muscle spasms right leg much greater than " left leg due to lumbar stenosis/radiculopathy as above and also Baker's cyst and osteoarthritis of the knee.   Now using low-dose Valium in the early evening and at night to sleep.  Temazepam discontinued to avoid confusion.      Future Appointments   Date Time Provider Department Center   9/10/2024  1:00 PM Paty Ugalde AU.D [facility] AUDIO [facility]   9/10/2024  2:00 PM Jaja Jordan NP [facility] ENT [facility]   9/23/2024  2:30 PM Oliva Chavez PA-C [facility] ORTHO [facility]   10/9/2024  3:20 PM Artem Espinoza MD [facility] NEURO8 [facility]   10/22/2024  2:50 PM [facility] LAB BMT [facility] LABBMT Shaan Figueroa   10/22/2024  4:00 PM Erlin Lopez MD [facility] HC BMT Shaan Andrew MD  [facility]

## 2024-09-27 ENCOUNTER — TELEPHONE (OUTPATIENT)
Dept: INTERNAL MEDICINE | Facility: CLINIC | Age: 84
End: 2024-09-27
Payer: MEDICARE

## 2024-09-27 NOTE — TELEPHONE ENCOUNTER
Perioperative letter of recommendations printed out.  Let us Fax to Dr Bob office and make sure it was received. By lian     Thank you!

## 2024-09-27 NOTE — LETTER
September 27, 2024      [facility]  [address], [address]  [facility] [address]  Phone: [phone]  Fax: [phone]       Patient: Shelly Vásquez   YOB: 1940  Date of Visit: 09/27/2024    To Dr Emiliano Bob and Staff    Vel Vásquez (1940) is having a total knee replacement on 9/30/2024.  Shelly has a past medical history significant for myelodysplastic syndrome --refractory anemia with sideroblasts, recent diagnosis of Parkinson's, a remote history of atrial fib which she is status post radiofrequency ablation, NSAID gastric bleeding proximally 2 years ago and she is on hormone replacement therapy..    Thus postoperatively patient to be transfused if hemoglobin goes below 7.5.  I also recommend, venous thromboembolism prophylaxis as you would normally do for someone on HRT and having a total knee replacement.  Since Shelly is prone to NSAID gastric bleeding, she should remain on pantoprazole 40 mg twice daily throughout the surgical and I would avoid any postop NSAIDs outside of Celebrex.    Thank you very much!    Sincerely,    Marlene Méndez MD

## 2024-09-30 ENCOUNTER — EXTERNAL CHRONIC CARE MANAGEMENT (OUTPATIENT)
Dept: PRIMARY CARE CLINIC | Facility: CLINIC | Age: 84
End: 2024-09-30
Payer: MEDICARE

## 2024-09-30 PROCEDURE — 99490 CHRNC CARE MGMT STAFF 1ST 20: CPT | Mod: PBBFAC | Performed by: INTERNAL MEDICINE

## 2024-09-30 PROCEDURE — 99490 CHRNC CARE MGMT STAFF 1ST 20: CPT | Mod: S$PBB,,, | Performed by: INTERNAL MEDICINE

## 2024-10-09 ENCOUNTER — OFFICE VISIT (OUTPATIENT)
Dept: NEUROLOGY | Facility: CLINIC | Age: 84
End: 2024-10-09
Payer: MEDICARE

## 2024-10-09 VITALS
WEIGHT: 154 LBS | HEIGHT: 68 IN | SYSTOLIC BLOOD PRESSURE: 106 MMHG | HEART RATE: 75 BPM | BODY MASS INDEX: 23.34 KG/M2 | DIASTOLIC BLOOD PRESSURE: 61 MMHG

## 2024-10-09 DIAGNOSIS — Z71.89 COUNSELING REGARDING GOALS OF CARE: ICD-10-CM

## 2024-10-09 DIAGNOSIS — R25.1 TREMOR: ICD-10-CM

## 2024-10-09 DIAGNOSIS — G20.C PARKINSONISM, UNSPECIFIED PARKINSONISM TYPE: Primary | ICD-10-CM

## 2024-10-09 DIAGNOSIS — D46.9 MDS (MYELODYSPLASTIC SYNDROME): ICD-10-CM

## 2024-10-09 DIAGNOSIS — G25.81 RLS (RESTLESS LEGS SYNDROME): ICD-10-CM

## 2024-10-09 PROCEDURE — 99214 OFFICE O/P EST MOD 30 MIN: CPT | Mod: S$PBB,,, | Performed by: PHYSICIAN ASSISTANT

## 2024-10-09 PROCEDURE — 99999 PR PBB SHADOW E&M-EST. PATIENT-LVL III: CPT | Mod: PBBFAC,,, | Performed by: PHYSICIAN ASSISTANT

## 2024-10-09 PROCEDURE — G2211 COMPLEX E/M VISIT ADD ON: HCPCS | Mod: S$PBB,,, | Performed by: PHYSICIAN ASSISTANT

## 2024-10-09 PROCEDURE — 99213 OFFICE O/P EST LOW 20 MIN: CPT | Mod: PBBFAC | Performed by: PHYSICIAN ASSISTANT

## 2024-10-09 RX ORDER — CARBIDOPA AND LEVODOPA 25; 100 MG/1; MG/1
1 TABLET ORAL 3 TIMES DAILY
Qty: 360 TABLET | Refills: 3 | Status: SHIPPED | OUTPATIENT
Start: 2024-10-09 | End: 2025-10-09

## 2024-10-09 NOTE — PROGRESS NOTES
"Name: Shelly Vásquez  MRN: [id number]   CSN: [id number]      Date: 10/09/2024    Chief Complaint: RLS    Interval History:  - had knee replacement   - here with spouse   - add levodopa last visit   - 1 tab TID   - helping with tremors/shakes   - a couple of falls, tripped on things   - was not using a walker or cane at those times   - has not taken mid day dose yet   - feels levodopa is helping with balance as well   - still having some spasms   - pramipexole 1 mg BID, 400 mg gabapentin TID   - not feeling lightheaded or nauseous   - wakes up really stiff           March 2024  - went to [facility]   -  had EMG (see below, I reviewed also), no clear neuropathy vs. Radiculopathy by their read (though I onder if small fiber sesnory is there)  - had R hip replacement and successful, now with hip lift  - still having spasms and RLS, and worse if anything since hip surgery  - spasms are "still so strong" always R>>>L   - is having more ineard turning R leg now, works with therapy to "turn out"  - has been with Lu[facility] Meredith, and [facility]  - nothing has changed it much  - off mirapex now, on valium now (had seen Joana Doty in the past, now with Dr. Méndez)  - for sleep, she starts with a little more gabapentin and another before bed      PD Review of Symptoms:  Anosmia: normal  Dysarthria/Hypophonia: she notes mild hypophonia   Dysphagia/Sialorrhea: none   Depression: some whenever she is cramping   Cognitive slowing: good, sometimes she forgets short term   Urinary changes: yes, frequency   Constipation: takes miralax, started taking recently   Orthostasis: none   Falls: as above   Freezing: none   Micrographia: some smaller writing 2024  Sleep issues:   -YAJAIRA: none   -RBD: maybe some new sleep talking 2024    From Aug 2022  - requip 0.25 mg QHS, added gabapentin after last visit   - new to me, has seen [facility]BR   - having more pain into her R groin, mostly anterior and medial  - reviewed MRI with her  - " "does show L1-2 disc  - balance is "ok"  -  agrees her meory and mood are stable        From May 2022  - currently taking requip 0.25 mg QHS for RLS   - accompanied by spouse today   - dose works well if she takes it early, sometimes an hour or two before she gets in bed   - if she wakes up at night or going out for the evening, if she takes 0.125 -- this will get her through a social event   - if she gets up in the middle of the night she will take another 0.125  - some involuntary jerking movements, lying down reading on the sofa and had these involuntary movements  - no loss of awareness, no loss of bowel or bladder function  - saw an osteopath who told her they thought her femoral nerve was inflamed  - some lower back pain, has not had lumbar MRI   - was told she needs a hip replacement  - still doing pilates and swimming/strength training  - one small fall during mardi gras, no major injuries     - supplements with vitamin B         From April 2021  - mirapex 0.125 mg QHS helping legs significantly   - only had cramps one night since I saw her last   - takes baclofen 10 mg QHS   - now taking 1/4 dose of miralax and that has helped significantly with constipation   - denies dizziness or lightheadedness  [facility]   - restarted oral iron       From 2/2021: Shelly Vásquez is R handed a81 y.o. 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 Uses restoril to sleep. Muscle cramps in the legs x 2 years, now legs jerking. Toes are cramping, curling. Fingers are also curling/cramping. Curling in hands started several years ago, had a shot in her hands and it helped. Legs involuntarily moving. Denies any odd sensation in her legs such as ants crawling or snakes to where she has to voluntarily move her legs to get " the sensation out. Postural tremors in right hand when putting on makeup. Not sure if tremor is at rest. Balance issues started a few years ago. Fell once coming out of bathtub, held onto towel fay and it came out of the wall and fell backwards. No vision changes. Very careful when she walks. Sometimes slower to get out of the car just to make sure she doesn't fall out. Has fallen 3 times in the past four years. Has slowed down some, makes sure to plant her feet correctly. Wearing out shoes, possibly some shuffling -- had to have her first paid of shoes resoled. Two nights ago had a terrible night of jerking and cramping, couldn't sleep at night at all. Only cramps at night time, not necessarily during the day. Denies neck pain or cramping. Denies head tremor. Not sure if she has noticed feet inversion or not.     Exercises 2-3 times a week. Walks in the park when the weather is better.     Currently not taking iron supplementation due to constipation but plans to restart now that she has started taking miralax.     Medication history:  - none     Neuroleptic exposure:  - none     Family History: son with cramping     Past medical, family, and social history reviewed as documented in chart with pertinent positive medical, family, and social history detailed in HPI.      Review of Systems:   Review of Systems   Constitutional:  Negative for chills, fever and malaise/fatigue.   HENT:  Negative for hearing loss.    Eyes:  Negative for blurred vision and double vision.   Respiratory:  Negative for cough, shortness of breath and stridor.    Cardiovascular:  Negative for chest pain and leg swelling.   Gastrointestinal:  Negative for constipation, diarrhea and nausea.   Genitourinary:  Negative for frequency and urgency.   Musculoskeletal:  Negative for falls.   Skin:  Negative for itching and rash.   Neurological:  Positive for sensory change. Negative for dizziness, tremors, loss of consciousness and weakness.    Psychiatric/Behavioral:  Negative for hallucinations and memory loss.        Past Medical History: The patient  has a past medical history of Arthritis, Atrial fibrillation, Basal cell cancer, Encounter for blood transfusion, Gastric ulcer, GERD (gastroesophageal reflux disease), Herpes simplex without mention of complication, Postmenopausal HRT (hormone replacement therapy), and Supraventricular tachycardia.    Social History: The patient  reports that she quit smoking about 44 years ago. Her smoking use included cigarettes. She started smoking about 66 years ago. She has a 22 pack-year smoking history. She has never used smokeless tobacco. She reports current alcohol use of about 2.0 standard drinks of alcohol per week. She reports that she does not use drugs.    Family History: Their family history includes Cirrhosis in her mother; No Known Problems in her brother, daughter, father, maternal aunt, maternal grandfather, maternal grandmother, maternal uncle, paternal aunt, paternal grandfather, paternal grandmother, and paternal uncle; Pulmonary embolism in her son.    Allergies: Baclofen and Nsaids (non-steroidal anti-inflammatory drug)     Meds:   Needs meds updated      Exam:  LMP  (LMP Unknown)     LMP  (LMP Unknown)       Constitutional  Well-developed, well-nourished, appears stated age   Ophthalmoscopic  No papilledema with no hemorrhages or exudates bilaterally   Cardiovascular  Radial pulses 2+ and symmetric, no LE edema bilaterally   Neurological    * Mental status  MOCA deferred     - Orientation  Oriented to person, place, time, and situation     - Memory   Intact recent and remote     - Attention/concentration  Attentive, vigilant during exam     - Language  Naming & repetition intact, +2-step commands     - Fund of knowledge  Aware of current events     - Executive  Well-organized thoughts     - Other     * Cranial nerves       - CN II  PERRL, visual fields full to confrontation     - CN III, IV, VI   Extraocular movements full, normal pursuits and saccades     - CN V  Sensation V1 - V3 intact     - CN VII  Face strong and symmetric bilaterally     - CN VIII  Hearing intact bilaterally     - CN IX, X  Palate raises midline and symmetric     - CN XI  SCM and trapezius 5/5 bilaterally     - CN XII  Tongue midline   * Motor  Muscle bulk normal, strength 5/5 throughout   * Sensory   Intact to temperature and vibration throughout   * Coordination  No dysmetria with finger-to-nose or heel-to-shin   * Gait  See below.   * Deep tendon reflexes  2+ and symmetric throughout, 1+ at ankles, no deficits   Babinski downgoing bilaterally   * Specialized movement exam  MIld hypophonic speech.    MIld facial masking.   No cogwheel rigidity.     mild bradykinesia, likely age appropriate    No tremor with rest, posture, kinesis, or intention.    No other dystonia, chorea, athetosis, myoclonus, or tics.   No motor impersistence.   Normal-based gait, cautious gait    Unable to tandem    No shortened stride length.   No abnormal arm swing.     No postural instability.      Laboratory/Radiological:  - Results:  Clinical Support on 08/19/2024   Component Date Value Ref Range Status    WBC 08/19/2024 7.03  3.90 - 12.70 K/uL Final    RBC 08/19/2024 2.51 (L)  4.00 - 5.40 M/uL Final    Hemoglobin 08/19/2024 9.1 (L)  12.0 - 16.0 g/dL Final    Hematocrit 08/19/2024 27.5 (L)  37.0 - 48.5 % Final    MCV 08/19/2024 110 (H)  82 - 98 fL Final    MCH 08/19/2024 36.3 (H)  27.0 - 31.0 pg Final    MCHC 08/19/2024 33.1  32.0 - 36.0 g/dL Final    RDW 08/19/2024 20.5 (H)  11.5 - 14.5 % Final    Platelets 08/19/2024 514 (H)  150 - 450 K/uL Final    MPV 08/19/2024 9.9  9.2 - 12.9 fL Final    Immature Granulocytes 08/19/2024 0.7 (H)  0.0 - 0.5 % Final    Gran # (ANC) 08/19/2024 4.3  1.8 - 7.7 K/uL Final    Immature Grans (Abs) 08/19/2024 0.05 (H)  0.00 - 0.04 K/uL Final    Lymph # 08/19/2024 1.8  1.0 - 4.8 K/uL Final    Mono # 08/19/2024 0.6  0.3 - 1.0  K/uL Final    Eos # 08/19/2024 0.2  0.0 - 0.5 K/uL Final    Baso # 08/19/2024 0.06  0.00 - 0.20 K/uL Final    nRBC 08/19/2024 0  0 /100 WBC Final    Gran % 08/19/2024 61.8  38.0 - 73.0 % Final    Lymph % 08/19/2024 25.0  18.0 - 48.0 % Final    Mono % 08/19/2024 8.8  4.0 - 15.0 % Final    Eosinophil % 08/19/2024 2.8  0.0 - 8.0 % Final    Basophil % 08/19/2024 0.9  0.0 - 1.9 % Final    Platelet Estimate 08/19/2024 Increased (A)   Final    Aniso 08/19/2024 Slight   Final    Poik 08/19/2024 Slight   Final    Poly 08/19/2024 Occasional   Final    Hypo 08/19/2024 Occasional   Final    Ovalocytes 08/19/2024 Occasional   Final    Tear Drop Cells 08/19/2024 Occasional   Final    Schistocytes 08/19/2024 Present   Final    Basophilic Stippling 08/19/2024 Occasional   Final    Fragmented Cells 08/19/2024 Occasional   Final    Differential Method 08/19/2024 Automated   Final    LD 08/19/2024 236  110 - 260 U/L Final    CPK 08/19/2024 83  20 - 180 U/L Final    Retic 08/19/2024 4.6 (H)  0.5 - 2.5 % Final    Iron 08/19/2024 106  30 - 160 ug/dL Final    Transferrin 08/19/2024 193 (L)  200 - 375 mg/dL Final    TIBC 08/19/2024 286  250 - 450 ug/dL Final    Saturated Iron 08/19/2024 37  20 - 50 % Final    Ferritin 08/19/2024 632 (H)  20.0 - 300.0 ng/mL Final    Group & Rh 08/19/2024 A POS   Final    Indirect Berry 08/19/2024 NEG   Final    Specimen Outdate 08/19/2024 08/22/2024 23:59   Final    D-Dimer 08/19/2024 0.28  <0.50 mg/L FEU Final    BNP 08/19/2024 173 (H)  0 - 99 pg/mL Final   Clinical Support on 08/02/2024   Component Date Value Ref Range Status    WBC 08/02/2024 7.93  3.90 - 12.70 K/uL Final    RBC 08/02/2024 2.24 (L)  4.00 - 5.40 M/uL Final    Hemoglobin 08/02/2024 7.9 (L)  12.0 - 16.0 g/dL Final    Hematocrit 08/02/2024 24.0 (L)  37.0 - 48.5 % Final    MCV 08/02/2024 107 (H)  82 - 98 fL Final    MCH 08/02/2024 35.3 (H)  27.0 - 31.0 pg Final    MCHC 08/02/2024 32.9  32.0 - 36.0 g/dL Final    RDW 08/02/2024 19.6 (H)  11.5  - 14.5 % Final    Platelets 08/02/2024 519 (H)  150 - 450 K/uL Final    MPV 08/02/2024 10.1  9.2 - 12.9 fL Final    Immature Granulocytes 08/02/2024 0.5  0.0 - 0.5 % Final    Gran # (ANC) 08/02/2024 4.7  1.8 - 7.7 K/uL Final    Immature Grans (Abs) 08/02/2024 0.04  0.00 - 0.04 K/uL Final    Lymph # 08/02/2024 2.3  1.0 - 4.8 K/uL Final    Mono # 08/02/2024 0.7  0.3 - 1.0 K/uL Final    Eos # 08/02/2024 0.2  0.0 - 0.5 K/uL Final    Baso # 08/02/2024 0.08  0.00 - 0.20 K/uL Final    nRBC 08/02/2024 0  0 /100 WBC Final    Gran % 08/02/2024 59.0  38.0 - 73.0 % Final    Lymph % 08/02/2024 28.4  18.0 - 48.0 % Final    Mono % 08/02/2024 8.6  4.0 - 15.0 % Final    Eosinophil % 08/02/2024 2.5  0.0 - 8.0 % Final    Basophil % 08/02/2024 1.0  0.0 - 1.9 % Final    Differential Method 08/02/2024 Automated   Final    Sodium 08/02/2024 135 (L)  136 - 145 mmol/L Final    Potassium 08/02/2024 4.1  3.5 - 5.1 mmol/L Final    Chloride 08/02/2024 103  95 - 110 mmol/L Final    CO2 08/02/2024 22 (L)  23 - 29 mmol/L Final    Glucose 08/02/2024 95  70 - 110 mg/dL Final    BUN 08/02/2024 17  8 - 23 mg/dL Final    Creatinine 08/02/2024 0.9  0.5 - 1.4 mg/dL Final    Calcium 08/02/2024 9.5  8.7 - 10.5 mg/dL Final    Total Protein 08/02/2024 6.8  6.0 - 8.4 g/dL Final    Albumin 08/02/2024 4.3  3.5 - 5.2 g/dL Final    Total Bilirubin 08/02/2024 0.7  0.1 - 1.0 mg/dL Final    Alkaline Phosphatase 08/02/2024 43 (L)  55 - 135 U/L Final    AST 08/02/2024 16  10 - 40 U/L Final    ALT 08/02/2024 9 (L)  10 - 44 U/L Final    eGFR 08/02/2024 >60.0  >60 mL/min/1.73 m^2 Final    Anion Gap 08/02/2024 10  8 - 16 mmol/L Final    Magnesium 08/02/2024 2.4  1.6 - 2.6 mg/dL Final    Phosphorus 08/02/2024 3.7  2.7 - 4.5 mg/dL Final    TSH 08/02/2024 2.014  0.400 - 4.000 uIU/mL Final       - Independent review of images: MRI          - Independent review of consultant's notes: Dev     ASSESSMENT:  1) RLS, tremor, and ? 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   2) keep other meds the same        This is a patient with a chronic and complex neurologic diagnosis whose overall, ongoing care is being managed and monitored by me and our Neurology clinic.   As such, since 2024,  is the appropriate add-on code to accompany the other E/M billing for this visit.          Collaborating Physician, Dr. Espinoza, was present during today's encounter. Any change to plan along with cosign to appear in the EMR.       I spent 33 minutes with the patient, reviewing past encounters, labs and imaging.        María Hendrickson PA-C   [facility]  Department of Neurology  Movement Disorders            Artem Espinoza MD, MPH  Division of Movement and Memory Disorders  [facility]

## 2024-10-09 NOTE — PROGRESS NOTES
CC: Right shoulder pain    83 y.o. Female who is a patient well known to me.  I have seen her multiple times in the past for her left shoulder, right knee and right foot. Patient is right hand dominant.  I most recently saw her on Aug 5th for her right knee.  Corticosteroid injection given.  This did not provide much pain relief.  She was referred by a friend to Dr. Emiliano Bob who apparently performed a right knee replacement surgery on Sept 30.  She is recovering from that surgery.  She now comes to see me again for a new complaint of right shoulder pain.  This is bothered her off and on for years.  Denies any prior treatment.  Worse with repetitive overhead activity.  Recently worsened in particular due to the required use of both upper extremities for her rolling walker.  Required after her knee surgery.  Bothers her when lying on her right side at night.  Better with rest.  No neck or radicular symptoms.  She does describes some associated subjective stiffness.  Feels similar to what her left side has felt.  She has known glenohumeral DJD in the left side.  She is accompanied today by her .  I also saw him recently for his shoulder.  Doing well with his corticosteroid injection.    Prior Hx 8/8/2024:  Shelly Vásquez returns to clinic today with a few complaints.  She has a primary complaint of plantar right foot pain that is quite significant.  This limits her mobility.  She is currently unable to stand as a result.  She reports insidious onset about a week ago.  No trauma.  She also reports an episode of entire right leg cramping which sounds like was quite severe.  She contacted counts years medicine and was given Valium that has been very effective and helpful.  She does have a history of previous significant almost whole-body cramping in the past that responded well to Valium.  She has a history of Parkinson's.  Her previously reported right knee pain is much better after the corticosteroid  injection but not fully resolved.  She is using a topical pain cream over the medial aspect of her knee and plantar aspect of her foot.  She denies any back pain at all.  No lumbar radicular complaints otherwise.  Again she has denied any right hip/groin pain.     Prior Hx 8/5/2024:   83 y.o. Female is a patient I have seen in the past for her left shoulder who presents with a new chief complaint of right knee pain.  She has a history of arthritis.  Previous right total hip arthroplasty by my colleague Dr. Issa Gary on 4/26/23.  She describes not only knee pain but pain in her right mid thigh and sometimes pain that extends and radiates to her medial foot and ankle.  She denies any back pain.  No numbness or tingling.  She has some chronic knee pain that has been intermittent.  Recently worsened after a trip to [place].  She denies specifically any anterior groin or hip specific pain.  Initially reported left knee pain but after further discussion denies any specific left sided symptoms.  Currently on a quad cane and a regular cane due to pain in her leg and knee.  She denies any history of trauma recently.  No falls.     She has right lower extremity ultrasound scheduled later today at 5:45pm as ordered by her PCP, Marlene Méndez MD.      Prior Hx 3/16/2021  80 y.o. Female returns today for follow-up of left shoulder. Diagnosis of left glenohumeral osteoarthritis and biceps tendinitis. Proceeded with left GH CSI on 12/10/20 repeated 1/03/21. She also underwetn recent biceps tendon sheath CSI on 2/2/21. She has been working with Aguila Moeller in PT. Previously discussed  arthroscopic debridement with capsular releases and biceps auto tenodesis as a limited goals intermediate option.      PMHx notable for Afib, GERD, right NAT 04/26/23 Dr. Gary.   Negative for tobacco. Former  Negative for diabetes. Last A1C: 5.1 08/15/23    PAST MEDICAL HISTORY:   Past Medical History:   Diagnosis Date    Arthritis     Atrial  fibrillation     Basal cell cancer     on the face    Encounter for blood transfusion     Gastric ulcer     GERD (gastroesophageal reflux disease)     Herpes simplex without mention of complication     rare outbreaks    Postmenopausal HRT (hormone replacement therapy)     Supraventricular tachycardia     s/p AVNRT ablation (Dr Brady)     PAST SURGICAL HISTORY:  Past Surgical History:   Procedure Laterality Date    APPENDECTOMY  age 21    ARTHROPLASTY OF HIP BY ANTERIOR APPROACH Right 4/26/2023    Procedure: ARTHROPLASTY, HIP, TOTAL, ANTERIOR APPROACH: RIGHT: DEPUY - C-STEM + PINNACLE;  Surgeon: Keon Gary III, MD;  Location: [facility] OR [address] [place] [address];  Service: Orthopedics;  Laterality: Right;    BACK SURGERY      Beast Lift  2004    BONE MARROW BIOPSY Right 12/19/2019    Procedure: Biopsy-bone marrow;  Surgeon: Aidan Spring MD;  Location: [facility] OR [address] [place] [address];  Service: Oncology;  Laterality: Right;    BREAST BIOPSY  50yrs ago    unable to see scar    CATARACT EXTRACTION W/  INTRAOCULAR LENS IMPLANT Right 9/7/2023    Procedure: EXTRACTION, CATARACT, WITH IOL INSERTION;  Surgeon: Francisco Willingham MD;  [facility];  Service: Ophthalmology;  Laterality: Right;  [facility] ONLY    CATARACT EXTRACTION W/  INTRAOCULAR LENS IMPLANT Left 10/2/2023    Procedure: EXTRACTION, CATARACT, WITH IOL INSERTION;  Surgeon: Francisco Willingham MD;  [facility];  Service: Ophthalmology;  Laterality: Left;  [facility] ONLY    COSMETIC SURGERY      DILATION AND CURETTAGE OF UTERUS  2008    PMB    ENDOMETRIAL BIOPSY      EPIDURAL STEROID INJECTION N/A 8/25/2022    Procedure: LUMBAR CELINA CONTRAST DIRECT REFERRAL;  Surgeon: Rj Hernandez MD;  Location: [facility] PAIN MGT;  Service: Pain Management;  Laterality: N/A;    ESOPHAGOGASTRODUODENOSCOPY N/A 10/4/2023    Procedure: EGD (ESOPHAGOGASTRODUODENOSCOPY);  Surgeon: Hugo Menchaca MD;  [facility]  Service: Endoscopy;  Laterality: N/A;    EYE SURGERY      INJECTION OF  ANESTHETIC AGENT AROUND NERVE Bilateral 11/11/2022    Procedure: BLOCK, NERVE BILATERAL L2,L3,L4 AND L5 MEDIAL BRANCH ONE OF TWO;  Surgeon: Rj Hernandez MD;  Location: [facility] PAIN MGT;  Service: Pain Management;  Laterality: Bilateral;    INJECTION OF JOINT Right 12/2/2022    Procedure: INJECTION, JOINT RIGHT INTRARTICULAR HIP CONTRAST;  Surgeon: Rj Hernandez MD;  Location: [facility] PAIN MGT;  Service: Pain Management;  Laterality: Right;    ptsosis      RADIOFREQUENCY ABLATION  03/2018    SMALL INTESTINE SURGERY      TONSILLECTOMY, ADENOIDECTOMY  age 8    TOTAL REDUCTION MAMMOPLASTY Bilateral 2003    TRANSFORAMINAL EPIDURAL INJECTION OF STEROID Right 8/11/2022    Procedure: Injection,steroid,epidural Right L1/2 TF DIrect Referral Instructed to provide intervention per MRI results;  Surgeon: Rj Hernandez MD;  Location: [facility] PAIN MGT;  Service: Pain Management;  Laterality: Right;    TRANSFORAMINAL EPIDURAL INJECTION OF STEROID Right 10/21/2022    Procedure: INJECTION, STEROID, EPIDURAL, TRANSFORAMINAL APPROACH, L5-S1 and S1, RIGHT CONTRAST;  Surgeon: Rj Hernandez MD;  Location: [facility] PAIN MGT;  Service: Pain Management;  Laterality: Right;    TUBAL LIGATION       FAMILY HISTORY:  Family History   Problem Relation Name Age of Onset    No Known Problems Father      Cirrhosis Mother      No Known Problems Brother      No Known Problems Maternal Aunt      No Known Problems Maternal Uncle      No Known Problems Paternal Aunt      No Known Problems Paternal Uncle      No Known Problems Maternal Grandmother      No Known Problems Maternal Grandfather      No Known Problems Paternal Grandmother      No Known Problems Paternal Grandfather      No Known Problems Daughter x1     Pulmonary embolism Son x1 age 60     Breast cancer Neg Hx      Colon cancer Neg Hx      Ovarian cancer Neg Hx      Amblyopia Neg Hx      Blindness Neg Hx      Cancer Neg Hx      Cataracts Neg Hx      Diabetes Neg Hx      Glaucoma Neg Hx       Hypertension Neg Hx      Macular degeneration Neg Hx      Retinal detachment Neg Hx      Strabismus Neg Hx      Stroke Neg Hx      Thyroid disease Neg Hx       MEDICATIONS:    Current Outpatient Medications:     acetaminophen (TYLENOL) 650 MG TbSR, Take 1 tablet (650 mg total) by mouth every 8 (eight) hours., Disp: 120 tablet, Rfl: 0    aspirin 81 MG Chew, Take 81 mg by mouth every Mon, Wed, Fri., Disp: , Rfl:     carbidopa-levodopa  mg (SINEMET)  mg per tablet, Take 1 tablet by mouth 3 (three) times daily. One additional tablet in the middle of the night as needed., Disp: 360 tablet, Rfl: 3    celecoxib (CELEBREX) 100 MG capsule, Take 1 capsule (100 mg total) by mouth 2 (two) times daily as needed for Pain., Disp: 60 capsule, Rfl: 0    cyanocobalamin 1,000 mcg/mL injection, Inject 1 mL (1,000 mcg total) into the muscle every 14 (fourteen) days., Disp: 10 mL, Rfl: 1    estradiol-norethindrone acet 0.5-0.1 mg per tablet, Take 1 tablet by mouth Daily., Disp: 84 tablet, Rfl: 3    furosemide (LASIX) 20 MG tablet, Take 1 tablet (20 mg total) by mouth daily as needed., Disp: 15 tablet, Rfl: 0    gabapentin (NEURONTIN) 300 MG capsule, Take 1 capsule (300 mg total) by mouth 3 (three) times daily as needed., Disp: 90 capsule, Rfl: 2    gabapentin (NEURONTIN) 400 MG capsule, Take 1 capsule (400 mg total) by mouth 3 (three) times daily., Disp: 90 capsule, Rfl: 2    pantoprazole (PROTONIX) 40 MG tablet, Take 40 mg by mouth 2 (two) times daily., Disp: , Rfl:     polyethylene glycol 3350 (MIRALAX ORAL), Take 1 Capful by mouth daily as needed (constipation)., Disp: , Rfl:     potassium chloride SA (K-DUR,KLOR-CON M) 10 MEQ tablet, Take 1 tablet (10 mEq total) by mouth daily as needed (edema)., Disp: 15 tablet, Rfl: 0    pramipexole (MIRAPEX) 1 MG tablet, TAKE 1 TABLET BY MOUTH TWICE DAILY, Disp: 180 tablet, Rfl: 1    traMADoL (ULTRAM) 50 mg tablet, Take 1 tablet (50 mg total) by mouth every 12 (twelve) hours as needed  "for Pain (if tylenol and celebrex do not relief pain)., Disp: 45 tablet, Rfl: 0    traMADoL (ULTRAM-ER) 100 MG Tb24, Take 1 tablet (100 mg total) by mouth once daily., Disp: 30 tablet, Rfl: 0    valACYclovir (VALTREX) 500 MG tablet, Take 1 tablet (500 mg total) by mouth 2 (two) times daily., Disp: 10 tablet, Rfl: 11    diazePAM (VALIUM) 2 MG tablet, Take 1 tablet (2 mg total) by mouth every 8 (eight) hours as needed for Anxiety., Disp: 60 tablet, Rfl: 1    vitamin D (VITAMIN D3) 1000 units Tab, Take 1,000 Units by mouth once daily., Disp: , Rfl:     ALLERGIES:  Review of patient's allergies indicates:   Allergen Reactions    Baclofen      Incoherent     Nsaids (non-steroidal anti-inflammatory drug) Other (See Comments)     GI Bleed    Had 5 blood transfusions    Re: GI bleed     REVIEW OF SYSTEMS:  Constitution: Negative. Negative for chills, fever and night sweats.    Hematologic/Lymphatic: Negative for bleeding problem. Does not bruise/bleed easily.   Skin: Negative for dry skin, itching and rash.   Musculoskeletal: Negative for falls. Positive for right shoulder pain and muscle weakness.     All other review of symptoms were reviewed and found to be noncontributory.     PHYSICAL EXAMINATION:  Vitals:  BP (!) 145/72   Pulse 74   Ht 5' 8" (1.727 m)   Wt 69.9 kg (154 lb 1.6 oz)   LMP  (LMP Unknown)   BMI 23.43 kg/m²    General: Well-developed well-nourished 83 y.o. femalein no acute distress   Cardiovascular: Regular rhythm by palpation of distal pulse, normal color and temperature, no concerning varicosities on symptomatic side   Lungs: No labored breathing or wheezing appreciated   Neuro: Alert and oriented ×3   Psychiatric: well oriented to person, place and time, demonstrates normal mood and affect   Skin: No rashes, lesions or ulcers, normal temperature, turgor, and texture on uninvolved extremity    Ortho/SPM Exam  Examination of the right shoulder demonstrates pain to palpation over the proximal biceps " groove and posterior glenohumeral joint line.  Active forward elevation to 140-150, passive to 160-170.  Pain at mid and terminal range.  Active external rotation with arm at side to 40, passive to 60.  Pain with passive external rotation stretch at side.  Positive glenohumeral grind test for some pain.  Reported clicking.  Nontender over Codman's point.  4+ to 5- out of 5 resisted scaption.  No significant pain.  5- out of 5 resisted external rotation with arm at side.  Negative AC joint.  Intact cervical neck range of motion.    IMAGING:  Xrays including AP, Outlet and Axillary Lateral of RIGHT shoulder are ordered / images reviewed by me:   Moderate glenohumeral degenerative changes.  Moderate to advanced AC arthropathy.    ASSESSMENT:      ICD-10-CM ICD-9-CM   1. Osteoarthritis of right glenohumeral joint  M19.011 715.91   2. Chronic right shoulder pain  M25.511 719.41    G89.29 338.29     PLAN:     -Findings and treatment options were discussed with the patient and her  at length.  She does have moderate glenohumeral degenerative changes with correlating symptoms on exam.  Symptomatic DJD.  Treatment options reviewed.  Conservative care recommended.  Discussed considerations for a shoulder corticosteroid injection today in the context of her recent knee replacement.  I think this would be okay.  Certainly this gets in to some of the issues with medical tourism.  The patient has also seen my colleague Dr. Gary, who performed her right hip replacement.  I am not clear whether Dr. Bob knew about her right knee CSI in Aug. I have counseled Shelly to be sure to communicate her prior treatment well to any and all future orthopedic providers so that the pertinent information can be relayed.  I do not think Dr. Bob will have any issue with her right shoulder injection today so we proceeded.  Hold off on any formal therapy for the right shoulder at this time as she is recovering from her right knee  surgery.  -CSI subacromial  -RTC PRN  -All questions answered    Large Joint Aspiration/Injection: R glenohumeral    Date/Time: 10/10/2024 1:15 PM    Performed by: ERNESTO Wallace MD  Authorized by: ERNESTO Wallace MD    Consent Done?:  Yes (Verbal)  Indications:  Pain  Site marked: the procedure site was marked    Timeout: prior to procedure the correct patient, procedure, and site was verified    Prep: patient was prepped and draped in usual sterile fashion      Local anesthesia used?: Yes    Local anesthetic:  Co-phenylcaine spray (0.2% Naropin)  Anesthetic total (ml):  4      Details:  Needle Size:  22 G  Approach:  Superior  Location:  Shoulder  Site:  R glenohumeral  Medications:  40 mg triamcinolone acetonide 40 mg/mL  Patient tolerance:  Patient tolerated the procedure well with no immediate complications

## 2024-10-10 ENCOUNTER — OFFICE VISIT (OUTPATIENT)
Dept: SPORTS MEDICINE | Facility: CLINIC | Age: 84
End: 2024-10-10
Payer: MEDICARE

## 2024-10-10 ENCOUNTER — HOSPITAL ENCOUNTER (OUTPATIENT)
Dept: RADIOLOGY | Facility: HOSPITAL | Age: 84
Discharge: HOME | End: 2024-10-10
Attending: ORTHOPAEDIC SURGERY
Payer: MEDICARE

## 2024-10-10 VITALS
WEIGHT: 154.13 LBS | BODY MASS INDEX: 23.36 KG/M2 | DIASTOLIC BLOOD PRESSURE: 72 MMHG | SYSTOLIC BLOOD PRESSURE: 145 MMHG | HEART RATE: 74 BPM | HEIGHT: 68 IN

## 2024-10-10 DIAGNOSIS — M25.511 RIGHT SHOULDER PAIN, UNSPECIFIED CHRONICITY: ICD-10-CM

## 2024-10-10 DIAGNOSIS — M25.511 CHRONIC RIGHT SHOULDER PAIN: ICD-10-CM

## 2024-10-10 DIAGNOSIS — G89.29 CHRONIC RIGHT SHOULDER PAIN: ICD-10-CM

## 2024-10-10 DIAGNOSIS — M19.011 OSTEOARTHRITIS OF RIGHT GLENOHUMERAL JOINT: Primary | ICD-10-CM

## 2024-10-10 PROCEDURE — 20610 DRAIN/INJ JOINT/BURSA W/O US: CPT | Mod: PBBFAC | Performed by: ORTHOPAEDIC SURGERY

## 2024-10-10 PROCEDURE — 73030 X-RAY EXAM OF SHOULDER: CPT | Mod: TC,RT

## 2024-10-10 PROCEDURE — 73030 X-RAY EXAM OF SHOULDER: CPT | Mod: 26,RT,, | Performed by: RADIOLOGY

## 2024-10-10 PROCEDURE — 99999 PR PBB SHADOW E&M-EST. PATIENT-LVL III: CPT | Mod: PBBFAC,,, | Performed by: ORTHOPAEDIC SURGERY

## 2024-10-10 PROCEDURE — 99213 OFFICE O/P EST LOW 20 MIN: CPT | Mod: PBBFAC,25 | Performed by: ORTHOPAEDIC SURGERY

## 2024-10-10 PROCEDURE — 99999PBSHW PR PBB SHADOW TECHNICAL ONLY FILED TO HB: Mod: PBBFAC,,,

## 2024-10-10 RX ORDER — TRIAMCINOLONE ACETONIDE 40 MG/ML
40 INJECTION, SUSPENSION INTRA-ARTICULAR; INTRAMUSCULAR
Status: DISCONTINUED | OUTPATIENT
Start: 2024-10-10 | End: 2024-10-10 | Stop reason: HOSPADM

## 2024-10-10 RX ADMIN — TRIAMCINOLONE ACETONIDE 40 MG: 40 INJECTION, SUSPENSION INTRA-ARTICULAR; INTRAMUSCULAR at 01:10

## 2024-10-11 ENCOUNTER — PATIENT MESSAGE (OUTPATIENT)
Dept: SPORTS MEDICINE | Facility: CLINIC | Age: 84
End: 2024-10-11
Payer: MEDICARE

## 2024-10-22 ENCOUNTER — LAB VISIT (OUTPATIENT)
Dept: LAB | Facility: HOSPITAL | Age: 84
End: 2024-10-22
Payer: MEDICARE

## 2024-10-22 ENCOUNTER — OFFICE VISIT (OUTPATIENT)
Dept: HEMATOLOGY/ONCOLOGY | Facility: CLINIC | Age: 84
End: 2024-10-22
Payer: MEDICARE

## 2024-10-22 VITALS
SYSTOLIC BLOOD PRESSURE: 135 MMHG | RESPIRATION RATE: 18 BRPM | DIASTOLIC BLOOD PRESSURE: 65 MMHG | HEART RATE: 77 BPM | OXYGEN SATURATION: 97 % | BODY MASS INDEX: 23.36 KG/M2 | HEIGHT: 68 IN | WEIGHT: 154.13 LBS

## 2024-10-22 DIAGNOSIS — D46.9 MDS (MYELODYSPLASTIC SYNDROME): Primary | ICD-10-CM

## 2024-10-22 DIAGNOSIS — D46.9 MDS (MYELODYSPLASTIC SYNDROME): ICD-10-CM

## 2024-10-22 DIAGNOSIS — D53.9 MACROCYTIC ANEMIA: ICD-10-CM

## 2024-10-22 LAB
ALBUMIN SERPL BCP-MCNC: 4.2 G/DL (ref 3.5–5.2)
ALP SERPL-CCNC: 64 U/L (ref 40–150)
ALT SERPL W/O P-5'-P-CCNC: 7 U/L (ref 10–44)
ANION GAP SERPL CALC-SCNC: 8 MMOL/L (ref 8–16)
AST SERPL-CCNC: 12 U/L (ref 10–40)
BASOPHILS # BLD AUTO: 0.11 K/UL (ref 0–0.2)
BASOPHILS NFR BLD: 1.5 % (ref 0–1.9)
BILIRUB SERPL-MCNC: 0.8 MG/DL (ref 0.1–1)
BUN SERPL-MCNC: 17 MG/DL (ref 8–23)
CALCIUM SERPL-MCNC: 9.3 MG/DL (ref 8.7–10.5)
CHLORIDE SERPL-SCNC: 104 MMOL/L (ref 95–110)
CO2 SERPL-SCNC: 23 MMOL/L (ref 23–29)
CREAT SERPL-MCNC: 0.9 MG/DL (ref 0.5–1.4)
DIFFERENTIAL METHOD BLD: ABNORMAL
EOSINOPHIL # BLD AUTO: 0.2 K/UL (ref 0–0.5)
EOSINOPHIL NFR BLD: 2.4 % (ref 0–8)
ERYTHROCYTE [DISTWIDTH] IN BLOOD BY AUTOMATED COUNT: 22.5 % (ref 11.5–14.5)
EST. GFR  (NO RACE VARIABLE): >60 ML/MIN/1.73 M^2
GLUCOSE SERPL-MCNC: 115 MG/DL (ref 70–110)
HCT VFR BLD AUTO: 24.3 % (ref 37–48.5)
HGB BLD-MCNC: 7.8 G/DL (ref 12–16)
IMM GRANULOCYTES # BLD AUTO: 0.02 K/UL (ref 0–0.04)
IMM GRANULOCYTES NFR BLD AUTO: 0.3 % (ref 0–0.5)
LYMPHOCYTES # BLD AUTO: 2.1 K/UL (ref 1–4.8)
LYMPHOCYTES NFR BLD: 27.7 % (ref 18–48)
MAGNESIUM SERPL-MCNC: 2.3 MG/DL (ref 1.6–2.6)
MCH RBC QN AUTO: 33.5 PG (ref 27–31)
MCHC RBC AUTO-ENTMCNC: 32.1 G/DL (ref 32–36)
MCV RBC AUTO: 104 FL (ref 82–98)
MONOCYTES # BLD AUTO: 0.7 K/UL (ref 0.3–1)
MONOCYTES NFR BLD: 8.9 % (ref 4–15)
NEUTROPHILS # BLD AUTO: 4.5 K/UL (ref 1.8–7.7)
NEUTROPHILS NFR BLD: 59.2 % (ref 38–73)
NRBC BLD-RTO: 0 /100 WBC
PHOSPHATE SERPL-MCNC: 3 MG/DL (ref 2.7–4.5)
PLATELET # BLD AUTO: 634 K/UL (ref 150–450)
PMV BLD AUTO: 10.1 FL (ref 9.2–12.9)
POTASSIUM SERPL-SCNC: 4.3 MMOL/L (ref 3.5–5.1)
PROT SERPL-MCNC: 7 G/DL (ref 6–8.4)
RBC # BLD AUTO: 2.33 M/UL (ref 4–5.4)
SODIUM SERPL-SCNC: 135 MMOL/L (ref 136–145)
WBC # BLD AUTO: 7.51 K/UL (ref 3.9–12.7)

## 2024-10-22 PROCEDURE — 84100 ASSAY OF PHOSPHORUS: CPT

## 2024-10-22 PROCEDURE — 85025 COMPLETE CBC W/AUTO DIFF WBC: CPT

## 2024-10-22 PROCEDURE — 99214 OFFICE O/P EST MOD 30 MIN: CPT | Mod: PBBFAC | Performed by: INTERNAL MEDICINE

## 2024-10-22 PROCEDURE — 36415 COLL VENOUS BLD VENIPUNCTURE: CPT

## 2024-10-22 PROCEDURE — 99214 OFFICE O/P EST MOD 30 MIN: CPT | Mod: S$PBB,,, | Performed by: INTERNAL MEDICINE

## 2024-10-22 PROCEDURE — 83735 ASSAY OF MAGNESIUM: CPT

## 2024-10-22 PROCEDURE — 99999 PR PBB SHADOW E&M-EST. PATIENT-LVL IV: CPT | Mod: PBBFAC,,, | Performed by: INTERNAL MEDICINE

## 2024-10-22 PROCEDURE — 80053 COMPREHEN METABOLIC PANEL: CPT

## 2024-10-22 RX ORDER — OXYCODONE AND ACETAMINOPHEN 5; 325 MG/1; MG/1
1-2 TABLET ORAL EVERY 6 HOURS PRN
COMMUNITY
Start: 2024-10-14

## 2024-10-22 RX ORDER — MUPIROCIN 20 MG/G
OINTMENT TOPICAL
COMMUNITY
Start: 2024-07-10

## 2024-10-22 RX ORDER — NICOTINE POLACRILEX 2 MG
GUM BUCCAL DAILY
COMMUNITY

## 2024-10-22 RX ORDER — HYDROCORTISONE 25 MG/G
CREAM TOPICAL
COMMUNITY
Start: 2024-07-14

## 2024-10-22 NOTE — PROGRESS NOTES
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 Diploid karyotype. NGS with SF3B1 (VAF 35%). Observation recommended.     History of Present Ilness:   Shelly Byrnes) is a pleasant 83 y.o.female with a past medical history of low risk MDS who presents for follow up.  She had a right robotic total knee replacement on 09/30/2024.  She is still recovering from this procedure.  She endorses fatigue, although she has been more active lately.    PAST MEDICAL HISTORY:   Past Medical History:   Diagnosis Date    Arthritis     Atrial fibrillation     Basal cell cancer     on the face    Encounter for blood transfusion     Gastric ulcer     GERD (gastroesophageal reflux disease)     Herpes simplex without mention of complication     rare outbreaks    Postmenopausal HRT (hormone replacement therapy)     Supraventricular tachycardia     s/p AVNRT ablation (Dr Brady)       PAST SURGICAL HISTORY:   Past Surgical History:   Procedure Laterality Date    APPENDECTOMY  age 21    ARTHROPLASTY OF HIP BY ANTERIOR APPROACH Right 4/26/2023    Procedure: ARTHROPLASTY, HIP, TOTAL, ANTERIOR APPROACH: RIGHT: DEPUY - C-STEM + PINNACLE;  Surgeon: Keon Gary III, MD;  Location: [facility] OR [address] [place] [address];  Service: Orthopedics;  Laterality: Right;    BACK SURGERY      Beast Lift  2004    BONE MARROW BIOPSY Right 12/19/2019    Procedure: Biopsy-bone marrow;  Surgeon: Aidan Spring MD;  Location: [facility] OR [address] [place] [address];  Service: Oncology;  Laterality: Right;    BREAST BIOPSY  50yrs ago    unable to see scar     CATARACT EXTRACTION W/  INTRAOCULAR LENS IMPLANT Right 9/7/2023    Procedure: EXTRACTION, CATARACT, WITH IOL INSERTION;  Surgeon: Francisco Willingham MD;  [facility];  Service: Ophthalmology;  Laterality: Right;  [facility] ONLY    CATARACT EXTRACTION W/  INTRAOCULAR LENS IMPLANT Left 10/2/2023    Procedure: EXTRACTION, CATARACT, WITH IOL INSERTION;  Surgeon: Francisco Willingham MD;  Location: [facility] OR;  Service: Ophthalmology;  Laterality: Left;  [facility] ONLY    COSMETIC SURGERY      DILATION AND CURETTAGE OF UTERUS  2008    PMB    ENDOMETRIAL BIOPSY      EPIDURAL STEROID INJECTION N/A 8/25/2022    Procedure: LUMBAR CELINA CONTRAST DIRECT REFERRAL;  Surgeon: Rj Hernandez MD;  Location: [facility];  Service: Pain Management;  Laterality: N/A;    ESOPHAGOGASTRODUODENOSCOPY N/A 10/4/2023    Procedure: EGD (ESOPHAGOGASTRODUODENOSCOPY);  Surgeon: Hugo Menchaca MD;  Location: [address]);  Service: Endoscopy;  Laterality: N/A;    EYE SURGERY      INJECTION OF ANESTHETIC AGENT AROUND NERVE Bilateral 11/11/2022    Procedure: BLOCK, NERVE BILATERAL L2,L3,L4 AND L5 MEDIAL BRANCH ONE OF TWO;  Surgeon: Rj Hernandez MD;  Location: [facility];  Service: Pain Management;  Laterality: Bilateral;    INJECTION OF JOINT Right 12/2/2022    Procedure: INJECTION, JOINT RIGHT INTRARTICULAR HIP CONTRAST;  Surgeon: Rj Hernandez MD;  Location: [facility];  Service: Pain Management;  Laterality: Right;    ptsosis      RADIOFREQUENCY ABLATION  03/2018    SMALL INTESTINE SURGERY      TONSILLECTOMY, ADENOIDECTOMY  age 8    TOTAL REDUCTION MAMMOPLASTY Bilateral 2003    TRANSFORAMINAL EPIDURAL INJECTION OF STEROID Right 8/11/2022    Procedure: Injection,steroid,epidural Right L1/2 TF DIrect Referral Instructed to provide intervention per MRI results;  Surgeon: Rj Hernandez MD;  Location: [facility];  Service: Pain Management;  Laterality: Right;    TRANSFORAMINAL EPIDURAL INJECTION OF STEROID Right  10/21/2022    Procedure: INJECTION, STEROID, EPIDURAL, TRANSFORAMINAL APPROACH, L5-S1 and S1, RIGHT CONTRAST;  Surgeon: Rj Hernandez MD;  Location: [facility];  Service: Pain Management;  Laterality: Right;    TUBAL LIGATION         PAST SOCIAL HISTORY:  Social History     Tobacco Use    Smoking status: Former     Current packs/day: 0.00     Average packs/day: 1 pack/day for 22.0 years (22.0 ttl pk-yrs)     Types: Cigarettes     Start date: 1958     Quit date: 1980     Years since quittin.2    Smokeless tobacco: Never   Substance Use Topics    Alcohol use: Yes     Alcohol/week: 2.0 standard drinks of alcohol     Types: 2 Glasses of wine per week     Comment: 1-2 glasses of  some night    Drug use: No       FAMILY HISTORY:  Family History   Problem Relation Name Age of Onset    No Known Problems Father      Cirrhosis Mother      No Known Problems Brother      No Known Problems Maternal Aunt      No Known Problems Maternal Uncle      No Known Problems Paternal Aunt      No Known Problems Paternal Uncle      No Known Problems Maternal Grandmother      No Known Problems Maternal Grandfather      No Known Problems Paternal Grandmother      No Known Problems Paternal Grandfather      No Known Problems Daughter x1     Pulmonary embolism Son x1 age 60     Breast cancer Neg Hx      Colon cancer Neg Hx      Ovarian cancer Neg Hx      Amblyopia Neg Hx      Blindness Neg Hx      Cancer Neg Hx      Cataracts Neg Hx      Diabetes Neg Hx      Glaucoma Neg Hx      Hypertension Neg Hx      Macular degeneration Neg Hx      Retinal detachment Neg Hx      Strabismus Neg Hx      Stroke Neg Hx      Thyroid disease Neg Hx         CURRENT MEDICATIONS:   Current Outpatient Medications   Medication Sig    acetaminophen (TYLENOL) 650 MG TbSR Take 1 tablet (650 mg total) by mouth every 8 (eight) hours.    aspirin 81 MG Chew Take 81 mg by mouth every Mon, Wed, Fri.    biotin 1 mg Cap  Take by mouth once daily.    carbidopa-levodopa  mg (SINEMET)  mg per tablet Take 1 tablet by mouth 3 (three) times daily. One additional tablet in the middle of the night as needed.    celecoxib (CELEBREX) 100 MG capsule Take 1 capsule (100 mg total) by mouth 2 (two) times daily as needed for Pain.    cyanocobalamin 1,000 mcg/mL injection Inject 1 mL (1,000 mcg total) into the muscle every 14 (fourteen) days.    estradiol-norethindrone acet 0.5-0.1 mg per tablet Take 1 tablet by mouth Daily.    furosemide (LASIX) 20 MG tablet Take 1 tablet (20 mg total) by mouth daily as needed.    gabapentin (NEURONTIN) 300 MG capsule Take 1 capsule (300 mg total) by mouth 3 (three) times daily as needed.    gabapentin (NEURONTIN) 400 MG capsule Take 1 capsule (400 mg total) by mouth 3 (three) times daily.    hydrocortisone 2.5 % cream Apply topically as needed.    mupirocin (BACTROBAN) 2 % ointment Apply topically as needed.    oxyCODONE-acetaminophen (PERCOCET) 5-325 mg per tablet Take 1-2 tablets by mouth every 6 (six) hours as needed.    pantoprazole (PROTONIX) 40 MG tablet Take 40 mg by mouth 2 (two) times daily.    polyethylene glycol 3350 (MIRALAX ORAL) Take 1 Capful by mouth daily as needed (constipation).    potassium chloride SA (K-DUR,KLOR-CON M) 10 MEQ tablet Take 1 tablet (10 mEq total) by mouth daily as needed (edema).    pramipexole (MIRAPEX) 1 MG tablet TAKE 1 TABLET BY MOUTH TWICE DAILY    traMADoL (ULTRAM) 50 mg tablet Take 1 tablet (50 mg total) by mouth every 12 (twelve) hours as needed for Pain (if tylenol and celebrex do not relief pain).    traMADoL (ULTRAM-ER) 100 MG Tb24 Take 1 tablet (100 mg total) by mouth once daily.    valACYclovir (VALTREX) 500 MG tablet Take 1 tablet (500 mg total) by mouth 2 (two) times daily.    vitamin D (VITAMIN D3) 1000 units Tab Take 1,000 Units by mouth once daily.    diazePAM (VALIUM) 2 MG tablet Take 1 tablet (2 mg total) by mouth every 8  (eight) hours as needed for Anxiety.     No current facility-administered medications for this visit.       ALLERGIES:   Review of patient's allergies indicates:   Allergen Reactions    Baclofen      Incoherent     Nsaids (non-steroidal anti-inflammatory drug) Other (See Comments)     GI Bleed    Had 5 blood transfusions    Re: GI bleed       Review of Systems:     Pertinent positives and negatives included in the HPI. Otherwise a complete review of systems is negative.    Physical Exam:     Vitals:    10/22/24 1643   BP: 135/65   Pulse: 77   Resp: 18     General: Appears well, NAD  Pulmonary: CTAB, no increased work of breathing, no W/R/C  Cardiovascular: S1S2 normal, RRR, no M/R/G  Abdominal: Soft, NT, ND, BS+, no HSM  Extremities: No C/C/E  Neurological: AAOx4, grossly normal, no focal deficits  Dermatologic: No appreciable rashes or lesions  Lymphatic: No cervical, axillary, or inguinal lymphadenopathy     ECOG Performance Status: (foot note - ECOG PS provided by [employer]cology [employer]) 0 - Asymptomatic    Karnofsky Performance Score:  100%- Normal, No Complaints, No Evidence of Disease    Labs:   Lab Results   Component Value Date    WBC 7.51 10/22/2024    RBC 2.33 (L) 10/22/2024    HGB 7.8 (L) 10/22/2024    HCT 24.3 (L) 10/22/2024     (H) 10/22/2024    MCH 33.5 (H) 10/22/2024    MCHC 32.1 10/22/2024    RDW 22.5 (H) 10/22/2024     (H) 10/22/2024    MPV 10.1 10/22/2024    GRAN 4.5 10/22/2024    GRAN 59.2 10/22/2024    LYMPH 2.1 10/22/2024    LYMPH 27.7 10/22/2024    MONO 0.7 10/22/2024    MONO 8.9 10/22/2024    EOS 0.2 10/22/2024    BASO 0.11 10/22/2024    EOSINOPHIL 2.4 10/22/2024    BASOPHIL 1.5 10/22/2024       CMP  Sodium   Date Value Ref Range Status   10/22/2024 135 (L) 136 - 145 mmol/L Final     Potassium   Date Value Ref Range Status   10/22/2024 4.3 3.5 - 5.1 mmol/L Final     Chloride   Date Value Ref Range Status   10/22/2024 104 95 - 110 mmol/L Final     CO2   Date Value  Ref Range Status   10/22/2024 23 23 - 29 mmol/L Final     Glucose   Date Value Ref Range Status   10/22/2024 115 (H) 70 - 110 mg/dL Final     BUN   Date Value Ref Range Status   10/22/2024 17 8 - 23 mg/dL Final     Creatinine   Date Value Ref Range Status   10/22/2024 0.9 0.5 - 1.4 mg/dL Final     Calcium   Date Value Ref Range Status   10/22/2024 9.3 8.7 - 10.5 mg/dL Final     Total Protein   Date Value Ref Range Status   10/22/2024 7.0 6.0 - 8.4 g/dL Final     Albumin   Date Value Ref Range Status   10/22/2024 4.2 3.5 - 5.2 g/dL Final     Total Bilirubin   Date Value Ref Range Status   10/22/2024 0.8 0.1 - 1.0 mg/dL Final     Comment:     For infants and newborns, interpretation of results should be based  on gestational age, weight and in agreement with clinical  observations.    Premature Infant recommended reference ranges:  Up to 24 hours.............<8.0 mg/dL  Up to 48 hours............<12.0 mg/dL  3-5 days..................<15.0 mg/dL  6-29 days.................<15.0 mg/dL       Alkaline Phosphatase   Date Value Ref Range Status   10/22/2024 64 40 - 150 U/L Final     AST ([place])   Date Value Ref Range Status   12/31/2015 14 14 - 36 U/L Final     AST   Date Value Ref Range Status   10/22/2024 12 10 - 40 U/L Final     ALT   Date Value Ref Range Status   10/22/2024 7 (L) 10 - 44 U/L Final     Anion Gap   Date Value Ref Range Status   10/22/2024 8 8 - 16 mmol/L Final     eGFR if    Date Value Ref Range Status   05/12/2022 >60.0 >60 mL/min/1.73 m^2 Final     eGFR if non    Date Value Ref Range Status   05/12/2022 59.3 (A) >60 mL/min/1.73 m^2 Final     Comment:     Calculation used to obtain the estimated glomerular filtration  rate (eGFR) is the CKD-EPI equation.        Pathology:  Reviewed      Assessment and Plan:   Shelly Vásquez (Shelly) is a pleasant 83 y.o.female with a past medical history of low risk MDS who presents for follow up.    Myelodysplastic syndrome  with ring sideroblasts, SF3B1, IPSS-M very low risk: She has low risk disease by IPSS/IPSS-R/IPSS-M, and she is not had any significant symptoms related to her MDS.  She is had a mild anemia which has been stable for years. We discussed luspatercept as an option to increase her hemoglobin, but she is not interested at this time as she is not symptomatic from her anemia.   Continue observation.     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  I offered transfusion at this time given her fatigue and hemoglobin of 7.8, but she would prefer to continue monitoring for now.  She will let us know if she feels that transfusion is needed.  She will also let us know if she feels as though repeat labs are needed prior to her next visit.    Follow up: Will arrange follow up q6 months.     Orders/Follow Up:           Route Chart for Scheduling    BMT Chart Routing      Follow up with physician 6 months.   Follow up with [facility]    Provider visit type    Infusion scheduling note    Injection scheduling note    Labs CBC, CMP, phosphorus, magnesium and type and screen   Scheduling:  Preferred lab:  Lab interval:  Prior to next visit   Imaging    Pharmacy appointment    Other referrals                   Total time of this visit was 30, including time spent face to face with patient and/or via video/audio, and also in preparing for today's visit for MDM and documentation. (Medical Decision Making, including consideration of possible diagnoses, management options, complex medical record review, review of diagnostic tests and information, consideration and discussion of significant complications based on comorbidities, and discussion with providers involved with the care of the patient). Greater than 50% was spent face to face with the patient counseling and coordinating care.      Karan Lopez MD  Hematology, Oncology, and Stem Cell Transplantation  [facility]

## 2024-10-31 ENCOUNTER — EXTERNAL CHRONIC CARE MANAGEMENT (OUTPATIENT)
Dept: PRIMARY CARE CLINIC | Facility: CLINIC | Age: 84
End: 2024-10-31
Payer: MEDICARE

## 2024-10-31 PROCEDURE — 99439 CHRNC CARE MGMT STAF EA ADDL: CPT | Mod: S$PBB,,, | Performed by: INTERNAL MEDICINE

## 2024-10-31 PROCEDURE — 99490 CHRNC CARE MGMT STAFF 1ST 20: CPT | Mod: S$PBB,,, | Performed by: INTERNAL MEDICINE

## 2024-10-31 PROCEDURE — 99439 CHRNC CARE MGMT STAF EA ADDL: CPT | Mod: PBBFAC | Performed by: INTERNAL MEDICINE

## 2024-10-31 PROCEDURE — 99490 CHRNC CARE MGMT STAFF 1ST 20: CPT | Mod: PBBFAC | Performed by: INTERNAL MEDICINE

## 2024-11-11 RX ORDER — GABAPENTIN 400 MG/1
400 CAPSULE ORAL 3 TIMES DAILY
Qty: 90 CAPSULE | Refills: 2 | Status: SHIPPED | OUTPATIENT
Start: 2024-11-11

## 2024-11-11 NOTE — TELEPHONE ENCOUNTER
No care due was identified.  [facility] Embedded Care Due Messages. Reference number: [id number].   11/09/2024 1:14:48 PM CST  
Please see the attached refill request.  
No

## 2024-11-26 ENCOUNTER — PATIENT MESSAGE (OUTPATIENT)
Dept: ADMINISTRATIVE | Facility: HOSPITAL | Age: 84
End: 2024-11-26
Payer: MEDICARE

## 2024-11-30 ENCOUNTER — EXTERNAL CHRONIC CARE MANAGEMENT (OUTPATIENT)
Dept: PRIMARY CARE CLINIC | Facility: CLINIC | Age: 84
End: 2024-11-30
Payer: MEDICARE

## 2024-11-30 PROCEDURE — 99490 CHRNC CARE MGMT STAFF 1ST 20: CPT | Mod: S$PBB,,, | Performed by: INTERNAL MEDICINE

## 2024-11-30 PROCEDURE — 99490 CHRNC CARE MGMT STAFF 1ST 20: CPT | Mod: PBBFAC | Performed by: INTERNAL MEDICINE

## 2024-12-16 ENCOUNTER — HOSPITAL ENCOUNTER (EMERGENCY)
Facility: HOSPITAL | Age: 84
Discharge: HOME | End: 2024-12-16
Attending: EMERGENCY MEDICINE
Payer: MEDICARE

## 2024-12-16 VITALS
SYSTOLIC BLOOD PRESSURE: 133 MMHG | HEART RATE: 78 BPM | TEMPERATURE: 99 F | HEIGHT: 68 IN | WEIGHT: 150 LBS | DIASTOLIC BLOOD PRESSURE: 64 MMHG | RESPIRATION RATE: 18 BRPM | BODY MASS INDEX: 22.73 KG/M2 | OXYGEN SATURATION: 97 %

## 2024-12-16 DIAGNOSIS — M62.838 MUSCLE SPASMS OF LOWER EXTREMITY: Primary | ICD-10-CM

## 2024-12-16 LAB
ALBUMIN SERPL BCP-MCNC: 4.5 G/DL (ref 3.5–5.2)
ALP SERPL-CCNC: 44 U/L (ref 40–150)
ALT SERPL W/O P-5'-P-CCNC: 7 U/L (ref 10–44)
ANION GAP SERPL CALC-SCNC: 8 MMOL/L (ref 8–16)
AST SERPL-CCNC: 15 U/L (ref 10–40)
BASOPHILS # BLD AUTO: 0.08 K/UL (ref 0–0.2)
BASOPHILS NFR BLD: 1 % (ref 0–1.9)
BILIRUB SERPL-MCNC: 1.4 MG/DL (ref 0.1–1)
BILIRUB UR QL STRIP: NEGATIVE
BUN SERPL-MCNC: 15 MG/DL (ref 8–23)
CALCIUM SERPL-MCNC: 9.3 MG/DL (ref 8.7–10.5)
CHLORIDE SERPL-SCNC: 102 MMOL/L (ref 95–110)
CK SERPL-CCNC: 82 U/L (ref 20–180)
CLARITY UR REFRACT.AUTO: CLEAR
CO2 SERPL-SCNC: 22 MMOL/L (ref 23–29)
COLOR UR AUTO: YELLOW
CREAT SERPL-MCNC: 0.8 MG/DL (ref 0.5–1.4)
DIFFERENTIAL METHOD BLD: ABNORMAL
EOSINOPHIL # BLD AUTO: 0.1 K/UL (ref 0–0.5)
EOSINOPHIL NFR BLD: 1.1 % (ref 0–8)
ERYTHROCYTE [DISTWIDTH] IN BLOOD BY AUTOMATED COUNT: 22.6 % (ref 11.5–14.5)
EST. GFR  (NO RACE VARIABLE): >60 ML/MIN/1.73 M^2
GLUCOSE SERPL-MCNC: 107 MG/DL (ref 70–110)
GLUCOSE UR QL STRIP: NEGATIVE
HCT VFR BLD AUTO: 24.9 % (ref 37–48.5)
HCV AB SERPL QL IA: NORMAL
HGB BLD-MCNC: 8.6 G/DL (ref 12–16)
HGB UR QL STRIP: NEGATIVE
HIV 1+2 AB+HIV1 P24 AG SERPL QL IA: NORMAL
IMM GRANULOCYTES # BLD AUTO: 0.03 K/UL (ref 0–0.04)
IMM GRANULOCYTES NFR BLD AUTO: 0.4 % (ref 0–0.5)
INFLUENZA A, MOLECULAR: NEGATIVE
INFLUENZA B, MOLECULAR: NEGATIVE
KETONES UR QL STRIP: NEGATIVE
LEUKOCYTE ESTERASE UR QL STRIP: NEGATIVE
LYMPHOCYTES # BLD AUTO: 2 K/UL (ref 1–4.8)
LYMPHOCYTES NFR BLD: 25.1 % (ref 18–48)
MCH RBC QN AUTO: 36.6 PG (ref 27–31)
MCHC RBC AUTO-ENTMCNC: 34.5 G/DL (ref 32–36)
MCV RBC AUTO: 106 FL (ref 82–98)
MONOCYTES # BLD AUTO: 0.7 K/UL (ref 0.3–1)
MONOCYTES NFR BLD: 8.5 % (ref 4–15)
NEUTROPHILS # BLD AUTO: 5.1 K/UL (ref 1.8–7.7)
NEUTROPHILS NFR BLD: 63.9 % (ref 38–73)
NITRITE UR QL STRIP: NEGATIVE
NRBC BLD-RTO: 0 /100 WBC
PH UR STRIP: 8 [PH] (ref 5–8)
PLATELET # BLD AUTO: 578 K/UL (ref 150–450)
PMV BLD AUTO: 9.8 FL (ref 9.2–12.9)
POTASSIUM SERPL-SCNC: 4.3 MMOL/L (ref 3.5–5.1)
PROT SERPL-MCNC: 7 G/DL (ref 6–8.4)
PROT UR QL STRIP: NEGATIVE
RBC # BLD AUTO: 2.35 M/UL (ref 4–5.4)
SARS-COV-2 RDRP RESP QL NAA+PROBE: NEGATIVE
SODIUM SERPL-SCNC: 132 MMOL/L (ref 136–145)
SP GR UR STRIP: 1.01 (ref 1–1.03)
SPECIMEN SOURCE: NORMAL
URN SPEC COLLECT METH UR: NORMAL
WBC # BLD AUTO: 7.98 K/UL (ref 3.9–12.7)

## 2024-12-16 PROCEDURE — 99284 EMERGENCY DEPT VISIT MOD MDM: CPT | Mod: 25

## 2024-12-16 PROCEDURE — 96372 THER/PROPH/DIAG INJ SC/IM: CPT | Performed by: PHYSICIAN ASSISTANT

## 2024-12-16 PROCEDURE — 82550 ASSAY OF CK (CPK): CPT | Performed by: EMERGENCY MEDICINE

## 2024-12-16 PROCEDURE — 87389 HIV-1 AG W/HIV-1&-2 AB AG IA: CPT | Performed by: PHYSICIAN ASSISTANT

## 2024-12-16 PROCEDURE — 86803 HEPATITIS C AB TEST: CPT | Performed by: PHYSICIAN ASSISTANT

## 2024-12-16 PROCEDURE — 87502 INFLUENZA DNA AMP PROBE: CPT | Performed by: EMERGENCY MEDICINE

## 2024-12-16 PROCEDURE — 96361 HYDRATE IV INFUSION ADD-ON: CPT

## 2024-12-16 PROCEDURE — 85025 COMPLETE CBC W/AUTO DIFF WBC: CPT | Performed by: EMERGENCY MEDICINE

## 2024-12-16 PROCEDURE — 96372 THER/PROPH/DIAG INJ SC/IM: CPT | Mod: 59

## 2024-12-16 PROCEDURE — 87635 SARS-COV-2 COVID-19 AMP PRB: CPT | Performed by: EMERGENCY MEDICINE

## 2024-12-16 PROCEDURE — 96374 THER/PROPH/DIAG INJ IV PUSH: CPT

## 2024-12-16 PROCEDURE — 25000003 PHARM REV CODE 250: Performed by: PHYSICIAN ASSISTANT

## 2024-12-16 PROCEDURE — 81003 URINALYSIS AUTO W/O SCOPE: CPT | Performed by: EMERGENCY MEDICINE

## 2024-12-16 PROCEDURE — 63600175 PHARM REV CODE 636 W HCPCS: Performed by: PHYSICIAN ASSISTANT

## 2024-12-16 PROCEDURE — 82550 ASSAY OF CK (CPK): CPT | Mod: 91 | Performed by: EMERGENCY MEDICINE

## 2024-12-16 PROCEDURE — 80053 COMPREHEN METABOLIC PANEL: CPT | Performed by: EMERGENCY MEDICINE

## 2024-12-16 RX ORDER — DIAZEPAM 10 MG/2ML
2 INJECTION INTRAMUSCULAR
Status: COMPLETED | OUTPATIENT
Start: 2024-12-16 | End: 2024-12-16

## 2024-12-16 RX ORDER — ACETAMINOPHEN 500 MG
1000 TABLET ORAL
Status: COMPLETED | OUTPATIENT
Start: 2024-12-16 | End: 2024-12-16

## 2024-12-16 RX ORDER — KETOROLAC TROMETHAMINE 30 MG/ML
10 INJECTION, SOLUTION INTRAMUSCULAR; INTRAVENOUS
Status: CANCELLED | OUTPATIENT
Start: 2024-12-16 | End: 2024-12-16

## 2024-12-16 RX ORDER — ORPHENADRINE CITRATE 30 MG/ML
60 INJECTION INTRAMUSCULAR; INTRAVENOUS
Status: COMPLETED | OUTPATIENT
Start: 2024-12-16 | End: 2024-12-16

## 2024-12-16 RX ORDER — DIAZEPAM 2 MG/1
2 TABLET ORAL
Status: COMPLETED | OUTPATIENT
Start: 2024-12-16 | End: 2024-12-16

## 2024-12-16 RX ORDER — METHOCARBAMOL 500 MG/1
500 TABLET, FILM COATED ORAL 3 TIMES DAILY
Qty: 15 TABLET | Refills: 0 | Status: SHIPPED | OUTPATIENT
Start: 2024-12-16 | End: 2024-12-21

## 2024-12-16 RX ORDER — ACETAMINOPHEN 500 MG
1000 TABLET ORAL
Status: CANCELLED | OUTPATIENT
Start: 2024-12-16 | End: 2024-12-16

## 2024-12-16 RX ADMIN — SODIUM CHLORIDE, POTASSIUM CHLORIDE, SODIUM LACTATE AND CALCIUM CHLORIDE 1000 ML: 600; 310; 30; 20 INJECTION, SOLUTION INTRAVENOUS at 06:12

## 2024-12-16 RX ADMIN — ORPHENADRINE CITRATE 60 MG: 30 INJECTION, SOLUTION INTRAMUSCULAR; INTRAVENOUS at 08:12

## 2024-12-16 RX ADMIN — DIAZEPAM 2 MG: 5 INJECTION, SOLUTION INTRAMUSCULAR; INTRAVENOUS at 06:12

## 2024-12-16 RX ADMIN — ACETAMINOPHEN 1000 MG: 500 TABLET ORAL at 07:12

## 2024-12-16 RX ADMIN — DIAZEPAM 2 MG: 2 TABLET ORAL at 05:12

## 2024-12-16 NOTE — ED TRIAGE NOTES
Shelly Vásquez, a 83 y.o. female presents to the ED via pv w/ complaint of whole body spasms, -n/v/d -cp -sob, spasms mostly in legs and back. Pmhx of parkinsons, afib, hrt    Triage note:  Chief Complaint   Patient presents with    Spasms     Whole body  spasms , since this morning    Female  Problem     Trouble started stream     Review of patient's allergies indicates:   Allergen Reactions    Baclofen      Incoherent     Nsaids (non-steroidal anti-inflammatory drug) Other (See Comments)     GI Bleed    Had 5 blood transfusions    Re: GI bleed     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)     Pt identifiers Shelly Vásquez checked and correct  LOC: The patient is awake, alert, aware of environment with an anxious affect. Oriented x4, speaking appropriately  APPEARANCE: Pt resting comfortably, pt is clean and well groomed, clothing properly fastened  SKIN: Skin warm, dry and intact, , moist mucus membranes  RESPIRATORY: Airway is open and patent, respirations are spontaneous, even and unlabored, normal effort and rate  CARDIAC: Normal rate and rhythm, no peripheral edema noted, capillary refill < 3 seconds, bilateral radial pulses 2+  ABDOMEN: Soft, nontender, nondistended, +cramps  NEUROLOGIC: PERRL, facial expression is symmetrical, patient moving all extremities spontaneously, normal sensation in all extremities when touched with a finger.  Follows all commands appropriately, +muscle spasms in all extremities and back  MUSCULOSKELETAL: No obvious deformities, generalized weakness; occassional cane use at baseline      oriented to person, place and time , normal sensation , short and long term memory intact

## 2024-12-16 NOTE — FIRST PROVIDER EVALUATION
"Medical screening examination initiated.  I have conducted a focused provider triage encounter, findings are as follows:    Brief history of present illness:  Patient with muscle spasms today.  SOme dysuria.      Vitals:    12/16/24 1547   BP: (!) 143/67   Pulse: 91   Resp: 20   Temp: 100 °F (37.8 °C)   TempSrc: Oral   SpO2: 96%   Weight: 68 kg (150 lb)   Height: 5' 8" (1.727 m)       Pertinent physical exam:  Nontoxic    Brief workup plan:  labs UA    Preliminary workup initiated; this workup will be continued and followed by the physician or advanced practice provider that is assigned to the patient when roomed.  "

## 2024-12-17 NOTE — DISCHARGE INSTRUCTIONS

## 2024-12-18 ENCOUNTER — OFFICE VISIT (OUTPATIENT)
Dept: INTERNAL MEDICINE | Facility: CLINIC | Age: 84
End: 2024-12-18
Payer: MEDICARE

## 2024-12-18 VITALS
WEIGHT: 155 LBS | HEIGHT: 68 IN | BODY MASS INDEX: 23.49 KG/M2 | SYSTOLIC BLOOD PRESSURE: 128 MMHG | DIASTOLIC BLOOD PRESSURE: 58 MMHG | OXYGEN SATURATION: 98 % | HEART RATE: 85 BPM

## 2024-12-18 DIAGNOSIS — M48.061 SPINAL STENOSIS OF LUMBAR REGION WITHOUT NEUROGENIC CLAUDICATION: ICD-10-CM

## 2024-12-18 DIAGNOSIS — M62.838 MUSCLE SPASM OF BOTH LOWER LEGS: Primary | ICD-10-CM

## 2024-12-18 DIAGNOSIS — G20.A2 PARKINSON'S DISEASE WITHOUT DYSKINESIA, WITH FLUCTUATING MANIFESTATIONS: ICD-10-CM

## 2024-12-18 DIAGNOSIS — R26.81 GAIT INSTABILITY: ICD-10-CM

## 2024-12-18 DIAGNOSIS — F51.01 PRIMARY INSOMNIA: ICD-10-CM

## 2024-12-18 DIAGNOSIS — D46.9 MDS (MYELODYSPLASTIC SYNDROME): ICD-10-CM

## 2024-12-18 DIAGNOSIS — Z91.81 HISTORY OF FALL WITHIN PAST 90 DAYS: ICD-10-CM

## 2024-12-18 PROCEDURE — 99214 OFFICE O/P EST MOD 30 MIN: CPT | Mod: S$PBB,,, | Performed by: INTERNAL MEDICINE

## 2024-12-18 PROCEDURE — 99213 OFFICE O/P EST LOW 20 MIN: CPT | Mod: PBBFAC | Performed by: INTERNAL MEDICINE

## 2024-12-18 PROCEDURE — 99999 PR PBB SHADOW E&M-EST. PATIENT-LVL III: CPT | Mod: PBBFAC,,, | Performed by: INTERNAL MEDICINE

## 2024-12-18 RX ORDER — DIAZEPAM 2 MG/1
TABLET ORAL
Qty: 45 TABLET | Refills: 1 | Status: SHIPPED | OUTPATIENT
Start: 2024-12-18

## 2024-12-18 RX ORDER — ESZOPICLONE 1 MG/1
1 TABLET, FILM COATED ORAL NIGHTLY PRN
Qty: 30 TABLET | Refills: 2 | Status: SHIPPED | OUTPATIENT
Start: 2024-12-18 | End: 2025-01-17

## 2024-12-18 RX ORDER — GABAPENTIN 300 MG/1
CAPSULE ORAL
Qty: 60 CAPSULE | Refills: 2 | Status: SHIPPED | OUTPATIENT
Start: 2024-12-18

## 2024-12-20 LAB
CK BB CFR SERPL ELPH: 0 %
CK MB CFR SERPL ELPH: 0 % (ref 0–3.3)
CK MM CFR SERPL ELPH: 100 % (ref 96.7–100)
CK SERPL-CCNC: 68 U/L (ref 30–223)

## 2024-12-21 NOTE — PROGRESS NOTES
Subjective:       Patient ID: Shelly Vásquez is a 83 y.o. female who presents today for:    Chief Complaint:   Chief Complaint   Patient presents with    Nursing Home Visit       History of Present Illness    CHIEF COMPLAINT:  Patient presents today for follow-up regarding medication management and episodes of sedation.  Is fallen 2 times in the past few days.  Two days ago she had not taken any Valium since prior to her knee surgery.  Then off of temazepam.  Benadryl last night to help her sleep after coming back from the emergency room.  In the emergency room they gave her to intravenous shots of Valium at 2 mg each and Robaxin intravenously as well as IV Tylenol.  The lower extremity cramps and spasms are similar to or the knee surgery/right knee total replacement, where they start mostly on the right lower extremity.  She has not had any upper body spasms or tremors and this has likely attributed to the carbidopa levodopa.  They usually begin in the early afternoon and continue on.  She is taking gabapentin 400 mg 3 times a day but this does not seem to be preventing these episodes that occur at least once to twice a week.  Shelly and her  now have full-time daytime help.  Her , Morgan does describe her kind of dozing off whether she is sitting down or standing up even prior to taking the Valium before the emergency room visit 2 days ago.    Total knee replacement was on October 1st.  Dr. Bob did the surgery.    SEDATION AND ER VISIT:  She experienced episodes of dozing off prior to recent Valium use and required emergency room evaluation for sedation and dehydration.    CURRENT MEDICATIONS:  She took 30 mg of Valium for pain prior to arriving at the medical center and has 2 mg Valium available for cramps. She discontinued temazepam. She continues Sinemet 3 times daily and Pantoprazole.  Dot taking a baby aspirin 3 times a week.    LABS:  Hemoglobin was 8.6 indicating anemia. Sodium levels  were slightly low. Kidney function tests were normal.      ROS:  General: -fever, -chills, -fatigue, -weight gain, -weight loss  Eyes: -vision changes, -redness, -discharge  ENT: -ear pain, -nasal congestion, -sore throat  Cardiovascular: -chest pain, -palpitations, -lower extremity edema  Respiratory: -cough, -shortness of breath  Gastrointestinal: -abdominal pain, -nausea, -vomiting, -diarrhea, -constipation, -blood in stool  Genitourinary: -dysuria, -hematuria, -frequency  Musculoskeletal: -joint pain, -muscle pain, +muscle cramps  Skin: -rash, -lesion  Neurological: -headache, -dizziness, -numbness, -tingling, +weakness  Psychiatric: -anxiety, -depression, -sleep difficulty           Medications:  Outpatient Encounter Medications as of 12/18/2024   Medication Sig Note Dispense Refill    acetaminophen (TYLENOL) 650 MG TbSR Take 1 tablet (650 mg total) by mouth every 8 (eight) hours.  120 tablet 0    biotin 1 mg Cap Take by mouth once daily.       carbidopa-levodopa  mg (SINEMET)  mg per tablet Take 1 tablet by mouth 3 (three) times daily. One additional tablet in the middle of the night as needed.  360 tablet 3    cyanocobalamin 1,000 mcg/mL injection Inject 1 mL (1,000 mcg total) into the muscle every 14 (fourteen) days.  10 mL 1    diazePAM (VALIUM) 2 MG tablet One nightly prn for leg spasms, can repeat 4 hours later if needed  45 tablet 1    estradiol-norethindrone acet 0.5-0.1 mg per tablet Take 1 tablet by mouth Daily.  84 tablet 3    eszopiclone (LUNESTA) 1 MG Tab Take 1 tablet (1 mg total) by mouth nightly as needed.  30 tablet 2    gabapentin (NEURONTIN) 300 MG capsule One at dinner time , one before bedtime (twice a day)  60 capsule 2    methocarbamoL (ROBAXIN) 500 MG Tab Take 1 tablet (500 mg total) by mouth 3 (three) times daily. for 5 days  15 tablet 0    pantoprazole (PROTONIX) 40 MG tablet Take 40 mg by mouth 2 (two) times daily.       polyethylene glycol 3350 (MIRALAX ORAL) Take 1  Capful by mouth daily as needed (constipation).       vitamin D (VITAMIN D3) 1000 units Tab Take 1,000 Units by mouth once daily. 4/4/2023: Hold 7 days prior to surgery      [DISCONTINUED] aspirin 81 MG Chew Take 81 mg by mouth every Mon, Wed, Fri.       [DISCONTINUED] celecoxib (CELEBREX) 100 MG capsule Take 1 capsule (100 mg total) by mouth 2 (two) times daily as needed for Pain.  60 capsule 0    [DISCONTINUED] diazePAM (VALIUM) 2 MG tablet Take 1 tablet (2 mg total) by mouth every 8 (eight) hours as needed for Anxiety.  60 tablet 1    [DISCONTINUED] furosemide (LASIX) 20 MG tablet Take 1 tablet (20 mg total) by mouth daily as needed.  15 tablet 0    [DISCONTINUED] gabapentin (NEURONTIN) 300 MG capsule Take 1 capsule (300 mg total) by mouth 3 (three) times daily as needed.  90 capsule 2    [DISCONTINUED] gabapentin (NEURONTIN) 400 MG capsule TAKE 1 CAPSULE(400 MG) BY MOUTH THREE TIMES DAILY  90 capsule 2    [DISCONTINUED] hydrocortisone 2.5 % cream Apply topically as needed.       [DISCONTINUED] mupirocin (BACTROBAN) 2 % ointment Apply topically as needed.       [DISCONTINUED] oxyCODONE-acetaminophen (PERCOCET) 5-325 mg per tablet Take 1-2 tablets by mouth every 6 (six) hours as needed.       [DISCONTINUED] potassium chloride SA (K-DUR,KLOR-CON M) 10 MEQ tablet Take 1 tablet (10 mEq total) by mouth daily as needed (edema).  15 tablet 0    [DISCONTINUED] pramipexole (MIRAPEX) 1 MG tablet TAKE 1 TABLET BY MOUTH TWICE DAILY  180 tablet 1    [DISCONTINUED] traMADoL (ULTRAM) 50 mg tablet Take 1 tablet (50 mg total) by mouth every 12 (twelve) hours as needed for Pain (if tylenol and celebrex do not relief pain).  45 tablet 0    [DISCONTINUED] traMADoL (ULTRAM-ER) 100 MG Tb24 Take 1 tablet (100 mg total) by mouth once daily.  30 tablet 0    [DISCONTINUED] valACYclovir (VALTREX) 500 MG tablet Take 1 tablet (500 mg total) by mouth 2 (two) times daily.  10 tablet 11     No facility-administered encounter medications on file  "as of 12/18/2024.       Allergies:  Review of patient's allergies indicates:   Allergen Reactions    Baclofen      Incoherent     Nsaids (non-steroidal anti-inflammatory drug) Other (See Comments)     GI Bleed    Had 5 blood transfusions    Re: GI bleed       Health Maintenance:  Immunization History   Administered Date(s) Administered    COVID-19, MRNA, LN-S, PF (Pfizer) (Gray Cap) 07/28/2022    COVID-19, MRNA, LN-S, PF (Pfizer) (Purple Cap) 01/09/2021, 01/30/2021, 09/08/2021    Influenza (FLUAD) - Quadrivalent - Adjuvanted - PF *Preferred* (65+) 10/02/2021, 10/12/2023    Influenza - Trivalent - Fluad - Adjuvanted - PF (65 years and older 12/14/2017, 12/10/2018    Influenza - Trivalent - Fluarix, Flulaval, Fluzone, Afluria - PF 08/31/2020    Influenza - Trivalent - Fluzone High Dose - PF (65 years and older) 10/15/2014, 10/10/2016, 10/21/2019    Pneumococcal Conjugate - 13 Valent 04/16/2018    Pneumococcal Polysaccharide - 23 Valent 08/26/2019    Tdap 03/31/2014    Zoster Recombinant 04/20/2024      Health Maintenance   Topic Date Due    RSV Vaccine (Age 60+ and Pregnant patients) (1 - 1-dose 75+ series) Never done    Colonoscopy  04/29/2019    TETANUS VACCINE  03/31/2024    Influenza Vaccine (1) 09/01/2024    COVID-19 Vaccine (5 - 2024-25 season) 09/01/2024    DEXA Scan  04/10/2026    Lipid Panel  08/15/2028    Shingles Vaccine  Completed    Pneumococcal Vaccines (Age 50+)  Completed          Objective:      Vital Signs  Pulse: 85  SpO2: 98 %  BP: (!) 128/58  Patient Position: Sitting  Pain Score:   7  Height and Weight  Height: 5' 8" (172.7 cm)  Weight: 70.3 kg (154 lb 15.7 oz)  BSA (Calculated - sq m): 1.84 sq meters  BMI (Calculated): 23.6  Weight in (lb) to have BMI = 25: 164.1]    Physical Exam    General: No acute distress. Well-developed. Well-nourished.  Eyes: EOMI. Sclerae anicteric.  HENT: Normocephalic. Atraumatic.  Cardiovascular: Regular rate. Regular rhythm. No murmurs. No rubs. No gallops. Normal " S1, S2.  Respiratory: Normal respiratory effort. Clear to auscultation bilaterally. No rales. No rhonchi. No wheezing.  Abdomen: Soft. Non-tender. Non-distended. Normoactive bowel sounds.  Musculoskeletal: No  obvious deformity.  Extremities: No lower extremity edema.  Neurological: Alert & oriented x3. No slurred speech. Normal gait.  Psychiatric: Normal mood. Normal affect. Good insight. Good judgment.  Skin: Warm. Dry. No rash.        Lab Results   Component Value Date    WBC 7.98 12/16/2024    HGB 8.6 (L) 12/16/2024    HCT 24.9 (L) 12/16/2024     (H) 12/16/2024    CHOL 155 08/15/2023    TRIG 46 08/15/2023    HDL 76 (H) 08/15/2023    ALT 7 (L) 12/16/2024    AST 15 12/16/2024     (L) 12/16/2024    K 4.3 12/16/2024     12/16/2024    CREATININE 0.8 12/16/2024    BUN 15 12/16/2024    CO2 22 (L) 12/16/2024    TSH 2.014 08/02/2024    INR 1.1 09/16/2024    HGBA1C 5.1 08/15/2023     Assessment/plan:     Shelly Vásquez is a 83 y.o.female with:    Muscle spasm of both lower legs  -     gabapentin (NEURONTIN) 300 MG capsule; One at dinner time , one before bedtime (twice a day)  Dispense: 60 capsule; Refill: 2  -     diazePAM (VALIUM) 2 MG tablet; One nightly prn for leg spasms, can repeat 4 hours later if needed  Dispense: 45 tablet; Refill: 1    Parkinson's disease without dyskinesia, with fluctuating manifestations    MDS (myelodysplastic syndrome)   Makes the spasms in the legs much much worse.  The only solution per Hematology is starting some chemotherapy, luspatercept patient deferred.  Will make sure patient has a appointment with Hematology within the next few months.  She saw last in October.    Spinal stenosis of lumbar region without neurogenic claudication  -     gabapentin (NEURONTIN) 300 MG capsule; One at dinner time , one before bedtime (twice a day)  Dispense: 60 capsule; Refill: 2    Gait instability  History of fall within past 90 days   Needs physical therapy.    Primary  insomnia  -     eszopiclone (LUNESTA) 1 MG Tab; Take 1 tablet (1 mg total) by mouth nightly as needed.  Dispense: 30 tablet; Refill: 2      Assessment & Plan    IMPRESSION:  - Assessed recent episodes of falling and sleepiness, attributing some to Valium use  - Evaluated current medication regimen, focusing on gabapentin and sleep aids  - Will reduce gabapentin dosage due to concerns about sedation and limited efficacy in preventing episodes  - Considered alternative sleep aid options, including Lunesta, to replace temazepam    INSOMNIA AND SLEEP HYGIENE:  - Started Lunesta as needed for sleep, to replace temazepam.    MYOCLONUS AND MUSCLE WEAKNESS:  - Decreased gabapentin from 1200 mg to 600 mg daily: Take 300 mg in the evening upon returning home.  - Take 300 mg before bedtime.  - Continued Valium 2 mg as needed for cramps, available for nighttime use.    GAIT ABNORMALITIES AND MOBILITY ISSUES:  - Renewed physical therapy referral for gait training.    MEDICATION MANAGEMENT:  - Continued Sinemet 3 times daily.  - Continued Pantoprazole.    DEHYDRATION PREVENTION:  - Explained that caffeine in coffee and Coca-Cola acts as a diuretic, potentially contributing to dehydration.  - Patient to increase water intake to combat potential dehydration.    FOLLOW-UP PLAN:  - Follow up in a few weeks.  - Patient to bring helpers to provide additional history at next visit.        Future Appointments   Date Time Provider Department Center   1/30/2025  1:40 PM Artem Espinoza MD [facility] [id number] [address]     This note was generated with the assistance of ambient listening technology. Verbal consent was obtained by the patient and accompanying visitor(s) for the recording of patient appointment to facilitate this note. I attest to having reviewed and edited the generated note for accuracy, though some syntax or spelling errors may persist. Please contact the author of this note for any clarification.     Marlene Andrew,  MD [facility]

## 2024-12-22 ENCOUNTER — PATIENT MESSAGE (OUTPATIENT)
Dept: INTERNAL MEDICINE | Facility: CLINIC | Age: 84
End: 2024-12-22
Payer: MEDICARE

## 2024-12-22 RX ORDER — PANTOPRAZOLE SODIUM 40 MG/1
40 TABLET, DELAYED RELEASE ORAL 2 TIMES DAILY
Qty: 180 TABLET | Refills: 1 | Status: SHIPPED | OUTPATIENT
Start: 2024-12-22

## 2024-12-22 NOTE — TELEPHONE ENCOUNTER
No care due was identified.  Health [facility] Embedded Care Due Messages. Reference number: [id number].   12/22/2024 8:25:52 AM CST

## 2024-12-23 NOTE — TELEPHONE ENCOUNTER
Refill Routing Note   Medication(s) are not appropriate for processing by [facility] for the following reason(s):        No active prescription written by provider    [facility] action(s):  Defer        Medication Therapy Plan: Discontinued by: Marlene Méndez MD on 2/16/2024      Appointments  past 12m or future 3m with PCP    Date Provider   Last Visit   12/18/2024 Marlene Méndez MD   Next Visit   Visit date not found Marlene Méndez MD   ED visits in past 90 days: 1        Note composed:7:32 PM 12/22/2024

## 2024-12-27 ENCOUNTER — TELEPHONE (OUTPATIENT)
Dept: INTERNAL MEDICINE | Facility: CLINIC | Age: 84
End: 2024-12-27
Payer: MEDICARE

## 2024-12-27 DIAGNOSIS — M62.838 MUSCLE SPASM OF BOTH LOWER LEGS: ICD-10-CM

## 2024-12-27 DIAGNOSIS — R26.81 GAIT INSTABILITY: Primary | ICD-10-CM

## 2024-12-27 DIAGNOSIS — M48.061 SPINAL STENOSIS OF LUMBAR REGION WITHOUT NEUROGENIC CLAUDICATION: ICD-10-CM

## 2024-12-31 ENCOUNTER — EXTERNAL CHRONIC CARE MANAGEMENT (OUTPATIENT)
Dept: PRIMARY CARE CLINIC | Facility: CLINIC | Age: 84
End: 2024-12-31
Payer: MEDICARE

## 2024-12-31 PROCEDURE — 99490 CHRNC CARE MGMT STAFF 1ST 20: CPT | Mod: PBBFAC | Performed by: INTERNAL MEDICINE

## 2024-12-31 PROCEDURE — 99439 CHRNC CARE MGMT STAF EA ADDL: CPT | Mod: PBBFAC | Performed by: INTERNAL MEDICINE

## 2025-01-14 DIAGNOSIS — Z00.00 ENCOUNTER FOR MEDICARE ANNUAL WELLNESS EXAM: ICD-10-CM

## 2025-01-30 ENCOUNTER — OFFICE VISIT (OUTPATIENT)
Facility: CLINIC | Age: 85
End: 2025-01-30
Payer: MEDICARE

## 2025-01-30 VITALS
HEART RATE: 74 BPM | HEIGHT: 68 IN | SYSTOLIC BLOOD PRESSURE: 101 MMHG | DIASTOLIC BLOOD PRESSURE: 59 MMHG | WEIGHT: 155 LBS | BODY MASS INDEX: 23.49 KG/M2

## 2025-01-30 DIAGNOSIS — G20.A2 PARKINSON'S DISEASE WITHOUT DYSKINESIA, WITH FLUCTUATING MANIFESTATIONS: ICD-10-CM

## 2025-01-30 PROCEDURE — 99214 OFFICE O/P EST MOD 30 MIN: CPT | Mod: S$PBB,,, | Performed by: PSYCHIATRY & NEUROLOGY

## 2025-01-30 PROCEDURE — 99213 OFFICE O/P EST LOW 20 MIN: CPT | Mod: PBBFAC | Performed by: PSYCHIATRY & NEUROLOGY

## 2025-01-30 PROCEDURE — 99999 PR PBB SHADOW E&M-EST. PATIENT-LVL III: CPT | Mod: PBBFAC,,, | Performed by: PSYCHIATRY & NEUROLOGY

## 2025-01-30 PROCEDURE — G2211 COMPLEX E/M VISIT ADD ON: HCPCS | Mod: S$PBB,,, | Performed by: PSYCHIATRY & NEUROLOGY

## 2025-01-30 NOTE — PROGRESS NOTES
"Name: Shelly áVsquez  MRN: [id number]   CSN: [id number]      Date: 01/30/2025    Chief Complaint: RLS    Interval History:    Patient presents today for follow up    RESTLESS LEGS SYNDROME:  She experiences symptoms affecting not only her legs but also her arms and whole body. She is currently taking carbidopa levodopa and gabapentin for symptom management. She reports anxiety about taking her morning medication and states she won't take it before 5 AM as it is intended for morning use, but expresses a strong desire to take it upon waking.    COGNITIVE FUNCTION:  She reports difficulties with executive functioning including timing and planning. She has trouble getting dressed in time for scheduled activities resulting in missed meals, misplaces personal items such as her wallet, and has difficulty with calendar management including recording correct dates.    SURGICAL HISTORY:  She has undergone hip replacement and knee surgery since last visit.         From March 2024:  - had knee replacement   - here with spouse   - add levodopa last visit   - 1 tab TID   - helping with tremors/shakes   - a couple of falls, tripped on things   - was not using a walker or cane at those times   - has not taken mid day dose yet   - feels levodopa is helping with balance as well   - still having some spasms   - pramipexole 1 mg BID, 400 mg gabapentin TID   - not feeling lightheaded or nauseous   - wakes up really stiff           March 2024  - went to [facility]   -  had EMG (see below, I reviewed also), no clear neuropathy vs. Radiculopathy by their read (though I onder if small fiber sesnory is there)  - had R hip replacement and successful, now with hip lift  - still having spasms and RLS, and worse if anything since hip surgery  - spasms are "still so strong" always R>>>L   - is having more ineard turning R leg now, works with therapy to "turn out"  - has been with Lu[facility] Meredith, and [facility]  - nothing has " "changed it much  - off mirapex now, on valium now (had seen Joana Doty in the past, now with Dr. Méndez)  - for sleep, she starts with a little more gabapentin and another before bed      PD Review of Symptoms:  Anosmia: normal  Dysarthria/Hypophonia: she notes mild hypophonia   Dysphagia/Sialorrhea: none   Depression: some whenever she is cramping   Cognitive slowing: good, sometimes she forgets short term   Urinary changes: yes, frequency   Constipation: takes miralax, started taking recently   Orthostasis: none   Falls: as above   Freezing: none   Micrographia: some smaller writing 2024  Sleep issues:   -YAJAIRA: none   -RBD: maybe some new sleep talking 2024    From Aug 2022  - requip 0.25 mg QHS, added gabapentin after last visit   - new to me, has seen [facility]   - having more pain into her R groin, mostly anterior and medial  - reviewed MRI with her  - does show L1-2 disc  - balance is "ok"  -  agrees her meory and mood are stable        From May 2022  - currently taking requip 0.25 mg QHS for RLS   - accompanied by spouse today   - dose works well if she takes it early, sometimes an hour or two before she gets in bed   - if she wakes up at night or going out for the evening, if she takes 0.125 -- this will get her through a social event   - if she gets up in the middle of the night she will take another 0.125  - some involuntary jerking movements, lying down reading on the sofa and had these involuntary movements  - no loss of awareness, no loss of bowel or bladder function  - saw an osteopath who told her they thought her femoral nerve was inflamed  - some lower back pain, has not had lumbar MRI   - was told she needs a hip replacement  - still doing pilates and swimming/strength training  - one small fall during mardi gras, no major injuries     - supplements with vitamin B         From April 2021  - mirapex 0.125 mg QHS helping legs significantly   - only had cramps one night since I saw her last   - " takes baclofen 10 mg QHS   - now taking 1/4 dose of miralax and that has helped significantly with constipation   - denies dizziness or lightheadedness  - [facility] at [facility]   - restarted oral iron       From 2/2021: Shelly Vásquez is R handed a81 y.o. 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 Uses restoril to sleep. Muscle cramps in the legs x 2 years, now legs jerking. Toes are cramping, curling. Fingers are also curling/cramping. Curling in hands started several years ago, had a shot in her hands and it helped. Legs involuntarily moving. Denies any odd sensation in her legs such as ants crawling or snakes to where she has to voluntarily move her legs to get the sensation out. Postural tremors in right hand when putting on makeup. Not sure if tremor is at rest. Balance issues started a few years ago. Fell once coming out of bathtub, held onto towel fay and it came out of the wall and fell backwards. No vision changes. Very careful when she walks. Sometimes slower to get out of the car just to make sure she doesn't fall out. Has fallen 3 times in the past four years. Has slowed down some, makes sure to plant her feet correctly. Wearing out shoes, possibly some shuffling -- had to have her first paid of shoes resoled. Two nights ago had a terrible night of jerking and cramping, couldn't sleep at night at all. Only cramps at night time, not necessarily during the day. Denies neck pain or cramping. Denies head tremor. Not sure if she has noticed feet inversion or not.     Exercises 2-3 times a week. Walks in the park when the weather is better.     Currently not taking iron supplementation due to constipation but plans to restart now that she has started taking miralax.     Medication history:  - none     Neuroleptic  exposure:  - none     Family History: son with cramping     Past medical, family, and social history reviewed as documented in chart with pertinent positive medical, family, and social history detailed in HPI.      Review of Systems:   Review of Systems   Constitutional:  Negative for chills, fever and malaise/fatigue.   HENT:  Negative for hearing loss.    Eyes:  Negative for blurred vision and double vision.   Respiratory:  Negative for cough, shortness of breath and stridor.    Cardiovascular:  Negative for chest pain and leg swelling.   Gastrointestinal:  Negative for constipation, diarrhea and nausea.   Genitourinary:  Negative for frequency and urgency.   Musculoskeletal:  Negative for falls.   Skin:  Negative for itching and rash.   Neurological:  Positive for sensory change. Negative for dizziness, tremors, loss of consciousness and weakness.   Psychiatric/Behavioral:  Negative for hallucinations and memory loss.        Past Medical History: The patient  has a past medical history of Arthritis, Atrial fibrillation, Basal cell cancer, Encounter for blood transfusion, Gastric ulcer, GERD (gastroesophageal reflux disease), Herpes simplex without mention of complication, Postmenopausal HRT (hormone replacement therapy), and Supraventricular tachycardia.    Social History: The patient  reports that she quit smoking about 44 years ago. Her smoking use included cigarettes. She started smoking about 66 years ago. She has a 22 pack-year smoking history. She has never used smokeless tobacco. She reports current alcohol use of about 2.0 standard drinks of alcohol per week. She reports that she does not use drugs.    Family History: Their family history includes Cirrhosis in her mother; No Known Problems in her brother, daughter, father, maternal aunt, maternal grandfather, maternal grandmother, maternal uncle, paternal aunt, paternal grandfather, paternal grandmother, and paternal uncle; Pulmonary embolism in her  "son.    Allergies: Baclofen and Nsaids (non-steroidal anti-inflammatory drug)     Meds:   Needs meds updated      Exam:  BP (!) 101/59 (BP Location: Left arm, Patient Position: Sitting)   Pulse 74   Ht 5' 8" (1.727 m)   Wt 70.3 kg (154 lb 15.7 oz)   LMP  (LMP Unknown)   BMI 23.57 kg/m²     BP (!) 101/59 (BP Location: Left arm, Patient Position: Sitting)   Pulse 74   Ht 5' 8" (1.727 m)   Wt 70.3 kg (154 lb 15.7 oz)   LMP  (LMP Unknown)   BMI 23.57 kg/m²       Constitutional  Well-developed, well-nourished, appears stated age   Ophthalmoscopic  No papilledema with no hemorrhages or exudates bilaterally   Cardiovascular  Radial pulses 2+ and symmetric, no LE edema bilaterally   Neurological    * Mental status  MOCA deferred     - Orientation  Oriented to person, place, time, and situation     - Memory   Intact recent and remote     - Attention/concentration  Attentive, vigilant during exam     - Language  Naming & repetition intact, +2-step commands     - Fund of knowledge  Aware of current events     - Executive  Well-organized thoughts     - Other     * Cranial nerves       - CN II  PERRL, visual fields full to confrontation     - CN III, IV, VI  Extraocular movements full, normal pursuits and saccades     - CN V  Sensation V1 - V3 intact     - CN VII  Face strong and symmetric bilaterally     - CN VIII  Hearing intact bilaterally     - CN IX, X  Palate raises midline and symmetric     - CN XI  SCM and trapezius 5/5 bilaterally     - CN XII  Tongue midline   * Motor  Muscle bulk normal, strength 5/5 throughout   * Sensory   Intact to temperature and vibration throughout   * Coordination  No dysmetria with finger-to-nose or heel-to-shin   * Gait  See below.   * Deep tendon reflexes  2+ and symmetric throughout, 1+ at ankles, no deficits   Babinski downgoing bilaterally   * Specialized movement exam  MIld hypophonic speech.    MIld facial masking.   No cogwheel rigidity.     mild bradykinesia, likely age " appropriate    No tremor with rest, posture, kinesis, or intention.    No other dystonia, chorea, athetosis, myoclonus, or tics.   No motor impersistence.   Normal-based gait, cautious gait    Unable to tandem    No shortened stride length.   No abnormal arm swing.     No postural instability.      Laboratory/Radiological:  - Results:  Admission on 12/16/2024, Discharged on 12/16/2024   Component Date Value Ref Range Status    Hepatitis C Ab 12/16/2024 Non-reactive  Non-reactive Final    HIV 1/2 Ag/Ab 12/16/2024 Non-reactive  Non-reactive Final    WBC 12/16/2024 7.98  3.90 - 12.70 K/uL Final    RBC 12/16/2024 2.35 (L)  4.00 - 5.40 M/uL Final    Hemoglobin 12/16/2024 8.6 (L)  12.0 - 16.0 g/dL Final    Hematocrit 12/16/2024 24.9 (L)  37.0 - 48.5 % Final    MCV 12/16/2024 106 (H)  82 - 98 fL Final    MCH 12/16/2024 36.6 (H)  27.0 - 31.0 pg Final    MCHC 12/16/2024 34.5  32.0 - 36.0 g/dL Final    RDW 12/16/2024 22.6 (H)  11.5 - 14.5 % Final    Platelets 12/16/2024 578 (H)  150 - 450 K/uL Final    MPV 12/16/2024 9.8  9.2 - 12.9 fL Final    Immature Granulocytes 12/16/2024 0.4  0.0 - 0.5 % Final    Gran # (ANC) 12/16/2024 5.1  1.8 - 7.7 K/uL Final    Immature Grans (Abs) 12/16/2024 0.03  0.00 - 0.04 K/uL Final    Lymph # 12/16/2024 2.0  1.0 - 4.8 K/uL Final    Mono # 12/16/2024 0.7  0.3 - 1.0 K/uL Final    Eos # 12/16/2024 0.1  0.0 - 0.5 K/uL Final    Baso # 12/16/2024 0.08  0.00 - 0.20 K/uL Final    nRBC 12/16/2024 0  0 /100 WBC Final    Gran % 12/16/2024 63.9  38.0 - 73.0 % Final    Lymph % 12/16/2024 25.1  18.0 - 48.0 % Final    Mono % 12/16/2024 8.5  4.0 - 15.0 % Final    Eosinophil % 12/16/2024 1.1  0.0 - 8.0 % Final    Basophil % 12/16/2024 1.0  0.0 - 1.9 % Final    Differential Method 12/16/2024 Automated   Final    Sodium 12/16/2024 132 (L)  136 - 145 mmol/L Final    Potassium 12/16/2024 4.3  3.5 - 5.1 mmol/L Final    Chloride 12/16/2024 102  95 - 110 mmol/L Final    CO2 12/16/2024 22 (L)  23 - 29 mmol/L Final     Glucose 12/16/2024 107  70 - 110 mg/dL Final    BUN 12/16/2024 15  8 - 23 mg/dL Final    Creatinine 12/16/2024 0.8  0.5 - 1.4 mg/dL Final    Calcium 12/16/2024 9.3  8.7 - 10.5 mg/dL Final    Total Protein 12/16/2024 7.0  6.0 - 8.4 g/dL Final    Albumin 12/16/2024 4.5  3.5 - 5.2 g/dL Final    Total Bilirubin 12/16/2024 1.4 (H)  0.1 - 1.0 mg/dL Final    Alkaline Phosphatase 12/16/2024 44  40 - 150 U/L Final    AST 12/16/2024 15  10 - 40 U/L Final    ALT 12/16/2024 7 (L)  10 - 44 U/L Final    eGFR 12/16/2024 >60.0  >60 mL/min/1.73 m^2 Final    Anion Gap 12/16/2024 8  8 - 16 mmol/L Final    Specimen UA 12/16/2024 Urine, Clean Catch   Final    Color, UA 12/16/2024 Yellow  Yellow, Straw, [place] Final    Appearance, UA 12/16/2024 Clear  Clear Final    pH, UA 12/16/2024 8.0  5.0 - 8.0 Final    Specific Gravity, UA 12/16/2024 1.015  1.005 - 1.030 Final    Protein, UA 12/16/2024 Negative  Negative Final    Glucose, UA 12/16/2024 Negative  Negative Final    Ketones, UA 12/16/2024 Negative  Negative Final    Bilirubin (UA) 12/16/2024 Negative  Negative Final    Occult Blood UA 12/16/2024 Negative  Negative Final    Nitrite, UA 12/16/2024 Negative  Negative Final    Leukocytes, UA 12/16/2024 Negative  Negative Final    Influenza A, Molecular 12/16/2024 Negative  Negative Final    Influenza B, Molecular 12/16/2024 Negative  Negative Final    Flu A & B Source 12/16/2024 Nasal swab   Final    SARS-CoV-2 RNA, Amplification, Qual 12/16/2024 Negative  Negative Final    CPK 12/16/2024 82  20 - 180 U/L Final    Total CK 12/16/2024 68  30 - 223 u/L Final    CK-MM 12/16/2024 100.0  96.7 - 100.0 % Final    CK-MB 12/16/2024 0.0  0.0 - 3.3 % Final    CK-BB 12/16/2024 0.0  0.0 % Final       - Independent review of images: MRI          - Independent review of consultant's notes: Dev     Assessment & Plan    MILD PARKINSONISM:  - Assessed patient's neurological status, noting improvement since last visit  - Evaluated orthopedic issues,  including recent hip and knee procedures  - Considered patient's age (84) in treatment decisions, opting to maintain current regimen  - Addressed memory concerns for male patient  - Decided against introducing new memory medications due to current stability    RESTLESS LEGS SYNDROME:  - Explained augmentation in Restless Leg Syndrome (RLS) and changes in treatment approach over time.  - Discussed the role of dopamine in managing RLS symptoms.  - Continued carbidopa levodopa (yellow tablet), gabapentin, and Mirapex.    MILD COGNITIVE IMPAIRMENT:  - Patient to utilize available support staff (, in-home assistance) for daily task management and calendar organization.    SOCIAL ENGAGEMENT AND MOBILITY:  - Patient to maintain current level of social engagement and activities.  - Patient to continue therapy sessions at [facility].    FOLLOW-UP:  - Follow up as scheduled.         This note was generated with the assistance of ambient listening technology. Verbal consent was obtained by the patient and accompanying visitor(s) for the recording of patient appointment to facilitate this note. I attest to having reviewed and edited the generated note for accuracy, though some syntax or spelling errors may persist. Please contact the author of this note for any clarification.    This is a patient with a chronic and complex neurologic diagnosis whose overall, ongoing care is being managed and monitored by me and our Neurology clinic.   As such, since 2024,  is the appropriate add-on code to accompany the other E/M billing for this visit.              Artem Espinoza MD, MPH  Division of Movement and Memory Disorders  [facility]

## 2025-01-31 ENCOUNTER — EXTERNAL CHRONIC CARE MANAGEMENT (OUTPATIENT)
Dept: PRIMARY CARE CLINIC | Facility: CLINIC | Age: 85
End: 2025-01-31
Payer: MEDICARE

## 2025-01-31 PROCEDURE — 99439 CHRNC CARE MGMT STAF EA ADDL: CPT | Mod: PBBFAC | Performed by: INTERNAL MEDICINE

## 2025-01-31 PROCEDURE — 99490 CHRNC CARE MGMT STAFF 1ST 20: CPT | Mod: S$PBB,,, | Performed by: INTERNAL MEDICINE

## 2025-01-31 PROCEDURE — 99439 CHRNC CARE MGMT STAF EA ADDL: CPT | Mod: S$PBB,,, | Performed by: INTERNAL MEDICINE

## 2025-01-31 PROCEDURE — 99490 CHRNC CARE MGMT STAFF 1ST 20: CPT | Mod: PBBFAC | Performed by: INTERNAL MEDICINE

## 2025-02-28 ENCOUNTER — EXTERNAL CHRONIC CARE MANAGEMENT (OUTPATIENT)
Dept: PRIMARY CARE CLINIC | Facility: CLINIC | Age: 85
End: 2025-02-28
Payer: MEDICARE

## 2025-02-28 PROCEDURE — 99490 CHRNC CARE MGMT STAFF 1ST 20: CPT | Mod: PBBFAC | Performed by: INTERNAL MEDICINE

## 2025-03-05 DIAGNOSIS — M48.061 SPINAL STENOSIS OF LUMBAR REGION WITHOUT NEUROGENIC CLAUDICATION: ICD-10-CM

## 2025-03-05 DIAGNOSIS — M62.838 MUSCLE SPASM OF BOTH LOWER LEGS: ICD-10-CM

## 2025-03-05 RX ORDER — GABAPENTIN 300 MG/1
CAPSULE ORAL
Qty: 60 CAPSULE | Refills: 2 | Status: SHIPPED | OUTPATIENT
Start: 2025-03-05

## 2025-03-05 NOTE — TELEPHONE ENCOUNTER
No care due was identified.  [facility] Embedded Care Due Messages. Reference number: [id number].   3/05/2025 5:37:45 AM CST

## 2025-03-13 RX ORDER — PANTOPRAZOLE SODIUM 40 MG/1
40 TABLET, DELAYED RELEASE ORAL 2 TIMES DAILY
Qty: 180 TABLET | Refills: 1 | Status: SHIPPED | OUTPATIENT
Start: 2025-03-13

## 2025-03-25 RX ORDER — PANTOPRAZOLE SODIUM 40 MG/1
40 TABLET, DELAYED RELEASE ORAL 2 TIMES DAILY
Qty: 180 TABLET | Refills: 1 | Status: SHIPPED | OUTPATIENT
Start: 2025-03-25 | End: 2025-03-26 | Stop reason: SDUPTHER

## 2025-03-26 DIAGNOSIS — K21.9 GASTROESOPHAGEAL REFLUX DISEASE, UNSPECIFIED WHETHER ESOPHAGITIS PRESENT: Primary | ICD-10-CM

## 2025-03-26 RX ORDER — PANTOPRAZOLE SODIUM 40 MG/1
40 TABLET, DELAYED RELEASE ORAL 2 TIMES DAILY
Qty: 180 TABLET | Refills: 1 | Status: SHIPPED | OUTPATIENT
Start: 2025-03-26 | End: 2025-03-28 | Stop reason: SDUPTHER

## 2025-03-26 NOTE — TELEPHONE ENCOUNTER
No care due was identified.  [facility] Embedded Care Due Messages. Reference number: [id number].   3/26/2025 12:17:07 PM CDT

## 2025-03-28 ENCOUNTER — OFFICE VISIT (OUTPATIENT)
Dept: INTERNAL MEDICINE | Facility: CLINIC | Age: 85
End: 2025-03-28
Payer: MEDICARE

## 2025-03-28 ENCOUNTER — CLINICAL SUPPORT (OUTPATIENT)
Dept: INTERNAL MEDICINE | Facility: CLINIC | Age: 85
End: 2025-03-28
Payer: MEDICARE

## 2025-03-28 VITALS
OXYGEN SATURATION: 98 % | WEIGHT: 155.88 LBS | DIASTOLIC BLOOD PRESSURE: 68 MMHG | HEART RATE: 70 BPM | HEIGHT: 68 IN | SYSTOLIC BLOOD PRESSURE: 110 MMHG | BODY MASS INDEX: 23.63 KG/M2

## 2025-03-28 DIAGNOSIS — Z87.11 HISTORY OF BLEEDING ULCERS: ICD-10-CM

## 2025-03-28 DIAGNOSIS — Z00.00 ANNUAL PHYSICAL EXAM: Primary | ICD-10-CM

## 2025-03-28 DIAGNOSIS — E53.8 DEFICIENCY OF OTHER SPECIFIED B GROUP VITAMINS: ICD-10-CM

## 2025-03-28 DIAGNOSIS — M62.838 MUSCLE SPASM OF BOTH LOWER LEGS: ICD-10-CM

## 2025-03-28 DIAGNOSIS — G20.A1 PARKINSON'S DISEASE WITHOUT DYSKINESIA OR FLUCTUATING MANIFESTATIONS: ICD-10-CM

## 2025-03-28 DIAGNOSIS — R79.9 ABNORMAL FINDING OF BLOOD CHEMISTRY, UNSPECIFIED: ICD-10-CM

## 2025-03-28 DIAGNOSIS — D46.9 MDS (MYELODYSPLASTIC SYNDROME): ICD-10-CM

## 2025-03-28 DIAGNOSIS — G25.81 RLS (RESTLESS LEGS SYNDROME): Primary | ICD-10-CM

## 2025-03-28 DIAGNOSIS — H54.7 VISUAL PROBLEMS: ICD-10-CM

## 2025-03-28 DIAGNOSIS — M12.811 ROTATOR CUFF ARTHROPATHY OF RIGHT SHOULDER: ICD-10-CM

## 2025-03-28 DIAGNOSIS — E55.9 VITAMIN D DEFICIENCY, UNSPECIFIED: ICD-10-CM

## 2025-03-28 DIAGNOSIS — K21.9 GASTROESOPHAGEAL REFLUX DISEASE, UNSPECIFIED WHETHER ESOPHAGITIS PRESENT: ICD-10-CM

## 2025-03-28 DIAGNOSIS — R53.81 OTHER MALAISE: ICD-10-CM

## 2025-03-28 LAB
25(OH)D3+25(OH)D2 SERPL-MCNC: 32 NG/ML (ref 30–96)
ABSOLUTE EOSINOPHIL (OHS): 0.18 K/UL
ABSOLUTE MONOCYTE (OHS): 0.71 K/UL (ref 0.3–1)
ABSOLUTE NEUTROPHIL COUNT (OHS): 2.82 K/UL (ref 1.8–7.7)
ALBUMIN SERPL BCP-MCNC: 4.1 G/DL (ref 3.5–5.2)
ALP SERPL-CCNC: 45 UNIT/L (ref 40–150)
ALT SERPL W/O P-5'-P-CCNC: <5 UNIT/L (ref 10–44)
ANION GAP (OHS): 6 MMOL/L (ref 8–16)
ANISOCYTOSIS BLD QL SMEAR: ABNORMAL
AST SERPL-CCNC: 12 UNIT/L (ref 11–45)
BACTERIA #/AREA URNS AUTO: ABNORMAL /HPF
BASOPHILS # BLD AUTO: 0.08 K/UL
BASOPHILS NFR BLD AUTO: 1.3 %
BILIRUB SERPL-MCNC: 1.3 MG/DL (ref 0.1–1)
BUN SERPL-MCNC: 15 MG/DL (ref 8–23)
CALCIUM SERPL-MCNC: 9.3 MG/DL (ref 8.7–10.5)
CAOX CRY UR QL COMP ASSIST: ABNORMAL
CHLORIDE SERPL-SCNC: 107 MMOL/L (ref 95–110)
CHOLEST SERPL-MCNC: 144 MG/DL (ref 120–199)
CHOLEST/HDLC SERPL: 2 {RATIO} (ref 2–5)
CO2 SERPL-SCNC: 25 MMOL/L (ref 23–29)
CREAT SERPL-MCNC: 0.8 MG/DL (ref 0.5–1.4)
DACRYOCYTES BLD QL SMEAR: ABNORMAL
EAG (OHS): 100 MG/DL (ref 68–131)
ERYTHROCYTE [DISTWIDTH] IN BLOOD BY AUTOMATED COUNT: 19.6 % (ref 11.5–14.5)
FERRITIN SERPL-MCNC: 614 NG/ML (ref 20–300)
GFR SERPLBLD CREATININE-BSD FMLA CKD-EPI: >60 ML/MIN/1.73/M2
GLUCOSE SERPL-MCNC: 74 MG/DL (ref 70–110)
HBA1C MFR BLD: 5.1 % (ref 4–5.6)
HCT VFR BLD AUTO: 23.9 % (ref 37–48.5)
HDLC SERPL-MCNC: 71 MG/DL (ref 40–75)
HDLC SERPL: 49.3 % (ref 20–50)
HGB BLD-MCNC: 7.8 GM/DL (ref 12–16)
IMM GRANULOCYTES # BLD AUTO: 0.04 K/UL (ref 0–0.04)
IMM GRANULOCYTES NFR BLD AUTO: 0.7 % (ref 0–0.5)
IRON SATN MFR SERPL: 72 % (ref 20–50)
IRON SERPL-MCNC: 175 UG/DL (ref 30–160)
LDLC SERPL CALC-MCNC: 64.6 MG/DL (ref 63–159)
LYMPHOCYTES # BLD AUTO: 2.27 K/UL (ref 1–4.8)
MCH RBC QN AUTO: 35.6 PG (ref 27–50)
MCHC RBC AUTO-ENTMCNC: 32.6 G/DL (ref 32–36)
MCV RBC AUTO: 109 FL (ref 82–98)
MICROSCOPIC COMMENT: ABNORMAL
NONHDLC SERPL-MCNC: 73 MG/DL
NUCLEATED RBC (/100WBC) (OHS): 1 /100 WBC
OVALOCYTES BLD QL SMEAR: ABNORMAL
PLATELET # BLD AUTO: 574 K/UL (ref 150–450)
PLATELET BLD QL SMEAR: ABNORMAL
PMV BLD AUTO: 10 FL (ref 9.2–12.9)
POIKILOCYTOSIS BLD QL SMEAR: SLIGHT
POLYCHROMASIA BLD QL SMEAR: ABNORMAL
POTASSIUM SERPL-SCNC: 4.5 MMOL/L (ref 3.5–5.1)
PROT SERPL-MCNC: 6.8 GM/DL (ref 6–8.4)
RBC # BLD AUTO: 2.19 M/UL (ref 4–5.4)
RBC #/AREA URNS AUTO: 1 /HPF (ref 0–4)
RELATIVE EOSINOPHIL (OHS): 3 %
RELATIVE LYMPHOCYTE (OHS): 37.2 % (ref 18–48)
RELATIVE MONOCYTE (OHS): 11.6 % (ref 4–15)
RELATIVE NEUTROPHIL (OHS): 46.2 % (ref 38–73)
SCHISTOCYTES BLD QL SMEAR: ABNORMAL
SODIUM SERPL-SCNC: 138 MMOL/L (ref 136–145)
SQUAMOUS #/AREA URNS AUTO: 56 /HPF
TIBC SERPL-MCNC: 243 UG/DL (ref 250–450)
TRANSFERRIN SERPL-MCNC: 164 MG/DL (ref 200–375)
TRIGL SERPL-MCNC: 42 MG/DL (ref 30–150)
TSH SERPL-ACNC: 1.64 UIU/ML (ref 0.4–4)
VIT B12 SERPL-MCNC: 630 PG/ML (ref 210–950)
WBC # BLD AUTO: 6.1 K/UL (ref 3.9–12.7)
WBC #/AREA URNS AUTO: 11 /HPF (ref 0–5)

## 2025-03-28 PROCEDURE — 99215 OFFICE O/P EST HI 40 MIN: CPT | Mod: S$PBB,,, | Performed by: INTERNAL MEDICINE

## 2025-03-28 PROCEDURE — 36415 COLL VENOUS BLD VENIPUNCTURE: CPT

## 2025-03-28 PROCEDURE — 83036 HEMOGLOBIN GLYCOSYLATED A1C: CPT

## 2025-03-28 PROCEDURE — 84466 ASSAY OF TRANSFERRIN: CPT

## 2025-03-28 PROCEDURE — 82306 VITAMIN D 25 HYDROXY: CPT

## 2025-03-28 PROCEDURE — 99213 OFFICE O/P EST LOW 20 MIN: CPT | Mod: PBBFAC | Performed by: INTERNAL MEDICINE

## 2025-03-28 PROCEDURE — 84443 ASSAY THYROID STIM HORMONE: CPT

## 2025-03-28 PROCEDURE — 87086 URINE CULTURE/COLONY COUNT: CPT

## 2025-03-28 PROCEDURE — 81001 URINALYSIS AUTO W/SCOPE: CPT

## 2025-03-28 PROCEDURE — 80061 LIPID PANEL: CPT

## 2025-03-28 PROCEDURE — 80053 COMPREHEN METABOLIC PANEL: CPT

## 2025-03-28 PROCEDURE — 85025 COMPLETE CBC W/AUTO DIFF WBC: CPT

## 2025-03-28 PROCEDURE — 82607 VITAMIN B-12: CPT

## 2025-03-28 PROCEDURE — 99999 PR PBB SHADOW E&M-EST. PATIENT-LVL III: CPT | Mod: PBBFAC,,, | Performed by: INTERNAL MEDICINE

## 2025-03-28 PROCEDURE — 82728 ASSAY OF FERRITIN: CPT

## 2025-03-28 RX ORDER — PRAMIPEXOLE DIHYDROCHLORIDE 1 MG/1
1 TABLET ORAL 2 TIMES DAILY
Qty: 180 TABLET | Refills: 1 | Status: SHIPPED | OUTPATIENT
Start: 2025-03-28 | End: 2026-03-28

## 2025-03-28 RX ORDER — PANTOPRAZOLE SODIUM 40 MG/1
40 TABLET, DELAYED RELEASE ORAL 2 TIMES DAILY
Qty: 180 TABLET | Refills: 1 | Status: SHIPPED | OUTPATIENT
Start: 2025-03-28

## 2025-03-28 RX ORDER — DIAZEPAM 2 MG/1
TABLET ORAL
Qty: 45 TABLET | Refills: 1 | Status: SHIPPED | OUTPATIENT
Start: 2025-03-28

## 2025-03-28 RX ORDER — ROPINIROLE 1 MG/1
1 TABLET, FILM COATED ORAL 3 TIMES DAILY
Qty: 270 TABLET | Refills: 1 | Status: SHIPPED | OUTPATIENT
Start: 2025-03-28 | End: 2025-03-28

## 2025-03-28 NOTE — TELEPHONE ENCOUNTER
No care due was identified.  [facility] Embedded Care Due Messages. Reference number: [id number].   3/28/2025 4:54:22 PM CDT

## 2025-03-30 ENCOUNTER — RESULTS FOLLOW-UP (OUTPATIENT)
Dept: INTERNAL MEDICINE | Facility: CLINIC | Age: 85
End: 2025-03-30

## 2025-03-31 ENCOUNTER — TELEPHONE (OUTPATIENT)
Dept: SPORTS MEDICINE | Facility: CLINIC | Age: 85
End: 2025-03-31
Payer: MEDICARE

## 2025-03-31 ENCOUNTER — OFFICE VISIT (OUTPATIENT)
Dept: SPORTS MEDICINE | Facility: CLINIC | Age: 85
End: 2025-03-31
Payer: MEDICARE

## 2025-03-31 ENCOUNTER — EXTERNAL CHRONIC CARE MANAGEMENT (OUTPATIENT)
Dept: PRIMARY CARE CLINIC | Facility: CLINIC | Age: 85
End: 2025-03-31
Payer: MEDICARE

## 2025-03-31 VITALS — DIASTOLIC BLOOD PRESSURE: 69 MMHG | HEART RATE: 73 BPM | SYSTOLIC BLOOD PRESSURE: 107 MMHG

## 2025-03-31 DIAGNOSIS — G89.29 CHRONIC RIGHT SHOULDER PAIN: Primary | ICD-10-CM

## 2025-03-31 DIAGNOSIS — M25.511 CHRONIC RIGHT SHOULDER PAIN: Primary | ICD-10-CM

## 2025-03-31 DIAGNOSIS — M19.011 OSTEOARTHRITIS OF RIGHT GLENOHUMERAL JOINT: ICD-10-CM

## 2025-03-31 DIAGNOSIS — S46.819S SPRAIN OF SUPRASPINATUS MUSCLE OR TENDON, UNSPECIFIED LATERALITY, SEQUELA: ICD-10-CM

## 2025-03-31 DIAGNOSIS — M67.80 TENDINOSIS: ICD-10-CM

## 2025-03-31 DIAGNOSIS — M12.811 ROTATOR CUFF ARTHROPATHY OF RIGHT SHOULDER: ICD-10-CM

## 2025-03-31 PROCEDURE — 99487 CPLX CHRNC CARE 1ST 60 MIN: CPT | Mod: PBBFAC | Performed by: INTERNAL MEDICINE

## 2025-03-31 PROCEDURE — 99489 CPLX CHRNC CARE EA ADDL 30: CPT | Mod: PBBFAC | Performed by: INTERNAL MEDICINE

## 2025-03-31 PROCEDURE — 99489 CPLX CHRNC CARE EA ADDL 30: CPT | Mod: S$PBB,,, | Performed by: INTERNAL MEDICINE

## 2025-03-31 PROCEDURE — 99999PBSHW PR PBB SHADOW TECHNICAL ONLY FILED TO HB: Mod: PBBFAC,,,

## 2025-03-31 PROCEDURE — 99213 OFFICE O/P EST LOW 20 MIN: CPT | Mod: PBBFAC | Performed by: FAMILY MEDICINE

## 2025-03-31 PROCEDURE — 99487 CPLX CHRNC CARE 1ST 60 MIN: CPT | Mod: S$PBB,,, | Performed by: INTERNAL MEDICINE

## 2025-03-31 PROCEDURE — 99999 PR PBB SHADOW E&M-EST. PATIENT-LVL III: CPT | Mod: PBBFAC,,, | Performed by: FAMILY MEDICINE

## 2025-03-31 PROCEDURE — 20551 NJX 1 TENDON ORIGIN/INSJ: CPT | Mod: PBBFAC | Performed by: FAMILY MEDICINE

## 2025-03-31 PROCEDURE — 20611 DRAIN/INJ JOINT/BURSA W/US: CPT | Mod: PBBFAC | Performed by: FAMILY MEDICINE

## 2025-03-31 RX ORDER — TRIAMCINOLONE ACETONIDE 40 MG/ML
20 INJECTION, SUSPENSION INTRA-ARTICULAR; INTRAMUSCULAR
Status: DISCONTINUED | OUTPATIENT
Start: 2025-03-31 | End: 2025-03-31 | Stop reason: HOSPADM

## 2025-03-31 RX ADMIN — TRIAMCINOLONE ACETONIDE 20 MG: 40 INJECTION, SUSPENSION INTRA-ARTICULAR; INTRAMUSCULAR at 11:03

## 2025-03-31 NOTE — PROGRESS NOTES
HISTORY OF PRESENT ILLNESS   Shelly Vásquez, a 84 y.o. female, presents today for evaluation of her right SHOULDER. Patient is right hand dominant.    Patient reports onset of chronic pain beginning several months ago.. Patient reports no known injury or trauma. Pain is located along posterior and lateral aspect of SHOULDER. Pain is 0/10 at present & up to 2/10 with provacative activity including  repetitive overhead motion  . Pain is described as sharp, ache, and dull. Patient states pain does not radiate.     Associated symptoms include stiffness. Pain is aggravated by activities above & occur daily . Symptoms do interfere with sleep. Patient reports pain & symptoms are getting worse. Patient reports no prior surgery to SHOULDER.     Prior treatment Shelly Vásquez has tried   OTC Acetaminophen - Yes  OTC NSAID - No   Rx NSAID - No   Rx Narcotic/Other - No   Brace - No   Injection - Cortisone - Yes - subacromial CSI 10/10/24   Injection - Biologics - No   Activity Modification - Yes  Physical Therapy - No   Home Exercise Program - No  Assistive Device - No  Other -  none     Review of systems (ROS):  A 10+ review of systems was performed with pertinent positives and negatives noted above in the history of present illness. Other systems were negative unless otherwise specified.    PHYSICAL EXAMINATION  General:  The patient is alert and oriented x 3. Mood is pleasant. Observation of ears, eyes and nose reveal no gross abnormalities. HEENT: NCAT, sclera anicteric. Lungs: Respirations are equal and unlabored.     SHOULDER EXAMINATION     OBSERVATION:     Swelling  none  Deformity  none   Discoloration  none   Scapular winging none   Scars   none  Atrophy  none    TENDERNESS / CREPITUS (T/C):          T/C      T/C   Clavicle   -/-  SUPRAspinatus    -/-     AC Jt.    -/-  INFRAspinatus  -/-    SC Jt.    -/-  Deltoid    -/-      G. Tuberosity  -/-  LH BICEP groove  -/-   Acromion:  -/-  Midline  Neck   -/-     Scapular Spine -/-  Trapezium   -/-   SMA Scapula  -/-  GH jt. line - post  -/-     Scapulothoracic  -/-         ROM:     Right shoulder   Left shoulder        AROM (PROM)   AROM (PROM)   FE    170° (175°)     170° (175°)     ER at 0°    60°  (65°)    60°  (65°)   ER at 90° ABD  90°  (90°)    90°  (90°)   IR at 90°  ABD   NA  (40°)     NA  (40°)      IR (spine level)   T10     T10    STRENGTH: (* = with pain) RIGHT SHOULDER  LEFT SHOULDER   SCAPTION   5/5    5/5    IR    5/5    5/5   ER    5/5    5/5   BICEPS   5/5    5/5   Deltoid    5/5    5/5     SIGNS:  Painful side       NEER   -   OSAMUELS        -    CHO   -   SPEEDS        -   DROP ARM   -   BELLY PRESS       -    X-Body ADD    -   LIFT-OFF        -   HORNBLOWERS      -              STABILITY TESTING   RIGHT SHOULDER  LEFT SHOULDER     Translation     Anterior up face    up face    Posterior up face   up face    Sulcus  < 10mm   < 10 mm     Signs   Apprehension   neg     neg       Relocation   no change    no change      Jerk test  neg    neg    EXTREMITY NEURO-VASCULAR EXAM    Sensation grossly intact to light touch all dermatomal regions.    DTR 2+ Biceps, Triceps, BR and Negative Fredricks sign   Grossly intact motor function at Elbow, Wrist and Hand   Distal pulses radial and ulnar 2+, brisk cap refill, symmetric.      NECK:  Painless FROM and spinous processes non-tender. Negative Spurlings sign.       Other Findings:    ASSESSMENT & PLAN  Assessment  #1 Osteoarthritis of glenohumeral joint, right /d  W/ supraspinatus tendinosis    No evidence of neurologic pathology  No evidence of vascular pathology    Imaging studies reviewed:   X-ray shoulder, right 24.10    Plan    We discussed options including    Watchful waiting / relative rest    Physical therapy X  discussed   Injection therapy CSI iaGH right  CSI supraspin tend right   Consultation    The patient chooses As above   x = prescribed  CSI = corticosteroid injection  VSI =  viscosupplement injection  PRPI = platelet rich plasma injection  ia = intra articular  R = right  L = left  B = bilateral   nfSx = surgical consultation was recommended, but patient is not interested in consultation at this time    Physical Therapy        Formal (fPT), @ [facility] facility    Formal (fPT), @ [facility] facility        Homegoing (hgPT), per concurrent fPT recommendations    Homegoing (hgPT), per prior fPT recommendations    Homegoing (hgPT), handout provided        w/  (atPT)    [blank] = not prescribed  x = prescribed  b = prescribed, and begin as indicated  t = continue as indicated  r = prescribed, and restart as indicated  p = completed prior as indicated  hs = prescribed, and with high school   col = prescribed, and with college or university   nfPT = physical therapy was recommended, but patient is not interested in PT at this time    Activity (e.g. sports, work) restrictions    [blank] = as tolerated  pt = per physical therapist  at = per   NWB = non weight bearing on affected lower extremity, with crutches assistance for ambulation    Bracing    [blank] = not prescribed  r = recommended, but not fit with at todays visit  f = prescribed and fit with at todays visit  t = continue as indicated  d = d/c  p = as needed  rare = use on rare, as-needed basis; advised against chronic use    Pain management    [blank] = No prescription necessary. A handout detailing dosing of appropriate   over-the-counter musculoskeletal analgesics was made available to the patient.   m = meloxicam x 14 days  mp = 14 day course of meloxicam prescribed prior    Follow up o   [blank] = as needed  [number] = in [number] weeks  CSI = for corticosteroid injection  VSI = for viscosupplement injection or injection series  PRP = for platelet rich plasma injection or injection series  MRI = after MRI imaging  ns = should surgical options be deferred (no surgery)  o =  appointment offered, deferred by patient    Should symptoms worsen or fail to resolve, consider    Revisiting the above options and / or MRI shoulder     Vocation:

## 2025-03-31 NOTE — PROCEDURES
"Large Joint Aspiration/Injection: R glenohumeral    Date/Time: 3/31/2025 11:30 AM    Performed by: Aidan Yang MD  Authorized by: Aidan Yang MD    Consent Done?:  Yes (Verbal)  Indications:  Pain  Site marked: the procedure site was marked    Timeout: prior to procedure the correct patient, procedure, and site was verified    Prep: patient was prepped and draped in usual sterile fashion      Details:  Needle Size:  22 G  Ultrasonic Guidance for needle placement?: Yes    Images are saved and documented.  Approach:  Posterior  Location:  Shoulder  Site:  R glenohumeral  Medications:  20 mg triamcinolone acetonide 40 mg/mL  Patient tolerance:  Patient tolerated the procedure well with no immediate complications     Description of ultrasound utilization for needle guidance:   Ultrasound guidance used for needle localization. Images saved and stored for documentation. The glenohumeral joint was visualized. Dynamic visualization of the 22g x 3.5" needle was continuous throughout the procedure.     "

## 2025-03-31 NOTE — PROCEDURES
"Tendon Origin    Date/Time: 3/31/2025 11:30 AM    Performed by: Aidan Yang MD  Authorized by: Aidan Yang MD    Consent Done?:  Yes (Verbal)  Timeout: prior to procedure the correct patient, procedure, and site was verified    Indications:  Pain  Site marked: the procedure site was marked    Timeout: prior to procedure the correct patient, procedure, and site was verified    Location: shoulder.  Prep: patient was prepped and draped in usual sterile fashion    Ultrasonic Guidance for Needle Placement?: Yes    Needle size:  25 G  Approach:  Posterolateral  Medications:  20 mg triamcinolone acetonide 40 mg/mL  Patient tolerance:  Patient tolerated the procedure well with no immediate complications   Supraspinatus tendon and tendon insertion injection RIGHT    Description of ultrasound utilization for needle guidance:   Ultrasound guidance used for needle localization. Images saved and stored for documentation. The supraspinatus muscle, myotendinous junction, and tendon insertion on the greater tuberosity of the humeral head were visualized. Dynamic visualization of the 22g x 1.5" needle was continuous throughout the procedure.    "

## 2025-03-31 NOTE — TELEPHONE ENCOUNTER
Attempted to call pt to ask if she would like to continue treatment with Dr. Wallace since he has been treating her for her shoulder pain. Pt did not answer and was unable to leave a message.

## 2025-04-06 NOTE — PROGRESS NOTES
Subjective:       Patient ID: Shelly Vásquez is a 84 y.o. female who presents today for:    Chief Complaint:   Chief Complaint   Patient presents with    Annual Exam     History of Present Illness    CHIEF COMPLAINT:  Patient presents today for medication refill of Pramipexole    PARKINSON'S DISEASE:  She experiences asymmetric tremors with more prominent vibration on the right side compared to the left side. She continues to experience leg cramps but notes improvement in severity and better symptom management.    MUSCULOSKELETAL:  Her right shoulder has arthritis with limited range of motion in her dominant arm, while maintaining full functionality in her left arm. Following leg surgery, she developed right foot inversion particularly affecting the right toe, and leg length discrepancy requiring shoe lifts. Physical therapy has been helpful in addressing these post-surgical complications. She can stand for approximately one hour but has difficulty with stairs, preferring to take one step at a time during ascent and descent.    MEDICATIONS:  She takes medications for Parkinson's disease (2), GI issues (1), and movement (1). Additional medications include vitamin D, Valium 2 mg, and hormone replacement therapy which she has been taking daily for 40 years.    SLEEP:  She reports improved sleep with Valium 2 mg compared to previous use of Temazepam.    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    EYES:  She reports excessive eye watering without other eye symptoms.      ROS:  General: -fever, -chills, -fatigue, -weight gain, -weight loss  Eyes: -vision changes, -redness, -discharge, +eye watering  ENT: -ear pain, -nasal congestion, -sore throat  Cardiovascular: -chest pain, -palpitations, -lower extremity edema  Respiratory: -cough, -shortness of breath  Gastrointestinal:  "-abdominal pain, -nausea, -vomiting, -diarrhea, -constipation, -blood in stool  Genitourinary: -dysuria, -hematuria, -frequency  Musculoskeletal: -joint pain, -muscle pain, +muscle cramps, +muscle spasms, +leg cramping, +difficulty walking, +limited movement  Skin: -rash, -lesion  Neurological: -headache, -dizziness, -numbness, -tingling, +tremors  Psychiatric: -anxiety, -depression, +sleep difficulty           Medications:  Encounter Medications[1]    Allergies:  Review of patient's allergies indicates:   Allergen Reactions    Baclofen      Incoherent     Nsaids (non-steroidal anti-inflammatory drug) Other (See Comments)     GI Bleed    Had 5 blood transfusions    Re: GI bleed           Objective:      Vital Signs  Pulse: 70  SpO2: 98 %  BP: 110/68  Patient Position: Sitting  Pain Score: 0-No pain  Height and Weight  Height: 5' 8" (172.7 cm)  Weight: 70.7 kg (155 lb 13.8 oz)  BSA (Calculated - sq m): 1.84 sq meters  BMI (Calculated): 23.7  Weight in (lb) to have BMI = 25: 164.1]    Physical Exam   Physical Exam    General: No acute distress. Well-developed. Well-nourished.  Eyes: EOMI. Sclerae anicteric.  HENT: Normocephalic. Atraumatic.  Cardiovascular: Regular rate. Regular rhythm. No murmurs. No rubs. No gallops. Normal S1, S2.  Respiratory: Normal respiratory effort. Clear to auscultation bilaterally. No rales. No rhonchi. No wheezing.  Abdomen: Soft. Non-tender. Non-distended. Normoactive bowel sounds.  Musculoskeletal: No  obvious deformity.  Extremities: No lower extremity edema.  Neurological: Alert & oriented x3. No slurred speech. Normal gait.  Psychiatric: Normal mood. Normal affect. Good insight. Good judgment.  Skin: Warm. Dry. No rash.  Neck: Thyroid not particularly comfortable.        Assessment/plan:     Shelly Vásquez is a 84 y.o.female here for an ANNUAL PHYSICAL EXAM:    Health Maintenance:  Health Maintenance   Topic Date Due    RSV Vaccine (Age 60+ and Pregnant patients) (1 - 1-dose 75+ " series) Never done    Colonoscopy  04/29/2019    TETANUS VACCINE  03/31/2024    Influenza Vaccine (1) 09/01/2024    COVID-19 Vaccine (5 - 2024-25 season) 09/01/2024    Lipid Panel  03/28/2030    Shingles Vaccine  Completed    Pneumococcal Vaccines (Age 50+)  Completed        RLS (restless legs syndrome)  -     pramipexole (MIRAPEX) 1 MG tablet; Take 1 tablet (1 mg total) by mouth 2 (two) times a day.  Dispense: 180 tablet; Refill: 1    Muscle spasm of both lower legs  -     diazePAM (VALIUM) 2 MG tablet; One nightly prn for leg spasms, can repeat 4 hours later if needed  Dispense: 45 tablet; Refill: 1    Rotator cuff arthropathy of right shoulder  -     Ambulatory referral/consult to Sports Medicine; Future; Expected date: 04/04/2025    Visual problems  -     Ambulatory referral/consult to Optometry; Future; Expected date: 04/04/2025    Parkinson's disease without dyskinesia or fluctuating manifestations   Continue with present dose of sinemet     History of bleeding ulcers  Comments:  continue with PPI bid for now as pt bleed on celebrex plus medrol dosepak. 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  - Assessed mobility and physical therapy progress  post-knee surgery.  - Evaluated anemia status, noting hemoglobin levels slightly lower than typical but within previously observed range.  - Considered shoulder arthritis symptoms and potential for corticosteroid injection.  - Discussed eye health concerns and importance of regular eye exams for detecting conditions like macular degeneration and glaucoma.  - Reviewed skin cancer history and recent radiation therapy.  - Noted vitamin D levels on lower side of normal range and discussed significance of maintaining vitamin D levels.    MDS (MYELODYSPLASTIC SYNDROME):  - Monitor patient's hemoglobin, currently at 7.9, which is lower than typical but consistent with previous levels and deemed acceptable by the hematologist.  - Continue hematology follow-ups twice a year, with the next appointment scheduled for next month for anemia check.  - Evaluate patient's ability to function at current hemoglobin level.  - Continue vitamin D supplementation.    PAROXYSMAL ATRIAL FIBRILLATION:  - Auscultated patient's heart during physical exam.    PARKINSON'S DISEASE:  - Initiated Requip (ropinirole) 0.25 mg twice daily for Parkinson's symptoms.  - Prescribed a three-month supply (270 tablets) to be taken twice daily, which can be used in conjunction with levodopa/carbidopa for both Parkinson's disease and restless leg syndrome.  - Monitor muscle spasms and vibration on the right side.  - Noted the patient has a mild form of Parkinson's disease, diagnosed concurrently with her .  - Acknowledge that the patient's symptoms could be a combination of anemia and Parkinson's disease.    OSTEOARTHRITIS OF RIGHT SHOULDER:  - Referred the patient to Dr. Mojica for evaluation and potential corticosteroid injection of right shoulder.  - Monitor limited range of motion due to arthritis.  - Observed a new arthritic protuberance in the patient's right shoulder.  - Consider ordering an x-ray of the shoulder if needed.    GAIT AND  MOBILITY ABNORMALITIES:  - Advised the patient to maintain use of shoe inserts/lifts to compensate for leg length difference.  - Monitor difficulty walking up and down stairs, noting the patient's preference to take one step at a time.  - Noted the patient can stand for about an hour and managed to visit a museum independently.  - Continue physical therapy 3 times per week.    RESTLESS LEG SYNDROME:  - Initiated Requip 0.25 mg twice daily for restless leg syndrome. (Note: Medication details are included under Parkinson's Disease section to avoid redundancy.)    EPIPHORA (EXCESSIVE TEARING):  - Referred the patient to Optometry for comprehensive eye exam, including evaluation of excessive tearing.  - Monitor excessive lacrimation, which might be due to a blocked duct.  - Recommend a regular eye exam to check for macular degeneration and glaucoma.    ANEMIA:  - Schedule follow-up [facility] hematologist Dr. Bob in April for anemia management.  - Monitor patient's hemoglobin level, which can fluctuate up to 9.  - Evaluate patient's ability to function at current hemoglobin level.    THYROID DISORDER:  - Noted that the patient's thyroid function is normal based on recent lab work.    HISTORY OF SKIN CANCER:  - Noted that the patient received radiation treatment for skin cancer in the ear.    HORMONE REPLACEMENT THERAPY:  - Continue current hormone therapy.  - Note that the patient has been on daily hormone replacement therapy for 40 years.    GENERAL HEALTH MANAGEMENT:  - Patient to continue with current physical therapy and exercise regimen, as well as meditation practice with .  - Continued Diazepam 2 mg at bedtime for sleep, with option to repeat after 4 hours if needed (45 tablets per month).  - Contact the office if any new concerns arise before next scheduled appointment.          Time spent with the patient was 40 min and 50 percent of that was in face to face contact     Future Appointments   Date Time  Provider Department Center   5/7/2025  1:15 PM Femi Siddiqui, OD [facility] OPT[facility] [place]   5/19/2025  4:00 PM Erlin Lopez MD [facility] HC BMT [place]   6/23/2025  2:20 PM Artem Espinoza MD [facility] LAYLA [place]     This note was generated with the assistance of ambient listening technology. Verbal consent was obtained by the patient and accompanying visitor(s) for the recording of patient appointment to facilitate this note. I attest to having reviewed and edited the generated note for accuracy, though some syntax or spelling errors may persist. Please contact the author of this note for any clarification.     Marlene Andrew MD  [facility]       [1]   Outpatient Encounter Medications as of 3/28/2025   Medication Sig Note Dispense Refill    acetaminophen (TYLENOL) 650 MG TbSR Take 1 tablet (650 mg total) by mouth every 8 (eight) hours.  120 tablet 0    biotin 1 mg Cap Take by mouth once daily.       carbidopa-levodopa  mg (SINEMET)  mg per tablet Take 1 tablet by mouth 3 (three) times daily. One additional tablet in the middle of the night as needed.  360 tablet 3    cyanocobalamin 1,000 mcg/mL injection Inject 1 mL (1,000 mcg total) into the muscle every 14 (fourteen) days.  10 mL 1    diazePAM (VALIUM) 2 MG tablet One nightly prn for leg spasms, can repeat 4 hours later if needed  45 tablet 1    estradiol-norethindrone acet 0.5-0.1 mg per tablet Take 1 tablet by mouth Daily.  84 tablet 3    gabapentin (NEURONTIN) 300 MG capsule One at dinner time , one before bedtime (twice a day)  60 capsule 2    polyethylene glycol 3350 (MIRALAX ORAL) Take 1 Capful by mouth daily as needed (constipation).       pramipexole (MIRAPEX) 1 MG tablet Take 1 tablet (1 mg total) by mouth 2 (two) times a day.  180 tablet 1    vitamin D (VITAMIN D3) 1000 units Tab Take 1,000 Units by mouth once daily. 4/4/2023: Hold 7 days prior to surgery      [DISCONTINUED] diazePAM (VALIUM) 2 MG tablet One  nightly prn for leg spasms, can repeat 4 hours later if needed  45 tablet 1    [DISCONTINUED] pantoprazole (PROTONIX) 40 MG tablet TAKE 1 TABLET(40 MG) BY MOUTH TWICE DAILY  180 tablet 1    [DISCONTINUED] pantoprazole (PROTONIX) 40 MG tablet Take 1 tablet (40 mg total) by mouth 2 (two) times daily.  180 tablet 1    [DISCONTINUED] pantoprazole (PROTONIX) 40 MG tablet Take 1 tablet (40 mg total) by mouth 2 (two) times daily.  180 tablet 1    [DISCONTINUED] pantoprazole (PROTONIX) 40 MG tablet Take 1 tablet (40 mg total) by mouth 2 (two) times daily.  180 tablet 1    [DISCONTINUED] rOPINIRole (REQUIP) 1 MG tablet Take 1 tablet (1 mg total) by mouth 3 (three) times daily.  270 tablet 1     No facility-administered encounter medications on file as of 3/28/2025.

## 2025-04-30 ENCOUNTER — EXTERNAL CHRONIC CARE MANAGEMENT (OUTPATIENT)
Dept: PRIMARY CARE CLINIC | Facility: CLINIC | Age: 85
End: 2025-04-30
Payer: MEDICARE

## 2025-04-30 PROCEDURE — 99490 CHRNC CARE MGMT STAFF 1ST 20: CPT | Mod: PBBFAC | Performed by: INTERNAL MEDICINE

## 2025-05-09 DIAGNOSIS — D46.9 MDS (MYELODYSPLASTIC SYNDROME): Primary | ICD-10-CM

## 2025-05-16 ENCOUNTER — PATIENT MESSAGE (OUTPATIENT)
Facility: CLINIC | Age: 85
End: 2025-05-16
Payer: MEDICARE

## 2025-05-26 DIAGNOSIS — Z79.890 POSTMENOPAUSAL HORMONE REPLACEMENT THERAPY: Chronic | ICD-10-CM

## 2025-05-26 RX ORDER — ESTRADIOL AND NORETHINDRONE ACETATE .5; .1 MG/1; MG/1
1 TABLET ORAL DAILY
Qty: 84 TABLET | Refills: 0 | Status: SHIPPED | OUTPATIENT
Start: 2025-05-26

## 2025-05-27 DIAGNOSIS — M62.838 MUSCLE SPASM OF BOTH LOWER LEGS: ICD-10-CM

## 2025-05-27 RX ORDER — DIAZEPAM 2 MG/1
TABLET ORAL
Qty: 45 TABLET | Refills: 1 | Status: SHIPPED | OUTPATIENT
Start: 2025-05-27

## 2025-05-27 NOTE — TELEPHONE ENCOUNTER
No care due was identified.  [facility] Embedded Care Due Messages. Reference number: [id number].   5/27/2025 4:27:28 PM CDT

## 2025-05-28 ENCOUNTER — TELEPHONE (OUTPATIENT)
Dept: ORTHOPEDICS | Facility: CLINIC | Age: 85
End: 2025-05-28
Payer: MEDICARE
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
## 2025-05-29 ENCOUNTER — OFFICE VISIT (OUTPATIENT)
Dept: ORTHOPEDICS | Facility: CLINIC | Age: 85
End: 2025-05-29
Payer: MEDICARE

## 2025-05-29 ENCOUNTER — HOSPITAL ENCOUNTER (OUTPATIENT)
Dept: RADIOLOGY | Facility: HOSPITAL | Age: 85
Discharge: HOME | End: 2025-05-29
Attending: PHYSICIAN ASSISTANT
Payer: MEDICARE

## 2025-05-29 VITALS — BODY MASS INDEX: 24.39 KG/M2 | WEIGHT: 160.94 LBS | HEIGHT: 68 IN

## 2025-05-29 DIAGNOSIS — S93.492A SPRAIN OF ANTERIOR TALOFIBULAR LIGAMENT OF LEFT ANKLE, INITIAL ENCOUNTER: Primary | ICD-10-CM

## 2025-05-29 DIAGNOSIS — M79.672 ACUTE FOOT PAIN, LEFT: ICD-10-CM

## 2025-05-29 DIAGNOSIS — M25.572 ACUTE LEFT ANKLE PAIN: ICD-10-CM

## 2025-05-29 PROCEDURE — 73610 X-RAY EXAM OF ANKLE: CPT | Mod: TC,LT

## 2025-05-29 PROCEDURE — 73610 X-RAY EXAM OF ANKLE: CPT | Mod: 26,LT,, | Performed by: RADIOLOGY

## 2025-05-29 PROCEDURE — 99999 PR PBB SHADOW E&M-EST. PATIENT-LVL III: CPT | Mod: PBBFAC,,, | Performed by: PHYSICIAN ASSISTANT

## 2025-05-29 PROCEDURE — 99213 OFFICE O/P EST LOW 20 MIN: CPT | Mod: PBBFAC,25 | Performed by: PHYSICIAN ASSISTANT

## 2025-05-29 PROCEDURE — 73630 X-RAY EXAM OF FOOT: CPT | Mod: TC,LT

## 2025-05-29 PROCEDURE — 73630 X-RAY EXAM OF FOOT: CPT | Mod: 26,LT,, | Performed by: RADIOLOGY
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
## 2025-05-31 ENCOUNTER — EXTERNAL CHRONIC CARE MANAGEMENT (OUTPATIENT)
Dept: PRIMARY CARE CLINIC | Facility: CLINIC | Age: 85
End: 2025-05-31
Payer: MEDICARE

## 2025-05-31 PROCEDURE — 99490 CHRNC CARE MGMT STAFF 1ST 20: CPT | Mod: PBBFAC | Performed by: INTERNAL MEDICINE

## 2025-05-31 PROCEDURE — 99490 CHRNC CARE MGMT STAFF 1ST 20: CPT | Mod: S$PBB,,, | Performed by: INTERNAL MEDICINE

## 2025-06-02 DIAGNOSIS — D46.9 MDS (MYELODYSPLASTIC SYNDROME): Primary | ICD-10-CM

## 2025-06-03 DIAGNOSIS — M48.061 SPINAL STENOSIS OF LUMBAR REGION WITHOUT NEUROGENIC CLAUDICATION: ICD-10-CM

## 2025-06-03 DIAGNOSIS — M62.838 MUSCLE SPASM OF BOTH LOWER LEGS: ICD-10-CM

## 2025-06-03 RX ORDER — GABAPENTIN 300 MG/1
CAPSULE ORAL
Qty: 60 CAPSULE | Refills: 2 | Status: SHIPPED | OUTPATIENT
Start: 2025-06-03

## 2025-06-07 DIAGNOSIS — R60.0 EDEMA OF RIGHT LOWER LEG: ICD-10-CM

## 2025-06-07 NOTE — TELEPHONE ENCOUNTER
No care due was identified.  [facility] Embedded Care Due Messages. Reference number: [id number].   6/07/2025 3:14:59 PM CDT

## 2025-06-08 RX ORDER — POTASSIUM CHLORIDE 750 MG/1
10 TABLET, EXTENDED RELEASE ORAL DAILY PRN
Qty: 15 TABLET | Refills: 0 | OUTPATIENT
Start: 2025-06-08

## 2025-06-08 NOTE — TELEPHONE ENCOUNTER
Refill Decision Note   Shelly Vásquez  is requesting a refill authorization.  Brief Assessment and Rationale for Refill:  Quick Discontinue     Medication Therapy Plan:  prescription was discontinued on 12/18/2024 by Marlene Méndez MD      Comments:     Note composed:2:01 PM 06/08/2025

## 2025-06-18 ENCOUNTER — OFFICE VISIT (OUTPATIENT)
Facility: CLINIC | Age: 85
End: 2025-06-18
Attending: OBSTETRICS & GYNECOLOGY
Payer: MEDICARE

## 2025-06-18 VITALS
WEIGHT: 160.25 LBS | SYSTOLIC BLOOD PRESSURE: 94 MMHG | HEIGHT: 68 IN | DIASTOLIC BLOOD PRESSURE: 53 MMHG | BODY MASS INDEX: 24.29 KG/M2 | HEART RATE: 79 BPM

## 2025-06-18 DIAGNOSIS — Z91.89 GYN EXAM FOR HIGH-RISK MEDICARE PATIENT: Primary | ICD-10-CM

## 2025-06-18 DIAGNOSIS — Z79.890 POSTMENOPAUSAL HORMONE REPLACEMENT THERAPY: Chronic | ICD-10-CM

## 2025-06-18 DIAGNOSIS — N95.8 GENITOURINARY SYNDROME OF MENOPAUSE: ICD-10-CM

## 2025-06-18 PROCEDURE — 99213 OFFICE O/P EST LOW 20 MIN: CPT | Mod: PBBFAC | Performed by: OBSTETRICS & GYNECOLOGY

## 2025-06-18 PROCEDURE — 99999 PR PBB SHADOW E&M-EST. PATIENT-LVL III: CPT | Mod: PBBFAC,,, | Performed by: OBSTETRICS & GYNECOLOGY

## 2025-06-18 PROCEDURE — G0101 CA SCREEN;PELVIC/BREAST EXAM: HCPCS | Mod: S$PBB,,, | Performed by: OBSTETRICS & GYNECOLOGY

## 2025-06-18 PROCEDURE — G0101 CA SCREEN;PELVIC/BREAST EXAM: HCPCS | Mod: PBBFAC | Performed by: OBSTETRICS & GYNECOLOGY

## 2025-06-18 RX ORDER — ESTRADIOL 0.1 MG/G
1 CREAM VAGINAL
Qty: 42 G | Refills: 3 | Status: SHIPPED | OUTPATIENT
Start: 2025-06-23

## 2025-06-18 RX ORDER — ASPIRIN 81 MG/1
1 TABLET ORAL 2 TIMES DAILY
COMMUNITY
Start: 2024-10-01

## 2025-06-18 RX ORDER — ESTRADIOL AND NORETHINDRONE ACETATE .5; .1 MG/1; MG/1
1 TABLET ORAL DAILY
Qty: 84 TABLET | Refills: 3 | Status: SHIPPED | OUTPATIENT
Start: 2025-06-18

## 2025-06-18 RX ORDER — ROPINIROLE 1 MG/1
1 TABLET, FILM COATED ORAL 3 TIMES DAILY
COMMUNITY
Start: 2025-06-16

## 2025-06-18 NOTE — PROGRESS NOTES
Subjective:       Patient ID: Shelly Vásquez is a 84 y.o. female.    Chief Complaint:  Gynecologic Exam      History of Present Illness  Gynecologic Exam  Pertinent negatives include no abdominal pain, back pain or headaches.       Shelly Vásquez is a 84 y.o. female  here for her annual GYN exam.  She is up to date with her PCP and labs.   She is menopausal since age 43 and has been on HRT for many years since menopause and wants to continue, feels well on them  . She became symptomatic again when she tried to stop a few years ago. She stopped getting mammograms a few years ago. Denies any concerns and declines to get any more. No prior cardiac history, family history of MI prior to age 50, or personal history of gestational DM/pre-eclampsia. She denies the following contraindications to HRT:  Vaginal bleeding, history of VTE/PE, thrombophilia,  breast cancer, or active liver disease.     HRT:  Activella Lo    denies PMB or break through bleeding.   denies vaginal itching or irritation.  Denies vaginal discharge.  She is not sexually active. She has had 1 partner for the past 15 years .   History of abnormal pap: No  Last Pap: No result found  Last MMG: normal-2021-routine follow-up in 12 months  Last Dexa: 3-:Normal bone mineral density lumbar spine, right left hip.   Last Colonoscopy:  colonoscopy 10 years ago without abnormalities.  denies domestic violence. She does feel safe at home.     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)     Past Surgical History:   Procedure Laterality Date    APPENDECTOMY  age 21    ARTHROPLASTY OF HIP BY  ANTERIOR APPROACH Right 4/26/2023    Procedure: ARTHROPLASTY, HIP, TOTAL, ANTERIOR APPROACH: RIGHT: DEPUY - C-STEM + PINNACLE;  Surgeon: Keon Gary III, MD;  [facility];  Service: Orthopedics;  Laterality: Right;    BACK SURGERY      Beast Lift  2004    BONE MARROW BIOPSY Right 12/19/2019    Procedure: Biopsy-bone marrow;  Surgeon: Aidan Spring MD;  Location: [facility] OR [address] [place] [address];  Service: Oncology;  Laterality: Right;    BREAST BIOPSY  50yrs ago    unable to see scar    CATARACT EXTRACTION W/  INTRAOCULAR LENS IMPLANT Right 9/7/2023    Procedure: EXTRACTION, CATARACT, WITH IOL INSERTION;  Surgeon: Francisco Willingham MD;  Location: [facility] OR;  Service: Ophthalmology;  Laterality: Right;  [facility] ONLY    CATARACT EXTRACTION W/  INTRAOCULAR LENS IMPLANT Left 10/2/2023    Procedure: EXTRACTION, CATARACT, WITH IOL INSERTION;  Surgeon: Francisco Willingham MD;  Location: [facility] OR;  Service: Ophthalmology;  Laterality: Left;  [facility] ONLY    COSMETIC SURGERY      DILATION AND CURETTAGE OF UTERUS  2008    PMB    ENDOMETRIAL BIOPSY      EPIDURAL STEROID INJECTION N/A 8/25/2022    Procedure: LUMBAR CELINA CONTRAST DIRECT REFERRAL;  Surgeon: Rj Hernandez MD;  Location: [facility] PAIN [facility]GT;  Service: Pain Management;  Laterality: N/A;    ESOPHAGOGASTRODUODENOSCOPY N/A 10/4/2023    Procedure: EGD (ESOPHAGOGASTRODUODENOSCOPY);  Surgeon: Hugo Menchaca MD;  Location: [facility] ([address] [place] [address]);  Service: Endoscopy;  Laterality: N/A;    EYE SURGERY      INJECTION OF ANESTHETIC AGENT AROUND NERVE Bilateral 11/11/2022    Procedure: BLOCK, NERVE BILATERAL L2,L3,L4 AND L5 MEDIAL BRANCH ONE OF TWO;  Surgeon: Rj Hernandez MD;  Location: [facility] PAIN [facility]GT;  Service: Pain Management;  Laterality: Bilateral;    INJECTION OF JOINT Right 12/2/2022    Procedure: INJECTION, JOINT RIGHT INTRARTICULAR HIP CONTRAST;  Surgeon: Rj Hernandez MD;  Location: [facility]IN [facility]GT;  Service: Pain Management;  Laterality: Right;    ptsosis       "RADIOFREQUENCY ABLATION  2018    SMALL INTESTINE SURGERY      TONSILLECTOMY, ADENOIDECTOMY  age 8    TOTAL REDUCTION MAMMOPLASTY Bilateral     TRANSFORAMINAL EPIDURAL INJECTION OF STEROID Right 2022    Procedure: Injection,steroid,epidural Right L1/2 TF DIrect Referral Instructed to provide intervention per MRI results;  Surgeon: Rj Hernandez MD;  Location: [facility] [place];  Service: Pain Management;  Laterality: Right;    TRANSFORAMINAL EPIDURAL INJECTION OF STEROID Right 10/21/2022    Procedure: INJECTION, STEROID, EPIDURAL, TRANSFORAMINAL APPROACH, L5-S1 and S1, RIGHT CONTRAST;  Surgeon: Rj Hernandez MD;  Location: [facility] [place];  Service: Pain Management;  Laterality: Right;    TUBAL LIGATION       Social History[1]  Family History   Problem Relation Name Age of Onset    No Known Problems Father      Cirrhosis Mother      No Known Problems Brother      No Known Problems Maternal Aunt      No Known Problems Maternal Uncle      No Known Problems Paternal Aunt      No Known Problems Paternal Uncle      No Known Problems Maternal Grandmother      No Known Problems Maternal Grandfather      No Known Problems Paternal Grandmother      No Known Problems Paternal Grandfather      No Known Problems Daughter x1     Pulmonary embolism Son x1 age 60     Breast cancer Neg Hx      Colon cancer Neg Hx      Ovarian cancer Neg Hx      Amblyopia Neg Hx      Blindness Neg Hx      Cancer Neg Hx      Cataracts Neg Hx      Diabetes Neg Hx      Glaucoma Neg Hx      Hypertension Neg Hx      Macular degeneration Neg Hx      Retinal detachment Neg Hx      Strabismus Neg Hx      Stroke Neg Hx      Thyroid disease Neg Hx       OB History          2    Para   2    Term   2            AB        Living   2         SAB        IAB        Ectopic        Multiple        Live Births   2                 BP (!) 94/53 (Patient Position: Sitting)   Pulse 79   Ht 5' 8" (1.727 m)   Wt 72.7 kg (160 lb 4.4 oz)   LMP  " (LMP Unknown)   BMI 24.37 kg/m²         GYN & OB History  No LMP recorded (lmp unknown). Patient is postmenopausal.       OB History    Para Term  AB Living   2 2 2   2   SAB IAB Ectopic Multiple Live Births       2      # Outcome Date GA Lbr Marquez/2nd Weight Sex Type Anes PTL Lv   2 Term      Vag-Spont   DANIS   1 Term      Vag-Spont   DANIS       Review of Systems  Review of Systems   Constitutional:  Negative for activity change, appetite change, fatigue and unexpected weight change.   HENT: Negative.     Eyes:  Negative for visual disturbance.   Respiratory:  Negative for shortness of breath and wheezing.    Cardiovascular:  Positive for leg swelling. Negative for chest pain and palpitations.   Gastrointestinal:  Negative for abdominal pain, bloating and blood in stool.   Endocrine: Negative for diabetes and hair loss.   Genitourinary:  Positive for bladder incontinence. Negative for postmenopausal bleeding.   Musculoskeletal:  Negative for back pain and joint swelling.   Integumentary:  Negative for acne, hair changes and nipple discharge.   Neurological:  Negative for headaches.   Hematological:  Does not bruise/bleed easily.   Psychiatric/Behavioral:  Negative for depression and sleep disturbance. The patient is not nervous/anxious.    Breast: Negative for mastodynia and nipple discharge          Objective:      Physical Exam:   Constitutional: She is oriented to person, place, and time. She appears well-developed and well-nourished.    HENT:   Head: Normocephalic and atraumatic.    Eyes: Pupils are equal, round, and reactive to light. EOM are normal.     Cardiovascular:  Normal rate and regular rhythm.             Pulmonary/Chest: Effort normal and breath sounds normal.   BREASTS:  no mass, no tenderness, no deformity and no retraction. Right breast exhibits no inverted nipple, no mass, no nipple discharge, no skin change, no tenderness, no bleeding and no swelling. Left breast exhibits no inverted  nipple, no mass, no nipple discharge, no skin change, no tenderness, no bleeding and no swelling. Breasts are symmetrical.              Abdominal: Soft. Bowel sounds are normal.     Genitourinary:    Pelvic exam was performed with patient supine.      Genitourinary Comments: PELVIC: Normal external genitalia without lesions.  Normal hair distribution.  Adequate perineal body, normal urethral meatus.  Vagina  Dry and poorly rugated, atrophic, without lesions or discharge.  Cervix pink, without lesions, discharge or tenderness.  No significant cystocele or rectocele.  Bimanual exam shows uterus to be normal size, regular, mobile and nontender.  Adnexa without masses or tenderness.    RECTAL:Deferred                 Musculoskeletal: Normal range of motion and moves all extremeties.       Neurological: She is alert and oriented to person, place, and time.    Skin: Skin is warm and dry.    Psychiatric: She has a normal mood and affect.              Assessment:        1. GYN exam for high-risk Medicare patient    2. Postmenopausal hormone replacement therapy    3. Genitourinary syndrome of menopause                Plan:        Problem List Items Addressed This Visit    None  Visit Diagnoses         GYN exam for high-risk Medicare patient    -  Primary      Postmenopausal hormone replacement therapy  (Chronic)       continue low dose combined therapy with aspirin 81 mg daily  good bone mineral density in 2018; we maybe can check another in 2022    Relevant Medications    estradiol-norethindrone acet 0.5-0.1 mg per tablet      Genitourinary syndrome of menopause        Relevant Medications    estradioL (ESTRACE) 0.01 % (0.1 mg/gram) vaginal cream (Start on 6/23/2025)          Follow up in about 1 year (around 6/18/2026).            [1]   Social History  Socioeconomic History    Marital status:    Tobacco Use    Smoking status: Former     Current packs/day: 0.00     Average packs/day: 1 pack/day for 22.0 years (22.0  ttl pk-yrs)     Types: Cigarettes     Start date: 1958     Quit date: 1980     Years since quittin.9    Smokeless tobacco: Never   Substance and Sexual Activity    Alcohol use: Yes     Alcohol/week: 2.0 standard drinks of alcohol     Types: 2 Glasses of wine per week     Comment: 1-2 glasses of  some night    Drug use: No    Sexual activity: Not Currently     Partners: Male     Birth control/protection: Post-menopausal     Comment:  since      Social Drivers of Health     Financial Resource Strain: Low Risk  (10/4/2023)    Overall Financial Resource Strain (CARDIA)     Difficulty of Paying Living Expenses: Not hard at all   Food Insecurity: No Food Insecurity (2024)    Received from [facility]    Hunger Vital Sign     Worried About Running Out of Food in the Last Year: Never true     Ran Out of Food in the Last Year: Never true   Transportation Needs: No Transportation Needs (2024)    Received from [facility]    PRAPARE - Transportation     Lack of Transportation (Medical): No     Lack of Transportation (Non-Medical): No   Physical Activity: Insufficiently Active (10/4/2023)    Exercise Vital Sign     Days of Exercise per Week: 7 days     Minutes of Exercise per Session: 20 min   Stress: No Stress Concern Present (10/4/2023)    [employer] [place] [employer] - Occupational Stress Questionnaire     Feeling of Stress : Only a little   Housing Stability: Low Risk  (2024)    Received from [facility]    Housing Stability Vital Sign     Unable to Pay for Housing in the Last Year: No     Number of Times Moved in the Last Year: 0     Homeless in the Last Year: No

## 2025-06-23 ENCOUNTER — OFFICE VISIT (OUTPATIENT)
Facility: CLINIC | Age: 85
End: 2025-06-23
Payer: MEDICARE

## 2025-06-23 VITALS
BODY MASS INDEX: 24.34 KG/M2 | DIASTOLIC BLOOD PRESSURE: 61 MMHG | SYSTOLIC BLOOD PRESSURE: 98 MMHG | HEIGHT: 68 IN | WEIGHT: 160.63 LBS | HEART RATE: 77 BPM

## 2025-06-23 DIAGNOSIS — G20.C PARKINSONISM, UNSPECIFIED PARKINSONISM TYPE: Primary | ICD-10-CM

## 2025-06-23 DIAGNOSIS — G25.81 RLS (RESTLESS LEGS SYNDROME): ICD-10-CM

## 2025-06-23 PROCEDURE — 99214 OFFICE O/P EST MOD 30 MIN: CPT | Mod: S$PBB,,, | Performed by: PSYCHIATRY & NEUROLOGY

## 2025-06-23 PROCEDURE — 99999 PR PBB SHADOW E&M-EST. PATIENT-LVL III: CPT | Mod: PBBFAC,,, | Performed by: PSYCHIATRY & NEUROLOGY

## 2025-06-23 PROCEDURE — 99213 OFFICE O/P EST LOW 20 MIN: CPT | Mod: PBBFAC | Performed by: PSYCHIATRY & NEUROLOGY

## 2025-06-23 PROCEDURE — G2211 COMPLEX E/M VISIT ADD ON: HCPCS | Mod: ,,, | Performed by: PSYCHIATRY & NEUROLOGY

## 2025-06-23 RX ORDER — GABAPENTIN 400 MG/1
400 CAPSULE ORAL 3 TIMES DAILY
Qty: 90 CAPSULE | Refills: 11 | Status: SHIPPED | OUTPATIENT
Start: 2025-06-23 | End: 2026-06-23

## 2025-06-23 NOTE — PROGRESS NOTES
Name: Shelly Vásquez  MRN: [id number]   CSN: [id number]      Date: 06/23/2025    Chief Complaint: RLS    Interval History:    Ms. Vásquez presents today for follow-up of restless legs and mild cognitive changes.    MOVEMENT DISORDERS:  She reports ongoing leg spasms characterized by occasional involuntary jerking movements while sitting, particularly mid-afternoon. The spasms became more noticeable after knee surgery and are accompanied by pain. She manages symptoms with small doses of tramadol, Tylenol, and valium, finding additional relief through leg massage during episodes. She also experiences persistent tremors primarily affecting the right hand, characterized by jerky movements when reaching for objects and occurring at rest. The tremors interfere with fine motor tasks like typing but temporarily stop when gripping objects. The tremors are not painful. She takes carbidopa-levodopa twice daily for tremor management.    GAIT AND MOBILITY:  She describes herself as having slower walking speed but maintains good balance. She attends physical therapy 3 times weekly and performs daily leg mobility exercises, focusing on improving leg straightness and stability. She has a slight antalgic gait following her hip and knee surgeries.    SLEEP PATTERNS:  She reports regularly taking post-lunch naps and preferring early bedtime, describing these rest periods as beneficial and following her body's natural fatigue signals.    CURRENT MEDICATIONS:  She takes carbidopa-levodopa twice daily, gabapentin 300mg twice daily, vitamin D supplement, and baby aspirin. She has discontinued oral iron supplementation. She denies medication side effects and understands potential sleepiness with gabapentin dose adjustment.    SURGICAL HISTORY:  She has undergone both hip and knee surgeries.         From Jan 2025:  RESTLESS LEGS SYNDROME:  She experiences symptoms affecting not only her legs but also her arms and whole body. She is  "currently taking carbidopa levodopa and gabapentin for symptom management. She reports anxiety about taking her morning medication and states she won't take it before 5 AM as it is intended for morning use, but expresses a strong desire to take it upon waking.    COGNITIVE FUNCTION:  She reports difficulties with executive functioning including timing and planning. She has trouble getting dressed in time for scheduled activities resulting in missed meals, misplaces personal items such as her wallet, and has difficulty with calendar management including recording correct dates.    SURGICAL HISTORY:  She has undergone hip replacement and knee surgery since last visit.         From March 2024:  - had knee replacement   - here with spouse   - add levodopa last visit   - 1 tab TID   - helping with tremors/shakes   - a couple of falls, tripped on things   - was not using a walker or cane at those times   - has not taken mid day dose yet   - feels levodopa is helping with balance as well   - still having some spasms   - pramipexole 1 mg BID, 400 mg gabapentin TID   - not feeling lightheaded or nauseous   - wakes up really stiff           March 2024  - went to [facility]   -  had EMG (see below, I reviewed also), no clear neuropathy vs. Radiculopathy by their read (though I onder if small fiber sesnory is there)  - had R hip replacement and successful, now with hip lift  - still having spasms and RLS, and worse if anything since hip surgery  - spasms are "still so strong" always R>>>L   - is having more ineard turning R leg now, works with therapy to "turn out"  - has been with [facility], and [facility]  - nothing has changed it much  - off mirapex now, on valium now (had seen Joana Doty in the past, now with Dr. Méndez)  - for sleep, she starts with a little more gabapentin and another before bed      PD Review of Symptoms:  Anosmia: normal  Dysarthria/Hypophonia: she notes mild hypophonia " "  Dysphagia/Sialorrhea: none   Depression: some whenever she is cramping   Cognitive slowing: good, sometimes she forgets short term   Urinary changes: yes, frequency   Constipation: takes miralax, started taking recently   Orthostasis: none   Falls: as above   Freezing: none   Micrographia: some smaller writing 2024  Sleep issues:   -YAJAIRA: none   -RBD: maybe some new sleep talking 2024    From Aug 2022  - requip 0.25 mg QHS, added gabapentin after last visit   - new to me, has seen [facility]   - having more pain into her R groin, mostly anterior and medial  - reviewed MRI with her  - does show L1-2 disc  - balance is "ok"  -  agrees her meory and mood are stable        From May 2022  - currently taking requip 0.25 mg QHS for RLS   - accompanied by spouse today   - dose works well if she takes it early, sometimes an hour or two before she gets in bed   - if she wakes up at night or going out for the evening, if she takes 0.125 -- this will get her through a social event   - if she gets up in the middle of the night she will take another 0.125  - some involuntary jerking movements, lying down reading on the sofa and had these involuntary movements  - no loss of awareness, no loss of bowel or bladder function  - saw an osteopath who told her they thought her femoral nerve was inflamed  - some lower back pain, has not had lumbar MRI   - was told she needs a hip replacement  - still doing pilates and swimming/strength training  - one small fall during mardi gras, no major injuries     - supplements with vitamin B         From April 2021  - mirapex 0.125 mg QHS helping legs significantly   - only had cramps one night since I saw her last   - takes baclofen 10 mg QHS   - now taking 1/4 dose of miralax and that has helped significantly with constipation   - denies dizziness or lightheadedness  - walking more at [employer]   - restarted oral iron       From 2/2021: Shelly Vásquez is R handed a81 y.o. 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 Uses restoril to sleep. Muscle cramps in the legs x 2 years, now legs jerking. Toes are cramping, curling. Fingers are also curling/cramping. Curling in hands started several years ago, had a shot in her hands and it helped. Legs involuntarily moving. Denies any odd sensation in her legs such as ants crawling or snakes to where she has to voluntarily move her legs to get the sensation out. Postural tremors in right hand when putting on makeup. Not sure if tremor is at rest. Balance issues started a few years ago. Fell once coming out of bathtub, held onto towel fay and it came out of the wall and fell backwards. No vision changes. Very careful when she walks. Sometimes slower to get out of the car just to make sure she doesn't fall out. Has fallen 3 times in the past four years. Has slowed down some, makes sure to plant her feet correctly. Wearing out shoes, possibly some shuffling -- had to have her first paid of shoes resoled. Two nights ago had a terrible night of jerking and cramping, couldn't sleep at night at all. Only cramps at night time, not necessarily during the day. Denies neck pain or cramping. Denies head tremor. Not sure if she has noticed feet inversion or not.     Exercises 2-3 times a week. Walks in the park when the weather is better.     Currently not taking iron supplementation due to constipation but plans to restart now that she has started taking miralax.     Medication history:  - none     Neuroleptic exposure:  - none     Family History: son with cramping     Past medical, family, and social history reviewed as documented in chart with pertinent positive medical, family, and social history detailed in HPI.      Review of Systems:   Review of Systems   Constitutional:  Negative for  "chills, fever and malaise/fatigue.   HENT:  Negative for hearing loss.    Eyes:  Negative for blurred vision and double vision.   Respiratory:  Negative for cough, shortness of breath and stridor.    Cardiovascular:  Negative for chest pain and leg swelling.   Gastrointestinal:  Negative for constipation, diarrhea and nausea.   Genitourinary:  Negative for frequency and urgency.   Musculoskeletal:  Negative for falls.   Skin:  Negative for itching and rash.   Neurological:  Positive for sensory change. Negative for dizziness, tremors, loss of consciousness and weakness.   Psychiatric/Behavioral:  Negative for hallucinations and memory loss.        Past Medical History: The patient  has a past medical history of Arthritis, Atrial fibrillation, Basal cell cancer, Encounter for blood transfusion, Gastric ulcer, GERD (gastroesophageal reflux disease), Herpes simplex without mention of complication, Postmenopausal HRT (hormone replacement therapy), and Supraventricular tachycardia.    Social History: The patient  reports that she quit smoking about 44 years ago. Her smoking use included cigarettes. She started smoking about 66 years ago. She has a 22 pack-year smoking history. She has never used smokeless tobacco. She reports current alcohol use of about 2.0 standard drinks of alcohol per week. She reports that she does not use drugs.    Family History: Their family history includes Cirrhosis in her mother; No Known Problems in her brother, daughter, father, maternal aunt, maternal grandfather, maternal grandmother, maternal uncle, paternal aunt, paternal grandfather, paternal grandmother, and paternal uncle; Pulmonary embolism in her son.    Allergies: Baclofen and Nsaids (non-steroidal anti-inflammatory drug)     Medications Ordered Prior to Encounter[1]        Exam:  BP 98/61 (BP Location: Right arm, Patient Position: Sitting)   Pulse 77   Ht 5' 8" (1.727 m)   Wt 72.8 kg (160 lb 9.7 oz)   LMP  (LMP Unknown)   BMI " 24.42 kg/m²       * Gait  See below.   * Deep tendon reflexes  2+ and symmetric throughout, 1+ at ankles, no deficits   Babinski downgoing bilaterally   * Specialized movement exam  MIld hypophonic speech.    MIld facial masking.   Mild Left wrist cogwheel rigidity.     mild bradykinesia, likely age appropriate    No tremor with rest, posture, kinesis, or intention.    No other dystonia, chorea, athetosis, myoclonus, or tics.   No motor impersistence.   Normal-based gait, cautious gait    Unable to tandem    No shortened stride length.   No abnormal arm swing.     No postural instability.      Laboratory/Radiological:  - Results:  Clinical Support on 03/28/2025   Component Date Value Ref Range Status    Sodium 03/28/2025 138  136 - 145 mmol/L Final    Potassium 03/28/2025 4.5  3.5 - 5.1 mmol/L Final    Chloride 03/28/2025 107  95 - 110 mmol/L Final    CO2 03/28/2025 25  23 - 29 mmol/L Final    Glucose 03/28/2025 74  70 - 110 mg/dL Final    BUN 03/28/2025 15  8 - 23 mg/dL Final    Creatinine 03/28/2025 0.8  0.5 - 1.4 mg/dL Final    Calcium 03/28/2025 9.3  8.7 - 10.5 mg/dL Final    Protein Total 03/28/2025 6.8  6.0 - 8.4 gm/dL Final    Albumin 03/28/2025 4.1  3.5 - 5.2 g/dL Final    Bilirubin Total 03/28/2025 1.3 (H)  0.1 - 1.0 mg/dL Final    ALP 03/28/2025 45  40 - 150 unit/L Final    AST 03/28/2025 12  11 - 45 unit/L Final    ALT 03/28/2025 <5 (L)  10 - 44 unit/L Final    Anion Gap 03/28/2025 6 (L)  8 - 16 mmol/L Final    eGFR 03/28/2025 >60  >60 mL/min/1.73/m2 Final    Cholesterol Total 03/28/2025 144  120 - 199 mg/dL Final    Triglyceride 03/28/2025 42  30 - 150 mg/dL Final    HDL Cholesterol 03/28/2025 71  40 - 75 mg/dL Final    LDL Cholesterol 03/28/2025 64.6  63.0 - 159.0 mg/dL Final    HDL/Cholesterol Ratio 03/28/2025 49.3  20.0 - 50.0 % Final    Cholesterol/HDL Ratio 03/28/2025 2.0  2.0 - 5.0 Final    Non HDL Cholesterol 03/28/2025 73  mg/dL Final    Hemoglobin A1c 03/28/2025 5.1  4.0 - 5.6 % Final     Estimated Average Glucose 03/28/2025 100  68 - 131 mg/dL Final    TSH 03/28/2025 1.641  0.400 - 4.000 uIU/mL Final    Vitamin D 03/28/2025 32  30 - 96 ng/mL Final    Vitamin B12 03/28/2025 630  210 - 950 pg/mL Final    Ferritin 03/28/2025 614.0 (H)  20.0 - 300.0 ng/mL Final    Iron Level 03/28/2025 175 (H)  30 - 160 ug/dL Final    Transferrin 03/28/2025 164 (L)  200 - 375 mg/dL Final    Iron Binding Capacity Total 03/28/2025 243 (L)  250 - 450 ug/dL Final    Iron Saturation 03/28/2025 72 (H)  20 - 50 % Final    RBC, UA 03/28/2025 1  0 - 4 /HPF Final    WBC, UA 03/28/2025 11 (H)  0 - 5 /HPF Final    Bacteria, UA 03/28/2025 Few (A)  None, Rare, Occasional /HPF Final    Squamous Epithelial Cells, UA 03/28/2025 56  /HPF Final    Calcium Oxalate Crystals, UA 03/28/2025 Many (A)  None, Rare, Occasional, Few, Moderate Final    Microscopic Comment 03/28/2025    Final    WBC 03/28/2025 6.10  3.90 - 12.70 K/uL Final    RBC 03/28/2025 2.19 (L)  4.00 - 5.40 M/uL Final    HGB 03/28/2025 7.8 (L)  12.0 - 16.0 gm/dL Final    HCT 03/28/2025 23.9 (L)  37.0 - 48.5 % Final    MCV 03/28/2025 109 (H)  82 - 98 fL Final    MCH 03/28/2025 35.6  27.0 - 50.0 pg Final    MCHC 03/28/2025 32.6  32.0 - 36.0 g/dL Final    RDW 03/28/2025 19.6 (H)  11.5 - 14.5 % Final    Platelet Count 03/28/2025 574 (H)  150 - 450 K/uL Final    MPV 03/28/2025 10.0  9.2 - 12.9 fL Final    Nucleated RBC 03/28/2025 1 (H)  <=0 /100 WBC Final    Neut % 03/28/2025 46.2  38 - 73 % Final    Lymph % 03/28/2025 37.2  18 - 48 % Final    Mono % 03/28/2025 11.6  4 - 15 % Final    Eos % 03/28/2025 3.0  <=8 % Final    Basophil % 03/28/2025 1.3  <=1.9 % Final    Imm Grans % 03/28/2025 0.7 (H)  0.0 - 0.5 % Final    Neut # 03/28/2025 2.82  1.8 - 7.7 K/uL Final    Lymph # 03/28/2025 2.27  1 - 4.8 K/uL Final    Mono # 03/28/2025 0.71  0.3 - 1 K/uL Final    Eos # 03/28/2025 0.18  <=0.5 K/uL Final    Baso # 03/28/2025 0.08  <=0.2 K/uL Final    Imm Grans # 03/28/2025 0.04  0.00 - 0.04  "K/uL Final    Platelet Estimate 03/28/2025 Increased (A)    Final    Fragmented Cells 03/28/2025 Occasional   Final    Anisocytosis 03/28/2025 Moderate   Final    Poik 03/28/2025 Slight   Final    Polychromasia 03/28/2025 Occasional   Final    Ovalocytes 03/28/2025 Occasional   Final    Tear drop cell 03/28/2025 Occasional   Final    Urine Culture 03/28/2025 >100,000 cfu/ml Escherichia coli (A)   Final       - Independent review of images: MRI          - Independent review of consultant's notes: Dev     Assessment & Plan      RLS:  - Assessed leg spasms and tremors, considering potential Parkinson's syndrome vs. age-related changes.  - Evaluated tremor characteristics, noting combination of resting and progressive postural tremor.  - Observed gait, noting slight antalgia likely related to previous hip and knee surgeries.  - Determined tremor is not classic Parkinsonian tremor, but more akin to periodic limb movement or "restless arm syndrome".  - Discussed similarity between arm tremor and restless leg syndrome.    TREMOR:  - Explained distractable component of tremor and how engaging other hand can potentially stop it.    MEDICATION MANAGEMENT:  - Increased Gabapentin to 400 mg twice daily and provided additional 300 mg tablets to be used as rescue medication if needed or for potential third daily dose.       This note was generated with the assistance of ambient listening technology. Verbal consent was obtained by the patient and accompanying visitor(s) for the recording of patient appointment to facilitate this note. I attest to having reviewed and edited the generated note for accuracy, though some syntax or spelling errors may persist. Please contact the author of this note for any clarification.    This is a patient with a chronic and complex neurologic diagnosis whose overall, ongoing care is being managed and monitored by me and our Neurology clinic.   As such, since 2024,  is the appropriate " add-on code to accompany the other E/M billing for this visit.              Artem Espinoza MD, MPH  Division of Movement and Memory Disorders  [facility]           MDM Level: 99049 (Moderate Complexity)  Moderate complexity: Evaluation of tremor and limb movements with differential including RLS, periodic limb movement, and Parkinsonian features.  Moderate data: Detailed neurologic assessment including gait and tremor characterization with consideration of prior surgeries.  Moderate risk: Adjustment of Gabapentin dosing with rescue option and consideration of movement disorder etiology.                     [1]   Current Outpatient Medications on File Prior to Visit   Medication Sig Dispense Refill    acetaminophen (TYLENOL) 650 MG TbSR Take 1 tablet (650 mg total) by mouth every 8 (eight) hours. 120 tablet 0    aspirin (ECOTRIN) 81 MG EC tablet Take 1 tablet by mouth 2 (two) times daily.      biotin 1 mg Cap Take by mouth once daily.      carbidopa-levodopa  mg (SINEMET)  mg per tablet Take 1 tablet by mouth 3 (three) times daily. One additional tablet in the middle of the night as needed. 360 tablet 3    cyanocobalamin 1,000 mcg/mL injection Inject 1 mL (1,000 mcg total) into the muscle every 14 (fourteen) days. 10 mL 1    diazePAM (VALIUM) 2 MG tablet One nightly prn for leg spasms, can repeat 4 hours later if needed 45 tablet 1    estradioL (ESTRACE) 0.01 % (0.1 mg/gram) vaginal cream Place 1 g vaginally twice a week. 42 g 3    estradiol-norethindrone acet 0.5-0.1 mg per tablet Take 1 tablet by mouth Daily. 84 tablet 3    gabapentin (NEURONTIN) 300 MG capsule TAKE ONE CAPSULE BY MOUTH TWICE DAILY, AT DINNER TIME AND 1 CAPSULE BY MOUTH BEFORE BEDTIME 60 capsule 2    pantoprazole (PROTONIX) 40 MG tablet Take 1 tablet (40 mg total) by mouth 2 (two) times daily. 180 tablet 1    polyethylene glycol 3350 (MIRALAX ORAL) Take 1 Capful by mouth daily as needed (constipation).       pramipexole (MIRAPEX) 1 MG tablet Take 1 tablet (1 mg total) by mouth 2 (two) times a day. 180 tablet 1    rOPINIRole (REQUIP) 1 MG tablet Take 1 mg by mouth 3 (three) times daily.      UNABLE TO FIND medication name: mag calm      vitamin D (VITAMIN D3) 1000 units Tab Take 1,000 Units by mouth once daily.       No current facility-administered medications on file prior to visit.

## 2025-06-30 ENCOUNTER — EXTERNAL CHRONIC CARE MANAGEMENT (OUTPATIENT)
Dept: PRIMARY CARE CLINIC | Facility: CLINIC | Age: 85
End: 2025-06-30
Payer: MEDICARE

## 2025-06-30 PROCEDURE — 99490 CHRNC CARE MGMT STAFF 1ST 20: CPT | Mod: PBBFAC | Performed by: INTERNAL MEDICINE

## 2025-06-30 PROCEDURE — 99490 CHRNC CARE MGMT STAFF 1ST 20: CPT | Mod: S$PBB,,, | Performed by: INTERNAL MEDICINE

## 2025-07-03 DIAGNOSIS — M62.838 MUSCLE SPASM OF BOTH LOWER LEGS: ICD-10-CM

## 2025-07-03 DIAGNOSIS — M48.061 SPINAL STENOSIS OF LUMBAR REGION WITHOUT NEUROGENIC CLAUDICATION: Primary | ICD-10-CM

## 2025-07-03 RX ORDER — DIAZEPAM 2 MG/1
2 TABLET ORAL EVERY 12 HOURS PRN
Qty: 60 TABLET | Refills: 1 | Status: SHIPPED | OUTPATIENT
Start: 2025-07-03

## 2025-07-03 RX ORDER — DIAZEPAM 2 MG/1
TABLET ORAL
Qty: 45 TABLET | Refills: 1 | Status: SHIPPED | OUTPATIENT
Start: 2025-07-03 | End: 2025-07-03 | Stop reason: SDUPTHER

## 2025-07-03 RX ORDER — TRAMADOL HYDROCHLORIDE 50 MG/1
50 TABLET, FILM COATED ORAL EVERY 12 HOURS PRN
Qty: 60 TABLET | Refills: 1 | Status: SHIPPED | OUTPATIENT
Start: 2025-07-03

## 2025-07-03 NOTE — TELEPHONE ENCOUNTER
No care due was identified.  [facility] Embedded Care Due Messages. Reference number: [id number].   7/03/2025 8:47:21 AM CDT

## 2025-07-08 ENCOUNTER — TELEPHONE (OUTPATIENT)
Dept: SPORTS MEDICINE | Facility: CLINIC | Age: 85
End: 2025-07-08
Payer: MEDICARE

## 2025-07-08 NOTE — TELEPHONE ENCOUNTER
I placed the call per Dr. Yang request to find out how Mrs. Bravo is doing with her shoulder and ankle. She seemed a little confused as to who I was called about because she started telling me about her  saying he wasn't doing well with his shoulder and that he was in a lot of pain. After repeating to her that the call was to get an update on HERSELF she stated she was doing well with her shoulder and ankle. She didn't need a f/u appointment scheduled at the time of the call.

## 2025-07-11 ENCOUNTER — HOSPITAL ENCOUNTER (EMERGENCY)
Facility: HOSPITAL | Age: 85
Discharge: HOME | End: 2025-07-11
Attending: STUDENT IN AN ORGANIZED HEALTH CARE EDUCATION/TRAINING PROGRAM
Payer: MEDICARE

## 2025-07-11 VITALS
DIASTOLIC BLOOD PRESSURE: 81 MMHG | WEIGHT: 160.5 LBS | OXYGEN SATURATION: 98 % | HEART RATE: 84 BPM | RESPIRATION RATE: 18 BRPM | TEMPERATURE: 99 F | BODY MASS INDEX: 24.32 KG/M2 | SYSTOLIC BLOOD PRESSURE: 106 MMHG | HEIGHT: 68 IN

## 2025-07-11 DIAGNOSIS — R07.9 CHEST PAIN: ICD-10-CM

## 2025-07-11 DIAGNOSIS — I48.0 PAROXYSMAL ATRIAL FIBRILLATION: Primary | ICD-10-CM

## 2025-07-11 LAB
ABSOLUTE EOSINOPHIL (OHS): 0.21 K/UL
ABSOLUTE MONOCYTE (OHS): 0.6 K/UL (ref 0.3–1)
ABSOLUTE NEUTROPHIL COUNT (OHS): 2.25 K/UL (ref 1.8–7.7)
ALBUMIN SERPL BCP-MCNC: 4.1 G/DL (ref 3.5–5.2)
ALP SERPL-CCNC: 43 UNIT/L (ref 40–150)
ALT SERPL W/O P-5'-P-CCNC: <5 UNIT/L (ref 10–44)
ANION GAP (OHS): 11 MMOL/L (ref 8–16)
AST SERPL-CCNC: 12 UNIT/L (ref 11–45)
BASOPHILS # BLD AUTO: 0.09 K/UL
BASOPHILS NFR BLD AUTO: 1.7 %
BILIRUB SERPL-MCNC: 0.7 MG/DL (ref 0.1–1)
BNP SERPL-MCNC: 177 PG/ML (ref 0–99)
BUN SERPL-MCNC: 17 MG/DL (ref 8–23)
CALCIUM SERPL-MCNC: 9 MG/DL (ref 8.7–10.5)
CHLORIDE SERPL-SCNC: 106 MMOL/L (ref 95–110)
CO2 SERPL-SCNC: 20 MMOL/L (ref 23–29)
CREAT SERPL-MCNC: 0.8 MG/DL (ref 0.5–1.4)
D DIMER PPP IA.FEU-MCNC: 0.21 MG/L FEU
ERYTHROCYTE [DISTWIDTH] IN BLOOD BY AUTOMATED COUNT: 20.3 % (ref 11.5–14.5)
GFR SERPLBLD CREATININE-BSD FMLA CKD-EPI: >60 ML/MIN/1.73/M2
GLUCOSE SERPL-MCNC: 101 MG/DL (ref 70–110)
HCT VFR BLD AUTO: 22.2 % (ref 37–48.5)
HCV AB SERPL QL IA: NORMAL
HGB BLD-MCNC: 7.3 GM/DL (ref 12–16)
HIV 1+2 AB+HIV1 P24 AG SERPL QL IA: NORMAL
IMM GRANULOCYTES # BLD AUTO: 0.02 K/UL (ref 0–0.04)
IMM GRANULOCYTES NFR BLD AUTO: 0.4 % (ref 0–0.5)
LYMPHOCYTES # BLD AUTO: 2.14 K/UL (ref 1–4.8)
MAGNESIUM SERPL-MCNC: 2.2 MG/DL (ref 1.6–2.6)
MCH RBC QN AUTO: 35.4 PG (ref 27–31)
MCHC RBC AUTO-ENTMCNC: 32.9 G/DL (ref 32–36)
MCV RBC AUTO: 108 FL (ref 82–98)
NUCLEATED RBC (/100WBC) (OHS): 1 /100 WBC
OHS QRS DURATION: 84 MS
OHS QTC CALCULATION: 437 MS
PLATELET # BLD AUTO: 567 K/UL (ref 150–450)
PMV BLD AUTO: 10.3 FL (ref 9.2–12.9)
POTASSIUM SERPL-SCNC: 4 MMOL/L (ref 3.5–5.1)
PROT SERPL-MCNC: 6.2 GM/DL (ref 6–8.4)
RBC # BLD AUTO: 2.06 M/UL (ref 4–5.4)
RELATIVE EOSINOPHIL (OHS): 4 %
RELATIVE LYMPHOCYTE (OHS): 40.3 % (ref 18–48)
RELATIVE MONOCYTE (OHS): 11.3 % (ref 4–15)
RELATIVE NEUTROPHIL (OHS): 42.3 % (ref 38–73)
SODIUM SERPL-SCNC: 137 MMOL/L (ref 136–145)
TROPONIN I SERPL HS-MCNC: <3 NG/L
TROPONIN I SERPL HS-MCNC: <3 NG/L
TSH SERPL-ACNC: 2.18 UIU/ML (ref 0.4–4)
WBC # BLD AUTO: 5.31 K/UL (ref 3.9–12.7)

## 2025-07-11 PROCEDURE — 84443 ASSAY THYROID STIM HORMONE: CPT | Performed by: STUDENT IN AN ORGANIZED HEALTH CARE EDUCATION/TRAINING PROGRAM

## 2025-07-11 PROCEDURE — 83735 ASSAY OF MAGNESIUM: CPT | Performed by: STUDENT IN AN ORGANIZED HEALTH CARE EDUCATION/TRAINING PROGRAM

## 2025-07-11 PROCEDURE — 87389 HIV-1 AG W/HIV-1&-2 AB AG IA: CPT | Performed by: PHYSICIAN ASSISTANT

## 2025-07-11 PROCEDURE — 85379 FIBRIN DEGRADATION QUANT: CPT | Performed by: STUDENT IN AN ORGANIZED HEALTH CARE EDUCATION/TRAINING PROGRAM

## 2025-07-11 PROCEDURE — 25000003 PHARM REV CODE 250: Performed by: STUDENT IN AN ORGANIZED HEALTH CARE EDUCATION/TRAINING PROGRAM

## 2025-07-11 PROCEDURE — 93005 ELECTROCARDIOGRAM TRACING: CPT

## 2025-07-11 PROCEDURE — 85025 COMPLETE CBC W/AUTO DIFF WBC: CPT | Performed by: STUDENT IN AN ORGANIZED HEALTH CARE EDUCATION/TRAINING PROGRAM

## 2025-07-11 PROCEDURE — 63600175 PHARM REV CODE 636 W HCPCS: Performed by: STUDENT IN AN ORGANIZED HEALTH CARE EDUCATION/TRAINING PROGRAM

## 2025-07-11 PROCEDURE — 84484 ASSAY OF TROPONIN QUANT: CPT | Performed by: STUDENT IN AN ORGANIZED HEALTH CARE EDUCATION/TRAINING PROGRAM

## 2025-07-11 PROCEDURE — 99285 EMERGENCY DEPT VISIT HI MDM: CPT | Mod: 25

## 2025-07-11 PROCEDURE — 86803 HEPATITIS C AB TEST: CPT | Performed by: PHYSICIAN ASSISTANT

## 2025-07-11 PROCEDURE — 93010 ELECTROCARDIOGRAM REPORT: CPT | Mod: ,,, | Performed by: INTERNAL MEDICINE

## 2025-07-11 PROCEDURE — 83880 ASSAY OF NATRIURETIC PEPTIDE: CPT | Performed by: STUDENT IN AN ORGANIZED HEALTH CARE EDUCATION/TRAINING PROGRAM

## 2025-07-11 PROCEDURE — 80053 COMPREHEN METABOLIC PANEL: CPT | Performed by: STUDENT IN AN ORGANIZED HEALTH CARE EDUCATION/TRAINING PROGRAM

## 2025-07-11 PROCEDURE — 96374 THER/PROPH/DIAG INJ IV PUSH: CPT

## 2025-07-11 RX ORDER — FAMOTIDINE 10 MG/ML
20 INJECTION, SOLUTION INTRAVENOUS
Status: COMPLETED | OUTPATIENT
Start: 2025-07-11 | End: 2025-07-11

## 2025-07-11 RX ORDER — ALUMINUM HYDROXIDE, MAGNESIUM HYDROXIDE, AND SIMETHICONE 1200; 120; 1200 MG/30ML; MG/30ML; MG/30ML
15 SUSPENSION ORAL
Status: COMPLETED | OUTPATIENT
Start: 2025-07-11 | End: 2025-07-11

## 2025-07-11 RX ORDER — DIAZEPAM 2 MG/1
2 TABLET ORAL
Status: COMPLETED | OUTPATIENT
Start: 2025-07-11 | End: 2025-07-11

## 2025-07-11 RX ADMIN — FAMOTIDINE 20 MG: 10 INJECTION, SOLUTION INTRAVENOUS at 01:07

## 2025-07-11 RX ADMIN — DIAZEPAM 2 MG: 2 TABLET ORAL at 01:07

## 2025-07-11 RX ADMIN — ALUMINUM HYDROXIDE, MAGNESIUM HYDROXIDE, AND SIMETHICONE 15 ML: 200; 200; 20 SUSPENSION ORAL at 01:07

## 2025-07-11 NOTE — ED PROVIDER NOTES
"Encounter Date: 7/11/2025       History     Chief Complaint   Patient presents with    Chest Pain     Pt complaining of mid sternal chest pain that woke her up out of her sleep earlier tonight. Pt states history of anxiety and has been having bad dreams lately. Per EMS pt states pain subsided when talking with them and caretaker. Pt denies nausea/vomiting     HPI  Is an 84-year-old female with past medical history of atrial fibrillation s/p ablation, SVT, GERD, gastric ulcer, arthritis presents to the ED after being woken up with sternal chest pain.  She says that the pain starts at her xiphoid process and is burning in nature involving some of her right chest wall.  She says that she felt especially tired when going to bed tonight and when she woke up with this chest pain that she was very disoriented as though she was in a "blank space." She has a nurse that helps her  around the clock that she called and upon her arrival found the patient had urinated in the bed which the patient says she has never done before.  She denies any palpitations, left-sided chest pain, or shortness of breath.  Review of patient's allergies indicates:   Allergen Reactions    Baclofen      Incoherent     Nsaids (non-steroidal anti-inflammatory drug) Other (See Comments)     GI Bleed    Had 5 blood transfusions    Re: GI bleed     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)     Past Surgical History:   Procedure Laterality Date    APPENDECTOMY  age 21    ARTHROPLASTY OF HIP BY ANTERIOR APPROACH Right 4/26/2023    Procedure: ARTHROPLASTY, HIP, TOTAL, ANTERIOR APPROACH: RIGHT: DEPUY - " C-STEM + PINNACLE;  Surgeon: Keon Gary III, MD;  [address]  Service: Orthopedics;  Laterality: Right;    BACK SURGERY      Beast Lift  2004    BONE MARROW BIOPSY Right 12/19/2019    Procedure: Biopsy-bone marrow;  Surgeon: Aidan Spring MD;  [facility];  Service: Oncology;  Laterality: Right;    BREAST BIOPSY  50yrs ago    unable to see scar    CATARACT EXTRACTION W/  INTRAOCULAR LENS IMPLANT Right 9/7/2023    Procedure: EXTRACTION, CATARACT, WITH IOL INSERTION;  Surgeon: Francisco Willingham MD;  Location: [facility] OR;  Service: Ophthalmology;  Laterality: Right;  [facility] ONLY    CATARACT EXTRACTION W/  INTRAOCULAR LENS IMPLANT Left 10/2/2023    Procedure: EXTRACTION, CATARACT, WITH IOL INSERTION;  Surgeon: Francisco Willingham MD;  Location: [facility] OR;  Service: Ophthalmology;  Laterality: Left;  [facility] ONLY    COSMETIC SURGERY      DILATION AND CURETTAGE OF UTERUS  2008    PMB    ENDOMETRIAL BIOPSY      EPIDURAL STEROID INJECTION N/A 8/25/2022    Procedure: LUMBAR CELINA CONTRAST DIRECT REFERRAL;  Surgeon: Rj Hernandez MD;  Location: [facility] PAIN [facility]GT;  Service: Pain Management;  Laterality: N/A;    ESOPHAGOGASTRODUODENOSCOPY N/A 10/4/2023    Procedure: EGD (ESOPHAGOGASTRODUODENOSCOPY);  Surgeon: Hugo Menchaca MD;  Location: [facility] ([address] [place] [address]);  Service: Endoscopy;  Laterality: N/A;    EYE SURGERY      INJECTION OF ANESTHETIC AGENT AROUND NERVE Bilateral 11/11/2022    Procedure: BLOCK, NERVE BILATERAL L2,L3,L4 AND L5 MEDIAL BRANCH ONE OF TWO;  Surgeon: Rj Hernandez MD;  Location: [facility]IN [facility]GT;  Service: Pain Management;  Laterality: Bilateral;    INJECTION OF JOINT Right 12/2/2022    Procedure: INJECTION, JOINT RIGHT INTRARTICULAR HIP CONTRAST;  Surgeon: Rj Hernandez MD;  Location: [facility]IN [facility];  Service: Pain Management;  Laterality: Right;    ptsosis      RADIOFREQUENCY ABLATION  03/2018    SMALL INTESTINE SURGERY      TONSILLECTOMY, ADENOIDECTOMY  age 8    TOTAL  REDUCTION MAMMOPLASTY Bilateral 2003    TRANSFORAMINAL EPIDURAL INJECTION OF STEROID Right 8/11/2022    Procedure: Injection,steroid,epidural Right L1/2 TF DIrect Referral Instructed to provide intervention per MRI results;  Surgeon: Rj Hernandez MD;  Location: [facility];  Service: Pain Management;  Laterality: Right;    TRANSFORAMINAL EPIDURAL INJECTION OF STEROID Right 10/21/2022    Procedure: INJECTION, STEROID, EPIDURAL, TRANSFORAMINAL APPROACH, L5-S1 and S1, RIGHT CONTRAST;  Surgeon: Rj Hernandez MD;  Location: [facility];  Service: Pain Management;  Laterality: Right;    TUBAL LIGATION       Family History   Problem Relation Name Age of Onset    No Known Problems Father      Cirrhosis Mother      No Known Problems Brother      No Known Problems Maternal Aunt      No Known Problems Maternal Uncle      No Known Problems Paternal Aunt      No Known Problems Paternal Uncle      No Known Problems Maternal Grandmother      No Known Problems Maternal Grandfather      No Known Problems Paternal Grandmother      No Known Problems Paternal Grandfather      No Known Problems Daughter x1     Pulmonary embolism Son x1 age 60     Breast cancer Neg Hx      Colon cancer Neg Hx      Ovarian cancer Neg Hx      Amblyopia Neg Hx      Blindness Neg Hx      Cancer Neg Hx      Cataracts Neg Hx      Diabetes Neg Hx      Glaucoma Neg Hx      Hypertension Neg Hx      Macular degeneration Neg Hx      Retinal detachment Neg Hx      Strabismus Neg Hx      Stroke Neg Hx      Thyroid disease Neg Hx       Social History[1]  Review of Systems    Physical Exam     Initial Vitals [07/11/25 0023]   BP Pulse Resp Temp SpO2   109/64 81 20 97 °F (36.1 °C) 96 %      MAP       --         Physical Exam    Constitutional: She appears well-developed. She appears lethargic.   HENT:   Head: Normocephalic.   Eyes: EOM are normal. Pupils are equal, round, and reactive to light.   Cardiovascular:  Normal rate. An irregular rhythm present.            Pulmonary/Chest: Breath sounds normal. No respiratory distress. She exhibits tenderness.   Abdominal: Abdomen is flat. Bowel sounds are normal.     Neurological: She appears lethargic.         ED Course   Procedures  Labs Reviewed   B-TYPE NATRIURETIC PEPTIDE - Abnormal       Result Value     (*)    CBC WITH DIFFERENTIAL - Abnormal    WBC 5.31      RBC 2.06 (*)     HGB 7.3 (*)     HCT 22.2 (*)      (*)     MCH 35.4 (*)     MCHC 32.9      RDW 20.3 (*)     Platelet Count 567 (*)     MPV 10.3      Nucleated RBC 1 (*)     Neut % 42.3      Lymph % 40.3      Mono % 11.3      Eos % 4.0      Basophil % 1.7      Imm Grans % 0.4      Neut # 2.25      Lymph # 2.14      Mono # 0.60      Eos # 0.21      Baso # 0.09      Imm Grans # 0.02     TROPONIN I HIGH SENSITIVITY - Normal    Troponin High Sensitive <3     TSH - Normal    TSH 2.177     CBC W/ AUTO DIFFERENTIAL    Narrative:     The following orders were created for panel order CBC auto differential.  Procedure                               Abnormality         Status                     ---------                               -----------         ------                     CBC with Differential[[id number]]       Abnormal            Final result                 Please view results for these tests on the individual orders.   HEPATITIS C ANTIBODY   HEP C VIRUS HOLD SPECIMEN   HIV 1 / 2 ANTIBODY   COMPREHENSIVE METABOLIC PANEL   MAGNESIUM   TROPONIN I HIGH SENSITIVITY   D DIMER, QUANTITATIVE          Imaging Results              X-Ray Chest AP Portable (In process)                      Medications   diazePAM tablet 2 mg (2 mg Oral Given 7/11/25 0123)   famotidine (PF) injection 20 mg (20 mg Intravenous Given 7/11/25 0130)   aluminum-magnesium hydroxide-simethicone 200-200-20 mg/5 mL suspension 15 mL (15 mLs Oral Given 7/11/25 0124)     Medical Decision Making  Amount and/or Complexity of Data Reviewed  Labs: ordered. Decision-making details documented in ED  Course.  Radiology: ordered.    Risk  OTC drugs.  Prescription drug management.    Chest pain is right sided and burning in nature making ACS less likely.           ED Course as of 25 0230    D-Dimer: 0.21 [LF]      ED Course User Index  [LF] Shivani Palacio MD                     Assessment:  Patient is an 84-year-old female with past medical history listed above who presents to the ED with burning sternal chest pain.    Plan:   - CBC, CMP, Mag, EKG, TSH ordered  - Troponin, BNP, D-dimer ordered  - famotidine 20 mg, diazepam 2 mg ordered    Clinical Impression:  Final diagnoses:  [R07.9] Chest pain                         [1]   Social History  Tobacco Use    Smoking status: Former     Current packs/day: 0.00     Average packs/day: 1 pack/day for 22.0 years (22.0 ttl pk-yrs)     Types: Cigarettes     Start date: 1958     Quit date: 1980     Years since quittin.9    Smokeless tobacco: Never   Substance Use Topics    Alcohol use: Yes     Alcohol/week: 2.0 standard drinks of alcohol     Types: 2 Glasses of wine per week     Comment: 1-2 glasses of  some night    Drug use: No        Bill Crockett MD  Resident  25 0111

## 2025-07-11 NOTE — ED NOTES
Patient assisted to bedside commode for BM.     Patient provided with pillow and TV turned on for comfort. Updated on transport ETA

## 2025-07-11 NOTE — DISCHARGE INSTRUCTIONS
You were seen in the ER today for chest pain, you were found to have recurrence of your atrial fibrillation, please follow up with your primary care doctor and Cardiology this week to see if you need to start taking any new medications, return to the ER if you have chest pain, palpitations, symptoms of passing out.    Thank you for coming to our Emergency Department today. It is important to remember that some problems or medical conditions are difficult to diagnose and may not be found or addressed during your Emergency Department visit.  These conditions often start with non-specific symptoms and can only be diagnosed on follow up visits with your primary care physician or specialist when the symptoms continue or change. Please remember that all medical conditions can change, and we cannot predict how you will be feeling tomorrow or the next day. Return to the ER with any questions/concerns, new/concerning symptoms, worsening or failure to improve.       Be sure to follow up with your primary care doctor and review all labs/imaging/tests that were performed during your ER visit with them. It is very common for us to identify non-emergent incidental findings which must be followed up with your primary care physician.  Some labs/imaging/tests may be outside of the normal range, and require non-emergent follow-up and/or further investigation/treatment/procedures/testing to help diagnose/exclude/prevent complications or other potentially serious medical conditions. Some abnormalities may not have been discussed or addressed during your ER visit. Some lab results may not return during your ER visit but can be accessible by downloading the free [facility] Mychart jame or by visiting [email or web address] . It is important for you to review all labs/imaging/tests which are outside of the normal range with your physician.    An ER visit does not replace a primary care visit, and many screening tests or follow-up tests  cannot be ordered by an ER doctor or performed by the ER. Some tests may even require pre-approval.    If you do not have a primary care doctor, you may contact the one listed on your discharge paperwork or you may also call the [facility] Appointment Desk at [phone] , or [facility] at  [phone] to schedule an appointment, or establish care with a primary care doctor or even a specialist and to obtain information about local resources. It is important to your health that you have a primary care doctor.    Please take all medications as directed. We have done our best to select a medication for you that will treat your condition however, all medications may potentially have side-effects and it is impossible to predict which medications may give you side-effects or what those side-effects (if any) those medications may give you.  If you feel that you are having a negative effect or side-effect of any medication you should stop taking those medications immediately and seek medical attention. If you feel that you are having a life-threatening reaction call 911.        Do not drive, swim, climb to height, take a bath, operate heavy machinery, drink alcohol or take potentially sedating medications, sign any legal documents or make any important decisions for 24 hours if you have received any pain medications, sedatives or mood altering drugs during your ER visit or within 24 hours of taking them if they have been prescribed to you.     You can find additional resources for Dentists, hearing aids, durable medical equipment, low cost pharmacies and other resources at [email or web address]

## 2025-07-11 NOTE — ED NOTES
Update provided to caretaker, Joaquina. Caretaker will be home to receive patient and assist her into house, but unable to  patient due to also being patient's 's nurse. Patient able to ambulate with stand-by assist.

## 2025-07-12 ENCOUNTER — PATIENT MESSAGE (OUTPATIENT)
Dept: ADMINISTRATIVE | Facility: OTHER | Age: 85
End: 2025-07-12
Payer: MEDICARE

## 2025-07-14 ENCOUNTER — OFFICE VISIT (OUTPATIENT)
Dept: INTERNAL MEDICINE | Facility: CLINIC | Age: 85
End: 2025-07-14
Payer: MEDICARE

## 2025-07-14 ENCOUNTER — HOSPITAL ENCOUNTER (OUTPATIENT)
Dept: CARDIOLOGY | Facility: CLINIC | Age: 85
Discharge: HOME | End: 2025-07-14
Payer: MEDICARE

## 2025-07-14 ENCOUNTER — TELEPHONE (OUTPATIENT)
Dept: CARDIOLOGY | Facility: CLINIC | Age: 85
End: 2025-07-14
Payer: MEDICARE

## 2025-07-14 VITALS
BODY MASS INDEX: 24.39 KG/M2 | SYSTOLIC BLOOD PRESSURE: 102 MMHG | HEIGHT: 68 IN | DIASTOLIC BLOOD PRESSURE: 68 MMHG | HEART RATE: 77 BPM | OXYGEN SATURATION: 98 % | WEIGHT: 160.94 LBS

## 2025-07-14 DIAGNOSIS — I48.91 ATRIAL FIBRILLATION, UNSPECIFIED TYPE: ICD-10-CM

## 2025-07-14 DIAGNOSIS — D46.9 MDS (MYELODYSPLASTIC SYNDROME): ICD-10-CM

## 2025-07-14 DIAGNOSIS — M48.061 SPINAL STENOSIS OF LUMBAR REGION WITHOUT NEUROGENIC CLAUDICATION: ICD-10-CM

## 2025-07-14 DIAGNOSIS — G25.81 RLS (RESTLESS LEGS SYNDROME): ICD-10-CM

## 2025-07-14 DIAGNOSIS — I48.92 ATRIAL FIB/FLUTTER, TRANSIENT: Primary | ICD-10-CM

## 2025-07-14 DIAGNOSIS — Z87.19 HISTORY OF GI BLEED: ICD-10-CM

## 2025-07-14 DIAGNOSIS — D64.9 SEVERE ANEMIA: ICD-10-CM

## 2025-07-14 DIAGNOSIS — I48.91 ATRIAL FIB/FLUTTER, TRANSIENT: Primary | ICD-10-CM

## 2025-07-14 DIAGNOSIS — Z78.0 ASYMPTOMATIC MENOPAUSE: ICD-10-CM

## 2025-07-14 DIAGNOSIS — I48.0 PAROXYSMAL ATRIAL FIBRILLATION: Primary | ICD-10-CM

## 2025-07-14 LAB
HOLD SPECIMEN: NORMAL
OHS QRS DURATION: 96 MS
OHS QTC CALCULATION: 407 MS

## 2025-07-14 PROCEDURE — 99999 PR PBB SHADOW E&M-EST. PATIENT-LVL III: CPT | Mod: PBBFAC,,, | Performed by: INTERNAL MEDICINE

## 2025-07-14 PROCEDURE — 93010 ELECTROCARDIOGRAM REPORT: CPT | Mod: S$PBB,,, | Performed by: INTERNAL MEDICINE

## 2025-07-14 PROCEDURE — 99214 OFFICE O/P EST MOD 30 MIN: CPT | Mod: S$PBB,,, | Performed by: INTERNAL MEDICINE

## 2025-07-14 PROCEDURE — 93005 ELECTROCARDIOGRAM TRACING: CPT | Mod: PBBFAC | Performed by: INTERNAL MEDICINE

## 2025-07-14 PROCEDURE — 99213 OFFICE O/P EST LOW 20 MIN: CPT | Mod: PBBFAC,25 | Performed by: INTERNAL MEDICINE

## 2025-07-14 NOTE — TELEPHONE ENCOUNTER
----- Message from Nora Baker MD sent at 7/14/2025  3:35 PM CDT -----  Regarding: FW: atrial fib  Team,     Can we move up her appointment? Thank you,     Nora Baker  ----- Message -----  From: Marlene Méndez MD  Sent: 7/14/2025   2:01 PM CDT  To: Erlin Lopez MD; Nora Baker#  Subject: RE: atrial fib                                   Thank you Erlin. How do you feel about DAPT ?  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 I have offered luspatercept to help improve her anemia, but she was not interested last time. I will continue to address.     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  She ruled out for an MI and was rate controlled  They did not send her home on Eliquis.  I am seeing her in follow-up today.  She has an appointment with you Nora but not for another couple of weeks.    She has a longstanding history of anemia, sideroblastic that Erlin follows with her.  Erlin you are seeing her in the middle of August as well for follow-up.    I will get an EKG today but I am not sure what to do about anticoagulation.  A few years ago she had a massive upper GI bleed from being on Celebrex and a 1 time Medrol Dosepak.    Nora maybe you can get her in sooner?    Malrene

## 2025-07-15 ENCOUNTER — OUTPATIENT CASE MANAGEMENT (OUTPATIENT)
Dept: ADMINISTRATIVE | Facility: OTHER | Age: 85
End: 2025-07-15
Payer: MEDICARE

## 2025-07-15 NOTE — LETTER
Shelly Vásquez  [address] [facility] [address]      Dear Shelly Wilkinson,    Welcome to [facility] Complex Care Management Program.  It was a pleasure talking with you today.  My name is Jazminemanav Newby, and I look forward to being your Care Manager.  My goal is to help you function at the healthiest and highest level possible.  You can contact me directly at [phone].    As an [facility] patient, some of the services we may be able to provide include:     Development of an individualized care plan with a Registered Nurse   Connection with a   Connection with available resources and services    Coordinate communication among your care team members   Provide coaching and education   Help you understand your doctors treatment plan  Help you obtain information about your insurance coverage.     All services provided by [facility]s Complex Care Managers and other care team members are coordinated with and communicated to your primary care team.      As part of your enrollment, you will be receiving education materials and more information about these services in your [facility] account, by phone or through the mail.  If you do not wish to participate or receive information, please contact our office at [phone].      [facility] Patient Rights and Responsibilities available upon request.    Sincerely,        Jazmine Newby, RN  [facility]   Outpatient RN Complex Care Manager
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
## 2025-07-15 NOTE — PROGRESS NOTES
Outpatient Care Management  Initial Patient Assessment    Patient: Shelly Vásquez  MRN: [id number]  Date of Service: 07/15/2025  Completed by: Jazmine Newby RN  Referral Date: 07/31/2022  Date of Eligibility: 7/12/2025  Program:   High Risk  Status: Ongoing  Effective Dates: 7/15/2025 - present  Responsible Staff: Jazmine Newby RN        Reason for Visit   Patient presents with    Other     7/15/2025: Received incoming call from patient. She requested a callback at 4:30 pm today.     Nursing Assessment     7/15/2025: Called patient back to enroll in OPCM    OPCM Enrollment Call     7/15/2025: Called patient back to enroll in OPCM       Brief Summary:  Shelly Vásquez self enrolled in program from campaign questionnaire. Patient qualifies for program based on high risk with chronic conditions of Afib, osteoarthritis, Parkinsonism, thrombocytosis, MDS, restless leg, impaired functional mobility, balance, gait, and endurance, GERD, SVT, and basal cell carcinoma. Patient with risk score of 69.4%.  Active problem list, medical, surgical and social history reviewed. Active comorbidities include Afib, osteoarthritis, Parkinsonism, thrombocytosis, MDS, restless leg, impaired functional mobility, balance, gait, and endurance, GERD, SVT, and basal cell carcinoma. Areas of need identified by patient include Education on chronic condition of Afib and Parkinson's disease. Educate on fall prevention.    Next steps: RN to educate patient on chronic conditions of Afib, Parkinson's, and CAD. RN to educate on fall prevention. Referral sent to CHW to complete SDOH.      Disability Status  Is the patient alert and oriented (person, place, time, and situation)?: Alert and oriented x 4  Hearing Difficulty or Deaf: no  Visual Difficulty or Blind: no  Visual and Hearing Conclusion Statement: Patient states she does not wear glasses and does not use hearing aids.  Difficulty Concentrating, Remembering or Making Decisions:  yes  Concentration Management: Patient states she sometimes has difficulty remembering.  Communication Difficulty: no  Eating/Swallowing Difficulty: no  Walking or Climbing Stairs Difficulty: no  Dressing/Bathing Difficulty: no  Grooming: independent  Transferring (e.g., getting in and out of chairs): independent  Toileting : Independent  Continence : Continence - Not a problem  Difficulty Managing Errands Independently: no  Errands Management: Patient drives and runs her own errands. Patient's spouse has paid caregivers around the clock.  Equipment Currently Used at Home: blood pressure machine; scale (Patient states her spouse has walkers but she does not need to use any mobility devices.)  ADL Conclusion Statement: Patient states she is independent with adl's, uses no mobility devices, drives, runs her own errands, and drives herself to her appointments. There are 24 hour caregivers in the home for her spouse.  Change in Functional Status Since Onset of Current Illness/Injury: no        Spiritual Beliefs  Spiritual, Cultural Beliefs, [employer] Practices, Values that Affect Care: no      Social History     Socioeconomic History    Marital status:    Tobacco Use    Smoking status: Former     Current packs/day: 0.00     Average packs/day: 1 pack/day for 22.0 years (22.0 ttl pk-yrs)     Types: Cigarettes     Start date: 1958     Quit date: 1980     Years since quittin.9    Smokeless tobacco: Never   Substance and Sexual Activity    Alcohol use: Yes     Alcohol/week: 2.0 standard drinks of alcohol     Types: 2 Glasses of wine per week     Comment: 1-2 glasses of  some night    Drug use: No    Sexual activity: Not Currently     Partners: Male     Birth control/protection: Post-menopausal     Comment:  since      Social Drivers of Health     Financial Resource Strain: Low Risk  (10/4/2023)    Overall Financial Resource Strain (CARDIA)     Difficulty of Paying Living Expenses: Not hard at  all   Food Insecurity: No Food Insecurity (9/29/2024)    Received from [facility]    Hunger Vital Sign     Worried About Running Out of Food in the Last Year: Never true     Ran Out of Food in the Last Year: Never true   Transportation Needs: No Transportation Needs (9/29/2024)    Received from [facility]    PRAPARE - Transportation     Lack of Transportation (Medical): No     Lack of Transportation (Non-Medical): No   Physical Activity: Insufficiently Active (10/4/2023)    Exercise Vital Sign     Days of Exercise per Week: 7 days     Minutes of Exercise per Session: 20 min   Stress: No Stress Concern Present (10/4/2023)    [employer] - Occupational Stress Questionnaire     Feeling of Stress : Only a little   Housing Stability: Low Risk  (9/29/2024)    Received from [facility]    Housing Stability Vital Sign     Unable to Pay for Housing in the Last Year: No     Number of Times Moved in the Last Year: 0     Homeless in the Last Year: No       Roles and Relationships  Primary Source of Support/Comfort: spouse  Name of Support/Comfort Primary Source: [place]  Secondary Source of Support/Comfort: child(shiraz)  Name of Support/Comfort Secondary Source: [place]      Advance Directives (For Healthcare)  Advance Directive  (If Adv Dir status is received, view document under Adv Dir in header or Chart Review Media tab): Patient has advance directive, copy not received.  Patient Requests Assistance: Patient will do independently        Patient Reported Insurance  Verified current insurance plan:: Medicare            7/15/2025     4:43 PM 6/18/2025     2:33 PM 3/28/2025    11:34 AM 5/1/2024     1:54 PM 3/13/2023     1:59 PM 12/15/2022     9:54 AM 11/22/2022     2:53 PM   Depression Patient Health Questionnaire   Over the last two weeks how often have you been bothered by little interest or pleasure in doing things Not at all Not at all Not at all Not at all Not at all  Not at all  Not at all     Over the last two weeks how often have you been bothered by feeling down, depressed or hopeless Not at all Not at all Not at all Not at all Not at all Not at all Not at all   PHQ-2 Total Score 0 0 0 0 0 0 0       Data saved with a previous flowsheet row definition       Learning Assessment       07/15/2025 1730 [facility] (7/15/2025 - Present)   Created by Jazmine Newby RN -  (Nurse) Status: Complete                 PRIMARY LEARNER     Primary Learner Name:  Shelly Vásquez  - 07/15/2025 1730    Relationship:  Patient  - 07/15/2025 1730    Does the primary learner have any barriers to learning?:  No Barriers  - 07/15/2025 1730    What is the preferred language of the primary learner?:  English  - 07/15/2025 1730    Is an  required?:  No  - 07/15/2025 1730    How does the primary learner prefer to learn new concepts?:  Listening  - 07/15/2025 1730    How often do you need to have someone help you read instructions, pamphlets, or written material from your doctor or pharmacy?:  Never  - 07/15/2025 1730        CO-LEARNER #1     No question answered        CO-LEARNER #2     No question answered        SPECIAL TOPICS     No question answered        ANSWERED BY:     No question answered        Comments         Edit History       Jazmine Newby, RN -  (Nurse)   07/15/2025 1730

## 2025-07-22 ENCOUNTER — OUTPATIENT CASE MANAGEMENT (OUTPATIENT)
Dept: ADMINISTRATIVE | Facility: OTHER | Age: 85
End: 2025-07-22
Payer: MEDICARE

## 2025-07-23 ENCOUNTER — TELEPHONE (OUTPATIENT)
Dept: INTERNAL MEDICINE | Facility: CLINIC | Age: 85
End: 2025-07-23
Payer: MEDICARE

## 2025-07-23 ENCOUNTER — PATIENT OUTREACH (OUTPATIENT)
Dept: ADMINISTRATIVE | Facility: OTHER | Age: 85
End: 2025-07-23
Payer: MEDICARE

## 2025-07-23 NOTE — PROGRESS NOTES
Community Health Worker attempted to contact patient via telephone to assist with SDOH. Left a voicemail message on patient's telephone number .  Will attempt again at a later date.

## 2025-07-23 NOTE — TELEPHONE ENCOUNTER
Can we schedule her bone density sometime at the end of August and then I would like to see her in [place] back to back in September for their annuals

## 2025-07-24 ENCOUNTER — HOSPITAL ENCOUNTER (OUTPATIENT)
Dept: CARDIOLOGY | Facility: HOSPITAL | Age: 85
Discharge: HOME | End: 2025-07-24
Attending: INTERNAL MEDICINE
Payer: MEDICARE

## 2025-07-24 VITALS
HEIGHT: 68 IN | TEMPERATURE: 72 F | SYSTOLIC BLOOD PRESSURE: 122 MMHG | WEIGHT: 160.94 LBS | BODY MASS INDEX: 24.39 KG/M2 | DIASTOLIC BLOOD PRESSURE: 70 MMHG

## 2025-07-24 DIAGNOSIS — I48.0 PAROXYSMAL ATRIAL FIBRILLATION: ICD-10-CM

## 2025-07-24 LAB
AORTIC SIZE INDEX (SOV): 1.7 CM/M2
AORTIC SIZE INDEX: 1.7 CM/M2
ASCENDING AORTA: 3.2 CM
AV AREA BY CONTINUOUS VTI: 1.7 CM2
AV INDEX (PROSTH): 0.51
AV LVOT MEAN GRADIENT: 3 MMHG
AV LVOT PEAK GRADIENT: 6 MMHG
AV MEAN GRADIENT: 13 MMHG
AV PEAK GRADIENT: 23 MMHG
AV VALVE AREA BY VELOCITY RATIO: 1.7 CM²
AV VALVE AREA: 1.7 CM2
AV VELOCITY RATIO: 0.5
BSA FOR ECHO PROCEDURE: 1.87 M2
CV ECHO LV RWT: 0.35 CM
DOP CALC AO PEAK VEL: 2.4 M/S
DOP CALC AO VTI: 56.8 CM
DOP CALC LVOT AREA: 3.5 CM2
DOP CALC LVOT DIAMETER: 2.1 CM
DOP CALC LVOT PEAK VEL: 1.2 M/S
DOP CALC LVOT STROKE VOLUME: 99.4 CM3
DOP CALCLVOT PEAK VEL VTI: 28.7 CM
E WAVE DECELERATION TIME: 159 MS
E/A RATIO: 0.9
E/E' RATIO: 10 M/S
ECHO EF ESTIMATED: 59 %
ECHO LV POSTERIOR WALL: 0.9 CM (ref 0.6–1.1)
FRACTIONAL SHORTENING: 31.4 % (ref 28–44)
GLOBAL LONGITUIDAL STRAIN: 20 %
INTERVENTRICULAR SEPTUM: 0.9 CM (ref 0.6–1.1)
IVC DIAMETER: 1.68 CM
LA MAJOR: 6.6 CM
LA MINOR: 6.6 CM
LA WIDTH: 4.5 CM
LEFT ATRIUM SIZE: 3.7 CM
LEFT ATRIUM VOLUME INDEX MOD: 57 ML/M2
LEFT ATRIUM VOLUME INDEX: 50 ML/M2
LEFT ATRIUM VOLUME MOD: 106 ML
LEFT ATRIUM VOLUME: 93 CM3
LEFT INTERNAL DIMENSION IN SYSTOLE: 3.5 CM (ref 2.1–4)
LEFT VENTRICLE DIASTOLIC VOLUME INDEX: 65.59 ML/M2
LEFT VENTRICLE DIASTOLIC VOLUME: 122 ML
LEFT VENTRICLE MASS INDEX: 87.9 G/M2
LEFT VENTRICLE SYSTOLIC VOLUME INDEX: 26.3 ML/M2
LEFT VENTRICLE SYSTOLIC VOLUME: 49 ML
LEFT VENTRICULAR INTERNAL DIMENSION IN DIASTOLE: 5.1 CM (ref 3.5–6)
LEFT VENTRICULAR MASS: 163.6 G
LV LATERAL E/E' RATIO: 10.4
LV SEPTAL E/E' RATIO: 10.4
Lab: 1.9 CM/M
Lab: 1.9 CM/M
MV A" WAVE DURATION": 91.34 MS
MV PEAK A VEL: 0.81 M/S
MV PEAK E VEL: 0.73 M/S
OHS CV CPX PATIENT HEIGHT IN: 68
OHS CV RV/LV RATIO: 0.75 CM
OHS LV EJECTION FRACTION SIMPSONS BIPLANE MOD: 59 %
PISA TR MAX VEL: 2.6 M/S
PULM VEIN A" WAVE DURATION": 91.34 MS
PULM VEIN S/D RATIO: 1.22
PULMONIC VEIN PEAK A VELOCITY: 0.3 M/S
PV PEAK D VEL: 0.45 M/S
PV PEAK S VEL: 0.55 M/S
RA MAJOR: 5.09 CM
RA PRESSURE ESTIMATED: 3 MMHG
RA WIDTH: 3.59 CM
RIGHT ATRIAL AREA: 17 CM2
RIGHT ATRIUM END SYSTOLIC VOLUME APICAL 4 CHAMBER INDEX BSA: 24.09 ML/M2
RIGHT ATRIUM VOLUME AREA LENGTH APICAL 4 CHAMBER: 44.8 ML
RIGHT VENTRICLE DIASTOLIC BASEL DIMENSION: 3.8 CM
RV TB RVSP: 6 MMHG
RV TISSUE DOPPLER FREE WALL SYSTOLIC VELOCITY 1 (APICAL 4 CHAMBER VIEW): 14.39 CM/S
SINUS: 3.2 CM
STJ: 2.7 CM
TDI LATERAL: 0.07 M/S
TDI SEPTAL: 0.07 M/S
TDI: 0.07 M/S
TRICUSPID ANNULAR PLANE SYSTOLIC EXCURSION: 3.2 CM
TV PEAK GRADIENT: 27 MMHG
TV REST PULMONARY ARTERY PRESSURE: 30 MMHG
Z-SCORE OF LEFT VENTRICULAR DIMENSION IN END DIASTOLE: -0.15
Z-SCORE OF LEFT VENTRICULAR DIMENSION IN END SYSTOLE: 0.73

## 2025-07-24 PROCEDURE — 93306 TTE W/DOPPLER COMPLETE: CPT

## 2025-07-29 ENCOUNTER — PATIENT OUTREACH (OUTPATIENT)
Dept: ADMINISTRATIVE | Facility: OTHER | Age: 85
End: 2025-07-29
Payer: MEDICARE

## 2025-07-29 NOTE — PROGRESS NOTES
This Community Health Worker (CHW) completed Social Determinant of Health (SDOH)  Questionnaire with patient/caregiver via telephone today.  Notified OPCM CM Jazmine Newby RN of completion.      Patient denied any SDOH needs at this time.        Discharged

## 2025-07-31 ENCOUNTER — EXTERNAL CHRONIC CARE MANAGEMENT (OUTPATIENT)
Dept: PRIMARY CARE CLINIC | Facility: CLINIC | Age: 85
End: 2025-07-31
Payer: MEDICARE

## 2025-07-31 PROCEDURE — 99439 CHRNC CARE MGMT STAF EA ADDL: CPT | Mod: S$PBB,,, | Performed by: INTERNAL MEDICINE

## 2025-07-31 PROCEDURE — 99490 CHRNC CARE MGMT STAFF 1ST 20: CPT | Mod: S$PBB,,, | Performed by: INTERNAL MEDICINE

## 2025-07-31 PROCEDURE — 99490 CHRNC CARE MGMT STAFF 1ST 20: CPT | Mod: PBBFAC | Performed by: INTERNAL MEDICINE

## 2025-07-31 PROCEDURE — 99439 CHRNC CARE MGMT STAF EA ADDL: CPT | Mod: PBBFAC | Performed by: INTERNAL MEDICINE

## 2025-08-01 ENCOUNTER — OUTPATIENT CASE MANAGEMENT (OUTPATIENT)
Dept: ADMINISTRATIVE | Facility: OTHER | Age: 85
End: 2025-08-01
Payer: MEDICARE

## 2025-08-06 ENCOUNTER — TELEPHONE (OUTPATIENT)
Dept: SPORTS MEDICINE | Facility: CLINIC | Age: 85
End: 2025-08-06
Payer: MEDICARE

## 2025-08-06 NOTE — TELEPHONE ENCOUNTER
Called patient to get more information about her pain. Was able to provide an appointment date for the patient if she wanted to proceed but informed patient her pain sounded more neurological and not muscular. Patient agreed and said she was going to see a new neurologist and get an MRI. I advised patient that was a good plan and she can always call back to get scheduled if need be. Patient agreed.     Mary Henry, MS, OTC  Clinical Assistant to Dr. Aidan Yang MD  [facility]      ----- Message from Catrachita sent at 8/6/2025  3:24 PM CDT -----  Regarding: Appointment Request  Good afternoon,  Pt is asking to see a provider for pain in her thigh. She is a patient of both Derrick Yang and Rodrigo. Would either of these providers be able to see her?  Thank you,  Catrachita

## 2025-08-11 DIAGNOSIS — I47.10 PSVT (PAROXYSMAL SUPRAVENTRICULAR TACHYCARDIA): Primary | ICD-10-CM

## 2025-08-11 DIAGNOSIS — I48.0 PAF (PAROXYSMAL ATRIAL FIBRILLATION): ICD-10-CM

## 2025-08-12 ENCOUNTER — OUTPATIENT CASE MANAGEMENT (OUTPATIENT)
Dept: ADMINISTRATIVE | Facility: OTHER | Age: 85
End: 2025-08-12
Payer: MEDICARE

## 2025-08-14 ENCOUNTER — HOSPITAL ENCOUNTER (OUTPATIENT)
Dept: CARDIOLOGY | Facility: CLINIC | Age: 85
Discharge: HOME | End: 2025-08-14
Payer: MEDICARE

## 2025-08-14 ENCOUNTER — OFFICE VISIT (OUTPATIENT)
Dept: ELECTROPHYSIOLOGY | Facility: CLINIC | Age: 85
End: 2025-08-14
Payer: MEDICARE

## 2025-08-14 VITALS
HEART RATE: 68 BPM | WEIGHT: 162.25 LBS | DIASTOLIC BLOOD PRESSURE: 64 MMHG | BODY MASS INDEX: 24.59 KG/M2 | SYSTOLIC BLOOD PRESSURE: 126 MMHG | HEIGHT: 68 IN

## 2025-08-14 DIAGNOSIS — I10 PRIMARY HYPERTENSION: ICD-10-CM

## 2025-08-14 DIAGNOSIS — M48.061 SPINAL STENOSIS OF LUMBAR REGION WITHOUT NEUROGENIC CLAUDICATION: ICD-10-CM

## 2025-08-14 DIAGNOSIS — E78.2 MIXED HYPERLIPIDEMIA: ICD-10-CM

## 2025-08-14 DIAGNOSIS — I47.10 PSVT (PAROXYSMAL SUPRAVENTRICULAR TACHYCARDIA): ICD-10-CM

## 2025-08-14 DIAGNOSIS — Z98.890 HISTORY OF LOOP RECORDER: ICD-10-CM

## 2025-08-14 DIAGNOSIS — G89.29 CHRONIC BILATERAL LOW BACK PAIN WITHOUT SCIATICA: ICD-10-CM

## 2025-08-14 DIAGNOSIS — I47.19 AVNRT (AV NODAL RE-ENTRY TACHYCARDIA): Primary | ICD-10-CM

## 2025-08-14 DIAGNOSIS — I35.0 NONRHEUMATIC AORTIC VALVE STENOSIS: ICD-10-CM

## 2025-08-14 DIAGNOSIS — M54.50 CHRONIC BILATERAL LOW BACK PAIN WITHOUT SCIATICA: ICD-10-CM

## 2025-08-14 DIAGNOSIS — Z98.890 HISTORY OF CARDIAC RADIOFREQUENCY ABLATION: ICD-10-CM

## 2025-08-14 DIAGNOSIS — Z87.11 HISTORY OF GASTRIC ULCER: ICD-10-CM

## 2025-08-14 DIAGNOSIS — Z87.19 HISTORY OF GI BLEED: ICD-10-CM

## 2025-08-14 DIAGNOSIS — M15.0 PRIMARY OSTEOARTHRITIS INVOLVING MULTIPLE JOINTS: Chronic | ICD-10-CM

## 2025-08-14 DIAGNOSIS — I48.0 PAF (PAROXYSMAL ATRIAL FIBRILLATION): ICD-10-CM

## 2025-08-14 DIAGNOSIS — K21.9 GASTROESOPHAGEAL REFLUX DISEASE, UNSPECIFIED WHETHER ESOPHAGITIS PRESENT: ICD-10-CM

## 2025-08-14 DIAGNOSIS — Z86.79 HISTORY OF ATRIAL FIBRILLATION: ICD-10-CM

## 2025-08-14 LAB
OHS QRS DURATION: 90 MS
OHS QTC CALCULATION: 393 MS

## 2025-08-14 PROCEDURE — 93005 ELECTROCARDIOGRAM TRACING: CPT | Mod: PBBFAC | Performed by: INTERNAL MEDICINE

## 2025-08-14 PROCEDURE — 99214 OFFICE O/P EST MOD 30 MIN: CPT | Mod: PBBFAC,25 | Performed by: STUDENT IN AN ORGANIZED HEALTH CARE EDUCATION/TRAINING PROGRAM

## 2025-08-14 PROCEDURE — 93010 ELECTROCARDIOGRAM REPORT: CPT | Mod: S$PBB,,, | Performed by: INTERNAL MEDICINE

## 2025-08-14 PROCEDURE — 99205 OFFICE O/P NEW HI 60 MIN: CPT | Mod: S$PBB,,, | Performed by: STUDENT IN AN ORGANIZED HEALTH CARE EDUCATION/TRAINING PROGRAM

## 2025-08-14 PROCEDURE — 99999 PR PBB SHADOW E&M-EST. PATIENT-LVL IV: CPT | Mod: PBBFAC,,, | Performed by: STUDENT IN AN ORGANIZED HEALTH CARE EDUCATION/TRAINING PROGRAM

## 2025-08-16 PROBLEM — I35.0 NONRHEUMATIC AORTIC VALVE STENOSIS: Status: ACTIVE | Noted: 2025-08-16

## 2025-08-16 PROBLEM — Z98.890 HISTORY OF LOOP RECORDER: Status: ACTIVE | Noted: 2025-08-16

## 2025-08-16 PROBLEM — I47.19 AVNRT (AV NODAL RE-ENTRY TACHYCARDIA): Status: ACTIVE | Noted: 2025-08-16

## 2025-08-16 PROBLEM — Z87.11 HISTORY OF GASTRIC ULCER: Status: ACTIVE | Noted: 2025-08-16

## 2025-08-18 ENCOUNTER — LAB VISIT (OUTPATIENT)
Dept: LAB | Facility: HOSPITAL | Age: 85
End: 2025-08-18
Payer: MEDICARE

## 2025-08-18 ENCOUNTER — OFFICE VISIT (OUTPATIENT)
Dept: HEMATOLOGY/ONCOLOGY | Facility: CLINIC | Age: 85
End: 2025-08-18
Payer: MEDICARE

## 2025-08-18 VITALS
DIASTOLIC BLOOD PRESSURE: 57 MMHG | WEIGHT: 161.63 LBS | OXYGEN SATURATION: 96 % | HEIGHT: 68 IN | RESPIRATION RATE: 18 BRPM | BODY MASS INDEX: 24.49 KG/M2 | SYSTOLIC BLOOD PRESSURE: 117 MMHG | HEART RATE: 68 BPM | TEMPERATURE: 98 F

## 2025-08-18 DIAGNOSIS — Z79.890 POSTMENOPAUSAL HORMONE REPLACEMENT THERAPY: Chronic | ICD-10-CM

## 2025-08-18 DIAGNOSIS — D46.1 REFRACTORY ANEMIA WITH RING SIDEROBLASTS: ICD-10-CM

## 2025-08-18 DIAGNOSIS — D46.9 MDS (MYELODYSPLASTIC SYNDROME): Primary | ICD-10-CM

## 2025-08-18 DIAGNOSIS — D46.9 MDS (MYELODYSPLASTIC SYNDROME): ICD-10-CM

## 2025-08-18 LAB
ABSOLUTE EOSINOPHIL (OHS): 0.16 K/UL
ABSOLUTE MONOCYTE (OHS): 0.5 K/UL (ref 0.3–1)
ABSOLUTE NEUTROPHIL COUNT (OHS): 2.95 K/UL (ref 1.8–7.7)
ALBUMIN SERPL BCP-MCNC: 4.3 G/DL (ref 3.5–5.2)
ALP SERPL-CCNC: 37 UNIT/L (ref 40–150)
ALT SERPL W/O P-5'-P-CCNC: 14 UNIT/L (ref 0–55)
ANION GAP (OHS): 6 MMOL/L (ref 8–16)
AST SERPL-CCNC: 20 UNIT/L (ref 0–50)
BASOPHILS # BLD AUTO: 0.08 K/UL
BASOPHILS NFR BLD AUTO: 1.5 %
BILIRUB SERPL-MCNC: 1.2 MG/DL (ref 0.1–1)
BUN SERPL-MCNC: 19 MG/DL (ref 8–23)
CALCIUM SERPL-MCNC: 9.2 MG/DL (ref 8.7–10.5)
CHLORIDE SERPL-SCNC: 106 MMOL/L (ref 95–110)
CO2 SERPL-SCNC: 24 MMOL/L (ref 23–29)
CREAT SERPL-MCNC: 0.9 MG/DL (ref 0.5–1.4)
ERYTHROCYTE [DISTWIDTH] IN BLOOD BY AUTOMATED COUNT: 20.8 % (ref 11.5–14.5)
GFR SERPLBLD CREATININE-BSD FMLA CKD-EPI: >60 ML/MIN/1.73/M2
GLUCOSE SERPL-MCNC: 109 MG/DL (ref 70–110)
HCT VFR BLD AUTO: 21.7 % (ref 37–48.5)
HGB BLD-MCNC: 7.2 GM/DL (ref 12–16)
IMM GRANULOCYTES # BLD AUTO: 0.01 K/UL (ref 0–0.04)
IMM GRANULOCYTES NFR BLD AUTO: 0.2 % (ref 0–0.5)
LYMPHOCYTES # BLD AUTO: 1.78 K/UL (ref 1–4.8)
MAGNESIUM SERPL-MCNC: 2.1 MG/DL (ref 1.6–2.6)
MCH RBC QN AUTO: 36 PG (ref 27–31)
MCHC RBC AUTO-ENTMCNC: 33.2 G/DL (ref 32–36)
MCV RBC AUTO: 109 FL (ref 82–98)
NUCLEATED RBC (/100WBC) (OHS): 0 /100 WBC
PHOSPHATE SERPL-MCNC: 3.7 MG/DL (ref 2.7–4.5)
PLATELET # BLD AUTO: 520 K/UL (ref 150–450)
PMV BLD AUTO: 9.8 FL (ref 9.2–12.9)
POTASSIUM SERPL-SCNC: 4.6 MMOL/L (ref 3.5–5.1)
PROT SERPL-MCNC: 6.3 GM/DL (ref 6–8.4)
RBC # BLD AUTO: 2 M/UL (ref 4–5.4)
RELATIVE EOSINOPHIL (OHS): 2.9 %
RELATIVE LYMPHOCYTE (OHS): 32.5 % (ref 18–48)
RELATIVE MONOCYTE (OHS): 9.1 % (ref 4–15)
RELATIVE NEUTROPHIL (OHS): 53.8 % (ref 38–73)
SODIUM SERPL-SCNC: 136 MMOL/L (ref 136–145)
WBC # BLD AUTO: 5.48 K/UL (ref 3.9–12.7)

## 2025-08-18 PROCEDURE — 99999 PR PBB SHADOW E&M-EST. PATIENT-LVL IV: CPT | Mod: PBBFAC,,,

## 2025-08-18 PROCEDURE — G2211 COMPLEX E/M VISIT ADD ON: HCPCS | Mod: ,,,

## 2025-08-18 PROCEDURE — 99215 OFFICE O/P EST HI 40 MIN: CPT | Mod: S$PBB,,,

## 2025-08-18 PROCEDURE — 83735 ASSAY OF MAGNESIUM: CPT

## 2025-08-18 PROCEDURE — 82040 ASSAY OF SERUM ALBUMIN: CPT

## 2025-08-18 PROCEDURE — 36415 COLL VENOUS BLD VENIPUNCTURE: CPT

## 2025-08-18 PROCEDURE — 84100 ASSAY OF PHOSPHORUS: CPT

## 2025-08-18 PROCEDURE — 99214 OFFICE O/P EST MOD 30 MIN: CPT | Mod: PBBFAC

## 2025-08-18 PROCEDURE — 85025 COMPLETE CBC W/AUTO DIFF WBC: CPT

## 2025-08-18 RX ORDER — ESTRADIOL AND NORETHINDRONE ACETATE .5; .1 MG/1; MG/1
1 TABLET ORAL DAILY
Qty: 84 TABLET | Refills: 3 | Status: SHIPPED | OUTPATIENT
Start: 2025-08-18

## 2025-08-21 DIAGNOSIS — D46.9 MDS (MYELODYSPLASTIC SYNDROME): Primary | ICD-10-CM

## 2025-08-22 DIAGNOSIS — M62.838 MUSCLE SPASM OF BOTH LOWER LEGS: ICD-10-CM

## 2025-08-22 RX ORDER — DIAZEPAM 2 MG/1
2 TABLET ORAL EVERY 12 HOURS PRN
Qty: 60 TABLET | Refills: 1 | Status: SHIPPED | OUTPATIENT
Start: 2025-08-22

## 2025-08-25 ENCOUNTER — HOSPITAL ENCOUNTER (OUTPATIENT)
Dept: RADIOLOGY | Facility: OTHER | Age: 85
Discharge: HOME | End: 2025-08-25
Attending: INTERNAL MEDICINE
Payer: MEDICARE

## 2025-08-25 DIAGNOSIS — Z78.0 ASYMPTOMATIC MENOPAUSE: ICD-10-CM

## 2025-08-25 PROCEDURE — 77080 DXA BONE DENSITY AXIAL: CPT | Mod: TC

## 2025-08-25 PROCEDURE — 77080 DXA BONE DENSITY AXIAL: CPT | Mod: 26,,, | Performed by: RADIOLOGY

## 2025-08-27 ENCOUNTER — INFUSION (OUTPATIENT)
Dept: INFUSION THERAPY | Facility: HOSPITAL | Age: 85
End: 2025-08-27
Payer: MEDICARE

## 2025-08-27 VITALS — HEART RATE: 63 BPM | DIASTOLIC BLOOD PRESSURE: 64 MMHG | SYSTOLIC BLOOD PRESSURE: 134 MMHG

## 2025-08-27 DIAGNOSIS — D46.9 MDS (MYELODYSPLASTIC SYNDROME): Primary | ICD-10-CM

## 2025-08-27 DIAGNOSIS — D46.1 REFRACTORY ANEMIA WITH RINGED SIDEROBLASTS: ICD-10-CM

## 2025-08-27 PROCEDURE — 96372 THER/PROPH/DIAG INJ SC/IM: CPT

## 2025-09-02 ENCOUNTER — OUTPATIENT CASE MANAGEMENT (OUTPATIENT)
Dept: ADMINISTRATIVE | Facility: OTHER | Age: 85
End: 2025-09-02
Payer: MEDICARE

## 2025-09-04 DIAGNOSIS — G25.81 RLS (RESTLESS LEGS SYNDROME): ICD-10-CM

## 2025-09-04 DIAGNOSIS — K21.9 GASTROESOPHAGEAL REFLUX DISEASE, UNSPECIFIED WHETHER ESOPHAGITIS PRESENT: ICD-10-CM

## 2025-09-04 DIAGNOSIS — D64.9 SEVERE ANEMIA: Primary | ICD-10-CM

## 2025-09-05 RX ORDER — PANTOPRAZOLE SODIUM 40 MG/1
40 TABLET, DELAYED RELEASE ORAL 2 TIMES DAILY
Qty: 180 TABLET | Refills: 1 | Status: SHIPPED | OUTPATIENT
Start: 2025-09-05

## 2025-09-05 RX ORDER — PRAMIPEXOLE DIHYDROCHLORIDE 1 MG/1
1 TABLET ORAL 2 TIMES DAILY
Qty: 180 TABLET | Refills: 3 | Status: SHIPPED | OUTPATIENT
Start: 2025-09-05

## (undated) DEVICE — CONTAINER SPECIMEN STRL 4OZ

## (undated) DEVICE — DERMABOND SKIN ADHESIVE PROPEN

## (undated) DEVICE — HOOD T-5 TEAR AWAY STERILE

## (undated) DEVICE — BLADE SAGITTAL 18 X 1.27 X 90M

## (undated) DEVICE — DRESSING LEUKOPLAST FLEX 1X3IN

## (undated) DEVICE — GLOVE BIOGEL SKINSENSE PI 7.5

## (undated) DEVICE — SUT VICRYL PLUS 3-0 SH 18IN

## (undated) DEVICE — SEE MEDLINE ITEM 157128

## (undated) DEVICE — TOWEL OR XRAY WHITE 17X26IN

## (undated) DEVICE — COVER MAYO STND XL 30X57IN

## (undated) DEVICE — DRAPE C-ARMOR EQUIPMENT COVER

## (undated) DEVICE — BLADE ELECTRO EDGE INSULATED

## (undated) DEVICE — DRAPE INCISE IOBAN 2 23X17IN

## (undated) DEVICE — GLOVE BIOGEL ECLIPSE SZ 7

## (undated) DEVICE — PACK DRAPE UNIVERSAL CONVERTOR

## (undated) DEVICE — ADHESIVE DERMABOND ADVANCED

## (undated) DEVICE — CARTRIDGE OIL

## (undated) DEVICE — SUT MONOCYRL 4-0 PS2 UND

## (undated) DEVICE — SUT VICRYL PLUS 0 CT1 18IN

## (undated) DEVICE — ALCOHOL 70% ISOP W/GREEN 16OZ

## (undated) DEVICE — KIT TOTAL HIP HPOFH OMC

## (undated) DEVICE — HOOD T7 W/ PEEL AWAY LENS

## (undated) DEVICE — DRESSING TRANS 4X4 TEGADERM

## (undated) DEVICE — SYS CLSR DERMABOND PRINEO 22CM

## (undated) DEVICE — SCRUB HIBICLENS 4% CHG 4OZ

## (undated) DEVICE — SYR 30CC LUER LOCK

## (undated) DEVICE — DRAPE OPMI STERILE

## (undated) DEVICE — BANDAGE ADHESIVE

## (undated) DEVICE — SUT 2/0 36IN COATED VICRYL

## (undated) DEVICE — BNDG COFLEX FOAM LF2 ST 4X5YD

## (undated) DEVICE — KIT BONE CEMENT PREPARATION

## (undated) DEVICE — SUT VICRYL PLUS 2-0 CT1 18

## (undated) DEVICE — MARKER SKIN STND TIP BLUE BARR

## (undated) DEVICE — DRESSING AQUACEL AG RBBN 2X45

## (undated) DEVICE — NDL SPINAL 20GX3.5 HUB

## (undated) DEVICE — SOL BETADINE 5%

## (undated) DEVICE — DRESSING TELFA STRL 4X3 LF

## (undated) DEVICE — Device

## (undated) DEVICE — KIT PT CARE HANA PROFX SSXT

## (undated) DEVICE — KIT IRR SUCTION HND PIECE

## (undated) DEVICE — SUT MONOCRYL 3-0 PS-2 UND

## (undated) DEVICE — SEE MEDLINE ITEM 146313

## (undated) DEVICE — DRAPE STERI INSTRUMENT 1018

## (undated) DEVICE — COVER SNAP 36IN X 30IN

## (undated) DEVICE — NDL SPINAL 18GX3.5 SPINOCAN

## (undated) DEVICE — DRAPE THREE-QTR REINF 53X77IN

## (undated) DEVICE — COVER MAYO STAND REINFRCD 30

## (undated) DEVICE — DIFFUSER

## (undated) DEVICE — UNDERGLOVES BIOGEL PI SZ 7 LF

## (undated) DEVICE — TAPE CURAD SILK ADH 3INX10YD

## (undated) DEVICE — DRESSING MEPORE ISLAND 31/2X4

## (undated) DEVICE — CATH IV CATHLON W/HUB14GAX

## (undated) DEVICE — SOL 9P NACL IRR PIC IL

## (undated) DEVICE — SYS REVOLUTION CEMENT MIXING

## (undated) DEVICE — GLOVE BIOGEL SKINSENSE PI 7.0

## (undated) DEVICE — DRAPE C-ARM ELAS CLIP 42X120IN

## (undated) DEVICE — TRAY DRY SKIN SCRUB PREP

## (undated) DEVICE — SOL POVIDONE SCRUB IODINE 4 OZ

## (undated) DEVICE — PAD CURAD NONADH 3X4IN

## (undated) DEVICE — GAUZE SPONGE 4X4 12PLY

## (undated) DEVICE — NDL 18GA X1 1/2 REG BEVEL

## (undated) DEVICE — SEE MEDLINE ITEM 157150

## (undated) DEVICE — SEE MEDLINE ITEM 157117

## (undated) DEVICE — GLOVE BIOGEL ORTHOPEDIC 8

## (undated) DEVICE — DRESSING AQUACEL RIBBON 2X45CM

## (undated) DEVICE — GLASSES EYE PROTECTIVE

## (undated) DEVICE — DRAPE IOBAN 2 STERI

## (undated) DEVICE — DRAPE STERI-DRAPE 1000 17X11IN

## (undated) DEVICE — ELECTRODE REM PLYHSV RETURN 9

## (undated) DEVICE — SYR SLIP TIP 10ML SHIELD

## (undated) DEVICE — DRAPE ABDOMINAL TIBURON 14X11

## (undated) DEVICE — SEALER AQUAMANTYS 3.48MM

## (undated) DEVICE — BUR BONE CUT MICRO TPS 3X3.8MM

## (undated) DEVICE — DRESSING TELFA N ADH 3X8

## (undated) DEVICE — SUT 1 36IN COATED VICRYL UN